# Patient Record
Sex: FEMALE | Race: WHITE | URBAN - METROPOLITAN AREA
[De-identification: names, ages, dates, MRNs, and addresses within clinical notes are randomized per-mention and may not be internally consistent; named-entity substitution may affect disease eponyms.]

---

## 2022-01-19 NOTE — ASU PATIENT PROFILE, ADULT - NSICDXPASTMEDICALHX_GEN_ALL_CORE_FT
PAST MEDICAL HISTORY:  Diverticulitis     High cholesterol     History of diverticulitis     Hypertension     Hypothyroidism

## 2022-01-19 NOTE — ASU PATIENT PROFILE, ADULT - NSICDXPASTSURGICALHX_GEN_ALL_CORE_FT
PAST SURGICAL HISTORY:  H/O bilateral oophorectomy     History of arthroscopy     History of laparotomy     History of tonsillectomy     S/P surgical removal of pilonidal cyst

## 2022-01-19 NOTE — ASU PATIENT PROFILE, ADULT - FALL HARM RISK - UNIVERSAL INTERVENTIONS
Bed in lowest position, wheels locked, appropriate side rails in place/Call bell, personal items and telephone in reach/Instruct patient to call for assistance before getting out of bed or chair/Non-slip footwear when patient is out of bed/[place] to call system/Physically safe environment - no spills, clutter or unnecessary equipment/Purposeful Proactive Rounding/Room/bathroom lighting operational, light cord in reach

## 2022-01-19 NOTE — ASU PATIENT PROFILE, ADULT - CAREGIVER PHONE NUMBER
Verified Results  * CT CHEST ABDOMEN PELVIS W CONTRAST [id number] 08:35AM [employer] Order Number: [id number]    - Patient Instructions: To schedule this appointment, please contact Central Scheduling at [phone]  Test Name Result Flag Reference   CT CHEST ABDOMEN PELVIS W CONTRAST (Report)     Additionally, delayed helical CT images of the abdomen were acquired  There is normal opacification of the renal collecting system and proximal ureters, without filling defect  The addendum does not change the impression of the original report  CT CHEST, ABDOMEN AND PELVIS WITH IV CONTRAST     INDICATION: 61-year-old woman with solitary pulmonary nodule and weight loss  COMPARISON: Chest CT 3/12/2015  Chest CT 3/21/2013  TECHNIQUE: CT examination of the chest, abdomen and pelvis was performed  This examination, like all CT scans performed in the [facility], was performed utilizing techniques to minimize radiation dose exposure, including the use of    iterative reconstruction and automated exposure control  Axial, sagittal, and coronal reformatted images were submitted for interpretation  100 cc of intravenous Omnipaque 350 was administered for this examination  Enteric contrast was administered  FINDINGS:     CHEST     LUNGS: 2 mm left lobe pulmonary nodule is unchanged since 2013 (series 3 image 29)  The irregular signal near nodule within the left lower lobe is unchanged since 2013 (series 3 image 44)  There are no new or suspicious pulmonary nodules  There is no   tracheal or endobronchial lesion  PLEURA: Unremarkable  HEART/GREAT VESSELS: Unremarkable for patient's age  Normal variant branching pattern of the aortic arch with left vertebral artery arising directly from the arch  MEDIASTINUM AND ISABEL: Unremarkable  CHEST WALL AND LOWER NECK: Unremarkable       ABDOMEN     LIVER/BILIARY TREE: A few hepatic hypodensities are too small to categorize, but are statistically benign representing cysts and are unchanged  GALLBLADDER: No calcified gallstones  No pericholecystic inflammatory change  SPLEEN: Unremarkable  PANCREAS: Unremarkable  ADRENAL GLANDS: Thickening of the left adrenal gland is unchanged which may represent adrenal hyperplasia  Right adrenal is unremarkable  KIDNEYS/URETERS: Unremarkable  No hydronephrosis  STOMACH AND BOWEL: Sigmoid and left colon diverticulosis without evidence of diverticulitis  APPENDIX: A normal appendix was visualized  ABDOMINOPELVIC CAVITY: No ascites or free intraperitoneal air  No lymphadenopathy  VESSELS: Unremarkable for patient's age  PELVIS     REPRODUCTIVE ORGANS: Unremarkable for patient's age  URINARY BLADDER: Unremarkable  ABDOMINAL WALL/INGUINAL REGIONS: Unremarkable  OSSEOUS STRUCTURES: There are no osseous aggressive lesions  Anterior compression deformity of T11 with 20% loss of height anteriorly is unchanged  IMPRESSION:   Chest:   1  Bilateral pulmonary nodules are unchanged since 2013 and radiographically benign  Abdomen:   1  No acute inflammatory process in the abdomen or pelvis  2  Sigmoid diverticulosis without diverticulitis  3  Anterior compression deformity of T11 with 20% loss of height anteriorly is unchanged         Workstation performed: [id number]     Signed by:   Mary Miller MD   8/17/16   100 [phone]

## 2022-01-20 ENCOUNTER — OUTPATIENT (OUTPATIENT)
Dept: INPATIENT UNIT | Facility: HOSPITAL | Age: 77
LOS: 1 days | Discharge: HOME | End: 2022-01-20
Payer: MEDICARE

## 2022-01-20 VITALS
DIASTOLIC BLOOD PRESSURE: 75 MMHG | HEART RATE: 97 BPM | TEMPERATURE: 98 F | WEIGHT: 125 LBS | OXYGEN SATURATION: 100 % | SYSTOLIC BLOOD PRESSURE: 124 MMHG | HEIGHT: 63 IN | RESPIRATION RATE: 16 BRPM

## 2022-01-20 VITALS
SYSTOLIC BLOOD PRESSURE: 127 MMHG | OXYGEN SATURATION: 100 % | HEART RATE: 97 BPM | RESPIRATION RATE: 16 BRPM | DIASTOLIC BLOOD PRESSURE: 83 MMHG | TEMPERATURE: 98 F

## 2022-01-20 DIAGNOSIS — Z98.890 OTHER SPECIFIED POSTPROCEDURAL STATES: Chronic | ICD-10-CM

## 2022-01-20 DIAGNOSIS — R16.0 HEPATOMEGALY, NOT ELSEWHERE CLASSIFIED: ICD-10-CM

## 2022-01-20 DIAGNOSIS — Z90.89 ACQUIRED ABSENCE OF OTHER ORGANS: Chronic | ICD-10-CM

## 2022-01-20 DIAGNOSIS — Z90.722 ACQUIRED ABSENCE OF OVARIES, BILATERAL: Chronic | ICD-10-CM

## 2022-01-20 DIAGNOSIS — C22.0 LIVER CELL CARCINOMA: ICD-10-CM

## 2022-01-20 LAB
ANION GAP SERPL CALC-SCNC: 8 MMOL/L — SIGNIFICANT CHANGE UP (ref 5–17)
BUN SERPL-MCNC: 25 MG/DL — HIGH (ref 7–23)
CALCIUM SERPL-MCNC: 10.3 MG/DL — HIGH (ref 8.5–10.1)
CHLORIDE SERPL-SCNC: 106 MMOL/L — SIGNIFICANT CHANGE UP (ref 96–108)
CO2 SERPL-SCNC: 24 MMOL/L — SIGNIFICANT CHANGE UP (ref 22–31)
CREAT SERPL-MCNC: 1.18 MG/DL — SIGNIFICANT CHANGE UP (ref 0.5–1.3)
GLUCOSE SERPL-MCNC: 97 MG/DL — SIGNIFICANT CHANGE UP (ref 70–99)
HCT VFR BLD CALC: 36.9 % — SIGNIFICANT CHANGE UP (ref 34.5–45)
HGB BLD-MCNC: 12 G/DL — SIGNIFICANT CHANGE UP (ref 11.5–15.5)
INR BLD: 1.05 RATIO — SIGNIFICANT CHANGE UP (ref 0.88–1.16)
MCHC RBC-ENTMCNC: 28.4 PG — SIGNIFICANT CHANGE UP (ref 27–34)
MCHC RBC-ENTMCNC: 32.5 GM/DL — SIGNIFICANT CHANGE UP (ref 32–36)
MCV RBC AUTO: 87.4 FL — SIGNIFICANT CHANGE UP (ref 80–100)
PLATELET # BLD AUTO: 348 K/UL — SIGNIFICANT CHANGE UP (ref 150–400)
POTASSIUM SERPL-MCNC: 4.2 MMOL/L — SIGNIFICANT CHANGE UP (ref 3.5–5.3)
POTASSIUM SERPL-SCNC: 4.2 MMOL/L — SIGNIFICANT CHANGE UP (ref 3.5–5.3)
PROTHROM AB SERPL-ACNC: 12.1 SEC — SIGNIFICANT CHANGE UP (ref 10.6–13.6)
RBC # BLD: 4.22 M/UL — SIGNIFICANT CHANGE UP (ref 3.8–5.2)
RBC # FLD: 13.2 % — SIGNIFICANT CHANGE UP (ref 10.3–14.5)
SODIUM SERPL-SCNC: 138 MMOL/L — SIGNIFICANT CHANGE UP (ref 135–145)
WBC # BLD: 8.89 K/UL — SIGNIFICANT CHANGE UP (ref 3.8–10.5)
WBC # FLD AUTO: 8.89 K/UL — SIGNIFICANT CHANGE UP (ref 3.8–10.5)

## 2022-01-20 PROCEDURE — 88172 CYTP DX EVAL FNA 1ST EA SITE: CPT

## 2022-01-20 PROCEDURE — 88342 IMHCHEM/IMCYTCHM 1ST ANTB: CPT

## 2022-01-20 PROCEDURE — 88341 IMHCHEM/IMCYTCHM EA ADD ANTB: CPT | Mod: 26

## 2022-01-20 PROCEDURE — 36415 COLL VENOUS BLD VENIPUNCTURE: CPT

## 2022-01-20 PROCEDURE — 88333 PATH CONSLTJ SURG CYTO XM 1: CPT | Mod: 26

## 2022-01-20 PROCEDURE — 88342 IMHCHEM/IMCYTCHM 1ST ANTB: CPT | Mod: 26

## 2022-01-20 PROCEDURE — 88307 TISSUE EXAM BY PATHOLOGIST: CPT

## 2022-01-20 PROCEDURE — 88360 TUMOR IMMUNOHISTOCHEM/MANUAL: CPT

## 2022-01-20 PROCEDURE — 76942 ECHO GUIDE FOR BIOPSY: CPT | Mod: 26

## 2022-01-20 PROCEDURE — 88307 TISSUE EXAM BY PATHOLOGIST: CPT | Mod: 26

## 2022-01-20 PROCEDURE — 88341 IMHCHEM/IMCYTCHM EA ADD ANTB: CPT

## 2022-01-20 PROCEDURE — 47000 NEEDLE BIOPSY OF LIVER PERQ: CPT

## 2022-01-20 PROCEDURE — 93010 ELECTROCARDIOGRAM REPORT: CPT

## 2022-01-20 PROCEDURE — 85027 COMPLETE CBC AUTOMATED: CPT

## 2022-01-20 PROCEDURE — 76942 ECHO GUIDE FOR BIOPSY: CPT

## 2022-01-20 PROCEDURE — 80048 BASIC METABOLIC PNL TOTAL CA: CPT

## 2022-01-20 PROCEDURE — 88333 PATH CONSLTJ SURG CYTO XM 1: CPT | Mod: 59

## 2022-01-20 PROCEDURE — 93005 ELECTROCARDIOGRAM TRACING: CPT | Mod: XU

## 2022-01-20 PROCEDURE — 85610 PROTHROMBIN TIME: CPT

## 2022-01-20 RX ORDER — MEPERIDINE HYDROCHLORIDE 50 MG/ML
12.5 INJECTION INTRAMUSCULAR; INTRAVENOUS; SUBCUTANEOUS
Refills: 0 | Status: DISCONTINUED | OUTPATIENT
Start: 2022-01-20 | End: 2022-01-20

## 2022-01-20 RX ORDER — ONDANSETRON 8 MG/1
4 TABLET, FILM COATED ORAL ONCE
Refills: 0 | Status: COMPLETED | OUTPATIENT
Start: 2022-01-20 | End: 2022-01-20

## 2022-01-20 RX ORDER — HYDROMORPHONE HYDROCHLORIDE 2 MG/ML
0.5 INJECTION INTRAMUSCULAR; INTRAVENOUS; SUBCUTANEOUS
Refills: 0 | Status: DISCONTINUED | OUTPATIENT
Start: 2022-01-20 | End: 2022-01-20

## 2022-01-20 RX ORDER — OXYCODONE HYDROCHLORIDE 5 MG/1
5 TABLET ORAL ONCE
Refills: 0 | Status: DISCONTINUED | OUTPATIENT
Start: 2022-01-20 | End: 2022-01-20

## 2022-01-20 RX ORDER — SODIUM CHLORIDE 9 MG/ML
1000 INJECTION INTRAMUSCULAR; INTRAVENOUS; SUBCUTANEOUS
Refills: 0 | Status: DISCONTINUED | OUTPATIENT
Start: 2022-01-20 | End: 2022-01-20

## 2022-01-20 RX ORDER — ACETAMINOPHEN 500 MG
1000 TABLET ORAL ONCE
Refills: 0 | Status: COMPLETED | OUTPATIENT
Start: 2022-01-20 | End: 2022-01-20

## 2022-01-20 RX ORDER — ERGOCALCIFEROL 1.25 MG/1
1 CAPSULE ORAL
Qty: 0 | Refills: 0 | DISCHARGE

## 2022-01-20 RX ADMIN — ONDANSETRON 4 MILLIGRAM(S): 8 TABLET, FILM COATED ORAL at 11:17

## 2022-01-20 RX ADMIN — Medication 400 MILLIGRAM(S): at 09:12

## 2022-01-20 RX ADMIN — Medication 1000 MILLIGRAM(S): at 09:27

## 2022-01-20 RX ADMIN — ONDANSETRON 4 MILLIGRAM(S): 8 TABLET, FILM COATED ORAL at 11:54

## 2022-01-20 RX ADMIN — HYDROMORPHONE HYDROCHLORIDE 0.5 MILLIGRAM(S): 2 INJECTION INTRAMUSCULAR; INTRAVENOUS; SUBCUTANEOUS at 09:25

## 2022-01-20 RX ADMIN — HYDROMORPHONE HYDROCHLORIDE 0.5 MILLIGRAM(S): 2 INJECTION INTRAMUSCULAR; INTRAVENOUS; SUBCUTANEOUS at 09:40

## 2022-01-20 NOTE — ASU DISCHARGE PLAN (ADULT/PEDIATRIC) - NS MD DC FALL RISK RISK
For information on Fall & Injury Prevention, visit: [email or web address] OR  [email or web address] OR  https://www.cdc.gov/steadi/patient.html

## 2022-06-27 RX ORDER — FLUTICASONE PROPIONATE 50 MCG
SPRAY, SUSPENSION (ML) NASAL
COMMUNITY
Start: 2021-11-23

## 2022-06-27 RX ORDER — LEVOTHYROXINE SODIUM 0.03 MG/1
TABLET ORAL
COMMUNITY

## 2022-06-27 RX ORDER — OLMESARTAN MEDOXOMIL 20 MG/1
TABLET ORAL
COMMUNITY

## 2022-11-15 ENCOUNTER — OFFICE (OUTPATIENT)
Dept: URBAN - METROPOLITAN AREA CLINIC 35 | Facility: CLINIC | Age: 77
Setting detail: OPHTHALMOLOGY
End: 2022-11-15
Payer: MEDICARE

## 2022-11-15 VITALS — HEIGHT: 55 IN

## 2022-11-15 DIAGNOSIS — H43.392: ICD-10-CM

## 2022-11-15 DIAGNOSIS — H26.493: ICD-10-CM

## 2022-11-15 DIAGNOSIS — H35.361: ICD-10-CM

## 2022-11-15 DIAGNOSIS — Z96.1: ICD-10-CM

## 2022-11-15 PROCEDURE — 92014 COMPRE OPH EXAM EST PT 1/>: CPT | Performed by: OPHTHALMOLOGY

## 2022-11-15 ASSESSMENT — REFRACTION_CURRENTRX
OS_OVR_VA: 20/
OD_OVR_VA: 20/
OD_VPRISM_DIRECTION: SV
OS_VPRISM_DIRECTION: SV
OS_SPHERE: +3.25
OD_SPHERE: +3.25

## 2022-11-15 ASSESSMENT — REFRACTION_AUTOREFRACTION
OS_CYLINDER: -1.00
OS_SPHERE: PLANO
OD_CYLINDER: -1.50
OD_AXIS: 094
OD_SPHERE: -0.25
OS_AXIS: 069

## 2022-11-15 ASSESSMENT — SPHEQUIV_DERIVED
OS_SPHEQUIV: -1
OD_SPHEQUIV: -1
OD_SPHEQUIV: -1
OS_SPHEQUIV: -0.5

## 2022-11-15 ASSESSMENT — KERATOMETRY
METHOD_AUTO_MANUAL: AUTO
OD_AXISANGLE_DEGREES: 168
OD_K1POWER_DIOPTERS: 41.25
OS_K2POWER_DIOPTERS: 41.25
OS_AXISANGLE_DEGREES: 090
OD_K2POWER_DIOPTERS: 41.75
OS_K1POWER_DIOPTERS: 41.25

## 2022-11-15 ASSESSMENT — AXIALLENGTH_DERIVED
OD_AL: 24.7724
OS_AL: 24.6594
OD_AL: 24.7724
OS_AL: 24.874

## 2022-11-15 ASSESSMENT — REFRACTION_MANIFEST
OD_CYLINDER: -1.00
OD_AXIS: 090
OD_VA1: 20/20
OS_CYLINDER: -1.00
OS_CYLINDER: -0.50
OS_VA1: 20/20-
OS_SPHERE: -0.50
OD_SPHERE: -0.50
OS_AXIS: 090
OS_SPHERE: -0.25
OS_VA1: 20/40
OS_AXIS: 080

## 2022-11-15 ASSESSMENT — VISUAL ACUITY
OS_BCVA: 20/20
OD_BCVA: 20/20-3

## 2022-11-15 ASSESSMENT — TONOMETRY
OS_IOP_MMHG: 16
OD_IOP_MMHG: 16

## 2022-11-15 ASSESSMENT — CONFRONTATIONAL VISUAL FIELD TEST (CVF)
OD_FINDINGS: FULL
OS_FINDINGS: FULL

## 2023-05-25 PROBLEM — K57.92 DIVERTICULITIS OF INTESTINE, PART UNSPECIFIED, WITHOUT PERFORATION OR ABSCESS WITHOUT BLEEDING: Chronic | Status: ACTIVE | Noted: 2022-01-19

## 2023-05-25 PROBLEM — E03.9 HYPOTHYROIDISM, UNSPECIFIED: Chronic | Status: ACTIVE | Noted: 2022-01-19

## 2023-05-25 PROBLEM — Z00.00 ENCOUNTER FOR PREVENTIVE HEALTH EXAMINATION: Status: ACTIVE | Noted: 2023-05-25

## 2023-05-25 PROBLEM — I10 ESSENTIAL (PRIMARY) HYPERTENSION: Chronic | Status: ACTIVE | Noted: 2022-01-19

## 2023-05-25 PROBLEM — Z87.19 PERSONAL HISTORY OF OTHER DISEASES OF THE DIGESTIVE SYSTEM: Chronic | Status: ACTIVE | Noted: 2022-01-19

## 2023-05-25 PROBLEM — E78.00 PURE HYPERCHOLESTEROLEMIA, UNSPECIFIED: Chronic | Status: ACTIVE | Noted: 2022-01-19

## 2023-05-30 ENCOUNTER — APPOINTMENT (OUTPATIENT)
Dept: ORTHOPEDIC SURGERY | Facility: CLINIC | Age: 78
End: 2023-05-30
Payer: MEDICARE

## 2023-05-30 ENCOUNTER — NON-APPOINTMENT (OUTPATIENT)
Age: 78
End: 2023-05-30

## 2023-05-30 VITALS
BODY MASS INDEX: 22.84 KG/M2 | DIASTOLIC BLOOD PRESSURE: 67 MMHG | HEIGHT: 61 IN | SYSTOLIC BLOOD PRESSURE: 103 MMHG | HEART RATE: 79 BPM | WEIGHT: 121 LBS

## 2023-05-30 PROCEDURE — 72100 X-RAY EXAM L-S SPINE 2/3 VWS: CPT

## 2023-05-30 PROCEDURE — 99204 OFFICE O/P NEW MOD 45 MIN: CPT

## 2023-05-30 RX ORDER — METHYLPREDNISOLONE 4 MG/1
4 TABLET ORAL
Qty: 1 | Refills: 0 | Status: ACTIVE | COMMUNITY
Start: 2023-05-30 | End: 1900-01-01

## 2023-05-30 NOTE — DISCUSSION/SUMMARY
[de-identified] : Lumbar radiculopathy, degenerative disc disease, spondylosis\par \par Extensive discussion of the natural history of this issue was had with the patient.  We discussed the treatment options focusing on conservative therapy which includes anti-inflammatories, physical therapy/home exercise, & activity modification. \par -A prescription for a Medrol Dosepak with the appropriate warnings and side effects was given to the patient.  I explicitly discussed she must call her oncologist first to discuss if it is okay with him that she take this steroid prior to beginning this to ensure it does not interfere with her other medications.\par -Physical therapy prescription was given to the patient.\par Patient will follow-up in 1 to 2 months if the pain returns or does not improve.

## 2023-05-30 NOTE — HISTORY OF PRESENT ILLNESS
[de-identified] : 5/30/2023–patient presents with 3 weeks of low back pain rating down her right leg into her anterior thigh.  Also complaining of some numbness on her right shin.  States she woke up in the morning 1 morning with this pain.  Taking Advil for the pain.  However she is on Opdivo once a month for renal cell cancer was told she should not take this sparingly.  Did receive a shot of Toradol in urgent care 3 weeks ago which did not help.  Denies red flag symptoms.  Multiple allergies as listed in chart, denies tobacco use.  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

## 2023-05-30 NOTE — PHYSICAL EXAM
[de-identified] : General Appearance: normal without acute distress\par Mental: Alert and oriented x 3\par Psych/affect: appropriate, cooperative\par Gait: Normal gait\par \par Lumbar spine\par Palpation: Nontender to palpation\par Motor: 5/5 L2-S1\par Sensory: 2/2 L2-S1 except 1/2 L4 on right\par Straight leg raise: Negative\par Clonus: < 5 beats [de-identified] : 5/30/2023–AP lateral lumbar spine–scoliotic curvature with significant spondylosis and degenerative disc disease

## 2023-10-25 DIAGNOSIS — M54.16 RADICULOPATHY, LUMBAR REGION: ICD-10-CM

## 2023-11-16 ENCOUNTER — OFFICE (OUTPATIENT)
Dept: URBAN - METROPOLITAN AREA CLINIC 35 | Facility: CLINIC | Age: 78
Setting detail: OPHTHALMOLOGY
End: 2023-11-16
Payer: MEDICARE

## 2023-11-16 DIAGNOSIS — Z96.1: ICD-10-CM

## 2023-11-16 DIAGNOSIS — H43.393: ICD-10-CM

## 2023-11-16 DIAGNOSIS — H35.363: ICD-10-CM

## 2023-11-16 DIAGNOSIS — H26.493: ICD-10-CM

## 2023-11-16 PROCEDURE — 92012 INTRM OPH EXAM EST PATIENT: CPT | Performed by: OPHTHALMOLOGY

## 2023-11-16 PROCEDURE — 92250 FUNDUS PHOTOGRAPHY W/I&R: CPT | Performed by: OPHTHALMOLOGY

## 2023-11-16 ASSESSMENT — REFRACTION_MANIFEST
OS_SPHERE: -0.25
OD_VA1: 20/20
OD_AXIS: 090
OD_CYLINDER: -1.00
OS_AXIS: 090
OS_CYLINDER: -0.50
OD_SPHERE: -0.50
OS_VA1: 20/40
OS_SPHERE: -0.50
OS_AXIS: 080
OS_CYLINDER: -1.00
OS_VA1: 20/20-

## 2023-11-16 ASSESSMENT — REFRACTION_AUTOREFRACTION
OS_AXIS: 070
OD_SPHERE: -0.50
OS_CYLINDER: -1.00
OD_CYLINDER: -1.25
OD_AXIS: 088
OS_SPHERE: PLANO

## 2023-11-16 ASSESSMENT — SPHEQUIV_DERIVED
OS_SPHEQUIV: -0.5
OS_SPHEQUIV: -1
OD_SPHEQUIV: -1
OD_SPHEQUIV: -1.125

## 2023-11-16 ASSESSMENT — CONFRONTATIONAL VISUAL FIELD TEST (CVF)
OD_FINDINGS: FULL
OS_FINDINGS: FULL

## 2023-11-16 ASSESSMENT — REFRACTION_CURRENTRX
OS_SPHERE: +2.25
OD_SPHERE: +2.25
OD_OVR_VA: 20/
OS_OVR_VA: 20/
OS_VPRISM_DIRECTION: SV
OD_VPRISM_DIRECTION: SV

## 2023-12-05 ENCOUNTER — APPOINTMENT (OUTPATIENT)
Dept: VASCULAR SURGERY | Facility: CLINIC | Age: 78
End: 2023-12-05
Payer: MEDICARE

## 2023-12-05 DIAGNOSIS — M79.606 PAIN IN LEG, UNSPECIFIED: ICD-10-CM

## 2023-12-05 PROCEDURE — 93970 EXTREMITY STUDY: CPT

## 2023-12-05 PROCEDURE — 93922 UPR/L XTREMITY ART 2 LEVELS: CPT

## 2023-12-05 PROCEDURE — 99203 OFFICE O/P NEW LOW 30 MIN: CPT

## 2023-12-05 RX ORDER — GABAPENTIN 300 MG/1
300 CAPSULE ORAL DAILY
Qty: 30 | Refills: 0 | Status: ACTIVE | COMMUNITY
Start: 2023-12-05 | End: 1900-01-01

## 2023-12-05 RX ORDER — OLMESARTAN MEDOXOMIL 20 MG/1
20 TABLET, FILM COATED ORAL
Refills: 0 | Status: ACTIVE | COMMUNITY

## 2023-12-05 RX ORDER — LEVOTHYROXINE SODIUM 25 UG/1
25 TABLET ORAL
Refills: 0 | Status: ACTIVE | COMMUNITY

## 2023-12-08 PROBLEM — M79.606 LEG PAIN: Status: ACTIVE | Noted: 2023-12-05

## 2023-12-13 ENCOUNTER — OFFICE (OUTPATIENT)
Dept: URBAN - METROPOLITAN AREA CLINIC 35 | Facility: CLINIC | Age: 78
Setting detail: OPHTHALMOLOGY
End: 2023-12-13
Payer: MEDICARE

## 2023-12-13 DIAGNOSIS — H26.493: ICD-10-CM

## 2023-12-13 DIAGNOSIS — H11.31: ICD-10-CM

## 2023-12-13 DIAGNOSIS — Z96.1: ICD-10-CM

## 2023-12-13 PROCEDURE — 99213 OFFICE O/P EST LOW 20 MIN: CPT | Performed by: OPHTHALMOLOGY

## 2023-12-13 ASSESSMENT — REFRACTION_MANIFEST
OS_VA1: 20/40
OD_CYLINDER: -1.00
OS_AXIS: 080
OD_VA1: 20/20
OS_SPHERE: -0.25
OS_VA1: 20/20-
OS_CYLINDER: -1.00
OS_CYLINDER: -0.50
OD_AXIS: 090
OS_SPHERE: -0.50
OS_AXIS: 090
OD_SPHERE: -0.50

## 2023-12-13 ASSESSMENT — REFRACTION_CURRENTRX
OS_SPHERE: +2.25
OS_VPRISM_DIRECTION: SV
OD_VPRISM_DIRECTION: SV
OD_OVR_VA: 20/
OD_SPHERE: +2.25
OS_OVR_VA: 20/

## 2023-12-13 ASSESSMENT — SPHEQUIV_DERIVED
OS_SPHEQUIV: -1
OS_SPHEQUIV: -0.5
OD_SPHEQUIV: -1

## 2023-12-13 ASSESSMENT — CONFRONTATIONAL VISUAL FIELD TEST (CVF)
OD_FINDINGS: FULL
OS_FINDINGS: FULL

## 2023-12-17 RX ORDER — TIVOZANIB 1.34 MG/1
1 CAPSULE ORAL
Refills: 0 | DISCHARGE
Start: 2023-12-17

## 2023-12-20 ENCOUNTER — INPATIENT (INPATIENT)
Facility: HOSPITAL | Age: 78
LOS: 2 days | Discharge: HOME | DRG: 543 | End: 2023-12-23
Attending: INTERNAL MEDICINE | Admitting: HOSPITALIST
Payer: MEDICARE

## 2023-12-20 VITALS — WEIGHT: 138.01 LBS

## 2023-12-20 DIAGNOSIS — Z98.890 OTHER SPECIFIED POSTPROCEDURAL STATES: Chronic | ICD-10-CM

## 2023-12-20 DIAGNOSIS — Z90.89 ACQUIRED ABSENCE OF OTHER ORGANS: Chronic | ICD-10-CM

## 2023-12-20 DIAGNOSIS — Z90.722 ACQUIRED ABSENCE OF OVARIES, BILATERAL: Chronic | ICD-10-CM

## 2023-12-20 DIAGNOSIS — S32.000A WEDGE COMPRESSION FRACTURE OF UNSPECIFIED LUMBAR VERTEBRA, INITIAL ENCOUNTER FOR CLOSED FRACTURE: ICD-10-CM

## 2023-12-20 LAB
ABO RH CONFIRMATION: SIGNIFICANT CHANGE UP
ABO RH CONFIRMATION: SIGNIFICANT CHANGE UP
ALBUMIN SERPL ELPH-MCNC: 3.1 G/DL — LOW (ref 3.3–5)
ALBUMIN SERPL ELPH-MCNC: 3.1 G/DL — LOW (ref 3.3–5)
ALP SERPL-CCNC: 125 U/L — HIGH (ref 40–120)
ALP SERPL-CCNC: 125 U/L — HIGH (ref 40–120)
ALT FLD-CCNC: 39 U/L — SIGNIFICANT CHANGE UP (ref 12–78)
ALT FLD-CCNC: 39 U/L — SIGNIFICANT CHANGE UP (ref 12–78)
ANION GAP SERPL CALC-SCNC: 6 MMOL/L — SIGNIFICANT CHANGE UP (ref 5–17)
ANION GAP SERPL CALC-SCNC: 6 MMOL/L — SIGNIFICANT CHANGE UP (ref 5–17)
APPEARANCE UR: CLEAR — SIGNIFICANT CHANGE UP
APPEARANCE UR: CLEAR — SIGNIFICANT CHANGE UP
APTT BLD: 28.6 SEC — SIGNIFICANT CHANGE UP (ref 24.5–35.6)
APTT BLD: 28.6 SEC — SIGNIFICANT CHANGE UP (ref 24.5–35.6)
AST SERPL-CCNC: 33 U/L — SIGNIFICANT CHANGE UP (ref 15–37)
AST SERPL-CCNC: 33 U/L — SIGNIFICANT CHANGE UP (ref 15–37)
BASOPHILS # BLD AUTO: 0.02 K/UL — SIGNIFICANT CHANGE UP (ref 0–0.2)
BASOPHILS # BLD AUTO: 0.02 K/UL — SIGNIFICANT CHANGE UP (ref 0–0.2)
BASOPHILS NFR BLD AUTO: 0.2 % — SIGNIFICANT CHANGE UP (ref 0–2)
BASOPHILS NFR BLD AUTO: 0.2 % — SIGNIFICANT CHANGE UP (ref 0–2)
BILIRUB SERPL-MCNC: 0.6 MG/DL — SIGNIFICANT CHANGE UP (ref 0.2–1.2)
BILIRUB SERPL-MCNC: 0.6 MG/DL — SIGNIFICANT CHANGE UP (ref 0.2–1.2)
BILIRUB UR-MCNC: NEGATIVE — SIGNIFICANT CHANGE UP
BILIRUB UR-MCNC: NEGATIVE — SIGNIFICANT CHANGE UP
BLD GP AB SCN SERPL QL: SIGNIFICANT CHANGE UP
BLD GP AB SCN SERPL QL: SIGNIFICANT CHANGE UP
BUN SERPL-MCNC: 37 MG/DL — HIGH (ref 7–23)
BUN SERPL-MCNC: 37 MG/DL — HIGH (ref 7–23)
CALCIUM SERPL-MCNC: 8.9 MG/DL — SIGNIFICANT CHANGE UP (ref 8.5–10.1)
CALCIUM SERPL-MCNC: 8.9 MG/DL — SIGNIFICANT CHANGE UP (ref 8.5–10.1)
CHLORIDE SERPL-SCNC: 104 MMOL/L — SIGNIFICANT CHANGE UP (ref 96–108)
CHLORIDE SERPL-SCNC: 104 MMOL/L — SIGNIFICANT CHANGE UP (ref 96–108)
CO2 SERPL-SCNC: 23 MMOL/L — SIGNIFICANT CHANGE UP (ref 22–31)
CO2 SERPL-SCNC: 23 MMOL/L — SIGNIFICANT CHANGE UP (ref 22–31)
COLOR SPEC: YELLOW — SIGNIFICANT CHANGE UP
COLOR SPEC: YELLOW — SIGNIFICANT CHANGE UP
CREAT SERPL-MCNC: 1.1 MG/DL — SIGNIFICANT CHANGE UP (ref 0.5–1.3)
CREAT SERPL-MCNC: 1.1 MG/DL — SIGNIFICANT CHANGE UP (ref 0.5–1.3)
DIFF PNL FLD: NEGATIVE — SIGNIFICANT CHANGE UP
DIFF PNL FLD: NEGATIVE — SIGNIFICANT CHANGE UP
EGFR: 52 ML/MIN/1.73M2 — LOW
EGFR: 52 ML/MIN/1.73M2 — LOW
EOSINOPHIL # BLD AUTO: 0 K/UL — SIGNIFICANT CHANGE UP (ref 0–0.5)
EOSINOPHIL # BLD AUTO: 0 K/UL — SIGNIFICANT CHANGE UP (ref 0–0.5)
EOSINOPHIL NFR BLD AUTO: 0 % — SIGNIFICANT CHANGE UP (ref 0–6)
EOSINOPHIL NFR BLD AUTO: 0 % — SIGNIFICANT CHANGE UP (ref 0–6)
GLUCOSE SERPL-MCNC: 115 MG/DL — HIGH (ref 70–99)
GLUCOSE SERPL-MCNC: 115 MG/DL — HIGH (ref 70–99)
GLUCOSE UR QL: NEGATIVE MG/DL — SIGNIFICANT CHANGE UP
GLUCOSE UR QL: NEGATIVE MG/DL — SIGNIFICANT CHANGE UP
HCT VFR BLD CALC: 51.3 % — HIGH (ref 34.5–45)
HCT VFR BLD CALC: 51.3 % — HIGH (ref 34.5–45)
HGB BLD-MCNC: 17.6 G/DL — HIGH (ref 11.5–15.5)
HGB BLD-MCNC: 17.6 G/DL — HIGH (ref 11.5–15.5)
IMM GRANULOCYTES NFR BLD AUTO: 0.3 % — SIGNIFICANT CHANGE UP (ref 0–0.9)
IMM GRANULOCYTES NFR BLD AUTO: 0.3 % — SIGNIFICANT CHANGE UP (ref 0–0.9)
INR BLD: 0.97 RATIO — SIGNIFICANT CHANGE UP (ref 0.85–1.18)
INR BLD: 0.97 RATIO — SIGNIFICANT CHANGE UP (ref 0.85–1.18)
KETONES UR-MCNC: ABNORMAL MG/DL
KETONES UR-MCNC: ABNORMAL MG/DL
LEUKOCYTE ESTERASE UR-ACNC: NEGATIVE — SIGNIFICANT CHANGE UP
LEUKOCYTE ESTERASE UR-ACNC: NEGATIVE — SIGNIFICANT CHANGE UP
LYMPHOCYTES # BLD AUTO: 0.5 K/UL — LOW (ref 1–3.3)
LYMPHOCYTES # BLD AUTO: 0.5 K/UL — LOW (ref 1–3.3)
LYMPHOCYTES # BLD AUTO: 3.8 % — LOW (ref 13–44)
LYMPHOCYTES # BLD AUTO: 3.8 % — LOW (ref 13–44)
MCHC RBC-ENTMCNC: 29.6 PG — SIGNIFICANT CHANGE UP (ref 27–34)
MCHC RBC-ENTMCNC: 29.6 PG — SIGNIFICANT CHANGE UP (ref 27–34)
MCHC RBC-ENTMCNC: 34.3 GM/DL — SIGNIFICANT CHANGE UP (ref 32–36)
MCHC RBC-ENTMCNC: 34.3 GM/DL — SIGNIFICANT CHANGE UP (ref 32–36)
MCV RBC AUTO: 86.4 FL — SIGNIFICANT CHANGE UP (ref 80–100)
MCV RBC AUTO: 86.4 FL — SIGNIFICANT CHANGE UP (ref 80–100)
MONOCYTES # BLD AUTO: 0.13 K/UL — SIGNIFICANT CHANGE UP (ref 0–0.9)
MONOCYTES # BLD AUTO: 0.13 K/UL — SIGNIFICANT CHANGE UP (ref 0–0.9)
MONOCYTES NFR BLD AUTO: 1 % — LOW (ref 2–14)
MONOCYTES NFR BLD AUTO: 1 % — LOW (ref 2–14)
NEUTROPHILS # BLD AUTO: 12.63 K/UL — HIGH (ref 1.8–7.4)
NEUTROPHILS # BLD AUTO: 12.63 K/UL — HIGH (ref 1.8–7.4)
NEUTROPHILS NFR BLD AUTO: 94.7 % — HIGH (ref 43–77)
NEUTROPHILS NFR BLD AUTO: 94.7 % — HIGH (ref 43–77)
NITRITE UR-MCNC: NEGATIVE — SIGNIFICANT CHANGE UP
NITRITE UR-MCNC: NEGATIVE — SIGNIFICANT CHANGE UP
PH UR: 5.5 — SIGNIFICANT CHANGE UP (ref 5–8)
PH UR: 5.5 — SIGNIFICANT CHANGE UP (ref 5–8)
PLATELET # BLD AUTO: 379 K/UL — SIGNIFICANT CHANGE UP (ref 150–400)
PLATELET # BLD AUTO: 379 K/UL — SIGNIFICANT CHANGE UP (ref 150–400)
POTASSIUM SERPL-MCNC: 4.2 MMOL/L — SIGNIFICANT CHANGE UP (ref 3.5–5.3)
POTASSIUM SERPL-MCNC: 4.2 MMOL/L — SIGNIFICANT CHANGE UP (ref 3.5–5.3)
POTASSIUM SERPL-SCNC: 4.2 MMOL/L — SIGNIFICANT CHANGE UP (ref 3.5–5.3)
POTASSIUM SERPL-SCNC: 4.2 MMOL/L — SIGNIFICANT CHANGE UP (ref 3.5–5.3)
PROT SERPL-MCNC: 6.5 GM/DL — SIGNIFICANT CHANGE UP (ref 6–8.3)
PROT SERPL-MCNC: 6.5 GM/DL — SIGNIFICANT CHANGE UP (ref 6–8.3)
PROT UR-MCNC: NEGATIVE MG/DL — SIGNIFICANT CHANGE UP
PROT UR-MCNC: NEGATIVE MG/DL — SIGNIFICANT CHANGE UP
PROTHROM AB SERPL-ACNC: 11 SEC — SIGNIFICANT CHANGE UP (ref 9.5–13)
PROTHROM AB SERPL-ACNC: 11 SEC — SIGNIFICANT CHANGE UP (ref 9.5–13)
RBC # BLD: 5.94 M/UL — HIGH (ref 3.8–5.2)
RBC # BLD: 5.94 M/UL — HIGH (ref 3.8–5.2)
RBC # FLD: 14.3 % — SIGNIFICANT CHANGE UP (ref 10.3–14.5)
RBC # FLD: 14.3 % — SIGNIFICANT CHANGE UP (ref 10.3–14.5)
SODIUM SERPL-SCNC: 133 MMOL/L — LOW (ref 135–145)
SODIUM SERPL-SCNC: 133 MMOL/L — LOW (ref 135–145)
SP GR SPEC: >1.03 — HIGH (ref 1–1.03)
SP GR SPEC: >1.03 — HIGH (ref 1–1.03)
UROBILINOGEN FLD QL: 1 MG/DL — SIGNIFICANT CHANGE UP (ref 0.2–1)
UROBILINOGEN FLD QL: 1 MG/DL — SIGNIFICANT CHANGE UP (ref 0.2–1)
WBC # BLD: 13.32 K/UL — HIGH (ref 3.8–10.5)
WBC # BLD: 13.32 K/UL — HIGH (ref 3.8–10.5)
WBC # FLD AUTO: 13.32 K/UL — HIGH (ref 3.8–10.5)
WBC # FLD AUTO: 13.32 K/UL — HIGH (ref 3.8–10.5)

## 2023-12-20 PROCEDURE — 80061 LIPID PANEL: CPT

## 2023-12-20 PROCEDURE — 99223 1ST HOSP IP/OBS HIGH 75: CPT

## 2023-12-20 PROCEDURE — 97116 GAIT TRAINING THERAPY: CPT | Mod: GP

## 2023-12-20 PROCEDURE — A9579: CPT

## 2023-12-20 PROCEDURE — 80048 BASIC METABOLIC PNL TOTAL CA: CPT

## 2023-12-20 PROCEDURE — 72158 MRI LUMBAR SPINE W/O & W/DYE: CPT | Mod: ME

## 2023-12-20 PROCEDURE — 97530 THERAPEUTIC ACTIVITIES: CPT | Mod: GP

## 2023-12-20 PROCEDURE — 97535 SELF CARE MNGMENT TRAINING: CPT | Mod: GO

## 2023-12-20 PROCEDURE — 97162 PT EVAL MOD COMPLEX 30 MIN: CPT | Mod: GP

## 2023-12-20 PROCEDURE — 85025 COMPLETE CBC W/AUTO DIFF WBC: CPT

## 2023-12-20 PROCEDURE — 83036 HEMOGLOBIN GLYCOSYLATED A1C: CPT

## 2023-12-20 PROCEDURE — G1004: CPT

## 2023-12-20 PROCEDURE — 72132 CT LUMBAR SPINE W/DYE: CPT | Mod: 26,MA

## 2023-12-20 PROCEDURE — 36415 COLL VENOUS BLD VENIPUNCTURE: CPT

## 2023-12-20 PROCEDURE — 99285 EMERGENCY DEPT VISIT HI MDM: CPT

## 2023-12-20 PROCEDURE — 86803 HEPATITIS C AB TEST: CPT

## 2023-12-20 PROCEDURE — 97166 OT EVAL MOD COMPLEX 45 MIN: CPT | Mod: GO

## 2023-12-20 PROCEDURE — 74177 CT ABD & PELVIS W/CONTRAST: CPT | Mod: 26,MA

## 2023-12-20 RX ORDER — SODIUM CHLORIDE 9 MG/ML
1000 INJECTION INTRAMUSCULAR; INTRAVENOUS; SUBCUTANEOUS ONCE
Refills: 0 | Status: COMPLETED | OUTPATIENT
Start: 2023-12-20 | End: 2023-12-20

## 2023-12-20 RX ORDER — ACETAMINOPHEN 500 MG
650 TABLET ORAL EVERY 6 HOURS
Refills: 0 | Status: DISCONTINUED | OUTPATIENT
Start: 2023-12-20 | End: 2023-12-22

## 2023-12-20 RX ORDER — ONDANSETRON 8 MG/1
4 TABLET, FILM COATED ORAL EVERY 8 HOURS
Refills: 0 | Status: DISCONTINUED | OUTPATIENT
Start: 2023-12-20 | End: 2023-12-23

## 2023-12-20 RX ORDER — OLMESARTAN MEDOXOMIL 5 MG/1
1 TABLET, FILM COATED ORAL
Refills: 0 | DISCHARGE

## 2023-12-20 RX ORDER — HYDROCORTISONE 20 MG
10 TABLET ORAL AT BEDTIME
Refills: 0 | Status: DISCONTINUED | OUTPATIENT
Start: 2023-12-20 | End: 2023-12-23

## 2023-12-20 RX ORDER — MORPHINE SULFATE 50 MG/1
2 CAPSULE, EXTENDED RELEASE ORAL ONCE
Refills: 0 | Status: DISCONTINUED | OUTPATIENT
Start: 2023-12-20 | End: 2023-12-20

## 2023-12-20 RX ORDER — LOSARTAN POTASSIUM 100 MG/1
50 TABLET, FILM COATED ORAL AT BEDTIME
Refills: 0 | Status: DISCONTINUED | OUTPATIENT
Start: 2023-12-20 | End: 2023-12-23

## 2023-12-20 RX ORDER — HYDROCORTISONE 20 MG
20 TABLET ORAL DAILY
Refills: 0 | Status: DISCONTINUED | OUTPATIENT
Start: 2023-12-20 | End: 2023-12-23

## 2023-12-20 RX ORDER — INFLUENZA VIRUS VACCINE 15; 15; 15; 15 UG/.5ML; UG/.5ML; UG/.5ML; UG/.5ML
0.7 SUSPENSION INTRAMUSCULAR ONCE
Refills: 0 | Status: COMPLETED | OUTPATIENT
Start: 2023-12-20 | End: 2023-12-20

## 2023-12-20 RX ORDER — LEVOTHYROXINE SODIUM 125 MCG
1 TABLET ORAL
Refills: 0 | DISCHARGE

## 2023-12-20 RX ORDER — CELECOXIB 200 MG/1
200 CAPSULE ORAL DAILY
Refills: 0 | Status: DISCONTINUED | OUTPATIENT
Start: 2023-12-20 | End: 2023-12-23

## 2023-12-20 RX ORDER — VITAMIN E 100 UNIT
800 CAPSULE ORAL
Qty: 0 | Refills: 0 | DISCHARGE

## 2023-12-20 RX ORDER — LANOLIN ALCOHOL/MO/W.PET/CERES
3 CREAM (GRAM) TOPICAL AT BEDTIME
Refills: 0 | Status: DISCONTINUED | OUTPATIENT
Start: 2023-12-20 | End: 2023-12-23

## 2023-12-20 RX ORDER — LEVOTHYROXINE SODIUM 125 MCG
1 TABLET ORAL
Qty: 0 | Refills: 0 | DISCHARGE

## 2023-12-20 RX ORDER — HEPARIN SODIUM 5000 [USP'U]/ML
5000 INJECTION INTRAVENOUS; SUBCUTANEOUS EVERY 12 HOURS
Refills: 0 | Status: DISCONTINUED | OUTPATIENT
Start: 2023-12-20 | End: 2023-12-23

## 2023-12-20 RX ORDER — ACETAMINOPHEN 500 MG
1000 TABLET ORAL ONCE
Refills: 0 | Status: COMPLETED | OUTPATIENT
Start: 2023-12-20 | End: 2023-12-20

## 2023-12-20 RX ADMIN — Medication 400 MILLIGRAM(S): at 15:03

## 2023-12-20 RX ADMIN — MORPHINE SULFATE 2 MILLIGRAM(S): 50 CAPSULE, EXTENDED RELEASE ORAL at 19:22

## 2023-12-20 RX ADMIN — MORPHINE SULFATE 2 MILLIGRAM(S): 50 CAPSULE, EXTENDED RELEASE ORAL at 15:09

## 2023-12-20 RX ADMIN — Medication 1000 MILLIGRAM(S): at 15:15

## 2023-12-20 RX ADMIN — SODIUM CHLORIDE 1000 MILLILITER(S): 9 INJECTION INTRAMUSCULAR; INTRAVENOUS; SUBCUTANEOUS at 15:03

## 2023-12-20 RX ADMIN — Medication 1000 MILLIGRAM(S): at 15:03

## 2023-12-20 RX ADMIN — MORPHINE SULFATE 2 MILLIGRAM(S): 50 CAPSULE, EXTENDED RELEASE ORAL at 15:32

## 2023-12-20 RX ADMIN — LOSARTAN POTASSIUM 50 MILLIGRAM(S): 100 TABLET, FILM COATED ORAL at 22:45

## 2023-12-20 NOTE — ED PROVIDER NOTE - CLINICAL SUMMARY MEDICAL DECISION MAKING FREE TEXT BOX
79 yo female with R sided lower back pain sent in by oncology for evaluation. Will get ct imaging, labs, pain control, and reeval. 77 yo female with R sided lower back pain sent in by oncology for evaluation. Will get ct imaging, labs, pain control, and reeval. 79 yo female with R sided lower back pain sent in by oncology for evaluation. Will get ct imaging, labs, pain control, and reeval.    Miles DO: patient with lumbar compression fx- acute; spoke with Dr. Dee- on call for spine- will see; leukocytosis likely 2/2 to recent steroids- no obvious source of infection; spoke with family at bedside- aware of plan of care for admission; endorsed to Dr. Alexandra.

## 2023-12-20 NOTE — PATIENT PROFILE ADULT - FALL HARM RISK - HARM RISK INTERVENTIONS
Assistance with ambulation/Assistance OOB with selected safe patient handling equipment/Communicate Risk of Fall with Harm to all staff/Discuss with provider need for PT consult/Monitor gait and stability/Provide patient with walking aids - walker, cane, crutches/Reinforce activity limits and safety measures with patient and family/Tailored Fall Risk Interventions/Visual Cue: Yellow wristband and red socks/Bed in lowest position, wheels locked, appropriate side rails in place/Call bell, personal items and telephone in reach/Instruct patient to call for assistance before getting out of bed or chair/Non-slip footwear when patient is out of bed/[place] to call system/Physically safe environment - no spills, clutter or unnecessary equipment/Purposeful Proactive Rounding/Room/bathroom lighting operational, light cord in reach Assistance with ambulation/Assistance OOB with selected safe patient handling equipment/Communicate Risk of Fall with Harm to all staff/Discuss with provider need for PT consult/Monitor gait and stability/Provide patient with walking aids - walker, cane, crutches/Reinforce activity limits and safety measures with patient and family/Tailored Fall Risk Interventions/Visual Cue: Yellow wristband and red socks/Bed in lowest position, wheels locked, appropriate side rails in place/Call bell, personal items and telephone in reach/Instruct patient to call for assistance before getting out of bed or chair/Non-slip footwear when patient is out of bed/[place] to call system/Physically safe environment - no spills, clutter or unnecessary equipment/Purposeful Proactive Rounding/Room/bathroom lighting operational, light cord in reach

## 2023-12-20 NOTE — ED ADULT NURSE REASSESSMENT NOTE - NS ED NURSE REASSESS COMMENT FT1
Patient complaining of lower right back pain at this time. Morphine 2mg IV administered. Will reassess. Offers no other complaints at this time.

## 2023-12-20 NOTE — ED ADULT NURSE NOTE - NSFALLRISKINTERV_ED_ALL_ED
Assistance OOB with selected safe patient handling equipment if applicable/Assistance with ambulation/Communicate fall risk and risk factors to all staff, patient, and family/Monitor gait and stability/Provide visual cue: yellow wristband, yellow gown, etc/Reinforce activity limits and safety measures with patient and family/Call bell, personal items and telephone in reach/Instruct patient to call for assistance before getting out of bed/chair/stretcher/Non-slip footwear applied when patient is off stretcher/[place] to call system/Physically safe environment - no spills, clutter or unnecessary equipment/Purposeful Proactive Rounding/Room/bathroom lighting operational, light cord in reach Assistance OOB with selected safe patient handling equipment if applicable/Assistance with ambulation/Communicate fall risk and risk factors to all staff, patient, and family/Monitor gait and stability/Provide visual cue: yellow wristband, yellow gown, etc/Reinforce activity limits and safety measures with patient and family/Call bell, personal items and telephone in reach/Instruct patient to call for assistance before getting out of bed/chair/stretcher/Non-slip footwear applied when patient is off stretcher/[place] to call system/Physically safe environment - no spills, clutter or unnecessary equipment/Purposeful Proactive Rounding/Room/bathroom lighting operational, light cord in reach

## 2023-12-20 NOTE — ED ADULT NURSE NOTE - OBJECTIVE STATEMENT
Patient came in complaining of R lower back pain radiating down right leg. Patient reports "worse than childbirth." Sent in by MD. No complaints of numbness/tingling to extremities, no incontinence. Patient has a hx of kidney CA with mets to liver.

## 2023-12-20 NOTE — ED PROVIDER NOTE - OBJECTIVE STATEMENT
77 yo female w/PMHx kidney CA that is in her liver, , hypothyroidism, HLD, and HTN presents to the ED c/o pain in her R hip that radiates down her R leg s/p mapping done 5 days ago. Pt was seen in [place] ED, pt told there may have been concern that they knicked her sciatic nerve. No bowel/bladder incontinence, no hematuria noted, pt not on AC meds, no reported blood in her stool, no fever/chills. Pt baseline ambulatory without a walker, but has been using her 's walker when ambulating with pain. Pt saw Dr. Brownlee 2 days ago, pt had an xray done. Pt called Dr. Carnes this morning due to the pain not subsiding and was advised to come in to the ED. Pt was put on prednisone by an orthopedist that she saw yesterday. Pt has been taking Tylenol for the pain with no relief. 79 yo female w/PMHx kidney CA that is in her liver, , hypothyroidism, HLD, and HTN presents to the ED c/o pain in her R hip that radiates down her R leg s/p mapping done 5 days ago. Pt was seen in [place] ED, pt told there may have been concern that they knicked her sciatic nerve. No bowel/bladder incontinence, no hematuria noted, pt not on AC meds, no reported blood in her stool, no fever/chills. Pt baseline ambulatory without a walker, but has been using her 's walker when ambulating with pain. Pt saw Dr. Brownlee 2 days ago, pt had an xray done. Pt called Dr. Carnes this morning due to the pain not subsiding and was advised to come in to the ED. Pt was put on prednisone by an orthopedist that she saw yesterday. Pt has been taking Tylenol for the pain with no relief. 77 yo female w/PMHx kidney CA that is in her liver, , hypothyroidism, HLD, and HTN presents to the ED c/o pain in her R hip that radiates down her R leg s/p mapping done 5 days ago. Pt was seen in [place] ED after procedure for right sided back pain; No bowel/bladder incontinence, no hematuria noted, pt not on AC meds, no reported blood in her stool, no fever/chills. Pt baseline ambulatory without a walker, but has been using her 's walker when ambulating with pain. Pt saw Dr. Brownlee 2 days ago, pt had an xray done. Pt called Dr. Brownlee this morning due to the pain not subsiding and was advised to come in to the ED. Pt was put on prednisone by an orthopedist that she saw yesterday. Pt has been taking Tylenol for the pain with no relief. 79 yo female w/PMHx kidney CA that is in her liver, , hypothyroidism, HLD, and HTN presents to the ED c/o pain in her R hip that radiates down her R leg s/p mapping done 5 days ago. Pt was seen in [place] ED after procedure for right sided back pain; No bowel/bladder incontinence, no hematuria noted, pt not on AC meds, no reported blood in her stool, no fever/chills. Pt baseline ambulatory without a walker, but has been using her 's walker when ambulating with pain. Pt saw Dr. Brownlee 2 days ago, pt had an xray done. Pt called Dr. Brownlee this morning due to the pain not subsiding and was advised to come in to the ED. Pt was put on prednisone by an orthopedist that she saw yesterday. Pt has been taking Tylenol for the pain with no relief.

## 2023-12-20 NOTE — ED ADULT TRIAGE NOTE - CHIEF COMPLAINT QUOTE
sib md manuel to be admitted for pain management. pt c/o back pain, hip  and right foot pain s/p mapping on friday.  pmh: renal cell carcinoma with liver mets.

## 2023-12-20 NOTE — PHARMACOTHERAPY INTERVENTION NOTE - COMMENTS
Medication reconciliation completed.  Reviewed Medication list and confirmed med allergies with patient; confirmed with Dr. First Medtalita.

## 2023-12-21 DIAGNOSIS — C64.2 MALIGNANT NEOPLASM OF LEFT KIDNEY, EXCEPT RENAL PELVIS: ICD-10-CM

## 2023-12-21 DIAGNOSIS — E03.9 HYPOTHYROIDISM, UNSPECIFIED: ICD-10-CM

## 2023-12-21 DIAGNOSIS — I10 ESSENTIAL (PRIMARY) HYPERTENSION: ICD-10-CM

## 2023-12-21 DIAGNOSIS — S32.000A WEDGE COMPRESSION FRACTURE OF UNSPECIFIED LUMBAR VERTEBRA, INITIAL ENCOUNTER FOR CLOSED FRACTURE: ICD-10-CM

## 2023-12-21 LAB
A1C WITH ESTIMATED AVERAGE GLUCOSE RESULT: 5.3 % — SIGNIFICANT CHANGE UP (ref 4–5.6)
A1C WITH ESTIMATED AVERAGE GLUCOSE RESULT: 5.3 % — SIGNIFICANT CHANGE UP (ref 4–5.6)
ANION GAP SERPL CALC-SCNC: 6 MMOL/L — SIGNIFICANT CHANGE UP (ref 5–17)
ANION GAP SERPL CALC-SCNC: 6 MMOL/L — SIGNIFICANT CHANGE UP (ref 5–17)
BASOPHILS # BLD AUTO: 0.02 K/UL — SIGNIFICANT CHANGE UP (ref 0–0.2)
BASOPHILS # BLD AUTO: 0.02 K/UL — SIGNIFICANT CHANGE UP (ref 0–0.2)
BASOPHILS NFR BLD AUTO: 0.2 % — SIGNIFICANT CHANGE UP (ref 0–2)
BASOPHILS NFR BLD AUTO: 0.2 % — SIGNIFICANT CHANGE UP (ref 0–2)
BUN SERPL-MCNC: 29 MG/DL — HIGH (ref 7–23)
BUN SERPL-MCNC: 29 MG/DL — HIGH (ref 7–23)
CALCIUM SERPL-MCNC: 9.6 MG/DL — SIGNIFICANT CHANGE UP (ref 8.5–10.1)
CALCIUM SERPL-MCNC: 9.6 MG/DL — SIGNIFICANT CHANGE UP (ref 8.5–10.1)
CHLORIDE SERPL-SCNC: 106 MMOL/L — SIGNIFICANT CHANGE UP (ref 96–108)
CHLORIDE SERPL-SCNC: 106 MMOL/L — SIGNIFICANT CHANGE UP (ref 96–108)
CHOLEST SERPL-MCNC: 228 MG/DL — HIGH
CHOLEST SERPL-MCNC: 228 MG/DL — HIGH
CO2 SERPL-SCNC: 26 MMOL/L — SIGNIFICANT CHANGE UP (ref 22–31)
CO2 SERPL-SCNC: 26 MMOL/L — SIGNIFICANT CHANGE UP (ref 22–31)
CREAT SERPL-MCNC: 1.16 MG/DL — SIGNIFICANT CHANGE UP (ref 0.5–1.3)
CREAT SERPL-MCNC: 1.16 MG/DL — SIGNIFICANT CHANGE UP (ref 0.5–1.3)
CULTURE RESULTS: SIGNIFICANT CHANGE UP
CULTURE RESULTS: SIGNIFICANT CHANGE UP
EGFR: 49 ML/MIN/1.73M2 — LOW
EGFR: 49 ML/MIN/1.73M2 — LOW
EOSINOPHIL # BLD AUTO: 0.05 K/UL — SIGNIFICANT CHANGE UP (ref 0–0.5)
EOSINOPHIL # BLD AUTO: 0.05 K/UL — SIGNIFICANT CHANGE UP (ref 0–0.5)
EOSINOPHIL NFR BLD AUTO: 0.4 % — SIGNIFICANT CHANGE UP (ref 0–6)
EOSINOPHIL NFR BLD AUTO: 0.4 % — SIGNIFICANT CHANGE UP (ref 0–6)
ESTIMATED AVERAGE GLUCOSE: 105 MG/DL — SIGNIFICANT CHANGE UP (ref 68–114)
ESTIMATED AVERAGE GLUCOSE: 105 MG/DL — SIGNIFICANT CHANGE UP (ref 68–114)
GLUCOSE SERPL-MCNC: 92 MG/DL — SIGNIFICANT CHANGE UP (ref 70–99)
GLUCOSE SERPL-MCNC: 92 MG/DL — SIGNIFICANT CHANGE UP (ref 70–99)
HCT VFR BLD CALC: 52.5 % — HIGH (ref 34.5–45)
HCT VFR BLD CALC: 52.5 % — HIGH (ref 34.5–45)
HCV AB S/CO SERPL IA: 0.06 S/CO — SIGNIFICANT CHANGE UP (ref 0–0.99)
HCV AB S/CO SERPL IA: 0.06 S/CO — SIGNIFICANT CHANGE UP (ref 0–0.99)
HCV AB SERPL-IMP: SIGNIFICANT CHANGE UP
HCV AB SERPL-IMP: SIGNIFICANT CHANGE UP
HDLC SERPL-MCNC: 77 MG/DL — SIGNIFICANT CHANGE UP
HDLC SERPL-MCNC: 77 MG/DL — SIGNIFICANT CHANGE UP
HGB BLD-MCNC: 17.1 G/DL — HIGH (ref 11.5–15.5)
HGB BLD-MCNC: 17.1 G/DL — HIGH (ref 11.5–15.5)
IMM GRANULOCYTES NFR BLD AUTO: 0.7 % — SIGNIFICANT CHANGE UP (ref 0–0.9)
IMM GRANULOCYTES NFR BLD AUTO: 0.7 % — SIGNIFICANT CHANGE UP (ref 0–0.9)
LIPID PNL WITH DIRECT LDL SERPL: 125 MG/DL — HIGH
LIPID PNL WITH DIRECT LDL SERPL: 125 MG/DL — HIGH
LYMPHOCYTES # BLD AUTO: 1.42 K/UL — SIGNIFICANT CHANGE UP (ref 1–3.3)
LYMPHOCYTES # BLD AUTO: 1.42 K/UL — SIGNIFICANT CHANGE UP (ref 1–3.3)
LYMPHOCYTES # BLD AUTO: 12.5 % — LOW (ref 13–44)
LYMPHOCYTES # BLD AUTO: 12.5 % — LOW (ref 13–44)
MCHC RBC-ENTMCNC: 29.1 PG — SIGNIFICANT CHANGE UP (ref 27–34)
MCHC RBC-ENTMCNC: 29.1 PG — SIGNIFICANT CHANGE UP (ref 27–34)
MCHC RBC-ENTMCNC: 32.6 GM/DL — SIGNIFICANT CHANGE UP (ref 32–36)
MCHC RBC-ENTMCNC: 32.6 GM/DL — SIGNIFICANT CHANGE UP (ref 32–36)
MCV RBC AUTO: 89.4 FL — SIGNIFICANT CHANGE UP (ref 80–100)
MCV RBC AUTO: 89.4 FL — SIGNIFICANT CHANGE UP (ref 80–100)
MONOCYTES # BLD AUTO: 1.04 K/UL — HIGH (ref 0–0.9)
MONOCYTES # BLD AUTO: 1.04 K/UL — HIGH (ref 0–0.9)
MONOCYTES NFR BLD AUTO: 9.2 % — SIGNIFICANT CHANGE UP (ref 2–14)
MONOCYTES NFR BLD AUTO: 9.2 % — SIGNIFICANT CHANGE UP (ref 2–14)
NEUTROPHILS # BLD AUTO: 8.72 K/UL — HIGH (ref 1.8–7.4)
NEUTROPHILS # BLD AUTO: 8.72 K/UL — HIGH (ref 1.8–7.4)
NEUTROPHILS NFR BLD AUTO: 77 % — SIGNIFICANT CHANGE UP (ref 43–77)
NEUTROPHILS NFR BLD AUTO: 77 % — SIGNIFICANT CHANGE UP (ref 43–77)
NON HDL CHOLESTEROL: 150 MG/DL — HIGH
NON HDL CHOLESTEROL: 150 MG/DL — HIGH
PLATELET # BLD AUTO: 347 K/UL — SIGNIFICANT CHANGE UP (ref 150–400)
PLATELET # BLD AUTO: 347 K/UL — SIGNIFICANT CHANGE UP (ref 150–400)
POTASSIUM SERPL-MCNC: 4.1 MMOL/L — SIGNIFICANT CHANGE UP (ref 3.5–5.3)
POTASSIUM SERPL-MCNC: 4.1 MMOL/L — SIGNIFICANT CHANGE UP (ref 3.5–5.3)
POTASSIUM SERPL-SCNC: 4.1 MMOL/L — SIGNIFICANT CHANGE UP (ref 3.5–5.3)
POTASSIUM SERPL-SCNC: 4.1 MMOL/L — SIGNIFICANT CHANGE UP (ref 3.5–5.3)
RBC # BLD: 5.87 M/UL — HIGH (ref 3.8–5.2)
RBC # BLD: 5.87 M/UL — HIGH (ref 3.8–5.2)
RBC # FLD: 14.5 % — SIGNIFICANT CHANGE UP (ref 10.3–14.5)
RBC # FLD: 14.5 % — SIGNIFICANT CHANGE UP (ref 10.3–14.5)
SODIUM SERPL-SCNC: 138 MMOL/L — SIGNIFICANT CHANGE UP (ref 135–145)
SODIUM SERPL-SCNC: 138 MMOL/L — SIGNIFICANT CHANGE UP (ref 135–145)
SPECIMEN SOURCE: SIGNIFICANT CHANGE UP
SPECIMEN SOURCE: SIGNIFICANT CHANGE UP
TRIGL SERPL-MCNC: 149 MG/DL — SIGNIFICANT CHANGE UP
TRIGL SERPL-MCNC: 149 MG/DL — SIGNIFICANT CHANGE UP
WBC # BLD: 11.33 K/UL — HIGH (ref 3.8–10.5)
WBC # BLD: 11.33 K/UL — HIGH (ref 3.8–10.5)
WBC # FLD AUTO: 11.33 K/UL — HIGH (ref 3.8–10.5)
WBC # FLD AUTO: 11.33 K/UL — HIGH (ref 3.8–10.5)

## 2023-12-21 PROCEDURE — 72158 MRI LUMBAR SPINE W/O & W/DYE: CPT | Mod: 26

## 2023-12-21 PROCEDURE — 99233 SBSQ HOSP IP/OBS HIGH 50: CPT

## 2023-12-21 RX ORDER — THIAMINE MONONITRATE (VIT B1) 100 MG
100 TABLET ORAL DAILY
Refills: 0 | Status: CANCELLED | OUTPATIENT
Start: 2023-12-21 | End: 2023-12-23

## 2023-12-21 RX ORDER — MULTIVIT-MIN/FERROUS GLUCONATE 9 MG/15 ML
1 LIQUID (ML) ORAL DAILY
Refills: 0 | Status: CANCELLED | OUTPATIENT
Start: 2023-12-21 | End: 2023-12-23

## 2023-12-21 RX ORDER — MORPHINE SULFATE 50 MG/1
2 CAPSULE, EXTENDED RELEASE ORAL
Refills: 0 | Status: DISCONTINUED | OUTPATIENT
Start: 2023-12-21 | End: 2023-12-22

## 2023-12-21 RX ORDER — MORPHINE SULFATE 50 MG/1
2 CAPSULE, EXTENDED RELEASE ORAL EVERY 6 HOURS
Refills: 0 | Status: DISCONTINUED | OUTPATIENT
Start: 2023-12-21 | End: 2023-12-21

## 2023-12-21 RX ADMIN — MORPHINE SULFATE 2 MILLIGRAM(S): 50 CAPSULE, EXTENDED RELEASE ORAL at 22:15

## 2023-12-21 RX ADMIN — MORPHINE SULFATE 2 MILLIGRAM(S): 50 CAPSULE, EXTENDED RELEASE ORAL at 00:04

## 2023-12-21 RX ADMIN — MORPHINE SULFATE 2 MILLIGRAM(S): 50 CAPSULE, EXTENDED RELEASE ORAL at 18:08

## 2023-12-21 RX ADMIN — LOSARTAN POTASSIUM 50 MILLIGRAM(S): 100 TABLET, FILM COATED ORAL at 22:08

## 2023-12-21 RX ADMIN — HEPARIN SODIUM 5000 UNIT(S): 5000 INJECTION INTRAVENOUS; SUBCUTANEOUS at 10:57

## 2023-12-21 RX ADMIN — MORPHINE SULFATE 2 MILLIGRAM(S): 50 CAPSULE, EXTENDED RELEASE ORAL at 05:05

## 2023-12-21 RX ADMIN — MORPHINE SULFATE 2 MILLIGRAM(S): 50 CAPSULE, EXTENDED RELEASE ORAL at 10:56

## 2023-12-21 RX ADMIN — MORPHINE SULFATE 2 MILLIGRAM(S): 50 CAPSULE, EXTENDED RELEASE ORAL at 17:53

## 2023-12-21 RX ADMIN — Medication 10 MILLIGRAM(S): at 22:08

## 2023-12-21 RX ADMIN — CELECOXIB 200 MILLIGRAM(S): 200 CAPSULE ORAL at 10:56

## 2023-12-21 RX ADMIN — Medication 20 MILLIGRAM(S): at 10:57

## 2023-12-21 RX ADMIN — HEPARIN SODIUM 5000 UNIT(S): 5000 INJECTION INTRAVENOUS; SUBCUTANEOUS at 22:08

## 2023-12-21 RX ADMIN — MORPHINE SULFATE 2 MILLIGRAM(S): 50 CAPSULE, EXTENDED RELEASE ORAL at 11:11

## 2023-12-21 RX ADMIN — MORPHINE SULFATE 2 MILLIGRAM(S): 50 CAPSULE, EXTENDED RELEASE ORAL at 04:42

## 2023-12-21 RX ADMIN — MORPHINE SULFATE 2 MILLIGRAM(S): 50 CAPSULE, EXTENDED RELEASE ORAL at 22:45

## 2023-12-21 RX ADMIN — MORPHINE SULFATE 2 MILLIGRAM(S): 50 CAPSULE, EXTENDED RELEASE ORAL at 00:22

## 2023-12-21 NOTE — DIETITIAN INITIAL EVALUATION ADULT - PERTINENT LABORATORY DATA
12-21    138  |  106  |  29<H>  ----------------------------<  92  4.1   |  26  |  1.16    Ca    9.6      21 Dec 2023 07:55    TPro  6.5  /  Alb  3.1<L>  /  TBili  0.6  /  DBili  x   /  AST  33  /  ALT  39  /  AlkPhos  125<H>  12-20  A1C with Estimated Average Glucose Result: 5.3 % (12-21-23 @ 07:55)

## 2023-12-21 NOTE — H&P ADULT - PROBLEM SELECTOR PLAN 1
5 days history of acute onset of low back pain started while mapping, constant, not alleviated by Tylenol  CT-Lumbar: Compression fracture deformity along the superior endplate of L4  - Ortho consulted   - Morphine   - Celebrex   - PT/OT  - WBAT  - DVT prophylaxis 5 days history of acute onset of low back pain started while mapping, constant, not alleviated by Tylenol  CT-Lumbar: Compression fracture deformity along the superior endplate of L4  - Ortho consulted   - Morphine   - Celebrex   - PT/OT  - WBAT  - DVT prophylaxis  - MRI in the AM

## 2023-12-21 NOTE — H&P ADULT - NSHPLABSRESULTS_GEN_ALL_CORE
17.6   13.32 )-----------( 379      ( 20 Dec 2023 14:35 )             51.3     133<L>  |  104  |  37<H>  ----------------------------<  115<H>     12-20  4.2   |  23  |  1.10    Ca    8.9      20 Dec 2023 15:24    TPro  6.5  /  Alb  3.1<L>  /  TBili  0.6  /  DBili  x   /  AST  33  /  ALT  39  /  AlkPhos  125<H>  12-20    PT/INR: 11.0/0.97 (12-20-23 @ 15:38)  PTT: 28.6 (12-20-23 @ 15:38)      Urinalysis Basic - ( 20 Dec 2023 15:05 )  Color: Yellow / Appearance: Clear / SG: >1.030 / pH: 5.5  Gluc: Negative mg/dL / Ketone: Trace mg/dL  / Bili: Negative / Urobili: 1.0 mg/dL   Blood: Negative / Protein: Negative mg/dL / Nitrite: Negative   Leuk Esterase: Negative / RBC: x / WBC x   Sq Epi: x / Non Sq Epi: x / Bacteria: x      CT-LUMBAR   FINDINGS:    Levoscoliosis to the lumbar spine. There is a lumbar lordosis. Subtle grade 1 anterolisthesis of L5 on S1. Compression endplate deformity along the superior endplate of L4,  Compared to prior imaging and resulting in loss of height of approximately 0-25%. The remainder of the vertebral bodies have the appropriate height. Reduced intervertebral disc height throughout the lumbar spine with scattered vacuum disc phenomena. Probable developing Schmorl's node along the superior endplate of L1. No jumped or perched facets.    T12-L1: No large disc bulge. The bilateral neuroforamina are patent. No narrowing of the spinal canal. Facet arthropathy.    L1-L2: Small disc bulge. Mild narrowing of the bilateral neuroforamen. No significant narrowing of the spinal canal. Facet arthropathy.    L2-L3: Disc bulge. Mild to moderate narrowing of the bilateral neuroforamen, right greater than left. Thickening of ligamentum flavum. Mild narrowing of the spinal canal. Facet arthropathy.    L3-L4: Disc bulge. Mild to moderate narrowing of the bilateral neuroforamen. Thickening of the ligamentum flavum. Moderate to severe narrowing of the spinal canal. Facet arthropathy.    L4-L5: Disc bulge. Mild to moderate narrowing of the bilateral neuroforamen. Thickening of ligamentum flavum. Moderate narrowing of the spinal canal. Facet arthropathy.    L5-S1: Disc bulge. Mild to moderate narrowing of the bilateral neuroforamen, left greater than right. Thickening of ligamentum flavum. No significant narrowing of the spinal canal. Facet arthropathy.    The paraspinal muscles are unremarkable. Caliectasis versus peripelvic cysts of the left kidney. Bilateral cortical based renal cysts. Cholelithiasis without evidence of cholecystitis. The extrahepatic CBD is enlarged measuring 1.6 cm. Questionable 0.3 cm right ureteral stone versus vascular phlebolith, image 288 of series 4. Diverticulosis without evidence of diverticulitis in the sigmoid colon.    IMPRESSION:    1. Compression fracture deformity along the superior endplate of L4. Recommend MRI of the lumbar spine for further assessment of acuity.  2. Multilevel degenerative change of the lumbar spine as discussed above, most significantly at the L3-L4 level where there is moderate to severe narrowing of the spinal canal.  3. Questionable subcentimeter right ureteral stone versus vascular phlebolith. Recommend clinical and laboratory correlation for further evaluation.

## 2023-12-21 NOTE — H&P ADULT - HISTORY OF PRESENT ILLNESS
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 Endorses 5 days history of low back pain radiates to the Right Lower extremity and ankle. Sudden onset on back pain started Friday while mapping, progressively getting worse, constant, not alleviated by pain meds. Denies any acute trauma but had a fall 2 months ago. at baseline ambulates independently, but has been using a walker since the pain started. Was place on prednisone yesterday by ortho. Denies falls, trauma. numbness, tingling, bladder/bowel incontinent fevers, chills, chest pain, SOB, N/V/D or any other acute symptoms. Labs remarkable for WBC: 13.32 (on Prednisone), H/H 17/64/51.3 (on Chemo), Found to be hypertensive BP: 180/113, HR:76, afebrile. Received 1L-NS, Ofirmev, Morphine 2mg x3   CT-Lumbar: Compression fracture deformity along the superior endplate of L4.

## 2023-12-21 NOTE — CONSULT NOTE ADULT - ASSESSMENT
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    Dusty Marcial MD  [facility]  Cell: [phone]   78 year old F hx of metastatic renal cell cancer, papillary variant, liver mets s/p radiation, s/p immunotherapy, now on Tivozanib presents with lower back pain    1) Lower back pain  Ct lumbar spine with L4 compression fracture  Await MRI  consult ortho/neurosurg  pain control as ordered      2) Metastatic renal cell  OK to continue tivozanib while in hospital    3) Polycythemia  has had recent development of polycythemia   will monitor  consider sending MISTY 2 mutation    F/u with Dr Saunders after dc    Thank you for the courtesy of this consultation and we will continue to follow.    Dusty Marcial MD  [facility]  Cell: [phone]

## 2023-12-21 NOTE — DIETITIAN INITIAL EVALUATION ADULT - PERTINENT MEDS FT
MEDICATIONS  (STANDING):  celecoxib 200 milliGRAM(s) Oral daily  heparin   Injectable 5000 Unit(s) SubCutaneous every 12 hours  hydrocortisone 10 milliGRAM(s) Oral at bedtime  hydrocortisone 20 milliGRAM(s) Oral daily  influenza  Vaccine (HIGH DOSE) 0.7 milliLiter(s) IntraMuscular once  losartan 50 milliGRAM(s) Oral at bedtime    MEDICATIONS  (PRN):  acetaminophen     Tablet .. 650 milliGRAM(s) Oral every 6 hours PRN Temp greater or equal to 38C (100.4F), Mild Pain (1 - 3)  aluminum hydroxide/magnesium hydroxide/simethicone Suspension 30 milliLiter(s) Oral every 4 hours PRN Dyspepsia  melatonin 3 milliGRAM(s) Oral at bedtime PRN Insomnia  morphine  - Injectable 2 milliGRAM(s) IV Push every 3 hours PRN Moderate Pain (4 - 6)  ondansetron Injectable 4 milliGRAM(s) IV Push every 8 hours PRN Nausea and/or Vomiting

## 2023-12-21 NOTE — CONSULT NOTE ADULT - SUBJECTIVE AND OBJECTIVE BOX
Pt is a 78F PMH kidney CA with mets to liver, hypothyroidism HLD, HTN who presents to ED with LBP radiating down R leg that started last Friday. States pain started suddenly while undergoing mapping 5 days ago and has worsened since. The pain is constant, including at rest, and radiates from R lower back down her leg anteriorly to ankle. Ambulates independently at baseline. To note, pt reports a fall onto knees/hands 2 months ago. Denies recent injuries, trauma, falls, numbness, tingling, fevers, chills, CP, SOB, N/V/D, bowel/bladder incontinence.    Vital Signs (24 Hrs):  T(C): 36.5 (12-20-23 @ 18:07), Max: 36.5 (12-20-23 @ 18:07)  HR: 67 (12-20-23 @ 18:07) (67 - 76)  BP: 169/104 (12-20-23 @ 18:07) (163/92 - 180/113)  RR: 18 (12-20-23 @ 18:07) (18 - 19)  SpO2: 99% (12-20-23 @ 18:07) (97% - 100%)  Wt(kg): --    LABS:                          17.6   13.32 )-----------( 379      ( 20 Dec 2023 14:35 )             51.3     12-20    133<L>  |  104  |  37<H>  ----------------------------<  115<H>  4.2   |  23  |  1.10    Ca    8.9      20 Dec 2023 15:24    TPro  6.5  /  Alb  3.1<L>  /  TBili  0.6  /  DBili  x   /  AST  33  /  ALT  39  /  AlkPhos  125<H>  12-20    LIVER FUNCTIONS - ( 20 Dec 2023 15:24 )  Alb: 3.1 g/dL / Pro: 6.5 gm/dL / ALK PHOS: 125 U/L / ALT: 39 U/L / AST: 33 U/L / GGT: x           PT/INR - ( 20 Dec 2023 15:38 )   PT: 11.0 sec;   INR: 0.97 ratio         PTT - ( 20 Dec 2023 15:38 )  PTT:28.6 sec    Physical Exam:   General: NAD, patient laying in bed comfortably  Calves nontender  Compartments compressible, soft    Spine:  NTTP of C-,T-Spine, TTP R L-Spine, paraspinal muscles, and R pelvic area with no step offs    Motor:       L2   L3    L4   L5   S1  L   5/5  5/5  5/5  5/5  5/5  R   5/5  5/5  5/5  5/5  5/5    Sensory:       L2   L3    L4   L5   S1  L    2     2     2     2     2   R    2     2     2     2     2     No pain with SLR, no clonus, no babinksi, no noonan    Secondary Assessment:  NC/AT, NTTP of clavicles, NTTP of Pelvis  UEs: NTTP of Shoulders, Elbows, Wrists, Hands; NT with AROM/PROM of Shoulders, Elbows, Wrists, Hands; AIN/PIN/Med/Uln/Msc/Rad/Ax intact  LEs: Able to SLR, NT with Log Roll, NT with Heel Strike, NTTP of Hips, Knees, Ankles, Feet; NT with AROM/PROM of Hips, Knees, Ankles, Feet; Q/H/Gsc/TA/EHL/FHL intact    Imaging:  CT LSp: age indeterminate L4 VCF    A/P: 78F who presents with LBP radiating down R leg, found to have age indeterminate L4 VCF  Given history of cancer and atraumatic pain, recommend obtaining MRI of the lumbar spine for further evaluation    WBAT  PT/OT  Follow up AM labs  Analgesics  DVT PPx per primary team  Incentive spirometry  Appreciate medical recs  Will discuss plan with Dr. Dee and notify primary team of any changes to plan
Reason for consult: metastatic renal cell cancer    HPI:  78 year old  female  a PMH metastatic renal cell ca following with Dr Saunders, maintained on Tivozanib presents with lower back pain.  She has had 5 days of lower back pain radiating to her right lower extremity and ankle.  She states meds have not alleviated her pain  She has had a CT scan showing compression fracture deformity along superior endplate of L 4  MRI pending  Morphine has been helping  No loss of bowel/bladder function but with pain traveling down RLE       PAST MEDICAL & SURGICAL HISTORY:  Hypertension      High cholesterol      Diverticulitis      History of diverticulitis      Hypothyroidism      History of tonsillectomy      History of laparotomy      History of arthroscopy      H/O bilateral oophorectomy      S/P surgical removal of pilonidal cyst          FAMILY HISTORY:      Alochol: Denied  Smoking: Nonsmoker  Drug Use: Denied  Marital Status:         Allergies    statins (Unknown)  Crestor (Other)  Zetia (Unknown)  Lipitor (Unknown)  Norvasc (Faint)  Cabometyx (Unknown)  Dyazide (Faint)  bacitracin (Rash)    Intolerances        MEDICATIONS  (STANDING):  celecoxib 200 milliGRAM(s) Oral daily  heparin   Injectable 5000 Unit(s) SubCutaneous every 12 hours  hydrocortisone 10 milliGRAM(s) Oral at bedtime  hydrocortisone 20 milliGRAM(s) Oral daily  influenza  Vaccine (HIGH DOSE) 0.7 milliLiter(s) IntraMuscular once  losartan 50 milliGRAM(s) Oral at bedtime    MEDICATIONS  (PRN):  acetaminophen     Tablet .. 650 milliGRAM(s) Oral every 6 hours PRN Temp greater or equal to 38C (100.4F), Mild Pain (1 - 3)  aluminum hydroxide/magnesium hydroxide/simethicone Suspension 30 milliLiter(s) Oral every 4 hours PRN Dyspepsia  melatonin 3 milliGRAM(s) Oral at bedtime PRN Insomnia  morphine  - Injectable 2 milliGRAM(s) IV Push every 3 hours PRN Moderate Pain (4 - 6)  ondansetron Injectable 4 milliGRAM(s) IV Push every 8 hours PRN Nausea and/or Vomiting      ROS  No fever, sweats, chills  No epistaxis, HA, sore throat       T(C): 36.5 (12-21-23 @ 11:00), Max: 36.5 (12-20-23 @ 18:07)  HR: 74 (12-21-23 @ 11:00) (65 - 76)  BP: 129/97 (12-21-23 @ 11:00) (129/97 - 180/113)  RR: 18 (12-21-23 @ 11:00) (18 - 19)  SpO2: 98% (12-21-23 @ 11:00) (97% - 100%)  Wt(kg): --    PE  NAD  Awake, alert  Anicteric, MMM  RRR  CTAB  Abd soft, NT, ND  No c/c/e  No rash grossly  FROM                          17.1   11.33 )-----------( 347      ( 21 Dec 2023 07:55 )             52.5       12-21    138  |  106  |  29<H>  ----------------------------<  92  4.1   |  26  |  1.16    Ca    9.6      21 Dec 2023 07:55    TPro  6.5  /  Alb  3.1<L>  /  TBili  0.6  /  DBili  x   /  AST  33  /  ALT  39  /  AlkPhos  125<H>  12-20

## 2023-12-21 NOTE — OCCUPATIONAL THERAPY INITIAL EVALUATION ADULT - ADL RETRAINING, OT EVAL
Patient will participate in lower body dressing with MOD I   in 2 weeks  Patient will participate in toilet transfer with MOD I   in 2 weeks  Patient will participate in toileting with MOD I     in 2 weeks  Patient will participate in grooming standing at the sink with MOD I   in 2 weeks

## 2023-12-21 NOTE — DIETITIAN INITIAL EVALUATION ADULT - PROBLEM SELECTOR PLAN 2
Upon ED arrival BP: 180/11  Current BP: 153/92  Continue home meds   - Benicar 20mg = Losartan 50mg   - Monitor BP

## 2023-12-21 NOTE — OCCUPATIONAL THERAPY INITIAL EVALUATION ADULT - PERTINENT HX OF CURRENT PROBLEM, REHAB EVAL
77 y/o F,  presents to the ED for Low Back Pain, 5 days history of low back pain radiates to the Right Lower extremity and ankle, Sudden onset. CT-Lumbar: Compression fracture deformity along the superior endplate of L4. Waiting Ortho consult and MRI  PMH of  kidney CA Mets to the liver, hypothyroidism, HLD, and HTN-Primary cancer of left kidney with metastasis 79 y/o F,  presents to the ED for Low Back Pain, 5 days history of low back pain radiates to the Right Lower extremity and ankle, Sudden onset. CT-Lumbar: Compression fracture deformity along the superior endplate of L4. Waiting Ortho consult and MRI  PMH of  kidney CA Mets to the liver, hypothyroidism, HLD, and HTN-Primary cancer of left kidney with metastasis

## 2023-12-21 NOTE — PHYSICAL THERAPY INITIAL EVALUATION ADULT - GENERAL OBSERVATIONS, REHAB EVAL
Pt seen on 2N, NAD, family at bedside, Pt reporting back pain Pt 7/10 at rest and 10/10 with activity.

## 2023-12-21 NOTE — DIETITIAN INITIAL EVALUATION ADULT - PROBLEM SELECTOR PLAN 1
5 days history of acute onset of low back pain started while mapping, constant, not alleviated by Tylenol  CT-Lumbar: Compression fracture deformity along the superior endplate of L4  - Ortho consulted   - Morphine   - Celebrex   - PT/OT  - WBAT  - DVT prophylaxis  - MRI in the AM

## 2023-12-21 NOTE — PHYSICAL THERAPY INITIAL EVALUATION ADULT - MODALITIES TREATMENT COMMENTS
Pt returned to supine for comfort, NAD, but unable to ambulate further due to increased pain, RW did not assist to decrease pain, Pt left supine in bed, +alarm, CBIR , family supportive. Awaiting MRI results,

## 2023-12-21 NOTE — DIETITIAN INITIAL EVALUATION ADULT - OTHER INFO
78 year old F w/ PMH of kidney CA Mets to the liver, hypothyroidism, HLD, and HTN presents to the ED for Low Back Pain. Endorses 5 days history of low back pain radiates to the Right Lower extremity and ankle. Sudden onset on back pain started Friday while mapping, progressively getting worse, constant, not alleviated by pain meds. Denies any acute trauma but had a fall 2 months ago. at baseline ambulates independently, but has been using a walker since the pain started. Was place on prednisone yesterday by ortho. Labs remarkable for WBC: 13.32 (on Prednisone), H/H 17/64/51.3 (on Chemo), Found to be hypertensive BP: 180/113, HR:76, afebrile. Received 1L-NS, Ofirmev, Morphine 2mg x3. CT-Lumbar: Compression fracture deformity along the superior endplate of L4. Adm w/ lumbar fx.     Pt reporting poor appetite at present 2/2 pain. Currently on DASH-TLC diet, recommend liberalize diet to regular to maximize caloric and nutrient intake. Endorses UBW of 136-138#; bed scale wt of 139.26# obtained by RD on 12/21/23. NFPE reveals mild muscle/fat wasting - appears thin, meets criteria for moderate acute PCM at this time. Pt not receptive to ONS at this time. Eder AGUILA. See below for further recommendations.

## 2023-12-21 NOTE — PHYSICAL THERAPY INITIAL EVALUATION ADULT - PERTINENT HX OF CURRENT PROBLEM, REHAB EVAL
79 y/o F,  presents to the ED for Low Back Pain, 5 days history of low back pain radiates to the Right Lower extremity and ankle, Sudden onset. CT-Lumbar: Compression fracture deformity along the superior endplate of L4. Waiting Ortho consult and MRI  PMH of  kidney CA Mets to the liver, hypothyroidism, HLD, and HTN-Primary cancer of left kidney with metastasis

## 2023-12-21 NOTE — H&P ADULT - NSHPPHYSICALEXAM_GEN_ALL_CORE
GENERAL: NAD, comfortable at bedside   HEAD:  Atraumatic, Normocephalic  EYES: EOMI, PERRL, conjunctiva and sclera clear  NECK: Supple, No JVD  CHEST/LUNG: Clear to auscultation bilaterally; No wheezes, rales or rhonchi  HEART: Regular rate and rhythm; No murmurs, rubs, or gallops, (+)S1, S2  ABDOMEN: Soft, Nontender, Nondistended; Normal Bowel sounds   EXTREMITIES:  2+ Peripheral Pulses, sensory intact, No clubbing, cyanosis, or edema  MSK: TTP at the lumbar R>L   PSYCH: normal mood and affect  NEUROLOGY: AAOx3, non-focal deficit   SKIN: No rashes or lesions

## 2023-12-21 NOTE — OCCUPATIONAL THERAPY INITIAL EVALUATION ADULT - LIVES WITH, PROFILE
Pt lives home w/ her  in a house w/2 [address] and resides on the main floor, RW, w/c, standard toilet, shower chair

## 2023-12-21 NOTE — OCCUPATIONAL THERAPY INITIAL EVALUATION ADULT - DIAGNOSIS, OT EVAL
Compression fx, spoke to ortho resident prior to session- no surgical intervention, MRI for malignancy in spine
60

## 2023-12-21 NOTE — DIETITIAN INITIAL EVALUATION ADULT - ADD RECOMMEND
1. Recommend liberalize diet to regular to maximize caloric and nutrient intake.   2. Consider adding thiamine 100 mg daily 2/2 poor PO intake/ malnutrition   3. Consider MVI w/ minerals daily to ensure 100% RDA met   4. Encourage protein-rich foods, maximize food preferences   5. Monitor bowel movements, c/w bowel regimen (miralax and senna daily)  6. Obtain vitamin D 25OH level to assess nutriture   7. Please obtain weekly weights   8. Confirm goals of care regarding nutrition support   RD will continue to monitor PO intake, labs, hydration, and wt prn.

## 2023-12-21 NOTE — DIETITIAN INITIAL EVALUATION ADULT - REASON INDICATOR FOR ASSESSMENT
HR - CA
[FreeTextEntry3] : I have reviewed the above and agree with all pertinent clinical information including history and physical examination and agree with treatment plan.

## 2023-12-21 NOTE — DIETITIAN INITIAL EVALUATION ADULT - ORAL INTAKE PTA/DIET HISTORY
Pt lives at home w/ , denies difficulty cooking and grocery shopping. Consumes 3 meals/day, reports not following any specific diet. Chemo tx started 2 yrs ago - Reported having poor appetite 2/2 chemo pills for first 6 mo however switched chemo meds and appetite has been normal for 1.5 years now. Reporting decreased appetite for 5 days 2/2 back pain.

## 2023-12-22 PROCEDURE — 99232 SBSQ HOSP IP/OBS MODERATE 35: CPT

## 2023-12-22 RX ORDER — SENNA PLUS 8.6 MG/1
2 TABLET ORAL AT BEDTIME
Refills: 0 | Status: DISCONTINUED | OUTPATIENT
Start: 2023-12-22 | End: 2023-12-23

## 2023-12-22 RX ORDER — OXYCODONE HYDROCHLORIDE 5 MG/1
5 TABLET ORAL EVERY 4 HOURS
Refills: 0 | Status: DISCONTINUED | OUTPATIENT
Start: 2023-12-22 | End: 2023-12-23

## 2023-12-22 RX ORDER — POLYETHYLENE GLYCOL 3350 17 G/17G
17 POWDER, FOR SOLUTION ORAL
Refills: 0 | Status: DISCONTINUED | OUTPATIENT
Start: 2023-12-22 | End: 2023-12-23

## 2023-12-22 RX ORDER — GABAPENTIN 400 MG/1
300 CAPSULE ORAL
Refills: 0 | Status: DISCONTINUED | OUTPATIENT
Start: 2023-12-22 | End: 2023-12-23

## 2023-12-22 RX ORDER — OXYCODONE HYDROCHLORIDE 5 MG/1
10 TABLET ORAL EVERY 4 HOURS
Refills: 0 | Status: DISCONTINUED | OUTPATIENT
Start: 2023-12-22 | End: 2023-12-23

## 2023-12-22 RX ORDER — ACETAMINOPHEN 500 MG
975 TABLET ORAL EVERY 6 HOURS
Refills: 0 | Status: DISCONTINUED | OUTPATIENT
Start: 2023-12-22 | End: 2023-12-23

## 2023-12-22 RX ADMIN — MORPHINE SULFATE 2 MILLIGRAM(S): 50 CAPSULE, EXTENDED RELEASE ORAL at 12:05

## 2023-12-22 RX ADMIN — GABAPENTIN 300 MILLIGRAM(S): 400 CAPSULE ORAL at 21:26

## 2023-12-22 RX ADMIN — HEPARIN SODIUM 5000 UNIT(S): 5000 INJECTION INTRAVENOUS; SUBCUTANEOUS at 21:26

## 2023-12-22 RX ADMIN — MORPHINE SULFATE 2 MILLIGRAM(S): 50 CAPSULE, EXTENDED RELEASE ORAL at 08:45

## 2023-12-22 RX ADMIN — LOSARTAN POTASSIUM 50 MILLIGRAM(S): 100 TABLET, FILM COATED ORAL at 21:26

## 2023-12-22 RX ADMIN — Medication 10 MILLIGRAM(S): at 21:27

## 2023-12-22 RX ADMIN — CELECOXIB 200 MILLIGRAM(S): 200 CAPSULE ORAL at 11:06

## 2023-12-22 RX ADMIN — OXYCODONE HYDROCHLORIDE 5 MILLIGRAM(S): 5 TABLET ORAL at 21:26

## 2023-12-22 RX ADMIN — MORPHINE SULFATE 2 MILLIGRAM(S): 50 CAPSULE, EXTENDED RELEASE ORAL at 03:39

## 2023-12-22 RX ADMIN — POLYETHYLENE GLYCOL 3350 17 GRAM(S): 17 POWDER, FOR SOLUTION ORAL at 21:26

## 2023-12-22 RX ADMIN — OXYCODONE HYDROCHLORIDE 5 MILLIGRAM(S): 5 TABLET ORAL at 21:56

## 2023-12-22 RX ADMIN — GABAPENTIN 300 MILLIGRAM(S): 400 CAPSULE ORAL at 11:06

## 2023-12-22 RX ADMIN — CELECOXIB 200 MILLIGRAM(S): 200 CAPSULE ORAL at 11:46

## 2023-12-22 RX ADMIN — SENNA PLUS 2 TABLET(S): 8.6 TABLET ORAL at 21:26

## 2023-12-22 RX ADMIN — MORPHINE SULFATE 2 MILLIGRAM(S): 50 CAPSULE, EXTENDED RELEASE ORAL at 04:09

## 2023-12-22 RX ADMIN — HEPARIN SODIUM 5000 UNIT(S): 5000 INJECTION INTRAVENOUS; SUBCUTANEOUS at 11:06

## 2023-12-22 RX ADMIN — MORPHINE SULFATE 2 MILLIGRAM(S): 50 CAPSULE, EXTENDED RELEASE ORAL at 11:51

## 2023-12-22 RX ADMIN — MORPHINE SULFATE 2 MILLIGRAM(S): 50 CAPSULE, EXTENDED RELEASE ORAL at 09:00

## 2023-12-22 RX ADMIN — Medication 20 MILLIGRAM(S): at 11:05

## 2023-12-23 ENCOUNTER — TRANSCRIPTION ENCOUNTER (OUTPATIENT)
Age: 78
End: 2023-12-23

## 2023-12-23 VITALS
RESPIRATION RATE: 16 BRPM | HEART RATE: 80 BPM | OXYGEN SATURATION: 98 % | DIASTOLIC BLOOD PRESSURE: 92 MMHG | SYSTOLIC BLOOD PRESSURE: 144 MMHG | TEMPERATURE: 97 F

## 2023-12-23 PROCEDURE — 99239 HOSP IP/OBS DSCHRG MGMT >30: CPT

## 2023-12-23 RX ORDER — OXYCODONE HYDROCHLORIDE 5 MG/1
1 TABLET ORAL
Qty: 42 | Refills: 0
Start: 2023-12-23 | End: 2023-12-29

## 2023-12-23 RX ORDER — ACETAMINOPHEN 500 MG
3 TABLET ORAL
Qty: 0 | Refills: 0 | DISCHARGE
Start: 2023-12-23

## 2023-12-23 RX ORDER — LEVOTHYROXINE SODIUM 125 MCG
0.5 TABLET ORAL
Refills: 0 | DISCHARGE

## 2023-12-23 RX ORDER — GABAPENTIN 400 MG/1
1 CAPSULE ORAL
Qty: 63 | Refills: 0
Start: 2023-12-23 | End: 2024-01-12

## 2023-12-23 RX ORDER — HYDRALAZINE HCL 50 MG
10 TABLET ORAL ONCE
Refills: 0 | Status: COMPLETED | OUTPATIENT
Start: 2023-12-23 | End: 2023-12-23

## 2023-12-23 RX ORDER — POLYETHYLENE GLYCOL 3350 17 G/17G
17 POWDER, FOR SOLUTION ORAL
Qty: 0 | Refills: 0 | DISCHARGE
Start: 2023-12-23

## 2023-12-23 RX ORDER — LOSARTAN POTASSIUM 100 MG/1
50 TABLET, FILM COATED ORAL ONCE
Refills: 0 | Status: COMPLETED | OUTPATIENT
Start: 2023-12-23 | End: 2023-12-23

## 2023-12-23 RX ORDER — MELOXICAM 15 MG/1
1 TABLET ORAL
Refills: 0 | DISCHARGE

## 2023-12-23 RX ORDER — SENNA PLUS 8.6 MG/1
2 TABLET ORAL
Qty: 0 | Refills: 0 | DISCHARGE
Start: 2023-12-23

## 2023-12-23 RX ORDER — NIVOLUMAB 10 MG/ML
0 INJECTION INTRAVENOUS
Refills: 0 | DISCHARGE

## 2023-12-23 RX ORDER — HYDROCORTISONE 20 MG
1 TABLET ORAL
Refills: 0 | DISCHARGE

## 2023-12-23 RX ADMIN — Medication 975 MILLIGRAM(S): at 00:17

## 2023-12-23 RX ADMIN — CELECOXIB 200 MILLIGRAM(S): 200 CAPSULE ORAL at 09:47

## 2023-12-23 RX ADMIN — OXYCODONE HYDROCHLORIDE 5 MILLIGRAM(S): 5 TABLET ORAL at 10:16

## 2023-12-23 RX ADMIN — Medication 975 MILLIGRAM(S): at 00:19

## 2023-12-23 RX ADMIN — POLYETHYLENE GLYCOL 3350 17 GRAM(S): 17 POWDER, FOR SOLUTION ORAL at 09:49

## 2023-12-23 RX ADMIN — Medication 975 MILLIGRAM(S): at 12:00

## 2023-12-23 RX ADMIN — Medication 975 MILLIGRAM(S): at 05:21

## 2023-12-23 RX ADMIN — OXYCODONE HYDROCHLORIDE 5 MILLIGRAM(S): 5 TABLET ORAL at 09:46

## 2023-12-23 RX ADMIN — LOSARTAN POTASSIUM 50 MILLIGRAM(S): 100 TABLET, FILM COATED ORAL at 05:28

## 2023-12-23 RX ADMIN — HEPARIN SODIUM 5000 UNIT(S): 5000 INJECTION INTRAVENOUS; SUBCUTANEOUS at 09:50

## 2023-12-23 RX ADMIN — Medication 975 MILLIGRAM(S): at 05:18

## 2023-12-23 RX ADMIN — Medication 10 MILLIGRAM(S): at 06:55

## 2023-12-23 RX ADMIN — Medication 20 MILLIGRAM(S): at 09:47

## 2023-12-23 RX ADMIN — GABAPENTIN 300 MILLIGRAM(S): 400 CAPSULE ORAL at 09:45

## 2023-12-23 NOTE — DISCHARGE NOTE PROVIDER - CARE PROVIDER_API CALL
Brittney Reaves  Orthopaedic Surgery  [address]  [place] [address]  Phone: [phone]  Fax: [phone]  Follow Up Time: 2 weeks   Brittney Reaves  Orthopaedic Surgery  [address]  [place] [address]  Phone: [phone]  Fax: [phone]  Follow Up Time: 2 weeks

## 2023-12-23 NOTE — PROGRESS NOTE ADULT - REASON FOR ADMISSION
Lumbar compression fracture.

## 2023-12-23 NOTE — DISCHARGE NOTE PROVIDER - NSDCCPCAREPLAN_GEN_ALL_CORE_FT
PRINCIPAL DISCHARGE DIAGNOSIS  Diagnosis: Right lumbar radiculopathy  Assessment and Plan of Treatment: Neurontin TID  Pain meds prn  Outpatient spine f/u with DR Reaves

## 2023-12-23 NOTE — DISCHARGE NOTE PROVIDER - NSDCMRMEDTOKEN_GEN_ALL_CORE_FT
acetaminophen 325 mg oral tablet: 3 tab(s) orally every 6 hours as needed for  mild pain  Benicar 20 mg oral tablet: 1 tab(s) orally once a day (at bedtime)  levothyroxine 88 mcg (0.088 mg) oral tablet: 1 tab(s) orally once a day ***pt takes extra 1/2 tab on Sundays***  Neurontin 300 mg oral capsule: 1 cap(s) orally 3 times a day MDD: 3 caps  oxyCODONE 5 mg oral tablet: 1 tab(s) orally every 4 hours MDD: 6 tabs  polyethylene glycol 3350 oral powder for reconstitution: 17 gram(s) orally 2 times a day  senna leaf extract oral tablet: 2 tab(s) orally once a day (at bedtime)

## 2023-12-23 NOTE — DISCHARGE NOTE NURSING/CASE MANAGEMENT/SOCIAL WORK - NSDCPEFALRISK_GEN_ALL_CORE
For information on Fall & Injury Prevention, visit: [email or web address] OR  [email or web address] OR  https://www.cdc.gov/steadi/patient.html For information on Fall & Injury Prevention, visit: [email or web address] OR  [email or web address] OR  https://www.cdc.gov/steadi/patient.html

## 2023-12-23 NOTE — DISCHARGE NOTE NURSING/CASE MANAGEMENT/SOCIAL WORK - PATIENT PORTAL LINK FT
You can access the FollowMyHealth Patient Portal offered by [facility] by registering at the following website: [email or web address]. By joining [employer]’s FollowMyHealth portal, you will also be able to view your health information using other applications (apps) compatible with our system. You can access the FollowMyHealth Patient Portal offered by [facility] by registering at the following website: [email or web address]. By joining [employer]’s FollowMyHealth portal, you will also be able to view your health information using other applications (apps) compatible with our system.

## 2023-12-23 NOTE — PROGRESS NOTE ADULT - SUBJECTIVE AND OBJECTIVE BOX
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 Endorses 5 days history of low back pain radiates to the Right Lower extremity and ankle. Sudden onset on back pain started Friday while mapping, progressively getting worse, constant, not alleviated by pain meds. Denies any acute trauma but had a fall 2 months ago. at baseline ambulates independently, but has been using a walker since the pain started. Was place on prednisone yesterday by ortho. Denies falls, trauma. numbness, tingling, bladder/bowel incontinent fevers, chills, chest pain, SOB, N/V/D or any other acute symptoms. Labs remarkable for WBC: 13.32 (on Prednisone), H/H 17/64/51.3 (on Chemo), Found to be hypertensive BP: 180/113, HR:76, afebrile. Received 1L-NS, Ofirmev, Morphine 2mg x3   CT-Lumbar: Compression fracture deformity along the superior endplate of L4.        12/21/23: Patient seen and examined. Pain only on sitting and ambulation.       Vital Signs Last 24 Hrs  T(C): 36.5 (21 Dec 2023 11:00), Max: 36.5 (20 Dec 2023 18:07)  T(F): 97.7 (21 Dec 2023 11:00), Max: 97.7 (20 Dec 2023 18:07)  HR: 74 (21 Dec 2023 11:00) (65 - 74)  BP: 129/97 (21 Dec 2023 11:00) (129/97 - 169/104)  BP(mean): 112 (20 Dec 2023 21:35) (112 - 123)  RR: 18 (21 Dec 2023 11:00) (18 - 18)  SpO2: 98% (21 Dec 2023 11:00) (97% - 100%)    Parameters below as of 21 Dec 2023 11:00  Patient On (Oxygen Delivery Method): room air            Physical Exam:     GENERAL: NAD, comfortable at bedside   HEAD:  Atraumatic, Normocephalic  EYES: EOMI, PERRL, conjunctiva and sclera clear  NECK: Supple, No JVD  CHEST/LUNG: Clear to auscultation bilaterally; No wheezes, rales or rhonchi  HEART: Regular rate and rhythm; No murmurs, rubs, or gallops, (+)S1, S2  ABDOMEN: Soft, Nontender, Nondistended; Normal Bowel sounds   EXTREMITIES:  2+ Peripheral Pulses, sensory intact, No clubbing, cyanosis, or edema  MSK: TTP at the lumbar R>L   PSYCH: normal mood and affect  NEUROLOGY: AAOx3, non-focal deficit   SKIN: No rashes or lesions                                  17.1   11.33 )-----------( 347      ( 21 Dec 2023 07:55 )             52.5     21 Dec 2023 07:55    138    |  106    |  29     ----------------------------<  92     4.1     |  26     |  1.16     Ca    9.6        21 Dec 2023 07:55    TPro  6.5    /  Alb  3.1    /  TBili  0.6    /  DBili  x      /  AST  33     /  ALT  39     /  AlkPhos  125    20 Dec 2023 15:24    LIVER FUNCTIONS - ( 20 Dec 2023 15:24 )  Alb: 3.1 g/dL / Pro: 6.5 gm/dL / ALK PHOS: 125 U/L / ALT: 39 U/L / AST: 33 U/L / GGT: x           PT/INR - ( 20 Dec 2023 15:38 )   PT: 11.0 sec;   INR: 0.97 ratio         PTT - ( 20 Dec 2023 15:38 )  PTT:28.6 sec  CAPILLARY BLOOD GLUCOSE            Urinalysis Basic - ( 21 Dec 2023 07:55 )    Color: x / Appearance: x / SG: x / pH: x  Gluc: 92 mg/dL / Ketone: x  / Bili: x / Urobili: x   Blood: x / Protein: x / Nitrite: x   Leuk Esterase: x / RBC: x / WBC x   Sq Epi: x / Non Sq Epi: x / Bacteria: x          MEDICATIONS  (STANDING):  celecoxib 200 milliGRAM(s) Oral daily  heparin   Injectable 5000 Unit(s) SubCutaneous every 12 hours  hydrocortisone 10 milliGRAM(s) Oral at bedtime  hydrocortisone 20 milliGRAM(s) Oral daily  influenza  Vaccine (HIGH DOSE) 0.7 milliLiter(s) IntraMuscular once  losartan 50 milliGRAM(s) Oral at bedtime    MEDICATIONS  (PRN):  acetaminophen     Tablet .. 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 Endorses 5 days history of low back pain radiates to the Right Lower extremity and ankle. Sudden onset on back pain started Friday while mapping, progressively getting worse, constant, not alleviated by pain meds. Denies any acute trauma but had a fall 2 months ago. at baseline ambulates independently, but has been using a walker since the pain started. Was place on prednisone yesterday by ortho. Denies falls, trauma. numbness, tingling, bladder/bowel incontinent fevers, chills, chest pain, SOB, N/V/D or any other acute symptoms. Labs remarkable for WBC: 13.32 (on Prednisone), H/H 17/64/51.3 (on Chemo), Found to be hypertensive BP: 180/113, HR:76, afebrile. Received 1L-NS, Ofirmev, Morphine 2mg x3   CT-Lumbar: Compression fracture deformity along the superior endplate of L4.      Problem/Plan - 1:  ·  Problem: Lumbar compression fracture.   ·  Plan: 5 days history of acute onset of low back pain started while mapping, constant, not alleviated by Tylenol  CT-Lumbar: Compression fracture deformity along the superior endplate of L4  - Ortho consulted   - Continue IV Morphine   - Celebrex   - PT/OT eval appreciated  - WBAT  - DVT prophylaxis    Problem/Plan - 2:  ·  Problem: Hypertension.   Continue home meds   - Monitor BP.    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   ·  Plan: Currently on chemo.  -Oncology eval appreciated    Problem/Plan - 4:  ·  Problem: Hypothyroidism.   ·  Plan: Synthroid 88mcg   
Pt seen today laying in bed c/o pain of the proximal aspect of right quad and dorsal aspect of right foot which started this morning after twisting in bed. Pt states pain was improving until this morning. PT denies numbness and weakness of b/l lower extremities. There are no known alleviating or aggravating factors. Pt denies changes in bowel and bladder habits.    PE  Gen appearance: NAD  motor strength: 5/5 of b/l quads, b/l HF, b/l gastrocs, b/l ant tibs, b/l EHL  Sensation: intact to light touch bilat lower ext  Non-tender to palpation of the lumbar spine today.    CT lumbar spine: Compression fracture deformity along the superior endplate of L4.   Recommend MRI of the lumbar spine for further assessment of acuity.  2.  Multilevel degenerative change of the lumbar spine as discussed   above, most significantly at the L3-L4 level where there is moderate to   severe narrowing of the spinal canal.    MRI lumbar spine: Disc extrusion into right lateral recess of the L4 body from the L3-4  disc space with associated degenerative changes involving the L3-4 disc  space level.  Evidence of edema involving the inferior endplate of L3 with low  signal T1 hyperintense signal on the STIR imaging in a somewhat bandlike  distribution. More subtle edema involving the superior endplate of L4.  Findings suggest significant degenerative change at this level with  vacuum disc phenomenon at the level of the disc space.    Plan  - MRI of the lumbar spine reviewed with pateint.  - Treatment options reviewed with patient such as conservative management (physical therapy with medications), injections, vs surgical options. Pt deferred on injections and surgical options at this time.  - Neurontin 300mg BID recommended at this time. Continue Celebrex.   - Monitor motor and neuro at this time.
Pt seen resting in bed comfortably with son and  at bedside. Pt states pain has improved with gabapentin. She was able to ambulate more with gabapentin. Pt has intermittent pain of the right quad. Pt denies numbness and weakness of b/l lower extremities. Pt denies changes in bowel and bladder habits.    PE  Gen appearance: NAD  Motor strength: 5/5 of b/l HF, b/l quads, b/l gastrocs, b/l ant tibs, b/l EHL  Sensation: intact to light touch bilat lower ext  Non- tender to palpation of the lumbar spine.    CT lumbar spine: Compression fracture deformity along the superior endplate of L4.  Recommend MRI of the lumbar spine for further assessment of acuity.  2. Multilevel degenerative change of the lumbar spine as discussed  above, most significantly at the L3-L4 level where there is moderate to  severe narrowing of the spinal canal.    MRI lumbar spine: Disc extrusion into right lateral recess of the L4 body from the L3-4  disc space with associated degenerative changes involving the L3-4 disc  space level.  Evidence of edema involving the inferior endplate of L3 with low  signal T1 hyperintense signal on the STIR imaging in a somewhat bandlike  distribution. More subtle edema involving the superior endplate of L4.  Findings suggest significant degenerative change at this level with  vacuum disc phenomenon at the level of the disc space.    Plan  Gabapentin 300mg will be titrated to 1 tab po TID. Pt will be discharged today.   F/u office as outpatient.   All questions answered.   Discussed case with Dr. Ornelas.   
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 Endorses 5 days history of low back pain radiates to the Right Lower extremity and ankle. Sudden onset on back pain started Friday while mapping, progressively getting worse, constant, not alleviated by pain meds. Denies any acute trauma but had a fall 2 months ago. at baseline ambulates independently, but has been using a walker since the pain started. Was place on prednisone yesterday by ortho. Denies falls, trauma. numbness, tingling, bladder/bowel incontinent fevers, chills, chest pain, SOB, N/V/D or any other acute symptoms. Labs remarkable for WBC: 13.32 (on Prednisone), H/H 17/64/51.3 (on Chemo), Found to be hypertensive BP: 180/113, HR:76, afebrile. Received 1L-NS, Ofirmev, Morphine 2mg x3   CT-Lumbar: Compression fracture deformity along the superior endplate of L4.    12/21/23: Patient seen and examined. Pain only on sitting and ambulation.   12/22/23- laying flat, states has RT leg numbness and pain.     Vital Signs Last 24 Hrs  T(C): 36.6 (22 Dec 2023 16:00), Max: 36.6 (22 Dec 2023 16:00)  T(F): 97.8 (22 Dec 2023 16:00), Max: 97.8 (22 Dec 2023 16:00)  HR: 70 (22 Dec 2023 16:00) (70 - 81)  BP: 145/90 (22 Dec 2023 16:00) (121/90 - 145/90)  BP(mean): 103 (22 Dec 2023 16:00) (103 - 103)  RR: 17 (22 Dec 2023 16:00) (16 - 17)  SpO2: 99% (22 Dec 2023 16:00) (96% - 100%)    Parameters below as of 22 Dec 2023 16:00  Patient On (Oxygen Delivery Method): room air    Physical Exam:  GENERAL: NAD, comfortable at bedside   HEAD:  Atraumatic, Normocephalic  EYES: EOMI, PERRL, conjunctiva and sclera clear  NECK: Supple, No JVD  CHEST/LUNG: Clear to auscultation bilaterally; No wheezes, rales or rhonchi  HEART: Regular rate and rhythm; No murmurs, rubs, or gallops, (+)S1, S2  ABDOMEN: Soft, Nontender, Nondistended; Normal Bowel sounds   EXTREMITIES:  2+ Peripheral Pulses, sensory intact, No clubbing, cyanosis, or edema  MSK: TTP at the lumbar R>L   PSYCH: normal mood and affect  NEUROLOGY: AAOx3, non-focal deficit   SKIN: No rashes or lesions    LABS:                                 17.1   11.33 )-----------( 347      ( 21 Dec 2023 07:55 )             52.5     21 Dec 2023 07:55    138    |  106    |  29     ----------------------------<  92     4.1     |  26     |  1.16     Ca    9.6        21 Dec 2023 07:55    Urinalysis Basic - ( 21 Dec 2023 07:55 )  Color: x / Appearance: x / SG: x / pH: x  Gluc: 92 mg/dL / Ketone: x  / Bili: x / Urobili: x   Blood: x / Protein: x / Nitrite: x   Leuk Esterase: x / RBC: x / WBC x   Sq Epi: x / Non Sq Epi: x / Bacteria: x    MEDICATIONS  (STANDING):  acetaminophen     Tablet .. 975 milliGRAM(s) Oral every 6 hours  celecoxib 200 milliGRAM(s) Oral daily  gabapentin 300 milliGRAM(s) Oral two times a day  heparin   Injectable 5000 Unit(s) SubCutaneous every 12 hours  hydrocortisone 10 milliGRAM(s) Oral at bedtime  hydrocortisone 20 milliGRAM(s) Oral daily  influenza  Vaccine (HIGH DOSE) 0.7 milliLiter(s) IntraMuscular once  losartan 50 milliGRAM(s) Oral at bedtime  polyethylene glycol 3350 17 Gram(s) Oral two times a day  senna 2 Tablet(s) Oral at bedtime    MEDICATIONS  (PRN):  aluminum hydroxide/magnesium hydroxide/simethicone Suspension 30 milliLiter(s) Oral every 4 hours PRN Dyspepsia  melatonin 3 milliGRAM(s) Oral at bedtime PRN Insomnia  ondansetron Injectable 4 milliGRAM(s) IV Push every 8 hours PRN Nausea and/or Vomiting  oxyCODONE    IR 5 milliGRAM(s) Oral every 4 hours PRN Moderate Pain (4 - 6)  oxyCODONE    IR 10 milliGRAM(s) Oral every 4 hours PRN Severe Pain (7 - 10)    Assessment/Plan:  #RT sided lumbar radiculopathy from L3-4 HNP  #L4 compression fx  Spine team following  Pain meds revised- start on standing tylenol 975mg q6h, oxy5mg/10mg q4h prn  Cont neurontin 300mg bid  Add bowel regimen  OOB with PT    #Metastatic renal cell ca  Onc eval noted  Outpatient f/u    #HTN- cont cozaar    #DVT proph- heparin sq    #Dispo- mobilize with PT  
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 Endorses 5 days history of low back pain radiates to the Right Lower extremity and ankle. Sudden onset on back pain started Friday while mapping, progressively getting worse, constant, not alleviated by pain meds. Denies any acute trauma but had a fall 2 months ago. at baseline ambulates independently, but has been using a walker since the pain started. Was place on prednisone yesterday by ortho. Denies falls, trauma. numbness, tingling, bladder/bowel incontinent fevers, chills, chest pain, SOB, N/V/D or any other acute symptoms. Labs remarkable for WBC: 13.32 (on Prednisone), H/H 17/64/51.3 (on Chemo), Found to be hypertensive BP: 180/113, HR:76, afebrile. Received 1L-NS, Ofirmev, Morphine 2mg x3   CT-Lumbar: Compression fracture deformity along the superior endplate of L4.    12/21/23: Patient seen and examined. Pain only on sitting and ambulation.   12/22/23- laying flat, states has RT leg numbness and pain.   12/23/23 seen ambulating from the bathroom back to the bed independant. States earlier today had more of the RT groin numbness/pain but now it is much improved. Not on chronic steroids at home. Excited to go home today    Vital Signs Last 24 Hrs  T(C): 36.3 (23 Dec 2023 07:25), Max: 36.6 (22 Dec 2023 16:00)  T(F): 97.3 (23 Dec 2023 07:25), Max: 97.8 (22 Dec 2023 16:00)  HR: 80 (23 Dec 2023 07:25) (68 - 90)  BP: 144/92 (23 Dec 2023 07:25) (144/92 - 198/110)  BP(mean): 128 (23 Dec 2023 06:35) (103 - 128)  RR: 16 (23 Dec 2023 07:25) (16 - 17)  SpO2: 98% (23 Dec 2023 07:25) (98% - 99%)    Parameters below as of 23 Dec 2023 07:25  Patient On (Oxygen Delivery Method): room air    Physical Exam:  GENERAL: NAD, comfortable at bedside   HEAD:  Atraumatic, Normocephalic  EYES: EOMI, PERRL, conjunctiva and sclera clear  NECK: Supple, No JVD  CHEST/LUNG: Clear to auscultation bilaterally; No wheezes, rales or rhonchi  HEART: Regular rate and rhythm; No murmurs, rubs, or gallops, (+)S1, S2  ABDOMEN: Soft, Nontender, Nondistended; Normal Bowel sounds   EXTREMITIES:  2+ Peripheral Pulses, sensory intact, No clubbing, cyanosis, or edema  MSK: no muscle tenderness  PSYCH: normal mood and affect  NEUROLOGY: AAOx3, non-focal deficit   SKIN: No rashes or lesions    LABS:                      No new labs    MEDICATIONS  (STANDING):  acetaminophen     Tablet .. 975 milliGRAM(s) Oral every 6 hours  celecoxib 200 milliGRAM(s) Oral daily  gabapentin 300 milliGRAM(s) Oral two times a day  heparin   Injectable 5000 Unit(s) SubCutaneous every 12 hours  hydrocortisone 10 milliGRAM(s) Oral at bedtime  hydrocortisone 20 milliGRAM(s) Oral daily  influenza  Vaccine (HIGH DOSE) 0.7 milliLiter(s) IntraMuscular once  losartan 50 milliGRAM(s) Oral at bedtime  polyethylene glycol 3350 17 Gram(s) Oral two times a day  senna 2 Tablet(s) Oral at bedtime    MEDICATIONS  (PRN):  aluminum hydroxide/magnesium hydroxide/simethicone Suspension 30 milliLiter(s) Oral every 4 hours PRN Dyspepsia  melatonin 3 milliGRAM(s) Oral at bedtime PRN Insomnia  ondansetron Injectable 4 milliGRAM(s) IV Push every 8 hours PRN Nausea and/or Vomiting  oxyCODONE    IR 5 milliGRAM(s) Oral every 4 hours PRN Moderate Pain (4 - 6)  oxyCODONE    IR 10 milliGRAM(s) Oral every 4 hours PRN Severe Pain (7 - 10)    Assessment/Plan:  #RT sided lumbar radiculopathy from L3-4 HNP  #L4 compression fx  Imaging studies reviewed  Spine team following  Ambulating  Will increase neurontin to 300mg TID on discharge  DC steroids  Cont tylenol prn  Will discharge on oxy 5mg prn  Outpatinet f/u with DR Reaves after New Year's    #Metastatic renal cell ca  Onc eval noted  Outpatient f/u    #No evidence of malnutrition, normal BMI, physical exam does not support the diagnosis    #HTN- cont cozaar    #DVT proph- heparin sq    #Dispo- home with home PT today. DC time 45 mins. D/w pt, son at bedside, spine team

## 2023-12-23 NOTE — DISCHARGE NOTE PROVIDER - HOSPITAL COURSE
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 Endorses 5 days history of low back pain radiates to the Right Lower extremity and ankle. Sudden onset on back pain started Friday while mapping, progressively getting worse, constant, not alleviated by pain meds. Denies any acute trauma but had a fall 2 months ago. at baseline ambulates independently, but has been using a walker since the pain started. Was place on prednisone yesterday by ortho. Denies falls, trauma. numbness, tingling, bladder/bowel incontinent fevers, chills, chest pain, SOB, N/V/D or any other acute symptoms.   CT-Lumbar: Compression fracture deformity along the superior endplate of L4.  Patient was seen by spine, treated conservatively. Improved. Ambulating independantly and stable for discharge home today    #RT sided lumbar radiculopathy from L3-4 HNP  #L4 compression fx  Imaging studies reviewed  Spine team following  Ambulating  Will increase neurontin to 300mg TID on discharge  DC steroids (states not on chronic steroids at home)  Cont tylenol prn  Will discharge on oxy 5mg prn  Outpatinet f/u with DR Reaves after New Year's    #Metastatic renal cell ca  Onc eval noted  Outpatient f/u    #No evidence of malnutrition, normal BMI, physical exam does not support the diagnosis    #HTN- cont cozaar    #Dispo- home with home PT today. DC time 45 mins. D/w pt, son at bedside, spine team

## 2023-12-29 DIAGNOSIS — M51.16 INTERVERTEBRAL DISC DISORDERS WITH RADICULOPATHY, LUMBAR REGION: ICD-10-CM

## 2023-12-29 DIAGNOSIS — E03.9 HYPOTHYROIDISM, UNSPECIFIED: ICD-10-CM

## 2023-12-29 DIAGNOSIS — C78.7 SECONDARY MALIGNANT NEOPLASM OF LIVER AND INTRAHEPATIC BILE DUCT: ICD-10-CM

## 2023-12-29 DIAGNOSIS — D75.1 SECONDARY POLYCYTHEMIA: ICD-10-CM

## 2023-12-29 DIAGNOSIS — C64.2 MALIGNANT NEOPLASM OF LEFT KIDNEY, EXCEPT RENAL PELVIS: ICD-10-CM

## 2023-12-29 DIAGNOSIS — E78.00 PURE HYPERCHOLESTEROLEMIA, UNSPECIFIED: ICD-10-CM

## 2023-12-29 DIAGNOSIS — Z79.890 HORMONE REPLACEMENT THERAPY: ICD-10-CM

## 2023-12-29 DIAGNOSIS — M48.56XA COLLAPSED VERTEBRA, NOT ELSEWHERE CLASSIFIED, LUMBAR REGION, INITIAL ENCOUNTER FOR FRACTURE: ICD-10-CM

## 2023-12-29 DIAGNOSIS — I10 ESSENTIAL (PRIMARY) HYPERTENSION: ICD-10-CM

## 2024-06-21 NOTE — DIETITIAN NUTRITION RISK NOTIFICATION - PHYSICAL ASSESSMENT TEMPLES
mild
What Type Of Note Output Would You Prefer (Optional)?: Bullet Format
Hpi Title: Evaluation of Skin Lesions

## 2024-08-03 ENCOUNTER — RESULT REVIEW (OUTPATIENT)
Age: 79
End: 2024-08-03

## 2024-08-15 ENCOUNTER — TRANSCRIPTION ENCOUNTER (OUTPATIENT)
Age: 79
End: 2024-08-15

## 2024-09-09 ENCOUNTER — INPATIENT (INPATIENT)
Facility: HOSPITAL | Age: 79
LOS: 4 days | Discharge: HOME | DRG: 872 | End: 2024-09-14
Attending: STUDENT IN AN ORGANIZED HEALTH CARE EDUCATION/TRAINING PROGRAM | Admitting: SURGERY
Payer: MEDICARE

## 2024-09-09 ENCOUNTER — TRANSCRIPTION ENCOUNTER (OUTPATIENT)
Age: 79
End: 2024-09-09

## 2024-09-09 VITALS
HEART RATE: 121 BPM | OXYGEN SATURATION: 96 % | TEMPERATURE: 98 F | WEIGHT: 139.99 LBS | DIASTOLIC BLOOD PRESSURE: 76 MMHG | HEIGHT: 61 IN | SYSTOLIC BLOOD PRESSURE: 106 MMHG | RESPIRATION RATE: 20 BRPM

## 2024-09-09 DIAGNOSIS — A41.9 SEPSIS, UNSPECIFIED ORGANISM: ICD-10-CM

## 2024-09-09 DIAGNOSIS — Z90.89 ACQUIRED ABSENCE OF OTHER ORGANS: Chronic | ICD-10-CM

## 2024-09-09 DIAGNOSIS — Z98.890 OTHER SPECIFIED POSTPROCEDURAL STATES: Chronic | ICD-10-CM

## 2024-09-09 DIAGNOSIS — Z90.722 ACQUIRED ABSENCE OF OVARIES, BILATERAL: Chronic | ICD-10-CM

## 2024-09-09 LAB
ALBUMIN SERPL ELPH-MCNC: 3.1 G/DL — LOW (ref 3.3–5)
ALP SERPL-CCNC: 757 U/L — HIGH (ref 40–120)
ALT FLD-CCNC: 79 U/L — HIGH (ref 12–78)
ANION GAP SERPL CALC-SCNC: 8 MMOL/L — SIGNIFICANT CHANGE UP (ref 5–17)
APPEARANCE UR: ABNORMAL
APTT BLD: 30.8 SEC — SIGNIFICANT CHANGE UP (ref 24.5–35.6)
AST SERPL-CCNC: 123 U/L — HIGH (ref 15–37)
BACTERIA # UR AUTO: NEGATIVE /HPF — SIGNIFICANT CHANGE UP
BASOPHILS # BLD AUTO: 0 K/UL — SIGNIFICANT CHANGE UP (ref 0–0.2)
BASOPHILS NFR BLD AUTO: 0 % — SIGNIFICANT CHANGE UP (ref 0–2)
BILIRUB SERPL-MCNC: 1 MG/DL — SIGNIFICANT CHANGE UP (ref 0.2–1.2)
BILIRUB UR-MCNC: ABNORMAL
BUN SERPL-MCNC: 19 MG/DL — SIGNIFICANT CHANGE UP (ref 7–23)
CALCIUM SERPL-MCNC: 9.8 MG/DL — SIGNIFICANT CHANGE UP (ref 8.5–10.1)
CAST: 15 /LPF — HIGH (ref 0–4)
CHLORIDE SERPL-SCNC: 99 MMOL/L — SIGNIFICANT CHANGE UP (ref 96–108)
CO2 SERPL-SCNC: 25 MMOL/L — SIGNIFICANT CHANGE UP (ref 22–31)
COLOR SPEC: SIGNIFICANT CHANGE UP
CREAT SERPL-MCNC: 1.25 MG/DL — SIGNIFICANT CHANGE UP (ref 0.5–1.3)
DIFF PNL FLD: NEGATIVE — SIGNIFICANT CHANGE UP
EGFR: 44 ML/MIN/1.73M2 — LOW
EGFR: 44 ML/MIN/1.73M2 — LOW
EOSINOPHIL # BLD AUTO: 0 K/UL — SIGNIFICANT CHANGE UP (ref 0–0.5)
EOSINOPHIL NFR BLD AUTO: 0 % — SIGNIFICANT CHANGE UP (ref 0–6)
FLUAV AG NPH QL: SIGNIFICANT CHANGE UP
FLUBV AG NPH QL: SIGNIFICANT CHANGE UP
GLUCOSE SERPL-MCNC: 74 MG/DL — SIGNIFICANT CHANGE UP (ref 70–99)
GLUCOSE UR QL: NEGATIVE MG/DL — SIGNIFICANT CHANGE UP
GRAN CASTS # UR COMP ASSIST: PRESENT
HCT VFR BLD CALC: 45.6 % — HIGH (ref 34.5–45)
HGB BLD-MCNC: 14.3 G/DL — SIGNIFICANT CHANGE UP (ref 11.5–15.5)
HYALINE CASTS # UR AUTO: PRESENT
INR BLD: 1.46 RATIO — HIGH (ref 0.85–1.18)
KETONES UR-MCNC: 15 MG/DL
LACTATE SERPL-SCNC: 4.5 MMOL/L — CRITICAL HIGH (ref 0.7–2)
LACTATE SERPL-SCNC: 5.5 MMOL/L — CRITICAL HIGH (ref 0.7–2)
LEUKOCYTE ESTERASE UR-ACNC: ABNORMAL
LYMPHOCYTES # BLD AUTO: 1.02 K/UL — SIGNIFICANT CHANGE UP (ref 1–3.3)
LYMPHOCYTES # BLD AUTO: 4 % — LOW (ref 13–44)
MANUAL SMEAR VERIFICATION: SIGNIFICANT CHANGE UP
MCHC RBC-ENTMCNC: 28.5 PG — SIGNIFICANT CHANGE UP (ref 27–34)
MCHC RBC-ENTMCNC: 31.4 GM/DL — LOW (ref 32–36)
MCV RBC AUTO: 90.8 FL — SIGNIFICANT CHANGE UP (ref 80–100)
MONOCYTES # BLD AUTO: 0.25 K/UL — SIGNIFICANT CHANGE UP (ref 0–0.9)
MONOCYTES NFR BLD AUTO: 1 % — LOW (ref 2–14)
NEUTROPHILS # BLD AUTO: 24.22 K/UL — HIGH (ref 1.8–7.4)
NEUTROPHILS NFR BLD AUTO: 95 % — HIGH (ref 43–77)
NITRITE UR-MCNC: NEGATIVE — SIGNIFICANT CHANGE UP
NRBC # BLD: 0 /100 WBCS — SIGNIFICANT CHANGE UP (ref 0–0)
NRBC # BLD: SIGNIFICANT CHANGE UP /100 WBCS (ref 0–0)
NRBC BLD-RTO: 0 /100 WBCS — SIGNIFICANT CHANGE UP (ref 0–0)
NRBC BLD-RTO: SIGNIFICANT CHANGE UP /100 WBCS (ref 0–0)
PH UR: 5 — SIGNIFICANT CHANGE UP (ref 5–8)
PLAT MORPH BLD: NORMAL — SIGNIFICANT CHANGE UP
PLATELET # BLD AUTO: 362 K/UL — SIGNIFICANT CHANGE UP (ref 150–400)
POLYCHROMASIA BLD QL SMEAR: SLIGHT — SIGNIFICANT CHANGE UP
POTASSIUM SERPL-MCNC: 4.4 MMOL/L — SIGNIFICANT CHANGE UP (ref 3.5–5.3)
POTASSIUM SERPL-SCNC: 4.4 MMOL/L — SIGNIFICANT CHANGE UP (ref 3.5–5.3)
PROT SERPL-MCNC: 8 GM/DL — SIGNIFICANT CHANGE UP (ref 6–8.3)
PROT UR-MCNC: 30 MG/DL
PROTHROM AB SERPL-ACNC: 16.3 SEC — HIGH (ref 9.5–13)
RBC # BLD: 5.02 M/UL — SIGNIFICANT CHANGE UP (ref 3.8–5.2)
RBC # FLD: 15.9 % — HIGH (ref 10.3–14.5)
RBC BLD AUTO: SIGNIFICANT CHANGE UP
RBC CASTS # UR COMP ASSIST: 8 /HPF — HIGH (ref 0–4)
RSV RNA NPH QL NAA+NON-PROBE: SIGNIFICANT CHANGE UP
SARS-COV-2 RNA SPEC QL NAA+PROBE: SIGNIFICANT CHANGE UP
SODIUM SERPL-SCNC: 132 MMOL/L — LOW (ref 135–145)
SP GR SPEC: 1.02 — SIGNIFICANT CHANGE UP (ref 1–1.03)
SQUAMOUS # UR AUTO: 1 /HPF — SIGNIFICANT CHANGE UP (ref 0–5)
UROBILINOGEN FLD QL: 1 MG/DL — SIGNIFICANT CHANGE UP (ref 0.2–1)
WBC # BLD: 25.49 K/UL — HIGH (ref 3.8–10.5)
WBC # FLD AUTO: 25.49 K/UL — HIGH (ref 3.8–10.5)
WBC UR QL: 4 /HPF — SIGNIFICANT CHANGE UP (ref 0–5)

## 2024-09-09 PROCEDURE — 83605 ASSAY OF LACTIC ACID: CPT

## 2024-09-09 PROCEDURE — 36415 COLL VENOUS BLD VENIPUNCTURE: CPT

## 2024-09-09 PROCEDURE — 97530 THERAPEUTIC ACTIVITIES: CPT | Mod: GP

## 2024-09-09 PROCEDURE — 74177 CT ABD & PELVIS W/CONTRAST: CPT | Mod: 26

## 2024-09-09 PROCEDURE — 85730 THROMBOPLASTIN TIME PARTIAL: CPT

## 2024-09-09 PROCEDURE — A9579: CPT

## 2024-09-09 PROCEDURE — 87449 NOS EACH ORGANISM AG IA: CPT

## 2024-09-09 PROCEDURE — 80048 BASIC METABOLIC PNL TOTAL CA: CPT

## 2024-09-09 PROCEDURE — 97162 PT EVAL MOD COMPLEX 30 MIN: CPT | Mod: GP

## 2024-09-09 PROCEDURE — 87640 STAPH A DNA AMP PROBE: CPT

## 2024-09-09 PROCEDURE — 85025 COMPLETE CBC W/AUTO DIFF WBC: CPT

## 2024-09-09 PROCEDURE — 82962 GLUCOSE BLOOD TEST: CPT

## 2024-09-09 PROCEDURE — 85610 PROTHROMBIN TIME: CPT

## 2024-09-09 PROCEDURE — 99291 CRITICAL CARE FIRST HOUR: CPT

## 2024-09-09 PROCEDURE — 83735 ASSAY OF MAGNESIUM: CPT

## 2024-09-09 PROCEDURE — 84100 ASSAY OF PHOSPHORUS: CPT

## 2024-09-09 PROCEDURE — 71260 CT THORAX DX C+: CPT | Mod: MC

## 2024-09-09 PROCEDURE — 74183 MRI ABD W/O CNTR FLWD CNTR: CPT | Mod: MC

## 2024-09-09 PROCEDURE — 84145 PROCALCITONIN (PCT): CPT

## 2024-09-09 PROCEDURE — 82248 BILIRUBIN DIRECT: CPT

## 2024-09-09 PROCEDURE — 85027 COMPLETE CBC AUTOMATED: CPT

## 2024-09-09 PROCEDURE — 87040 BLOOD CULTURE FOR BACTERIA: CPT

## 2024-09-09 PROCEDURE — 87324 CLOSTRIDIUM AG IA: CPT

## 2024-09-09 PROCEDURE — 74177 CT ABD & PELVIS W/CONTRAST: CPT | Mod: MC

## 2024-09-09 PROCEDURE — 80053 COMPREHEN METABOLIC PANEL: CPT

## 2024-09-09 PROCEDURE — 71260 CT THORAX DX C+: CPT | Mod: 26

## 2024-09-09 PROCEDURE — 97116 GAIT TRAINING THERAPY: CPT | Mod: GP

## 2024-09-09 PROCEDURE — 87641 MR-STAPH DNA AMP PROBE: CPT

## 2024-09-09 RX ORDER — PIPERACILLIN-TAZO-DEXTROSE,ISO 3.375G/5
3.38 IV SOLUTION, PIGGYBACK PREMIX FROZEN(ML) INTRAVENOUS ONCE
Refills: 0 | Status: COMPLETED | OUTPATIENT
Start: 2024-09-09 | End: 2024-09-09

## 2024-09-09 RX ORDER — HYDROCORTISONE 20 MG
100 TABLET ORAL EVERY 8 HOURS
Refills: 0 | Status: DISCONTINUED | OUTPATIENT
Start: 2024-09-09 | End: 2024-09-10

## 2024-09-09 RX ORDER — SODIUM CHLORIDE 9 G/1000ML
1000 INJECTION, SOLUTION INTRAVENOUS
Refills: 0 | Status: DISCONTINUED | OUTPATIENT
Start: 2024-09-09 | End: 2024-09-11

## 2024-09-09 RX ORDER — PIPERACILLIN-TAZO-DEXTROSE,ISO 3.375G/5
3.38 IV SOLUTION, PIGGYBACK PREMIX FROZEN(ML) INTRAVENOUS EVERY 8 HOURS
Refills: 0 | Status: DISCONTINUED | OUTPATIENT
Start: 2024-09-09 | End: 2024-09-10

## 2024-09-09 RX ORDER — AMIODARONE HYDROCHLORIDE 50 MG/ML
200 INJECTION, SOLUTION INTRAVENOUS DAILY
Refills: 0 | Status: DISCONTINUED | OUTPATIENT
Start: 2024-09-09 | End: 2024-09-09

## 2024-09-09 RX ORDER — POLYETHYLENE GLYCOL 3350 17 G/17G
17 POWDER, FOR SOLUTION ORAL
Refills: 0 | Status: DISCONTINUED | OUTPATIENT
Start: 2024-09-09 | End: 2024-09-14

## 2024-09-09 RX ORDER — NOREPINEPHRINE BITARTRATE 8 MG
0.05 SOLUTION INTRAVENOUS
Qty: 8 | Refills: 0 | Status: DISCONTINUED | OUTPATIENT
Start: 2024-09-09 | End: 2024-09-10

## 2024-09-09 RX ORDER — ACETAMINOPHEN 500 MG/5ML
1000 LIQUID (ML) ORAL ONCE
Refills: 0 | Status: COMPLETED | OUTPATIENT
Start: 2024-09-09 | End: 2024-09-09

## 2024-09-09 RX ORDER — LEVOTHYROXINE SODIUM 300 MCG
88 TABLET ORAL DAILY
Refills: 0 | Status: DISCONTINUED | OUTPATIENT
Start: 2024-09-09 | End: 2024-09-09

## 2024-09-09 RX ORDER — SENNA 187 MG
2 TABLET ORAL AT BEDTIME
Refills: 0 | Status: DISCONTINUED | OUTPATIENT
Start: 2024-09-09 | End: 2024-09-14

## 2024-09-09 RX ORDER — LEVOTHYROXINE SODIUM 300 MCG
100 TABLET ORAL DAILY
Refills: 0 | Status: DISCONTINUED | OUTPATIENT
Start: 2024-09-09 | End: 2024-09-14

## 2024-09-09 RX ORDER — VANCOMYCIN HCL IN 5 % DEXTROSE 1.5G/250ML
1000 PLASTIC BAG, INJECTION (ML) INTRAVENOUS ONCE
Refills: 0 | Status: COMPLETED | OUTPATIENT
Start: 2024-09-09 | End: 2024-09-09

## 2024-09-09 RX ORDER — KETOROLAC TROMETHAMINE 30 MG/ML
15 INJECTION, SOLUTION INTRAMUSCULAR; INTRAVENOUS ONCE
Refills: 0 | Status: DISCONTINUED | OUTPATIENT
Start: 2024-09-09 | End: 2024-09-09

## 2024-09-09 RX ADMIN — NOREPINEPHRINE BITARTRATE 5.95 MICROGRAM(S)/KG/MIN: 8 SOLUTION at 15:34

## 2024-09-09 RX ADMIN — Medication 1000 MILLILITER(S): at 12:38

## 2024-09-09 RX ADMIN — SODIUM CHLORIDE 125 MILLILITER(S): 9 INJECTION, SOLUTION INTRAVENOUS at 19:51

## 2024-09-09 RX ADMIN — Medication 25 GRAM(S): at 21:38

## 2024-09-09 RX ADMIN — Medication 100 MILLIGRAM(S): at 16:58

## 2024-09-09 RX ADMIN — KETOROLAC TROMETHAMINE 15 MILLIGRAM(S): 30 INJECTION, SOLUTION INTRAMUSCULAR; INTRAVENOUS at 16:08

## 2024-09-09 RX ADMIN — Medication 250 MILLIGRAM(S): at 16:08

## 2024-09-09 RX ADMIN — Medication 1000 MILLILITER(S): at 13:06

## 2024-09-09 RX ADMIN — Medication 200 GRAM(S): at 13:33

## 2024-09-09 RX ADMIN — Medication 1000 MILLILITER(S): at 16:08

## 2024-09-09 RX ADMIN — Medication 400 MILLIGRAM(S): at 13:07

## 2024-09-10 LAB
ALBUMIN SERPL ELPH-MCNC: 2.1 G/DL — LOW (ref 3.3–5)
ALP SERPL-CCNC: 469 U/L — HIGH (ref 40–120)
ALT FLD-CCNC: 96 U/L — HIGH (ref 12–78)
ANION GAP SERPL CALC-SCNC: 7 MMOL/L — SIGNIFICANT CHANGE UP (ref 5–17)
AST SERPL-CCNC: 104 U/L — HIGH (ref 15–37)
BASOPHILS # BLD AUTO: 0.06 K/UL — SIGNIFICANT CHANGE UP (ref 0–0.2)
BASOPHILS NFR BLD AUTO: 0.2 % — SIGNIFICANT CHANGE UP (ref 0–2)
BILIRUB DIRECT SERPL-MCNC: 0.7 MG/DL — HIGH (ref 0–0.3)
BILIRUB INDIRECT FLD-MCNC: 0.5 MG/DL — SIGNIFICANT CHANGE UP (ref 0.2–1)
BILIRUB SERPL-MCNC: 1.2 MG/DL — SIGNIFICANT CHANGE UP (ref 0.2–1.2)
BUN SERPL-MCNC: 14 MG/DL — SIGNIFICANT CHANGE UP (ref 7–23)
C DIFF GDH STL QL: NEGATIVE — SIGNIFICANT CHANGE UP
C DIFF GDH STL QL: SIGNIFICANT CHANGE UP
CALCIUM SERPL-MCNC: 8.5 MG/DL — SIGNIFICANT CHANGE UP (ref 8.5–10.1)
CHLORIDE SERPL-SCNC: 111 MMOL/L — HIGH (ref 96–108)
CO2 SERPL-SCNC: 22 MMOL/L — SIGNIFICANT CHANGE UP (ref 22–31)
CREAT SERPL-MCNC: 0.87 MG/DL — SIGNIFICANT CHANGE UP (ref 0.5–1.3)
E CLOAC COMP DNA BLD POS QL NAA+PROBE: SIGNIFICANT CHANGE UP
EGFR: 69 ML/MIN/1.73M2 — SIGNIFICANT CHANGE UP
EGFR: 69 ML/MIN/1.73M2 — SIGNIFICANT CHANGE UP
EOSINOPHIL # BLD AUTO: 0 K/UL — SIGNIFICANT CHANGE UP (ref 0–0.5)
EOSINOPHIL NFR BLD AUTO: 0 % — SIGNIFICANT CHANGE UP (ref 0–6)
GLUCOSE SERPL-MCNC: 99 MG/DL — SIGNIFICANT CHANGE UP (ref 70–99)
GRAM STN FLD: ABNORMAL
HCT VFR BLD CALC: 38.7 % — SIGNIFICANT CHANGE UP (ref 34.5–45)
HGB BLD-MCNC: 12.3 G/DL — SIGNIFICANT CHANGE UP (ref 11.5–15.5)
IMM GRANULOCYTES NFR BLD AUTO: 0.7 % — SIGNIFICANT CHANGE UP (ref 0–0.9)
LACTATE SERPL-SCNC: 1.9 MMOL/L — SIGNIFICANT CHANGE UP (ref 0.7–2)
LYMPHOCYTES # BLD AUTO: 0.42 K/UL — LOW (ref 1–3.3)
LYMPHOCYTES # BLD AUTO: 1.3 % — LOW (ref 13–44)
MAGNESIUM SERPL-MCNC: 1.5 MG/DL — LOW (ref 1.6–2.6)
MCHC RBC-ENTMCNC: 28.1 PG — SIGNIFICANT CHANGE UP (ref 27–34)
MCHC RBC-ENTMCNC: 31.8 GM/DL — LOW (ref 32–36)
MCV RBC AUTO: 88.4 FL — SIGNIFICANT CHANGE UP (ref 80–100)
METHOD TYPE: SIGNIFICANT CHANGE UP
MONOCYTES # BLD AUTO: 0.45 K/UL — SIGNIFICANT CHANGE UP (ref 0–0.9)
MONOCYTES NFR BLD AUTO: 1.3 % — LOW (ref 2–14)
MRSA PCR RESULT.: DETECTED
NEUTROPHILS # BLD AUTO: 32.17 K/UL — HIGH (ref 1.8–7.4)
NEUTROPHILS NFR BLD AUTO: 96.5 % — HIGH (ref 43–77)
PHOSPHATE SERPL-MCNC: 3.6 MG/DL — SIGNIFICANT CHANGE UP (ref 2.5–4.5)
PLATELET # BLD AUTO: 275 K/UL — SIGNIFICANT CHANGE UP (ref 150–400)
POTASSIUM SERPL-MCNC: 3.9 MMOL/L — SIGNIFICANT CHANGE UP (ref 3.5–5.3)
POTASSIUM SERPL-SCNC: 3.9 MMOL/L — SIGNIFICANT CHANGE UP (ref 3.5–5.3)
PROCALCITONIN SERPL-MCNC: 15 NG/ML — HIGH (ref 0.02–0.1)
PROT SERPL-MCNC: 5.9 GM/DL — LOW (ref 6–8.3)
RBC # BLD: 4.38 M/UL — SIGNIFICANT CHANGE UP (ref 3.8–5.2)
RBC # FLD: 15.9 % — HIGH (ref 10.3–14.5)
S AUREUS DNA NOSE QL NAA+PROBE: DETECTED
SODIUM SERPL-SCNC: 140 MMOL/L — SIGNIFICANT CHANGE UP (ref 135–145)
SPECIMEN SOURCE: SIGNIFICANT CHANGE UP
SPECIMEN SOURCE: SIGNIFICANT CHANGE UP
WBC # BLD: 33.34 K/UL — HIGH (ref 3.8–10.5)
WBC # FLD AUTO: 33.34 K/UL — HIGH (ref 3.8–10.5)

## 2024-09-10 PROCEDURE — 74183 MRI ABD W/O CNTR FLWD CNTR: CPT | Mod: 26

## 2024-09-10 PROCEDURE — 99222 1ST HOSP IP/OBS MODERATE 55: CPT

## 2024-09-10 PROCEDURE — 99291 CRITICAL CARE FIRST HOUR: CPT

## 2024-09-10 RX ORDER — CEFEPIME 2 G/20ML
INJECTION, POWDER, FOR SOLUTION INTRAVENOUS
Refills: 0 | Status: DISCONTINUED | OUTPATIENT
Start: 2024-09-10 | End: 2024-09-10

## 2024-09-10 RX ORDER — HYDROCORTISONE 20 MG
20 TABLET ORAL
Refills: 0 | Status: DISCONTINUED | OUTPATIENT
Start: 2024-09-10 | End: 2024-09-10

## 2024-09-10 RX ORDER — HYDROCORTISONE 20 MG
20 TABLET ORAL
Refills: 0 | Status: DISCONTINUED | OUTPATIENT
Start: 2024-09-11 | End: 2024-09-14

## 2024-09-10 RX ORDER — MEROPENEM 1 G/30ML
1000 INJECTION INTRAVENOUS EVERY 8 HOURS
Refills: 0 | Status: DISCONTINUED | OUTPATIENT
Start: 2024-09-10 | End: 2024-09-13

## 2024-09-10 RX ORDER — CEFEPIME 2 G/20ML
2000 INJECTION, POWDER, FOR SOLUTION INTRAVENOUS EVERY 12 HOURS
Refills: 0 | Status: DISCONTINUED | OUTPATIENT
Start: 2024-09-10 | End: 2024-09-10

## 2024-09-10 RX ORDER — MUPIROCIN CALCIUM 20 MG/G
1 CREAM TOPICAL
Refills: 0 | Status: DISCONTINUED | OUTPATIENT
Start: 2024-09-10 | End: 2024-09-14

## 2024-09-10 RX ORDER — HYDROCORTISONE 20 MG
10 TABLET ORAL AT BEDTIME
Refills: 0 | Status: DISCONTINUED | OUTPATIENT
Start: 2024-09-11 | End: 2024-09-14

## 2024-09-10 RX ORDER — MEROPENEM 1 G/30ML
1000 INJECTION INTRAVENOUS EVERY 8 HOURS
Refills: 0 | Status: DISCONTINUED | OUTPATIENT
Start: 2024-09-10 | End: 2024-09-10

## 2024-09-10 RX ORDER — HYDROCORTISONE 20 MG
3 TABLET ORAL
Refills: 0 | DISCHARGE

## 2024-09-10 RX ORDER — CEFEPIME 2 G/20ML
2000 INJECTION, POWDER, FOR SOLUTION INTRAVENOUS ONCE
Refills: 0 | Status: COMPLETED | OUTPATIENT
Start: 2024-09-10 | End: 2024-09-10

## 2024-09-10 RX ORDER — HYDROCORTISONE 20 MG
30 TABLET ORAL DAILY
Refills: 0 | Status: DISCONTINUED | OUTPATIENT
Start: 2024-09-10 | End: 2024-09-10

## 2024-09-10 RX ORDER — HYDROCORTISONE 20 MG
10 TABLET ORAL AT BEDTIME
Refills: 0 | Status: DISCONTINUED | OUTPATIENT
Start: 2024-09-10 | End: 2024-09-10

## 2024-09-10 RX ADMIN — MEROPENEM 1000 MILLIGRAM(S): 1 INJECTION INTRAVENOUS at 19:37

## 2024-09-10 RX ADMIN — SODIUM CHLORIDE 125 MILLILITER(S): 9 INJECTION, SOLUTION INTRAVENOUS at 04:06

## 2024-09-10 RX ADMIN — CEFEPIME 2000 MILLIGRAM(S): 2 INJECTION, POWDER, FOR SOLUTION INTRAVENOUS at 11:08

## 2024-09-10 RX ADMIN — Medication 100 MILLIGRAM(S): at 00:10

## 2024-09-10 RX ADMIN — Medication 40 MILLIGRAM(S): at 11:26

## 2024-09-10 RX ADMIN — Medication 100 MILLIGRAM(S): at 07:24

## 2024-09-10 RX ADMIN — MUPIROCIN CALCIUM 1 APPLICATION(S): 20 CREAM TOPICAL at 22:53

## 2024-09-10 RX ADMIN — Medication 100 MICROGRAM(S): at 05:36

## 2024-09-10 RX ADMIN — Medication 25 GRAM(S): at 05:36

## 2024-09-11 DIAGNOSIS — K75.0 ABSCESS OF LIVER: ICD-10-CM

## 2024-09-11 LAB
-  AMPICILLIN/SULBACTAM: SIGNIFICANT CHANGE UP
-  AMPICILLIN: SIGNIFICANT CHANGE UP
-  AZTREONAM: SIGNIFICANT CHANGE UP
-  CEFAZOLIN: SIGNIFICANT CHANGE UP
-  CEFEPIME: SIGNIFICANT CHANGE UP
-  CEFOXITIN: SIGNIFICANT CHANGE UP
-  CEFTRIAXONE: SIGNIFICANT CHANGE UP
-  CIPROFLOXACIN: SIGNIFICANT CHANGE UP
-  ERTAPENEM: SIGNIFICANT CHANGE UP
-  GENTAMICIN: SIGNIFICANT CHANGE UP
-  IMIPENEM: SIGNIFICANT CHANGE UP
-  LEVOFLOXACIN: SIGNIFICANT CHANGE UP
-  MEROPENEM: SIGNIFICANT CHANGE UP
-  PIPERACILLIN/TAZOBACTAM: SIGNIFICANT CHANGE UP
-  TOBRAMYCIN: SIGNIFICANT CHANGE UP
-  TRIMETHOPRIM/SULFAMETHOXAZOLE: SIGNIFICANT CHANGE UP
ALBUMIN SERPL ELPH-MCNC: 2.3 G/DL — LOW (ref 3.3–5)
ALP SERPL-CCNC: 415 U/L — HIGH (ref 40–120)
ALT FLD-CCNC: 133 U/L — HIGH (ref 12–78)
ANION GAP SERPL CALC-SCNC: 10 MMOL/L — SIGNIFICANT CHANGE UP (ref 5–17)
AST SERPL-CCNC: 132 U/L — HIGH (ref 15–37)
BILIRUB SERPL-MCNC: 0.9 MG/DL — SIGNIFICANT CHANGE UP (ref 0.2–1.2)
BUN SERPL-MCNC: 16 MG/DL — SIGNIFICANT CHANGE UP (ref 7–23)
CALCIUM SERPL-MCNC: 9.1 MG/DL — SIGNIFICANT CHANGE UP (ref 8.5–10.1)
CHLORIDE SERPL-SCNC: 108 MMOL/L — SIGNIFICANT CHANGE UP (ref 96–108)
CO2 SERPL-SCNC: 21 MMOL/L — LOW (ref 22–31)
CREAT SERPL-MCNC: 0.89 MG/DL — SIGNIFICANT CHANGE UP (ref 0.5–1.3)
CULTURE RESULTS: ABNORMAL
CULTURE RESULTS: ABNORMAL
EGFR: 67 ML/MIN/1.73M2 — SIGNIFICANT CHANGE UP
EGFR: 67 ML/MIN/1.73M2 — SIGNIFICANT CHANGE UP
GLUCOSE BLDC GLUCOMTR-MCNC: 76 MG/DL — SIGNIFICANT CHANGE UP (ref 70–99)
GLUCOSE BLDC GLUCOMTR-MCNC: 89 MG/DL — SIGNIFICANT CHANGE UP (ref 70–99)
GLUCOSE SERPL-MCNC: 68 MG/DL — LOW (ref 70–99)
HCT VFR BLD CALC: 41.5 % — SIGNIFICANT CHANGE UP (ref 34.5–45)
HGB BLD-MCNC: 12.9 G/DL — SIGNIFICANT CHANGE UP (ref 11.5–15.5)
MAGNESIUM SERPL-MCNC: 1.6 MG/DL — SIGNIFICANT CHANGE UP (ref 1.6–2.6)
MCHC RBC-ENTMCNC: 28 PG — SIGNIFICANT CHANGE UP (ref 27–34)
MCHC RBC-ENTMCNC: 31.1 GM/DL — LOW (ref 32–36)
MCV RBC AUTO: 90 FL — SIGNIFICANT CHANGE UP (ref 80–100)
METHOD TYPE: SIGNIFICANT CHANGE UP
ORGANISM # SPEC MICROSCOPIC CNT: ABNORMAL
ORGANISM # SPEC MICROSCOPIC CNT: ABNORMAL
ORGANISM # SPEC MICROSCOPIC CNT: SIGNIFICANT CHANGE UP
PHOSPHATE SERPL-MCNC: 2.3 MG/DL — LOW (ref 2.5–4.5)
PLATELET # BLD AUTO: 197 K/UL — SIGNIFICANT CHANGE UP (ref 150–400)
POTASSIUM SERPL-MCNC: 3.5 MMOL/L — SIGNIFICANT CHANGE UP (ref 3.5–5.3)
POTASSIUM SERPL-SCNC: 3.5 MMOL/L — SIGNIFICANT CHANGE UP (ref 3.5–5.3)
PROT SERPL-MCNC: 6.3 GM/DL — SIGNIFICANT CHANGE UP (ref 6–8.3)
RBC # BLD: 4.61 M/UL — SIGNIFICANT CHANGE UP (ref 3.8–5.2)
RBC # FLD: 16.1 % — HIGH (ref 10.3–14.5)
SODIUM SERPL-SCNC: 139 MMOL/L — SIGNIFICANT CHANGE UP (ref 135–145)
SPECIMEN SOURCE: SIGNIFICANT CHANGE UP
SPECIMEN SOURCE: SIGNIFICANT CHANGE UP
WBC # BLD: 16.15 K/UL — HIGH (ref 3.8–10.5)
WBC # FLD AUTO: 16.15 K/UL — HIGH (ref 3.8–10.5)

## 2024-09-11 PROCEDURE — 99221 1ST HOSP IP/OBS SF/LOW 40: CPT

## 2024-09-11 PROCEDURE — 99233 SBSQ HOSP IP/OBS HIGH 50: CPT

## 2024-09-11 RX ORDER — GLUCAGON 3 MG/1
1 POWDER NASAL ONCE
Refills: 0 | Status: DISCONTINUED | OUTPATIENT
Start: 2024-09-11 | End: 2024-09-14

## 2024-09-11 RX ORDER — DEXTROSE 50 % IN WATER 50 %
25 SYRINGE (ML) INTRAVENOUS ONCE
Refills: 0 | Status: DISCONTINUED | OUTPATIENT
Start: 2024-09-11 | End: 2024-09-14

## 2024-09-11 RX ORDER — IBUPROFEN 200 MG
400 TABLET ORAL ONCE
Refills: 0 | Status: COMPLETED | OUTPATIENT
Start: 2024-09-11 | End: 2024-09-11

## 2024-09-11 RX ORDER — SODIUM CHLORIDE 9 G/1000ML
1000 INJECTION, SOLUTION INTRAVENOUS
Refills: 0 | Status: DISCONTINUED | OUTPATIENT
Start: 2024-09-11 | End: 2024-09-14

## 2024-09-11 RX ORDER — ACETAMINOPHEN 500 MG/5ML
650 LIQUID (ML) ORAL ONCE
Refills: 0 | Status: COMPLETED | OUTPATIENT
Start: 2024-09-11 | End: 2024-09-11

## 2024-09-11 RX ORDER — DEXTROSE 50 % IN WATER 50 %
12.5 SYRINGE (ML) INTRAVENOUS ONCE
Refills: 0 | Status: DISCONTINUED | OUTPATIENT
Start: 2024-09-11 | End: 2024-09-14

## 2024-09-11 RX ORDER — DEXTROSE 50 % IN WATER 50 %
15 SYRINGE (ML) INTRAVENOUS ONCE
Refills: 0 | Status: DISCONTINUED | OUTPATIENT
Start: 2024-09-11 | End: 2024-09-14

## 2024-09-11 RX ORDER — DEXTROSE 50 % IN WATER 50 %
12.5 SYRINGE (ML) INTRAVENOUS ONCE
Refills: 0 | Status: COMPLETED | OUTPATIENT
Start: 2024-09-11 | End: 2024-09-11

## 2024-09-11 RX ADMIN — MUPIROCIN CALCIUM 1 APPLICATION(S): 20 CREAM TOPICAL at 09:12

## 2024-09-11 RX ADMIN — Medication 400 MILLIGRAM(S): at 06:39

## 2024-09-11 RX ADMIN — Medication 40 MILLIGRAM(S): at 05:19

## 2024-09-11 RX ADMIN — MEROPENEM 1000 MILLIGRAM(S): 1 INJECTION INTRAVENOUS at 11:07

## 2024-09-11 RX ADMIN — Medication 650 MILLIGRAM(S): at 05:40

## 2024-09-11 RX ADMIN — Medication 1 APPLICATION(S): at 05:19

## 2024-09-11 RX ADMIN — MEROPENEM 1000 MILLIGRAM(S): 1 INJECTION INTRAVENOUS at 21:42

## 2024-09-11 RX ADMIN — Medication 10 MILLIGRAM(S): at 21:42

## 2024-09-11 RX ADMIN — Medication 650 MILLIGRAM(S): at 06:10

## 2024-09-11 RX ADMIN — Medication 20 MILLIGRAM(S): at 09:13

## 2024-09-11 RX ADMIN — Medication 100 MICROGRAM(S): at 05:19

## 2024-09-11 RX ADMIN — Medication 400 MILLIGRAM(S): at 07:09

## 2024-09-11 RX ADMIN — MEROPENEM 1000 MILLIGRAM(S): 1 INJECTION INTRAVENOUS at 05:19

## 2024-09-11 RX ADMIN — MUPIROCIN CALCIUM 1 APPLICATION(S): 20 CREAM TOPICAL at 21:42

## 2024-09-12 LAB
ANION GAP SERPL CALC-SCNC: 6 MMOL/L — SIGNIFICANT CHANGE UP (ref 5–17)
APTT BLD: 27.6 SEC — SIGNIFICANT CHANGE UP (ref 24.5–35.6)
BUN SERPL-MCNC: 15 MG/DL — SIGNIFICANT CHANGE UP (ref 7–23)
CALCIUM SERPL-MCNC: 8.6 MG/DL — SIGNIFICANT CHANGE UP (ref 8.5–10.1)
CHLORIDE SERPL-SCNC: 110 MMOL/L — HIGH (ref 96–108)
CO2 SERPL-SCNC: 26 MMOL/L — SIGNIFICANT CHANGE UP (ref 22–31)
CREAT SERPL-MCNC: 0.62 MG/DL — SIGNIFICANT CHANGE UP (ref 0.5–1.3)
EGFR: 92 ML/MIN/1.73M2 — SIGNIFICANT CHANGE UP
EGFR: 92 ML/MIN/1.73M2 — SIGNIFICANT CHANGE UP
GLUCOSE SERPL-MCNC: 93 MG/DL — SIGNIFICANT CHANGE UP (ref 70–99)
HCT VFR BLD CALC: 34.1 % — LOW (ref 34.5–45)
HGB BLD-MCNC: 10.9 G/DL — LOW (ref 11.5–15.5)
INR BLD: 1.15 RATIO — SIGNIFICANT CHANGE UP (ref 0.85–1.18)
MCHC RBC-ENTMCNC: 28.3 PG — SIGNIFICANT CHANGE UP (ref 27–34)
MCHC RBC-ENTMCNC: 32 GM/DL — SIGNIFICANT CHANGE UP (ref 32–36)
MCV RBC AUTO: 88.6 FL — SIGNIFICANT CHANGE UP (ref 80–100)
PLATELET # BLD AUTO: 169 K/UL — SIGNIFICANT CHANGE UP (ref 150–400)
POTASSIUM SERPL-MCNC: 3.7 MMOL/L — SIGNIFICANT CHANGE UP (ref 3.5–5.3)
POTASSIUM SERPL-SCNC: 3.7 MMOL/L — SIGNIFICANT CHANGE UP (ref 3.5–5.3)
PROTHROM AB SERPL-ACNC: 12.9 SEC — SIGNIFICANT CHANGE UP (ref 9.5–13)
RBC # BLD: 3.85 M/UL — SIGNIFICANT CHANGE UP (ref 3.8–5.2)
RBC # FLD: 15.9 % — HIGH (ref 10.3–14.5)
SODIUM SERPL-SCNC: 142 MMOL/L — SIGNIFICANT CHANGE UP (ref 135–145)
WBC # BLD: 10.33 K/UL — SIGNIFICANT CHANGE UP (ref 3.8–10.5)
WBC # FLD AUTO: 10.33 K/UL — SIGNIFICANT CHANGE UP (ref 3.8–10.5)

## 2024-09-12 PROCEDURE — 99233 SBSQ HOSP IP/OBS HIGH 50: CPT

## 2024-09-12 PROCEDURE — 99221 1ST HOSP IP/OBS SF/LOW 40: CPT | Mod: AI

## 2024-09-12 RX ORDER — METOPROLOL SUCCINATE 50 MG/1
50 TABLET, EXTENDED RELEASE ORAL EVERY 24 HOURS
Refills: 0 | Status: DISCONTINUED | OUTPATIENT
Start: 2024-09-12 | End: 2024-09-14

## 2024-09-12 RX ORDER — APIXABAN 2.5 MG/1
2.5 TABLET, FILM COATED ORAL
Refills: 0 | Status: DISCONTINUED | OUTPATIENT
Start: 2024-09-12 | End: 2024-09-14

## 2024-09-12 RX ADMIN — MEROPENEM 1000 MILLIGRAM(S): 1 INJECTION INTRAVENOUS at 22:00

## 2024-09-12 RX ADMIN — MUPIROCIN CALCIUM 1 APPLICATION(S): 20 CREAM TOPICAL at 22:02

## 2024-09-12 RX ADMIN — MEROPENEM 1000 MILLIGRAM(S): 1 INJECTION INTRAVENOUS at 14:26

## 2024-09-12 RX ADMIN — MUPIROCIN CALCIUM 1 APPLICATION(S): 20 CREAM TOPICAL at 10:48

## 2024-09-12 RX ADMIN — Medication 20 MILLIGRAM(S): at 10:48

## 2024-09-12 RX ADMIN — Medication 10 MILLIGRAM(S): at 22:00

## 2024-09-12 RX ADMIN — MEROPENEM 1000 MILLIGRAM(S): 1 INJECTION INTRAVENOUS at 05:59

## 2024-09-12 RX ADMIN — METOPROLOL SUCCINATE 50 MILLIGRAM(S): 50 TABLET, EXTENDED RELEASE ORAL at 22:00

## 2024-09-12 RX ADMIN — APIXABAN 2.5 MILLIGRAM(S): 2.5 TABLET, FILM COATED ORAL at 22:00

## 2024-09-12 RX ADMIN — Medication 100 MICROGRAM(S): at 05:59

## 2024-09-12 RX ADMIN — Medication 1 APPLICATION(S): at 05:59

## 2024-09-13 LAB
ALBUMIN SERPL ELPH-MCNC: 2.3 G/DL — LOW (ref 3.3–5)
ALP SERPL-CCNC: 354 U/L — HIGH (ref 40–120)
ALT FLD-CCNC: 68 U/L — SIGNIFICANT CHANGE UP (ref 12–78)
ANION GAP SERPL CALC-SCNC: 6 MMOL/L — SIGNIFICANT CHANGE UP (ref 5–17)
AST SERPL-CCNC: 39 U/L — HIGH (ref 15–37)
BILIRUB SERPL-MCNC: 0.8 MG/DL — SIGNIFICANT CHANGE UP (ref 0.2–1.2)
BUN SERPL-MCNC: 14 MG/DL — SIGNIFICANT CHANGE UP (ref 7–23)
CALCIUM SERPL-MCNC: 8.9 MG/DL — SIGNIFICANT CHANGE UP (ref 8.5–10.1)
CHLORIDE SERPL-SCNC: 104 MMOL/L — SIGNIFICANT CHANGE UP (ref 96–108)
CO2 SERPL-SCNC: 26 MMOL/L — SIGNIFICANT CHANGE UP (ref 22–31)
CREAT SERPL-MCNC: 0.69 MG/DL — SIGNIFICANT CHANGE UP (ref 0.5–1.3)
EGFR: 89 ML/MIN/1.73M2 — SIGNIFICANT CHANGE UP
EGFR: 89 ML/MIN/1.73M2 — SIGNIFICANT CHANGE UP
GLUCOSE SERPL-MCNC: 76 MG/DL — SIGNIFICANT CHANGE UP (ref 70–99)
HCT VFR BLD CALC: 35.4 % — SIGNIFICANT CHANGE UP (ref 34.5–45)
HGB BLD-MCNC: 11.2 G/DL — LOW (ref 11.5–15.5)
MCHC RBC-ENTMCNC: 27.7 PG — SIGNIFICANT CHANGE UP (ref 27–34)
MCHC RBC-ENTMCNC: 31.6 GM/DL — LOW (ref 32–36)
MCV RBC AUTO: 87.4 FL — SIGNIFICANT CHANGE UP (ref 80–100)
PLATELET # BLD AUTO: 201 K/UL — SIGNIFICANT CHANGE UP (ref 150–400)
POTASSIUM SERPL-MCNC: 3.4 MMOL/L — LOW (ref 3.5–5.3)
POTASSIUM SERPL-SCNC: 3.4 MMOL/L — LOW (ref 3.5–5.3)
PROT SERPL-MCNC: 6.3 GM/DL — SIGNIFICANT CHANGE UP (ref 6–8.3)
RBC # BLD: 4.05 M/UL — SIGNIFICANT CHANGE UP (ref 3.8–5.2)
RBC # FLD: 16.1 % — HIGH (ref 10.3–14.5)
SODIUM SERPL-SCNC: 136 MMOL/L — SIGNIFICANT CHANGE UP (ref 135–145)
WBC # BLD: 9.64 K/UL — SIGNIFICANT CHANGE UP (ref 3.8–10.5)
WBC # FLD AUTO: 9.64 K/UL — SIGNIFICANT CHANGE UP (ref 3.8–10.5)

## 2024-09-13 PROCEDURE — 99233 SBSQ HOSP IP/OBS HIGH 50: CPT

## 2024-09-13 RX ORDER — CIPROFLOXACIN HCL 250 MG
500 TABLET ORAL EVERY 12 HOURS
Refills: 0 | Status: DISCONTINUED | OUTPATIENT
Start: 2024-09-13 | End: 2024-09-14

## 2024-09-13 RX ORDER — METRONIDAZOLE 250 MG
500 TABLET ORAL EVERY 8 HOURS
Refills: 0 | Status: DISCONTINUED | OUTPATIENT
Start: 2024-09-13 | End: 2024-09-14

## 2024-09-13 RX ADMIN — APIXABAN 2.5 MILLIGRAM(S): 2.5 TABLET, FILM COATED ORAL at 09:20

## 2024-09-13 RX ADMIN — Medication 10 MILLIGRAM(S): at 21:42

## 2024-09-13 RX ADMIN — Medication 500 MILLIGRAM(S): at 21:42

## 2024-09-13 RX ADMIN — MUPIROCIN CALCIUM 1 APPLICATION(S): 20 CREAM TOPICAL at 21:40

## 2024-09-13 RX ADMIN — Medication 2 TABLET(S): at 09:22

## 2024-09-13 RX ADMIN — Medication 500 MILLIGRAM(S): at 13:41

## 2024-09-13 RX ADMIN — Medication 1 APPLICATION(S): at 09:20

## 2024-09-13 RX ADMIN — APIXABAN 2.5 MILLIGRAM(S): 2.5 TABLET, FILM COATED ORAL at 21:41

## 2024-09-13 RX ADMIN — Medication 20 MILLIGRAM(S): at 09:21

## 2024-09-13 RX ADMIN — METOPROLOL SUCCINATE 50 MILLIGRAM(S): 50 TABLET, EXTENDED RELEASE ORAL at 21:41

## 2024-09-13 RX ADMIN — MEROPENEM 1000 MILLIGRAM(S): 1 INJECTION INTRAVENOUS at 05:15

## 2024-09-13 RX ADMIN — Medication 100 MICROGRAM(S): at 05:15

## 2024-09-13 RX ADMIN — POLYETHYLENE GLYCOL 3350 17 GRAM(S): 17 POWDER, FOR SOLUTION ORAL at 09:20

## 2024-09-13 RX ADMIN — Medication 40 MILLIGRAM(S): at 05:15

## 2024-09-13 RX ADMIN — MUPIROCIN CALCIUM 1 APPLICATION(S): 20 CREAM TOPICAL at 13:04

## 2024-09-13 RX ADMIN — Medication 40 MILLIEQUIVALENT(S): at 09:26

## 2024-09-14 ENCOUNTER — TRANSCRIPTION ENCOUNTER (OUTPATIENT)
Age: 79
End: 2024-09-14

## 2024-09-14 VITALS
DIASTOLIC BLOOD PRESSURE: 95 MMHG | OXYGEN SATURATION: 100 % | RESPIRATION RATE: 18 BRPM | SYSTOLIC BLOOD PRESSURE: 155 MMHG | TEMPERATURE: 98 F | HEART RATE: 74 BPM

## 2024-09-14 PROCEDURE — 99239 HOSP IP/OBS DSCHRG MGMT >30: CPT

## 2024-09-14 RX ORDER — METRONIDAZOLE 250 MG
1 TABLET ORAL
Qty: 27 | Refills: 0
Start: 2024-09-14 | End: 2024-09-22

## 2024-09-14 RX ORDER — CIPROFLOXACIN HCL 250 MG
1 TABLET ORAL
Qty: 18 | Refills: 0
Start: 2024-09-14 | End: 2024-09-22

## 2024-09-14 RX ORDER — OLMESARTAN MEDOXOMIL 5 MG/1
1 TABLET, FILM COATED ORAL
Refills: 0 | DISCHARGE

## 2024-09-14 RX ORDER — MAGNESIUM, ALUMINUM HYDROXIDE 200-200 MG
30 TABLET,CHEWABLE ORAL ONCE
Refills: 0 | Status: COMPLETED | OUTPATIENT
Start: 2024-09-14 | End: 2024-09-14

## 2024-09-14 RX ADMIN — Medication 500 MILLIGRAM(S): at 06:38

## 2024-09-14 RX ADMIN — Medication 100 MICROGRAM(S): at 06:39

## 2024-09-14 RX ADMIN — APIXABAN 2.5 MILLIGRAM(S): 2.5 TABLET, FILM COATED ORAL at 09:32

## 2024-09-14 RX ADMIN — Medication 500 MILLIGRAM(S): at 09:32

## 2024-09-14 RX ADMIN — POLYETHYLENE GLYCOL 3350 17 GRAM(S): 17 POWDER, FOR SOLUTION ORAL at 06:43

## 2024-09-14 RX ADMIN — MUPIROCIN CALCIUM 1 APPLICATION(S): 20 CREAM TOPICAL at 09:32

## 2024-09-14 RX ADMIN — Medication 20 MILLIGRAM(S): at 09:32

## 2024-09-14 RX ADMIN — Medication 1 APPLICATION(S): at 09:32

## 2024-09-17 LAB
CULTURE RESULTS: SIGNIFICANT CHANGE UP
CULTURE RESULTS: SIGNIFICANT CHANGE UP
SPECIMEN SOURCE: SIGNIFICANT CHANGE UP
SPECIMEN SOURCE: SIGNIFICANT CHANGE UP

## 2024-09-19 DIAGNOSIS — E44.0 MODERATE PROTEIN-CALORIE MALNUTRITION: ICD-10-CM

## 2024-09-19 DIAGNOSIS — Z79.01 LONG TERM (CURRENT) USE OF ANTICOAGULANTS: ICD-10-CM

## 2024-09-19 DIAGNOSIS — I10 ESSENTIAL (PRIMARY) HYPERTENSION: ICD-10-CM

## 2024-09-19 DIAGNOSIS — C78.7 SECONDARY MALIGNANT NEOPLASM OF LIVER AND INTRAHEPATIC BILE DUCT: ICD-10-CM

## 2024-09-19 DIAGNOSIS — A41.02 SEPSIS DUE TO METHICILLIN RESISTANT STAPHYLOCOCCUS AUREUS: ICD-10-CM

## 2024-09-19 DIAGNOSIS — Z95.828 PRESENCE OF OTHER VASCULAR IMPLANTS AND GRAFTS: ICD-10-CM

## 2024-09-19 DIAGNOSIS — E03.9 HYPOTHYROIDISM, UNSPECIFIED: ICD-10-CM

## 2024-09-19 DIAGNOSIS — C64.1 MALIGNANT NEOPLASM OF RIGHT KIDNEY, EXCEPT RENAL PELVIS: ICD-10-CM

## 2024-09-19 DIAGNOSIS — E86.0 DEHYDRATION: ICD-10-CM

## 2024-09-19 DIAGNOSIS — E78.5 HYPERLIPIDEMIA, UNSPECIFIED: ICD-10-CM

## 2024-09-19 DIAGNOSIS — Z79.890 HORMONE REPLACEMENT THERAPY: ICD-10-CM

## 2024-09-19 DIAGNOSIS — K75.0 ABSCESS OF LIVER: ICD-10-CM

## 2024-09-19 DIAGNOSIS — E27.40 UNSPECIFIED ADRENOCORTICAL INSUFFICIENCY: ICD-10-CM

## 2024-09-19 DIAGNOSIS — R65.21 SEVERE SEPSIS WITH SEPTIC SHOCK: ICD-10-CM

## 2024-09-19 DIAGNOSIS — Z90.722 ACQUIRED ABSENCE OF OVARIES, BILATERAL: ICD-10-CM

## 2024-09-19 DIAGNOSIS — I48.91 UNSPECIFIED ATRIAL FIBRILLATION: ICD-10-CM

## 2024-09-29 ENCOUNTER — INPATIENT (INPATIENT)
Facility: HOSPITAL | Age: 79
LOS: 16 days | Discharge: HOME HEALTH | DRG: 872 | End: 2024-10-16
Attending: STUDENT IN AN ORGANIZED HEALTH CARE EDUCATION/TRAINING PROGRAM | Admitting: SURGERY
Payer: MEDICARE

## 2024-09-29 VITALS — WEIGHT: 139.11 LBS | HEIGHT: 61 IN

## 2024-09-29 DIAGNOSIS — Z90.89 ACQUIRED ABSENCE OF OTHER ORGANS: Chronic | ICD-10-CM

## 2024-09-29 DIAGNOSIS — A41.9 SEPSIS, UNSPECIFIED ORGANISM: ICD-10-CM

## 2024-09-29 DIAGNOSIS — Z98.890 OTHER SPECIFIED POSTPROCEDURAL STATES: Chronic | ICD-10-CM

## 2024-09-29 DIAGNOSIS — Z90.722 ACQUIRED ABSENCE OF OVARIES, BILATERAL: Chronic | ICD-10-CM

## 2024-09-29 LAB
-  K. PNEUMONIAE GROUP: SIGNIFICANT CHANGE UP
ALBUMIN SERPL ELPH-MCNC: 2.1 G/DL — LOW (ref 3.3–5)
ALBUMIN SERPL ELPH-MCNC: 2.5 G/DL — LOW (ref 3.3–5)
ALP SERPL-CCNC: 331 U/L — HIGH (ref 40–120)
ALP SERPL-CCNC: 476 U/L — HIGH (ref 40–120)
ALT FLD-CCNC: 165 U/L — HIGH (ref 12–78)
ALT FLD-CCNC: 56 U/L — SIGNIFICANT CHANGE UP (ref 12–78)
AMYLASE P1 CFR SERPL: 45 U/L — SIGNIFICANT CHANGE UP (ref 25–115)
ANION GAP SERPL CALC-SCNC: 11 MMOL/L — SIGNIFICANT CHANGE UP (ref 5–17)
ANION GAP SERPL CALC-SCNC: 9 MMOL/L — SIGNIFICANT CHANGE UP (ref 5–17)
APPEARANCE UR: ABNORMAL
APTT BLD: 35.7 SEC — HIGH (ref 24.5–35.6)
AST SERPL-CCNC: 104 U/L — HIGH (ref 15–37)
AST SERPL-CCNC: 353 U/L — HIGH (ref 15–37)
BACTERIA # UR AUTO: NEGATIVE /HPF — SIGNIFICANT CHANGE UP
BASOPHILS # BLD AUTO: 0.07 K/UL — SIGNIFICANT CHANGE UP (ref 0–0.2)
BASOPHILS NFR BLD AUTO: 0.3 % — SIGNIFICANT CHANGE UP (ref 0–2)
BILIRUB SERPL-MCNC: 1.2 MG/DL — SIGNIFICANT CHANGE UP (ref 0.2–1.2)
BILIRUB SERPL-MCNC: 1.6 MG/DL — HIGH (ref 0.2–1.2)
BILIRUB UR-MCNC: ABNORMAL
BUN SERPL-MCNC: 21 MG/DL — SIGNIFICANT CHANGE UP (ref 7–23)
BUN SERPL-MCNC: 26 MG/DL — HIGH (ref 7–23)
CALCIUM SERPL-MCNC: 8.4 MG/DL — LOW (ref 8.5–10.1)
CALCIUM SERPL-MCNC: 9 MG/DL — SIGNIFICANT CHANGE UP (ref 8.5–10.1)
CAST: 17 /LPF — HIGH (ref 0–4)
CHLORIDE SERPL-SCNC: 103 MMOL/L — SIGNIFICANT CHANGE UP (ref 96–108)
CHLORIDE SERPL-SCNC: 112 MMOL/L — HIGH (ref 96–108)
CO2 SERPL-SCNC: 20 MMOL/L — LOW (ref 22–31)
CO2 SERPL-SCNC: 21 MMOL/L — LOW (ref 22–31)
COLOR SPEC: ABNORMAL
COMMENT - URINE: SIGNIFICANT CHANGE UP
CREAT SERPL-MCNC: 1.3 MG/DL — SIGNIFICANT CHANGE UP (ref 0.5–1.3)
CREAT SERPL-MCNC: 2.06 MG/DL — HIGH (ref 0.5–1.3)
DIFF PNL FLD: NEGATIVE — SIGNIFICANT CHANGE UP
E FAECALIS DNA BLD POS QL NAA+NON-PROBE: SIGNIFICANT CHANGE UP
E FAECIUM DNA BLD POS QL NAA+NON-PROBE: SIGNIFICANT CHANGE UP
EGFR: 24 ML/MIN/1.73M2 — LOW
EGFR: 42 ML/MIN/1.73M2 — LOW
EOSINOPHIL # BLD AUTO: 0.03 K/UL — SIGNIFICANT CHANGE UP (ref 0–0.5)
EOSINOPHIL NFR BLD AUTO: 0.1 % — SIGNIFICANT CHANGE UP (ref 0–6)
FLUAV AG NPH QL: SIGNIFICANT CHANGE UP
FLUBV AG NPH QL: SIGNIFICANT CHANGE UP
GLUCOSE SERPL-MCNC: 55 MG/DL — LOW (ref 70–99)
GLUCOSE SERPL-MCNC: 85 MG/DL — SIGNIFICANT CHANGE UP (ref 70–99)
GLUCOSE UR QL: NEGATIVE MG/DL — SIGNIFICANT CHANGE UP
GRAM STN FLD: ABNORMAL
GRAN CASTS # UR COMP ASSIST: PRESENT
HCT VFR BLD CALC: 37.2 % — SIGNIFICANT CHANGE UP (ref 34.5–45)
HCT VFR BLD CALC: 44.6 % — SIGNIFICANT CHANGE UP (ref 34.5–45)
HGB BLD-MCNC: 11.7 G/DL — SIGNIFICANT CHANGE UP (ref 11.5–15.5)
HGB BLD-MCNC: 13.4 G/DL — SIGNIFICANT CHANGE UP (ref 11.5–15.5)
IMM GRANULOCYTES NFR BLD AUTO: 0.8 % — SIGNIFICANT CHANGE UP (ref 0–0.9)
INR BLD: 1.4 RATIO — HIGH (ref 0.85–1.16)
INR BLD: 1.58 RATIO — HIGH (ref 0.85–1.16)
KETONES UR-MCNC: ABNORMAL MG/DL
LACTATE SERPL-SCNC: 2.6 MMOL/L — HIGH (ref 0.7–2)
LACTATE SERPL-SCNC: 3.1 MMOL/L — HIGH (ref 0.7–2)
LACTATE SERPL-SCNC: 8.1 MMOL/L — CRITICAL HIGH (ref 0.7–2)
LEUKOCYTE ESTERASE UR-ACNC: ABNORMAL
LIDOCAIN IGE QN: 20 U/L — SIGNIFICANT CHANGE UP (ref 13–75)
LYMPHOCYTES # BLD AUTO: 0.61 K/UL — LOW (ref 1–3.3)
LYMPHOCYTES # BLD AUTO: 2.9 % — LOW (ref 13–44)
MAGNESIUM SERPL-MCNC: 1.5 MG/DL — LOW (ref 1.6–2.6)
MCHC RBC-ENTMCNC: 27.6 PG — SIGNIFICANT CHANGE UP (ref 27–34)
MCHC RBC-ENTMCNC: 28 PG — SIGNIFICANT CHANGE UP (ref 27–34)
MCHC RBC-ENTMCNC: 30 GM/DL — LOW (ref 32–36)
MCHC RBC-ENTMCNC: 31.5 GM/DL — LOW (ref 32–36)
MCV RBC AUTO: 89 FL — SIGNIFICANT CHANGE UP (ref 80–100)
MCV RBC AUTO: 91.8 FL — SIGNIFICANT CHANGE UP (ref 80–100)
METHOD TYPE: SIGNIFICANT CHANGE UP
MONOCYTES # BLD AUTO: 0.67 K/UL — SIGNIFICANT CHANGE UP (ref 0–0.9)
MONOCYTES NFR BLD AUTO: 3.2 % — SIGNIFICANT CHANGE UP (ref 2–14)
MRSA PCR RESULT.: SIGNIFICANT CHANGE UP
NEUTROPHILS # BLD AUTO: 19.34 K/UL — HIGH (ref 1.8–7.4)
NEUTROPHILS NFR BLD AUTO: 92.7 % — HIGH (ref 43–77)
NITRITE UR-MCNC: NEGATIVE — SIGNIFICANT CHANGE UP
PH UR: 5 — SIGNIFICANT CHANGE UP (ref 5–8)
PHOSPHATE SERPL-MCNC: 3.8 MG/DL — SIGNIFICANT CHANGE UP (ref 2.5–4.5)
PLATELET # BLD AUTO: 202 K/UL — SIGNIFICANT CHANGE UP (ref 150–400)
PLATELET # BLD AUTO: 279 K/UL — SIGNIFICANT CHANGE UP (ref 150–400)
POTASSIUM SERPL-MCNC: 4.4 MMOL/L — SIGNIFICANT CHANGE UP (ref 3.5–5.3)
POTASSIUM SERPL-MCNC: 5.3 MMOL/L — SIGNIFICANT CHANGE UP (ref 3.5–5.3)
POTASSIUM SERPL-SCNC: 4.4 MMOL/L — SIGNIFICANT CHANGE UP (ref 3.5–5.3)
POTASSIUM SERPL-SCNC: 5.3 MMOL/L — SIGNIFICANT CHANGE UP (ref 3.5–5.3)
PROCALCITONIN SERPL-MCNC: 29.4 NG/ML — HIGH (ref 0.02–0.1)
PROT SERPL-MCNC: 5.5 GM/DL — LOW (ref 6–8.3)
PROT SERPL-MCNC: 6.5 GM/DL — SIGNIFICANT CHANGE UP (ref 6–8.3)
PROT UR-MCNC: 30 MG/DL
PROTHROM AB SERPL-ACNC: 16.5 SEC — HIGH (ref 9.9–13.4)
PROTHROM AB SERPL-ACNC: 18.6 SEC — HIGH (ref 9.9–13.4)
RBC # BLD: 4.18 M/UL — SIGNIFICANT CHANGE UP (ref 3.8–5.2)
RBC # BLD: 4.86 M/UL — SIGNIFICANT CHANGE UP (ref 3.8–5.2)
RBC # FLD: 18.9 % — HIGH (ref 10.3–14.5)
RBC # FLD: 19.6 % — HIGH (ref 10.3–14.5)
RBC CASTS # UR COMP ASSIST: 18 /HPF — HIGH (ref 0–4)
RSV RNA NPH QL NAA+NON-PROBE: SIGNIFICANT CHANGE UP
S AUREUS DNA NOSE QL NAA+PROBE: SIGNIFICANT CHANGE UP
SARS-COV-2 RNA SPEC QL NAA+PROBE: SIGNIFICANT CHANGE UP
SODIUM SERPL-SCNC: 135 MMOL/L — SIGNIFICANT CHANGE UP (ref 135–145)
SODIUM SERPL-SCNC: 141 MMOL/L — SIGNIFICANT CHANGE UP (ref 135–145)
SP GR SPEC: 1.03 — SIGNIFICANT CHANGE UP (ref 1–1.03)
SPECIMEN SOURCE: SIGNIFICANT CHANGE UP
SPECIMEN SOURCE: SIGNIFICANT CHANGE UP
SQUAMOUS # UR AUTO: 6 /HPF — HIGH (ref 0–5)
UROBILINOGEN FLD QL: 1 MG/DL — SIGNIFICANT CHANGE UP (ref 0.2–1)
WBC # BLD: 20.88 K/UL — HIGH (ref 3.8–10.5)
WBC # BLD: 21.48 K/UL — HIGH (ref 3.8–10.5)
WBC # FLD AUTO: 20.88 K/UL — HIGH (ref 3.8–10.5)
WBC # FLD AUTO: 21.48 K/UL — HIGH (ref 3.8–10.5)
WBC UR QL: 5 /HPF — SIGNIFICANT CHANGE UP (ref 0–5)

## 2024-09-29 PROCEDURE — 84100 ASSAY OF PHOSPHORUS: CPT

## 2024-09-29 PROCEDURE — 87040 BLOOD CULTURE FOR BACTERIA: CPT

## 2024-09-29 PROCEDURE — 85027 COMPLETE CBC AUTOMATED: CPT

## 2024-09-29 PROCEDURE — 76000 FLUOROSCOPY <1 HR PHYS/QHP: CPT

## 2024-09-29 PROCEDURE — 83605 ASSAY OF LACTIC ACID: CPT

## 2024-09-29 PROCEDURE — 86022 PLATELET ANTIBODIES: CPT

## 2024-09-29 PROCEDURE — C1769: CPT

## 2024-09-29 PROCEDURE — 93308 TTE F-UP OR LMTD: CPT

## 2024-09-29 PROCEDURE — 83735 ASSAY OF MAGNESIUM: CPT

## 2024-09-29 PROCEDURE — 74183 MRI ABD W/O CNTR FLWD CNTR: CPT | Mod: MC

## 2024-09-29 PROCEDURE — C1889: CPT

## 2024-09-29 PROCEDURE — 36415 COLL VENOUS BLD VENIPUNCTURE: CPT

## 2024-09-29 PROCEDURE — C1729: CPT

## 2024-09-29 PROCEDURE — C2625: CPT

## 2024-09-29 PROCEDURE — 99291 CRITICAL CARE FIRST HOUR: CPT

## 2024-09-29 PROCEDURE — 87070 CULTURE OTHR SPECIMN AEROBIC: CPT

## 2024-09-29 PROCEDURE — 83036 HEMOGLOBIN GLYCOSYLATED A1C: CPT

## 2024-09-29 PROCEDURE — 85025 COMPLETE CBC W/AUTO DIFF WBC: CPT

## 2024-09-29 PROCEDURE — 93321 DOPPLER ECHO F-UP/LMTD STD: CPT

## 2024-09-29 PROCEDURE — C1748: CPT

## 2024-09-29 PROCEDURE — 80048 BASIC METABOLIC PNL TOTAL CA: CPT

## 2024-09-29 PROCEDURE — 49405 IMAGE CATH FLUID COLXN VISC: CPT

## 2024-09-29 PROCEDURE — 85730 THROMBOPLASTIN TIME PARTIAL: CPT

## 2024-09-29 PROCEDURE — 93005 ELECTROCARDIOGRAM TRACING: CPT

## 2024-09-29 PROCEDURE — 80202 ASSAY OF VANCOMYCIN: CPT

## 2024-09-29 PROCEDURE — 87641 MR-STAPH DNA AMP PROBE: CPT

## 2024-09-29 PROCEDURE — 87186 SC STD MICRODIL/AGAR DIL: CPT

## 2024-09-29 PROCEDURE — 97530 THERAPEUTIC ACTIVITIES: CPT | Mod: GP

## 2024-09-29 PROCEDURE — 85610 PROTHROMBIN TIME: CPT

## 2024-09-29 PROCEDURE — 82150 ASSAY OF AMYLASE: CPT

## 2024-09-29 PROCEDURE — C1751: CPT

## 2024-09-29 PROCEDURE — 97116 GAIT TRAINING THERAPY: CPT | Mod: GP

## 2024-09-29 PROCEDURE — 87077 CULTURE AEROBIC IDENTIFY: CPT

## 2024-09-29 PROCEDURE — 87640 STAPH A DNA AMP PROBE: CPT

## 2024-09-29 PROCEDURE — 80053 COMPREHEN METABOLIC PANEL: CPT

## 2024-09-29 PROCEDURE — 74177 CT ABD & PELVIS W/CONTRAST: CPT | Mod: 26,MC

## 2024-09-29 PROCEDURE — 84145 PROCALCITONIN (PCT): CPT

## 2024-09-29 PROCEDURE — 97163 PT EVAL HIGH COMPLEX 45 MIN: CPT | Mod: GP

## 2024-09-29 PROCEDURE — A9579: CPT

## 2024-09-29 PROCEDURE — 83690 ASSAY OF LIPASE: CPT

## 2024-09-29 PROCEDURE — 71045 X-RAY EXAM CHEST 1 VIEW: CPT | Mod: 26

## 2024-09-29 PROCEDURE — 80076 HEPATIC FUNCTION PANEL: CPT

## 2024-09-29 PROCEDURE — C1887: CPT

## 2024-09-29 PROCEDURE — 93325 DOPPLER ECHO COLOR FLOW MAPG: CPT

## 2024-09-29 PROCEDURE — 74160 CT ABDOMEN W/CONTRAST: CPT | Mod: MC

## 2024-09-29 RX ORDER — VANCOMYCIN HCL-SODIUM CHLORIDE IV SOLN 1.5 GM/250ML-0.9% 1.5-0.9/25 GM/ML-%
950 SOLUTION INTRAVENOUS ONCE
Refills: 0 | Status: DISCONTINUED | OUTPATIENT
Start: 2024-09-29 | End: 2024-09-29

## 2024-09-29 RX ORDER — MEROPENEM 500 MG/20ML
500 INJECTION INTRAVENOUS ONCE
Refills: 0 | Status: COMPLETED | OUTPATIENT
Start: 2024-09-29 | End: 2024-09-29

## 2024-09-29 RX ORDER — MEROPENEM 500 MG/20ML
INJECTION INTRAVENOUS
Refills: 0 | Status: DISCONTINUED | OUTPATIENT
Start: 2024-09-29 | End: 2024-09-29

## 2024-09-29 RX ORDER — HYDROCORTISONE 5 MG/1
100 TABLET ORAL ONCE
Refills: 0 | Status: COMPLETED | OUTPATIENT
Start: 2024-09-29 | End: 2024-09-29

## 2024-09-29 RX ORDER — CEFEPIME 2 G/1
1000 INJECTION, POWDER, FOR SOLUTION INTRAVENOUS ONCE
Refills: 0 | Status: COMPLETED | OUTPATIENT
Start: 2024-09-29 | End: 2024-09-29

## 2024-09-29 RX ORDER — SODIUM CHLORIDE 0.9 % (FLUSH) 0.9 %
1000 SYRINGE (ML) INJECTION ONCE
Refills: 0 | Status: COMPLETED | OUTPATIENT
Start: 2024-09-29 | End: 2024-09-29

## 2024-09-29 RX ORDER — FLUCONAZOLE 200 MG
150 TABLET ORAL ONCE
Refills: 0 | Status: COMPLETED | OUTPATIENT
Start: 2024-09-29 | End: 2024-09-29

## 2024-09-29 RX ORDER — CEFEPIME 2 G/1
1000 INJECTION, POWDER, FOR SOLUTION INTRAVENOUS ONCE
Refills: 0 | Status: DISCONTINUED | OUTPATIENT
Start: 2024-09-29 | End: 2024-09-29

## 2024-09-29 RX ORDER — MEROPENEM 500 MG/20ML
500 INJECTION INTRAVENOUS EVERY 12 HOURS
Refills: 0 | Status: DISCONTINUED | OUTPATIENT
Start: 2024-09-30 | End: 2024-09-30

## 2024-09-29 RX ORDER — ACETAMINOPHEN 325 MG
1000 TABLET ORAL ONCE
Refills: 0 | Status: COMPLETED | OUTPATIENT
Start: 2024-09-29 | End: 2024-09-29

## 2024-09-29 RX ORDER — NOREPINEPHRINE BITARTRATE/D5W 16MG/250ML
0.05 PLASTIC BAG, INJECTION (ML) INTRAVENOUS
Qty: 8 | Refills: 0 | Status: DISCONTINUED | OUTPATIENT
Start: 2024-09-29 | End: 2024-09-30

## 2024-09-29 RX ORDER — HYDROCORTISONE 5 MG/1
20 TABLET ORAL DAILY
Refills: 0 | Status: DISCONTINUED | OUTPATIENT
Start: 2024-09-29 | End: 2024-10-16

## 2024-09-29 RX ORDER — AMIODARONE HYDROCHLORIDE 50 MG/ML
100 INJECTION, SOLUTION INTRAVENOUS DAILY
Refills: 0 | Status: DISCONTINUED | OUTPATIENT
Start: 2024-09-30 | End: 2024-10-16

## 2024-09-29 RX ORDER — HYDROCORTISONE 5 MG/1
10 TABLET ORAL AT BEDTIME
Refills: 0 | Status: DISCONTINUED | OUTPATIENT
Start: 2024-09-29 | End: 2024-10-16

## 2024-09-29 RX ORDER — MEROPENEM 500 MG/20ML
INJECTION INTRAVENOUS
Refills: 0 | Status: DISCONTINUED | OUTPATIENT
Start: 2024-09-29 | End: 2024-09-30

## 2024-09-29 RX ORDER — SODIUM CHLORIDE IRRIG SOLUTION 0.9 %
500 SOLUTION, IRRIGATION IRRIGATION
Refills: 0 | Status: DISCONTINUED | OUTPATIENT
Start: 2024-09-29 | End: 2024-09-30

## 2024-09-29 RX ORDER — VANCOMYCIN HCL-SODIUM CHLORIDE IV SOLN 1.5 GM/250ML-0.9% 1.5-0.9/25 GM/ML-%
1000 SOLUTION INTRAVENOUS ONCE
Refills: 0 | Status: COMPLETED | OUTPATIENT
Start: 2024-09-29 | End: 2024-09-29

## 2024-09-29 RX ORDER — SODIUM CHLORIDE 0.9 % (FLUSH) 0.9 %
1950 SYRINGE (ML) INJECTION ONCE
Refills: 0 | Status: COMPLETED | OUTPATIENT
Start: 2024-09-29 | End: 2024-09-29

## 2024-09-29 RX ORDER — SODIUM CHLORIDE IRRIG SOLUTION 0.9 %
1000 SOLUTION, IRRIGATION IRRIGATION
Refills: 0 | Status: DISCONTINUED | OUTPATIENT
Start: 2024-09-29 | End: 2024-10-01

## 2024-09-29 RX ORDER — PANTOPRAZOLE SODIUM 40 MG/1
40 TABLET, DELAYED RELEASE ORAL DAILY
Refills: 0 | Status: DISCONTINUED | OUTPATIENT
Start: 2024-09-29 | End: 2024-10-16

## 2024-09-29 RX ADMIN — Medication 5000 UNIT(S): at 13:44

## 2024-09-29 RX ADMIN — VANCOMYCIN HCL-SODIUM CHLORIDE IV SOLN 1.5 GM/250ML-0.9% 250 MILLIGRAM(S): 1.5-0.9/25 SOLUTION at 21:17

## 2024-09-29 RX ADMIN — PANTOPRAZOLE SODIUM 40 MILLIGRAM(S): 40 TABLET, DELAYED RELEASE ORAL at 12:38

## 2024-09-29 RX ADMIN — CEFEPIME 1000 MILLIGRAM(S): 2 INJECTION, POWDER, FOR SOLUTION INTRAVENOUS at 06:18

## 2024-09-29 RX ADMIN — VANCOMYCIN HCL-SODIUM CHLORIDE IV SOLN 1.5 GM/250ML-0.9% 250 MILLIGRAM(S): 1.5-0.9/25 SOLUTION at 06:22

## 2024-09-29 RX ADMIN — Medication 100 MILLILITER(S): at 18:13

## 2024-09-29 RX ADMIN — HYDROCORTISONE 100 MILLIGRAM(S): 5 TABLET ORAL at 06:26

## 2024-09-29 RX ADMIN — Medication 5.92 MICROGRAM(S)/KG/MIN: at 08:50

## 2024-09-29 RX ADMIN — HYDROCORTISONE 10 MILLIGRAM(S): 5 TABLET ORAL at 21:52

## 2024-09-29 RX ADMIN — Medication 1000 MILLIGRAM(S): at 06:25

## 2024-09-29 RX ADMIN — Medication 1000 MILLILITER(S): at 08:15

## 2024-09-29 RX ADMIN — Medication 100 MICROGRAM(S): at 12:38

## 2024-09-29 RX ADMIN — MEROPENEM 500 MILLIGRAM(S): 500 INJECTION INTRAVENOUS at 13:00

## 2024-09-29 RX ADMIN — HYDROCORTISONE 20 MILLIGRAM(S): 5 TABLET ORAL at 12:38

## 2024-09-29 RX ADMIN — Medication 500 MILLILITER(S): at 12:46

## 2024-09-29 RX ADMIN — Medication 1950 MILLILITER(S): at 05:18

## 2024-09-29 RX ADMIN — Medication 100 MILLILITER(S): at 12:46

## 2024-09-29 RX ADMIN — Medication 5000 UNIT(S): at 21:18

## 2024-09-29 RX ADMIN — Medication 150 MILLIGRAM(S): at 08:44

## 2024-09-29 NOTE — ED ADULT NURSE REASSESSMENT NOTE - NS ED NURSE REASSESS COMMENT FT1
Assumed care of pt from Александр VANEGAS, denies pain at this time, pt is alert to person, place, time, situation at this time.

## 2024-09-29 NOTE — PROGRESS NOTE ADULT - SUBJECTIVE AND OBJECTIVE BOX
Patient is a 79y old  Female who presents with a chief complaint of fever, abd pain, nausea, diarrhea (29 Sep 2024 11:29)    Interval event/s:     Allergies    Crestor (Other)  Lipitor (Unknown)  tivozanib (Faint)  Cabometyx (Unknown)  Dyazide (Faint)  statins (Unknown)  bacitracin (Rash)  Zetia (Unknown)  Norvasc (Faint)    Intolerances      REVIEW OF SYSTEMS: SEE BELOW       ICU Vital Signs Last 24 Hrs  T(C): 36.9 (29 Sep 2024 11:01), Max: 40.2 (29 Sep 2024 06:00)  T(F): 98.5 (29 Sep 2024 11:01), Max: 104.4 (29 Sep 2024 06:00)  HR: 87 (29 Sep 2024 11:00) (85 - 124)  BP: 87/68 (29 Sep 2024 11:00) (76/53 - 114/76)  BP(mean): 75 (29 Sep 2024 11:00) (58 - 90)  ABP: --  ABP(mean): --  RR: 26 (29 Sep 2024 11:00) (18 - 26)  SpO2: 98% (29 Sep 2024 11:00) (96% - 99%)    O2 Parameters below as of 29 Sep 2024 10:55  Patient On (Oxygen Delivery Method): room air            CAPILLARY BLOOD GLUCOSE          I&O's Summary          MEDICATIONS  (STANDING):  heparin   Injectable 5000 Unit(s) SubCutaneous every 8 hours  hydrocortisone 10 milliGRAM(s) Oral at bedtime  hydrocortisone 20 milliGRAM(s) Oral daily  lactated ringers. 500 milliLiter(s) (500 mL/Hr) IV Continuous <Continuous>  lactated ringers. 1000 milliLiter(s) (100 mL/Hr) IV Continuous <Continuous>  levothyroxine 100 MICROGram(s) Oral daily  meropenem  IVPB      norepinephrine Infusion 0.05 MICROgram(s)/kG/Min (5.92 mL/Hr) IV Continuous <Continuous>  pantoprazole  Injectable 40 milliGRAM(s) IV Push daily  Tivozanib (Fotivda) 890 MICROGram(s)   Oral daily      MEDICATIONS  (PRN):      PHYSICAL EXAM: SEE BELOW                          13.4   20.88 )-----------( 279      ( 29 Sep 2024 06:17 )             44.6       09-29    135  |  103  |  26[H]  ----------------------------<  55[L]  5.3   |  21[L]  |  2.06[H]    Ca    9.0      29 Sep 2024 06:17    TPro  6.5  /  Alb  2.5[L]  /  TBili  1.2  /  DBili  x   /  AST  104[H]  /  ALT  56  /  AlkPhos  476[H]  09-29    Lactate 2.6           09-29 @ 09:42    Lactate 3.1           09-29 @ 08:50    Lactate 8.1           09-29 @ 06:17          PT/INR - ( 29 Sep 2024 06:17 )   PT: 16.5 sec;   INR: 1.40 ratio         PTT - ( 29 Sep 2024 06:17 )  PTT:35.7 sec  Urinalysis Basic - ( 29 Sep 2024 06:17 )    Color: x / Appearance: x / SG: x / pH: x  Gluc: 55 mg/dL / Ketone: x  / Bili: x / Urobili: x   Blood: x / Protein: x / Nitrite: x   Leuk Esterase: x / RBC: x / WBC x   Sq Epi: x / Non Sq Epi: x / Bacteria: x

## 2024-09-29 NOTE — PHARMACOTHERAPY INTERVENTION NOTE - COMMENTS
Recommended an ID consult in the setting of Enterococcus faecium bacteremia.     Tania Hill, PharmD  Clinical Pharmacy Specialist, Infectious Diseases  Tele-Antimicrobial Stewardship Program (Tele-ASP)  Tele-ASP Phone: [phone]  
Recommended vancomycin trough level for 9/30 with AM labs to assist with further vancomycin dosing optimization.     Tania Hill, PharmD  Clinical Pharmacy Specialist, Infectious Diseases  Tele-Antimicrobial Stewardship Program (Tele-[facility])  Tele-ASP Phone: [phone] 
Medication history complete, reviewed medications with patient and confirmed with doctor first med hx.

## 2024-09-29 NOTE — H&P ADULT - ASSESSMENT
ICU ASSESSMENT AND PLAN:   78yo F with PMH: Renal Cell Carcinoma with mets to liver, IR embolization of a liver lesion on 7/30 Diverticulitis, HTN, HLD, Hypothyroid was recently, recent new onset Afib/RVR in August 2024 started on Eliquis, GI Bleed after ERCP- resolved without intervention, started on eliquis during admit for Afib. liver abscesses    admitted 8/2-8/15 for septic shock requiring pressors and critical care admission, Found to have dilated common bile duct, elevated LFTs and likely cholangitis/choledocholithiasis with stone, s/p ERCP with sphincterotomy/stone removal and stent placement on 8/5/24      admitted 9/9-9/14 for septic shock, requiring levophed and critical care admission, blood cultures during admission had Enterobacter, sensitive to Cipro. Noted liver abscess on MRI, felt to be too small for biopsy at the time, decision for prolong antibiotic course made. Discharged on Cipro/Flagyl to complete her therapy, this was completed on Monday 9/23.     She was doing well until today, when she developed fever to 103+, nausea, vomiting, diarrhea-brown, which she states is exactly how she presented for 9/9-9/14 admission.  Found to be hypotensive in ED, give 3 liters sepsis fluids without improvement and started on levophed for BP augmentation. ICU Consulted    Will admit to ICU for critical care monitoring    1- Septic Shock +/- Hypovolemic shock  2- Acute Renal Failure  3- Metabolic acidosis  4- Hypoglycemia  5- Transaminitis  6- Hypodense lesions in liver on CT scan, patient states she had MRI done as outpatient on Friday 9/27 to followup liver abscess noted on previous admission.   7- Parox Afib on eliquis/amiodarone  8- hypothyroid, last TSH August was normal  9- Adrenal Insufficiency    - Continue levophed, maintain MAP>65, wean as able  - Consider midodrine if unable to wean easily  - LR 500cc bolus, then LR at 100cc/hr   - Continue hydrocortisone, consider increased/stress dose if remains hypotensive  - Lactate improving, repeat in AM  - Consulted her Heme/Onc Dr. Saunders, as he will be able to access the recent MRI result and get an accurate comparison of the studies  - IR to be consulted  - Meropenem started for now, ID consulted  - Renal dose all medications  - Avoid nephrotoxic agents  - Maintain electrolytes, goals K>4.0, Phos>3.0, Mg>2.0  - Monitor glucoses, no history of DM  - GI consulted Dr. Reinoso  - Continue levothyroxine  - Currently in sinus with PRANAY, will hold off eliquis, Hep for DVT proph, can adjust once decision for any procedures are determined    Case discussed with Dr. Anatoliy Valle, ICU Attending    Critical Care time 75 minutes

## 2024-09-29 NOTE — ED PROVIDER NOTE - CADM POA CENTRAL LINE
An attempt was made to call the telephone number listed in patient’s demographics, to schedule a Well Child Exam, listed telephone number not in service. If/when parent(s) returns my call, please schedule  w/available provider and update patient's demographic information.    Thank you,  Aggie Hinojosa, HAMZAHR   No

## 2024-09-29 NOTE — ED PROVIDER NOTE - PHYSICAL EXAMINATION
Constitutional: NAD AAOx3  Eyes: EOMI, pupils equal  Head: Normocephalic atraumatic, dry mm  Mouth: no airway obstruction  Cardiac: tachy regular rate   Resp: Lungs CTAB  GI: Abd s/nt/nd  Neuro: CN2-12 intact  Skin: No rashes

## 2024-09-29 NOTE — H&P ADULT - NSHPREVIEWOFSYSTEMS_GEN_ALL_CORE
REVIEW OF SYSTEMS    General:	no weight gain/loss  Skin/Breast:	no rashes  Ophthalmologic:	no vision changes  ENMT:	no dysphagia, no tinnitus  Respiratory and Thorax:	no wheeze, no cough, no sputum  Cardiovascular:	no chest pain  Gastrointestinal:	prior to today, no symptoms, this morning nausea/vomiting/diarrhea  Genitourinary:	no dysuria  Musculoskeletal:	no muscle pain, no muscle wasting, no cramping  Neurological:	no headaches, no numbness/tingling  Psychiatric:	no depression no changes in behavior, normal activity  Hematology/Lymphatics:	no nodes,   Endocrine:	no changes in skin or hair, no polyuria/polydipsia  Allergic/Immunologic: no rashes

## 2024-09-29 NOTE — PROGRESS NOTE ADULT - ASSESSMENT
80 y/o female PMH RCC with metastasis to liver s/p IR embolization & biliary stent, HTN, HLD, hypothyroid, recent hospitalization for Enterobacter bacteremia and liver abscess, discharged on cipro and Flagyl which she finished on 9/23, now presents with fever, N, V and malaise     Found to have hypotension, elevated WBC, and Cr      Admitted for:     Septic shock (POA) due to liver abscess, possible gram negative bacteremia   Prerenal PRANAY   Lactic acidosis       Plan:     -neuro stable, no focal deficit   -on low dose Levo, wean for goal MAP>65  -also on hydrocortisone, will change to iv and increase dose if Levo requirement increases   -received IVF bolus, will continue maintenance fluids as appears dry  -trend LA   -start empiric meropenem (received during previous hospitalization), f/u BCx, trend WBC and temp curve   -consult ID and IR   -monitor renal fn. Trend and replete lytes prn. No Marcos   -DVT ppx with sc heparin       Patient critically ill on pressor

## 2024-09-29 NOTE — PATIENT PROFILE ADULT - FALL HARM RISK - HARM RISK INTERVENTIONS

## 2024-09-29 NOTE — PATIENT PROFILE ADULT - NSPROPOAPRESSUREINJURY_GEN_A_NUR
ED HPI GENERAL MEDICAL PROBLEM





- General


Stated Complaint: DOESN'T FEEL WELL


Time Seen by Provider: 08/28/21 14:10


Source of Information: Reports: Patient


History Limitations: Reports: No Limitations





- History of Present Illness


INITIAL COMMENTS - FREE TEXT/NARRATIVE: 





Patient comes emergency department today from home with concerns of not feeling 

well.  This patient yesterday was asymptomatic.  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 

tired.  He had a very brief episode of diaphoresis.  Otherwise he has no other 

symptoms.  During the episode of the diaphoresis he did not have any chest pain 

shortness of breath or difficulty breathing.  No weakness dizziness 

lightheadedness.  He has felt fatigued all day but he typically would not feel 

as such with [place] activity [place].  He has had no fever no 

chills.  No abdominal pain no nausea or vomiting.  No cough or congestion.  No 

hematuria dysuria urinary frequency.  No black or tarry stools.  No rash sores 

or lesions.  He is vaccinated for Covid.  He has not been around anyone ill.  He

still feels fatigue but the diaphoresis has resolved.  He has a history of high 

blood pressure.





- Related Data


                                    Allergies











Allergy/AdvReac Type Severity Reaction Status Date / Time


 


No Known Allergies Allergy   Verified 08/28/21 14:34











Home Meds: 


                                    Home Meds





Hydrochlorothiazide 25 mg PO DAILY 12/07/16 [History]


Lisinopril 10 mg PO DAILY 12/07/16 [History]


Sertraline [Zoloft] 100 mg PO DAILY 12/07/16 [History]


amLODIPine [Norvasc] 10 mg PO DAILY 12/07/16 [History]











Past Medical History


HEENT History: Reports: None


Cardiovascular History: Reports: High Cholesterol, Hypertension, Other (See 

Below)


Other Cardiovascular History: low hdl


Respiratory History: Reports: None


Gastrointestinal History: Reports: Other (See Below)


Other Gastrointestinal History: fm hx colon ca


Psychiatric History: Reports: Anxiety, Depression


Endocrine/Metabolic History: Reports: Obesity/BMI 30+





- Past Surgical History


HEENT Surgical History: Reports: Tonsillectomy





ED ROS GENERAL





- Review of Systems


Review Of Systems: Comprehensive ROS is negative, except as noted in HPI.





ED EXAM, GENERAL





- Physical Exam


Exam: See Below


Exam Limited By: No Limitations


General Appearance: Alert, WD/WN, No Apparent Distress


Eye Exam: Bilateral Eye: EOMI


Ears: Normal External Exam


Nose: Normal Inspection


Throat/Mouth: Normal Inspection, Normal Lips, Normal Voice


Head: Atraumatic, Normocephalic


Neck: Normal Inspection, Supple, Non-Tender, Full Range of Motion


Respiratory/Chest: No Respiratory Distress, Lungs Clear, Normal Breath Sounds, 

No Accessory Muscle Use, Chest Non-Tender


Cardiovascular: Normal Peripheral Pulses, Regular Rate, Rhythm, No Murmur


GI/Abdominal: Normal Bowel Sounds, Soft, Non-Tender


 (Male) Exam: Deferred


Rectal (Males) Exam: Deferred


Back Exam: Normal Inspection, Full Range of Motion


Extremities: Normal Inspection, Normal Range of Motion, Non-Tender, No Pedal 

Edema, Normal Capillary Refill


Neurological: Alert, Oriented, Normal Cognition, Normal Gait, No Motor/Sensory 

Deficits


Psychiatric: Normal Affect, Normal Mood


Skin Exam: Warm, Dry, Intact, Normal Color, No Rash





Course





- Vital Signs


Last Recorded V/S: 


                                Last Vital Signs











Temp  98.1 F   08/28/21 16:05


 


Pulse  78   08/28/21 16:05


 


Resp  14   08/28/21 16:05


 


BP  128/72   08/28/21 16:05


 


Pulse Ox  98   08/28/21 16:05














- Orders/Labs/Meds


Orders: 


                               Active Orders 24 hr











 Category Date Time Status


 


 Peripheral IV Insertion Adult [OM.PC] Stat Oth  08/28/21 14:11 Ordered











Labs: 


                                Laboratory Tests











  08/28/21 08/28/21 08/28/21 Range/Units





  14:11 14:12 14:52 


 


WBC    8.2  (4.0-10.0)  x10^3/uL


 


RBC    4.86  (4.5-6.0)  x10^6/uL


 


Hgb    15.4  (14.0-18.0)  g/dL


 


Hct    43.8  (40.0-52.0)  %


 


MCV    90.1  (78.0-93.0)  fL


 


MCH    31.7  (26.0-32.0)  pg


 


MCHC    35.2  (32.0-36.0)  g/dL


 


RDW Coeff of Cody    12.2  (10.0-15.0)  %


 


Plt Count    261  (130-400)  x10^3/uL


 


Immature Gran % (Auto)    0.20  (0.00-0.43)  %


 


Neut % (Auto)    65.9  (50.0-80.0)  %


 


Lymph % (Auto)    22.4 L  (25.0-50.0)  %


 


Mono % (Auto)    7.3  (2.0-11.0)  %


 


Eos % (Auto)    3.3  (0.0-4.0)  %


 


Baso % (Auto)    0.9  (0.2-1.2)  %


 


Neut # (Auto)    5.4  (1.8-7.7)  x10^3/uL


 


Lymph # (Auto)    1.8  (1.0-4.8)  x10^3/uL


 


Mono # (Auto)    0.6  (0.0-0.8)  x10^3/uL


 


Eos # (Auto)    0.3  (0.0-0.5)  x10^3/uL


 


Baso # (Auto)    0.1  (0.0-0.2)  x10^3/uL


 


Immature Gran # (Auto)    0.02  (0.00-0.07)  x10^3/uL


 


Sodium     (136-145)  mmol/L


 


Potassium     (3.5-5.1)  mmol/L


 


Chloride     ()  mmol/L


 


Carbon Dioxide     (21-32)  mmol/L


 


Anion Gap     (5-15)  mmol/L


 


BUN     (7-18)  mg/dL


 


Creatinine     (0.70-1.30)  mg/dL


 


Est Cr Clr Drug Dosing     


 


Estimated GFR (MDRD)     


 


Glucose     (70-99)  mg/dL


 


Lactic Acid     (0.4-2.0)  mmol/L


 


Calcium     (8.5-10.1)  mg/dL


 


Corrected Calcium     (8.5-10.1)  mg/dL


 


Total Bilirubin     (0.2-1.0)  mg/dL


 


AST     (15-37)  U/L


 


ALT     (16-63)  U/L


 


Alkaline Phosphatase     ()  U/L


 


Creatine Kinase     ()  U/L


 


Troponin I High Sens     (<=76)  ng/L


 


C-Reactive Protein     (<=0.9)  mg/dL


 


Total Protein     (6.4-8.2)  g/dL


 


Albumin     (3.4-5.0)  g/dL


 


Globulin     


 


Albumin/Globulin Ratio     


 


TSH, Ultra Sensitive     (0.358-3.74)  uIU/mL


 


Urine Color  Yellow    (YELLOW)  


 


Urine Appearance  Clear    (CLEAR)  


 


Urine pH  7.0    (5.0-8.0)  


 


Ur Specific Gravity  1.015    


 


Urine Protein  Negative    (NEGATIVE)  mg/dL


 


Urine Glucose (UA)  Negative    (NEGATIVE)  mg/dL


 


Urine Ketones  Negative    (NEGATIVE)  mg/dL


 


Urine Occult Blood  Negative    (NEGATIVE)  


 


Urine Nitrite  Negative    (NEGATIVE)  


 


Urine Bilirubin  Negative    (NEGATIVE)  


 


Urine Urobilinogen  0.2    (0.2)  EU/dL


 


Ur Leukocyte Esterase  Negative    (NEGATIVE)  


 


SARS CoV-2 RNA Rapid DANIEL   Negative   (NEGATIVE)  














  08/28/21 08/28/21 08/28/21 Range/Units





  14:52 14:52 14:52 


 


WBC     (4.0-10.0)  x10^3/uL


 


RBC     (4.5-6.0)  x10^6/uL


 


Hgb     (14.0-18.0)  g/dL


 


Hct     (40.0-52.0)  %


 


MCV     (78.0-93.0)  fL


 


MCH     (26.0-32.0)  pg


 


MCHC     (32.0-36.0)  g/dL


 


RDW Coeff of Cody     (10.0-15.0)  %


 


Plt Count     (130-400)  x10^3/uL


 


Immature Gran % (Auto)     (0.00-0.43)  %


 


Neut % (Auto)     (50.0-80.0)  %


 


Lymph % (Auto)     (25.0-50.0)  %


 


Mono % (Auto)     (2.0-11.0)  %


 


Eos % (Auto)     (0.0-4.0)  %


 


Baso % (Auto)     (0.2-1.2)  %


 


Neut # (Auto)     (1.8-7.7)  x10^3/uL


 


Lymph # (Auto)     (1.0-4.8)  x10^3/uL


 


Mono # (Auto)     (0.0-0.8)  x10^3/uL


 


Eos # (Auto)     (0.0-0.5)  x10^3/uL


 


Baso # (Auto)     (0.0-0.2)  x10^3/uL


 


Immature Gran # (Auto)     (0.00-0.07)  x10^3/uL


 


Sodium  139    (136-145)  mmol/L


 


Potassium  3.7    (3.5-5.1)  mmol/L


 


Chloride  102    ()  mmol/L


 


Carbon Dioxide  26    (21-32)  mmol/L


 


Anion Gap  14.7    (5-15)  mmol/L


 


BUN  10    (7-18)  mg/dL


 


Creatinine  1.0    (0.70-1.30)  mg/dL


 


Est Cr Clr Drug Dosing  TNP    


 


Estimated GFR (MDRD)  > 60    


 


Glucose  89    (70-99)  mg/dL


 


Lactic Acid   1.0   (0.4-2.0)  mmol/L


 


Calcium  9.1    (8.5-10.1)  mg/dL


 


Corrected Calcium  9.3    (8.5-10.1)  mg/dL


 


Total Bilirubin  0.4    (0.2-1.0)  mg/dL


 


AST  35    (15-37)  U/L


 


ALT  78 H    (16-63)  U/L


 


Alkaline Phosphatase  48    ()  U/L


 


Creatine Kinase  167    ()  U/L


 


Troponin I High Sens  10    (<=76)  ng/L


 


C-Reactive Protein  < 0.2    (<=0.9)  mg/dL


 


Total Protein  7.4    (6.4-8.2)  g/dL


 


Albumin  3.7    (3.4-5.0)  g/dL


 


Globulin  3.7    


 


Albumin/Globulin Ratio  1.00    


 


TSH, Ultra Sensitive    1.369  (0.358-3.74)  uIU/mL


 


Urine Color     (YELLOW)  


 


Urine Appearance     (CLEAR)  


 


Urine pH     (5.0-8.0)  


 


Ur Specific Gravity     


 


Urine Protein     (NEGATIVE)  mg/dL


 


Urine Glucose (UA)     (NEGATIVE)  mg/dL


 


Urine Ketones     (NEGATIVE)  mg/dL


 


Urine Occult Blood     (NEGATIVE)  


 


Urine Nitrite     (NEGATIVE)  


 


Urine Bilirubin     (NEGATIVE)  


 


Urine Urobilinogen     (0.2)  EU/dL


 


Ur Leukocyte Esterase     (NEGATIVE)  


 


SARS CoV-2 RNA Rapid DANIEL     (NEGATIVE)  











Meds: 


Medications














Discontinued Medications














Generic Name Dose Route Start Last Admin





  Trade Name Freq  PRN Reason Stop Dose Admin


 


Sodium Chloride  10 ml  08/28/21 14:11 





  Sodium Chloride 0.9% 10 Ml Syringe  FLUSH  





  ASDIRECTED PRN  





  Keep Vein Open  














- Re-Assessments/Exams


Free Text/Narrative Re-Assessment/Exam: 





His EKG on arrival is unremarkable no ischemia ST elevation or depression when 

reviewed extemporaneously by myself.





The patient is in no distress and is in exam is unremarkable and he only 

complains of fatigue on arrival.





His laboratory evaluation is very unremarkable.  His troponin is normal.  His 

TSH is normal his urinalysis as well as his CMP and BMP.  I am unsure of what is

 causing his symptoms of fatigue.  His CPK is normal as well.  I see no signs of

 infection.  He had no chest pain palpitations or other complaints during the 

episode of diaphoresis.  It is possible that he may had a small tachyarrhythmia 

that caused the diaphoretic situation although I am unsure of what is causing 

his rather sudden onset of fatigue.  He has no malaise myalgias or arthralgias. 

 He is otherwise asymptomatic in the emergency department.  I will have him 

follow-up with his primary care provider on Monday to see if they will get a 

Holter monitor in place to ensure that he is not have any tacky dysrhythmias.  

Anything new or worse he is to recheck sooner.  He is comfortable with this plan

 his questions are answered.








Departure





- Departure


Time of Disposition: 16:18


Disposition: Home, Self-Care 01


Clinical Impression: 


Fatigue


Qualifiers:


 Fatigue type: unspecified Qualified Code(s): R53.83 - Other fatigue








- Discharge Information


Referrals: 


Liliana Soto DO [Primary Care Provider] - 


Forms:  ED Department Discharge


Additional Instructions: 


Home rest today. 


Stay out of the heat. 


Plenty of fluids over the next few days. 


Return to the ED if new or worsening symptoms. 


Contact your PCP on monday for possible holter monitor placement. 





Sepsis Event Note (ED)





- Focused Exam


Vital Signs: 


                                   Vital Signs











  Temp Pulse Resp BP Pulse Ox


 


 08/28/21 16:05  98.1 F  78  14  128/72  98


 


 08/28/21 14:05  98.2 F  89  18  131/86  96














- My Orders


Last 24 Hours: 


My Active Orders





08/28/21 14:11


Peripheral IV Insertion Adult [OM.PC] Stat 














- Assessment/Plan


Last 24 Hours: 


My Active Orders





08/28/21 14:11


Peripheral IV Insertion Adult [OM.PC] Stat no

## 2024-09-29 NOTE — ED PROVIDER NOTE - CLINICAL SUMMARY MEDICAL DECISION MAKING FREE TEXT BOX
79-year-old patient presents emerged department.  Fevers up to 102 at home.  Patient hypotensive and tachycardic, concern for sepsis.  Plan check labs, CT abdomen pelvis.

## 2024-09-29 NOTE — ED PROVIDER NOTE - PROGRESS NOTE DETAILS
ED Attending Dr. Catherine, pt difficult iv access.  IV placed left neck - 20 guage. Ronan Lemus DO (Attending): Received signout from Dr. Brooks, patient with septic shock status post 2 L, IV gave an additional liter total of 3 L IV fluids, broad-spectrum antibiotics.  Patient initial lactate 8.1, leukocytosis to 20.  Urine, chest x-ray and CT do not reveal obvious source.  On reassessment patient's MAP still below 60 3:05 liters IV fluids.  Will start norepinephrine and consult ICU.

## 2024-09-29 NOTE — H&P ADULT - HISTORY OF PRESENT ILLNESS
78yo F with PMH: Renal Cell Carcinoma with mets to liver, IR embolization of a liver lesion on 7/30 Diverticulitis, HTN, HLD, Hypothyroid was recently, recent new onset Afib/RVR in August 2024 started on Eliquis, GI Bleed after ERCP- resolved without intervention, started on eliquis during admit for Afib.    admitted 8/2-8/15 for septic shock requiring pressors and critical care admission, Found to have dilated common bile duct, elevated LFTs and likely cholangitis/choledocholithiasis with stone, s/p ERCP with sphincterotomy/stone removal and stent placement on 8/5/24      admitted 9/9-9/14 for septic shock, requiring levophed and critical care admission, blood cultures during admission had Enterobacter, sensitive to Cipro. 78yo F with PMH: Renal Cell Carcinoma with mets to liver, IR embolization of a liver lesion on 7/30 Diverticulitis, HTN, HLD, Hypothyroid was recently, recent new onset Afib/RVR in August 2024 started on Eliquis, GI Bleed after ERCP- resolved without intervention, started on eliquis during admit for Afib. liver abscesses    admitted 8/2-8/15 for septic shock requiring pressors and critical care admission, Found to have dilated common bile duct, elevated LFTs and likely cholangitis/choledocholithiasis with stone, s/p ERCP with sphincterotomy/stone removal and stent placement on 8/5/24      admitted 9/9-9/14 for septic shock, requiring levophed and critical care admission, blood cultures during admission had Enterobacter, sensitive to Cipro. Noted liver abscess on MRI, felt to be too small for biopsy at the time, decision for prolong antibiotic course made. Discharged on Cipro/Flagyl to complete her therapy, this was completed on Monday 9/23.     She was doing well until today, when she developed fever to 103+, nausea, vomiting, diarrhea-brown, which she states is exactly how she presented for 9/9-9/14 admission.  Found to be hypotensive in ED, give 3 liters sepsis fluids without improvement and started on levophed for BP augmentation. ICU Consulted

## 2024-09-29 NOTE — H&P ADULT - NSICDXPASTMEDICALHX_GEN_ALL_CORE_FT
PAST MEDICAL HISTORY:  Cholelithiasis with cholangitis     Diverticulitis     High cholesterol     History of biliary stent insertion     History of diverticulitis     Hypertension     Hypothyroidism     Metastasis to liver     Paroxysmal atrial fibrillation     Renal cell cancer

## 2024-09-29 NOTE — ED ADULT TRIAGE NOTE - CHIEF COMPLAINT QUOTE
Pt presents to the ED from home with c/o lower abdominal pain, N/V/D and fever tmax 102 since 1800p. Pt states she last took Tylenol at 2130p. Pts son states she has been seen in  for similar symptoms 2x.

## 2024-09-29 NOTE — ED PROVIDER NOTE - CRITICAL CARE ATTENDING CONTRIBUTION TO CARE
Patient presented febrile, tachycardic, hypotensive given 3 L IV fluids, stress dose steroids, persistently hypotensive, Broad-spectrum antibiotics, started on Levophed.  Patient admitted to ICU.

## 2024-09-29 NOTE — ED ADULT NURSE NOTE - OBJECTIVE STATEMENT
80 y/o female presents to the ED c/o 2 episodes of vomiting and an elevated temp (max temp 103) since around 3PM. Pt reports taking tylenol at 9:30 PM. Pt PMHx kidney cancer in liver (on immunotherapy), 6-8 weeks ago sepsis with similar symptoms. Pt denies pain, SOB, chest pain, N/V/D. Pt has no other complaints at this time, safety and comfort maintained.

## 2024-09-29 NOTE — H&P ADULT - NSHPPHYSICALEXAM_GEN_ALL_CORE
T(C): 36.9 (09-29-24 @ 11:01), Max: 40.2 (09-29-24 @ 06:00)  HR: 77 (09-29-24 @ 14:00) (77 - 124)  BP: 90/64 (09-29-24 @ 14:00) (76/53 - 114/76)  RR: 16 (09-29-24 @ 14:00) (16 - 26)  SpO2: 99% (09-29-24 @ 14:00) (95% - 100%)    CONSTITUTIONAL: Pleasant, cooperative, talkative F layin bed, Well groomed, no apparent distress  EYES: PERRLA and symmetric, EOMI, No conjunctival or scleral injection, non-icteric  ENMT: Oral mucosa with moist membranes. Normal dentition; no pharyngeal injection or exudates             NECK: Supple, symmetric and without tracheal deviation   RESP: No respiratory distress, no use of accessory muscles; CTA b/l, no WRR  CV: RRR, +S1S2, no MRG; no JVD; no peripheral edema  GI: Soft, NT, ND, no rebound, no guarding; no palpable masses; no hepatosplenomegaly; no hernia palpated  LYMPH: No cervical LAD or tenderness; no axillary LAD or tenderness; no inguinal LAD or tenderness  MSK: Normal gait; No digital clubbing or cyanosis; examination of the (head/neck/spine/ribs/pelvis, RUE, LUE, RLE, LLE) without misalignment,            Normal ROM without pain, no spinal tenderness, normal muscle strength/tone  SKIN: No rashes or ulcers noted; no subcutaneous nodules or induration palpable  NEURO: CN II-XII intact; normal reflexes in upper and lower extremities, sensation intact in upper and lower extremities b/l to light touch   PSYCH: Appropriate insight/judgment; A+O x 3, mood and affect appropriate, recent/remote memory intact

## 2024-09-29 NOTE — H&P ADULT - NSHPLABSRESULTS_GEN_ALL_CORE
09-29    135  |  103  |  26[H]  ----------------------------<  55[L]  5.3   |  21[L]  |  2.06[H]    Ca    9.0      29 Sep 2024 06:17    TPro  6.5  /  Alb  2.5[L]  /  TBili  1.2  /  DBili  x   /  AST  104[H]  /  ALT  56  /  AlkPhos  476[H]  09-29                          13.4   20.88 )-----------( 279      ( 29 Sep 2024 06:17 )             44.6     9/29 6am Lactate 8.1  9/29 9am Lactate 3.1  9/29 10am Lactate 2.6    PT/INR 16.5/1.4  PTT 35.7    Respiratory Viral panel negative    Urinalysis (09.29.24 @ 06:06)   Glucose Qualitative, Urine: Negative mg/dL  Blood, Urine: Negative  pH Urine: 5.0  Color: Orange  Urine Appearance: Cloudy  Bilirubin: Moderate  Ketone - Urine: Trace mg/dL  Specific Gravity: 1.029  Protein, Urine: 30 mg/dL  Urobilinogen: 1.0 mg/dL  Nitrite: Negative  Leukocyte Esterase Concentration: Trace    PREVIOUS ADMISSION BLOOD CULTURES  Culture - Blood (09.09.24 @ 12:32)   - Enterobacter cloacae complex: Detec  - Ampicillin: R >16 These ampicillin results predict results for amoxicillin  - Ampicillin/Sulbactam: R >16/8  - Aztreonam: S <=4  - Cefazolin: R >16  - Cefepime: S <=2  - Cefoxitin: R >16  - Ceftriaxone: R 8 Enterobacter cloacae, Klebsiella aerogenes, and Citrobacter freundii may develop resistance during prolonged therapy.  - Ciprofloxacin: S <=0.25  - Ertapenem: S <=0.5  - Gentamicin: S <=2  - Imipenem: S <=1  - Levofloxacin: S <=0.5  - Meropenem: S <=1  - Piperacillin/Tazobactam: S <=8 Treatment with Pipercillin/Tazobactam is not recommended in severe infections casued by Klebsiella aerogenes, Enterobacter cloacae complex, and Citrobacter freundii complex.  - Tobramycin: S <=2  - Trimethoprim/Sulfamethoxazole: S <=0.5/9.5    PREVIOUS ADMISSION ECHO  8/3/24:   1. Left ventricular wall thickness is normal. Left ventricular systolic function is normal with an ejection fraction visually estimated at 65 to 70 %.   2. Normal left ventricular diastolic function.   3. Normal right ventricular cavity size and normal right ventricular systolic function.   4. The right atrium is normal in size.   5. Mild mitral regurgitation.   6. Structurally normal pulmonic valve with normal leaflet excursion.   7. Structurally normal tricuspid valve with normalleaflet excursion. Mild tricuspid regurgitation.   8. Left atrium is normal in size.   9. Mild pulmonic regurgitation.  10. No evidence of aortic regurgitation.  11. There is mild calcification of the mitral valve annulus.  12. Trileaflet aortic valve with normal systolic excursion. Mild aortic stenosis.    9/29 CT Abdomen and Pelvis:  1- Sigmoid diverticulosis without diverticulitis. No bowel obstruction or gross bowel wall thickening.  2- Extensive area of hypovascularity throughout the right hepatic lobe more prominent than on prior exam may represent sequela of recent treatment, correlate with clinical history. Stable appearance of the left hepatic lobe.

## 2024-09-29 NOTE — ED PROVIDER NOTE - OBJECTIVE STATEMENT
79-year-old patient with past medical history for renal  cancer, on immunotherapy, follows with Dr. manuel for heme-onc,  presents emergency department with son for weakness, lower abdominal pain, vomiting.  Patient started to not feel well this morning.  Vomit x 1.  Reports today patient increasing lower abdominal pain and now with increased weakness.  No travel, no sick contacts.  Patient with admissions in the past for sepsis.

## 2024-09-30 LAB
A1C WITH ESTIMATED AVERAGE GLUCOSE RESULT: 4.8 % — SIGNIFICANT CHANGE UP (ref 4–5.6)
ALBUMIN SERPL ELPH-MCNC: 1.8 G/DL — LOW (ref 3.3–5)
ALP SERPL-CCNC: 255 U/L — HIGH (ref 40–120)
ALT FLD-CCNC: 164 U/L — HIGH (ref 12–78)
ANION GAP SERPL CALC-SCNC: 5 MMOL/L — SIGNIFICANT CHANGE UP (ref 5–17)
AST SERPL-CCNC: 262 U/L — HIGH (ref 15–37)
BASOPHILS # BLD AUTO: 0 K/UL — SIGNIFICANT CHANGE UP (ref 0–0.2)
BASOPHILS NFR BLD AUTO: 0 % — SIGNIFICANT CHANGE UP (ref 0–2)
BILIRUB SERPL-MCNC: 0.8 MG/DL — SIGNIFICANT CHANGE UP (ref 0.2–1.2)
BUN SERPL-MCNC: 17 MG/DL — SIGNIFICANT CHANGE UP (ref 7–23)
CALCIUM SERPL-MCNC: 8.5 MG/DL — SIGNIFICANT CHANGE UP (ref 8.5–10.1)
CHLORIDE SERPL-SCNC: 110 MMOL/L — HIGH (ref 96–108)
CO2 SERPL-SCNC: 26 MMOL/L — SIGNIFICANT CHANGE UP (ref 22–31)
CREAT SERPL-MCNC: 0.85 MG/DL — SIGNIFICANT CHANGE UP (ref 0.5–1.3)
CULTURE RESULTS: NO GROWTH — SIGNIFICANT CHANGE UP
EGFR: 71 ML/MIN/1.73M2 — SIGNIFICANT CHANGE UP
EOSINOPHIL # BLD AUTO: 0 K/UL — SIGNIFICANT CHANGE UP (ref 0–0.5)
EOSINOPHIL NFR BLD AUTO: 0 % — SIGNIFICANT CHANGE UP (ref 0–6)
ESTIMATED AVERAGE GLUCOSE: 91 MG/DL — SIGNIFICANT CHANGE UP (ref 68–114)
GLUCOSE SERPL-MCNC: 74 MG/DL — SIGNIFICANT CHANGE UP (ref 70–99)
HCT VFR BLD CALC: 34.3 % — LOW (ref 34.5–45)
HCT VFR BLD CALC: 36.6 % — SIGNIFICANT CHANGE UP (ref 34.5–45)
HGB BLD-MCNC: 10.7 G/DL — LOW (ref 11.5–15.5)
HGB BLD-MCNC: 11.4 G/DL — LOW (ref 11.5–15.5)
LACTATE SERPL-SCNC: 1.8 MMOL/L — SIGNIFICANT CHANGE UP (ref 0.7–2)
LYMPHOCYTES # BLD AUTO: 0.73 K/UL — LOW (ref 1–3.3)
LYMPHOCYTES # BLD AUTO: 5 % — LOW (ref 13–44)
MAGNESIUM SERPL-MCNC: 1.4 MG/DL — LOW (ref 1.6–2.6)
MCHC RBC-ENTMCNC: 27.8 PG — SIGNIFICANT CHANGE UP (ref 27–34)
MCHC RBC-ENTMCNC: 27.9 PG — SIGNIFICANT CHANGE UP (ref 27–34)
MCHC RBC-ENTMCNC: 31.1 GM/DL — LOW (ref 32–36)
MCHC RBC-ENTMCNC: 31.2 GM/DL — LOW (ref 32–36)
MCV RBC AUTO: 89.3 FL — SIGNIFICANT CHANGE UP (ref 80–100)
MCV RBC AUTO: 89.6 FL — SIGNIFICANT CHANGE UP (ref 80–100)
MONOCYTES # BLD AUTO: 0.44 K/UL — SIGNIFICANT CHANGE UP (ref 0–0.9)
MONOCYTES NFR BLD AUTO: 3 % — SIGNIFICANT CHANGE UP (ref 2–14)
NEUTROPHILS # BLD AUTO: 13.32 K/UL — HIGH (ref 1.8–7.4)
NEUTROPHILS NFR BLD AUTO: 88 % — HIGH (ref 43–77)
NRBC # BLD: SIGNIFICANT CHANGE UP /100 WBCS (ref 0–0)
PHOSPHATE SERPL-MCNC: 2.7 MG/DL — SIGNIFICANT CHANGE UP (ref 2.5–4.5)
PLATELET # BLD AUTO: 103 K/UL — LOW (ref 150–400)
PLATELET # BLD AUTO: 143 K/UL — LOW (ref 150–400)
POTASSIUM SERPL-MCNC: 4 MMOL/L — SIGNIFICANT CHANGE UP (ref 3.5–5.3)
POTASSIUM SERPL-SCNC: 4 MMOL/L — SIGNIFICANT CHANGE UP (ref 3.5–5.3)
PROT SERPL-MCNC: 5 GM/DL — LOW (ref 6–8.3)
RBC # BLD: 3.83 M/UL — SIGNIFICANT CHANGE UP (ref 3.8–5.2)
RBC # BLD: 4.1 M/UL — SIGNIFICANT CHANGE UP (ref 3.8–5.2)
RBC # FLD: 18.8 % — HIGH (ref 10.3–14.5)
RBC # FLD: 19 % — HIGH (ref 10.3–14.5)
SODIUM SERPL-SCNC: 141 MMOL/L — SIGNIFICANT CHANGE UP (ref 135–145)
SPECIMEN SOURCE: SIGNIFICANT CHANGE UP
WBC # BLD: 14.64 K/UL — HIGH (ref 3.8–10.5)
WBC # BLD: 18.23 K/UL — HIGH (ref 3.8–10.5)
WBC # FLD AUTO: 14.64 K/UL — HIGH (ref 3.8–10.5)
WBC # FLD AUTO: 18.23 K/UL — HIGH (ref 3.8–10.5)

## 2024-09-30 PROCEDURE — 99221 1ST HOSP IP/OBS SF/LOW 40: CPT

## 2024-09-30 PROCEDURE — 99291 CRITICAL CARE FIRST HOUR: CPT

## 2024-09-30 PROCEDURE — 49405 IMAGE CATH FLUID COLXN VISC: CPT

## 2024-09-30 PROCEDURE — 99222 1ST HOSP IP/OBS MODERATE 55: CPT

## 2024-09-30 PROCEDURE — 93010 ELECTROCARDIOGRAM REPORT: CPT

## 2024-09-30 RX ORDER — ACETAMINOPHEN 325 MG
1000 TABLET ORAL ONCE
Refills: 0 | Status: COMPLETED | OUTPATIENT
Start: 2024-09-30 | End: 2024-09-30

## 2024-09-30 RX ORDER — PHENYLEPHRINE TANNATE 10 MG/5 ML
0.1 SUSPENSION, ORAL (FINAL DOSE FORM) ORAL
Qty: 40 | Refills: 0 | Status: DISCONTINUED | OUTPATIENT
Start: 2024-09-30 | End: 2024-10-01

## 2024-09-30 RX ORDER — NOREPINEPHRINE BITARTRATE/D5W 16MG/250ML
0.05 PLASTIC BAG, INJECTION (ML) INTRAVENOUS
Qty: 8 | Refills: 0 | Status: DISCONTINUED | OUTPATIENT
Start: 2024-09-30 | End: 2024-10-01

## 2024-09-30 RX ORDER — MAGNESIUM SULFATE 500 MG/ML
2 VIAL (ML) INJECTION
Refills: 0 | Status: COMPLETED | OUTPATIENT
Start: 2024-09-30 | End: 2024-09-30

## 2024-09-30 RX ORDER — ACETAMINOPHEN 325 MG
1000 TABLET ORAL ONCE
Refills: 0 | Status: DISCONTINUED | OUTPATIENT
Start: 2024-09-30 | End: 2024-09-30

## 2024-09-30 RX ORDER — CHLORHEXIDINE GLUCONATE ORAL RINSE 1.2 MG/ML
1 SOLUTION DENTAL
Refills: 0 | Status: DISCONTINUED | OUTPATIENT
Start: 2024-09-30 | End: 2024-10-16

## 2024-09-30 RX ORDER — AMIODARONE HYDROCHLORIDE 50 MG/ML
150 INJECTION, SOLUTION INTRAVENOUS ONCE
Refills: 0 | Status: COMPLETED | OUTPATIENT
Start: 2024-09-30 | End: 2024-09-30

## 2024-09-30 RX ORDER — VANCOMYCIN HCL-SODIUM CHLORIDE IV SOLN 1.5 GM/250ML-0.9% 1.5-0.9/25 GM/ML-%
500 SOLUTION INTRAVENOUS EVERY 12 HOURS
Refills: 0 | Status: DISCONTINUED | OUTPATIENT
Start: 2024-09-30 | End: 2024-10-02

## 2024-09-30 RX ORDER — MEROPENEM 500 MG/20ML
1000 INJECTION INTRAVENOUS EVERY 12 HOURS
Refills: 0 | Status: DISCONTINUED | OUTPATIENT
Start: 2024-09-30 | End: 2024-10-03

## 2024-09-30 RX ORDER — SODIUM CHLORIDE 0.9 % (FLUSH) 0.9 %
1000 SYRINGE (ML) INJECTION ONCE
Refills: 0 | Status: COMPLETED | OUTPATIENT
Start: 2024-09-30 | End: 2024-09-30

## 2024-09-30 RX ORDER — METOPROLOL TARTRATE 50 MG
2.5 TABLET ORAL ONCE
Refills: 0 | Status: COMPLETED | OUTPATIENT
Start: 2024-09-30 | End: 2024-09-30

## 2024-09-30 RX ADMIN — HYDROCORTISONE 10 MILLIGRAM(S): 5 TABLET ORAL at 21:16

## 2024-09-30 RX ADMIN — Medication 5000 UNIT(S): at 05:25

## 2024-09-30 RX ADMIN — MEROPENEM 500 MILLIGRAM(S): 500 INJECTION INTRAVENOUS at 05:25

## 2024-09-30 RX ADMIN — Medication 1000 MILLIGRAM(S): at 14:20

## 2024-09-30 RX ADMIN — Medication 5000 UNIT(S): at 21:21

## 2024-09-30 RX ADMIN — Medication 2.5 MILLIGRAM(S): at 17:57

## 2024-09-30 RX ADMIN — CHLORHEXIDINE GLUCONATE ORAL RINSE 1 APPLICATION(S): 1.2 SOLUTION DENTAL at 17:09

## 2024-09-30 RX ADMIN — Medication 25 GRAM(S): at 09:11

## 2024-09-30 RX ADMIN — AMIODARONE HYDROCHLORIDE 100 MILLIGRAM(S): 50 INJECTION, SOLUTION INTRAVENOUS at 12:44

## 2024-09-30 RX ADMIN — HYDROCORTISONE 20 MILLIGRAM(S): 5 TABLET ORAL at 12:43

## 2024-09-30 RX ADMIN — Medication 400 MILLIGRAM(S): at 13:50

## 2024-09-30 RX ADMIN — Medication 25 GRAM(S): at 11:21

## 2024-09-30 RX ADMIN — Medication 100 MICROGRAM(S): at 05:26

## 2024-09-30 RX ADMIN — Medication 2.37 MICROGRAM(S)/KG/MIN: at 17:08

## 2024-09-30 RX ADMIN — VANCOMYCIN HCL-SODIUM CHLORIDE IV SOLN 1.5 GM/250ML-0.9% 100 MILLIGRAM(S): 1.5-0.9/25 SOLUTION at 21:16

## 2024-09-30 RX ADMIN — Medication 1000 MILLILITER(S): at 17:08

## 2024-09-30 RX ADMIN — PANTOPRAZOLE SODIUM 40 MILLIGRAM(S): 40 TABLET, DELAYED RELEASE ORAL at 09:12

## 2024-09-30 RX ADMIN — AMIODARONE HYDROCHLORIDE 618 MILLIGRAM(S): 50 INJECTION, SOLUTION INTRAVENOUS at 17:08

## 2024-09-30 RX ADMIN — MEROPENEM 1000 MILLIGRAM(S): 500 INJECTION INTRAVENOUS at 15:56

## 2024-09-30 NOTE — CONSULT NOTE ADULT - ASSESSMENT
Interventional Radiology    Evaluate for Procedure: liver abscess    HPI: 78 y/o female PMH RCC with metastasis to liver s/p IR embolization & biliary stent, HTN, HLD, hypothyroid, recent hospitalization for Enterobacter bacteremia and liver abscess, discharged on cipro and Flagyl which she finished on 9/23, now presents with fever, N, V and malaise found to have liver abscess on imaging. IR consulted for drainage.     Allergies: Crestor (Other)  Lipitor (Unknown)  tivozanib (Faint)  Cabometyx (Unknown)  Dyazide (Faint)  statins (Unknown)  bacitracin (Rash)  Zetia (Unknown)  Norvasc (Faint)    Medications (Abx/Cardiac/Anticoagulation/Blood Products)    cefepime  Injectable.: 1000 milliGRAM(s) IV Push (09-29 @ 06:18)  fluconAZOLE   Tablet: 150 milliGRAM(s) Oral (09-29 @ 08:44)  heparin   Injectable: 5000 Unit(s) SubCutaneous (09-30 @ 05:25)  meropenem Injectable: 500 milliGRAM(s) IV Push (09-29 @ 13:00)  meropenem Injectable: 500 milliGRAM(s) IV Push (09-30 @ 05:25)  norepinephrine Infusion: 5.92 mL/Hr IV Continuous (09-29 @ 08:44)  vancomycin  IVPB: 250 mL/Hr IV Intermittent (09-29 @ 21:17)  vancomycin  IVPB.: 250 mL/Hr IV Intermittent (09-29 @ 06:22)    Data:    T(C): 36.9  HR: 73  BP: 124/69  RR: 20  SpO2: 76%    -WBC 14.64 / HgB 10.7 / Hct 34.3 / Plt 143  -Na 141 / Cl 110 / BUN 17 / Glucose 74  -K 4.0 / CO2 26 / Cr 0.85  - / Alk Phos 255 / T.Bili 0.8  -INR 1.58 / PTT --          Radiology:     Assessment/Plan:78 y/o female PMH RCC with metastasis to liver s/p IR embolization & biliary stent, HTN, HLD, hypothyroid, recent hospitalization for Enterobacter bacteremia and liver abscess, discharged on cipro and Flagyl which she finished on 9/23, now presents with fever, N, V and malaise found to have liver abscess on imaging. IR consulted for drainage.   Pt with leukocystosis and on pressor support in the ICU .    - CT reviewed with Dr. Carvajal, liver abscess accessible for drainage.   - Will plan for procedure on 9/30.  - Needs to be NPO for procedure.   - D/w Dr. Nevarez.  Interventional Radiology    Evaluate for Procedure: liver abscess    HPI: 78 y/o female PMH RCC with metastasis to liver s/p IR embolization & biliary stent, HTN, HLD, hypothyroid, recent hospitalization for Enterobacter bacteremia and liver abscess, discharged on cipro and Flagyl which she finished on 9/23, now presents with fever, N, V and malaise found to have liver abscess on imaging. IR consulted for drainage.     Allergies: Crestor (Other)  Lipitor (Unknown)  tivozanib (Faint)  Cabometyx (Unknown)  Dyazide (Faint)  statins (Unknown)  bacitracin (Rash)  Zetia (Unknown)  Norvasc (Faint)    Medications (Abx/Cardiac/Anticoagulation/Blood Products)    cefepime  Injectable.: 1000 milliGRAM(s) IV Push (09-29 @ 06:18)  fluconAZOLE   Tablet: 150 milliGRAM(s) Oral (09-29 @ 08:44)  heparin   Injectable: 5000 Unit(s) SubCutaneous (09-30 @ 05:25)  meropenem Injectable: 500 milliGRAM(s) IV Push (09-29 @ 13:00)  meropenem Injectable: 500 milliGRAM(s) IV Push (09-30 @ 05:25)  norepinephrine Infusion: 5.92 mL/Hr IV Continuous (09-29 @ 08:44)  vancomycin  IVPB: 250 mL/Hr IV Intermittent (09-29 @ 21:17)  vancomycin  IVPB.: 250 mL/Hr IV Intermittent (09-29 @ 06:22)    Data:    T(C): 36.9  HR: 73  BP: 124/69  RR: 20  SpO2: 76%    -WBC 14.64 / HgB 10.7 / Hct 34.3 / Plt 143  -Na 141 / Cl 110 / BUN 17 / Glucose 74  -K 4.0 / CO2 26 / Cr 0.85  - / Alk Phos 255 / T.Bili 0.8  -INR 1.58 / PTT --          Radiology:     Assessment/Plan:78 y/o female PMH RCC with metastasis to liver s/p IR embolization & biliary stent, HTN, HLD, hypothyroid, recent hospitalization for Enterobacter bacteremia and liver abscess, discharged on cipro and Flagyl which she finished on 9/23, now presents with fever, N, V and malaise found to have liver abscess on imaging. IR consulted for drainage.       - CT reviewed with Dr. Carvajal, liver abscess accessible for drainage.   - Will plan for procedure on 9/30.  - Needs to be NPO for procedure.   - D/w Dr. Nevarez.

## 2024-09-30 NOTE — CONSULT NOTE ADULT - SUBJECTIVE AND OBJECTIVE BOX
Patient is a 79y old  Female who presents with a chief complaint of fever, abd pain, nausea, diarrhea    HPI:  This is a 79 year old woman with  diverticulosis,, HLD, HTN, hypothyroid, metastatic RCC to liver s/p kidney embo 7/30 on immunotherapy, AF on Eliquis, recently hospitalized for cholangitis s/p ERCP with placement of stent 8/5 returning 9/10 with sepsis r/t liver abscess s/p drainage now presenting again to [facility] with fever, chills, abdominal pain, non bloody diarrhea, and vomiting. Patient evaluated at bedside this morning in CCU. Currently denies any abdominal pain, does admit to chills and mild nausea currently. Denies hematemesis or CGE. Reports symptoms similar to presenting symptoms prompting last hospitalization. Upon admission hypotensive, on IV pressors now hemodynamically stable off pressors. CT imaging with hepatic abscess, CBD with stent in place. Significant leukocytosis. IR for drainage of abscess today versus tomorrow.     PAST MEDICAL & SURGICAL HISTORY:  Hypertension      High cholesterol      Diverticulitis      History of diverticulitis      Hypothyroidism      Renal cell cancer      Metastasis to liver      Paroxysmal atrial fibrillation      Cholelithiasis with cholangitis      History of biliary stent insertion      History of tonsillectomy      History of laparotomy      History of arthroscopy      H/O bilateral oophorectomy      S/P surgical removal of pilonidal cyst    MEDICATIONS  (STANDING):  acetaminophen   IVPB .. 1000 milliGRAM(s) IV Intermittent once  aMIOdarone    Tablet 100 milliGRAM(s) Oral daily  chlorhexidine 4% Liquid 1 Application(s) Topical <User Schedule>  heparin   Injectable 5000 Unit(s) SubCutaneous every 8 hours  hydrocortisone 10 milliGRAM(s) Oral at bedtime  hydrocortisone 20 milliGRAM(s) Oral daily  lactated ringers. 1000 milliLiter(s) (100 mL/Hr) IV Continuous <Continuous>  levothyroxine 100 MICROGram(s) Oral daily  meropenem Injectable 1000 milliGRAM(s) IV Push every 12 hours  norepinephrine Infusion 0.05 MICROgram(s)/kG/Min (5.92 mL/Hr) IV Continuous <Continuous>  pantoprazole  Injectable 40 milliGRAM(s) IV Push daily  Tivozanib (Fotivda) 890 MICROGram(s) 0.89 milliGRAM(s) Oral daily  vancomycin  IVPB 500 milliGRAM(s) IV Intermittent every 12 hours    MEDICATIONS  (PRN):    Allergies    Crestor (Other)  Lipitor (Unknown)  tivozanib (Faint)  Cabometyx (Unknown)  Dyazide (Faint)  statins (Unknown)  bacitracin (Rash)  Zetia (Unknown)  Norvasc (Faint)    Intolerances    SOCIAL HISTORY:    FAMILY HISTORY:      REVIEW OF SYSTEMS:    CONSTITUTIONAL: No weakness, fevers or chills  EYES/ENT: No visual changes;  No vertigo or throat pain   NECK: No pain or stiffness  RESPIRATORY: No cough, wheezing, hemoptysis; No shortness of breath  CARDIOVASCULAR: No chest pain or palpitations  GASTROINTESTINAL: See HPI  GENITOURINARY: No dysuria, frequency or hematuria  NEUROLOGICAL: No numbness or weakness  SKIN: No itching, burning, rashes, or lesions   PSYCH: Normal mood and affect  All other review of systems is negative unless indicated above.    Vital Signs Last 24 Hrs  T(C): 40.3 (30 Sep 2024 13:36), Max: 40.3 (30 Sep 2024 13:36)  T(F): 104.6 (30 Sep 2024 13:36), Max: 104.6 (30 Sep 2024 13:36)  HR: 101 (30 Sep 2024 10:00) (72 - 114)  BP: 131/78 (30 Sep 2024 10:00) (92/59 - 138/80)  BP(mean): 95 (30 Sep 2024 10:00) (68 - 97)  RR: 25 (30 Sep 2024 10:00) (16 - 26)  SpO2: 100% (30 Sep 2024 10:00) (76% - 100%)    Parameters below as of 30 Sep 2024 08:00  Patient On (Oxygen Delivery Method): room air    PHYSICAL EXAM:    Constitutional: No acute distress, well-developed, non-toxic appearing  HEENT: masked, good phonation, not icteric  Neck: supple, no lymphadenopathy  Respiratory: clear to ascultation bilaterally, no wheezing  Cardiovascular: S1 and S2, regular rate and rhythm, no murmurs rubs or gallops  Gastrointestinal: soft, non-tender, non-distended, +bowel sounds, no rebound or guarding, no surgical scars, no drains  Extremities: No peripheral edema, no cyanosis or clubbing  Vascular: 2+ peripheral pulses, no venous stasis  Neurological: A/O x 3, no focal deficits, no asterixis  Psychiatric: Normal mood, normal affect  Skin: No rashes, not jaundiced    LABS:                        10.7   14.64 )-----------( 143      ( 30 Sep 2024 06:38 )             34.3     09-30    141  |  110[H]  |  17  ----------------------------<  74  4.0   |  26  |  0.85    Ca    8.5      30 Sep 2024 06:38  Phos  2.7     09-30  Mg     1.4     09-30    TPro  5.0[L]  /  Alb  1.8[L]  /  TBili  0.8  /  DBili  x   /  AST  262[H]  /  ALT  164[H]  /  AlkPhos  255[H]  09-30    PT/INR - ( 29 Sep 2024 14:36 )   PT: 18.6 sec;   INR: 1.58 ratio         PTT - ( 29 Sep 2024 06:17 )  PTT:35.7 sec  LIVER FUNCTIONS - ( 30 Sep 2024 06:38 )  Alb: 1.8 g/dL / Pro: 5.0 gm/dL / ALK PHOS: 255 U/L / ALT: 164 U/L / AST: 262 U/L / GGT: x             RADIOLOGY & ADDITIONAL STUDIES:  FINDINGS:  LOWER CHEST: A few less than 4 mm right middle lobe and bilateral lower   nodules slightly increased in size and number from the prior exam. Right   retrocrural cystic lesion is unchanged.    LIVER: Extensive area of hypovascularity in the anterior right hepatic   lobe extending into the right hepatic dome to a greater extent than on   prior CT of 9/9/2024 correlate for recent treatment or manipulation. Left   hepatic hypovascular lesions are unchanged.  BILE DUCTS: Biliary stent identified.  GALLBLADDER: Not well delineated.  SPLEEN: Several areas of decreased peripheral wedge-shaped enhancement of   the spleen slightly more prominent on prior exam of unclear etiology and   significance.  PANCREAS: Within normal limits.  ADRENALS: Within normal limits.  KIDNEYS/URETERS: Kidneys enhance bilaterally and symmetrically without   hydronephrosis. Multiple left parapelvic cyst and posterior left mid to   upper pole renal cyst.    BLADDER: Within normal limits.  REPRODUCTIVE ORGANS: Uterus and adnexa within normal limits.    BOWEL: No bowel obstruction. Appendix is not visualized. No evidence of   inflammation in the pericecal region. Sigmoid diverticulosis without   diverticulitis.  PERITONEUM/RETROPERITONEUM: Within normal limits.  VESSELS: Atherosclerotic changes.  LYMPH NODES: No lymphadenopathy.  ABDOMINAL WALL: Within normal limits.  BONES: Degenerative changes and osteopenia.    IMPRESSION:  Sigmoid diverticulosis without diverticulitis. No bowel obstruction or   gross bowel wall thickening.    Extensive area of hypovascularity throughout the right hepatic lobe more   prominent than on prior exam may represent sequela of recent treatment,   correlate with clinical history. Stable appearance of the left hepatic   lobe.     Patient is a 79y old  Female who presents with a chief complaint of fever, abd pain, nausea, diarrhea    79 year old woman with diverticulosis,, HLD, HTN, hypothyroid, metastatic RCC to liver s/p kidney embo 7/30 on immunotherapy, AF on Eliquis, recently hospitalized for cholangitis s/p ERCP with placement of stent 8/5 returning 9/10 with sepsis due to liver abscess s/p drainage now presenting again to [facility] with fever, chills, abdominal pain, non bloody diarrhea, and vomiting.     Patient evaluated at bedside this morning in CCU. States over the weekend developed acute onset of nausea, vomiting, and fevers. Also with abdominal pain, RUQ, sharp, non radiating, 6/10 in intensity, sudden onset, constant, without known alleviating or exacerbating factors.  Currently denies any abdominal pain, but does admit to  ongoing intermittent chills and mild nausea. Denies hematemesis or CGE. Reports symptoms similar to presenting symptoms prompting last hospitalization. Upon admission hypotensive, on IV pressors now hemodynamically stable off pressors. CT imaging with hepatic abscess, CBD with stent in place. Significant leukocytosis. IR for drainage of abscess today versus tomorrow.     PAST MEDICAL & SURGICAL HISTORY:  Hypertension      High cholesterol      Diverticulitis      History of diverticulitis      Hypothyroidism      Renal cell cancer      Metastasis to liver      Paroxysmal atrial fibrillation      Cholelithiasis with cholangitis      History of biliary stent insertion      History of tonsillectomy      History of laparotomy      History of arthroscopy      H/O bilateral oophorectomy      S/P surgical removal of pilonidal cyst    MEDICATIONS  (STANDING):  acetaminophen   IVPB .. 1000 milliGRAM(s) IV Intermittent once  aMIOdarone    Tablet 100 milliGRAM(s) Oral daily  chlorhexidine 4% Liquid 1 Application(s) Topical <User Schedule>  heparin   Injectable 5000 Unit(s) SubCutaneous every 8 hours  hydrocortisone 10 milliGRAM(s) Oral at bedtime  hydrocortisone 20 milliGRAM(s) Oral daily  lactated ringers. 1000 milliLiter(s) (100 mL/Hr) IV Continuous <Continuous>  levothyroxine 100 MICROGram(s) Oral daily  meropenem Injectable 1000 milliGRAM(s) IV Push every 12 hours  norepinephrine Infusion 0.05 MICROgram(s)/kG/Min (5.92 mL/Hr) IV Continuous <Continuous>  pantoprazole  Injectable 40 milliGRAM(s) IV Push daily  Tivozanib (Fotivda) 890 MICROGram(s) 0.89 milliGRAM(s) Oral daily  vancomycin  IVPB 500 milliGRAM(s) IV Intermittent every 12 hours    MEDICATIONS  (PRN):    Allergies    Crestor (Other)  Lipitor (Unknown)  tivozanib (Faint)  Cabometyx (Unknown)  Dyazide (Faint)  statins (Unknown)  bacitracin (Rash)  Zetia (Unknown)  Norvasc (Faint)    Intolerances    SOCIAL HISTORY:  no smoking, drinking or drugs    FAMILY HISTORY:  no colon or stomach cancer      REVIEW OF SYSTEMS:    CONSTITUTIONAL: + weakness, +fevers + chills  EYES/ENT: No visual changes;  No vertigo or throat pain   NECK: No pain or stiffness  RESPIRATORY: No cough, wheezing, hemoptysis; No shortness of breath  CARDIOVASCULAR: No chest pain or palpitations  GASTROINTESTINAL: See HPI  GENITOURINARY: No dysuria, frequency or hematuria  NEUROLOGICAL: No numbness or weakness  SKIN: No itching, burning, rashes, or lesions   PSYCH: Normal mood and affect  All other review of systems is negative unless indicated above.    Vital Signs Last 24 Hrs  T(C): 40.3 (30 Sep 2024 13:36), Max: 40.3 (30 Sep 2024 13:36)  T(F): 104.6 (30 Sep 2024 13:36), Max: 104.6 (30 Sep 2024 13:36)  HR: 101 (30 Sep 2024 10:00) (72 - 114)  BP: 131/78 (30 Sep 2024 10:00) (92/59 - 138/80)  BP(mean): 95 (30 Sep 2024 10:00) (68 - 97)  RR: 25 (30 Sep 2024 10:00) (16 - 26)  SpO2: 100% (30 Sep 2024 10:00) (76% - 100%)    Parameters below as of 30 Sep 2024 08:00  Patient On (Oxygen Delivery Method): room air    PHYSICAL EXAM:    Constitutional: No acute distress, non-toxic appearing  HEENT: unmasked, good phonation, not icteric  Neck: supple, no lymphadenopathy  Respiratory: clear to ascultation bilaterally, no wheezing  Cardiovascular: S1 and S2, regular rate and rhythm, no murmurs rubs or gallops  Gastrointestinal: soft, mildly tender to deep palpation, non-distended, +bowel sounds, no rebound or guarding  Extremities: No peripheral edema, no cyanosis or clubbing  Vascular: 2+ peripheral pulses, no venous stasis  Neurological: A/O x 3, no focal deficits, no asterixis  Psychiatric: Normal mood, normal affect  Skin: No rashes, not jaundiced    LABS:                        10.7   14.64 )-----------( 143      ( 30 Sep 2024 06:38 )             34.3     09-30    141  |  110[H]  |  17  ----------------------------<  74  4.0   |  26  |  0.85    Ca    8.5      30 Sep 2024 06:38  Phos  2.7     09-30  Mg     1.4     09-30    TPro  5.0[L]  /  Alb  1.8[L]  /  TBili  0.8  /  DBili  x   /  AST  262[H]  /  ALT  164[H]  /  AlkPhos  255[H]  09-30    PT/INR - ( 29 Sep 2024 14:36 )   PT: 18.6 sec;   INR: 1.58 ratio         PTT - ( 29 Sep 2024 06:17 )  PTT:35.7 sec  LIVER FUNCTIONS - ( 30 Sep 2024 06:38 )  Alb: 1.8 g/dL / Pro: 5.0 gm/dL / ALK PHOS: 255 U/L / ALT: 164 U/L / AST: 262 U/L / GGT: x             RADIOLOGY & ADDITIONAL STUDIES:  FINDINGS:  LOWER CHEST: A few less than 4 mm right middle lobe and bilateral lower   nodules slightly increased in size and number from the prior exam. Right   retrocrural cystic lesion is unchanged.    LIVER: Extensive area of hypovascularity in the anterior right hepatic   lobe extending into the right hepatic dome to a greater extent than on   prior CT of 9/9/2024 correlate for recent treatment or manipulation. Left   hepatic hypovascular lesions are unchanged.  BILE DUCTS: Biliary stent identified.  GALLBLADDER: Not well delineated.  SPLEEN: Several areas of decreased peripheral wedge-shaped enhancement of   the spleen slightly more prominent on prior exam of unclear etiology and   significance.  PANCREAS: Within normal limits.  ADRENALS: Within normal limits.  KIDNEYS/URETERS: Kidneys enhance bilaterally and symmetrically without   hydronephrosis. Multiple left parapelvic cyst and posterior left mid to   upper pole renal cyst.    BLADDER: Within normal limits.  REPRODUCTIVE ORGANS: Uterus and adnexa within normal limits.    BOWEL: No bowel obstruction. Appendix is not visualized. No evidence of   inflammation in the pericecal region. Sigmoid diverticulosis without   diverticulitis.  PERITONEUM/RETROPERITONEUM: Within normal limits.  VESSELS: Atherosclerotic changes.  LYMPH NODES: No lymphadenopathy.  ABDOMINAL WALL: Within normal limits.  BONES: Degenerative changes and osteopenia.    IMPRESSION:  Sigmoid diverticulosis without diverticulitis. No bowel obstruction or   gross bowel wall thickening.    Extensive area of hypovascularity throughout the right hepatic lobe more   prominent than on prior exam may represent sequela of recent treatment,   correlate with clinical history. Stable appearance of the left hepatic   lobe.     26-Apr-2021

## 2024-09-30 NOTE — CONSULT NOTE ADULT - ASSESSMENT
79 year old woman with  diverticulosis,, HLD, HTN, hypothyroid, metastatic RCC to liver s/p kidney embo 7/30 on immunotherapy, AF on Eliquis, recently hospitalized for cholangitis s/p ERCP with placement of stent 8/5 returning 9/10 with sepsis r/t liver abscess s/p drainage now presenting again to [facility] with fever, chills, abdominal pain, non bloody diarrhea, and vomiting. Elevated transaminases normal tbili. Imaging consistent with liver abscess requiring repeat drainage via IR.     Imp: Sepsis 2/2 hepatic abscess versus transient obstruction CBD stent    Rec:  ::Agree with IR evaluation and drainage abscess  ::Due to second admission for sepsis and presence of CBD stent, would recommend stent removal and exchange via ERCP  ::ERCP likely Wednesday  ::Continue IV abx and supportive care

## 2024-09-30 NOTE — PRE PROCEDURE NOTE - PRE PROCEDURE EVALUATION
Evaluate for Procedure: liver abscess    HPI: 78 y/o female PMH RCC with metastasis to liver s/p IR embolization & biliary stent, HTN, HLD, hypothyroid, recent hospitalization for Enterobacter bacteremia and liver abscess, discharged on cipro and Flagyl which she finished on 9/23, now presents with fever, N, V and malaise found to have liver abscess on imaging. IR consulted for drainage.     Allergies: Crestor (Other)  Lipitor (Unknown)  tivozanib (Faint)  Cabometyx (Unknown)  Dyazide (Faint)  statins (Unknown)  bacitracin (Rash)  Zetia (Unknown)  Norvasc (Faint)    Medications (Abx/Cardiac/Anticoagulation/Blood Products)    cefepime  Injectable.: 1000 milliGRAM(s) IV Push (09-29 @ 06:18)  fluconAZOLE   Tablet: 150 milliGRAM(s) Oral (09-29 @ 08:44)  heparin   Injectable: 5000 Unit(s) SubCutaneous (09-30 @ 05:25)  meropenem Injectable: 500 milliGRAM(s) IV Push (09-29 @ 13:00)  meropenem Injectable: 500 milliGRAM(s) IV Push (09-30 @ 05:25)  norepinephrine Infusion: 5.92 mL/Hr IV Continuous (09-29 @ 08:44)  vancomycin  IVPB: 250 mL/Hr IV Intermittent (09-29 @ 21:17)  vancomycin  IVPB.: 250 mL/Hr IV Intermittent (09-29 @ 06:22)    Data:    T(C): 36.9  HR: 73  BP: 124/69  RR: 20  SpO2: 76%    -WBC 14.64 / HgB 10.7 / Hct 34.3 / Plt 143  -Na 141 / Cl 110 / BUN 17 / Glucose 74  -K 4.0 / CO2 26 / Cr 0.85  - / Alk Phos 255 / T.Bili 0.8  -INR 1.58 / PTT --          Radiology:     Assessment/Plan:78 y/o female PMH RCC with metastasis to liver s/p IR embolization & biliary stent, HTN, HLD, hypothyroid, recent hospitalization for Enterobacter bacteremia and liver abscess, discharged on cipro and Flagyl which she finished on 9/23, now presents with fever, N, V and malaise found to have liver abscess on imaging. IR consulted for drainage.       - CT reviewed with Dr. Carvajal, liver abscess accessible for drainage.   - Will plan for procedure on 9/30.  - Needs to be NPO for procedure.   - D/w Dr. Nevarez.

## 2024-09-30 NOTE — PROGRESS NOTE ADULT - SUBJECTIVE AND OBJECTIVE BOX
CCU Progress  Note    HPI:    S:    Pt seen and examined  78yo F with PMH: Renal Cell Carcinoma with mets to liver, IR embolization of a liver lesion on 7/30 Diverticulitis, HTN, HLD, Hypothyroid was recently, recent new onset Afib/RVR in August 2024 started on Eliquis, GI Bleed after ERCP- resolved without intervention, started on eliquis during admit for Afib. liver abscesses    admitted 8/2-8/15 for septic shock requiring pressors and critical care admission, Found to have dilated common bile duct, elevated LFTs and likely cholangitis/choledocholithiasis with stone, s/p ERCP with sphincterotomy/stone removal and stent placement on 8/5/24    admitted 9/9-9/14 for septic shock, requiring levophed and critical care admission, blood cultures during admission had Enterobacter, sensitive to Cipro. Noted liver abscess on MRI, felt to be too small for biopsy at the time, decision for prolong antibiotic course made. Discharged on Cipro/Flagyl to complete her therapy, this was completed on Monday 9/23.     She was doing well until today, when she developed fever to 103+, nausea, vomiting, diarrhea-brown, which she states is exactly how she presented for 9/9-9/14 admission.  Found to be hypotensive in ED, give 3 liters sepsis fluids without improvement and started on levophed for BP augmentation. ICU Consulted.    9/30: Remains on pressors, source felt to be liver abscess, larger then previous. IR, ID consults, on merrem as S on last admit.    ROS: + abd pain, N/V  Remainder of systems reviewed, negative        Allergies    Crestor (Other)  Lipitor (Unknown)  tivozanib (Faint)  Cabometyx (Unknown)  Dyazide (Faint)  statins (Unknown)  bacitracin (Rash)  Zetia (Unknown)  Norvasc (Faint)    Intolerances        MEDICATIONS  (STANDING):  acetaminophen   IVPB .. 1000 milliGRAM(s) IV Intermittent once  aMIOdarone    Tablet 100 milliGRAM(s) Oral daily  chlorhexidine 4% Liquid 1 Application(s) Topical <User Schedule>  hydrocortisone 10 milliGRAM(s) Oral at bedtime  hydrocortisone 20 milliGRAM(s) Oral daily  levothyroxine 100 MICROGram(s) Oral daily  magnesium sulfate  IVPB 2 Gram(s) IV Intermittent once  meropenem Injectable 1000 milliGRAM(s) IV Push every 12 hours  pantoprazole  Injectable 40 milliGRAM(s) IV Push daily  potassium chloride  10 mEq/100 mL IVPB 10 milliEquivalent(s) IV Intermittent every 1 hour  vancomycin  IVPB 750 milliGRAM(s) IV Intermittent every 12 hours    MEDICATIONS  (PRN):  HYDROmorphone  Injectable 0.5 milliGRAM(s) IV Push every 4 hours PRN Moderate Pain (4 - 6)      Drug Dosing Weight  Height (cm): 154.9 (01 Oct 2024 18:19)  Weight (kg): 63.1 (01 Oct 2024 18:19)  BMI (kg/m2): 26.3 (01 Oct 2024 18:19)  BSA (m2): 1.62 (01 Oct 2024 18:19)      PAST MEDICAL & SURGICAL HISTORY:  Hypertension      High cholesterol      Diverticulitis      History of diverticulitis      Hypothyroidism      Renal cell cancer      Metastasis to liver      Paroxysmal atrial fibrillation      Cholelithiasis with cholangitis      History of biliary stent insertion      History of tonsillectomy      History of laparotomy      History of arthroscopy      H/O bilateral oophorectomy      S/P surgical removal of pilonidal cyst          FAMILY HISTORY:        REVIEW OF SYSTEMS    UNLESS OTHERWISE NOTED IN HPI above:    Constitutional:  No Weight Change, No Fever, No Chills, No Night Sweats, No Fatigue, No Malaise  ENT/Mouth:  No Hearing Changes, No Ear Pain, No Nasal Congestion, No  Sinus Pain, No Hoarseness, No sore throat, No Rhinorrhea, No Swallowing  Difficulty  Eyes:  No Eye Pain, No Swelling, No Redness, No Foreign Body, No Discharge, No Vision Changes  Cardiovascular:  No Chest Pain, No SOB, No PND, No Dyspnea on Exertion,  No Orthopnea, No Claudication, No Edema, No Palpitations  Respiratory:  No Cough, No Sputum, No Wheezing, No Smoke Exposure, No Dyspnea  Gastrointestinal:  No Nausea, No Vomiting, No Diarrhea, No  Constipation, No Pain, No Heartburn, No Anorexia, No Dysphagia, No  Hematochezia, No Melena, No Flatulence, No Jaundice  Genitourinary:  No Dysmenorrhea, No DUB, No Dyspareunia, No Dysuria, No  Urinary Frequency, No Hematuria, No Urinary Incontinence, No Urgency,  No Flank Pain, No Urinary Flow Changes, No Hesitancy  Musculoskeletal:  No Arthralgias, No Myalgias, No Joint Swelling, No  Joint Stiffness, No Back Pain, No Neck Pain, No Injury History  Skin:  No Skin Lesions, No Pruritis, No Hair Changes, No Breast/Skin Changes, No Nipple Discharge  Neuro:  No Weakness, No Numbness, No Paresthesias, No Loss of  Consciousness, No Syncope, No Dizziness, No Headache, No Coordination  Changes, No Recent Falls  Psych:  No Anxiety/Panic, No Depression, No Insomnia, No Personality  Changes, No Delusions, No Rumination, No SI/HI/AH/VH, No Social Issues,  No Memory Changes, No Violence/Abuse Hx., No Eating Concerns  Heme/Lymph:  No Bruising, No Bleeding, No Transfusions History, No Lymphadenopathy  Endocrine:  No Polyuria, No Polydipsia, No Temperature Intolerance    O:    ICU Vital Signs Last 24 Hrs  T(C): 36.4 (03 Oct 2024 08:00), Max: 37.3 (02 Oct 2024 16:20)  T(F): 97.6 (03 Oct 2024 08:00), Max: 99.1 (02 Oct 2024 16:20)  HR: 99 (03 Oct 2024 09:00) (82 - 106)  BP: 151/88 (03 Oct 2024 06:00) (114/98 - 151/88)  BP(mean): 108 (03 Oct 2024 06:00) (99 - 115)  ABP: --  ABP(mean): --  RR: 22 (03 Oct 2024 09:00) (14 - 24)  SpO2: 100% (03 Oct 2024 09:00) (93% - 100%)    O2 Parameters below as of 02 Oct 2024 18:00  Patient On (Oxygen Delivery Method): room air                I&O's Detail    02 Oct 2024 07:01  -  03 Oct 2024 07:00  --------------------------------------------------------  IN:  Total IN: 0 mL    OUT:    Drain (mL): 0 mL    Voided (mL): 1 mL  Total OUT: 1 mL    Total NET: -1 mL              PE:    Adult F lying in bed  + appears ill but not toxic  No JVD trachea midline  Normocephalic, atraumatic  S1S2+  CTA B/L  Abd soft some TTP RUQ to deep palpation, no peritoneal signs  No leg swelling/edema noted  Awake and alert  Skin pink, warm    LABS:    CBC Full  -  ( 03 Oct 2024 05:42 )  WBC Count : 9.16 K/uL  RBC Count : 3.91 M/uL  Hemoglobin : 10.6 g/dL  Hematocrit : 33.1 %  Platelet Count - Automated : 112 K/uL  Mean Cell Volume : 84.7 fl  Mean Cell Hemoglobin : 27.1 pg  Mean Cell Hemoglobin Concentration : 32.0 gm/dL  Auto Neutrophil # : x  Auto Lymphocyte # : x  Auto Monocyte # : x  Auto Eosinophil # : x  Auto Basophil # : x  Auto Neutrophil % : x  Auto Lymphocyte % : x  Auto Monocyte % : x  Auto Eosinophil % : x  Auto Basophil % : x    10-03    140  |  105  |  8   ----------------------------<  86  3.2[L]   |  29  |  0.51    Ca    8.0[L]      03 Oct 2024 05:42  Phos  2.8     10-03  Mg     1.5     10-03    TPro  5.1[L]  /  Alb  1.7[L]  /  TBili  0.9  /  DBili  0.4[H]  /  AST  298[H]  /  ALT  254[H]  /  AlkPhos  332[H]  10-03    PT/INR - ( 03 Oct 2024 05:42 )   PT: 12.3 sec;   INR: 1.04 ratio           Urinalysis Basic - ( 03 Oct 2024 05:42 )    Color: x / Appearance: x / SG: x / pH: x  Gluc: 86 mg/dL / Ketone: x  / Bili: x / Urobili: x   Blood: x / Protein: x / Nitrite: x   Leuk Esterase: x / RBC: x / WBC x   Sq Epi: x / Non Sq Epi: x / Bacteria: x      CAPILLARY BLOOD GLUCOSE            LIVER FUNCTIONS - ( 03 Oct 2024 05:42 )  Alb: 1.7 g/dL / Pro: 5.1 gm/dL / ALK PHOS: 332 U/L / ALT: 254 U/L / AST: 298 U/L / GGT: x

## 2024-09-30 NOTE — PROGRESS NOTE ADULT - ASSESSMENT
A:    79F    Here for:    1- Shock POA, suspect septic  2- PRANAY  3- Metabolic acidosis  4- Hypoglycemia  5- Transaminitis  6- Liver abscesses  7- Adrenal Insufficiency    This patient requires critical care for support of one or more vital organ systems with a high probability of imminent or life threatening deterioration in his/her condition    P:    Pt requires continued critical care for active resuscitation and mgmt of organ system, active management of vasopressors and Tx of likely RN bacteremia    Pressors, actively titrate to MAP > 65; may require addition of 2nd agent   Continued fluid resuscitation as remains volume responsive  IR consult, will likely benefit from drainage of abscess to obtain source control  Merrem, previous Cx S to it, continue, ID consult, f/u Cxs; CZx of abscess will be of particular value  Hx of adrenal insufficiency on PO hydrocortisone; change to IV  Diet, NPO prior to any IR procedure  BG < 180  PRANAY, suspect pre renal + ATN from sepsis; continue IVF, trend  No s/s active bleeding  Trend LA, sepsis bundle  VTE ppx    Dispo: Cont critical care.    Date of entry of this note is equal to the date of services rendered    TOTAL CRITICAL CARE TIME:  Add'l 70 minutes (EXCLUSIVE of any non bundled procedures)    Note: This is a critically ill patient. Time spent has been in salvage of life, limb and vital organ systems. This time INCLUDES time spent directly as this patient's bedside with evaluation and management, review of chart including review of laboratory and imaging studies, interpretation of vital signs and cardiac output measurements, any necessary ventilator management, and time spent discussing plan of care with patient and family, including goals of care discussion. This also includes time spent in multidisciplinary discussion with care team and various consultants to optimize treatment plan. This time may NOT include various procedures, which will be noted seperately.

## 2024-09-30 NOTE — PROGRESS NOTE ADULT - SUBJECTIVE AND OBJECTIVE BOX
CC:    HPI:  78yo F with PMH: Renal Cell Carcinoma with mets to liver, IR embolization of a liver lesion on 7/30 Diverticulitis, HTN, HLD, Hypothyroid was recently, recent new onset Afib/RVR in August 2024 started on Eliquis, GI Bleed after ERCP- resolved without intervention, started on eliquis during admit for Afib. liver abscesses    admitted 8/2-8/15 for septic shock requiring pressors and critical care admission, Found to have dilated common bile duct, elevated LFTs and likely cholangitis/choledocholithiasis with stone, s/p ERCP with sphincterotomy/stone removal and stent placement on 8/5/24      admitted 9/9-9/14 for septic shock, requiring levophed and critical care admission, blood cultures during admission had Enterobacter, sensitive to Cipro. Noted liver abscess on MRI, felt to be too small for biopsy at the time, decision for prolong antibiotic course made. Discharged on Cipro/Flagyl to complete her therapy, this was completed on Monday 9/23.     She was doing well until today, when she developed fever to 103+, nausea, vomiting, diarrhea-brown, which she states is exactly how she presented for 9/9-9/14 admission.      9/30: Patient to go to IR today for hepatic abscess drainage         PAST MEDICAL & SURGICAL HISTORY:  Hypertension      High cholesterol      Diverticulitis      History of diverticulitis      Hypothyroidism      Renal cell cancer      Metastasis to liver      Paroxysmal atrial fibrillation      Cholelithiasis with cholangitis      History of biliary stent insertion      History of tonsillectomy      History of laparotomy      History of arthroscopy      H/O bilateral oophorectomy      S/P surgical removal of pilonidal cyst          FAMILY HISTORY:      Social Hx:    Allergies    Crestor (Other)  Lipitor (Unknown)  tivozanib (Faint)  Cabometyx (Unknown)  Dyazide (Faint)  statins (Unknown)  bacitracin (Rash)  Zetia (Unknown)  Norvasc (Faint)    Intolerances            ICU Vital Signs Last 24 Hrs  T(C): 36.9 (30 Sep 2024 08:28), Max: 37.1 (29 Sep 2024 19:40)  T(F): 98.4 (30 Sep 2024 08:28), Max: 98.7 (29 Sep 2024 19:40)  HR: 101 (30 Sep 2024 10:00) (72 - 114)  BP: 131/78 (30 Sep 2024 10:00) (90/64 - 138/80)  BP(mean): 95 (30 Sep 2024 10:00) (68 - 97)  ABP: --  ABP(mean): --  RR: 25 (30 Sep 2024 10:00) (16 - 26)  SpO2: 100% (30 Sep 2024 10:00) (76% - 100%)    O2 Parameters below as of 30 Sep 2024 08:00  Patient On (Oxygen Delivery Method): room air                I&O's Summary    29 Sep 2024 07:01  -  30 Sep 2024 07:00  --------------------------------------------------------  IN: 2721 mL / OUT: 900 mL / NET: 1821 mL                              10.7   14.64 )-----------( 143      ( 30 Sep 2024 06:38 )             34.3       09-30    141  |  110[H]  |  17  ----------------------------<  74  4.0   |  26  |  0.85    Ca    8.5      30 Sep 2024 06:38  Phos  2.7     09-30  Mg     1.4     09-30    TPro  5.0[L]  /  Alb  1.8[L]  /  TBili  0.8  /  DBili  x   /  AST  262[H]  /  ALT  164[H]  /  AlkPhos  255[H]  09-30                Urinalysis Basic - ( 30 Sep 2024 06:38 )    Color: x / Appearance: x / SG: x / pH: x  Gluc: 74 mg/dL / Ketone: x  / Bili: x / Urobili: x   Blood: x / Protein: x / Nitrite: x   Leuk Esterase: x / RBC: x / WBC x   Sq Epi: x / Non Sq Epi: x / Bacteria: x        MEDICATIONS  (STANDING):  acetaminophen   IVPB .. 1000 milliGRAM(s) IV Intermittent once  aMIOdarone    Tablet 100 milliGRAM(s) Oral daily  heparin   Injectable 5000 Unit(s) SubCutaneous every 8 hours  hydrocortisone 10 milliGRAM(s) Oral at bedtime  hydrocortisone 20 milliGRAM(s) Oral daily  lactated ringers. 1000 milliLiter(s) (100 mL/Hr) IV Continuous <Continuous>  levothyroxine 100 MICROGram(s) Oral daily  meropenem Injectable 1000 milliGRAM(s) IV Push every 12 hours  norepinephrine Infusion 0.05 MICROgram(s)/kG/Min (5.92 mL/Hr) IV Continuous <Continuous>  pantoprazole  Injectable 40 milliGRAM(s) IV Push daily  Tivozanib (Fotivda) 890 MICROGram(s) 0.89 milliGRAM(s) Oral daily  vancomycin  IVPB 500 milliGRAM(s) IV Intermittent every 12 hours    MEDICATIONS  (PRN):      DVT Prophylaxis: SQH    Advanced Directives:  Discussed with:    Visit Information: 30 min    ** Time is exclusive of billed procedures and/or teaching and/or routine family updates.

## 2024-09-30 NOTE — PROGRESS NOTE ADULT - ASSESSMENT
A/P: 79 female P/W fevers and has an enlarging hepatic abscess and bacteremia    Plan:  CCU  Continue Dorcas and Vanco PND sensitivities  For IR Drainage today  GI to Change the Stent on Wednesday  PO Diet after procedure  Continue IVF  Wean pressors off  Continue Synthroid  SQH DVT Prophylaxis

## 2024-09-30 NOTE — CONSULT NOTE ADULT - ASSESSMENT
78yo F with PMH: Renal Cell Carcinoma with mets to liver, IR embolization of a liver lesion on 7/30, Diverticulitis, HTN, HLD, Hypothyroidism,  recent new onset Afib/RVR in August 2024 started on Eliquis, GI Bleed after ERCP- resolved without intervention, liver abscesses, an admission on  8/2-8/15 for septic shock requiring pressors and critical care admission, Found to have dilated common bile duct, elevated LFTs and likely cholangitis/choledocholithiasis with stone, s/p ERCP with sphincterotomy/stone removal and stent placement on 8/5/24, admitted 9/9-9/14 for septic shock, requiring levophed and critical care admission, blood cultures during admission had Enterobacter, sensitive to Cipro. Noted liver abscess on MRI, felt to be too small for biopsy at the time, decision for prolonged antibiotic course made. Discharged on Cipro/Flagyl to complete her therapy, this was completed on Monday 9/23. Then patient was admitted on 9/29 for evaluation of fever to 103, nausea, vomiting, diarrhea, similar to her presentation of her 9/9-9/14 admission, when admitted on 9/29 was so hypotensive has required levophed after fluids. The patient is still on pressors after fluids without improvement. Initial lactate was 8, but has improved to normal. The patient is alert and oriented and has mild difuse abdominal pain but overall her GI symptoms are improved.     1. Patient admitted with septic shock secondary to multiple organisms most likely due to GI or liver origin, due to liver abscesses or masses  - follow up cultures to sensitivity  - will continue with meropenem as ordered; however sometimes the Enterococcus faecium will be ampicillin resistant therefore will add vancomycin  - check vancomycin trough prior to fourth dose   - reviewed prior medical records to evaluate for resistant or atypical pathogens   - iv hydration and supportive care and pressors per intensivist  - serial cbc and monitor temperature   - IR consulted  - oncology evaluation  - leukocytosis most likely reactive to infection and is improving with antibiotics as is lactate  2. other issues; per medicine    [facility] token not applicable as all rx is iv

## 2024-09-30 NOTE — PROGRESS NOTE ADULT - SUBJECTIVE AND OBJECTIVE BOX
80yo F with PMH: Renal Cell Carcinoma with mets to liver, IR embolization of a liver lesion on 7/30 Diverticulitis, HTN, HLD, Hypothyroid was recently, recent new onset Afib/RVR in August 2024 started on Eliquis, GI Bleed after ERCP- resolved without intervention, started on eliquis during admit for Afib. liver abscesses    admitted 8/2-8/15 for septic shock requiring pressors and critical care admission, Found to have dilated common bile duct, elevated LFTs and likely cholangitis/choledocholithiasis with stone, s/p ERCP with sphincterotomy/stone removal and stent placement on 8/5/24      admitted 9/9-9/14 for septic shock, requiring levophed and critical care admission, blood cultures during admission had Enterobacter, sensitive to Cipro. Noted liver abscess on MRI, felt to be too small for biopsy at the time, decision for prolong antibiotic course made. Discharged on Cipro/Flagyl to complete her therapy, this was completed on Monday 9/23.     She was doing well until yesterday; , when she developed fever to 103+, nausea, vomiting, diarrhea-brown, which she states is exactly how she presented for 9/9-9/14 admission.    Patient given IV fluids pressors and antibiotics   now feeling better,   off pressors     review of scans shows liver lesion/abscess larger than several weeks ago     plan   later today going down to interventional radiology   drain will be placed into abscess   plan will be determined by specific findings/results of culture     above reviewed with patient who is comfortable with the  plan

## 2024-09-30 NOTE — CONSULT NOTE ADULT - NS ATTEND AMEND GEN_ALL_CORE FT
79 year old woman with prior cholangitis, with recent admission for liver abscess s/p drainage, now returns with sepsis and liver abscess.      Agree with IR drainage, but question is why has recurrent abscesses?  ?stent occlusion? No jaundice/normal bili.   ?leak?  IR drainage first to control sepsis and ERCP for cholangiogram and remove/replace stent.

## 2024-09-30 NOTE — CONSULT NOTE ADULT - SUBJECTIVE AND OBJECTIVE BOX
Patient is a 79y old  Female who presents with a chief complaint of fever, abd pain, nausea, diarrhea (29 Sep 2024 12:13)    HPI:  78yo F with PMH: Renal Cell Carcinoma with mets to liver, IR embolization of a liver lesion on 7/30, Diverticulitis, HTN, HLD, Hypothyroidism,  recent new onset Afib/RVR in August 2024 started on Eliquis, GI Bleed after ERCP- resolved without intervention, liver abscesses, an admission on  8/2-8/15 for septic shock requiring pressors and critical care admission, Found to have dilated common bile duct, elevated LFTs and likely cholangitis/choledocholithiasis with stone, s/p ERCP with sphincterotomy/stone removal and stent placement on 8/5/24, admitted 9/9-9/14 for septic shock, requiring levophed and critical care admission, blood cultures during admission had Enterobacter, sensitive to Cipro. Noted liver abscess on MRI, felt to be too small for biopsy at the time, decision for prolonged antibiotic course made. Discharged on Cipro/Flagyl to complete her therapy, this was completed on Monday 9/23. Then patient was admitted on 9/29 for evaluation of fever to 103, nausea, vomiting, diarrhea, similar to her presentation of her 9/9-9/14 admission, when admitted on 9/29 was so hypotensive has required levophed after fluids. The patient is still on pressors after fluids without improvement. Initial lactate was 8, but has improved to normal. The patient is alert and oriented and has mild difuse abdominal pain but overall her GI symptoms are improved.         PMH: as above  PSH: as above  Meds: per reconciliation sheet, noted below  MEDICATIONS  (STANDING):  acetaminophen   IVPB .. 1000 milliGRAM(s) IV Intermittent once  aMIOdarone    Tablet 100 milliGRAM(s) Oral daily  heparin   Injectable 5000 Unit(s) SubCutaneous every 8 hours  hydrocortisone 10 milliGRAM(s) Oral at bedtime  hydrocortisone 20 milliGRAM(s) Oral daily  lactated ringers. 1000 milliLiter(s) (100 mL/Hr) IV Continuous <Continuous>  levothyroxine 100 MICROGram(s) Oral daily  magnesium sulfate  IVPB 2 Gram(s) IV Intermittent every 2 hours  meropenem Injectable 500 milliGRAM(s) IV Push every 12 hours  meropenem Injectable      norepinephrine Infusion 0.05 MICROgram(s)/kG/Min (5.92 mL/Hr) IV Continuous <Continuous>  pantoprazole  Injectable 40 milliGRAM(s) IV Push daily  Tivozanib (Fotivda) 890 MICROGram(s) 0.89 milliGRAM(s) Oral daily  vancomycin  IVPB 500 milliGRAM(s) IV Intermittent every 12 hours    MEDICATIONS  (PRN):    Allergies    Crestor (Other)  Lipitor (Unknown)  tivozanib (Faint)  Cabometyx (Unknown)  Dyazide (Faint)  statins (Unknown)  bacitracin (Rash)  Zetia (Unknown)  Norvasc (Faint)    Intolerances      Social: no smoking, no alcohol, no illegal drugs; no recent travel, no exposure to TB  FAMILY HISTORY:     no history of premature cardiovascular disease in first degree relatives  ROS: the patient denies fever, no HA, no dizziness, no sore throat, no blurry vision, no CP, no palpitations, no SOB, no cough,  no dysuria, no leg pain, no claudication, no rash, no joint aches, no rectal pain or bleeding, no night sweats  All other systems reviewed and are negative    Vital Signs Last 24 Hrs  T(C): 36.9 (30 Sep 2024 08:28), Max: 37.1 (29 Sep 2024 19:40)  T(F): 98.4 (30 Sep 2024 08:28), Max: 98.7 (29 Sep 2024 19:40)  HR: 101 (30 Sep 2024 10:00) (72 - 114)  BP: 131/78 (30 Sep 2024 10:00) (90/64 - 138/80)  BP(mean): 95 (30 Sep 2024 10:00) (68 - 97)  RR: 25 (30 Sep 2024 10:00) (16 - 26)  SpO2: 100% (30 Sep 2024 10:00) (76% - 100%)    Parameters below as of 30 Sep 2024 08:00  Patient On (Oxygen Delivery Method): room air      Daily     Daily     PE:    Constitutional: frail looking  HEENT: NC/AT, EOMI, PERRLA, conjunctivae clear; ears and nose atraumatic; pharynx clear  Neck: supple; thyroid not palpable  Back: no tenderness  Respiratory: respiratory effort normal; clear to auscultation  Cardiovascular: S1S2 regular, no murmurs  Abdomen: soft, mildly tender, not distended, positive BS; no liver or spleen organomegaly  Genitourinary: no suprapubic tenderness  Musculoskeletal: no muscle tenderness, no joint swelling or tenderness  Neurological/ Psychiatric: AxOx3, judgement and insight normal;  moving all extremities  Skin: no rashes; no palpable lesions    Labs: all available labs reviewed                        10.7   14.64 )-----------( 143      ( 30 Sep 2024 06:38 )             34.3     09-30    141  |  110[H]  |  17  ----------------------------<  74  4.0   |  26  |  0.85    Ca    8.5      30 Sep 2024 06:38  Phos  2.7     09-30  Mg     1.4     09-30    TPro  5.0[L]  /  Alb  1.8[L]  /  TBili  0.8  /  DBili  x   /  AST  262[H]  /  ALT  164[H]  /  AlkPhos  255[H]  09-30     LIVER FUNCTIONS - ( 30 Sep 2024 06:38 )  Alb: 1.8 g/dL / Pro: 5.0 gm/dL / ALK PHOS: 255 U/L / ALT: 164 U/L / AST: 262 U/L / GGT: x           Urinalysis Basic - ( 30 Sep 2024 06:38 )    Color: x / Appearance: x / SG: x / pH: x  Gluc: 74 mg/dL / Ketone: x  / Bili: x / Urobili: x   Blood: x / Protein: x / Nitrite: x   Leuk Esterase: x / RBC: x / WBC x   Sq Epi: x / Non Sq Epi: x / Bacteria: x        Lactate, Blood (09.29.24 @ 09:42)   Lactate, Blood: 2.6 mmol/L    Lactate, Blood (09.29.24 @ 06:17)   Lactate, Blood: 8.1: Test Name:LACTATE Called by:L DIBLASI Called to:SOO OWUSU RN_   Read back 2 Pt IDs:Y Read back values:Y Date/Tm:09/29/2024 07:31:51 EDT mmol/L    Culture - Blood (09.29.24 @ 06:06)   Gram Stain:   Growth in aerobic bottle: Gram Negative Rods and Gram positive cocci in   pairs   Growth in anaerobic bottle: Gram Negative Rods and Gram positive cocci in   pairs and Gram Positive Rods  Specimen Source: .Blood BLOOD  Culture Results:   Growth in aerobic bottle: Gram Negative Rods and Gram positive cocci in   pairs   Growth in anaerobic bottle: Gram Negative Rods and Gram positive cocci in   pairs and Gram Positive Rods    Culture - Blood (09.29.24 @ 06:06)   - K. pneumoniae group: Detec (K. pneumoniae, K. quasipneumoniae, K. variicola)  - Enterococcus faecium: Detec  - Enterococcus faecalis: Detec  Gram Stain:   Growth in anaerobic bottle: Gram Positive Rods   and Gram positive cocci in pairs   and Gram Negative Rods   Growth in aerobic bottle: Gram positive cocci in pairs and Gram Negative   Rods  Specimen Source: .Blood BLOOD  Organism: Blood Culture PCR  Culture Results:   Growth in anaerobic bottle: Gram Positive Rods   and Gram positive cocci in pairs   and Gram Negative Rods   Growth in aerobic bottle: Gram positive cocci in pairs and Gram Negative   Rods   Direct identification is available within approximately 3-5   hours either by Blood Panel Multiplexed PCR or Direct   MALDI-TOF. Details: [email or web address]  Organism Identification: Blood Culture PCR  Method Type: PCR    < from: CT Abdomen and Pelvis w/ IV Cont (09.29.24 @ 07:22) >    ACC: [id number] EXAM:  CT ABDOMEN AND PELVIS IC   ORDERED BY: CHAPO MCGILL     PROCEDURE DATE:  09/29/2024          INTERPRETATION:  CLINICAL INFORMATION: Lower abdominal pain and fever,   history of malignancy    COMPARISON: CT chest abdomen pelvis 9/9/2024 CT abdomen pelvis 8/7/2024,   MRI of the abdomen 9/10/2024    CONTRAST/COMPLICATIONS:  IV Contrast: Omnipaque 350  90 cc administered   0 cc discarded  Oral Contrast: NONE  Complications: None reported at time of study completion    PROCEDURE:  CTof the Abdomen and Pelvis was performed.  Sagittal and coronal reformats were performed.    FINDINGS:  LOWER CHEST: A few less than 4 mm right middle lobe and bilateral lower   nodules slightly increased in size and number from the prior exam. Right   retrocrural cystic lesion is unchanged.    LIVER: Extensive area of hypovascularity in the anterior right hepatic   lobe extending into the right hepatic dome to a greater extent than on   prior CT of 9/9/2024 correlate for recent treatment or manipulation. Left   hepatic hypovascular lesions are unchanged.  BILE DUCTS: Biliary stent identified.  GALLBLADDER: Not well delineated.  SPLEEN: Several areas of decreased peripheral wedge-shaped enhancement of   the spleen slightly more prominent on prior exam of unclear etiology and   significance.  PANCREAS: Within normal limits.  ADRENALS: Within normal limits.  KIDNEYS/URETERS: Kidneys enhance bilaterally and symmetrically without   hydronephrosis. Multiple left parapelvic cyst and posterior left mid to   upper pole renal cyst.    BLADDER: Within normal limits.  REPRODUCTIVE ORGANS: Uterus and adnexa within normal limits.    BOWEL: No bowel obstruction. Appendix is not visualized. No evidence of   inflammation in the pericecal region. Sigmoid diverticulosis without   diverticulitis.  PERITONEUM/RETROPERITONEUM: Within normal limits.  VESSELS: Atherosclerotic changes.  LYMPH NODES: No lymphadenopathy.  ABDOMINAL WALL: Within normal limits.  BONES: Degenerative changes and osteopenia.    IMPRESSION:  Sigmoid diverticulosis without diverticulitis. No bowel obstruction or   gross bowel wall thickening.    Extensive area of hypovascularity throughout the right hepatic lobe more   prominent than on prior exam may represent sequela of recent treatment,   correlate with clinical history. Stable appearance of the left hepatic   lobe.    < end of copied text >        Radiology: all available radiological tests reviewed    Advanced directives addressed: full resuscitation

## 2024-10-01 LAB
-  AMPICILLIN/SULBACTAM: SIGNIFICANT CHANGE UP
-  AMPICILLIN: SIGNIFICANT CHANGE UP
-  AZTREONAM: SIGNIFICANT CHANGE UP
-  CEFAZOLIN: SIGNIFICANT CHANGE UP
-  CEFEPIME: SIGNIFICANT CHANGE UP
-  CEFOXITIN: SIGNIFICANT CHANGE UP
-  CEFTRIAXONE: SIGNIFICANT CHANGE UP
-  CIPROFLOXACIN: SIGNIFICANT CHANGE UP
-  ERTAPENEM: SIGNIFICANT CHANGE UP
-  GENTAMICIN SYNERGY: SIGNIFICANT CHANGE UP
-  GENTAMICIN SYNERGY: SIGNIFICANT CHANGE UP
-  GENTAMICIN: SIGNIFICANT CHANGE UP
-  IMIPENEM: SIGNIFICANT CHANGE UP
-  LEVOFLOXACIN: SIGNIFICANT CHANGE UP
-  MEROPENEM: SIGNIFICANT CHANGE UP
-  PIPERACILLIN/TAZOBACTAM: SIGNIFICANT CHANGE UP
-  STREPTOMYCIN SYNERGY: SIGNIFICANT CHANGE UP
-  STREPTOMYCIN SYNERGY: SIGNIFICANT CHANGE UP
-  TOBRAMYCIN: SIGNIFICANT CHANGE UP
-  TRIMETHOPRIM/SULFAMETHOXAZOLE: SIGNIFICANT CHANGE UP
-  VANCOMYCIN: SIGNIFICANT CHANGE UP
-  VANCOMYCIN: SIGNIFICANT CHANGE UP
ANION GAP SERPL CALC-SCNC: 5 MMOL/L — SIGNIFICANT CHANGE UP (ref 5–17)
BUN SERPL-MCNC: 12 MG/DL — SIGNIFICANT CHANGE UP (ref 7–23)
CALCIUM SERPL-MCNC: 7.8 MG/DL — LOW (ref 8.5–10.1)
CHLORIDE SERPL-SCNC: 113 MMOL/L — HIGH (ref 96–108)
CO2 SERPL-SCNC: 23 MMOL/L — SIGNIFICANT CHANGE UP (ref 22–31)
CREAT SERPL-MCNC: 0.59 MG/DL — SIGNIFICANT CHANGE UP (ref 0.5–1.3)
CULTURE RESULTS: ABNORMAL
CULTURE RESULTS: ABNORMAL
EGFR: 93 ML/MIN/1.73M2 — SIGNIFICANT CHANGE UP
GLUCOSE SERPL-MCNC: 82 MG/DL — SIGNIFICANT CHANGE UP (ref 70–99)
GRAM STN FLD: ABNORMAL
HCT VFR BLD CALC: 33.4 % — LOW (ref 34.5–45)
HGB BLD-MCNC: 10.3 G/DL — LOW (ref 11.5–15.5)
MAGNESIUM SERPL-MCNC: 1.9 MG/DL — SIGNIFICANT CHANGE UP (ref 1.6–2.6)
MCHC RBC-ENTMCNC: 27.7 PG — SIGNIFICANT CHANGE UP (ref 27–34)
MCHC RBC-ENTMCNC: 30.8 GM/DL — LOW (ref 32–36)
MCV RBC AUTO: 89.8 FL — SIGNIFICANT CHANGE UP (ref 80–100)
METHOD TYPE: SIGNIFICANT CHANGE UP
ORGANISM # SPEC MICROSCOPIC CNT: ABNORMAL
ORGANISM # SPEC MICROSCOPIC CNT: SIGNIFICANT CHANGE UP
PHOSPHATE SERPL-MCNC: 2.6 MG/DL — SIGNIFICANT CHANGE UP (ref 2.5–4.5)
PLATELET # BLD AUTO: 83 K/UL — LOW (ref 150–400)
POTASSIUM SERPL-MCNC: 3.8 MMOL/L — SIGNIFICANT CHANGE UP (ref 3.5–5.3)
POTASSIUM SERPL-SCNC: 3.8 MMOL/L — SIGNIFICANT CHANGE UP (ref 3.5–5.3)
RBC # BLD: 3.72 M/UL — LOW (ref 3.8–5.2)
RBC # FLD: 18.9 % — HIGH (ref 10.3–14.5)
SODIUM SERPL-SCNC: 141 MMOL/L — SIGNIFICANT CHANGE UP (ref 135–145)
SPECIMEN SOURCE: SIGNIFICANT CHANGE UP
WBC # BLD: 11.99 K/UL — HIGH (ref 3.8–10.5)
WBC # FLD AUTO: 11.99 K/UL — HIGH (ref 3.8–10.5)

## 2024-10-01 PROCEDURE — 99231 SBSQ HOSP IP/OBS SF/LOW 25: CPT

## 2024-10-01 PROCEDURE — 74183 MRI ABD W/O CNTR FLWD CNTR: CPT | Mod: 26

## 2024-10-01 PROCEDURE — 99291 CRITICAL CARE FIRST HOUR: CPT

## 2024-10-01 RX ORDER — ACETAMINOPHEN 325 MG
650 TABLET ORAL ONCE
Refills: 0 | Status: COMPLETED | OUTPATIENT
Start: 2024-10-01 | End: 2024-10-01

## 2024-10-01 RX ORDER — HYDROMORPHONE HYDROCHLORIDE 1 MG/ML
0.5 INJECTION, SOLUTION INTRAMUSCULAR; INTRAVENOUS; SUBCUTANEOUS EVERY 4 HOURS
Refills: 0 | Status: DISCONTINUED | OUTPATIENT
Start: 2024-10-01 | End: 2024-10-07

## 2024-10-01 RX ADMIN — MEROPENEM 1000 MILLIGRAM(S): 500 INJECTION INTRAVENOUS at 05:24

## 2024-10-01 RX ADMIN — Medication 5000 UNIT(S): at 21:56

## 2024-10-01 RX ADMIN — HYDROCORTISONE 20 MILLIGRAM(S): 5 TABLET ORAL at 09:31

## 2024-10-01 RX ADMIN — PANTOPRAZOLE SODIUM 40 MILLIGRAM(S): 40 TABLET, DELAYED RELEASE ORAL at 09:30

## 2024-10-01 RX ADMIN — Medication 650 MILLIGRAM(S): at 20:51

## 2024-10-01 RX ADMIN — HYDROMORPHONE HYDROCHLORIDE 0.5 MILLIGRAM(S): 1 INJECTION, SOLUTION INTRAMUSCULAR; INTRAVENOUS; SUBCUTANEOUS at 10:00

## 2024-10-01 RX ADMIN — HYDROCORTISONE 10 MILLIGRAM(S): 5 TABLET ORAL at 21:56

## 2024-10-01 RX ADMIN — Medication 5000 UNIT(S): at 05:24

## 2024-10-01 RX ADMIN — Medication 5000 UNIT(S): at 14:32

## 2024-10-01 RX ADMIN — MEROPENEM 1000 MILLIGRAM(S): 500 INJECTION INTRAVENOUS at 17:42

## 2024-10-01 RX ADMIN — VANCOMYCIN HCL-SODIUM CHLORIDE IV SOLN 1.5 GM/250ML-0.9% 100 MILLIGRAM(S): 1.5-0.9/25 SOLUTION at 21:56

## 2024-10-01 RX ADMIN — AMIODARONE HYDROCHLORIDE 100 MILLIGRAM(S): 50 INJECTION, SOLUTION INTRAVENOUS at 09:31

## 2024-10-01 RX ADMIN — Medication 100 MICROGRAM(S): at 05:25

## 2024-10-01 RX ADMIN — Medication 650 MILLIGRAM(S): at 21:30

## 2024-10-01 RX ADMIN — CHLORHEXIDINE GLUCONATE ORAL RINSE 1 APPLICATION(S): 1.2 SOLUTION DENTAL at 05:26

## 2024-10-01 RX ADMIN — HYDROMORPHONE HYDROCHLORIDE 0.5 MILLIGRAM(S): 1 INJECTION, SOLUTION INTRAMUSCULAR; INTRAVENOUS; SUBCUTANEOUS at 09:32

## 2024-10-01 RX ADMIN — VANCOMYCIN HCL-SODIUM CHLORIDE IV SOLN 1.5 GM/250ML-0.9% 100 MILLIGRAM(S): 1.5-0.9/25 SOLUTION at 09:35

## 2024-10-01 NOTE — PROGRESS NOTE ADULT - ASSESSMENT
80yo F with PMH: Renal Cell Carcinoma with mets to liver, IR embolization of a liver lesion on 7/30, Diverticulitis, HTN, HLD, Hypothyroidism,  recent new onset Afib/RVR in August 2024 started on Eliquis, GI Bleed after ERCP- resolved without intervention, liver abscesses, an admission on  8/2-8/15 for septic shock requiring pressors and critical care admission, Found to have dilated common bile duct, elevated LFTs and likely cholangitis/choledocholithiasis with stone, s/p ERCP with sphincterotomy/stone removal and stent placement on 8/5/24, admitted 9/9-9/14 for septic shock, requiring levophed and critical care admission, blood cultures during admission had Enterobacter, sensitive to Cipro. Noted liver abscess on MRI, felt to be too small for biopsy at the time, decision for prolonged antibiotic course made. Discharged on Cipro/Flagyl to complete her therapy, this was completed on Monday 9/23. Then patient was admitted on 9/29 for evaluation of fever to 103, nausea, vomiting, diarrhea, similar to her presentation of her 9/9-9/14 admission, when admitted on 9/29 was so hypotensive has required levophed after fluids. The patient is still on pressors after fluids without improvement. Initial lactate was 8, but has improved to normal. The patient is alert and oriented and has mild difuse abdominal pain but overall her GI symptoms are improved.     1. Patient admitted with septic shock secondary to multiple organisms most likely due to GI or liver origin, due to liver abscesses or masses  - follow up cultures to sensitivity  - will continue with meropenem as ordered; however sometimes the Enterococcus faecium will be ampicillin resistant therefore will add vancomycin; also noted with Clostridium perfringens, usually from perforated colon, however patient does not have this on imaging and physical exam not consistent with this; meropenem will cover this as well as other organisms recovered in culture; also on vancomycin for potential resistant Enterococcus  - check vancomycin trough prior to fourth dose   - reviewed prior medical records to evaluate for resistant or atypical pathogens   - iv hydration and supportive care and pressors per intensivist  - serial cbc and monitor temperature   - IR consulted and drain placed  - oncology evaluation and noted  - leukocytosis most likely reactive to infection and is improving with antibiotics as is lactate  - GI evaluation in progress-- CBD stent to be evaluated   2. other issues; per medicine    [facility] token not applicable as all rx is iv

## 2024-10-01 NOTE — DIETITIAN INITIAL EVALUATION ADULT - ORAL INTAKE PTA/DIET HISTORY
Reports having a good appetite/ intake pta. States that her appetite has been fair and has not changed since her previous admission earlier in September. Normally consumes 3 meals/ day and does not follow any diet restrictions. Per diet recall, pt consuming <75% of ENN chronically. Has previously tried Ensure products which has caused her to have gastrointestinal issues. Her family member is a nurse and told her to drink Evian water and body armor which she has been trying to do. Lives at home w/ her . Had nausea/ vomiting the day of admission but does not normally have issues w/ nausea and vomiting.

## 2024-10-01 NOTE — DIETITIAN INITIAL EVALUATION ADULT - OTHER INFO
80 y/o F with a PMHx of Renal Cell Carcinoma with mets to liver, IR embolization of a liver lesion on 7/30 Diverticulitis, HTN, HLD, Hypothyroid was recently, recent new onset Afib/RVR in August 2024 started on Eliquis, GI Bleed after ERCP- resolved without intervention, started on eliquis during admit for Afib, liver abscesses who was admitted 8/2-8/15 for septic shock requiring pressors and critical care admission and readmitted 9/9-9/14 for septic shock, requiring levophed and critical care admission, blood cultures during admission had Enterobacter, sensitive to Cipro. Presented to ED for fever to 103+, nausea, vomiting, diarrhea-brown, which she states is exactly how she presented for 9/9-9/14 admission. Found to be hypotensive in ED, give 3 liters sepsis fluids without improvement and started on levophed for BP augmentation. Admitted for septic shock d/t liver abscess, acute renal failure, enlarging liver abscess, bacteremia, and metabolic acidosis; tx to CCU. S/p 12F drain placed into hepatic dome abscess (9/30). Plan for ERCP tomorrow.    Visited pt this morning, pt's diet advanced to Regular while in IDR. RD observed CLD tray at bedside - 100% of tray consumed; pt declined wanting to order breakfast at time of visit. States that she only had nausea and vomiting the day she was admitted but has been fine for the rest of admission. Endorses a stable BW of 143#. RD obtained bedscale wt on 10/1 - 143#. Weight hx reviewed: 146# w/ 1+ edema (taken by RD on 9/12/24; met criteria for moderate malnutrition), 165# w/ 2+ edema (taken by RD on 8/6/24; met criteria for moderate malnutrition, however noted ?accuracy of bedscale wt), 139# (taken by RD on 12/21/23; met criteria for moderate malnutrition); weight appears relatively over the last year. Pt thin appearing. NFPE reveals mild to moderate muscle/ fat wasting, pt continues to meet criteria for PCM at this time. C/w Regular diet. Encouraged high kcal/ high prot intake, pt declined any ONS options at this time d/t ONS causing GI distress. Encouraged pt to have family/ visitors bring in body armour from home. Pt reports having loose stools over the last week, pt receptive to trialing banatrol to help w/ loose stools. Please see additional recommendations below.

## 2024-10-01 NOTE — DIETITIAN INITIAL EVALUATION ADULT - ORAL NUTRITION SUPPLEMENTS
DATE OF VISIT:  03/09/2017    SUBJECTIVE:  The patient was seen and examined.  The patient is resting comfortably.  From nursing staff report I learned, the patient was having some shortness of breath earlier, but when she walked she felt better.  The patient continues to be on heparin drip.  The patient has been advanced to clear liquids by General Surgery.  Consultation notes reviewed.  No other acute events from nursing staff report.    OBJECTIVE:    VITAL SIGNS:  Temperature 98.6, pulse of 120, respirations 18, /78, and O2 saturation 94% on 2 to 3 L nasal cannula.       GENERAL:  The patient is awake, alert, oriented.     CVS:  Tachycardic.     PULMONARY:  Lungs, clear to auscultate bilaterally.     GI:  Incision site, G-tube and colostomy.     EXTREMITIES:  No sign of edema or cyanosis.    LABS AND INVESTIGATIONS:  Sodium 140, potassium 4.2, chloride 106, carbon dioxide 25, BUN 20, creatinine 0.67.  GFR more than 60, glucose 135, calcium 8.5, phosphorus 4.2, magnesium 1.9.  CBC showed WBC of 8.9, hemoglobin 7.9, hematocrit 25, and platelets are 586.    ASSESSMENT AND PLAN:  Small bowel obstruction with perforation of the esophageal hernia, status post repair by Dr. Moya from General Surgery.  The patient continues to improve from point of view.  The patient has advanced to clear liquids.  She continues to have nausea, but no vomiting, not much in the colostomy bag, G-tube still to gravity, I am told by nursing staff.  We will defer further postop management to Dr. Moya and General Surgery team.  The patient is off antibiotics at this time per ID.  1. History of colorectal cancer.  The patient is status post colostomy prior to this hospitalization due to her history of colorectal cancer.  2. Endometrial cancer.  The patient is status post hysterectomy, bilateral salpingo-oophorectomy prior to this hospitalization.  Defer to treatment as outpatient.  3. History of thyroid cancer in the  Declines all ONS options despite max encouragement  past.  4. Pulmonary embolism and deep venous thrombosis.  The patient is currently on heparin drip.  She needs to transition to oral anticoagulation.  5. Tachycardia, sinus in nature.  Continue with current metoprolol regimen.  I discuss with Cardiology.  They are okay with the patient being on the higher side while she has sinus. Once she starts tolerating oral diet, we will transition to oral tablets.  6. Anxiety.  Continue with Ativan p.r.n.  7. Dyslipidemia.  Continue statin therapy when possible.  8. Acute on chronic anemia.  Hemoglobin is stable today.  No need of transfusion.  No obvious signs of bleeding.  Continue monitoring Heme/Onc is also on board.  9. Hypokalemia.  Potassium is within normal limits.  10. Hypomagnesemia, within normal limits today.  11. Diabetes mellitus type 2.  Continue sliding scale and TPN, which has insulin already included in it.  12. Metabolic acidosis, resolved.  13. Thrombocytopenia, improved.  14. Severe malnutrition.  Continue with supplements.  15. Atelectasis.  Continue incentive spirometry, mobility, and physical therapy.  16. Hematuria.  This most likely secondary to either heparin and mixture from vaginal bleeding due to her endometrial cancer.  Continue with anticoagulation given her DVT PE needs anticoagulation.  17. Insomnia.  Continue with p.r.n. Benadryl as needed.  18. Deep venous thrombosis/gastrointestinal prophylaxis.  Continue with SCDs, heparin drip, and encourage mobility.  The patient is on TPN and Protonix.    DISPOSITION:  The patient is not ready for discharge.  Continue with the current management.        __________________________  Anastasia Scott MD  [id number]      D:  03/09/2017 11:33:04  T:  03/09/2017 12:04:12   AQ/modl   Job:  [id number]

## 2024-10-01 NOTE — PROGRESS NOTE ADULT - SUBJECTIVE AND OBJECTIVE BOX
Interventional Radiology Follow-Up Note.    This is a 79y Female s/p liver abscess drainage on 9/30 in Interventional Radiology with Dr. Carvajal.   Reports pain in the RUQ.  Pt with fever and tachy cardic post procedure. Now improved. Off pressors.    Medication:     aMIOdarone    Tablet: (09-30)  aMIOdarone IVPB: (09-30)  heparin   Injectable: (10-01)  meropenem Injectable: (09-29)  meropenem Injectable: (09-30)  meropenem Injectable: (10-01)  metoprolol tartrate Injectable: (09-30)  phenylephrine    Infusion: (09-30)  vancomycin  IVPB: (09-29)  vancomycin  IVPB: (09-30)    Vitals:   T(F): 98, Max: 104.6 (13:36)  HR: 79  BP: 119/84  RR: 16  SpO2: 96%    Physical Exam:  General: Nontoxic, in NAD.  Abdomen: soft, NTND.    Drain Device: Drain intact attached to gravity drain bag. Drain was just emptied therefore no fluid in the bag.  Flushed with 5cc NS w/o difficulty. Dressing clean, dry, intact.   24hr Drain output: 30cc.    LABS:  WBC 11.99 / Hgb 10.3 / Hct 33.4 / Plt 83  Na 141 / K 3.8 / CO2 23 / Cl 113 / BUN 12 / Cr 0.59 / Glucose 82  ALT -- / AST -- / Alk Phos -- / Tbili --  Ptt -- / Pt -- / INR --    Culture - Abscess with Gram Stain (09.30.24 @ 15:00)    Gram Stain:   Few polymorphonuclear leukocytes seen per low power field  Few Gram positive cocci in pairs seen per oil power field  Few Gram Negative Rods seen per oil power field   Specimen Source: .Abscess            Assessment/Plan: 78 y/o female PMH RCC with metastasis to liver s/p IR embolization & biliary stent, HTN, HLD, hypothyroid, recent hospitalization for Enterobacter bacteremia and liver abscess, discharged on cipro and Flagyl which she finished on 9/23, now presents with fever, N, V and malaise found to have liver abscess on imaging. Pt s/p liver abscess drainage on 9/30.  Pt with fever and tachy cardic post procedure. Now improved. Off pressors.    - CX; Few Gram positive cocci and Gram Negative Montrell  - Flush drain as ordered.  - MRI abdomen and pelvis with IV contrast for tumor evalaution.  - Continue to monitor output.   - D/w ICU ACP and .     Interventional Radiology Follow-Up Note.    This is a 79y Female s/p liver abscess drainage on 9/30 in Interventional Radiology with Dr. Carvajal.   Reports pain in the RUQ.  Pt with fever and tachy cardic post procedure. Now improved. Off pressors.    Medication:     aMIOdarone    Tablet: (09-30)  aMIOdarone IVPB: (09-30)  heparin   Injectable: (10-01)  meropenem Injectable: (09-29)  meropenem Injectable: (09-30)  meropenem Injectable: (10-01)  metoprolol tartrate Injectable: (09-30)  phenylephrine    Infusion: (09-30)  vancomycin  IVPB: (09-29)  vancomycin  IVPB: (09-30)    Vitals:   T(F): 98, Max: 104.6 (13:36)  HR: 79  BP: 119/84  RR: 16  SpO2: 96%    Physical Exam:  General: Nontoxic, in NAD.  Abdomen: soft, NTND.    Drain Device: Drain intact attached to gravity drain bag. Drain was just emptied therefore no fluid in the bag.  Flushed with 5cc NS w/o difficulty. Dressing clean, dry, intact.   24hr Drain output: 30cc.    LABS:  WBC 11.99 / Hgb 10.3 / Hct 33.4 / Plt 83  Na 141 / K 3.8 / CO2 23 / Cl 113 / BUN 12 / Cr 0.59 / Glucose 82  ALT -- / AST -- / Alk Phos -- / Tbili --  Ptt -- / Pt -- / INR --    Culture - Abscess with Gram Stain (09.30.24 @ 15:00)    Gram Stain:   Few polymorphonuclear leukocytes seen per low power field  Few Gram positive cocci in pairs seen per oil power field  Few Gram Negative Rods seen per oil power field   Specimen Source: .Abscess            Assessment/Plan: 80 y/o female PMH RCC with metastasis to liver s/p IR embolization & biliary stent, HTN, HLD, hypothyroid, recent hospitalization for Enterobacter bacteremia and liver abscess, discharged on cipro and Flagyl which she finished on 9/23, now presents with fever, N, V and malaise found to have liver abscess on imaging. Pt s/p liver abscess drainage on 9/30.  Pt with fever and tachy cardic post procedure. Now improved. Off pressors.    - CX; Few Gram positive cocci and Gram Negative Montrell  - Flush drain as ordered.  - MRI abdomen with IV contrast for tumor evaluation.  - Continue to monitor output.   - D/w ICU ACP and .

## 2024-10-01 NOTE — DIETITIAN INITIAL EVALUATION ADULT - PERTINENT LABORATORY DATA
10-01    141  |  113[H]  |  12  ----------------------------<  82  3.8   |  23  |  0.59    Ca    7.8[L]      01 Oct 2024 05:45  Phos  2.6     10-01  Mg     1.9     10-01    TPro  5.0[L]  /  Alb  1.8[L]  /  TBili  0.8  /  DBili  x   /  AST  262[H]  /  ALT  164[H]  /  AlkPhos  255[H]  09-30  A1C with Estimated Average Glucose Result: 4.8 % (09-30-24 @ 06:38)  A1C with Estimated Average Glucose Result: 5.4 % (08-05-24 @ 05:47)  A1C with Estimated Average Glucose Result: 5.3 % (12-21-23 @ 07:55)

## 2024-10-01 NOTE — PROGRESS NOTE ADULT - ASSESSMENT
A:    79F    Here for:    1- Shock POA, suspect septic  2- PRANAY  3- Metabolic acidosis  4- Hypoglycemia  5- Transaminitis  6- Liver abscesses  7- Adrenal Insufficiency  8- RADHA    This patient requires critical care for support of one or more vital organ systems with a high probability of imminent or life threatening deterioration in his/her condition    P:    Pt requires continued critical care for active resuscitation and mgmt of organ system, active management of vasopressors and Tx of likely RN bacteremia    s/p drainage of abscess, now in RADHA and "SIRS-y" post drainage, which is not entirely unexpected   Add'l bolus IVF now  Given push dose esmolol during case; will use amiodarone now as not a negative inotrope    Pressors, actively titrate to MAP > 65; may require addition of 2nd agent   Continued fluid resuscitation as remains volume responsive  Merrem, previous Cx S to it, continue, ID consult, f/u Cxs; CZx of abscess will be of particular value  Hx of adrenal insufficiency on PO hydrocortisone; change to IV  Diet, NPO prior to any IR procedure  BG < 180  PRANAY, suspect pre renal + ATN from sepsis; continue IVF, trend  No s/s active bleeding  Trend LA, sepsis bundle  VTE ppx    Dispo: Cont critical care.    Date of entry of this note is equal to the date of services rendered    TOTAL CRITICAL CARE TIME:  31 minutes (EXCLUSIVE of any non bundled procedures)    Note: This is a critically ill patient. Time spent has been in salvage of life, limb and vital organ systems. This time INCLUDES time spent directly as this patient's bedside with evaluation and management, review of chart including review of laboratory and imaging studies, interpretation of vital signs and cardiac output measurements, any necessary ventilator management, and time spent discussing plan of care with patient and family, including goals of care discussion. This also includes time spent in multidisciplinary discussion with care team and various consultants to optimize treatment plan. This time may NOT include various procedures, which will be noted seperately.

## 2024-10-01 NOTE — DIETITIAN NUTRITION RISK NOTIFICATION - ADDITIONAL COMMENTS/DIETITIAN RECOMMENDATIONS
1) C/w Regular diet  2) Declines all ONS options despite max encouragement  3) Obtain vitamin D 25OH level to assess nutriture  4) Please obtain daily weights  5) MVI w/ minerals daily to ensure 100% RDA met  6) Consider adding thiamine 100 mg daily 2/2 poor PO intake/ malnutrition   7) Encourage protein-rich foods, maximize food preferences  8) Monitor bowel movements, if no BM for >3 days, consider implementing bowel regimen.  9) Check serum zinc level for suspected deficiency. Consider adding 220 mg of zinc sulfate daily 2/2 persistent diarrhea  10) Add 3 packets Banatrol 2/2 persistent diarrhea (provides 40 kcal, 0 g protein/ packet)  11) Confirm goals of care regarding nutrition support  RD will continue to monitor PO intake, labs, hydration, and wt prn.

## 2024-10-01 NOTE — DIETITIAN NUTRITION RISK NOTIFICATION - TREATMENT: THE FOLLOWING DIET HAS BEEN RECOMMENDED
Diet, Regular (10-01-24 @ 08:38) [Active]  Diet, NPO after Midnight:      NPO Start Date: 01-Oct-2024,   NPO Start Time: 23:59 (10-01-24 @ 08:38) [Active]

## 2024-10-01 NOTE — DIETITIAN INITIAL EVALUATION ADULT - ETIOLOGY
r/t decreased ability to meet increased nutrient needs 2/2 metastatic renal cell cancer w/ mets to liver, sepsis

## 2024-10-01 NOTE — CONSULT NOTE ADULT - SUBJECTIVE AND OBJECTIVE BOX
Reason for consult: renal cell ca    HPI:  78yo F with PMH: Renal Cell Carcinoma with mets to liver, IR embolization of a liver lesion on 7/30 Diverticulitis, HTN, HLD, Hypothyroid was recently  admitted 8/2-8/15 for septic shock requiring pressors and critical care admission, Found to have dilated common bile duct, elevated LFTs and likely cholangitis/choledocholithiasis with stone, s/p ERCP with sphincterotomy/stone removal and stent placement on 8/5/24      admitted 9/9-9/14 for septic shock, requiring levophed and critical care admission, blood cultures during admission had Enterobacter, sensitive to Cipro. Noted liver abscess on MRI, felt to be too small for biopsy at the time, decision for prolong antibiotic course made. Discharged on Cipro/Flagyl to complete her therapy, this was completed on Monday 9/23.     She was doing well until earlier this week when she developed fever to 103+, nausea, vomiting, diarrhea-brown, which she states is exactly how she presented for 9/9-9/14 admission. She was admitted to ICU and given pressor support and IVF. had right sided abdominal drain placed by IR and now improving.      PAST MEDICAL & SURGICAL HISTORY:  Hypertension      High cholesterol      Diverticulitis      History of diverticulitis      Hypothyroidism      Renal cell cancer      Metastasis to liver      Paroxysmal atrial fibrillation      Cholelithiasis with cholangitis      History of biliary stent insertion      History of tonsillectomy      History of laparotomy      History of arthroscopy      H/O bilateral oophorectomy      S/P surgical removal of pilonidal cyst          FAMILY HISTORY:      Alochol: Denied  Smoking: Nonsmoker  Drug Use: Denied  Marital Status:         Allergies    Crestor (Other)  Lipitor (Unknown)  tivozanib (Faint)  Cabometyx (Unknown)  Dyazide (Faint)  statins (Unknown)  bacitracin (Rash)  Zetia (Unknown)  Norvasc (Faint)    Intolerances        MEDICATIONS  (STANDING):  acetaminophen   IVPB .. 1000 milliGRAM(s) IV Intermittent once  aMIOdarone    Tablet 100 milliGRAM(s) Oral daily  chlorhexidine 4% Liquid 1 Application(s) Topical <User Schedule>  heparin   Injectable 5000 Unit(s) SubCutaneous every 8 hours  hydrocortisone 10 milliGRAM(s) Oral at bedtime  hydrocortisone 20 milliGRAM(s) Oral daily  levothyroxine 100 MICROGram(s) Oral daily  meropenem Injectable 1000 milliGRAM(s) IV Push every 12 hours  pantoprazole  Injectable 40 milliGRAM(s) IV Push daily  phenylephrine    Infusion 0.1 MICROgram(s)/kG/Min (2.37 mL/Hr) IV Continuous <Continuous>  Tivozanib (Fotivda) 890 MICROGram(s) 0.89 milliGRAM(s) Oral daily  vancomycin  IVPB 500 milliGRAM(s) IV Intermittent every 12 hours    MEDICATIONS  (PRN):  HYDROmorphone  Injectable 0.5 milliGRAM(s) IV Push every 4 hours PRN Moderate Pain (4 - 6)      ROS  No fever, sweats, chills  No epistaxis, HA, sore throat    No dysuria, hematuria    T(C): 36.6 (10-01-24 @ 15:58), Max: 37.1 (10-01-24 @ 10:07)  HR: 88 (10-01-24 @ 16:00) (71 - 135)  BP: 118/80 (10-01-24 @ 16:00) (94/81 - 145/95)  RR: 18 (10-01-24 @ 16:00) (14 - 29)  SpO2: 99% (10-01-24 @ 16:00) (92% - 100%)  Wt(kg): --    PE  NAD  Awake, alert  Abd r sided abd drain  No c/c/e  No rash grossly  FROM                          10.3   11.99 )-----------( 83       ( 01 Oct 2024 05:45 )             33.4       10-01    141  |  113[H]  |  12  ----------------------------<  82  3.8   |  23  |  0.59    Ca    7.8[L]      01 Oct 2024 05:45  Phos  2.6     10-01  Mg     1.9     10-01    TPro  5.0[L]  /  Alb  1.8[L]  /  TBili  0.8  /  DBili  x   /  AST  262[H]  /  ALT  164[H]  /  AlkPhos  255[H]  09-30

## 2024-10-01 NOTE — PROGRESS NOTE ADULT - ASSESSMENT
A/P: 79 female P/W fevers and has an enlarging hepatic abscess and bacteremia    Plan:  CCU  Continue Dorcas and Vanco PND sensitivities  S/P IR Drainage  Worsening sepsis post drainage  For MR of Liver  GI to Change the Stent on Wednesday--NPO after MN  PO Diet after procedure  D/C IVF  Continue Synthroid  SQH DVT Prophylaxis

## 2024-10-01 NOTE — DIETITIAN INITIAL EVALUATION ADULT - ADD RECOMMEND
1) C/w Regular diet  2) Declines all ONS options despite max encouragement  3) Obtain vitamin D 25OH level to assess nutriture  4) Please obtain daily weights  5) MVI w/ minerals daily to ensure 100% RDA met  6) Consider adding thiamine 100 mg daily 2/2 poor PO intake/ malnutrition

## 2024-10-01 NOTE — CONSULT NOTE ADULT - ASSESSMENT
80 y/o female PMH HTN, HLD, hypothyroid, RCC with liver mets on Tivozanib, recent IR embolization of a liver lesion on 7/30 and multiple admissions for sepsis, liver abscess now presents with hypovolemic shock.    1) Septic Shock  On IV abx  IR consulted and drain placed  leukocytosis most likely reactive to infection and is improving with antibiotics as is lactate  GI evaluation in progress-- CBD stent to be evaluated       2) Metatastatic renal cell ca  -now on tivozanib and tolerating well- however would hold off while in hospital  -also on opdivo and holding until after dc  -post IR guided R hepatic lobe embolization by Dr Brito by Dr Molina Brito - will need IR f/u  -f/u with Dr Saunders after dc    Thank you for the courtesy of this consultation and we will continue to follow.      d/w nursing staff    Dusty Marcial MD  [facility]  Cell: [phone]    Thank you for the courtesy of this consultation and we will continue to follow.    Dusty Marcial MD  [facility]  Cell: [phone]

## 2024-10-01 NOTE — DIETITIAN INITIAL EVALUATION ADULT - PERTINENT MEDS FT
MEDICATIONS  (STANDING):  acetaminophen   IVPB .. 1000 milliGRAM(s) IV Intermittent once  aMIOdarone    Tablet 100 milliGRAM(s) Oral daily  chlorhexidine 4% Liquid 1 Application(s) Topical <User Schedule>  heparin   Injectable 5000 Unit(s) SubCutaneous every 8 hours  hydrocortisone 10 milliGRAM(s) Oral at bedtime  hydrocortisone 20 milliGRAM(s) Oral daily  levothyroxine 100 MICROGram(s) Oral daily  meropenem Injectable 1000 milliGRAM(s) IV Push every 12 hours  pantoprazole  Injectable 40 milliGRAM(s) IV Push daily  phenylephrine    Infusion 0.1 MICROgram(s)/kG/Min (2.37 mL/Hr) IV Continuous <Continuous>  Tivozanib (Fotivda) 890 MICROGram(s) 0.89 milliGRAM(s) Oral daily  vancomycin  IVPB 500 milliGRAM(s) IV Intermittent every 12 hours    MEDICATIONS  (PRN):  HYDROmorphone  Injectable 0.5 milliGRAM(s) IV Push every 4 hours PRN Moderate Pain (4 - 6)    Home Medications:  amiodarone 100 mg oral tablet: 1 tab(s) orally once a day (29 Sep 2024 08:27)  apixaban 2.5 mg oral tablet: 1 tab(s) orally 2 times a day (29 Sep 2024 08:46)  Fotivda 890 mcg (0.89 mg) oral capsule: 1 cap(s) orally once a day x 21 days then 7 days off then restart ***patient is in the middle of cycle- family aware and will bring in if necessary*** (29 Sep 2024 08:46)  hydrocortisone 10 mg oral tablet: 1 tab(s) orally 2 tablets in the morning, 1 tablet in the evening (29 Sep 2024 08:46)  levothyroxine 100 mcg (0.1 mg) oral tablet: 1 tab(s) orally once a day (29 Sep 2024 08:46)  metoprolol succinate 50 mg oral tablet, extended release: 1 tab(s) orally 2 times a day (29 Sep 2024 08:46)  Opdivo 10 mg/mL intravenous solution: Once a month (29 Sep 2024 08:46)  vitamin E d-alpha 400 intl units oral capsule: 2 cap(s) orally once a day (29 Sep 2024 08:46)

## 2024-10-01 NOTE — PROGRESS NOTE ADULT - SUBJECTIVE AND OBJECTIVE BOX
Date of service: 10-01-24 @ 11:56      Patient lying in bed; had fever late last afternoon, had right sided abdominal drain placed by IR; off pressors  wants to eat, is in good spirits      ROS:  denies dizziness, no HA, no SOB or cough,  no diarrhea or constipation; no dysuria, no urinary frequency, no legs pain, no rashes    MEDICATIONS  (STANDING):  acetaminophen   IVPB .. 1000 milliGRAM(s) IV Intermittent once  aMIOdarone    Tablet 100 milliGRAM(s) Oral daily  chlorhexidine 4% Liquid 1 Application(s) Topical <User Schedule>  heparin   Injectable 5000 Unit(s) SubCutaneous every 8 hours  hydrocortisone 10 milliGRAM(s) Oral at bedtime  hydrocortisone 20 milliGRAM(s) Oral daily  levothyroxine 100 MICROGram(s) Oral daily  meropenem Injectable 1000 milliGRAM(s) IV Push every 12 hours  pantoprazole  Injectable 40 milliGRAM(s) IV Push daily  phenylephrine    Infusion 0.1 MICROgram(s)/kG/Min (2.37 mL/Hr) IV Continuous <Continuous>  Tivozanib (Fotivda) 890 MICROGram(s) 0.89 milliGRAM(s) Oral daily  vancomycin  IVPB 500 milliGRAM(s) IV Intermittent every 12 hours    MEDICATIONS  (PRN):  HYDROmorphone  Injectable 0.5 milliGRAM(s) IV Push every 4 hours PRN Moderate Pain (4 - 6)      Vital Signs Last 24 Hrs  T(C): 36.7 (30 Sep 2024 23:46), Max: 40.3 (30 Sep 2024 13:36)  T(F): 98 (30 Sep 2024 23:46), Max: 104.6 (30 Sep 2024 13:36)  HR: 101 (01 Oct 2024 11:00) (71 - 185)  BP: 141/94 (01 Oct 2024 11:00) (59/46 - 148/91)  BP(mean): 108 (01 Oct 2024 11:00) (52 - 109)  RR: 29 (01 Oct 2024 11:00) (14 - 30)  SpO2: 99% (01 Oct 2024 11:00) (85% - 100%)    Parameters below as of 01 Oct 2024 10:00  Patient On (Oxygen Delivery Method): room air            Physical Exam:            PE:    Constitutional: frail looking  HEENT: NC/AT, EOMI, PERRLA, conjunctivae clear; ears and nose atraumatic; pharynx clear  Neck: supple; thyroid not palpable  Back: no tenderness  Respiratory: respiratory effort normal; clear to auscultation  Cardiovascular: S1S2 regular, no murmurs  Abdomen: soft, mildly tender, not distended, positive BS; no liver or spleen organomegaly; right sided drain with blood tinged fluid  Genitourinary: no suprapubic tenderness  Musculoskeletal: no muscle tenderness, no joint swelling or tenderness  Neurological/ Psychiatric: AxOx3, judgement and insight normal;  moving all extremities  Skin: no rashes; no palpable lesions    Labs: all available labs reviewed             Labs:                        10.3   11.99 )-----------( 83       ( 01 Oct 2024 05:45 )             33.4     10-01    141  |  113[H]  |  12  ----------------------------<  82  3.8   |  23  |  0.59    Ca    7.8[L]      01 Oct 2024 05:45  Phos  2.6     10-01  Mg     1.9     10-01    TPro  5.0[L]  /  Alb  1.8[L]  /  TBili  0.8  /  DBili  x   /  AST  262[H]  /  ALT  164[H]  /  AlkPhos  255[H]  09-30           Cultures:       Culture - Abscess with Gram Stain (collected 09-30-24 @ 15:00)  Source: .Abscess  Gram Stain (10-01-24 @ 03:00):    Few polymorphonuclear leukocytes seen per low power field    Few Gram positive cocci in pairs seen per oil power field    Few Gram Negative Rods seen per oil power field    Culture - Blood (collected 09-29-24 @ 06:06)  Source: .Blood BLOOD  Gram Stain (09-29-24 @ 21:29):    Growth in aerobic bottle: Gram Negative Rods and Gram positive cocci in    pairs    Growth in anaerobic bottle: Gram Negative Rods and Gram positive cocci in    pairs and Gram Positive Rods  Preliminary Report (09-30-24 @ 22:28):    Growth in aerobic and anaerobic bottles: Klebsiella pneumoniae    Growth in aerobic and anaerobic bottles: Enterococcus faecalis    Growth in anaerobic bottle: Clostridium perfringens "Susceptibilities not    performed"    See previous culture [id number]    Culture - Urine (collected 09-29-24 @ 06:06)  Source: Clean Catch None  Final Report (09-30-24 @ 12:10):    No growth    Culture - Blood (collected 09-29-24 @ 06:06)  Source: .Blood BLOOD  Gram Stain (09-29-24 @ 21:28):    Growth in anaerobic bottle: Gram Positive Rods    and Gram positive cocci in pairs    and Gram Negative Rods    Growth in aerobic bottle: Gram positive cocci in pairs and Gram Negative    Rods  Preliminary Report (09-30-24 @ 22:43):    Growth in aerobic and anaerobic bottles: Klebsiella pneumoniae    Growth in aerobic and anaerobic bottles: Enterococcus faecalis    Growth in anaerobic bottle: Clostridium perfringens "Susceptibilities not    performed"    Direct identification is available within approximately 3-5    hours either by Blood Panel Multiplexed PCR or Direct    MALDI-TOF. Details: [email or web address]  Organism: Blood Culture PCR  Klebsiella pneumoniae (10-01-24 @ 07:39)  Organism: Klebsiella pneumoniae (10-01-24 @ 07:39)      -  Levofloxacin: S <=0.5      -  Tobramycin: S <=2      -  Aztreonam: S <=4      -  Gentamicin: S <=2      -  Cefazolin: S <=2      -  Cefepime: S <=2      -  Piperacillin/Tazobactam: S <=8      -  Ciprofloxacin: S <=0.25      -  Imipenem: S <=1      -  Ceftriaxone: S <=1      -  Ampicillin: R >16 These ampicillin results predict results for amoxicillin      Method Type: ARLEY      -  Meropenem: S <=1      -  Ampicillin/Sulbactam: S 8/4      -  Cefoxitin: I 16      -  Trimethoprim/Sulfamethoxazole: S <=0.5/9.5      -  Ertapenem: S <=0.5  Organism: Blood Culture PCR (09-29-24 @ 21:27)      Method Type: PCR      -  K. pneumoniae group: Detec (K. pneumoniae, K. quasipneumoniae, K. variicola)      -  Enterococcus faecalis: Detec      -  Enterococcus faecium: Detec                        < from: CT Abdomen and Pelvis w/ IV Cont (09.29.24 @ 07:22) >    ACC: [id number] EXAM:  CT ABDOMEN AND PELVIS IC   ORDERED BY: CHAPO MCGILL     PROCEDURE DATE:  09/29/2024          INTERPRETATION:  CLINICAL INFORMATION: Lower abdominal pain and fever,   history of malignancy    COMPARISON: CT chest abdomen pelvis 9/9/2024 CT abdomen pelvis 8/7/2024,   MRI of the abdomen 9/10/2024    CONTRAST/COMPLICATIONS:  IV Contrast: Omnipaque 350  90 cc administered   0 cc discarded  Oral Contrast: NONE  Complications: None reported at time of study completion    PROCEDURE:  CTof the Abdomen and Pelvis was performed.  Sagittal and coronal reformats were performed.    FINDINGS:  LOWER CHEST: A few less than 4 mm right middle lobe and bilateral lower   nodules slightly increased in size and number from the prior exam. Right   retrocrural cystic lesion is unchanged.    LIVER: Extensive area of hypovascularity in the anterior right hepatic   lobe extending into the right hepatic dome to a greater extent than on   prior CT of 9/9/2024 correlate for recent treatment or manipulation. Left   hepatic hypovascular lesions are unchanged.  BILE DUCTS: Biliary stent identified.  GALLBLADDER: Not well delineated.  SPLEEN: Several areas of decreased peripheral wedge-shaped enhancement of   the spleen slightly more prominent on prior exam of unclear etiology and   significance.  PANCREAS: Within normal limits.  ADRENALS: Within normal limits.  KIDNEYS/URETERS: Kidneys enhance bilaterally and symmetrically without   hydronephrosis. Multiple left parapelvic cyst and posterior left mid to   upper pole renal cyst.    BLADDER: Within normal limits.  REPRODUCTIVE ORGANS: Uterus and adnexa within normal limits.    BOWEL: No bowel obstruction. Appendix is not visualized. No evidence of   inflammation in the pericecal region. Sigmoid diverticulosis without   diverticulitis.  PERITONEUM/RETROPERITONEUM: Within normal limits.  VESSELS: Atherosclerotic changes.  LYMPH NODES: No lymphadenopathy.  ABDOMINAL WALL: Within normal limits.  BONES: Degenerative changes and osteopenia.    IMPRESSION:  Sigmoid diverticulosis without diverticulitis. No bowel obstruction or   gross bowel wall thickening.    Extensive area of hypovascularity throughout the right hepatic lobe more   prominent than on prior exam may represent sequela of recent treatment,   correlate with clinical history. Stable appearance of the left hepatic   lobe.    < end of copied text >        Radiology: all available radiological tests reviewed    Advanced directives addressed: full resuscitation

## 2024-10-01 NOTE — DIETITIAN INITIAL EVALUATION ADULT - NS FNS DIET ORDER
Diet, Regular (10-01-24 @ 08:38)  Diet, NPO after Midnight:      NPO Start Date: 01-Oct-2024,   NPO Start Time: 23:59 (10-01-24 @ 08:38)

## 2024-10-01 NOTE — PROGRESS NOTE ADULT - SUBJECTIVE AND OBJECTIVE BOX
CCU Progress  Note    HPI:    S:    Pt seen and examined  78yo F with PMH: Renal Cell Carcinoma with mets to liver, IR embolization of a liver lesion on 7/30 Diverticulitis, HTN, HLD, Hypothyroid was recently, recent new onset Afib/RVR in August 2024 started on Eliquis, GI Bleed after ERCP- resolved without intervention, started on eliquis during admit for Afib. liver abscesses    admitted 8/2-8/15 for septic shock requiring pressors and critical care admission, Found to have dilated common bile duct, elevated LFTs and likely cholangitis/choledocholithiasis with stone, s/p ERCP with sphincterotomy/stone removal and stent placement on 8/5/24    admitted 9/9-9/14 for septic shock, requiring levophed and critical care admission, blood cultures during admission had Enterobacter, sensitive to Cipro. Noted liver abscess on MRI, felt to be too small for biopsy at the time, decision for prolong antibiotic course made. Discharged on Cipro/Flagyl to complete her therapy, this was completed on Monday 9/23.     She was doing well until today, when she developed fever to 103+, nausea, vomiting, diarrhea-brown, which she states is exactly how she presented for 9/9-9/14 admission.  Found to be hypotensive in ED, give 3 liters sepsis fluids without improvement and started on levophed for BP augmentation. ICU Consulted.    9/30: Remains on pressors, source felt to be liver abscess, larger then previous. IR, ID consults, on merrem as S on last admit.  10/1: s/p IR drainage; extremely "SIRS-y" post drainage came over in RADHA, higher pressor requirement    ROS: + abd pain, N/V  Remainder of systems reviewed, negative    Allergies    Crestor (Other)  Lipitor (Unknown)  tivozanib (Faint)  Cabometyx (Unknown)  Dyazide (Faint)  statins (Unknown)  bacitracin (Rash)  Zetia (Unknown)  Norvasc (Faint)    Intolerances    MEDICATIONS  (STANDING):    acetaminophen   IVPB .. 1000 milliGRAM(s) IV Intermittent once  aMIOdarone    Tablet 100 milliGRAM(s) Oral daily  chlorhexidine 4% Liquid 1 Application(s) Topical <User Schedule>  hydrocortisone 10 milliGRAM(s) Oral at bedtime  hydrocortisone 20 milliGRAM(s) Oral daily  levothyroxine 100 MICROGram(s) Oral daily  magnesium sulfate  IVPB 2 Gram(s) IV Intermittent once  meropenem Injectable 1000 milliGRAM(s) IV Push every 12 hours  pantoprazole  Injectable 40 milliGRAM(s) IV Push daily  potassium chloride  10 mEq/100 mL IVPB 10 milliEquivalent(s) IV Intermittent every 1 hour  vancomycin  IVPB 750 milliGRAM(s) IV Intermittent every 12 hours    MEDICATIONS  (PRN):  HYDROmorphone  Injectable 0.5 milliGRAM(s) IV Push every 4 hours PRN Moderate Pain (4 - 6)    Drug Dosing Weight    Height (cm): 154.9 (01 Oct 2024 18:19)  Weight (kg): 63.1 (01 Oct 2024 18:19)  BMI (kg/m2): 26.3 (01 Oct 2024 18:19)  BSA (m2): 1.62 (01 Oct 2024 18:19)    PAST MEDICAL & SURGICAL HISTORY:  Hypertension      High cholesterol      Diverticulitis      History of diverticulitis      Hypothyroidism      Renal cell cancer      Metastasis to liver      Paroxysmal atrial fibrillation      Cholelithiasis with cholangitis      History of biliary stent insertion      History of tonsillectomy      History of laparotomy      History of arthroscopy      H/O bilateral oophorectomy      S/P surgical removal of pilonidal cyst          FAMILY HISTORY:        REVIEW OF SYSTEMS    UNLESS OTHERWISE NOTED IN HPI above:    Constitutional:  No Weight Change, No Fever, No Chills, No Night Sweats, No Fatigue, No Malaise  ENT/Mouth:  No Hearing Changes, No Ear Pain, No Nasal Congestion, No  Sinus Pain, No Hoarseness, No sore throat, No Rhinorrhea, No Swallowing  Difficulty  Eyes:  No Eye Pain, No Swelling, No Redness, No Foreign Body, No Discharge, No Vision Changes  Cardiovascular:  No Chest Pain, No SOB, No PND, No Dyspnea on Exertion,  No Orthopnea, No Claudication, No Edema, No Palpitations  Respiratory:  No Cough, No Sputum, No Wheezing, No Smoke Exposure, No Dyspnea  Gastrointestinal:  No Nausea, No Vomiting, No Diarrhea, No  Constipation, No Pain, No Heartburn, No Anorexia, No Dysphagia, No  Hematochezia, No Melena, No Flatulence, No Jaundice  Genitourinary:  No Dysmenorrhea, No DUB, No Dyspareunia, No Dysuria, No  Urinary Frequency, No Hematuria, No Urinary Incontinence, No Urgency,  No Flank Pain, No Urinary Flow Changes, No Hesitancy  Musculoskeletal:  No Arthralgias, No Myalgias, No Joint Swelling, No  Joint Stiffness, No Back Pain, No Neck Pain, No Injury History  Skin:  No Skin Lesions, No Pruritis, No Hair Changes, No Breast/Skin Changes, No Nipple Discharge  Neuro:  No Weakness, No Numbness, No Paresthesias, No Loss of  Consciousness, No Syncope, No Dizziness, No Headache, No Coordination  Changes, No Recent Falls  Psych:  No Anxiety/Panic, No Depression, No Insomnia, No Personality  Changes, No Delusions, No Rumination, No SI/HI/AH/VH, No Social Issues,  No Memory Changes, No Violence/Abuse Hx., No Eating Concerns  Heme/Lymph:  No Bruising, No Bleeding, No Transfusions History, No Lymphadenopathy  Endocrine:  No Polyuria, No Polydipsia, No Temperature Intolerance    O:    ICU Vital Signs Last 24 Hrs  T(C): 36.4 (03 Oct 2024 08:00), Max: 37.3 (02 Oct 2024 16:20)  T(F): 97.6 (03 Oct 2024 08:00), Max: 99.1 (02 Oct 2024 16:20)  HR: 99 (03 Oct 2024 09:00) (82 - 106)  BP: 151/88 (03 Oct 2024 06:00) (114/98 - 151/88)  BP(mean): 108 (03 Oct 2024 06:00) (99 - 115)  ABP: --  ABP(mean): --  RR: 22 (03 Oct 2024 09:00) (14 - 24)  SpO2: 100% (03 Oct 2024 09:00) (93% - 100%)    O2 Parameters below as of 02 Oct 2024 18:00  Patient On (Oxygen Delivery Method): room air                I&O's Detail    02 Oct 2024 07:01  -  03 Oct 2024 07:00  --------------------------------------------------------  IN:  Total IN: 0 mL    OUT:    Drain (mL): 0 mL    Voided (mL): 1 mL  Total OUT: 1 mL    Total NET: -1 mL              PE:    Adult F lying in bed  + appears ill but not toxic  No JVD trachea midline  Normocephalic, atraumatic  S1S2+  CTA B/L  Abd soft some TTP RUQ to deep palpation, no peritoneal signs  No leg swelling/edema noted  Awake and alert  Skin pink, warm      LABS:    CBC Full  -  ( 03 Oct 2024 05:42 )  WBC Count : 9.16 K/uL  RBC Count : 3.91 M/uL  Hemoglobin : 10.6 g/dL  Hematocrit : 33.1 %  Platelet Count - Automated : 112 K/uL  Mean Cell Volume : 84.7 fl  Mean Cell Hemoglobin : 27.1 pg  Mean Cell Hemoglobin Concentration : 32.0 gm/dL  Auto Neutrophil # : x  Auto Lymphocyte # : x  Auto Monocyte # : x  Auto Eosinophil # : x  Auto Basophil # : x  Auto Neutrophil % : x  Auto Lymphocyte % : x  Auto Monocyte % : x  Auto Eosinophil % : x  Auto Basophil % : x    10-03    140  |  105  |  8   ----------------------------<  86  3.2[L]   |  29  |  0.51    Ca    8.0[L]      03 Oct 2024 05:42  Phos  2.8     10-03  Mg     1.5     10-03    TPro  5.1[L]  /  Alb  1.7[L]  /  TBili  0.9  /  DBili  0.4[H]  /  AST  298[H]  /  ALT  254[H]  /  AlkPhos  332[H]  10-03    PT/INR - ( 03 Oct 2024 05:42 )   PT: 12.3 sec;   INR: 1.04 ratio           Urinalysis Basic - ( 03 Oct 2024 05:42 )    Color: x / Appearance: x / SG: x / pH: x  Gluc: 86 mg/dL / Ketone: x  / Bili: x / Urobili: x   Blood: x / Protein: x / Nitrite: x   Leuk Esterase: x / RBC: x / WBC x   Sq Epi: x / Non Sq Epi: x / Bacteria: x      CAPILLARY BLOOD GLUCOSE            LIVER FUNCTIONS - ( 03 Oct 2024 05:42 )  Alb: 1.7 g/dL / Pro: 5.1 gm/dL / ALK PHOS: 332 U/L / ALT: 254 U/L / AST: 298 U/L / GGT: x

## 2024-10-01 NOTE — PROGRESS NOTE ADULT - SUBJECTIVE AND OBJECTIVE BOX
HPI:  78yo F with PMH: Renal Cell Carcinoma with mets to liver, IR embolization of a liver lesion on 7/30 Diverticulitis, HTN, HLD, Hypothyroid was recently, recent new onset Afib/RVR in August 2024 started on Eliquis, GI Bleed after ERCP- resolved without intervention, started on eliquis during admit for Afib. liver abscesses    admitted 8/2-8/15 for septic shock requiring pressors and critical care admission, Found to have dilated common bile duct, elevated LFTs and likely cholangitis/choledocholithiasis with stone, s/p ERCP with sphincterotomy/stone removal and stent placement on 8/5/24      admitted 9/9-9/14 for septic shock, requiring levophed and critical care admission, blood cultures during admission had Enterobacter, sensitive to Cipro. Noted liver abscess on MRI, felt to be too small for biopsy at the time, decision for prolong antibiotic course made. Discharged on Cipro/Flagyl to complete her therapy, this was completed on Monday 9/23.     She was doing well until today, when she developed fever to 103+, nausea, vomiting, diarrhea-brown, which she states is exactly how she presented for 9/9-9/14 admission.      9/30: Patient to go to IR today for hepatic abscess drainage  10/1: S/P Hepatic abscess drainage, pressors over night       PAST MEDICAL & SURGICAL HISTORY:  Hypertension      High cholesterol      Diverticulitis      History of diverticulitis      Hypothyroidism      Renal cell cancer      Metastasis to liver      Paroxysmal atrial fibrillation      Cholelithiasis with cholangitis      History of biliary stent insertion      History of tonsillectomy      History of laparotomy      History of arthroscopy      H/O bilateral oophorectomy      S/P surgical removal of pilonidal cyst          FAMILY HISTORY:      Social Hx:    Allergies    Crestor (Other)  Lipitor (Unknown)  tivozanib (Faint)  Cabometyx (Unknown)  Dyazide (Faint)  statins (Unknown)  bacitracin (Rash)  Zetia (Unknown)  Norvasc (Faint)    Intolerances            ICU Vital Signs Last 24 Hrs  T(C): 36.7 (30 Sep 2024 23:46), Max: 40.3 (30 Sep 2024 13:36)  T(F): 98 (30 Sep 2024 23:46), Max: 104.6 (30 Sep 2024 13:36)  HR: 79 (01 Oct 2024 06:00) (71 - 185)  BP: 119/84 (01 Oct 2024 06:00) (59/46 - 148/91)  BP(mean): 96 (01 Oct 2024 06:00) (52 - 109)  ABP: --  ABP(mean): --  RR: 16 (01 Oct 2024 06:00) (14 - 30)  SpO2: 96% (01 Oct 2024 05:00) (85% - 100%)    O2 Parameters below as of 30 Sep 2024 16:15  Patient On (Oxygen Delivery Method): room air                I&O's Summary    30 Sep 2024 07:01  -  01 Oct 2024 07:00  --------------------------------------------------------  IN: 4278.7 mL / OUT: 33 mL / NET: 4245.7 mL                              10.3   11.99 )-----------( 83       ( 01 Oct 2024 05:45 )             33.4       10-01    141  |  113[H]  |  12  ----------------------------<  82  3.8   |  23  |  0.59    Ca    7.8[L]      01 Oct 2024 05:45  Phos  2.6     10-01  Mg     1.9     10-01    TPro  5.0[L]  /  Alb  1.8[L]  /  TBili  0.8  /  DBili  x   /  AST  262[H]  /  ALT  164[H]  /  AlkPhos  255[H]  09-30                Urinalysis Basic - ( 01 Oct 2024 05:45 )    Color: x / Appearance: x / SG: x / pH: x  Gluc: 82 mg/dL / Ketone: x  / Bili: x / Urobili: x   Blood: x / Protein: x / Nitrite: x   Leuk Esterase: x / RBC: x / WBC x   Sq Epi: x / Non Sq Epi: x / Bacteria: x        MEDICATIONS  (STANDING):  acetaminophen   IVPB .. 1000 milliGRAM(s) IV Intermittent once  aMIOdarone    Tablet 100 milliGRAM(s) Oral daily  chlorhexidine 4% Liquid 1 Application(s) Topical <User Schedule>  heparin   Injectable 5000 Unit(s) SubCutaneous every 8 hours  hydrocortisone 10 milliGRAM(s) Oral at bedtime  hydrocortisone 20 milliGRAM(s) Oral daily  levothyroxine 100 MICROGram(s) Oral daily  meropenem Injectable 1000 milliGRAM(s) IV Push every 12 hours  pantoprazole  Injectable 40 milliGRAM(s) IV Push daily  phenylephrine    Infusion 0.1 MICROgram(s)/kG/Min (2.37 mL/Hr) IV Continuous <Continuous>  Tivozanib (Fotivda) 890 MICROGram(s) 0.89 milliGRAM(s) Oral daily  vancomycin  IVPB 500 milliGRAM(s) IV Intermittent every 12 hours    MEDICATIONS  (PRN):  HYDROmorphone  Injectable 0.5 milliGRAM(s) IV Push every 4 hours PRN Moderate Pain (4 - 6)      DVT Prophylaxis:    Advanced Directives:  Discussed with:    Visit Information: 30 min    ** Time is exclusive of billed procedures and/or teaching and/or routine family updates.

## 2024-10-02 ENCOUNTER — RESULT REVIEW (OUTPATIENT)
Age: 79
End: 2024-10-02

## 2024-10-02 LAB
-  AMOXICILLIN/CLAVULANIC ACID: SIGNIFICANT CHANGE UP
-  AMPICILLIN/SULBACTAM: SIGNIFICANT CHANGE UP
-  AMPICILLIN: SIGNIFICANT CHANGE UP
-  AMPICILLIN: SIGNIFICANT CHANGE UP
-  AZTREONAM: SIGNIFICANT CHANGE UP
-  CEFAZOLIN: SIGNIFICANT CHANGE UP
-  CEFEPIME: SIGNIFICANT CHANGE UP
-  CEFOXITIN: SIGNIFICANT CHANGE UP
-  CEFTRIAXONE: SIGNIFICANT CHANGE UP
-  CIPROFLOXACIN: SIGNIFICANT CHANGE UP
-  ERTAPENEM: SIGNIFICANT CHANGE UP
-  GENTAMICIN: SIGNIFICANT CHANGE UP
-  IMIPENEM: SIGNIFICANT CHANGE UP
-  LEVOFLOXACIN: SIGNIFICANT CHANGE UP
-  MEROPENEM: SIGNIFICANT CHANGE UP
-  PIPERACILLIN/TAZOBACTAM: SIGNIFICANT CHANGE UP
-  TOBRAMYCIN: SIGNIFICANT CHANGE UP
-  TRIMETHOPRIM/SULFAMETHOXAZOLE: SIGNIFICANT CHANGE UP
-  VANCOMYCIN: SIGNIFICANT CHANGE UP
ADD ON TEST-SPECIMEN IN LAB: SIGNIFICANT CHANGE UP
ADD ON TEST-SPECIMEN IN LAB: SIGNIFICANT CHANGE UP
ALBUMIN SERPL ELPH-MCNC: 1.9 G/DL — LOW (ref 3.3–5)
ALP SERPL-CCNC: 351 U/L — HIGH (ref 40–120)
ALT FLD-CCNC: 292 U/L — HIGH (ref 12–78)
ANION GAP SERPL CALC-SCNC: 5 MMOL/L — SIGNIFICANT CHANGE UP (ref 5–17)
AST SERPL-CCNC: 362 U/L — HIGH (ref 15–37)
BILIRUB DIRECT SERPL-MCNC: 0.4 MG/DL — HIGH (ref 0–0.3)
BILIRUB INDIRECT FLD-MCNC: 0.5 MG/DL — SIGNIFICANT CHANGE UP (ref 0.2–1)
BILIRUB SERPL-MCNC: 0.9 MG/DL — SIGNIFICANT CHANGE UP (ref 0.2–1.2)
BUN SERPL-MCNC: 9 MG/DL — SIGNIFICANT CHANGE UP (ref 7–23)
CALCIUM SERPL-MCNC: 8.1 MG/DL — LOW (ref 8.5–10.1)
CHLORIDE SERPL-SCNC: 110 MMOL/L — HIGH (ref 96–108)
CO2 SERPL-SCNC: 23 MMOL/L — SIGNIFICANT CHANGE UP (ref 22–31)
CREAT SERPL-MCNC: 0.48 MG/DL — LOW (ref 0.5–1.3)
EGFR: 97 ML/MIN/1.73M2 — SIGNIFICANT CHANGE UP
GLUCOSE SERPL-MCNC: 72 MG/DL — SIGNIFICANT CHANGE UP (ref 70–99)
HCT VFR BLD CALC: 32.7 % — LOW (ref 34.5–45)
HEPARIN-PF4 AB RESULT: <0.6 U/ML — SIGNIFICANT CHANGE UP (ref 0–0.9)
HGB BLD-MCNC: 10.3 G/DL — LOW (ref 11.5–15.5)
MAGNESIUM SERPL-MCNC: 1.7 MG/DL — SIGNIFICANT CHANGE UP (ref 1.6–2.6)
MCHC RBC-ENTMCNC: 27.5 PG — SIGNIFICANT CHANGE UP (ref 27–34)
MCHC RBC-ENTMCNC: 31.5 GM/DL — LOW (ref 32–36)
MCV RBC AUTO: 87.2 FL — SIGNIFICANT CHANGE UP (ref 80–100)
METHOD TYPE: SIGNIFICANT CHANGE UP
METHOD TYPE: SIGNIFICANT CHANGE UP
PF4 HEPARIN CMPLX AB SER-ACNC: NEGATIVE — SIGNIFICANT CHANGE UP
PHOSPHATE SERPL-MCNC: 2.1 MG/DL — LOW (ref 2.5–4.5)
PLATELET # BLD AUTO: 71 K/UL — LOW (ref 150–400)
POTASSIUM SERPL-MCNC: 3.7 MMOL/L — SIGNIFICANT CHANGE UP (ref 3.5–5.3)
POTASSIUM SERPL-SCNC: 3.7 MMOL/L — SIGNIFICANT CHANGE UP (ref 3.5–5.3)
PROT SERPL-MCNC: 5.7 GM/DL — LOW (ref 6–8.3)
RBC # BLD: 3.75 M/UL — LOW (ref 3.8–5.2)
RBC # FLD: 18.7 % — HIGH (ref 10.3–14.5)
SODIUM SERPL-SCNC: 138 MMOL/L — SIGNIFICANT CHANGE UP (ref 135–145)
VANCOMYCIN TROUGH SERPL-MCNC: 8.4 UG/ML — LOW (ref 10–20)
WBC # BLD: 9.28 K/UL — SIGNIFICANT CHANGE UP (ref 3.8–10.5)
WBC # FLD AUTO: 9.28 K/UL — SIGNIFICANT CHANGE UP (ref 3.8–10.5)

## 2024-10-02 PROCEDURE — 93325 DOPPLER ECHO COLOR FLOW MAPG: CPT | Mod: 26

## 2024-10-02 PROCEDURE — 99232 SBSQ HOSP IP/OBS MODERATE 35: CPT

## 2024-10-02 PROCEDURE — 93321 DOPPLER ECHO F-UP/LMTD STD: CPT | Mod: 26

## 2024-10-02 PROCEDURE — 99231 SBSQ HOSP IP/OBS SF/LOW 25: CPT

## 2024-10-02 PROCEDURE — 93308 TTE F-UP OR LMTD: CPT | Mod: 26

## 2024-10-02 PROCEDURE — 99233 SBSQ HOSP IP/OBS HIGH 50: CPT

## 2024-10-02 RX ORDER — VANCOMYCIN HCL-SODIUM CHLORIDE IV SOLN 1.5 GM/250ML-0.9% 1.5-0.9/25 GM/ML-%
750 SOLUTION INTRAVENOUS EVERY 12 HOURS
Refills: 0 | Status: DISCONTINUED | OUTPATIENT
Start: 2024-10-02 | End: 2024-10-08

## 2024-10-02 RX ORDER — SOD PHOS DI, MONO/K PHOS MONO 250 MG
2 TABLET ORAL
Refills: 0 | Status: COMPLETED | OUTPATIENT
Start: 2024-10-02 | End: 2024-10-02

## 2024-10-02 RX ADMIN — PANTOPRAZOLE SODIUM 40 MILLIGRAM(S): 40 TABLET, DELAYED RELEASE ORAL at 10:18

## 2024-10-02 RX ADMIN — Medication 100 MICROGRAM(S): at 05:18

## 2024-10-02 RX ADMIN — Medication 2 PACKET(S): at 10:18

## 2024-10-02 RX ADMIN — HYDROMORPHONE HYDROCHLORIDE 0.5 MILLIGRAM(S): 1 INJECTION, SOLUTION INTRAMUSCULAR; INTRAVENOUS; SUBCUTANEOUS at 11:00

## 2024-10-02 RX ADMIN — HYDROMORPHONE HYDROCHLORIDE 0.5 MILLIGRAM(S): 1 INJECTION, SOLUTION INTRAMUSCULAR; INTRAVENOUS; SUBCUTANEOUS at 10:30

## 2024-10-02 RX ADMIN — HYDROCORTISONE 10 MILLIGRAM(S): 5 TABLET ORAL at 23:34

## 2024-10-02 RX ADMIN — MEROPENEM 1000 MILLIGRAM(S): 500 INJECTION INTRAVENOUS at 05:19

## 2024-10-02 RX ADMIN — AMIODARONE HYDROCHLORIDE 100 MILLIGRAM(S): 50 INJECTION, SOLUTION INTRAVENOUS at 10:18

## 2024-10-02 RX ADMIN — Medication 2 PACKET(S): at 23:34

## 2024-10-02 RX ADMIN — MEROPENEM 1000 MILLIGRAM(S): 500 INJECTION INTRAVENOUS at 17:54

## 2024-10-02 RX ADMIN — HYDROMORPHONE HYDROCHLORIDE 0.5 MILLIGRAM(S): 1 INJECTION, SOLUTION INTRAMUSCULAR; INTRAVENOUS; SUBCUTANEOUS at 23:35

## 2024-10-02 RX ADMIN — HYDROCORTISONE 20 MILLIGRAM(S): 5 TABLET ORAL at 10:18

## 2024-10-02 RX ADMIN — VANCOMYCIN HCL-SODIUM CHLORIDE IV SOLN 1.5 GM/250ML-0.9% 100 MILLIGRAM(S): 1.5-0.9/25 SOLUTION at 10:19

## 2024-10-02 RX ADMIN — CHLORHEXIDINE GLUCONATE ORAL RINSE 1 APPLICATION(S): 1.2 SOLUTION DENTAL at 05:19

## 2024-10-02 RX ADMIN — VANCOMYCIN HCL-SODIUM CHLORIDE IV SOLN 1.5 GM/250ML-0.9% 250 MILLIGRAM(S): 1.5-0.9/25 SOLUTION at 23:35

## 2024-10-02 NOTE — PROGRESS NOTE ADULT - ASSESSMENT
80yo F with PMH: Renal Cell Carcinoma with mets to liver, IR embolization of a liver lesion on 7/30, Diverticulitis, HTN, HLD, Hypothyroidism,  recent new onset Afib/RVR in August 2024 started on Eliquis, GI Bleed after ERCP- resolved without intervention, liver abscesses, an admission on  8/2-8/15 for septic shock requiring pressors and critical care admission, Found to have dilated common bile duct, elevated LFTs and likely cholangitis/choledocholithiasis with stone, s/p ERCP with sphincterotomy/stone removal and stent placement on 8/5/24, admitted 9/9-9/14 for septic shock, requiring levophed and critical care admission, blood cultures during admission had Enterobacter, sensitive to Cipro. Noted liver abscess on MRI, felt to be too small for biopsy at the time, decision for prolonged antibiotic course made. Discharged on Cipro/Flagyl to complete her therapy, this was completed on Monday 9/23. Then patient was admitted on 9/29 for evaluation of fever to 103, nausea, vomiting, diarrhea, similar to her presentation of her 9/9-9/14 admission, when admitted on 9/29 was so hypotensive has required levophed after fluids. The patient is still on pressors after fluids without improvement. Initial lactate was 8, but has improved to normal. The patient is alert and oriented and has mild difuse abdominal pain but overall her GI symptoms are improved.     1. Patient admitted with septic shock secondary to multiple organisms most likely due to GI or liver origin, due to liver abscesses or masses  - follow up cultures to sensitivity  - will continue with meropenem as ordered; however sometimes the Enterococcus faecium will be ampicillin resistant therefore will add vancomycin; also noted with Clostridium perfringens, usually from perforated colon, however patient does not have this on imaging and physical exam not consistent with this; meropenem will cover this as well as other organisms recovered in culture; also on vancomycin for potential resistant Enterococcus  - repeat blood cultures done today, 10/2  - echocardiogram ordered to rule out endocarditis given the Enterococcus  - check vancomycin trough prior to fourth dose -- subtherapeutic; will increase the dose to 750 mg q 12 hours  - serial cbc and monitor temperature   - IR consulted and drain placed-- growing Klebsiella and Enterococcus faecalis  - oncology evaluation and noted  - leukocytosis most likely reactive to infection and is improving with antibiotics as is lactate  - GI evaluation in progress-- CBD stent to be evaluated --- procedure to be done today, 10/2  2. other issues; per medicine    [facility] token not applicable as all rx is iv

## 2024-10-02 NOTE — PROGRESS NOTE ADULT - ASSESSMENT
RCC IV/ papillary with liver metastases : Unknown primary  S/P Liver directed therapies , prior TKI/ immune therapy ( [facility]SI-[facility]) and recently Tivolizumab + Nivolumab  Multiple septic episodes/ Now with source likely from abscess  Improved on antibiotics  Anemia / multifactorial    PLAN    Antibiotics  Hydration  physical therapy  DVT prophylaxis  Monitor counts  Will hold on therapy ( TKI ) for now  Can FU with IR as out patient

## 2024-10-02 NOTE — PROGRESS NOTE ADULT - SUBJECTIVE AND OBJECTIVE BOX
Interventional Radiology Follow-Up Note.    This is a 79y Female s/p liver abscess drainage on 9/30 in Interventional Radiology with Dr. Carvajal.   reports pain at the drain site.     Medication:   aMIOdarone    Tablet: (10-01)  aMIOdarone IVPB: (09-30)  heparin   Injectable: (10-01)  meropenem Injectable: (10-02)  metoprolol tartrate Injectable: (09-30)  phenylephrine    Infusion: (09-30)  vancomycin  IVPB: (10-01)    Vitals:   T(F): 98.7, Max: 98.7 (00:06)  HR: 76  BP: 137/88  RR: 18  SpO2: 98%    Physical Exam:  General: Nontoxic, in NAD.  Abdomen: soft, NTND.   Drain Device: Drain intact attached to gravity drain bag. Draining SS fluid.  Flushed with 10cc NS w/o difficulty. Dressing clean, dry, intact.   24hr Drain output: 5cc    LABS:  WBC 9.28 / Hgb 10.3 / Hct 32.7 / Plt 71  Na 138 / K 3.7 / CO2 23 / Cl 110 / BUN 9 / Cr 0.48 / Glucose 72  ALT -- / AST -- / Alk Phos -- / Tbili --  Ptt -- / Pt -- / INR --    Preliminary Report:    Few Enterococcus faecalis    Rare Klebsiella pneumoniae        Assessment/Plan:   80 y/o female PMH RCC with metastasis to liver s/p IR embolization & biliary stent, HTN, HLD, hypothyroid, recent hospitalization for Enterobacter bacteremia and liver abscess, discharged on cipro and Flagyl which she finished on 9/23, now presents with fever, N, V and malaise found to have liver abscess on imaging. Pt s/p liver abscess drainage on 9/30.  Pt with fever and tachy cardic post procedure. Now improved. Off pressors.    - CX; Few Gram positive cocci and Gram Negative Montrell  - Flush drain as ordered.  - MRI results pending.   - Planned for stent exchange with GI today.

## 2024-10-02 NOTE — PROGRESS NOTE ADULT - ASSESSMENT
A:    79yFemale    1- Shock POA, suspect septic  2- PRANAY  3- Metabolic acidosis  4- Hypoglycemia  5- Transaminitis  6- Liver abscesses  7- Adrenal Insufficiency  8- RADHA    This patient requires a moderate level of care due to the complexity and severity of their condition which increases the amount and complexity of data to be reviewed and analyzed in addition to the risk of complications, morbidity and mortality of patient management    P:    s/p drainage of abscess  Overall improving  Resolved shock state  Organ dysfxn improving  However, remains with complex and active care    Pressors titrated off  PRN fluids  Merrem + vanco per Cx results  NPO p MN for change of stent by GI  Hx of adrenal insufficiency on PO hydrocortisone; change to IV, wean  Diet, NPO prior to any IR procedure  BG < 180  PRANAY, suspect pre renal + ATN from sepsis; continue IVF, trend  No s/s active bleeding  Trend LA, sepsis bundle  VTE ppx      Dispo: Cont care.    Time spent on this patient encounter: 35+ minutes    Date of entry of this note is equal to the date of services rendered.    This includes assessment of the presenting problems with associated risks, reviewing the medical record to prepare for the encounter and meeting face to face with the patient to obtain additional history and conduct a focused exmaination. I have also performed an appropiate physical exam, made interventions listed and ordered and interpreted appropiate diagnostic studies as documented. This also included communication and coordination of care with the multidisciplinary team including the bedside nurse, appropriate attending of record and consultants as needed.

## 2024-10-02 NOTE — PROGRESS NOTE ADULT - SUBJECTIVE AND OBJECTIVE BOX
Patient is a 79y old female who presents with a chief complaint of fever, abd pain, nausea, diarrhea    Followup: sepsis  s/p liver abscess drainage via IR. For planned CBD stent exchange tomorrow. At bedside with no complaints. MRI done, pending read.    MEDICATIONS  (STANDING):  acetaminophen   IVPB .. 1000 milliGRAM(s) IV Intermittent once  aMIOdarone    Tablet 100 milliGRAM(s) Oral daily  chlorhexidine 4% Liquid 1 Application(s) Topical <User Schedule>  hydrocortisone 10 milliGRAM(s) Oral at bedtime  hydrocortisone 20 milliGRAM(s) Oral daily  levothyroxine 100 MICROGram(s) Oral daily  meropenem Injectable 1000 milliGRAM(s) IV Push every 12 hours  pantoprazole  Injectable 40 milliGRAM(s) IV Push daily  potassium phosphate / sodium phosphate Powder (PHOS-NaK) 2 Packet(s) Oral two times a day  vancomycin  IVPB 500 milliGRAM(s) IV Intermittent every 12 hours    MEDICATIONS  (PRN):  HYDROmorphone  Injectable 0.5 milliGRAM(s) IV Push every 4 hours PRN Moderate Pain (4 - 6)    Vital Signs Last 24 Hrs  T(C): 36.1 (02 Oct 2024 09:45), Max: 37.1 (02 Oct 2024 00:06)  T(F): 97 (02 Oct 2024 09:45), Max: 98.7 (02 Oct 2024 00:06)  HR: 76 (02 Oct 2024 06:00) (71 - 100)  BP: 137/88 (02 Oct 2024 05:00) (94/81 - 149/76)  BP(mean): 103 (02 Oct 2024 05:00) (86 - 106)  RR: 18 (02 Oct 2024 06:00) (15 - 24)  SpO2: 98% (02 Oct 2024 06:00) (93% - 100%)    Parameters below as of 02 Oct 2024 06:00  Patient On (Oxygen Delivery Method): room air    PHYSICAL EXAM:    Constitutional: No acute distress, well-developed, non-toxic appearing  HEENT: masked, good phonation, not icteric  Neck: supple, no lymphadenopathy  Respiratory: clear to ascultation bilaterally, no wheezing  Cardiovascular: S1 and S2, regular rate and rhythm, no murmurs rubs or gallops  Gastrointestinal: soft, non-tender, non-distended, +bowel sounds, no rebound or guarding, no surgical scars, no drains  Extremities: No peripheral edema, no cyanosis or clubbing  Vascular: 2+ peripheral pulses, no venous stasis  Neurological: A/O x 3, no focal deficits, no asterixis  Psychiatric: Normal mood, normal affect  Skin: No rashes, not jaundiced    LABS:                        10.3   9.28  )-----------( 71       ( 02 Oct 2024 06:21 )             32.7     10-02    138  |  110[H]  |  9   ----------------------------<  72  3.7   |  23  |  0.48[L]    Ca    8.1[L]      02 Oct 2024 06:21  Phos  2.1     10-02  Mg     1.7     10-02      RADIOLOGY & ADDITIONAL STUDIES: MR pending Patient is a 79y old female who presents with a chief complaint of fever, abd pain, nausea, diarrhea    Followup: sepsis  s/p liver abscess drainage via IR. For planned CBD stent exchange tomorrow. At bedside with no complaints. MRI done, pending read.    MEDICATIONS  (STANDING):  acetaminophen   IVPB .. 1000 milliGRAM(s) IV Intermittent once  aMIOdarone    Tablet 100 milliGRAM(s) Oral daily  chlorhexidine 4% Liquid 1 Application(s) Topical <User Schedule>  hydrocortisone 10 milliGRAM(s) Oral at bedtime  hydrocortisone 20 milliGRAM(s) Oral daily  levothyroxine 100 MICROGram(s) Oral daily  meropenem Injectable 1000 milliGRAM(s) IV Push every 12 hours  pantoprazole  Injectable 40 milliGRAM(s) IV Push daily  potassium phosphate / sodium phosphate Powder (PHOS-NaK) 2 Packet(s) Oral two times a day  vancomycin  IVPB 500 milliGRAM(s) IV Intermittent every 12 hours    MEDICATIONS  (PRN):  HYDROmorphone  Injectable 0.5 milliGRAM(s) IV Push every 4 hours PRN Moderate Pain (4 - 6)    Vital Signs Last 24 Hrs  T(C): 36.1 (02 Oct 2024 09:45), Max: 37.1 (02 Oct 2024 00:06)  T(F): 97 (02 Oct 2024 09:45), Max: 98.7 (02 Oct 2024 00:06)  HR: 76 (02 Oct 2024 06:00) (71 - 100)  BP: 137/88 (02 Oct 2024 05:00) (94/81 - 149/76)  BP(mean): 103 (02 Oct 2024 05:00) (86 - 106)  RR: 18 (02 Oct 2024 06:00) (15 - 24)  SpO2: 98% (02 Oct 2024 06:00) (93% - 100%)    Parameters below as of 02 Oct 2024 06:00  Patient On (Oxygen Delivery Method): room air    PHYSICAL EXAM:    Constitutional: No acute distress, well-developed, non-toxic appearing  HEENT: unmasked, good phonation, not icteric  Neck: supple, no lymphadenopathy  Gastrointestinal: soft, non-tender, non-distended, +bowel sounds, no rebound or guarding, +drain  Extremities: No peripheral edema, no cyanosis or clubbing  Vascular: 2+ peripheral pulses, no venous stasis  Neurological: A/O x 3, no focal deficits, no asterixis  Psychiatric: Normal mood, normal affect  Skin: No rashes, not jaundiced    LABS:                        10.3   9.28  )-----------( 71       ( 02 Oct 2024 06:21 )             32.7     10-02    138  |  110[H]  |  9   ----------------------------<  72  3.7   |  23  |  0.48[L]    Ca    8.1[L]      02 Oct 2024 06:21  Phos  2.1     10-02  Mg     1.7     10-02      RADIOLOGY & ADDITIONAL STUDIES: MR pending

## 2024-10-02 NOTE — PROGRESS NOTE ADULT - ASSESSMENT
79 year old woman with  diverticulosis,, HLD, HTN, hypothyroid, metastatic RCC to liver s/p kidney embo 7/30 on immunotherapy, AF on Eliquis, recently hospitalized for cholangitis s/p ERCP with placement of stent 8/5 returning 9/10 with sepsis r/t liver abscess s/p drainage now presenting again to [facility] with fever, chills, abdominal pain, non bloody diarrhea, and vomiting. Elevated transaminases normal tbili. Imaging consistent with liver abscess requiring repeat drainage via IR.     Imp: Sepsis 2/2 hepatic abscess versus transient obstruction CBD stent  s/p drainage abscess via IR growing Klebsiella and Enterococcus faecalis  significant thrombocytopenia, r/o HIT    Rec:  ::MR pending  ::Due to second admission for sepsis and presence of CBD stent, would recommend stent removal and exchange via ERCP tomorrow 10/3  ::NPO p MN 10/2  ::Hep function and coags in AM  ::Continue IV abx and supportive care

## 2024-10-02 NOTE — PROGRESS NOTE ADULT - SUBJECTIVE AND OBJECTIVE BOX
HPI:  78yo F with PMH: Renal Cell Carcinoma with mets to liver, IR embolization of a liver lesion on 7/30 Diverticulitis, HTN, HLD, Hypothyroid was recently, recent new onset Afib/RVR in August 2024 started on Eliquis, GI Bleed after ERCP- resolved without intervention, started on eliquis during admit for Afib. liver abscesses    admitted 8/2-8/15 for septic shock requiring pressors and critical care admission, Found to have dilated common bile duct, elevated LFTs and likely cholangitis/choledocholithiasis with stone, s/p ERCP with sphincterotomy/stone removal and stent placement on 8/5/24      admitted 9/9-9/14 for septic shock, requiring levophed and critical care admission, blood cultures during admission had Enterobacter, sensitive to Cipro. Noted liver abscess on MRI, felt to be too small for biopsy at the time, decision for prolong antibiotic course made. Discharged on Cipro/Flagyl to complete her therapy, this was completed on Monday 9/23.     She was doing well until today, when she developed fever to 103+, nausea, vomiting, diarrhea-brown, which she states is exactly how she presented for 9/9-9/14 admission.      9/30: Patient to go to IR today for hepatic abscess drainage  10/1: S/P Hepatic abscess drainage, pressors over night  10/2: Off Pressors, for Stent change with GI on 10/3, worsening thrombocytopenia       PAST MEDICAL & SURGICAL HISTORY:  Hypertension      High cholesterol      Diverticulitis      History of diverticulitis      Hypothyroidism      Renal cell cancer      Metastasis to liver      Paroxysmal atrial fibrillation      Cholelithiasis with cholangitis      History of biliary stent insertion      History of tonsillectomy      History of laparotomy      History of arthroscopy      H/O bilateral oophorectomy      S/P surgical removal of pilonidal cyst          FAMILY HISTORY:      Social Hx:    Allergies    Crestor (Other)  Lipitor (Unknown)  tivozanib (Faint)  Cabometyx (Unknown)  Dyazide (Faint)  statins (Unknown)  bacitracin (Rash)  Zetia (Unknown)  Norvasc (Faint)    Intolerances        Height (cm): 154.9 (10-01 @ 18:19)  Weight (kg): 63.1 (10-01 @ 18:19)  BMI (kg/m2): 26.3 (10-01 @ 18:19)    ICU Vital Signs Last 24 Hrs  T(C): 36.1 (02 Oct 2024 09:45), Max: 37.1 (02 Oct 2024 00:06)  T(F): 97 (02 Oct 2024 09:45), Max: 98.7 (02 Oct 2024 00:06)  HR: 76 (02 Oct 2024 06:00) (71 - 104)  BP: 137/88 (02 Oct 2024 05:00) (94/81 - 149/76)  BP(mean): 103 (02 Oct 2024 05:00) (86 - 111)  ABP: --  ABP(mean): --  RR: 18 (02 Oct 2024 06:00) (15 - 24)  SpO2: 98% (02 Oct 2024 06:00) (93% - 100%)    O2 Parameters below as of 02 Oct 2024 06:00  Patient On (Oxygen Delivery Method): room air                I&O's Summary    01 Oct 2024 07:01  -  02 Oct 2024 07:00  --------------------------------------------------------  IN: 100 mL / OUT: 480 mL / NET: -380 mL                              10.3   9.28  )-----------( 71       ( 02 Oct 2024 06:21 )             32.7       10-02    138  |  110[H]  |  9   ----------------------------<  72  3.7   |  23  |  0.48[L]    Ca    8.1[L]      02 Oct 2024 06:21  Phos  2.1     10-02  Mg     1.7     10-02                  Urinalysis Basic - ( 02 Oct 2024 06:21 )    Color: x / Appearance: x / SG: x / pH: x  Gluc: 72 mg/dL / Ketone: x  / Bili: x / Urobili: x   Blood: x / Protein: x / Nitrite: x   Leuk Esterase: x / RBC: x / WBC x   Sq Epi: x / Non Sq Epi: x / Bacteria: x        MEDICATIONS  (STANDING):  acetaminophen   IVPB .. 1000 milliGRAM(s) IV Intermittent once  aMIOdarone    Tablet 100 milliGRAM(s) Oral daily  chlorhexidine 4% Liquid 1 Application(s) Topical <User Schedule>  hydrocortisone 10 milliGRAM(s) Oral at bedtime  hydrocortisone 20 milliGRAM(s) Oral daily  levothyroxine 100 MICROGram(s) Oral daily  meropenem Injectable 1000 milliGRAM(s) IV Push every 12 hours  pantoprazole  Injectable 40 milliGRAM(s) IV Push daily  potassium phosphate / sodium phosphate Powder (PHOS-NaK) 2 Packet(s) Oral two times a day  vancomycin  IVPB 500 milliGRAM(s) IV Intermittent every 12 hours    MEDICATIONS  (PRN):  HYDROmorphone  Injectable 0.5 milliGRAM(s) IV Push every 4 hours PRN Moderate Pain (4 - 6)      DVT Prophylaxis:    Advanced Directives:  Discussed with:    Visit Information:    ** Time is exclusive of billed procedures and/or teaching and/or routine family updates.

## 2024-10-02 NOTE — PROGRESS NOTE ADULT - ASSESSMENT
A/P: 79 female P/W fevers and has an enlarging hepatic abscess and bacteremia    Plan:  CCU  Continue Docras and Vanco PND all sensitivities  S/P IR Drainage  For MR of Liver---Awaiting Reading  GI to Change the Stent on 10/3--NPO after MN  PO Diet and NPO after MN tonight  Replace lytes  Continue Synthroid  SQH DVT Prophylaxis A/P: 79 female P/W fevers and has an enlarging hepatic abscess and bacteremia    Plan:  CCU  Continue Dorcas and Vanco PND all sensitivities  S/P IR Drainage  For MR of Liver---Awaiting Reading  GI to Change the Stent on 10/3--NPO after MN  PO Diet and NPO after MN tonight  Check HIT, D/C SQH  Replace lytes  Continue Synthroid  SQH DVT Prophylaxis

## 2024-10-02 NOTE — PROGRESS NOTE ADULT - SUBJECTIVE AND OBJECTIVE BOX
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, s/p multiple  IR embolization liver lesions last  on 7/30 ; On Tivozanib ( VEGF TKI inhibitor)    8/2-8/15 had  septic shock requiring pressors and critical care admission, Found to have dilated common bile duct, elevated LFTs and likely cholangitis/choledocholithiasis with stone, s/p ERCP with sphincterotomy/stone removal and stent placement on 8/5/24  Readmitted 9/9-9/14 for septic shock, requiring levophed and critical care admission, blood cultures during admission had Enterobacter, sensitive to Cipro / Noted liver abscess on MRI, felt to be too small for biopsy at the time, decision for prolong antibiotic course made. Discharged on Cipro/Flagyl to complete her therapy, this was completed on Monday 9/23.   She was doing well until earlier this week when she developed fever to 103+, nausea, vomiting, diarrhea-brown, which she states is exactly how she presented for 9/9-9/14 admission. She was admitted to ICU and given pressor support and IVF. had right sided abdominal drain placed by IR and now improving.  Feeling better today  Blood pressure in normal range         < from: CT Abdomen and Pelvis w/ IV Cont (09.29.24 @ 07:22) >  COMPARISON: CT chest abdomen pelvis 9/9/2024 CT abdomen pelvis 8/7/2024,   MRI of the abdomen 9/10/2024    CONTRAST/COMPLICATIONS:  IV Contrast: Omnipaque 350  90 cc administered   0 cc discarded  Oral Contrast: NONE  Complications: None reported at time of study completion    PROCEDURE:  CTof the Abdomen and Pelvis was performed.  Sagittal and coronal reformats were performed.    FINDINGS:  LOWER CHEST: A few less than 4 mm right middle lobe and bilateral lower   nodules slightly increased in size and number from the prior exam. Right   retrocrural cystic lesion is unchanged.    LIVER: Extensive area of hypovascularity in the anterior right hepatic   lobe extending into the right hepatic dome to a greater extent than on   prior CT of 9/9/2024 correlate for recent treatment or manipulation. Left   hepatic hypovascular lesions are unchanged.  BILE DUCTS: Biliary stent identified.  GALLBLADDER: Not well delineated.  SPLEEN: Several areas of decreased peripheral wedge-shaped enhancement of   the spleen slightly more prominent on prior exam of unclear etiology and   significance.  PANCREAS: Within normal limits.  ADRENALS: Within normal limits.  KIDNEYS/URETERS: Kidneys enhance bilaterally and symmetrically without   hydronephrosis. Multiple left parapelvic cyst and posterior left mid to   upper pole renal cyst.    BLADDER: Within normal limits.  REPRODUCTIVE ORGANS: Uterus and adnexa within normal limits.    BOWEL: No bowel obstruction. Appendix is not visualized. No evidence of   inflammation in the pericecal region. Sigmoid diverticulosis without   diverticulitis.  PERITONEUM/RETROPERITONEUM: Within normal limits.  VESSELS: Atherosclerotic changes.  LYMPH NODES: No lymphadenopathy.  ABDOMINAL WALL: Within normal limits.  BONES: Degenerative changes and osteopenia.    IMPRESSION:  Sigmoid diverticulosis without diverticulitis. No bowel obstruction or   gross bowel wall thickening.    Extensive area of hypovascularity throughout the right hepatic lobe more   prominent     < end of copied text >      < from: MR Abdomen w/wo IV Cont (09.10.24 @ 22:23) >  PROCEDURE DATE:  09/10/2024          INTERPRETATION:  CLINICAL INFORMATION: History of metastatic papillary   renal cell cancer to liver. Status post embolization. Cholangitis with   biliary stent.    COMPARISON: Contrast-enhanced CT of the abdomen and pelvis September 9, 2024, pre and postcontrast CT of the abdomen and pelvis August 7, 2024,   contrast-enhanced CT of the abdomen and pelvis August 4, 2024,   noncontrast CT of the abdomen and pelvis August 3, 2024,   contrast-enhanced CT of the abdomen and pelvis December 20, 2023.    CONTRAST/COMPLICATIONS:  IV Contrast: Gadavist  6 cc administered   1.5 cc discarded  Oral Contrast: NONE  Complications: None reported at time of study completion    PROCEDURE:  MRI of the abdomen was performed.      FINDINGS:  LOWER CHEST: Within normal limits.    LIVER: There are multiple heterogeneously mildly T2 hyperintense   collections with thin uniform peripheral enhancement throughout the   liver, without evidence of internal enhancement, compatible with prior   embolization sites, measuring up to 6.0 x 5.0 cm in segment 4. There is   residual enhancing T2 hyperintense tumor measuring 2.0 x 1.9 cm in   segment 3 anteriorly and a 3.5 x 2.2 cm in segment 8 centrally.  BILE DUCTS: CBD stent in place. There is mild intrahepatic biliary   dilatation within the lateral segment of the left lobe of the liver with   adjacent peripherally enhancing T2 bright collection measuring up to 9   mm, suggestive of small abscesses.  GALLBLADDER: Within normal limits.  SPLEEN: Within normal limits.  PANCREAS: Within normal limits.  ADRENALS: Within normal limits.  KIDNEYS/URETERS: Again are noted left parapelvic and cortical cysts.   Trace bilateral perinephric stranding. Otherwise, within normal limits.    VISUALIZED PORTIONS:  BOWEL: Within normal limits.  PERITONEUM: No ascites.  VESSELS: Within normal limits.  RETROPERITONEUM/LYMPH NODES: No lymphadenopathy. Dilated cisterna chyli.  ABDOMINAL WALL: Within normal limits.  BONES: Within normal limits.    IMPRESSION: Evolution of prior embolization sites with residual tumor, as   described. Mild intrahepatic biliary dilatation in the left lobe   laterally with small adjacent abscesses.    --- End of Report ---    < end of copied text >    REVIEW OF SYSTEMS:      Allergies    Crestor (Other)  Lipitor (Unknown)  tivozanib (Faint)  Cabometyx (Unknown)  Dyazide (Faint)  statins (Unknown)  bacitracin (Rash)  Zetia (Unknown)  Norvasc (Faint)    Intolerances        MEDICATIONS  (STANDING):  acetaminophen   IVPB .. 1000 milliGRAM(s) IV Intermittent once  aMIOdarone    Tablet 100 milliGRAM(s) Oral daily  chlorhexidine 4% Liquid 1 Application(s) Topical <User Schedule>  hydrocortisone 10 milliGRAM(s) Oral at bedtime  hydrocortisone 20 milliGRAM(s) Oral daily  levothyroxine 100 MICROGram(s) Oral daily  meropenem Injectable 1000 milliGRAM(s) IV Push every 12 hours  pantoprazole  Injectable 40 milliGRAM(s) IV Push daily  potassium phosphate / sodium phosphate Powder (PHOS-NaK) 2 Packet(s) Oral two times a day  vancomycin  IVPB 750 milliGRAM(s) IV Intermittent every 12 hours    MEDICATIONS  (PRN):  HYDROmorphone  Injectable 0.5 milliGRAM(s) IV Push every 4 hours PRN Moderate Pain (4 - 6)      Vital Signs Last 24 Hrs  T(C): 37.3 (02 Oct 2024 16:20), Max: 37.3 (02 Oct 2024 16:20)  T(F): 99.1 (02 Oct 2024 16:20), Max: 99.1 (02 Oct 2024 16:20)  HR: 103 (02 Oct 2024 16:00) (71 - 106)  BP: 114/98 (02 Oct 2024 16:00) (111/99 - 153/88)  BP(mean): 107 (02 Oct 2024 16:00) (86 - 117)  RR: 22 (02 Oct 2024 16:00) (15 - 23)  SpO2: 95% (02 Oct 2024 16:00) (93% - 100%)    Parameters below as of 02 Oct 2024 14:00  Patient On (Oxygen Delivery Method): room air        PHYSICAL EXAM:    GENERAL: NAD,   HEAD:  Atraumatic, Normocephalic  EYES: EOMI, PERRLA, conjunctiva and sclera clear    NECK: Supple, No JVD, Normal thyroid  NERVOUS SYSTEM:    CHEST/LUNG: Clear to percussion bilaterally; No rales, rhonchi,   HEART: Regular rate and rhythm;   ABDOMEN: Soft, Nontender.  EXTREMITIES:   edema:-  LYMPH: No lymphadenopathy noted        LABS:                        10.3   9.28  )-----------( 71       ( 02 Oct 2024 06:21 )             32.7     10-02    138  |  110[H]  |  9   ----------------------------<  72  3.7   |  23  |  0.48[L]    Ca    8.1[L]      02 Oct 2024 06:21  Phos  2.1     10-02  Mg     1.7     10-02    TPro  5.7[L]  /  Alb  1.9[L]  /  TBili  0.9  /  DBili  0.4[H]  /  AST  362[H]  /  ALT  292[H]  /  AlkPhos  351[H]  10-02      Urinalysis Basic - ( 02 Oct 2024 06:21 )    Color: x / Appearance: x / SG: x / pH: x  Gluc: 72 mg/dL / Ketone: x  / Bili: x / Urobili: x   Blood: x / Protein: x / Nitrite: x   Leuk Esterase: x / RBC: x / WBC x   Sq Epi: x / Non Sq Epi: x / Bacteria: x

## 2024-10-02 NOTE — PROGRESS NOTE ADULT - SUBJECTIVE AND OBJECTIVE BOX
Date of service: 10-02-24 @ 13:29      Patient lying in bed; afebrile, no complaints; will have GI procedure for CBD stent       ROS: no fever or chills; denies dizziness, no HA, no SOB or cough, no abdominal pain, no diarrhea or constipation; no dysuria, no urinary frequency, no legs pain, no rashes    MEDICATIONS  (STANDING):  acetaminophen   IVPB .. 1000 milliGRAM(s) IV Intermittent once  aMIOdarone    Tablet 100 milliGRAM(s) Oral daily  chlorhexidine 4% Liquid 1 Application(s) Topical <User Schedule>  hydrocortisone 10 milliGRAM(s) Oral at bedtime  hydrocortisone 20 milliGRAM(s) Oral daily  levothyroxine 100 MICROGram(s) Oral daily  meropenem Injectable 1000 milliGRAM(s) IV Push every 12 hours  pantoprazole  Injectable 40 milliGRAM(s) IV Push daily  potassium phosphate / sodium phosphate Powder (PHOS-NaK) 2 Packet(s) Oral two times a day  vancomycin  IVPB 500 milliGRAM(s) IV Intermittent every 12 hours    MEDICATIONS  (PRN):  HYDROmorphone  Injectable 0.5 milliGRAM(s) IV Push every 4 hours PRN Moderate Pain (4 - 6)      Vital Signs Last 24 Hrs  T(C): 36.1 (02 Oct 2024 09:45), Max: 37.1 (02 Oct 2024 00:06)  T(F): 97 (02 Oct 2024 09:45), Max: 98.7 (02 Oct 2024 00:06)  HR: 76 (02 Oct 2024 06:00) (71 - 100)  BP: 137/88 (02 Oct 2024 05:00) (94/81 - 149/76)  BP(mean): 103 (02 Oct 2024 05:00) (86 - 106)  RR: 18 (02 Oct 2024 06:00) (15 - 24)  SpO2: 98% (02 Oct 2024 06:00) (93% - 100%)    Parameters below as of 02 Oct 2024 06:00  Patient On (Oxygen Delivery Method): room air            Physical Exam:            PE:    Constitutional: frail looking  HEENT: NC/AT, EOMI, PERRLA, conjunctivae clear; ears and nose atraumatic; pharynx clear  Neck: supple; thyroid not palpable  Back: no tenderness  Respiratory: respiratory effort normal; clear to auscultation  Cardiovascular: S1S2 regular, no murmurs  Abdomen: soft, mildly tender, not distended, positive BS; no liver or spleen organomegaly; right sided drain with blood tinged fluid  Genitourinary: no suprapubic tenderness  Musculoskeletal: no muscle tenderness, no joint swelling or tenderness  Neurological/ Psychiatric: AxOx3, judgement and insight normal;  moving all extremities  Skin: no rashes; no palpable lesions    Labs: all available labs reviewed                                     Labs:                        10.3   9.28  )-----------( 71       ( 02 Oct 2024 06:21 )             32.7     10-02    138  |  110[H]  |  9   ----------------------------<  72  3.7   |  23  |  0.48[L]    Ca    8.1[L]      02 Oct 2024 06:21  Phos  2.1     10-02  Mg     1.7     10-02         Vancomycin Level, Trough: 8.4 ug/mL (10-02 @ 09:51)      Cultures:       Culture - Abscess with Gram Stain (collected 09-30-24 @ 15:00)  Source: .Abscess  Gram Stain (10-01-24 @ 03:00):    Few polymorphonuclear leukocytes seen per low power field    Few Gram positive cocci in pairs seen per oil power field    Few Gram Negative Rods seen per oil power field  Preliminary Report (10-01-24 @ 22:32):    Few Enterococcus faecalis    Rare Klebsiella pneumoniae    Culture - Blood (collected 09-29-24 @ 06:06)  Source: .Blood BLOOD  Gram Stain (09-29-24 @ 21:29):    Growth in aerobic bottle: Gram Negative Rods and Gram positive cocci in    pairs    Growth in anaerobic bottle: Gram Negative Rods and Gram positive cocci in    pairs and Gram Positive Rods  Final Report (10-01-24 @ 12:11):    Growth in aerobic and anaerobic bottles: Klebsiella pneumoniae    Growth in aerobic and anaerobic bottles: Enterococcus faecalis    Growth in anaerobic bottle: Clostridium perfringens "Susceptibilities not    performed"    See previous culture [id number]    Culture - Blood (collected 09-29-24 @ 06:06)  Source: .Blood BLOOD  Gram Stain (09-29-24 @ 21:28):    Growth in anaerobic bottle: Gram Positive Rods    and Gram positive cocci in pairs    and Gram Negative Rods    Growth in aerobic bottle: Gram positive cocci in pairs and Gram Negative    Rods  Final Report (10-01-24 @ 12:10):    Growth in aerobic and anaerobic bottles: Klebsiella pneumoniae    Growth in aerobic and anaerobic bottles: Enterococcus faecalis    Growth in anaerobic bottle: Enterococcus faecium    Growth in anaerobic bottle: Clostridium perfringens "Susceptibilities not    performed"    Direct identification is available within approximately 3-5    hours either by Blood Panel Multiplexed PCR or Direct    MALDI-TOF. Details: [email or web address]  Organism: Blood Culture PCR  Klebsiella pneumoniae  Enterococcus faecalis  Enterococcus faecium (10-01-24 @ 12:10)  Organism: Enterococcus faecium (10-01-24 @ 12:10)      Method Type: ARLEY      -  Ampicillin: R >8 Predicts results to ampicillin/sulbactam, amoxacillin-clavulanate and  piperacillin-tazobactam.      -  Vancomycin: S 0.5      -  Gentamicin synergy: S <=500      -  Streptomycin synergy: R >1000  Organism: Enterococcus faecalis (10-01-24 @ 12:10)      Method Type: ARLEY      -  Ampicillin: S <=2 Predicts results to ampicillin/sulbactam, amoxacillin-clavulanate and  piperacillin-tazobactam.      -  Vancomycin: S 2      -  Gentamicin synergy: S <=500      -  Streptomycin synergy: S <=1000  Organism: Klebsiella pneumoniae (10-01-24 @ 12:10)      Method Type: ARLEY      -  Ampicillin: R >16 These ampicillin results predict results for amoxicillin      -  Ampicillin/Sulbactam: S 8/4      -  Aztreonam: S <=4      -  Cefazolin: S <=2      -  Cefepime: S <=2      -  Cefoxitin: I 16      -  Ceftriaxone: S <=1      -  Ciprofloxacin: S <=0.25      -  Ertapenem: S <=0.5      -  Gentamicin: S <=2      -  Imipenem: S <=1      -  Levofloxacin: S <=0.5      -  Meropenem: S <=1      -  Piperacillin/Tazobactam: S <=8      -  Tobramycin: S <=2      -  Trimethoprim/Sulfamethoxazole: S <=0.5/9.5  Organism: Blood Culture PCR (10-01-24 @ 12:10)      Method Type: PCR      -  K. pneumoniae group: Detec (K. pneumoniae, K. quasipneumoniae, K. variicola)      -  Enterococcus faecalis: Detec      -  Enterococcus faecium: Detec    Culture - Urine (collected 09-29-24 @ 06:06)  Source: Clean Catch None  Final Report (09-30-24 @ 12:10):    No growth      Culture - Abscess with Gram Stain (09.30.24 @ 15:00)   Gram Stain:   Few polymorphonuclear leukocytes seen per low power field   Few Gram positive cocci in pairs seen per oil power field   Few Gram Negative Rods seen per oil power field  Specimen Source: .Abscess  Culture Results:   Few Enterococcus faecalis   Rare Klebsiella pneumoniae      < from: CT Abdomen and Pelvis w/ IV Cont (09.29.24 @ 07:22) >    ACC: [id number] EXAM:  CT ABDOMEN AND PELVIS IC   ORDERED BY: CHAPO MCGILL     PROCEDURE DATE:  09/29/2024          INTERPRETATION:  CLINICAL INFORMATION: Lower abdominal pain and fever,   history of malignancy    COMPARISON: CT chest abdomen pelvis 9/9/2024 CT abdomen pelvis 8/7/2024,   MRI of the abdomen 9/10/2024    CONTRAST/COMPLICATIONS:  IV Contrast: Omnipaque 350  90 cc administered   0 cc discarded  Oral Contrast: NONE  Complications: None reported at time of study completion    PROCEDURE:  CTof the Abdomen and Pelvis was performed.  Sagittal and coronal reformats were performed.    FINDINGS:  LOWER CHEST: A few less than 4 mm right middle lobe and bilateral lower   nodules slightly increased in size and number from the prior exam. Right   retrocrural cystic lesion is unchanged.    LIVER: Extensive area of hypovascularity in the anterior right hepatic   lobe extending into the right hepatic dome to a greater extent than on   prior CT of 9/9/2024 correlate for recent treatment or manipulation. Left   hepatic hypovascular lesions are unchanged.  BILE DUCTS: Biliary stent identified.  GALLBLADDER: Not well delineated.  SPLEEN: Several areas of decreased peripheral wedge-shaped enhancement of   the spleen slightly more prominent on prior exam of unclear etiology and   significance.  PANCREAS: Within normal limits.  ADRENALS: Within normal limits.  KIDNEYS/URETERS: Kidneys enhance bilaterally and symmetrically without   hydronephrosis. Multiple left parapelvic cyst and posterior left mid to   upper pole renal cyst.    BLADDER: Within normal limits.  REPRODUCTIVE ORGANS: Uterus and adnexa within normal limits.    BOWEL: No bowel obstruction. Appendix is not visualized. No evidence of   inflammation in the pericecal region. Sigmoid diverticulosis without   diverticulitis.  PERITONEUM/RETROPERITONEUM: Within normal limits.  VESSELS: Atherosclerotic changes.  LYMPH NODES: No lymphadenopathy.  ABDOMINAL WALL: Within normal limits.  BONES: Degenerative changes and osteopenia.    IMPRESSION:  Sigmoid diverticulosis without diverticulitis. No bowel obstruction or   gross bowel wall thickening.    Extensive area of hypovascularity throughout the right hepatic lobe more   prominent than on prior exam may represent sequela of recent treatment,   correlate with clinical history. Stable appearance of the left hepatic   lobe.    < end of copied text >        Radiology: all available radiological tests reviewed    Advanced directives addressed: full resuscitation

## 2024-10-02 NOTE — PROGRESS NOTE ADULT - SUBJECTIVE AND OBJECTIVE BOX
CCU Progress  Note    HPI:    S:    Pt seen and examined  80yo F with PMH: Renal Cell Carcinoma with mets to liver, IR embolization of a liver lesion on 7/30 Diverticulitis, HTN, HLD, Hypothyroid was recently, recent new onset Afib/RVR in August 2024 started on Eliquis, GI Bleed after ERCP- resolved without intervention, started on eliquis during admit for Afib. liver abscesses    admitted 8/2-8/15 for septic shock requiring pressors and critical care admission, Found to have dilated common bile duct, elevated LFTs and likely cholangitis/choledocholithiasis with stone, s/p ERCP with sphincterotomy/stone removal and stent placement on 8/5/24    admitted 9/9-9/14 for septic shock, requiring levophed and critical care admission, blood cultures during admission had Enterobacter, sensitive to Cipro. Noted liver abscess on MRI, felt to be too small for biopsy at the time, decision for prolong antibiotic course made. Discharged on Cipro/Flagyl to complete her therapy, this was completed on Monday 9/23.     She was doing well until today, when she developed fever to 103+, nausea, vomiting, diarrhea-brown, which she states is exactly how she presented for 9/9-9/14 admission.  Found to be hypotensive in ED, give 3 liters sepsis fluids without improvement and started on levophed for BP augmentation. ICU Consulted.    9/30: Remains on pressors, source felt to be liver abscess, larger then previous. IR, ID consults, on merrem as S on last admit.  10/1: s/p IR drainage; extremely "SIRS-y" post drainage came over in [facility], higher pressor requirement  10/2: Improved with resuscitation, pressors weaned off. HR better controlled. Cx's with multiple bugs ID'd, ID recs appreciated.     ROS: + abd pain, N/V, improving  Remainder of systems reviewed, negative      Allergies    Crestor (Other)  Lipitor (Unknown)  tivozanib (Faint)  Cabometyx (Unknown)  Dyazide (Faint)  statins (Unknown)  bacitracin (Rash)  Zetia (Unknown)  Norvasc (Faint)    Intolerances        MEDICATIONS  (STANDING):  acetaminophen   IVPB .. 1000 milliGRAM(s) IV Intermittent once  aMIOdarone    Tablet 100 milliGRAM(s) Oral daily  chlorhexidine 4% Liquid 1 Application(s) Topical <User Schedule>  hydrocortisone 10 milliGRAM(s) Oral at bedtime  hydrocortisone 20 milliGRAM(s) Oral daily  levothyroxine 100 MICROGram(s) Oral daily  magnesium sulfate  IVPB 2 Gram(s) IV Intermittent once  meropenem Injectable 1000 milliGRAM(s) IV Push every 12 hours  pantoprazole  Injectable 40 milliGRAM(s) IV Push daily  potassium chloride  10 mEq/100 mL IVPB 10 milliEquivalent(s) IV Intermittent every 1 hour  vancomycin  IVPB 750 milliGRAM(s) IV Intermittent every 12 hours    MEDICATIONS  (PRN):  HYDROmorphone  Injectable 0.5 milliGRAM(s) IV Push every 4 hours PRN Moderate Pain (4 - 6)      Drug Dosing Weight  Height (cm): 154.9 (01 Oct 2024 18:19)  Weight (kg): 63.1 (01 Oct 2024 18:19)  BMI (kg/m2): 26.3 (01 Oct 2024 18:19)  BSA (m2): 1.62 (01 Oct 2024 18:19)    PAST MEDICAL & SURGICAL HISTORY:  Hypertension      High cholesterol      Diverticulitis      History of diverticulitis      Hypothyroidism      Renal cell cancer      Metastasis to liver      Paroxysmal atrial fibrillation      Cholelithiasis with cholangitis      History of biliary stent insertion      History of tonsillectomy      History of laparotomy      History of arthroscopy      H/O bilateral oophorectomy      S/P surgical removal of pilonidal cyst          FAMILY HISTORY:      UNLESS OTHERWISE NOTED IN HPI above:    Constitutional:  No Weight Change, No Fever, No Chills, No Night Sweats, No Fatigue, No Malaise  ENT/Mouth:  No Hearing Changes, No Ear Pain, No Nasal Congestion, No  Sinus Pain, No Hoarseness, No sore throat, No Rhinorrhea, No Swallowing  Difficulty  Eyes:  No Eye Pain, No Swelling, No Redness, No Foreign Body, No Discharge, No Vision Changes  Cardiovascular:  No Chest Pain, No SOB, No PND, No Dyspnea on Exertion,  No Orthopnea, No Claudication, No Edema, No Palpitations  Respiratory:  No Cough, No Sputum, No Wheezing, No Smoke Exposure, No Dyspnea  Gastrointestinal:  No Nausea, No Vomiting, No Diarrhea, No  Constipation, No Pain, No Heartburn, No Anorexia, No Dysphagia, No  Hematochezia, No Melena, No Flatulence, No Jaundice  Genitourinary:  No Dysmenorrhea, No DUB, No Dyspareunia, No Dysuria, No  Urinary Frequency, No Hematuria, No Urinary Incontinence, No Urgency,  No Flank Pain, No Urinary Flow Changes, No Hesitancy  Musculoskeletal:  No Arthralgias, No Myalgias, No Joint Swelling, No  Joint Stiffness, No Back Pain, No Neck Pain, No Injury History  Skin:  No Skin Lesions, No Pruritis, No Hair Changes, No Breast/Skin Changes, No Nipple Discharge  Neuro:  No Weakness, No Numbness, No Paresthesias, No Loss of  Consciousness, No Syncope, No Dizziness, No Headache, No Coordination  Changes, No Recent Falls  Psych:  No Anxiety/Panic, No Depression, No Insomnia, No Personality  Changes, No Delusions, No Rumination, No SI/HI/AH/VH, No Social Issues,  No Memory Changes, No Violence/Abuse Hx., No Eating Concerns  Heme/Lymph:  No Bruising, No Bleeding, No Transfusions History, No Lymphadenopathy  Endocrine:  No Polyuria, No Polydipsia, No Temperature Intolerance    O:    ICU Vital Signs Last 24 Hrs  T(C): 36.4 (03 Oct 2024 08:00), Max: 37.3 (02 Oct 2024 16:20)  T(F): 97.6 (03 Oct 2024 08:00), Max: 99.1 (02 Oct 2024 16:20)  HR: 99 (03 Oct 2024 09:00) (82 - 106)  BP: 151/88 (03 Oct 2024 06:00) (114/98 - 151/88)  BP(mean): 108 (03 Oct 2024 06:00) (99 - 115)  ABP: --  ABP(mean): --  RR: 22 (03 Oct 2024 09:00) (14 - 24)  SpO2: 100% (03 Oct 2024 09:00) (93% - 100%)    O2 Parameters below as of 02 Oct 2024 18:00  Patient On (Oxygen Delivery Method): room air                I&O's Detail    02 Oct 2024 07:01  -  03 Oct 2024 07:00  --------------------------------------------------------  IN:  Total IN: 0 mL    OUT:    Drain (mL): 0 mL    Voided (mL): 1 mL  Total OUT: 1 mL    Total NET: -1 mL              PE:    Adult F lying in bed  + appears ill but not toxic  No JVD trachea midline  Normocephalic, atraumatic  S1S2+ afib but rate controlled  CTA B/L  Abd soft some TTP RUQ to deep palpation, no peritoneal signs  + IR drain in place with puruplent blood effluent  No leg swelling/edema noted  Awake and alert  Skin pink, warm      LABS:    CBC Full  -  ( 03 Oct 2024 05:42 )  WBC Count : 9.16 K/uL  RBC Count : 3.91 M/uL  Hemoglobin : 10.6 g/dL  Hematocrit : 33.1 %  Platelet Count - Automated : 112 K/uL  Mean Cell Volume : 84.7 fl  Mean Cell Hemoglobin : 27.1 pg  Mean Cell Hemoglobin Concentration : 32.0 gm/dL  Auto Neutrophil # : x  Auto Lymphocyte # : x  Auto Monocyte # : x  Auto Eosinophil # : x  Auto Basophil # : x  Auto Neutrophil % : x  Auto Lymphocyte % : x  Auto Monocyte % : x  Auto Eosinophil % : x  Auto Basophil % : x    10-03    140  |  105  |  8   ----------------------------<  86  3.2[L]   |  29  |  0.51    Ca    8.0[L]      03 Oct 2024 05:42  Phos  2.8     10-03  Mg     1.5     10-03    TPro  5.1[L]  /  Alb  1.7[L]  /  TBili  0.9  /  DBili  0.4[H]  /  AST  298[H]  /  ALT  254[H]  /  AlkPhos  332[H]  10-03    PT/INR - ( 03 Oct 2024 05:42 )   PT: 12.3 sec;   INR: 1.04 ratio           Urinalysis Basic - ( 03 Oct 2024 05:42 )    Color: x / Appearance: x / SG: x / pH: x  Gluc: 86 mg/dL / Ketone: x  / Bili: x / Urobili: x   Blood: x / Protein: x / Nitrite: x   Leuk Esterase: x / RBC: x / WBC x   Sq Epi: x / Non Sq Epi: x / Bacteria: x      CAPILLARY BLOOD GLUCOSE            LIVER FUNCTIONS - ( 03 Oct 2024 05:42 )  Alb: 1.7 g/dL / Pro: 5.1 gm/dL / ALK PHOS: 332 U/L / ALT: 254 U/L / AST: 298 U/L / GGT: x

## 2024-10-03 DIAGNOSIS — A41.9 SEPSIS, UNSPECIFIED ORGANISM: ICD-10-CM

## 2024-10-03 DIAGNOSIS — E83.42 HYPOMAGNESEMIA: ICD-10-CM

## 2024-10-03 DIAGNOSIS — R18.8 OTHER ASCITES: ICD-10-CM

## 2024-10-03 DIAGNOSIS — E87.6 HYPOKALEMIA: ICD-10-CM

## 2024-10-03 LAB
ALBUMIN SERPL ELPH-MCNC: 1.7 G/DL — LOW (ref 3.3–5)
ALP SERPL-CCNC: 332 U/L — HIGH (ref 40–120)
ALT FLD-CCNC: 254 U/L — HIGH (ref 12–78)
ANION GAP SERPL CALC-SCNC: 6 MMOL/L — SIGNIFICANT CHANGE UP (ref 5–17)
AST SERPL-CCNC: 298 U/L — HIGH (ref 15–37)
BILIRUB DIRECT SERPL-MCNC: 0.4 MG/DL — HIGH (ref 0–0.3)
BILIRUB INDIRECT FLD-MCNC: 0.5 MG/DL — SIGNIFICANT CHANGE UP (ref 0.2–1)
BILIRUB SERPL-MCNC: 0.9 MG/DL — SIGNIFICANT CHANGE UP (ref 0.2–1.2)
BUN SERPL-MCNC: 8 MG/DL — SIGNIFICANT CHANGE UP (ref 7–23)
CALCIUM SERPL-MCNC: 8 MG/DL — LOW (ref 8.5–10.1)
CHLORIDE SERPL-SCNC: 105 MMOL/L — SIGNIFICANT CHANGE UP (ref 96–108)
CO2 SERPL-SCNC: 29 MMOL/L — SIGNIFICANT CHANGE UP (ref 22–31)
CREAT SERPL-MCNC: 0.51 MG/DL — SIGNIFICANT CHANGE UP (ref 0.5–1.3)
EGFR: 96 ML/MIN/1.73M2 — SIGNIFICANT CHANGE UP
GLUCOSE SERPL-MCNC: 86 MG/DL — SIGNIFICANT CHANGE UP (ref 70–99)
HCT VFR BLD CALC: 33.1 % — LOW (ref 34.5–45)
HGB BLD-MCNC: 10.6 G/DL — LOW (ref 11.5–15.5)
INR BLD: 1.04 RATIO — SIGNIFICANT CHANGE UP (ref 0.85–1.16)
MAGNESIUM SERPL-MCNC: 1.5 MG/DL — LOW (ref 1.6–2.6)
MCHC RBC-ENTMCNC: 27.1 PG — SIGNIFICANT CHANGE UP (ref 27–34)
MCHC RBC-ENTMCNC: 32 GM/DL — SIGNIFICANT CHANGE UP (ref 32–36)
MCV RBC AUTO: 84.7 FL — SIGNIFICANT CHANGE UP (ref 80–100)
PHOSPHATE SERPL-MCNC: 2.8 MG/DL — SIGNIFICANT CHANGE UP (ref 2.5–4.5)
PLATELET # BLD AUTO: 112 K/UL — LOW (ref 150–400)
POTASSIUM SERPL-MCNC: 3.2 MMOL/L — LOW (ref 3.5–5.3)
POTASSIUM SERPL-SCNC: 3.2 MMOL/L — LOW (ref 3.5–5.3)
PROT SERPL-MCNC: 5.1 GM/DL — LOW (ref 6–8.3)
PROTHROM AB SERPL-ACNC: 12.3 SEC — SIGNIFICANT CHANGE UP (ref 9.9–13.4)
RBC # BLD: 3.91 M/UL — SIGNIFICANT CHANGE UP (ref 3.8–5.2)
RBC # FLD: 18.9 % — HIGH (ref 10.3–14.5)
SODIUM SERPL-SCNC: 140 MMOL/L — SIGNIFICANT CHANGE UP (ref 135–145)
VANCOMYCIN TROUGH SERPL-MCNC: 10 UG/ML — SIGNIFICANT CHANGE UP (ref 10–20)
WBC # BLD: 9.16 K/UL — SIGNIFICANT CHANGE UP (ref 3.8–10.5)
WBC # FLD AUTO: 9.16 K/UL — SIGNIFICANT CHANGE UP (ref 3.8–10.5)

## 2024-10-03 PROCEDURE — 99231 SBSQ HOSP IP/OBS SF/LOW 25: CPT

## 2024-10-03 PROCEDURE — 43264 ERCP REMOVE DUCT CALCULI: CPT

## 2024-10-03 PROCEDURE — 99291 CRITICAL CARE FIRST HOUR: CPT

## 2024-10-03 PROCEDURE — 43276 ERCP STENT EXCHANGE W/DILATE: CPT

## 2024-10-03 RX ORDER — MAGNESIUM SULFATE 500 MG/ML
2 VIAL (ML) INJECTION ONCE
Refills: 0 | Status: COMPLETED | OUTPATIENT
Start: 2024-10-03 | End: 2024-10-03

## 2024-10-03 RX ORDER — MEROPENEM 500 MG/20ML
1000 INJECTION INTRAVENOUS EVERY 8 HOURS
Refills: 0 | Status: DISCONTINUED | OUTPATIENT
Start: 2024-10-03 | End: 2024-10-08

## 2024-10-03 RX ORDER — METOPROLOL TARTRATE 50 MG
50 TABLET ORAL EVERY 12 HOURS
Refills: 0 | Status: DISCONTINUED | OUTPATIENT
Start: 2024-10-03 | End: 2024-10-16

## 2024-10-03 RX ADMIN — HYDROMORPHONE HYDROCHLORIDE 0.5 MILLIGRAM(S): 1 INJECTION, SOLUTION INTRAMUSCULAR; INTRAVENOUS; SUBCUTANEOUS at 09:55

## 2024-10-03 RX ADMIN — VANCOMYCIN HCL-SODIUM CHLORIDE IV SOLN 1.5 GM/250ML-0.9% 250 MILLIGRAM(S): 1.5-0.9/25 SOLUTION at 22:00

## 2024-10-03 RX ADMIN — Medication 50 GRAM(S): at 12:18

## 2024-10-03 RX ADMIN — HYDROMORPHONE HYDROCHLORIDE 0.5 MILLIGRAM(S): 1 INJECTION, SOLUTION INTRAMUSCULAR; INTRAVENOUS; SUBCUTANEOUS at 10:30

## 2024-10-03 RX ADMIN — HYDROMORPHONE HYDROCHLORIDE 0.5 MILLIGRAM(S): 1 INJECTION, SOLUTION INTRAMUSCULAR; INTRAVENOUS; SUBCUTANEOUS at 22:30

## 2024-10-03 RX ADMIN — Medication 40 MILLIEQUIVALENT(S): at 18:26

## 2024-10-03 RX ADMIN — PANTOPRAZOLE SODIUM 40 MILLIGRAM(S): 40 TABLET, DELAYED RELEASE ORAL at 10:18

## 2024-10-03 RX ADMIN — Medication 100 MILLIEQUIVALENT(S): at 16:18

## 2024-10-03 RX ADMIN — MEROPENEM 1000 MILLIGRAM(S): 500 INJECTION INTRAVENOUS at 16:16

## 2024-10-03 RX ADMIN — Medication 100 MILLIEQUIVALENT(S): at 10:17

## 2024-10-03 RX ADMIN — CHLORHEXIDINE GLUCONATE ORAL RINSE 1 APPLICATION(S): 1.2 SOLUTION DENTAL at 06:55

## 2024-10-03 RX ADMIN — Medication 100 MILLIEQUIVALENT(S): at 12:18

## 2024-10-03 RX ADMIN — HYDROCORTISONE 10 MILLIGRAM(S): 5 TABLET ORAL at 22:00

## 2024-10-03 RX ADMIN — VANCOMYCIN HCL-SODIUM CHLORIDE IV SOLN 1.5 GM/250ML-0.9% 250 MILLIGRAM(S): 1.5-0.9/25 SOLUTION at 10:19

## 2024-10-03 RX ADMIN — AMIODARONE HYDROCHLORIDE 100 MILLIGRAM(S): 50 INJECTION, SOLUTION INTRAVENOUS at 10:18

## 2024-10-03 RX ADMIN — HYDROCORTISONE 20 MILLIGRAM(S): 5 TABLET ORAL at 10:19

## 2024-10-03 RX ADMIN — MEROPENEM 1000 MILLIGRAM(S): 500 INJECTION INTRAVENOUS at 06:55

## 2024-10-03 RX ADMIN — MEROPENEM 1000 MILLIGRAM(S): 500 INJECTION INTRAVENOUS at 22:00

## 2024-10-03 RX ADMIN — Medication 100 MICROGRAM(S): at 06:55

## 2024-10-03 RX ADMIN — Medication 50 MILLIGRAM(S): at 18:26

## 2024-10-03 RX ADMIN — HYDROMORPHONE HYDROCHLORIDE 0.5 MILLIGRAM(S): 1 INJECTION, SOLUTION INTRAMUSCULAR; INTRAVENOUS; SUBCUTANEOUS at 22:00

## 2024-10-03 NOTE — PROGRESS NOTE ADULT - SUBJECTIVE AND OBJECTIVE BOX
Pt seen, comfortable, going for procedure today, low grade fever +     MEDICATIONS  (STANDING):  acetaminophen   IVPB .. 1000 milliGRAM(s) IV Intermittent once  aMIOdarone    Tablet 100 milliGRAM(s) Oral daily  chlorhexidine 4% Liquid 1 Application(s) Topical <User Schedule>  hydrocortisone 10 milliGRAM(s) Oral at bedtime  hydrocortisone 20 milliGRAM(s) Oral daily  levothyroxine 100 MICROGram(s) Oral daily  magnesium sulfate  IVPB 2 Gram(s) IV Intermittent once  meropenem Injectable 1000 milliGRAM(s) IV Push every 12 hours  pantoprazole  Injectable 40 milliGRAM(s) IV Push daily  potassium chloride  10 mEq/100 mL IVPB 10 milliEquivalent(s) IV Intermittent every 1 hour  vancomycin  IVPB 750 milliGRAM(s) IV Intermittent every 12 hours    MEDICATIONS  (PRN):  HYDROmorphone  Injectable 0.5 milliGRAM(s) IV Push every 4 hours PRN Moderate Pain (4 - 6)      ROS     No epistaxis, HA, sore throat       Vital Signs Last 24 Hrs  T(C): 36.4 (03 Oct 2024 08:00), Max: 37.3 (02 Oct 2024 16:20)  T(F): 97.6 (03 Oct 2024 08:00), Max: 99.1 (02 Oct 2024 16:20)  HR: 99 (03 Oct 2024 09:00) (82 - 106)  BP: 151/88 (03 Oct 2024 06:00) (114/98 - 151/88)  BP(mean): 108 (03 Oct 2024 06:00) (99 - 115)  RR: 22 (03 Oct 2024 09:00) (14 - 24)  SpO2: 100% (03 Oct 2024 09:00) (93% - 100%)    Parameters below as of 02 Oct 2024 18:00  Patient On (Oxygen Delivery Method): room air        PE  NAD  Awake, alert   Abd R drain  No c/c/e  No rash grossly  FROM                          10.6   9.16  )-----------( 112      ( 03 Oct 2024 05:42 )             33.1       10-03    140  |  105  |  8   ----------------------------<  86  3.2[L]   |  29  |  0.51    Ca    8.0[L]      03 Oct 2024 05:42  Phos  2.8     10-03  Mg     1.5     10-03    TPro  5.1[L]  /  Alb  1.7[L]  /  TBili  0.9  /  DBili  0.4[H]  /  AST  298[H]  /  ALT  254[H]  /  AlkPhos  332[H]  10-03

## 2024-10-03 NOTE — PROGRESS NOTE ADULT - SUBJECTIVE AND OBJECTIVE BOX
Patient is a 79y old  Female who presents with a chief complaint of fever, abd pain, nausea, diarrhea (03 Oct 2024 16:58)      BRIEF HOSPITAL COURSE-      Events last 24 hours:   s/p ERCP         Review of Systems:  CONSTITUTIONAL: No fever, chills, or fatigue  EYES: No eye pain, visual disturbances, or discharge  ENMT:  No difficulty hearing, tinnitus, vertigo; No sinus or throat pain  NECK: No pain or stiffness  RESPIRATORY: No cough, wheezing, chills or hemoptysis; No shortness of breath  CARDIOVASCULAR: No chest pain, palpitations, dizziness, or leg swelling  GASTROINTESTINAL: No abdominal or epigastric pain. No nausea, vomiting, or hematemesis; No diarrhea or constipation. No melena or hematochezia.  GENITOURINARY: No dysuria, frequency, hematuria, or incontinence  NEUROLOGICAL: No headaches, memory loss, loss of strength, numbness, or tremors  SKIN: No itching, burning, rashes, or lesions   MUSCULOSKELETAL: No joint pain or swelling; No muscle, back, or extremity pain  PSYCHIATRIC: No depression, anxiety, mood swings, or difficulty sleeping        PAST MEDICAL & SURGICAL HISTORY-  Hypertension      High cholesterol      Diverticulitis      History of diverticulitis      Hypothyroidism      Renal cell cancer      Metastasis to liver      Paroxysmal atrial fibrillation      Cholelithiasis with cholangitis      History of biliary stent insertion      History of tonsillectomy      History of laparotomy      History of arthroscopy      H/O bilateral oophorectomy      S/P surgical removal of pilonidal cyst          Medications:  meropenem Injectable 1000 milliGRAM(s) IV Push every 8 hours  vancomycin  IVPB 750 milliGRAM(s) IV Intermittent every 12 hours    aMIOdarone    Tablet 100 milliGRAM(s) Oral daily  metoprolol succinate ER 50 milliGRAM(s) Oral every 12 hours      acetaminophen   IVPB .. 1000 milliGRAM(s) IV Intermittent once  HYDROmorphone  Injectable 0.5 milliGRAM(s) IV Push every 4 hours PRN        pantoprazole  Injectable 40 milliGRAM(s) IV Push daily      hydrocortisone 10 milliGRAM(s) Oral at bedtime  hydrocortisone 20 milliGRAM(s) Oral daily  levothyroxine 100 MICROGram(s) Oral daily        chlorhexidine 4% Liquid 1 Application(s) Topical <User Schedule>            ICU Vital Signs Last 24 Hrs  T(C): 36.9 (04 Oct 2024 00:04), Max: 37.1 (03 Oct 2024 21:00)  T(F): 98.5 (04 Oct 2024 00:04), Max: 98.8 (03 Oct 2024 21:00)  HR: 88 (04 Oct 2024 02:00) (79 - 103)  BP: 150/96 (04 Oct 2024 02:00) (127/99 - 158/88)  BP(mean): 112 (04 Oct 2024 02:00) (102 - 122)  ABP: --  ABP(mean): --  RR: 27 (04 Oct 2024 02:00) (14 - 27)  SpO2: 99% (03 Oct 2024 21:00) (93% - 100%)    O2 Parameters below as of 03 Oct 2024 13:30  Patient On (Oxygen Delivery Method): room air                I&O's Detail    02 Oct 2024 07:01  -  03 Oct 2024 07:00  --------------------------------------------------------  IN:  Total IN: 0 mL    OUT:    Drain (mL): 0 mL    Voided (mL): 1 mL  Total OUT: 1 mL    Total NET: -1 mL      03 Oct 2024 07:01  -  04 Oct 2024 02:38  --------------------------------------------------------  IN:    IV PiggyBack: 400 mL    Oral Fluid: 360 mL  Total IN: 760 mL    OUT:    Drain (mL): 20 mL    Voided (mL): 800 mL  Total OUT: 820 mL    Total NET: -60 mL          LABS:                        10.6   9.16  )-----------( 112      ( 03 Oct 2024 05:42 )             33.1     10-03    140  |  105  |  8   ----------------------------<  86  3.2[L]   |  29  |  0.51    Ca    8.0[L]      03 Oct 2024 05:42  Phos  2.8     10-03  Mg     1.5     10-03    TPro  5.1[L]  /  Alb  1.7[L]  /  TBili  0.9  /  DBili  0.4[H]  /  AST  298[H]  /  ALT  254[H]  /  AlkPhos  332[H]  10-03          CAPILLARY BLOOD GLUCOSE        PT/INR - ( 03 Oct 2024 05:42 )   PT: 12.3 sec;   INR: 1.04 ratio           Urinalysis Basic - ( 03 Oct 2024 05:42 )    Color: x / Appearance: x / SG: x / pH: x  Gluc: 86 mg/dL / Ketone: x  / Bili: x / Urobili: x   Blood: x / Protein: x / Nitrite: x   Leuk Esterase: x / RBC: x / WBC x   Sq Epi: x / Non Sq Epi: x / Bacteria: x      CULTURES:  Culture Results:   No growth at 24 hours (10-02 @ 12:11)  Culture Results:   No growth at 24 hours (10-02 @ 12:06)  Culture Results:   Few Enterococcus faecalis  Rare Klebsiella pneumoniae (09-30 @ 15:00)  Culture Results:   Growth in aerobic and anaerobic bottles: Klebsiella pneumoniae  Growth in aerobic and anaerobic bottles: Enterococcus faecalis  Growth in anaerobic bottle: Enterococcus faecium  Growth in anaerobic bottle: Clostridium perfringens "Susceptibilities not  performed"  Direct identification is available within approximately 3-5  hours either by Blood Panel Multiplexed PCR or Direct  MALDI-TOF. Details: [email or web address] (09-29 @ 06:06)  Culture Results:   Growth in aerobic and anaerobic bottles: Klebsiella pneumoniae  Growth in aerobic and anaerobic bottles: Enterococcus faecalis  Growth in anaerobic bottle: Clostridium perfringens "Susceptibilities not  performed"  See previous culture [id number] (09-29 @ 06:06)  Culture Results:   No growth (09-29 @ 06:06)      Physical Examination:  General: No acute distress.    HEENT: Pupils equal, reactive to light.  Symmetric.  PULM: Clear to auscultation bilaterally, no significant sputum production  NECK: Supple, no lymphadenopathy, trachea midline  CVS: Regular rate and rhythm, no murmurs, rubs, or gallops  ABD: Soft, nondistended, nontender, normoactive bowel sounds, no masses  EXT: No edema, nontender  SKIN: Warm and well perfused, no rashes noted.  NEURO: Alert, oriented, interactive, nonfocal  DEVICES:     RADIOLOGY: ***    CRITICAL CARE TIME SPENT: ** minutes assessing presenting problems of acute illness, which pose high probability of life threatening deterioration or end organ damage/dysfunction, as well as medical decision making including initiating plan of care, reviewing data, reviewing radiologic exams, discussing with multidisciplinary team,  discussing goals of care with patient/family, and writing this note.  Non-inclusive of procedures performed,      DATE OF ENTRY OF THIS NOTE IS EQUAL TO THE DATE OF SERVICES RENDERED Patient is a 79y old  Female who presents with a chief complaint of fever, abd pain, nausea, diarrhea (03 Oct 2024 16:58)      BRIEF HOSPITAL COURSE-  80 y/o Female with PMH of Renal Cell Carcinoma (W/ Liver Mets), IR embolization of a liver lesion on 7/30 Diverticulitis, HTN, HLD, Hypothyroid was recently, recent new onset Afib/RVR in August 2024 started on Eliquis, GI Bleed after ERCP- resolved without intervention, started on eliquis during admit for Afib. liver abscesses    Events last 24 hours:   Shock state resolved, transitioned off vasopressor therapy. Endorses mild abdominal tenderness.         Review of Systems:  CONSTITUTIONAL: No fever, chills, or fatigue  EYES: No eye pain, visual disturbances, or discharge  ENMT:  No difficulty hearing, tinnitus, vertigo; No sinus or throat pain  NECK: No pain or stiffness  RESPIRATORY: No cough, wheezing, chills or hemoptysis; No shortness of breath  CARDIOVASCULAR: No chest pain, palpitations, dizziness, or leg swelling  GASTROINTESTINAL: No abdominal or epigastric pain. No nausea, vomiting, or hematemesis; No diarrhea or constipation. No melena or hematochezia.  GENITOURINARY: No dysuria, frequency, hematuria, or incontinence  NEUROLOGICAL: No headaches, memory loss, loss of strength, numbness, or tremors  SKIN: No itching, burning, rashes, or lesions   MUSCULOSKELETAL: No joint pain or swelling; No muscle, back, or extremity pain  PSYCHIATRIC: No depression, anxiety, mood swings, or difficulty sleeping        PAST MEDICAL & SURGICAL HISTORY-  Hypertension      High cholesterol      Diverticulitis      History of diverticulitis      Hypothyroidism      Renal cell cancer      Metastasis to liver      Paroxysmal atrial fibrillation      Cholelithiasis with cholangitis      History of biliary stent insertion      History of tonsillectomy      History of laparotomy      History of arthroscopy      H/O bilateral oophorectomy      S/P surgical removal of pilonidal cyst          Medications:  meropenem Injectable 1000 milliGRAM(s) IV Push every 8 hours  vancomycin  IVPB 750 milliGRAM(s) IV Intermittent every 12 hours    aMIOdarone    Tablet 100 milliGRAM(s) Oral daily  metoprolol succinate ER 50 milliGRAM(s) Oral every 12 hours      acetaminophen   IVPB .. 1000 milliGRAM(s) IV Intermittent once  HYDROmorphone  Injectable 0.5 milliGRAM(s) IV Push every 4 hours PRN        pantoprazole  Injectable 40 milliGRAM(s) IV Push daily      hydrocortisone 10 milliGRAM(s) Oral at bedtime  hydrocortisone 20 milliGRAM(s) Oral daily  levothyroxine 100 MICROGram(s) Oral daily        chlorhexidine 4% Liquid 1 Application(s) Topical <User Schedule>            ICU Vital Signs Last 24 Hrs  T(C): 36.9 (04 Oct 2024 00:04), Max: 37.1 (03 Oct 2024 21:00)  T(F): 98.5 (04 Oct 2024 00:04), Max: 98.8 (03 Oct 2024 21:00)  HR: 88 (04 Oct 2024 02:00) (79 - 103)  BP: 150/96 (04 Oct 2024 02:00) (127/99 - 158/88)  BP(mean): 112 (04 Oct 2024 02:00) (102 - 122)  ABP: --  ABP(mean): --  RR: 27 (04 Oct 2024 02:00) (14 - 27)  SpO2: 99% (03 Oct 2024 21:00) (93% - 100%)    O2 Parameters below as of 03 Oct 2024 13:30  Patient On (Oxygen Delivery Method): room air                I&O's Detail    02 Oct 2024 07:01  -  03 Oct 2024 07:00  --------------------------------------------------------  IN:  Total IN: 0 mL    OUT:    Drain (mL): 0 mL    Voided (mL): 1 mL  Total OUT: 1 mL    Total NET: -1 mL      03 Oct 2024 07:01  -  04 Oct 2024 02:38  --------------------------------------------------------  IN:    IV PiggyBack: 400 mL    Oral Fluid: 360 mL  Total IN: 760 mL    OUT:    Drain (mL): 20 mL    Voided (mL): 800 mL  Total OUT: 820 mL    Total NET: -60 mL          LABS:                        10.6   9.16  )-----------( 112      ( 03 Oct 2024 05:42 )             33.1     10-03    140  |  105  |  8   ----------------------------<  86  3.2[L]   |  29  |  0.51    Ca    8.0[L]      03 Oct 2024 05:42  Phos  2.8     10-03  Mg     1.5     10-03    TPro  5.1[L]  /  Alb  1.7[L]  /  TBili  0.9  /  DBili  0.4[H]  /  AST  298[H]  /  ALT  254[H]  /  AlkPhos  332[H]  10-03          CAPILLARY BLOOD GLUCOSE        PT/INR - ( 03 Oct 2024 05:42 )   PT: 12.3 sec;   INR: 1.04 ratio           Urinalysis Basic - ( 03 Oct 2024 05:42 )    Color: x / Appearance: x / SG: x / pH: x  Gluc: 86 mg/dL / Ketone: x  / Bili: x / Urobili: x   Blood: x / Protein: x / Nitrite: x   Leuk Esterase: x / RBC: x / WBC x   Sq Epi: x / Non Sq Epi: x / Bacteria: x      CULTURES:  Culture Results:   No growth at 24 hours (10-02 @ 12:11)  Culture Results:   No growth at 24 hours (10-02 @ 12:06)  Culture Results:   Few Enterococcus faecalis  Rare Klebsiella pneumoniae (09-30 @ 15:00)  Culture Results:   Growth in aerobic and anaerobic bottles: Klebsiella pneumoniae  Growth in aerobic and anaerobic bottles: Enterococcus faecalis  Growth in anaerobic bottle: Enterococcus faecium  Growth in anaerobic bottle: Clostridium perfringens "Susceptibilities not  performed"  Direct identification is available within approximately 3-5  hours either by Blood Panel Multiplexed PCR or Direct  MALDI-TOF. Details: [email or web address] (09-29 @ 06:06)  Culture Results:   Growth in aerobic and anaerobic bottles: Klebsiella pneumoniae  Growth in aerobic and anaerobic bottles: Enterococcus faecalis  Growth in anaerobic bottle: Clostridium perfringens "Susceptibilities not  performed"  See previous culture [id number] (09-29 @ 06:06)  Culture Results:   No growth (09-29 @ 06:06)      Physical Examination:  General: No acute distress.    HEENT: Pupils equal, reactive to light.  Symmetric.  PULM: Clear to auscultation bilaterally, no significant sputum production  NECK: Supple, no lymphadenopathy, trachea midline  CVS: Regular rate and rhythm, no murmurs, rubs, or gallops  ABD: Soft, nondistended, nontender, normoactive bowel sounds, no masses  EXT: No edema, nontender  SKIN: Warm and well perfused, no rashes noted.  NEURO: Alert, oriented, interactive, nonfocal  DEVICES:     RADIOLOGY: ***    CRITICAL CARE TIME SPENT: ** minutes assessing presenting problems of acute illness, which pose high probability of life threatening deterioration or end organ damage/dysfunction, as well as medical decision making including initiating plan of care, reviewing data, reviewing radiologic exams, discussing with multidisciplinary team,  discussing goals of care with patient/family, and writing this note.  Non-inclusive of procedures performed,      DATE OF ENTRY OF THIS NOTE IS EQUAL TO THE DATE OF SERVICES RENDERED Patient is a 79y old  Female who presents with a chief complaint of fever, abd pain, nausea, diarrhea (03 Oct 2024 16:58)      BRIEF HOSPITAL COURSE-  78 y/o Female with PMH of Renal Cell Carcinoma (W/ Liver Mets), IR embolization of a liver lesion on 7/30, Diverticulitis, HTN, HLD, Hypothyroid, AFib (On Eliquis) presents to  ED complaining of fevers. Admitted to ICU for distributive shcok secondary to liver abscess. Of note, patient with multiple recent hospitalizations secondary to distributive shock in setting of acute liver abscess and cholangitis.     Events last 24 hours:   Shock state resolved, transitioned off vasopressor therapy. Endorses mild abdominal tenderness. s/p IR drainage of liver abscess.         Review of Systems:  CONSTITUTIONAL: No fever, chills, or fatigue.  EYES: No eye pain, visual disturbances, or discharge.  ENMT:  No difficulty hearing, tinnitus, vertigo. No sinus or throat pain.  NECK: No pain or stiffness.  RESPIRATORY: No cough, wheezing, chills or hemoptysis. No shortness of breath.  CARDIOVASCULAR: No chest pain, palpitations, dizziness, or leg swelling.  GASTROINTESTINAL: +Abdominal pain. No epigastric pain. No nausea, vomiting, or hematemesis; No diarrhea or constipation. No melena or hematochezia.  GENITOURINARY: No dysuria, frequency, hematuria, or incontinence.  NEUROLOGICAL: No headaches, memory loss, loss of strength, numbness, or tremors.  SKIN: No itching, burning, rashes, or lesions.  MUSCULOSKELETAL: No joint pain or swelling; No muscle, back, or extremity pain.  PSYCHIATRIC: No depression, anxiety, mood swings, or difficulty sleeping.        PAST MEDICAL & SURGICAL HISTORY-  Hypertension      High cholesterol      Diverticulitis      History of diverticulitis      Hypothyroidism      Renal cell cancer      Metastasis to liver      Paroxysmal atrial fibrillation      Cholelithiasis with cholangitis      History of biliary stent insertion      History of tonsillectomy      History of laparotomy      History of arthroscopy      H/O bilateral oophorectomy      S/P surgical removal of pilonidal cyst          Medications:  meropenem Injectable 1000 milliGRAM(s) IV Push every 8 hours  vancomycin  IVPB 750 milliGRAM(s) IV Intermittent every 12 hours    aMIOdarone    Tablet 100 milliGRAM(s) Oral daily  metoprolol succinate ER 50 milliGRAM(s) Oral every 12 hours    acetaminophen   IVPB .. 1000 milliGRAM(s) IV Intermittent once  HYDROmorphone  Injectable 0.5 milliGRAM(s) IV Push every 4 hours PRN    pantoprazole  Injectable 40 milliGRAM(s) IV Push daily    hydrocortisone 10 milliGRAM(s) Oral at bedtime  hydrocortisone 20 milliGRAM(s) Oral daily  levothyroxine 100 MICROGram(s) Oral daily    chlorhexidine 4% Liquid 1 Application(s) Topical <User Schedule>          ICU Vital Signs Last 24 Hrs  T(C): 36.9 (04 Oct 2024 00:04), Max: 37.1 (03 Oct 2024 21:00)  T(F): 98.5 (04 Oct 2024 00:04), Max: 98.8 (03 Oct 2024 21:00)  HR: 88 (04 Oct 2024 02:00) (79 - 103)  BP: 150/96 (04 Oct 2024 02:00) (127/99 - 158/88)  BP(mean): 112 (04 Oct 2024 02:00) (102 - 122)  ABP: --  ABP(mean): --  RR: 27 (04 Oct 2024 02:00) (14 - 27)  SpO2: 99% (03 Oct 2024 21:00) (93% - 100%)    O2 Parameters below as of 03 Oct 2024 13:30  Patient On (Oxygen Delivery Method): room air                I&O's Detail    02 Oct 2024 07:01  -  03 Oct 2024 07:00  --------------------------------------------------------  IN:  Total IN: 0 mL    OUT:    Drain (mL): 0 mL    Voided (mL): 1 mL  Total OUT: 1 mL    Total NET: -1 mL      03 Oct 2024 07:01  -  04 Oct 2024 02:38  --------------------------------------------------------  IN:    IV PiggyBack: 400 mL    Oral Fluid: 360 mL  Total IN: 760 mL    OUT:    Drain (mL): 20 mL    Voided (mL): 800 mL  Total OUT: 820 mL    Total NET: -60 mL          LABS:                        10.6   9.16  )-----------( 112      ( 03 Oct 2024 05:42 )             33.1     10-03    140  |  105  |  8   ----------------------------<  86  3.2[L]   |  29  |  0.51    Ca    8.0[L]      03 Oct 2024 05:42  Phos  2.8     10-03  Mg     1.5     10-03    TPro  5.1[L]  /  Alb  1.7[L]  /  TBili  0.9  /  DBili  0.4[H]  /  AST  298[H]  /  ALT  254[H]  /  AlkPhos  332[H]  10-03        CAPILLARY BLOOD GLUCOSE        PT/INR - ( 03 Oct 2024 05:42 )   PT: 12.3 sec;   INR: 1.04 ratio           Urinalysis Basic - ( 03 Oct 2024 05:42 )  Color: x / Appearance: x / SG: x / pH: x  Gluc: 86 mg/dL / Ketone: x  / Bili: x / Urobili: x   Blood: x / Protein: x / Nitrite: x   Leuk Esterase: x / RBC: x / WBC x   Sq Epi: x / Non Sq Epi: x / Bacteria: x        CULTURES:  Culture Results:   No growth at 24 hours (10-02 @ 12:11)  Culture Results:   No growth at 24 hours (10-02 @ 12:06)  Culture Results:   Few Enterococcus faecalis  Rare Klebsiella pneumoniae (09-30 @ 15:00)  Culture Results:   Growth in aerobic and anaerobic bottles: Klebsiella pneumoniae  Growth in aerobic and anaerobic bottles: Enterococcus faecalis  Growth in anaerobic bottle: Enterococcus faecium  Growth in anaerobic bottle: Clostridium perfringens "Susceptibilities not  performed"  Direct identification is available within approximately 3-5  hours either by Blood Panel Multiplexed PCR or Direct  MALDI-TOF. Details: [email or web address] (09-29 @ 06:06)  Culture Results:   Growth in aerobic and anaerobic bottles: Klebsiella pneumoniae  Growth in aerobic and anaerobic bottles: Enterococcus faecalis  Growth in anaerobic bottle: Clostridium perfringens "Susceptibilities not  performed"  See previous culture [id number] (09-29 @ 06:06)  Culture Results:   No growth (09-29 @ 06:06)      Physical Examination:  General: Elderly female, chronically ill appearing. In no acute distress.    HEENT: Pupils equal, reactive to light.  Symmetric.  PULM: Clear to auscultation bilaterally, no adventitious sounds. No significant sputum production.  NECK: Supple, no lymphadenopathy, trachea midline.  CVS: Regular rate and rhythm. No murmurs, rubs, or gallops. S1, S2 intact.   ABD: Soft, nondistended, nontender, normoactive bowel sounds. No masses palpable.   EXT: No edema. Nontender.   SKIN: Warm and well perfused, no rashes noted.  NEURO: Alert, oriented, interactive. Nonfocal.   DEVICES:       RADIOLOGY-    < from: CT Abdomen and Pelvis w/ IV Cont (09.29.24 @ 07:22) >  ACC: [id number] EXAM:  CT ABDOMEN AND PELVIS IC   ORDERED BY: CHAPO MCGILL     PROCEDURE DATE:  09/29/2024      INTERPRETATION:  CLINICAL INFORMATION: Lower abdominal pain and fever,   history of malignancy    COMPARISON: CT chest abdomen pelvis 9/9/2024 CT abdomen pelvis 8/7/2024,   MRI of the abdomen 9/10/2024    CONTRAST/COMPLICATIONS:  IV Contrast: Omnipaque 350  90 cc administered   0 cc discarded  Oral Contrast: NONE  Complications: None reported at time of study completion    PROCEDURE:  CTof the Abdomen and Pelvis was performed.  Sagittal and coronal reformats were performed.    FINDINGS:  LOWER CHEST: A few less than 4 mm right middle lobe and bilateral lower   nodules slightly increased in size and number from the prior exam. Right   retrocrural cystic lesion is unchanged.    LIVER: Extensive area of hypovascularity in the anterior right hepatic   lobe extending into the right hepatic dome to a greater extent than on   prior CT of 9/9/2024 correlate for recent treatment or manipulation. Left   hepatic hypovascular lesions are unchanged.  BILE DUCTS: Biliary stent identified.  GALLBLADDER: Not well delineated.  SPLEEN: Several areas of decreased peripheral wedge-shaped enhancement of   the spleen slightly more prominent on prior exam of unclear etiology and   significance.  PANCREAS: Within normal limits.  ADRENALS: Within normal limits.  KIDNEYS/URETERS: Kidneys enhance bilaterally and symmetrically without   hydronephrosis. Multiple left parapelvic cyst and posterior left mid to   upper pole renal cyst.    BLADDER: Within normal limits.  REPRODUCTIVE ORGANS: Uterus and adnexa within normal limits.    BOWEL: No bowel obstruction. Appendix is not visualized. No evidence of   inflammation in the pericecal region. Sigmoid diverticulosis without   diverticulitis.  PERITONEUM/RETROPERITONEUM: Within normal limits.  VESSELS: Atherosclerotic changes.  LYMPH NODES: No lymphadenopathy.  ABDOMINAL WALL: Within normal limits.  BONES: Degenerative changes and osteopenia.    IMPRESSION:  Sigmoid diverticulosis without diverticulitis. No bowel obstruction or   gross bowel wall thickening.    Extensive area of hypovascularity throughout the right hepatic lobe more   prominent than on prior exam may represent sequela of recent treatment,   correlate with clinical history. Stable appearance of the left hepatic   lobe.      --- End of Report ---    TRENTON ABRAHAM MD; Attending Radiologist  This document has been electronically signed. Sep 29 2024  7:40AM    < end of copied text >      Time spent on this patient encounter, which includes documenting this note in the electronic medical record, was 75+ minutes including assessing the presenting problems with associated risks, reviewing the medical record to prepare for the encounter, and meeting face to face with the patient to obtain additional history.  I have also performed an appropriate physical exam, made interventions listed and ordered and interpreted appropriate diagnostic studies as documented.     To improve communication and patient safety, I have coordinated care with the multidisciplinary team including the bedside nurse, appropriate attending of record and consultants as needed.          DATE OF ENTRY OF THIS NOTE IS EQUAL TO THE DATE OF SERVICES RENDERED

## 2024-10-03 NOTE — PROGRESS NOTE ADULT - ASSESSMENT
RCC IV/ papillary with liver metastases : Unknown primary  S/P Liver directed therapies , prior TKI/ immune therapy ( MSI-H) and recently Tivolizumab + Nivolumab  Multiple septic episodes/ Now with source likely from abscess  Improved on antibiotics  Anemia / multifactorial    PLAN    Antibiotics  Hydration  S/p IR guided drain placement  For CBD stent exchange today  physical therapy  DVT prophylaxis  Monitor counts  Will hold on therapy ( TKI ) for now  Can FU with IR as out patient      Thank you for the courtesy of this consultation and we will continue to follow.    Dusty Marcial MD  [facility]  Cell: [phone]

## 2024-10-03 NOTE — PROGRESS NOTE ADULT - ASSESSMENT
80yo F with PMH: Renal Cell Carcinoma with mets to liver, IR embolization of a liver lesion on 7/30, Diverticulitis, HTN, HLD, Hypothyroidism,  recent new onset Afib/RVR in August 2024 started on Eliquis, GI Bleed after ERCP- resolved without intervention, liver abscesses, an admission on  8/2-8/15 for septic shock requiring pressors and critical care admission, Found to have dilated common bile duct, elevated LFTs and likely cholangitis/choledocholithiasis with stone, s/p ERCP with sphincterotomy/stone removal and stent placement on 8/5/24, admitted 9/9-9/14 for septic shock, requiring levophed and critical care admission, blood cultures during admission had Enterobacter, sensitive to Cipro. Noted liver abscess on MRI, felt to be too small for biopsy at the time, decision for prolonged antibiotic course made. Discharged on Cipro/Flagyl to complete her therapy, this was completed on Monday 9/23. Then patient was admitted on 9/29 for evaluation of fever to 103, nausea, vomiting, diarrhea, similar to her presentation of her 9/9-9/14 admission, when admitted on 9/29 was so hypotensive has required levophed after fluids. The patient is still on pressors after fluids without improvement. Initial lactate was 8, but has improved to normal. The patient is alert and oriented and has mild difuse abdominal pain but overall her GI symptoms are improved.     1. Liver abscess s/p drainage with ENFA, KLPN. Sepsis with ENFA, ENFAE, KLPN and CLPE. Metastatic renal cell Ca with mets to the liver. CRF stage 3. Immunocompromised host.   -Patient admitted with septic shock secondary to multiple organisms most likely due to GI or liver origin, due to liver abscesses or masses  -still with low grade fever  -abdomen is improving  -on meropenem 1 g mIV q12h and vancomycin 750 mg IV q12h  -tolerating abx well so far; no side effects noted  -vancomycin dose increased yesterday due to low level  -continue IV abx coverage   -repeat BC x 2 collected  - serial cbc and monitor temperature   - IR consulted and drain placed-- growing Klebsiella and Enterococcus faecalis  - oncology evaluation and noted  - leukocytosis resolved  - GI evaluation in progress-- plan for CBD stent exchange   2. other issues; per medicine    [facility] abx token not applicable at this time

## 2024-10-03 NOTE — PROGRESS NOTE ADULT - SUBJECTIVE AND OBJECTIVE BOX
Date of service: 10-03-24 @ 10:44    Lying in bed in NAD  Has low grade fever    ROS: no fever or chills; denies dizziness, no HA, no SOB or cough, no abdominal pain, no diarrhea or constipation; no dysuria, no legs pain, no rashes    MEDICATIONS  (STANDING):  acetaminophen   IVPB .. 1000 milliGRAM(s) IV Intermittent once  aMIOdarone    Tablet 100 milliGRAM(s) Oral daily  chlorhexidine 4% Liquid 1 Application(s) Topical <User Schedule>  hydrocortisone 10 milliGRAM(s) Oral at bedtime  hydrocortisone 20 milliGRAM(s) Oral daily  levothyroxine 100 MICROGram(s) Oral daily  magnesium sulfate  IVPB 2 Gram(s) IV Intermittent once  meropenem Injectable 1000 milliGRAM(s) IV Push every 12 hours  pantoprazole  Injectable 40 milliGRAM(s) IV Push daily  potassium chloride  10 mEq/100 mL IVPB 10 milliEquivalent(s) IV Intermittent every 1 hour  vancomycin  IVPB 750 milliGRAM(s) IV Intermittent every 12 hours    Vital Signs Last 24 Hrs  T(C): 36.4 (03 Oct 2024 08:00), Max: 37.3 (02 Oct 2024 16:20)  T(F): 97.6 (03 Oct 2024 08:00), Max: 99.1 (02 Oct 2024 16:20)  HR: 99 (03 Oct 2024 09:00) (82 - 106)  BP: 151/88 (03 Oct 2024 06:00) (114/98 - 151/88)  BP(mean): 108 (03 Oct 2024 06:00) (99 - 115)  RR: 22 (03 Oct 2024 09:00) (14 - 24)  SpO2: 100% (03 Oct 2024 09:00) (93% - 100%)    Parameters below as of 02 Oct 2024 18:00  Patient On (Oxygen Delivery Method): room air     Physical exam:    Constitutional: frail looking  HEENT: NC/AT, EOMI, PERRLA, conjunctivae clear; ears and nose atraumatic; pharynx clear  Neck: supple; thyroid not palpable  Back: no tenderness  Respiratory: respiratory effort normal; clear to auscultation  Cardiovascular: S1S2 regular, no murmurs  Abdomen: soft, mildly tender, not distended, positive BS; no liver or spleen organomegaly; right sided drain with blood tinged fluid  Genitourinary: no suprapubic tenderness  Musculoskeletal: no muscle tenderness, no joint swelling or tenderness  Neurological/ Psychiatric: AxOx3, judgement and insight normal;  moving all extremities  Skin: no rashes; no palpable lesions    Labs: reviewed                        10.6   9.16  )-----------( 112      ( 03 Oct 2024 05:42 )             33.1     10-03    140  |  105  |  8   ----------------------------<  86  3.2[L]   |  29  |  0.51    Ca    8.0[L]      03 Oct 2024 05:42  Phos  2.8     10-03  Mg     1.5     10-03    TPro  5.1[L]  /  Alb  1.7[L]  /  TBili  0.9  /  DBili  0.4[H]  /  AST  298[H]  /  ALT  254[H]  /  AlkPhos  332[H]  10-03    Vancomycin Level, Trough: 8.4 ug/mL (10-02 @ 09:51)                        10.3   9.28  )-----------( 71       ( 02 Oct 2024 06:21 )             32.7     10-02    138  |  110[H]  |  9   ----------------------------<  72  3.7   |  23  |  0.48[L]    Ca    8.1[L]      02 Oct 2024 06:21  Phos  2.1     10-02  Mg     1.7     10-02    Cultures:       Culture - Abscess with Gram Stain (collected 09-30-24 @ 15:00)  Source: .Abscess  Gram Stain (10-01-24 @ 03:00):    Few polymorphonuclear leukocytes seen per low power field    Few Gram positive cocci in pairs seen per oil power field    Few Gram Negative Rods seen per oil power field  Preliminary Report (10-01-24 @ 22:32):    Few Enterococcus faecalis    Rare Klebsiella pneumoniae    Culture - Blood (collected 09-29-24 @ 06:06)  Source: .Blood BLOOD  Gram Stain (09-29-24 @ 21:29):    Growth in aerobic bottle: Gram Negative Rods and Gram positive cocci in    pairs    Growth in anaerobic bottle: Gram Negative Rods and Gram positive cocci in    pairs and Gram Positive Rods  Final Report (10-01-24 @ 12:11):    Growth in aerobic and anaerobic bottles: Klebsiella pneumoniae    Growth in aerobic and anaerobic bottles: Enterococcus faecalis    Growth in anaerobic bottle: Clostridium perfringens "Susceptibilities not    performed"    See previous culture [id number]    Culture - Blood (collected 09-29-24 @ 06:06)  Source: .Blood BLOOD  Gram Stain (09-29-24 @ 21:28):    Growth in anaerobic bottle: Gram Positive Rods    and Gram positive cocci in pairs    and Gram Negative Rods    Growth in aerobic bottle: Gram positive cocci in pairs and Gram Negative    Rods  Final Report (10-01-24 @ 12:10):    Growth in aerobic and anaerobic bottles: Klebsiella pneumoniae    Growth in aerobic and anaerobic bottles: Enterococcus faecalis    Growth in anaerobic bottle: Enterococcus faecium    Growth in anaerobic bottle: Clostridium perfringens "Susceptibilities not    performed"    Direct identification is available within approximately 3-5    hours either by Blood Panel Multiplexed PCR or Direct    MALDI-TOF. Details: [email or web address]  Organism: Blood Culture PCR  Klebsiella pneumoniae  Enterococcus faecalis  Enterococcus faecium (10-01-24 @ 12:10)  Organism: Enterococcus faecium (10-01-24 @ 12:10)      Method Type: ARLEY      -  Ampicillin: R >8 Predicts results to ampicillin/sulbactam, amoxacillin-clavulanate and  piperacillin-tazobactam.      -  Vancomycin: S 0.5      -  Gentamicin synergy: S <=500      -  Streptomycin synergy: R >1000  Organism: Enterococcus faecalis (10-01-24 @ 12:10)      Method Type: ARLEY      -  Ampicillin: S <=2 Predicts results to ampicillin/sulbactam, amoxacillin-clavulanate and  piperacillin-tazobactam.      -  Vancomycin: S 2      -  Gentamicin synergy: S <=500      -  Streptomycin synergy: S <=1000  Organism: Klebsiella pneumoniae (10-01-24 @ 12:10)      Method Type: ARLEY      -  Ampicillin: R >16 These ampicillin results predict results for amoxicillin      -  Ampicillin/Sulbactam: S 8/4      -  Aztreonam: S <=4      -  Cefazolin: S <=2      -  Cefepime: S <=2      -  Cefoxitin: I 16      -  Ceftriaxone: S <=1      -  Ciprofloxacin: S <=0.25      -  Ertapenem: S <=0.5      -  Gentamicin: S <=2      -  Imipenem: S <=1      -  Levofloxacin: S <=0.5      -  Meropenem: S <=1      -  Piperacillin/Tazobactam: S <=8      -  Tobramycin: S <=2      -  Trimethoprim/Sulfamethoxazole: S <=0.5/9.5  Organism: Blood Culture PCR (10-01-24 @ 12:10)      Method Type: PCR      -  K. pneumoniae group: Detec (K. pneumoniae, K. quasipneumoniae, K. variicola)      -  Enterococcus faecalis: Detec      -  Enterococcus faecium: Detec    Culture - Urine (collected 09-29-24 @ 06:06)  Source: Clean Catch None  Final Report (09-30-24 @ 12:10):    No growth    < from: CT Abdomen and Pelvis w/ IV Cont (09.29.24 @ 07:22) >    ACC: [id number] EXAM:  CT ABDOMEN AND PELVIS IC   ORDERED BY: CHAPO MCGILL     PROCEDURE DATE:  09/29/2024          INTERPRETATION:  CLINICAL INFORMATION: Lower abdominal pain and fever,   history of malignancy    COMPARISON: CT chest abdomen pelvis 9/9/2024 CT abdomen pelvis 8/7/2024,   MRI of the abdomen 9/10/2024    CONTRAST/COMPLICATIONS:  IV Contrast: Omnipaque 350  90 cc administered   0 cc discarded  Oral Contrast: NONE  Complications: None reported at time of study completion    PROCEDURE:  CTof the Abdomen and Pelvis was performed.  Sagittal and coronal reformats were performed.    FINDINGS:  LOWER CHEST: A few less than 4 mm right middle lobe and bilateral lower   nodules slightly increased in size and number from the prior exam. Right   retrocrural cystic lesion is unchanged.    LIVER: Extensive area of hypovascularity in the anterior right hepatic   lobe extending into the right hepatic dome to a greater extent than on   prior CT of 9/9/2024 correlate for recent treatment or manipulation. Left   hepatic hypovascular lesions are unchanged.  BILE DUCTS: Biliary stent identified.  GALLBLADDER: Not well delineated.  SPLEEN: Several areas of decreased peripheral wedge-shaped enhancement of   the spleen slightly more prominent on prior exam of unclear etiology and   significance.  PANCREAS: Within normal limits.  ADRENALS: Within normal limits.  KIDNEYS/URETERS: Kidneys enhance bilaterally and symmetrically without   hydronephrosis. Multiple left parapelvic cyst and posterior left mid to   upper pole renal cyst.    BLADDER: Within normal limits.  REPRODUCTIVE ORGANS: Uterus and adnexa within normal limits.    BOWEL: No bowel obstruction. Appendix is not visualized. No evidence of   inflammation in the pericecal region. Sigmoid diverticulosis without   diverticulitis.  PERITONEUM/RETROPERITONEUM: Within normal limits.  VESSELS: Atherosclerotic changes.  LYMPH NODES: No lymphadenopathy.  ABDOMINAL WALL: Within normal limits.  BONES: Degenerative changes and osteopenia.    IMPRESSION:  Sigmoid diverticulosis without diverticulitis. No bowel obstruction or   gross bowel wall thickening.    Extensive area of hypovascularity throughout the right hepatic lobe more   prominent than on prior exam may represent sequela of recent treatment,   correlate with clinical history. Stable appearance of the left hepatic   lobe.    < end of copied text >        Radiology: all available radiological tests reviewed    Advanced directives addressed: full resuscitation

## 2024-10-03 NOTE — PROGRESS NOTE ADULT - SUBJECTIVE AND OBJECTIVE BOX
80yo F with PMH: Renal Cell Carcinoma with mets to liver, IR embolization of a liver lesion on 7/30 Diverticulitis, HTN, HLD, Hypothyroid was recently, recent new onset Afib/RVR in August 2024 started on Eliquis, GI Bleed after ERCP- resolved without intervention, started on eliquis during admit for Afib. liver abscesses admitted 8/2-8/15 for septic shock requiring pressors and critical care admission, Found to have dilated common bile duct, elevated LFTs and likely cholangitis/choledocholithiasis with stone, s/p ERCP with sphincterotomy/stone removal and stent placement on 8/5/24  admitted 9/9-9/14 for septic shock, requiring levophed and critical care admission, blood cultures during admission had Enterobacter, sensitive to Cipro. Noted liver abscess on MRI, felt to be too small for biopsy at the time, decision for prolong antibiotic course made. Discharged on Cipro/Flagyl to complete her therapy, this was completed on Monday 9/23.     She was doing well until today, when she developed fever to 103+, nausea, vomiting, diarrhea-brown, which she states is exactly how she presented for 9/9-9/14 admission.  9/30: Patient to go to IR today for hepatic abscess drainage  10/1: S/P Hepatic abscess drainage, pressors over night  10/2: Off Pressors, for Stent change with GI on 10/3, worsening thrombocytopenia  10/3 Case discussed on CCU rounds, stable.    ICU Vital Signs Last 24 Hrs  T(C): 36.1 (03 Oct 2024 13:30), Max: 37.2 (03 Oct 2024 00:00)  T(F): 97 (03 Oct 2024 13:30), Max: 98.9 (03 Oct 2024 00:00)  HR: 87 (03 Oct 2024 16:00) (82 - 101)  BP: 144/96 (03 Oct 2024 16:00) (127/99 - 158/88)  BP(mean): 108 (03 Oct 2024 16:00) (102 - 115)  RR: 15 (03 Oct 2024 16:00) (14 - 24)  SpO2: 95% (03 Oct 2024 16:00) (93% - 100%)    O2 Parameters below as of 03 Oct 2024 13:30  Patient On (Oxygen Delivery Method): room air                          10.6   9.16  )-----------( 112      ( 03 Oct 2024 05:42 )             33.1         10-03    140  |  105  |  8   ----------------------------<  86  3.2[L]   |  29  |  0.51    Ca    8.0[L]      03 Oct 2024 05:42  Phos  2.8     10-03  Mg     1.5     10-03    TPro  5.1[L]  /  Alb  1.7[L]  /  TBili  0.9  /  DBili  0.4[H]  /  AST  298[H]  /  ALT  254[H]  /  AlkPhos  332[H]  10-03    LIVER FUNCTIONS - ( 03 Oct 2024 05:42 )  Alb: 1.7 g/dL / Pro: 5.1 gm/dL / ALK PHOS: 332 U/L / ALT: 254 U/L / AST: 298 U/L / GGT: x

## 2024-10-03 NOTE — PROGRESS NOTE ADULT - SUBJECTIVE AND OBJECTIVE BOX
78yo F with PMH: Renal Cell Carcinoma with mets to liver, IR embolization of a liver lesion on 7/30 Diverticulitis, HTN, HLD, Hypothyroid was recently, recent new onset Afib/RVR in August 2024 started on Eliquis, GI Bleed after ERCP- resolved without intervention, started on eliquis during admit for Afib. liver abscesses now s/p IR drainage.       Vital Signs Last 24 Hrs  T(C): 36.1 (03 Oct 2024 13:30), Max: 37.3 (02 Oct 2024 16:20)  T(F): 97 (03 Oct 2024 13:30), Max: 99.1 (02 Oct 2024 16:20)  HR: 88 (03 Oct 2024 14:00) (82 - 103)  BP: 144/100 (03 Oct 2024 14:00) (114/98 - 151/88)  BP(mean): 114 (03 Oct 2024 14:00) (102 - 115)  RR: 14 (03 Oct 2024 14:00) (14 - 24)  SpO2: 97% (03 Oct 2024 14:00) (93% - 100%)    Parameters below as of 03 Oct 2024 13:30  Patient On (Oxygen Delivery Method): room air      CBC                        10.6   9.16  )-----------( 112      ( 03 Oct 2024 05:42 )             33.1       Chemistry  10-03    140  |  105  |  8   ----------------------------<  86  3.2[L]   |  29  |  0.51    Ca    8.0[L]      03 Oct 2024 05:42  Phos  2.8     10-03  Mg     1.5     10-03    TPro  5.1[L]  /  Alb  1.7[L]  /  TBili  0.9  /  DBili  0.4[H]  /  AST  298[H]  /  ALT  254[H]  /  AlkPhos  332[H]  10-03      PT/INR - ( 03 Oct 2024 05:42 )   PT: 12.3 sec;   INR: 1.04 ratio

## 2024-10-03 NOTE — PROGRESS NOTE ADULT - ASSESSMENT
78yo F with liver abscess s/p IR drainage  - s/p GI stent exchange  - WBC WNL  - 0cc output overnight 78yo F with liver abscess s/p IR drainage  - s/p GI stent exchange  - WBC WNL  - 0cc output overnight, flushed at bedside with no issues

## 2024-10-03 NOTE — PROGRESS NOTE ADULT - ASSESSMENT
80 y/o Female with PMH of Renal Cell Carcinoma (W/ Liver Mets), IR embolization of a liver lesion on 7/30, Diverticulitis, HTN, HLD, Hypothyroid, AFib (On Eliquis) presents to  ED complaining of fever with:    1. Distributive Shock   2. Liver abscess  3. Hypokalemia   4. Hypomagnesemia   5. Thrombocytopenia       Neuro: Alert and oriented, mentating at baseline. Pain control PRN.   Resp: Maintaining O2>93% on NC. Actively titrating FiO2 as tolerated. Aspiration precautions. CXR with .  CV: Shock state resolved, transitioned off vasopressor therapy. Shock state likely secondary to acute underlying infectious etiology. AFib, HR controlled. Goal HR <110. Keep K>4 and Mg>2 for optimal arrhythmia suppression. Monitor clinical and laboratory end points of perfusion maintain MAP >65.   GI: CBD with stent exchange today. s/p liver abscess drainage with IR. SUBHA drain in place, minimal output. GI prophylaxis with Protonix.   Renal: Hypomagnesemia and hypokalemia, repleted with KCl and Mg SO4. Avoid nephrotoxic medications. Trend labs, replete lytes as needed to maintain K>4, Mg>2 and Phos>3.   ID: Normal WBC and afebrile. BCx/AbscessCx +Kleb and Enterococcus. C/W Meropenem and Vancomycin. Trend WBC and fever curve.   Heme: H/H stable. Trend H/H, transfuse for hgb <7.0 or active bleeding. SCDs in place. Thrombocytopenia, Plt 70k. Suspect secondary to acute underlying infectious etiology

## 2024-10-04 DIAGNOSIS — E83.39 OTHER DISORDERS OF PHOSPHORUS METABOLISM: ICD-10-CM

## 2024-10-04 LAB
ALBUMIN SERPL ELPH-MCNC: 1.9 G/DL — LOW (ref 3.3–5)
ALP SERPL-CCNC: 374 U/L — HIGH (ref 40–120)
ALT FLD-CCNC: 209 U/L — HIGH (ref 12–78)
ANION GAP SERPL CALC-SCNC: 5 MMOL/L — SIGNIFICANT CHANGE UP (ref 5–17)
ANISOCYTOSIS BLD QL: SIGNIFICANT CHANGE UP
AST SERPL-CCNC: 165 U/L — HIGH (ref 15–37)
BASOPHILS # BLD AUTO: 0 K/UL — SIGNIFICANT CHANGE UP (ref 0–0.2)
BASOPHILS NFR BLD AUTO: 0 % — SIGNIFICANT CHANGE UP (ref 0–2)
BILIRUB SERPL-MCNC: 1 MG/DL — SIGNIFICANT CHANGE UP (ref 0.2–1.2)
BUN SERPL-MCNC: 6 MG/DL — LOW (ref 7–23)
CALCIUM SERPL-MCNC: 8.6 MG/DL — SIGNIFICANT CHANGE UP (ref 8.5–10.1)
CHLORIDE SERPL-SCNC: 105 MMOL/L — SIGNIFICANT CHANGE UP (ref 96–108)
CO2 SERPL-SCNC: 30 MMOL/L — SIGNIFICANT CHANGE UP (ref 22–31)
CREAT SERPL-MCNC: 0.45 MG/DL — LOW (ref 0.5–1.3)
EGFR: 99 ML/MIN/1.73M2 — SIGNIFICANT CHANGE UP
EOSINOPHIL # BLD AUTO: 0.1 K/UL — SIGNIFICANT CHANGE UP (ref 0–0.5)
EOSINOPHIL NFR BLD AUTO: 1 % — SIGNIFICANT CHANGE UP (ref 0–6)
GLUCOSE SERPL-MCNC: 92 MG/DL — SIGNIFICANT CHANGE UP (ref 70–99)
HCT VFR BLD CALC: 35.2 % — SIGNIFICANT CHANGE UP (ref 34.5–45)
HGB BLD-MCNC: 11.1 G/DL — LOW (ref 11.5–15.5)
HYPOCHROMIA BLD QL: SLIGHT — SIGNIFICANT CHANGE UP
LYMPHOCYTES # BLD AUTO: 0.59 K/UL — LOW (ref 1–3.3)
LYMPHOCYTES # BLD AUTO: 6 % — LOW (ref 13–44)
MAGNESIUM SERPL-MCNC: 1.8 MG/DL — SIGNIFICANT CHANGE UP (ref 1.6–2.6)
MANUAL SMEAR VERIFICATION: SIGNIFICANT CHANGE UP
MCHC RBC-ENTMCNC: 27 PG — SIGNIFICANT CHANGE UP (ref 27–34)
MCHC RBC-ENTMCNC: 31.5 GM/DL — LOW (ref 32–36)
MCV RBC AUTO: 85.6 FL — SIGNIFICANT CHANGE UP (ref 80–100)
MONOCYTES # BLD AUTO: 0.49 K/UL — SIGNIFICANT CHANGE UP (ref 0–0.9)
MONOCYTES NFR BLD AUTO: 5 % — SIGNIFICANT CHANGE UP (ref 2–14)
MYELOCYTES NFR BLD: 2 % — HIGH (ref 0–0)
NEUTROPHILS # BLD AUTO: 8.44 K/UL — HIGH (ref 1.8–7.4)
NEUTROPHILS NFR BLD AUTO: 86 % — HIGH (ref 43–77)
NRBC # BLD: 0 /100 WBCS — SIGNIFICANT CHANGE UP (ref 0–0)
NRBC # BLD: SIGNIFICANT CHANGE UP /100 WBCS (ref 0–0)
PHOSPHATE SERPL-MCNC: 2.4 MG/DL — LOW (ref 2.5–4.5)
PLAT MORPH BLD: NORMAL — SIGNIFICANT CHANGE UP
PLATELET # BLD AUTO: 144 K/UL — LOW (ref 150–400)
POTASSIUM SERPL-MCNC: 3.4 MMOL/L — LOW (ref 3.5–5.3)
POTASSIUM SERPL-SCNC: 3.4 MMOL/L — LOW (ref 3.5–5.3)
PROT SERPL-MCNC: 5.5 GM/DL — LOW (ref 6–8.3)
RBC # BLD: 4.11 M/UL — SIGNIFICANT CHANGE UP (ref 3.8–5.2)
RBC # FLD: 18.8 % — HIGH (ref 10.3–14.5)
RBC BLD AUTO: ABNORMAL
SODIUM SERPL-SCNC: 140 MMOL/L — SIGNIFICANT CHANGE UP (ref 135–145)
STOMATOCYTES BLD QL SMEAR: SIGNIFICANT CHANGE UP
TOXIC GRANULES BLD QL SMEAR: PRESENT — SIGNIFICANT CHANGE UP
VANCOMYCIN TROUGH SERPL-MCNC: 11.8 UG/ML — SIGNIFICANT CHANGE UP (ref 10–20)
WBC # BLD: 9.81 K/UL — SIGNIFICANT CHANGE UP (ref 3.8–10.5)
WBC # FLD AUTO: 9.81 K/UL — SIGNIFICANT CHANGE UP (ref 3.8–10.5)

## 2024-10-04 PROCEDURE — 99291 CRITICAL CARE FIRST HOUR: CPT

## 2024-10-04 PROCEDURE — 99233 SBSQ HOSP IP/OBS HIGH 50: CPT

## 2024-10-04 RX ORDER — SOD PHOS DI, MONO/K PHOS MONO 250 MG
1 TABLET ORAL THREE TIMES A DAY
Refills: 0 | Status: COMPLETED | OUTPATIENT
Start: 2024-10-04 | End: 2024-10-05

## 2024-10-04 RX ADMIN — Medication 50 MILLIGRAM(S): at 10:05

## 2024-10-04 RX ADMIN — HYDROMORPHONE HYDROCHLORIDE 0.5 MILLIGRAM(S): 1 INJECTION, SOLUTION INTRAMUSCULAR; INTRAVENOUS; SUBCUTANEOUS at 06:42

## 2024-10-04 RX ADMIN — Medication 1 TABLET(S): at 14:21

## 2024-10-04 RX ADMIN — HYDROCORTISONE 20 MILLIGRAM(S): 5 TABLET ORAL at 10:05

## 2024-10-04 RX ADMIN — MEROPENEM 1000 MILLIGRAM(S): 500 INJECTION INTRAVENOUS at 14:21

## 2024-10-04 RX ADMIN — HYDROMORPHONE HYDROCHLORIDE 0.5 MILLIGRAM(S): 1 INJECTION, SOLUTION INTRAMUSCULAR; INTRAVENOUS; SUBCUTANEOUS at 07:00

## 2024-10-04 RX ADMIN — MEROPENEM 1000 MILLIGRAM(S): 500 INJECTION INTRAVENOUS at 22:01

## 2024-10-04 RX ADMIN — HYDROMORPHONE HYDROCHLORIDE 0.5 MILLIGRAM(S): 1 INJECTION, SOLUTION INTRAMUSCULAR; INTRAVENOUS; SUBCUTANEOUS at 21:54

## 2024-10-04 RX ADMIN — VANCOMYCIN HCL-SODIUM CHLORIDE IV SOLN 1.5 GM/250ML-0.9% 250 MILLIGRAM(S): 1.5-0.9/25 SOLUTION at 21:58

## 2024-10-04 RX ADMIN — VANCOMYCIN HCL-SODIUM CHLORIDE IV SOLN 1.5 GM/250ML-0.9% 250 MILLIGRAM(S): 1.5-0.9/25 SOLUTION at 10:04

## 2024-10-04 RX ADMIN — Medication 50 MILLIGRAM(S): at 22:06

## 2024-10-04 RX ADMIN — HYDROMORPHONE HYDROCHLORIDE 0.5 MILLIGRAM(S): 1 INJECTION, SOLUTION INTRAMUSCULAR; INTRAVENOUS; SUBCUTANEOUS at 22:30

## 2024-10-04 RX ADMIN — HYDROMORPHONE HYDROCHLORIDE 0.5 MILLIGRAM(S): 1 INJECTION, SOLUTION INTRAMUSCULAR; INTRAVENOUS; SUBCUTANEOUS at 11:48

## 2024-10-04 RX ADMIN — HYDROCORTISONE 10 MILLIGRAM(S): 5 TABLET ORAL at 22:04

## 2024-10-04 RX ADMIN — Medication 1 TABLET(S): at 22:04

## 2024-10-04 RX ADMIN — AMIODARONE HYDROCHLORIDE 100 MILLIGRAM(S): 50 INJECTION, SOLUTION INTRAVENOUS at 10:05

## 2024-10-04 RX ADMIN — MEROPENEM 1000 MILLIGRAM(S): 500 INJECTION INTRAVENOUS at 06:20

## 2024-10-04 RX ADMIN — Medication 100 MICROGRAM(S): at 06:22

## 2024-10-04 RX ADMIN — PANTOPRAZOLE SODIUM 40 MILLIGRAM(S): 40 TABLET, DELAYED RELEASE ORAL at 10:04

## 2024-10-04 RX ADMIN — CHLORHEXIDINE GLUCONATE ORAL RINSE 1 APPLICATION(S): 1.2 SOLUTION DENTAL at 07:19

## 2024-10-04 NOTE — PROGRESS NOTE ADULT - SUBJECTIVE AND OBJECTIVE BOX
Date of service: 10-04-24 @ 09:37    Lying in bed in NAD  Abdomen feels less tender  Tolerating PO intake  No fever  Had ERCP with stant exchange     ROS: no fever or chills; denies dizziness, no HA, no SOB or cough, no diarrhea or constipation; no dysuria, no legs pain, no rashes    MEDICATIONS  (STANDING):  acetaminophen   IVPB .. 1000 milliGRAM(s) IV Intermittent once  aMIOdarone    Tablet 100 milliGRAM(s) Oral daily  chlorhexidine 4% Liquid 1 Application(s) Topical <User Schedule>  hydrocortisone 10 milliGRAM(s) Oral at bedtime  hydrocortisone 20 milliGRAM(s) Oral daily  levothyroxine 100 MICROGram(s) Oral daily  meropenem Injectable 1000 milliGRAM(s) IV Push every 8 hours  metoprolol succinate ER 50 milliGRAM(s) Oral every 12 hours  pantoprazole  Injectable 40 milliGRAM(s) IV Push daily  potassium phosphate / sodium phosphate Tablet (K-PHOS No. 2) 1 Tablet(s) Oral three times a day  vancomycin  IVPB 750 milliGRAM(s) IV Intermittent every 12 hours    Vital Signs Last 24 Hrs  T(C): 36.9 (04 Oct 2024 05:00), Max: 37.1 (03 Oct 2024 21:00)  T(F): 98.5 (04 Oct 2024 05:00), Max: 98.8 (03 Oct 2024 21:00)  HR: 77 (04 Oct 2024 08:00) (69 - 103)  BP: 159/97 (04 Oct 2024 08:00) (127/99 - 162/98)  BP(mean): 111 (04 Oct 2024 08:00) (100 - 122)  RR: 14 (04 Oct 2024 08:00) (12 - 27)  SpO2: 98% (04 Oct 2024 08:00) (93% - 99%)    Parameters below as of 03 Oct 2024 13:30  Patient On (Oxygen Delivery Method): room air     Physical exam:    Constitutional: frail looking  HEENT: NC/AT, EOMI, PERRLA, conjunctivae clear; ears and nose atraumatic; pharynx clear  Neck: supple; thyroid not palpable  Back: no tenderness  Respiratory: respiratory effort normal; clear to auscultation  Cardiovascular: S1S2 regular, no murmurs  Abdomen: soft, mildly tender, not distended, positive BS; no liver or spleen organomegaly; right sided drain with blood tinged fluid  Genitourinary: no suprapubic tenderness  Musculoskeletal: no muscle tenderness, no joint swelling or tenderness  Neurological/ Psychiatric: AxOx3, judgement and insight normal;  moving all extremities  Skin: no rashes; no palpable lesions    Labs: reviewed                        11.1   9.81  )-----------(144      ( 04 Oct 2024 05:26 )             35.2     10-04    140  |  105  |  6[L]  ----------------------------<  92  3.4[L]   |  30  |  0.45[L]    Ca    8.6      04 Oct 2024 05:26  Phos  2.4     10-04  Mg     1.8     10-04    TPro  5.5[L]  /  Alb  1.9[L]  /  TBili  1.0  /  DBili  x   /  AST  165[H]  /  ALT  209[H]  /  AlkPhos  374[H]  10-04    Vancomycin Level, Trough: 10.0 ug/mL (10-03 @ 21:40)  Vancomycin Level, Trough: 8.4 ug/mL (10-02 @ 09:51)                        10.6   9.16  )-----------( 112      ( 03 Oct 2024 05:42 )             33.1     10-03    140  |  105  |  8   ----------------------------<  86  3.2[L]   |  29  |  0.51    Ca    8.0[L]      03 Oct 2024 05:42  Phos  2.8     10-03  Mg     1.5     10-03    TPro  5.1[L]  /  Alb  1.7[L]  /  TBili  0.9  /  DBili  0.4[H]  /  AST  298[H]  /  ALT  254[H]  /  AlkPhos  332[H]  10-03    Vancomycin Level, Trough: 8.4 ug/mL (10-02 @ 09:51)                        10.3   9.28  )-----------( 71       ( 02 Oct 2024 06:21 )             32.7     10-02    138  |  110[H]  |  9   ----------------------------<  72  3.7   |  23  |  0.48[L]    Ca    8.1[L]      02 Oct 2024 06:21  Phos  2.1     10-02  Mg     1.7     10-02    Cultures:       Culture - Abscess with Gram Stain (collected 09-30-24 @ 15:00)  Source: .Abscess  Gram Stain (10-01-24 @ 03:00):    Few polymorphonuclear leukocytes seen per low power field    Few Gram positive cocci in pairs seen per oil power field    Few Gram Negative Rods seen per oil power field  Preliminary Report (10-01-24 @ 22:32):    Few Enterococcus faecalis    Rare Klebsiella pneumoniae    Culture - Blood (collected 09-29-24 @ 06:06)  Source: .Blood BLOOD  Gram Stain (09-29-24 @ 21:29):    Growth in aerobic bottle: Gram Negative Rods and Gram positive cocci in    pairs    Growth in anaerobic bottle: Gram Negative Rods and Gram positive cocci in    pairs and Gram Positive Rods  Final Report (10-01-24 @ 12:11):    Growth in aerobic and anaerobic bottles: Klebsiella pneumoniae    Growth in aerobic and anaerobic bottles: Enterococcus faecalis    Growth in anaerobic bottle: Clostridium perfringens "Susceptibilities not    performed"    See previous culture [id number]    Culture - Blood (collected 09-29-24 @ 06:06)  Source: .Blood BLOOD  Gram Stain (09-29-24 @ 21:28):    Growth in anaerobic bottle: Gram Positive Rods    and Gram positive cocci in pairs    and Gram Negative Rods    Growth in aerobic bottle: Gram positive cocci in pairs and Gram Negative    Rods  Final Report (10-01-24 @ 12:10):    Growth in aerobic and anaerobic bottles: Klebsiella pneumoniae    Growth in aerobic and anaerobic bottles: Enterococcus faecalis    Growth in anaerobic bottle: Enterococcus faecium    Growth in anaerobic bottle: Clostridium perfringens "Susceptibilities not    performed"    Direct identification is available within approximately 3-5    hours either by Blood Panel Multiplexed PCR or Direct    MALDI-TOF. Details: [email or web address]  Organism: Blood Culture PCR  Klebsiella pneumoniae  Enterococcus faecalis  Enterococcus faecium (10-01-24 @ 12:10)  Organism: Enterococcus faecium (10-01-24 @ 12:10)      Method Type: ARLEY      -  Ampicillin: R >8 Predicts results to ampicillin/sulbactam, amoxacillin-clavulanate and  piperacillin-tazobactam.      -  Vancomycin: S 0.5      -  Gentamicin synergy: S <=500      -  Streptomycin synergy: R >1000  Organism: Enterococcus faecalis (10-01-24 @ 12:10)      Method Type: ARLEY      -  Ampicillin: S <=2 Predicts results to ampicillin/sulbactam, amoxacillin-clavulanate and  piperacillin-tazobactam.      -  Vancomycin: S 2      -  Gentamicin synergy: S <=500      -  Streptomycin synergy: S <=1000  Organism: Klebsiella pneumoniae (10-01-24 @ 12:10)      Method Type: ARLEY      -  Ampicillin: R >16 These ampicillin results predict results for amoxicillin      -  Ampicillin/Sulbactam: S 8/4      -  Aztreonam: S <=4      -  Cefazolin: S <=2      -  Cefepime: S <=2      -  Cefoxitin: I 16      -  Ceftriaxone: S <=1      -  Ciprofloxacin: S <=0.25      -  Ertapenem: S <=0.5      -  Gentamicin: S <=2      -  Imipenem: S <=1      -  Levofloxacin: S <=0.5      -  Meropenem: S <=1      -  Piperacillin/Tazobactam: S <=8      -  Tobramycin: S <=2      -  Trimethoprim/Sulfamethoxazole: S <=0.5/9.5  Organism: Blood Culture PCR (10-01-24 @ 12:10)      Method Type: PCR      -  K. pneumoniae group: Detec (K. pneumoniae, K. quasipneumoniae, K. variicola)      -  Enterococcus faecalis: Detec      -  Enterococcus faecium: Detec    Culture - Urine (collected 09-29-24 @ 06:06)  Source: Clean Catch None  Final Report (09-30-24 @ 12:10):    No growth    < from: CT Abdomen and Pelvis w/ IV Cont (09.29.24 @ 07:22) >    ACC: [id number] EXAM:  CT ABDOMEN AND PELVIS IC   ORDERED BY: CHAPO MANRIQUE DATE:  09/29/2024          INTERPRETATION:  CLINICAL INFORMATION: Lower abdominal pain and fever,   history of malignancy    COMPARISON: CT chest abdomen pelvis 9/9/2024 CT abdomen pelvis 8/7/2024,   MRI of the abdomen 9/10/2024    CONTRAST/COMPLICATIONS:  IV Contrast: Omnipaque 350  90 cc administered   0 cc discarded  Oral Contrast: NONE  Complications: None reported at time of study completion    PROCEDURE:  CTof the Abdomen and Pelvis was performed.  Sagittal and coronal reformats were performed.    FINDINGS:  LOWER CHEST: A few less than 4 mm right middle lobe and bilateral lower   nodules slightly increased in size and number from the prior exam. Right   retrocrural cystic lesion is unchanged.    LIVER: Extensive area of hypovascularity in the anterior right hepatic   lobe extending into the right hepatic dome to a greater extent than on   prior CT of 9/9/2024 correlate for recent treatment or manipulation. Left   hepatic hypovascular lesions are unchanged.  BILE DUCTS: Biliary stent identified.  GALLBLADDER: Not well delineated.  SPLEEN: Several areas of decreased peripheral wedge-shaped enhancement of   the spleen slightly more prominent on prior exam of unclear etiology and   significance.  PANCREAS: Within normal limits.  ADRENALS: Within normal limits.  KIDNEYS/URETERS: Kidneys enhance bilaterally and symmetrically without   hydronephrosis. Multiple left parapelvic cyst and posterior left mid to   upper pole renal cyst.    BLADDER: Within normal limits.  REPRODUCTIVE ORGANS: Uterus and adnexa within normal limits.    BOWEL: No bowel obstruction. Appendix is not visualized. No evidence of   inflammation in the pericecal region. Sigmoid diverticulosis without   diverticulitis.  PERITONEUM/RETROPERITONEUM: Within normal limits.  VESSELS: Atherosclerotic changes.  LYMPH NODES: No lymphadenopathy.  ABDOMINAL WALL: Within normal limits.  BONES: Degenerative changes and osteopenia.    IMPRESSION:  Sigmoid diverticulosis without diverticulitis. No bowel obstruction or   gross bowel wall thickening.    Extensive area of hypovascularity throughout the right hepatic lobe more   prominent than on prior exam may represent sequela of recent treatment,   correlate with clinical history. Stable appearance of the left hepatic   lobe.    < end of copied text >        Radiology: all available radiological tests reviewed    Advanced directives addressed: full resuscitation

## 2024-10-04 NOTE — PROGRESS NOTE ADULT - ASSESSMENT
#Septic shock - s /p ICU  management and pressor agent     #Sepsis due to hepatic abscess   -s/p IR drained   -ct abx     #Hypokalemia- replaced, monitor it     #HTN- metoprolol, ct to monitor and adjust it     #HLD      #Hypothyroidism -ct levohtyroxine     #h/o Afib   -metoprolol   -off AC due to h.o massive gi bleed   -amiodarone     #Renal cell cancer   -out pt management     #possible adrenal insufficiency -ct hydrocortisone     #DVT pr -scd

## 2024-10-04 NOTE — PROGRESS NOTE ADULT - RESPIRATORY
airway patent/good air movement/respirations non-labored
clear to auscultation bilaterally/no wheezes/no rales/no rhonchi
no respiratory distress/airway patent/good air movement/respirations non-labored
clear to auscultation bilaterally/no wheezes/no rales/no rhonchi
clear to auscultation bilaterally/no wheezes/no rales/no rhonchi
clear to auscultation bilaterally/no wheezes/no respiratory distress

## 2024-10-04 NOTE — PROGRESS NOTE ADULT - SUBJECTIVE AND OBJECTIVE BOX
80yo F with PMH: Renal Cell Carcinoma with mets to liver, IR embolization of a liver lesion on 7/30 Diverticulitis, HTN, HLD, Hypothyroid was recently, recent new onset Afib/RVR in August 2024 started on Eliquis, GI Bleed after ERCP- resolved without intervention, started on eliquis during admit for Afib. liver abscesses admitted 8/2-8/15 for septic shock requiring pressors and critical care admission, Found to have dilated common bile duct, elevated LFTs and likely cholangitis/choledocholithiasis with stone, s/p ERCP with sphincterotomy/stone removal and stent placement on 8/5/24  admitted 9/9-9/14 for septic shock, requiring levophed and critical care admission, blood cultures during admission had Enterobacter, sensitive to Cipro. Noted liver abscess on MRI, felt to be too small for biopsy at the time, decision for prolong antibiotic course made. Discharged on Cipro/Flagyl to complete her therapy, this was completed on Monday 9/23.     She was doing well until today, when she developed fever to 103+, nausea, vomiting, diarrhea-brown, which she states is exactly how she presented for 9/9-9/14 admission.  9/30: Patient to go to IR today for hepatic abscess drainage  10/1: S/P Hepatic abscess drainage, pressors over night  10/2: Off Pressors, for Stent change with GI on 10/3, worsening thrombocytopenia  10/3 Case discussed on CCU rounds, stable.  10/4 Case discussedd on CCU rounds, clinically stable, no acute changes.    ICU Vital Signs Last 24 Hrs  T(C): 36.9 (04 Oct 2024 05:00), Max: 37.1 (03 Oct 2024 21:00)  T(F): 98.5 (04 Oct 2024 05:00), Max: 98.8 (03 Oct 2024 21:00)  HR: 79 (04 Oct 2024 09:00) (69 - 103)  BP: 144/93 (04 Oct 2024 09:00) (127/99 - 162/98)  BP(mean): 110 (04 Oct 2024 09:00) (100 - 122)  RR: 13 (04 Oct 2024 09:00) (12 - 27)  SpO2: 98% (04 Oct 2024 09:00) (93% - 99%)    O2 Parameters below as of 03 Oct 2024 13:30  Patient On (Oxygen Delivery Method): room air                                  11.1   9.81  )-----------( 144      ( 04 Oct 2024 05:26 )             35.2         10-04    140  |  105  |  6[L]  ----------------------------<  92  3.4[L]   |  30  |  0.45[L]    Ca    8.6      04 Oct 2024 05:26  Phos  2.4     10-04  Mg     1.8     10-04    TPro  5.5[L]  /  Alb  1.9[L]  /  TBili  1.0  /  DBili  x   /  AST  165[H]  /  ALT  209[H]  /  AlkPhos  374[H]  10-04    LIVER FUNCTIONS - ( 04 Oct 2024 05:26 )  Alb: 1.9 g/dL / Pro: 5.5 gm/dL / ALK PHOS: 374 U/L / ALT: 209 U/L / AST: 165 U/L / GGT: x

## 2024-10-04 NOTE — PROGRESS NOTE ADULT - SUBJECTIVE AND OBJECTIVE BOX
80yo F with PMH: Renal Cell Carcinoma with mets to liver, IR embolization of a liver lesion on 7/30 Diverticulitis, HTN, HLD, Hypothyroid was recently, recent new onset Afib/RVR in August 2024 started on Eliquis, GI Bleed after ERCP- resolved without intervention, started on eliquis during admit for Afib. liver abscesses    admitted 8/2-8/15 for septic shock requiring pressors and critical care admission, Found to have dilated common bile duct, elevated LFTs and likely cholangitis/choledocholithiasis with stone, s/p ERCP with sphincterotomy/stone removal and stent placement on 8/5/24      admitted 9/9-9/14 for septic shock, requiring levophed and critical care admission, blood cultures during admission had Enterobacter, sensitive to Cipro. Noted liver abscess on MRI, felt to be too small for biopsy at the time, decision for prolong antibiotic course made. Discharged on Cipro/Flagyl to complete her therapy, this was completed on Monday 9/23.     She was doing well until day of admission, when she developed fever to 103+, nausea, vomiting, diarrhea-brown, which she states is exactly how she presented for admission.  Found to be hypotensive in ED, give 3 liters sepsis fluids without improvement and started on levophed for BP augmentation. ICU Consulted   (29 Sep 2024 11:29)      Review of Systems:  CONSTITUTIONAL: as per hpi   EYES/ENT: No visual changes;  No vertigo or throat pain   NECK: No pain or stiffness  RESPIRATORY: No cough, wheezing, hemoptysis; No shortness of breath,   CARDIOVASCULAR: No chest pain or palpitations  GASTROINTESTINAL: as per hpi   GENITOURINARY: No dysuria, frequency or hematuria  NEUROLOGICAL: No numbness or weakness  SKIN: No itching, burning, rashes, or lesions   All other review of systems is negative unless indicated above    PHYSICAL EXAM:    Vital Signs Last 24 Hrs  T(C): 36.9 (04 Oct 2024 05:00), Max: 37.1 (03 Oct 2024 21:00)  T(F): 98.5 (04 Oct 2024 05:00), Max: 98.8 (03 Oct 2024 21:00)  HR: 79 (04 Oct 2024 09:00) (69 - 103)  BP: 144/93 (04 Oct 2024 09:00) (127/99 - 162/98)  BP(mean): 110 (04 Oct 2024 09:00) (100 - 122)  RR: 13 (04 Oct 2024 09:00) (12 - 27)  SpO2: 98% (04 Oct 2024 09:00) (93% - 99%)    Parameters below as of 03 Oct 2024 13:30  Patient On (Oxygen Delivery Method): room air        GENERAL: comfortable   HEAD:  Atraumatic, Normocephalic  EYES: EOMI, PERRLA, conjunctiva and sclera clear  HEENT: Moist mucous membranes  NECK: Supple, No JVD  NERVOUS SYSTEM:  Alert   CHEST/LUNG: Clear to auscultation bilaterally; No rales, rhonchi, wheezing, or rubs  HEART:S1S2 normal, no murmer  ABDOMEN: Soft, tender on deep palpation,bs+  GENITOURINARY- Voiding, no palpable bladder  EXTREMITIES:  2+ Peripheral Pulses, No clubbing, cyanosis, or edema  MUSCULOSKELETAL No muscle tenderness, Muscle tone normal, No joint tenderness, no Joint swelling, Joint range of motion-normal  SKIN-no rash, no lesion  PSYCH- Mood stable  LYMPH Node- No palpable lymph node    LABS:                        11.1   9.81  )-----------( 144      ( 04 Oct 2024 05:26 )             35.2     10-04    140  |  105  |  6[L]  ----------------------------<  92  3.4[L]   |  30  |  0.45[L]    Ca    8.6      04 Oct 2024 05:26  Phos  2.4     10-04  Mg     1.8     10-04    TPro  5.5[L]  /  Alb  1.9[L]  /  TBili  1.0  /  DBili  x   /  AST  165[H]  /  ALT  209[H]  /  AlkPhos  374[H]  10-04    PT/INR - ( 03 Oct 2024 05:42 )   PT: 12.3 sec;   INR: 1.04 ratio           Urinalysis Basic - ( 04 Oct 2024 05:26 )    Color: x / Appearance: x / SG: x / pH: x  Gluc: 92 mg/dL / Ketone: x  / Bili: x / Urobili: x   Blood: x / Protein: x / Nitrite: x   Leuk Esterase: x / RBC: x / WBC x   Sq Epi: x / Non Sq Epi: x / Bacteria: x        CAPILLARY BLOOD GLUCOSE                Standing medicine  acetaminophen   IVPB .. 1000 milliGRAM(s) IV Intermittent once  aMIOdarone    Tablet 100 milliGRAM(s) Oral daily  chlorhexidine 4% Liquid 1 Application(s) Topical <User Schedule>  hydrocortisone 20 milliGRAM(s) Oral daily  hydrocortisone 10 milliGRAM(s) Oral at bedtime  HYDROmorphone  Injectable 0.5 milliGRAM(s) IV Push every 4 hours PRN  levothyroxine 100 MICROGram(s) Oral daily  meropenem Injectable 1000 milliGRAM(s) IV Push every 8 hours  metoprolol succinate ER 50 milliGRAM(s) Oral every 12 hours  pantoprazole  Injectable 40 milliGRAM(s) IV Push daily  potassium phosphate / sodium phosphate Tablet (K-PHOS No. 2) 1 Tablet(s) Oral three times a day  vancomycin  IVPB 750 milliGRAM(s) IV Intermittent every 12 hours        # Reviewed today's blood work which include CBC, BMP       #Discussed with other team member- ID -Dr. Roberson- ct abx, ICU team     #Discussed with pt in detail        Total time spent 51 minutes

## 2024-10-04 NOTE — PROGRESS NOTE ADULT - ASSESSMENT
80yo F with PMH: Renal Cell Carcinoma with mets to liver, IR embolization of a liver lesion on 7/30, Diverticulitis, HTN, HLD, Hypothyroidism,  recent new onset Afib/RVR in August 2024 started on Eliquis, GI Bleed after ERCP- resolved without intervention, liver abscesses, an admission on  8/2-8/15 for septic shock requiring pressors and critical care admission, Found to have dilated common bile duct, elevated LFTs and likely cholangitis/choledocholithiasis with stone, s/p ERCP with sphincterotomy/stone removal and stent placement on 8/5/24, admitted 9/9-9/14 for septic shock, requiring levophed and critical care admission, blood cultures during admission had Enterobacter, sensitive to Cipro. Noted liver abscess on MRI, felt to be too small for biopsy at the time, decision for prolonged antibiotic course made. Discharged on Cipro/Flagyl to complete her therapy, this was completed on Monday 9/23. Then patient was admitted on 9/29 for evaluation of fever to 103, nausea, vomiting, diarrhea, similar to her presentation of her 9/9-9/14 admission, when admitted on 9/29 was so hypotensive has required levophed after fluids. The patient is still on pressors after fluids without improvement. Initial lactate was 8, but has improved to normal. The patient is alert and oriented and has mild difuse abdominal pain but overall her GI symptoms are improved.     1. Liver abscess s/p drainage with ENFA, KLPN. Sepsis with ENFA, ENFAE, KLPN and CLPE. Metastatic renal cell Ca with mets to the liver. CRF stage 3. Immunocompromised host.   -Patient admitted with septic shock secondary to multiple organisms most likely due to GI or liver origin, due to liver abscesses or masses  -still with low grade fever  -abdomen is improving  -on meropenem 1 gm IV q12h and vancomycin 750 mg IV q12h # 4  -tolerating abx well so far; no side effects noted  -repeat vancomycin trough level is therapeutic  -continue IV abx coverage   -repeat BC x 2 collected  - serial cbc and monitor temperature   - IR consulted and drain placed-- growing Klebsiella and Enterococcus faecalis  - oncology evaluation and noted  - leukocytosis resolved  - GI evaluation -- s/p ERCP with stant exchange   2. other issues; per medicine    [facility] abx token not applicable at this time

## 2024-10-05 LAB
ANION GAP SERPL CALC-SCNC: 5 MMOL/L — SIGNIFICANT CHANGE UP (ref 5–17)
BUN SERPL-MCNC: 10 MG/DL — SIGNIFICANT CHANGE UP (ref 7–23)
CALCIUM SERPL-MCNC: 8.7 MG/DL — SIGNIFICANT CHANGE UP (ref 8.5–10.1)
CHLORIDE SERPL-SCNC: 101 MMOL/L — SIGNIFICANT CHANGE UP (ref 96–108)
CO2 SERPL-SCNC: 31 MMOL/L — SIGNIFICANT CHANGE UP (ref 22–31)
CREAT SERPL-MCNC: 0.5 MG/DL — SIGNIFICANT CHANGE UP (ref 0.5–1.3)
CULTURE RESULTS: ABNORMAL
EGFR: 97 ML/MIN/1.73M2 — SIGNIFICANT CHANGE UP
GLUCOSE SERPL-MCNC: 96 MG/DL — SIGNIFICANT CHANGE UP (ref 70–99)
ORGANISM # SPEC MICROSCOPIC CNT: ABNORMAL
ORGANISM # SPEC MICROSCOPIC CNT: ABNORMAL
ORGANISM # SPEC MICROSCOPIC CNT: SIGNIFICANT CHANGE UP
POTASSIUM SERPL-MCNC: 3.5 MMOL/L — SIGNIFICANT CHANGE UP (ref 3.5–5.3)
POTASSIUM SERPL-SCNC: 3.5 MMOL/L — SIGNIFICANT CHANGE UP (ref 3.5–5.3)
SODIUM SERPL-SCNC: 137 MMOL/L — SIGNIFICANT CHANGE UP (ref 135–145)
SPECIMEN SOURCE: SIGNIFICANT CHANGE UP

## 2024-10-05 PROCEDURE — 99233 SBSQ HOSP IP/OBS HIGH 50: CPT

## 2024-10-05 RX ADMIN — HYDROCORTISONE 20 MILLIGRAM(S): 5 TABLET ORAL at 09:22

## 2024-10-05 RX ADMIN — HYDROCORTISONE 10 MILLIGRAM(S): 5 TABLET ORAL at 21:21

## 2024-10-05 RX ADMIN — Medication 1 TABLET(S): at 05:19

## 2024-10-05 RX ADMIN — Medication 50 MILLIGRAM(S): at 21:21

## 2024-10-05 RX ADMIN — MEROPENEM 1000 MILLIGRAM(S): 500 INJECTION INTRAVENOUS at 21:21

## 2024-10-05 RX ADMIN — PANTOPRAZOLE SODIUM 40 MILLIGRAM(S): 40 TABLET, DELAYED RELEASE ORAL at 09:21

## 2024-10-05 RX ADMIN — HYDROMORPHONE HYDROCHLORIDE 0.5 MILLIGRAM(S): 1 INJECTION, SOLUTION INTRAMUSCULAR; INTRAVENOUS; SUBCUTANEOUS at 19:30

## 2024-10-05 RX ADMIN — AMIODARONE HYDROCHLORIDE 100 MILLIGRAM(S): 50 INJECTION, SOLUTION INTRAVENOUS at 09:23

## 2024-10-05 RX ADMIN — MEROPENEM 1000 MILLIGRAM(S): 500 INJECTION INTRAVENOUS at 05:20

## 2024-10-05 RX ADMIN — VANCOMYCIN HCL-SODIUM CHLORIDE IV SOLN 1.5 GM/250ML-0.9% 250 MILLIGRAM(S): 1.5-0.9/25 SOLUTION at 09:20

## 2024-10-05 RX ADMIN — Medication 50 MILLIGRAM(S): at 09:23

## 2024-10-05 RX ADMIN — Medication 100 MICROGRAM(S): at 05:18

## 2024-10-05 RX ADMIN — HYDROMORPHONE HYDROCHLORIDE 0.5 MILLIGRAM(S): 1 INJECTION, SOLUTION INTRAMUSCULAR; INTRAVENOUS; SUBCUTANEOUS at 05:18

## 2024-10-05 RX ADMIN — MEROPENEM 1000 MILLIGRAM(S): 500 INJECTION INTRAVENOUS at 14:37

## 2024-10-05 RX ADMIN — HYDROMORPHONE HYDROCHLORIDE 0.5 MILLIGRAM(S): 1 INJECTION, SOLUTION INTRAMUSCULAR; INTRAVENOUS; SUBCUTANEOUS at 05:48

## 2024-10-05 RX ADMIN — HYDROMORPHONE HYDROCHLORIDE 0.5 MILLIGRAM(S): 1 INJECTION, SOLUTION INTRAMUSCULAR; INTRAVENOUS; SUBCUTANEOUS at 18:58

## 2024-10-05 RX ADMIN — CHLORHEXIDINE GLUCONATE ORAL RINSE 1 APPLICATION(S): 1.2 SOLUTION DENTAL at 14:37

## 2024-10-05 RX ADMIN — VANCOMYCIN HCL-SODIUM CHLORIDE IV SOLN 1.5 GM/250ML-0.9% 250 MILLIGRAM(S): 1.5-0.9/25 SOLUTION at 21:22

## 2024-10-05 NOTE — PROGRESS NOTE ADULT - ASSESSMENT
78yo F with PMH: Renal Cell Carcinoma with mets to liver, IR embolization of a liver lesion on 7/30, Diverticulitis, HTN, HLD, Hypothyroidism,  recent new onset Afib/RVR in August 2024 started on Eliquis, GI Bleed after ERCP- resolved without intervention, liver abscesses, an admission on  8/2-8/15 for septic shock requiring pressors and critical care admission, Found to have dilated common bile duct, elevated LFTs and likely cholangitis/choledocholithiasis with stone, s/p ERCP with sphincterotomy/stone removal and stent placement on 8/5/24, admitted 9/9-9/14 for septic shock, requiring levophed and critical care admission, blood cultures during admission had Enterobacter, sensitive to Cipro. Noted liver abscess on MRI, felt to be too small for biopsy at the time, decision for prolonged antibiotic course made. Discharged on Cipro/Flagyl to complete her therapy, this was completed on Monday 9/23. Then patient was admitted on 9/29 for evaluation of fever to 103, nausea, vomiting, diarrhea, similar to her presentation of her 9/9-9/14 admission, when admitted on 9/29 was so hypotensive has required levophed after fluids. The patient is still on pressors after fluids without improvement. Initial lactate was 8, but has improved to normal. The patient is alert and oriented and has mild difuse abdominal pain but overall her GI symptoms are improved.     1. Liver abscess s/p drainage with ENFA, KLPN. Sepsis with ENFA, ENFAE, KLPN and CLPE. Metastatic renal cell Ca with mets to the liver. CRF stage 3. Immunocompromised host.   -Patient admitted with septic shock secondary to multiple organisms most likely due to GI or liver origin, due to liver abscesses or masses  -low grade fever is resolving  -abdomen is improving  -on meropenem 1 gm IV q12h and vancomycin 750 mg IV q12h # 5  -tolerating abx well so far; no side effects noted  -repeat vancomycin trough level is therapeutic  -repeat BC x 2 are negative to date   - serial cbc and monitor temperature   - oncology evaluation appreciated   - leukocytosis resolved  - GI evaluation -- s/p ERCP with stent exchange   -continue IV abx coverage   2. other issues; per medicine    [facility] abx token not applicable at this time

## 2024-10-05 NOTE — PROGRESS NOTE ADULT - SUBJECTIVE AND OBJECTIVE BOX
80yo F with PMH: Renal Cell Carcinoma with mets to liver, IR embolization of a liver lesion on 7/30 Diverticulitis, HTN, HLD, Hypothyroid was recently, recent new onset Afib/RVR in August 2024 started on Eliquis, GI Bleed after ERCP- resolved without intervention, started on eliquis during admit for Afib. liver abscesses    admitted 8/2-8/15 for septic shock requiring pressors and critical care admission, Found to have dilated common bile duct, elevated LFTs and likely cholangitis/choledocholithiasis with stone, s/p ERCP with sphincterotomy/stone removal and stent placement on 8/5/24      admitted 9/9-9/14 for septic shock, requiring levophed and critical care admission, blood cultures during admission had Enterobacter, sensitive to Cipro. Noted liver abscess on MRI, felt to be too small for biopsy at the time, decision for prolong antibiotic course made. Discharged on Cipro/Flagyl to complete her therapy, this was completed on Monday 9/23.     She was doing well until day of admission, when she developed fever to 103+, nausea, vomiting, diarrhea-brown, which she states is exactly how she presented for admission.  Found to be hypotensive in ED, give 3 liters sepsis fluids without improvement and started on levophed for BP augmentation. ICU Consulted   (29 Sep 2024 11:29)      Review of Systems:  CONSTITUTIONAL: as per hpi   EYES/ENT: No visual changes;  No vertigo or throat pain   NECK: No pain or stiffness  RESPIRATORY: No cough, wheezing, hemoptysis; No shortness of breath,   CARDIOVASCULAR: No chest pain or palpitations  GASTROINTESTINAL: as per hpi   GENITOURINARY: No dysuria, frequency or hematuria  NEUROLOGICAL: No numbness or weakness  SKIN: No itching, burning, rashes, or lesions   All other review of systems is negative unless indicated above    PHYSICAL EXAM:    Vital Signs Last 24 Hrs  T(C): 36.2 (05 Oct 2024 09:16), Max: 37.2 (04 Oct 2024 16:46)  T(F): 97.2 (05 Oct 2024 09:16), Max: 98.9 (04 Oct 2024 16:46)  HR: 96 (05 Oct 2024 09:16) (79 - 96)  BP: 143/90 (05 Oct 2024 09:16) (108/64 - 153/88)  BP(mean): 100 (04 Oct 2024 17:00) (100 - 111)  RR: 18 (05 Oct 2024 09:16) (13 - 18)  SpO2: 99% (05 Oct 2024 09:16) (95% - 99%)    Parameters below as of 05 Oct 2024 09:16  Patient On (Oxygen Delivery Method): room air            GENERAL: comfortable   HEAD:  Atraumatic, Normocephalic  EYES: EOMI, PERRLA, conjunctiva and sclera clear  HEENT: Moist mucous membranes  NECK: Supple, No JVD  NERVOUS SYSTEM:  Alert   CHEST/LUNG: Clear to auscultation bilaterally; No rales, rhonchi, wheezing, or rubs  HEART:S1S2 normal, no murmer  ABDOMEN: Soft, tender on deep palpation,bs+  GENITOURINARY- Voiding, no palpable bladder  EXTREMITIES:  2+ Peripheral Pulses, No clubbing, cyanosis, or edema  MUSCULOSKELETAL No muscle tenderness, Muscle tone normal, No joint tenderness, no Joint swelling, Joint range of motion-normal  SKIN-no rash, no lesion  PSYCH- Mood stable  LYMPH Node- No palpable lymph node                        11.1   9.81  )-----------( 144      ( 04 Oct 2024 05:26 )             35.2     10-05    137  |  101  |  10  ----------------------------<  96  3.5   |  31  |  0.50    Ca    8.7      05 Oct 2024 06:16  Phos  2.4     10-04  Mg     1.8     10-04    TPro  5.5[L]  /  Alb  1.9[L]  /  TBili  1.0  /  DBili  x   /  AST  165[H]  /  ALT  209[H]  /  AlkPhos  374[H]  10-04    LIVER FUNCTIONS - ( 04 Oct 2024 05:26 )  Alb: 1.9 g/dL / Pro: 5.5 gm/dL / ALK PHOS: 374 U/L / ALT: 209 U/L / AST: 165 U/L / GGT: x             Culture - Blood (collected 02 Oct 2024 12:11)  Source: .Blood BLOOD  Preliminary Report (04 Oct 2024 20:01):    No growth at 48 Hours    Culture - Blood (collected 02 Oct 2024 12:06)  Source: .Blood BLOOD  Preliminary Report (04 Oct 2024 20:01):    No growth at 48 Hours        Urinalysis Basic - ( 05 Oct 2024 06:16 )    Color: x / Appearance: x / SG: x / pH: x  Gluc: 96 mg/dL / Ketone: x  / Bili: x / Urobili: x   Blood: x / Protein: x / Nitrite: x   Leuk Esterase: x / RBC: x / WBC x   Sq Epi: x / Non Sq Epi: x / Bacteria: x          CAPILLARY BLOOD GLUCOSE          Culture - Blood (collected 02 Oct 2024 12:11)  Source: .Blood BLOOD  Preliminary Report (04 Oct 2024 20:01):    No growth at 48 Hours    Culture - Blood (collected 02 Oct 2024 12:06)  Source: .Blood BLOOD  Preliminary Report (04 Oct 2024 20:01):    No growth at 48 Hours      MEDICATIONS  (STANDING):  acetaminophen   IVPB .. 1000 milliGRAM(s) IV Intermittent once  aMIOdarone    Tablet 100 milliGRAM(s) Oral daily  chlorhexidine 4% Liquid 1 Application(s) Topical <User Schedule>  hydrocortisone 10 milliGRAM(s) Oral at bedtime  hydrocortisone 20 milliGRAM(s) Oral daily  levothyroxine 100 MICROGram(s) Oral daily  meropenem Injectable 1000 milliGRAM(s) IV Push every 8 hours  metoprolol succinate ER 50 milliGRAM(s) Oral every 12 hours  pantoprazole  Injectable 40 milliGRAM(s) IV Push daily  vancomycin  IVPB 750 milliGRAM(s) IV Intermittent every 12 hours    MEDICATIONS  (PRN):  HYDROmorphone  Injectable 0.5 milliGRAM(s) IV Push every 4 hours PRN Moderate Pain (4 - 6)            # Reviewed today's blood work which include CBC, BMP       #Discussed with other team member- ID -Dr. Roberson- ct abx,     #Discussed with pt in detail        Total time spent 51 minutes

## 2024-10-05 NOTE — PROGRESS NOTE ADULT - SUBJECTIVE AND OBJECTIVE BOX
Date of service: 10-05-24 @ 09:34    Lying in bed in NAD  Alert and verbal  Tolerating PO intake  No fever    ROS: no fever or chills; denies dizziness, no HA, no SOB or cough, no diarrhea or constipation; no dysuria, no legs pain, no rashes    MEDICATIONS  (STANDING):  acetaminophen   IVPB .. 1000 milliGRAM(s) IV Intermittent once  aMIOdarone    Tablet 100 milliGRAM(s) Oral daily  chlorhexidine 4% Liquid 1 Application(s) Topical <User Schedule>  hydrocortisone 10 milliGRAM(s) Oral at bedtime  hydrocortisone 20 milliGRAM(s) Oral daily  levothyroxine 100 MICROGram(s) Oral daily  meropenem Injectable 1000 milliGRAM(s) IV Push every 8 hours  metoprolol succinate ER 50 milliGRAM(s) Oral every 12 hours  pantoprazole  Injectable 40 milliGRAM(s) IV Push daily  vancomycin  IVPB 750 milliGRAM(s) IV Intermittent every 12 hours    Vital Signs Last 24 Hrs  T(C): 36.2 (05 Oct 2024 09:16), Max: 37.2 (04 Oct 2024 16:46)  T(F): 97.2 (05 Oct 2024 09:16), Max: 98.9 (04 Oct 2024 16:46)  HR: 96 (05 Oct 2024 09:16) (79 - 96)  BP: 143/90 (05 Oct 2024 09:16) (108/64 - 153/88)  BP(mean): 100 (04 Oct 2024 17:00) (100 - 111)  RR: 18 (05 Oct 2024 09:16) (13 - 18)  SpO2: 99% (05 Oct 2024 09:16) (95% - 99%)    Parameters below as of 05 Oct 2024 09:16  Patient On (Oxygen Delivery Method): room air     Physical exam:    Constitutional: frail looking  HEENT: NC/AT, EOMI, PERRLA, conjunctivae clear; ears and nose atraumatic; pharynx clear  Neck: supple; thyroid not palpable  Back: no tenderness  Respiratory: respiratory effort normal; clear to auscultation  Cardiovascular: S1S2 regular, no murmurs  Abdomen: soft, mildly tender, not distended, positive BS; no liver or spleen organomegaly; right sided drain with blood tinged fluid  Genitourinary: no suprapubic tenderness  Musculoskeletal: no muscle tenderness, no joint swelling or tenderness  Neurological/ Psychiatric: AxOx3, judgement and insight normal;  moving all extremities  Skin: no rashes; no palpable lesions    Labs: reviewed                        11.1   9.81  )-----------(144      ( 04 Oct 2024 05:26 )             35.2     10-04    140  |  105  |  6[L]  ----------------------------<  92  3.4[L]   |  30  |  0.45[L]    Ca    8.6      04 Oct 2024 05:26  Phos  2.4     10-04  Mg     1.8     10-04    TPro  5.5[L]  /  Alb  1.9[L]  /  TBili  1.0  /  DBili  x   /  AST  165[H]  /  ALT  209[H]  /  AlkPhos  374[H]  10-04    Vancomycin Level, Trough: 10.0 ug/mL (10-03 @ 21:40)  Vancomycin Level, Trough: 8.4 ug/mL (10-02 @ 09:51)                        10.6   9.16  )-----------( 112      ( 03 Oct 2024 05:42 )             33.1     10-03    140  |  105  |  8   ----------------------------<  86  3.2[L]   |  29  |  0.51    Ca    8.0[L]      03 Oct 2024 05:42  Phos  2.8     10-03  Mg     1.5     10-03    TPro  5.1[L]  /  Alb  1.7[L]  /  TBili  0.9  /  DBili  0.4[H]  /  AST  298[H]  /  ALT  254[H]  /  AlkPhos  332[H]  10-03    Vancomycin Level, Trough: 8.4 ug/mL (10-02 @ 09:51)                        10.3   9.28  )-----------( 71       ( 02 Oct 2024 06:21 )             32.7     10-02    138  |  110[H]  |  9   ----------------------------<  72  3.7   |  23  |  0.48[L]    Ca    8.1[L]      02 Oct 2024 06:21  Phos  2.1     10-02  Mg     1.7     10-02    Cultures:       Culture - Abscess with Gram Stain (collected 09-30-24 @ 15:00)  Source: .Abscess  Gram Stain (10-01-24 @ 03:00):    Few polymorphonuclear leukocytes seen per low power field    Few Gram positive cocci in pairs seen per oil power field    Few Gram Negative Rods seen per oil power field  Preliminary Report (10-01-24 @ 22:32):    Few Enterococcus faecalis    Rare Klebsiella pneumoniae    Culture - Blood (collected 09-29-24 @ 06:06)  Source: .Blood BLOOD  Gram Stain (09-29-24 @ 21:29):    Growth in aerobic bottle: Gram Negative Rods and Gram positive cocci in    pairs    Growth in anaerobic bottle: Gram Negative Rods and Gram positive cocci in    pairs and Gram Positive Rods  Final Report (10-01-24 @ 12:11):    Growth in aerobic and anaerobic bottles: Klebsiella pneumoniae    Growth in aerobic and anaerobic bottles: Enterococcus faecalis    Growth in anaerobic bottle: Clostridium perfringens "Susceptibilities not    performed"    See previous culture [id number]    Culture - Blood (collected 09-29-24 @ 06:06)  Source: .Blood BLOOD  Gram Stain (09-29-24 @ 21:28):    Growth in anaerobic bottle: Gram Positive Rods    and Gram positive cocci in pairs    and Gram Negative Rods    Growth in aerobic bottle: Gram positive cocci in pairs and Gram Negative    Rods  Final Report (10-01-24 @ 12:10):    Growth in aerobic and anaerobic bottles: Klebsiella pneumoniae    Growth in aerobic and anaerobic bottles: Enterococcus faecalis    Growth in anaerobic bottle: Enterococcus faecium    Growth in anaerobic bottle: Clostridium perfringens "Susceptibilities not    performed"    Direct identification is available within approximately 3-5    hours either by Blood Panel Multiplexed PCR or Direct    MALDI-TOF. Details: [email or web address]  Organism: Blood Culture PCR  Klebsiella pneumoniae  Enterococcus faecalis  Enterococcus faecium (10-01-24 @ 12:10)  Organism: Enterococcus faecium (10-01-24 @ 12:10)      Method Type: ARLEY      -  Ampicillin: R >8 Predicts results to ampicillin/sulbactam, amoxacillin-clavulanate and  piperacillin-tazobactam.      -  Vancomycin: S 0.5      -  Gentamicin synergy: S <=500      -  Streptomycin synergy: R >1000  Organism: Enterococcus faecalis (10-01-24 @ 12:10)      Method Type: ARLEY      -  Ampicillin: S <=2 Predicts results to ampicillin/sulbactam, amoxacillin-clavulanate and  piperacillin-tazobactam.      -  Vancomycin: S 2      -  Gentamicin synergy: S <=500      -  Streptomycin synergy: S <=1000  Organism: Klebsiella pneumoniae (10-01-24 @ 12:10)      Method Type: ARLEY      -  Ampicillin: R >16 These ampicillin results predict results for amoxicillin      -  Ampicillin/Sulbactam: S 8/4      -  Aztreonam: S <=4      -  Cefazolin: S <=2      -  Cefepime: S <=2      -  Cefoxitin: I 16      -  Ceftriaxone: S <=1      -  Ciprofloxacin: S <=0.25      -  Ertapenem: S <=0.5      -  Gentamicin: S <=2      -  Imipenem: S <=1      -  Levofloxacin: S <=0.5      -  Meropenem: S <=1      -  Piperacillin/Tazobactam: S <=8      -  Tobramycin: S <=2      -  Trimethoprim/Sulfamethoxazole: S <=0.5/9.5  Organism: Blood Culture PCR (10-01-24 @ 12:10)      Method Type: PCR      -  K. pneumoniae group: Detec (K. pneumoniae, K. quasipneumoniae, K. variicola)      -  Enterococcus faecalis: Detec      -  Enterococcus faecium: Detec    Culture - Urine (collected 09-29-24 @ 06:06)  Source: Clean Catch None  Final Report (09-30-24 @ 12:10):    No growth    Culture - Blood (collected 02 Oct 2024 12:11)  Source: .Blood BLOOD  Preliminary Report (04 Oct 2024 20:01):    No growth at 48 Hours    Culture - Blood (collected 02 Oct 2024 12:06)  Source: .Blood BLOOD  Preliminary Report (04 Oct 2024 20:01):    No growth at 48 Hours    < from: CT Abdomen and Pelvis w/ IV Cont (09.29.24 @ 07:22) >    ACC: [id number] EXAM:  CT ABDOMEN AND PELVIS IC   ORDERED BY: CHAPO MANRIQUE DATE:  09/29/2024          INTERPRETATION:  CLINICAL INFORMATION: Lower abdominal pain and fever,   history of malignancy    COMPARISON: CT chest abdomen pelvis 9/9/2024 CT abdomen pelvis 8/7/2024,   MRI of the abdomen 9/10/2024    CONTRAST/COMPLICATIONS:  IV Contrast: Omnipaque 350  90 cc administered   0 cc discarded  Oral Contrast: NONE  Complications: None reported at time of study completion    PROCEDURE:  CTof the Abdomen and Pelvis was performed.  Sagittal and coronal reformats were performed.    FINDINGS:  LOWER CHEST: A few less than 4 mm right middle lobe and bilateral lower   nodules slightly increased in size and number from the prior exam. Right   retrocrural cystic lesion is unchanged.    LIVER: Extensive area of hypovascularity in the anterior right hepatic   lobe extending into the right hepatic dome to a greater extent than on   prior CT of 9/9/2024 correlate for recent treatment or manipulation. Left   hepatic hypovascular lesions are unchanged.  BILE DUCTS: Biliary stent identified.  GALLBLADDER: Not well delineated.  SPLEEN: Several areas of decreased peripheral wedge-shaped enhancement of   the spleen slightly more prominent on prior exam of unclear etiology and   significance.  PANCREAS: Within normal limits.  ADRENALS: Within normal limits.  KIDNEYS/URETERS: Kidneys enhance bilaterally and symmetrically without   hydronephrosis. Multiple left parapelvic cyst and posterior left mid to   upper pole renal cyst.    BLADDER: Within normal limits.  REPRODUCTIVE ORGANS: Uterus and adnexa within normal limits.    BOWEL: No bowel obstruction. Appendix is not visualized. No evidence of   inflammation in the pericecal region. Sigmoid diverticulosis without   diverticulitis.  PERITONEUM/RETROPERITONEUM: Within normal limits.  VESSELS: Atherosclerotic changes.  LYMPH NODES: No lymphadenopathy.  ABDOMINAL WALL: Within normal limits.  BONES: Degenerative changes and osteopenia.    IMPRESSION:  Sigmoid diverticulosis without diverticulitis. No bowel obstruction or   gross bowel wall thickening.    Extensive area of hypovascularity throughout the right hepatic lobe more   prominent than on prior exam may represent sequela of recent treatment,   correlate with clinical history. Stable appearance of the left hepatic   lobe.    < end of copied text >        Radiology: all available radiological tests reviewed    Advanced directives addressed: full resuscitation

## 2024-10-06 PROCEDURE — 99233 SBSQ HOSP IP/OBS HIGH 50: CPT

## 2024-10-06 RX ADMIN — Medication 100 MICROGRAM(S): at 05:30

## 2024-10-06 RX ADMIN — HYDROCORTISONE 10 MILLIGRAM(S): 5 TABLET ORAL at 22:08

## 2024-10-06 RX ADMIN — HYDROCORTISONE 20 MILLIGRAM(S): 5 TABLET ORAL at 09:44

## 2024-10-06 RX ADMIN — PANTOPRAZOLE SODIUM 40 MILLIGRAM(S): 40 TABLET, DELAYED RELEASE ORAL at 09:43

## 2024-10-06 RX ADMIN — Medication 50 MILLIGRAM(S): at 22:08

## 2024-10-06 RX ADMIN — MEROPENEM 1000 MILLIGRAM(S): 500 INJECTION INTRAVENOUS at 22:08

## 2024-10-06 RX ADMIN — VANCOMYCIN HCL-SODIUM CHLORIDE IV SOLN 1.5 GM/250ML-0.9% 250 MILLIGRAM(S): 1.5-0.9/25 SOLUTION at 09:43

## 2024-10-06 RX ADMIN — HYDROMORPHONE HYDROCHLORIDE 0.5 MILLIGRAM(S): 1 INJECTION, SOLUTION INTRAMUSCULAR; INTRAVENOUS; SUBCUTANEOUS at 00:10

## 2024-10-06 RX ADMIN — AMIODARONE HYDROCHLORIDE 100 MILLIGRAM(S): 50 INJECTION, SOLUTION INTRAVENOUS at 09:44

## 2024-10-06 RX ADMIN — HYDROMORPHONE HYDROCHLORIDE 0.5 MILLIGRAM(S): 1 INJECTION, SOLUTION INTRAMUSCULAR; INTRAVENOUS; SUBCUTANEOUS at 05:55

## 2024-10-06 RX ADMIN — HYDROMORPHONE HYDROCHLORIDE 0.5 MILLIGRAM(S): 1 INJECTION, SOLUTION INTRAMUSCULAR; INTRAVENOUS; SUBCUTANEOUS at 00:40

## 2024-10-06 RX ADMIN — Medication 50 MILLIGRAM(S): at 09:43

## 2024-10-06 RX ADMIN — CHLORHEXIDINE GLUCONATE ORAL RINSE 1 APPLICATION(S): 1.2 SOLUTION DENTAL at 09:44

## 2024-10-06 RX ADMIN — HYDROMORPHONE HYDROCHLORIDE 0.5 MILLIGRAM(S): 1 INJECTION, SOLUTION INTRAMUSCULAR; INTRAVENOUS; SUBCUTANEOUS at 06:25

## 2024-10-06 RX ADMIN — VANCOMYCIN HCL-SODIUM CHLORIDE IV SOLN 1.5 GM/250ML-0.9% 250 MILLIGRAM(S): 1.5-0.9/25 SOLUTION at 22:08

## 2024-10-06 RX ADMIN — MEROPENEM 1000 MILLIGRAM(S): 500 INJECTION INTRAVENOUS at 05:30

## 2024-10-06 RX ADMIN — MEROPENEM 1000 MILLIGRAM(S): 500 INJECTION INTRAVENOUS at 14:28

## 2024-10-06 RX ADMIN — HYDROMORPHONE HYDROCHLORIDE 0.5 MILLIGRAM(S): 1 INJECTION, SOLUTION INTRAMUSCULAR; INTRAVENOUS; SUBCUTANEOUS at 12:17

## 2024-10-06 RX ADMIN — HYDROMORPHONE HYDROCHLORIDE 0.5 MILLIGRAM(S): 1 INJECTION, SOLUTION INTRAMUSCULAR; INTRAVENOUS; SUBCUTANEOUS at 12:47

## 2024-10-06 NOTE — PROGRESS NOTE ADULT - SUBJECTIVE AND OBJECTIVE BOX
80yo F with PMH: Renal Cell Carcinoma with mets to liver, IR embolization of a liver lesion on 7/30 Diverticulitis, HTN, HLD, Hypothyroid was recently, recent new onset Afib/RVR in August 2024 started on Eliquis, GI Bleed after ERCP- resolved without intervention, started on eliquis during admit for Afib. liver abscesses    admitted 8/2-8/15 for septic shock requiring pressors and critical care admission, Found to have dilated common bile duct, elevated LFTs and likely cholangitis/choledocholithiasis with stone, s/p ERCP with sphincterotomy/stone removal and stent placement on 8/5/24      admitted 9/9-9/14 for septic shock, requiring levophed and critical care admission, blood cultures during admission had Enterobacter, sensitive to Cipro. Noted liver abscess on MRI, felt to be too small for biopsy at the time, decision for prolong antibiotic course made. Discharged on Cipro/Flagyl to complete her therapy, this was completed on Monday 9/23.     She was doing well until day of admission, when she developed fever to 103+, nausea, vomiting, diarrhea-brown, which she states is exactly how she presented for admission.  Found to be hypotensive in ED, give 3 liters sepsis fluids without improvement and started on levophed for BP augmentation. ICU Consulted   (29 Sep 2024 11:29)      Review of Systems:  CONSTITUTIONAL: as per hpi   EYES/ENT: No visual changes;  No vertigo or throat pain   NECK: No pain or stiffness  RESPIRATORY: No cough, wheezing, hemoptysis; No shortness of breath,   CARDIOVASCULAR: No chest pain or palpitations  GASTROINTESTINAL: as per hpi   GENITOURINARY: No dysuria, frequency or hematuria  NEUROLOGICAL: No numbness or weakness  SKIN: No itching, burning, rashes, or lesions   All other review of systems is negative unless indicated above    PHYSICAL EXAM:    Vital Signs Last 24 Hrs  T(C): 36.6 (06 Oct 2024 08:36), Max: 36.6 (06 Oct 2024 08:36)  T(F): 97.8 (06 Oct 2024 08:36), Max: 97.8 (06 Oct 2024 08:36)  HR: 88 (06 Oct 2024 08:36) (85 - 88)  BP: 146/96 (06 Oct 2024 08:36) (138/84 - 146/96)  BP(mean): --  RR: 18 (06 Oct 2024 08:36) (18 - 18)  SpO2: 97% (06 Oct 2024 08:36) (97% - 97%)    Parameters below as of 06 Oct 2024 08:36  Patient On (Oxygen Delivery Method): room air            GENERAL: comfortable   HEAD:  Atraumatic, Normocephalic  EYES: EOMI, PERRLA, conjunctiva and sclera clear  HEENT: Moist mucous membranes  NECK: Supple, No JVD  NERVOUS SYSTEM:  Alert   CHEST/LUNG: Clear to auscultation bilaterally; No rales, rhonchi, wheezing, or rubs  HEART:S1S2 normal, no murmer  ABDOMEN: Soft, tender on deep palpation,bs+  GENITOURINARY- Voiding, no palpable bladder  EXTREMITIES:  2+ Peripheral Pulses, No clubbing, cyanosis, or edema  MUSCULOSKELETAL No muscle tenderness, Muscle tone normal, No joint tenderness, no Joint swelling, Joint range of motion-normal  SKIN-no rash, no lesion  PSYCH- Mood stable  LYMPH Node- No palpable lymph node               10-05    137  |  101  |  10  ----------------------------<  96  3.5   |  31  |  0.50    Ca    8.7      05 Oct 2024 06:16            Urinalysis Basic - ( 05 Oct 2024 06:16 )    Color: x / Appearance: x / SG: x / pH: x  Gluc: 96 mg/dL / Ketone: x  / Bili: x / Urobili: x   Blood: x / Protein: x / Nitrite: x   Leuk Esterase: x / RBC: x / WBC x   Sq Epi: x / Non Sq Epi: x / Bacteria: x          CAPILLARY BLOOD GLUCOSE          MEDICATIONS  (STANDING):  aMIOdarone    Tablet 100 milliGRAM(s) Oral daily  chlorhexidine 4% Liquid 1 Application(s) Topical <User Schedule>  hydrocortisone 10 milliGRAM(s) Oral at bedtime  hydrocortisone 20 milliGRAM(s) Oral daily  levothyroxine 100 MICROGram(s) Oral daily  meropenem Injectable 1000 milliGRAM(s) IV Push every 8 hours  metoprolol succinate ER 50 milliGRAM(s) Oral every 12 hours  pantoprazole  Injectable 40 milliGRAM(s) IV Push daily  vancomycin  IVPB 750 milliGRAM(s) IV Intermittent every 12 hours    MEDICATIONS  (PRN):  HYDROmorphone  Injectable 0.5 milliGRAM(s) IV Push every 4 hours PRN Moderate Pain (4 - 6)      # Reviewed today's blood work which include CBC, BMP       #Discussed with other team member- ID -Dr. Roberson- ct abx,     #Discussed with pt in detail        Total time spent 51 minutes

## 2024-10-06 NOTE — PROGRESS NOTE ADULT - SUBJECTIVE AND OBJECTIVE BOX
Date of service: 10-06-24 @ 08:03    Lying in bed in NAD  Feels stronger  No fever  LFT's still elevated    ROS: no fever or chills; denies dizziness, no HA, no SOB or cough, no abdominal pain, no diarrhea or constipation; no dysuria, no legs pain, no rashes    MEDICATIONS  (STANDING):  aMIOdarone    Tablet 100 milliGRAM(s) Oral daily  chlorhexidine 4% Liquid 1 Application(s) Topical <User Schedule>  hydrocortisone 10 milliGRAM(s) Oral at bedtime  hydrocortisone 20 milliGRAM(s) Oral daily  levothyroxine 100 MICROGram(s) Oral daily  meropenem Injectable 1000 milliGRAM(s) IV Push every 8 hours  metoprolol succinate ER 50 milliGRAM(s) Oral every 12 hours  pantoprazole  Injectable 40 milliGRAM(s) IV Push daily  vancomycin  IVPB 750 milliGRAM(s) IV Intermittent every 12 hours    Vital Signs Last 24 Hrs  T(C): 36.2 (05 Oct 2024 21:15), Max: 36.2 (05 Oct 2024 09:16)  T(F): 97.2 (05 Oct 2024 21:15), Max: 97.2 (05 Oct 2024 09:16)  HR: 85 (05 Oct 2024 21:15) (85 - 96)  BP: 146/88 (05 Oct 2024 21:15) (138/84 - 146/88)  BP(mean): --  RR: 18 (05 Oct 2024 21:15) (18 - 18)  SpO2: 97% (05 Oct 2024 21:15) (97% - 99%)    Parameters below as of 05 Oct 2024 21:15  Patient On (Oxygen Delivery Method): room air     Physical exam:    Constitutional: frail looking  HEENT: NC/AT, EOMI, PERRLA, conjunctivae clear; ears and nose atraumatic; pharynx clear  Neck: supple; thyroid not palpable  Back: no tenderness  Respiratory: respiratory effort normal; clear to auscultation  Cardiovascular: S1S2 regular, no murmurs  Abdomen: soft, mildly tender, not distended, positive BS; no liver or spleen organomegaly; right sided drain   Genitourinary: no suprapubic tenderness  Musculoskeletal: no muscle tenderness, no joint swelling or tenderness  Neurological/ Psychiatric: AxOx3, judgement and insight normal;  moving all extremities  Skin: no rashes; no palpable lesions    Labs: reviewed    10-05    137  |  101  |  10  ----------------------------<  96  3.5   |  31  |  0.50    Ca    8.7      05 Oct 2024 06:16    Vancomycin Level, Trough: 11.8 ug/mL (10-04 @ 09:13)                        11.1   9.81  )-----------(144      ( 04 Oct 2024 05:26 )             35.2     10-04    140  |  105  |  6[L]  ----------------------------<  92  3.4[L]   |  30  |  0.45[L]    Ca    8.6      04 Oct 2024 05:26  Phos  2.4     10-04  Mg     1.8     10-04    TPro  5.5[L]  /  Alb  1.9[L]  /  TBili  1.0  /  DBili  x   /  AST  165[H]  /  ALT  209[H]  /  AlkPhos  374[H]  10-04    Vancomycin Level, Trough: 10.0 ug/mL (10-03 @ 21:40)  Vancomycin Level, Trough: 8.4 ug/mL (10-02 @ 09:51)                        10.6   9.16  )-----------( 112      ( 03 Oct 2024 05:42 )             33.1     10-03    140  |  105  |  8   ----------------------------<  86  3.2[L]   |  29  |  0.51    Ca    8.0[L]      03 Oct 2024 05:42  Phos  2.8     10-03  Mg     1.5     10-03    TPro  5.1[L]  /  Alb  1.7[L]  /  TBili  0.9  /  DBili  0.4[H]  /  AST  298[H]  /  ALT  254[H]  /  AlkPhos  332[H]  10-03    Vancomycin Level, Trough: 8.4 ug/mL (10-02 @ 09:51)                        10.3   9.28  )-----------( 71       ( 02 Oct 2024 06:21 )             32.7     10-02    138  |  110[H]  |  9   ----------------------------<  72  3.7   |  23  |  0.48[L]    Ca    8.1[L]      02 Oct 2024 06:21  Phos  2.1     10-02  Mg     1.7     10-02    Cultures:       Culture - Abscess with Gram Stain (collected 09-30-24 @ 15:00)  Source: .Abscess  Gram Stain (10-01-24 @ 03:00):    Few polymorphonuclear leukocytes seen per low power field    Few Gram positive cocci in pairs seen per oil power field    Few Gram Negative Rods seen per oil power field  Preliminary Report (10-01-24 @ 22:32):    Few Enterococcus faecalis    Rare Klebsiella pneumoniae    Culture - Blood (collected 09-29-24 @ 06:06)  Source: .Blood BLOOD  Gram Stain (09-29-24 @ 21:29):    Growth in aerobic bottle: Gram Negative Rods and Gram positive cocci in    pairs    Growth in anaerobic bottle: Gram Negative Rods and Gram positive cocci in    pairs and Gram Positive Rods  Final Report (10-01-24 @ 12:11):    Growth in aerobic and anaerobic bottles: Klebsiella pneumoniae    Growth in aerobic and anaerobic bottles: Enterococcus faecalis    Growth in anaerobic bottle: Clostridium perfringens "Susceptibilities not    performed"    See previous culture [id number]    Culture - Blood (collected 09-29-24 @ 06:06)  Source: .Blood BLOOD  Gram Stain (09-29-24 @ 21:28):    Growth in anaerobic bottle: Gram Positive Rods    and Gram positive cocci in pairs    and Gram Negative Rods    Growth in aerobic bottle: Gram positive cocci in pairs and Gram Negative    Rods  Final Report (10-01-24 @ 12:10):    Growth in aerobic and anaerobic bottles: Klebsiella pneumoniae    Growth in aerobic and anaerobic bottles: Enterococcus faecalis    Growth in anaerobic bottle: Enterococcus faecium    Growth in anaerobic bottle: Clostridium perfringens "Susceptibilities not    performed"    Direct identification is available within approximately 3-5    hours either by Blood Panel Multiplexed PCR or Direct    MALDI-TOF. Details: [email or web address]  Organism: Blood Culture PCR  Klebsiella pneumoniae  Enterococcus faecalis  Enterococcus faecium (10-01-24 @ 12:10)  Organism: Enterococcus faecium (10-01-24 @ 12:10)      Method Type: ARLEY      -  Ampicillin: R >8 Predicts results to ampicillin/sulbactam, amoxacillin-clavulanate and  piperacillin-tazobactam.      -  Vancomycin: S 0.5      -  Gentamicin synergy: S <=500      -  Streptomycin synergy: R >1000  Organism: Enterococcus faecalis (10-01-24 @ 12:10)      Method Type: ARLEY      -  Ampicillin: S <=2 Predicts results to ampicillin/sulbactam, amoxacillin-clavulanate and  piperacillin-tazobactam.      -  Vancomycin: S 2      -  Gentamicin synergy: S <=500      -  Streptomycin synergy: S <=1000  Organism: Klebsiella pneumoniae (10-01-24 @ 12:10)      Method Type: ARLEY      -  Ampicillin: R >16 These ampicillin results predict results for amoxicillin      -  Ampicillin/Sulbactam: S 8/4      -  Aztreonam: S <=4      -  Cefazolin: S <=2      -  Cefepime: S <=2      -  Cefoxitin: I 16      -  Ceftriaxone: S <=1      -  Ciprofloxacin: S <=0.25      -  Ertapenem: S <=0.5      -  Gentamicin: S <=2      -  Imipenem: S <=1      -  Levofloxacin: S <=0.5      -  Meropenem: S <=1      -  Piperacillin/Tazobactam: S <=8      -  Tobramycin: S <=2      -  Trimethoprim/Sulfamethoxazole: S <=0.5/9.5  Organism: Blood Culture PCR (10-01-24 @ 12:10)      Method Type: PCR      -  K. pneumoniae group: Detec (K. pneumoniae, K. quasipneumoniae, K. variicola)      -  Enterococcus faecalis: Detec      -  Enterococcus faecium: Detec    Culture - Urine (collected 09-29-24 @ 06:06)  Source: Clean Catch None  Final Report (09-30-24 @ 12:10):    No growth    Culture - Blood (collected 02 Oct 2024 12:11)  Source: .Blood BLOOD  Preliminary Report (04 Oct 2024 20:01):    No growth at 48 Hours    Culture - Blood (collected 02 Oct 2024 12:06)  Source: .Blood BLOOD  Preliminary Report (04 Oct 2024 20:01):    No growth at 48 Hours    < from: CT Abdomen and Pelvis w/ IV Cont (09.29.24 @ 07:22) >    ACC: [id number] EXAM:  CT ABDOMEN AND PELVIS IC   ORDERED BY: CHAPO MCGILL     PROCEDURE DATE:  09/29/2024          INTERPRETATION:  CLINICAL INFORMATION: Lower abdominal pain and fever,   history of malignancy    COMPARISON: CT chest abdomen pelvis 9/9/2024 CT abdomen pelvis 8/7/2024,   MRI of the abdomen 9/10/2024    CONTRAST/COMPLICATIONS:  IV Contrast: Omnipaque 350  90 cc administered   0 cc discarded  Oral Contrast: NONE  Complications: None reported at time of study completion    PROCEDURE:  CTof the Abdomen and Pelvis was performed.  Sagittal and coronal reformats were performed.    FINDINGS:  LOWER CHEST: A few less than 4 mm right middle lobe and bilateral lower   nodules slightly increased in size and number from the prior exam. Right   retrocrural cystic lesion is unchanged.    LIVER: Extensive area of hypovascularity in the anterior right hepatic   lobe extending into the right hepatic dome to a greater extent than on   prior CT of 9/9/2024 correlate for recent treatment or manipulation. Left   hepatic hypovascular lesions are unchanged.  BILE DUCTS: Biliary stent identified.  GALLBLADDER: Not well delineated.  SPLEEN: Several areas of decreased peripheral wedge-shaped enhancement of   the spleen slightly more prominent on prior exam of unclear etiology and   significance.  PANCREAS: Within normal limits.  ADRENALS: Within normal limits.  KIDNEYS/URETERS: Kidneys enhance bilaterally and symmetrically without   hydronephrosis. Multiple left parapelvic cyst and posterior left mid to   upper pole renal cyst.    BLADDER: Within normal limits.  REPRODUCTIVE ORGANS: Uterus and adnexa within normal limits.    BOWEL: No bowel obstruction. Appendix is not visualized. No evidence of   inflammation in the pericecal region. Sigmoid diverticulosis without   diverticulitis.  PERITONEUM/RETROPERITONEUM: Within normal limits.  VESSELS: Atherosclerotic changes.  LYMPH NODES: No lymphadenopathy.  ABDOMINAL WALL: Within normal limits.  BONES: Degenerative changes and osteopenia.    IMPRESSION:  Sigmoid diverticulosis without diverticulitis. No bowel obstruction or   gross bowel wall thickening.    Extensive area of hypovascularity throughout the right hepatic lobe more   prominent than on prior exam may represent sequela of recent treatment,   correlate with clinical history. Stable appearance of the left hepatic   lobe.    < end of copied text >        Radiology: all available radiological tests reviewed    Advanced directives addressed: full resuscitation

## 2024-10-06 NOTE — PROGRESS NOTE ADULT - NSPROGADDITIONALINFOA_GEN_ALL_CORE
I spent a total of 51  minutes in face-to-face time with the patient and on the floor managing patient including coordination of care. Overt 50% of the time spent in discussion of the diagnosis, counseling and treatment plan.
I spent a total of 51  minutes in face-to-face time with the patient and on the floor managing patient including coordination of care. Overt 50% of the time spent in discussion of the diagnosis, counseling and treatment plan.

## 2024-10-06 NOTE — PROGRESS NOTE ADULT - ASSESSMENT
80yo F with PMH: Renal Cell Carcinoma with mets to liver, IR embolization of a liver lesion on 7/30, Diverticulitis, HTN, HLD, Hypothyroidism,  recent new onset Afib/RVR in August 2024 started on Eliquis, GI Bleed after ERCP- resolved without intervention, liver abscesses, an admission on  8/2-8/15 for septic shock requiring pressors and critical care admission, Found to have dilated common bile duct, elevated LFTs and likely cholangitis/choledocholithiasis with stone, s/p ERCP with sphincterotomy/stone removal and stent placement on 8/5/24, admitted 9/9-9/14 for septic shock, requiring levophed and critical care admission, blood cultures during admission had Enterobacter, sensitive to Cipro. Noted liver abscess on MRI, felt to be too small for biopsy at the time, decision for prolonged antibiotic course made. Discharged on Cipro/Flagyl to complete her therapy, this was completed on Monday 9/23. Then patient was admitted on 9/29 for evaluation of fever to 103, nausea, vomiting, diarrhea, similar to her presentation of her 9/9-9/14 admission, when admitted on 9/29 was so hypotensive has required levophed after fluids. The patient is still on pressors after fluids without improvement. Initial lactate was 8, but has improved to normal. The patient is alert and oriented and has mild difuse abdominal pain but overall her GI symptoms are improved.     1. Liver abscess s/p drainage with ENFA, KLPN. Sepsis with ENFA, ENFAE, KLPN and CLPE. Metastatic renal cell Ca with mets to the liver. CRF stage 3. Immunocompromised host.   -Patient admitted with septic shock secondary to multiple organisms most likely due to GI or liver origin, due to liver abscesses or masses  -low grade fever is resolving  -abdomen is improving  -on meropenem 1 gm IV q12h and vancomycin 750 mg IV q12h # 6  -tolerating abx well so far; no side effects noted  -repeat vancomycin trough level is therapeutic  -repeat BC x 2 are negative to date   - serial cbc and monitor temperature   - oncology evaluation appreciated   - leukocytosis resolved  - GI evaluation -- s/p ERCP with stent exchange   -continue IV abx coverage   -she may need to complete therapy with IV abx as outpatient   2. other issues; per medicine    [facility] abx token not applicable at this time

## 2024-10-07 ENCOUNTER — TRANSCRIPTION ENCOUNTER (OUTPATIENT)
Age: 79
End: 2024-10-07

## 2024-10-07 PROCEDURE — 99232 SBSQ HOSP IP/OBS MODERATE 35: CPT

## 2024-10-07 PROCEDURE — 99231 SBSQ HOSP IP/OBS SF/LOW 25: CPT

## 2024-10-07 RX ADMIN — PANTOPRAZOLE SODIUM 40 MILLIGRAM(S): 40 TABLET, DELAYED RELEASE ORAL at 09:16

## 2024-10-07 RX ADMIN — HYDROMORPHONE HYDROCHLORIDE 0.5 MILLIGRAM(S): 1 INJECTION, SOLUTION INTRAMUSCULAR; INTRAVENOUS; SUBCUTANEOUS at 00:42

## 2024-10-07 RX ADMIN — VANCOMYCIN HCL-SODIUM CHLORIDE IV SOLN 1.5 GM/250ML-0.9% 250 MILLIGRAM(S): 1.5-0.9/25 SOLUTION at 09:15

## 2024-10-07 RX ADMIN — HYDROCORTISONE 10 MILLIGRAM(S): 5 TABLET ORAL at 21:19

## 2024-10-07 RX ADMIN — MEROPENEM 1000 MILLIGRAM(S): 500 INJECTION INTRAVENOUS at 05:46

## 2024-10-07 RX ADMIN — Medication 50 MILLIGRAM(S): at 09:19

## 2024-10-07 RX ADMIN — Medication 50 MILLIGRAM(S): at 21:21

## 2024-10-07 RX ADMIN — AMIODARONE HYDROCHLORIDE 100 MILLIGRAM(S): 50 INJECTION, SOLUTION INTRAVENOUS at 09:19

## 2024-10-07 RX ADMIN — HYDROCORTISONE 20 MILLIGRAM(S): 5 TABLET ORAL at 09:16

## 2024-10-07 RX ADMIN — HYDROMORPHONE HYDROCHLORIDE 0.5 MILLIGRAM(S): 1 INJECTION, SOLUTION INTRAMUSCULAR; INTRAVENOUS; SUBCUTANEOUS at 10:08

## 2024-10-07 RX ADMIN — HYDROMORPHONE HYDROCHLORIDE 0.5 MILLIGRAM(S): 1 INJECTION, SOLUTION INTRAMUSCULAR; INTRAVENOUS; SUBCUTANEOUS at 06:20

## 2024-10-07 RX ADMIN — MEROPENEM 1000 MILLIGRAM(S): 500 INJECTION INTRAVENOUS at 21:21

## 2024-10-07 RX ADMIN — Medication 100 MICROGRAM(S): at 05:46

## 2024-10-07 RX ADMIN — HYDROMORPHONE HYDROCHLORIDE 0.5 MILLIGRAM(S): 1 INJECTION, SOLUTION INTRAMUSCULAR; INTRAVENOUS; SUBCUTANEOUS at 06:01

## 2024-10-07 RX ADMIN — HYDROMORPHONE HYDROCHLORIDE 0.5 MILLIGRAM(S): 1 INJECTION, SOLUTION INTRAMUSCULAR; INTRAVENOUS; SUBCUTANEOUS at 01:00

## 2024-10-07 RX ADMIN — VANCOMYCIN HCL-SODIUM CHLORIDE IV SOLN 1.5 GM/250ML-0.9% 250 MILLIGRAM(S): 1.5-0.9/25 SOLUTION at 21:18

## 2024-10-07 RX ADMIN — CHLORHEXIDINE GLUCONATE ORAL RINSE 1 APPLICATION(S): 1.2 SOLUTION DENTAL at 09:19

## 2024-10-07 RX ADMIN — MEROPENEM 1000 MILLIGRAM(S): 500 INJECTION INTRAVENOUS at 13:58

## 2024-10-07 NOTE — PROGRESS NOTE ADULT - SUBJECTIVE AND OBJECTIVE BOX
Date of service: 10-07-24 @ 12:01    Patient lying in bed; afebrile, has pain at the drain placed liver lesion site which was placed by IR        ROS: no fever or chills; denies dizziness, no HA, no SOB or cough,  no diarrhea or constipation; no dysuria, no urinary frequency, no legs pain, no rashes    MEDICATIONS  (STANDING):  aMIOdarone    Tablet 100 milliGRAM(s) Oral daily  chlorhexidine 4% Liquid 1 Application(s) Topical <User Schedule>  hydrocortisone 10 milliGRAM(s) Oral at bedtime  hydrocortisone 20 milliGRAM(s) Oral daily  levothyroxine 100 MICROGram(s) Oral daily  meropenem Injectable 1000 milliGRAM(s) IV Push every 8 hours  metoprolol succinate ER 50 milliGRAM(s) Oral every 12 hours  pantoprazole  Injectable 40 milliGRAM(s) IV Push daily  vancomycin  IVPB 750 milliGRAM(s) IV Intermittent every 12 hours    MEDICATIONS  (PRN):  HYDROmorphone  Injectable 0.5 milliGRAM(s) IV Push every 4 hours PRN Moderate Pain (4 - 6)      Vital Signs Last 24 Hrs  T(C): 36.6 (07 Oct 2024 09:10), Max: 36.8 (06 Oct 2024 20:39)  T(F): 97.9 (07 Oct 2024 09:10), Max: 98.3 (06 Oct 2024 20:39)  HR: 91 (07 Oct 2024 09:10) (86 - 91)  BP: 144/90 (07 Oct 2024 09:10) (134/81 - 144/90)  BP(mean): --  RR: 18 (07 Oct 2024 09:10) (18 - 18)  SpO2: 96% (07 Oct 2024 09:10) (96% - 98%)    Parameters below as of 07 Oct 2024 09:10  Patient On (Oxygen Delivery Method): room air            Physical Exam:          Constitutional: frail looking  HEENT: NC/AT, EOMI, PERRLA, conjunctivae clear; ears and nose atraumatic; pharynx clear  Neck: supple; thyroid not palpable  Back: no tenderness  Respiratory: respiratory effort normal; clear to auscultation  Cardiovascular: S1S2 regular, no murmurs  Abdomen: soft, mildly tender, not distended, positive BS; no liver or spleen organomegaly; right sided drain   Genitourinary: no suprapubic tenderness  Musculoskeletal: no muscle tenderness, no joint swelling or tenderness  Neurological/ Psychiatric: AxOx3, judgement and insight normal;  moving all extremities  Skin: no rashes; no palpable lesions    Labs: reviewed        Labs:                 Cultures:       Culture - Blood (collected 10-02-24 @ 12:11)  Source: .Blood BLOOD  Preliminary Report (10-06-24 @ 20:00):    No growth at 4 days    Culture - Blood (collected 10-02-24 @ 12:06)  Source: .Blood BLOOD  Preliminary Report (10-06-24 @ 20:00):    No growth at 4 days    Culture - Abscess with Gram Stain (collected 09-30-24 @ 15:00)  Source: .Abscess  Gram Stain (10-01-24 @ 03:00):    Few polymorphonuclear leukocytes seen per low power field    Few Gram positive cocci in pairs seen per oil power field    Few Gram Negative Rods seen per oil power field  Final Report (10-05-24 @ 14:59):    Few Enterococcus faecalis    Rare Klebsiella pneumoniae  Organism: Enterococcus faecalis  Klebsiella pneumoniae (10-05-24 @ 14:59)  Organism: Klebsiella pneumoniae (10-05-24 @ 14:59)      Method Type: ARLEY      -  Amoxicillin/Clavulanic Acid: S <=8/4      -  Ampicillin: R >16 These ampicillin results predict results for amoxicillin      -  Ampicillin/Sulbactam: S 8/4      -  Aztreonam: S <=4      -  Cefazolin: S <=2      -  Cefepime: S <=2      -  Cefoxitin: S <=8      -  Ceftriaxone: S <=1      -  Ciprofloxacin: S <=0.25      -  Ertapenem: S <=0.5      -  Gentamicin: S <=2      -  Imipenem: S <=1      -  Levofloxacin: S <=0.5      -  Meropenem: S <=1      -  Piperacillin/Tazobactam: S <=8      -  Tobramycin: S <=2      -  Trimethoprim/Sulfamethoxazole: S <=0.5/9.5  Organism: Enterococcus faecalis (10-05-24 @ 14:59)      Method Type: ARLYE      -  Ampicillin: S <=2 Predicts results to ampicillin/sulbactam, amoxacillin-clavulanate and  piperacillin-tazobactam.      -  Vancomycin: S 2                    Culture - Abscess with Gram Stain (collected 09-30-24 @ 15:00)  Source: .Abscess  Gram Stain (10-01-24 @ 03:00):    Few polymorphonuclear leukocytes seen per low power field    Few Gram positive cocci in pairs seen per oil power field    Few Gram Negative Rods seen per oil power field  Preliminary Report (10-01-24 @ 22:32):    Few Enterococcus faecalis    Rare Klebsiella pneumoniae    Culture - Blood (collected 09-29-24 @ 06:06)  Source: .Blood BLOOD  Gram Stain (09-29-24 @ 21:29):    Growth in aerobic bottle: Gram Negative Rods and Gram positive cocci in    pairs    Growth in anaerobic bottle: Gram Negative Rods and Gram positive cocci in    pairs and Gram Positive Rods  Final Report (10-01-24 @ 12:11):    Growth in aerobic and anaerobic bottles: Klebsiella pneumoniae    Growth in aerobic and anaerobic bottles: Enterococcus faecalis    Growth in anaerobic bottle: Clostridium perfringens "Susceptibilities not    performed"    See previous culture [id number]    Culture - Blood (collected 09-29-24 @ 06:06)  Source: .Blood BLOOD  Gram Stain (09-29-24 @ 21:28):    Growth in anaerobic bottle: Gram Positive Rods    and Gram positive cocci in pairs    and Gram Negative Rods    Growth in aerobic bottle: Gram positive cocci in pairs and Gram Negative    Rods  Final Report (10-01-24 @ 12:10):    Growth in aerobic and anaerobic bottles: Klebsiella pneumoniae    Growth in aerobic and anaerobic bottles: Enterococcus faecalis    Growth in anaerobic bottle: Enterococcus faecium    Growth in anaerobic bottle: Clostridium perfringens "Susceptibilities not    performed"    Direct identification is available within approximately 3-5    hours either by Blood Panel Multiplexed PCR or Direct    MALDI-TOF. Details: [email or web address]  Organism: Blood Culture PCR  Klebsiella pneumoniae  Enterococcus faecalis  Enterococcus faecium (10-01-24 @ 12:10)  Organism: Enterococcus faecium (10-01-24 @ 12:10)      Method Type: ARLEY      -  Ampicillin: R >8 Predicts results to ampicillin/sulbactam, amoxacillin-clavulanate and  piperacillin-tazobactam.      -  Vancomycin: S 0.5      -  Gentamicin synergy: S <=500      -  Streptomycin synergy: R >1000  Organism: Enterococcus faecalis (10-01-24 @ 12:10)      Method Type: ARLEY      -  Ampicillin: S <=2 Predicts results to ampicillin/sulbactam, amoxacillin-clavulanate and  piperacillin-tazobactam.      -  Vancomycin: S 2      -  Gentamicin synergy: S <=500      -  Streptomycin synergy: S <=1000  Organism: Klebsiella pneumoniae (10-01-24 @ 12:10)      Method Type: ARLEY      -  Ampicillin: R >16 These ampicillin results predict results for amoxicillin      -  Ampicillin/Sulbactam: S 8/4      -  Aztreonam: S <=4      -  Cefazolin: S <=2      -  Cefepime: S <=2      -  Cefoxitin: I 16      -  Ceftriaxone: S <=1      -  Ciprofloxacin: S <=0.25      -  Ertapenem: S <=0.5      -  Gentamicin: S <=2      -  Imipenem: S <=1      -  Levofloxacin: S <=0.5      -  Meropenem: S <=1      -  Piperacillin/Tazobactam: S <=8      -  Tobramycin: S <=2      -  Trimethoprim/Sulfamethoxazole: S <=0.5/9.5  Organism: Blood Culture PCR (10-01-24 @ 12:10)      Method Type: PCR      -  K. pneumoniae group: Detec (K. pneumoniae, K. quasipneumoniae, K. variicola)      -  Enterococcus faecalis: Detec      -  Enterococcus faecium: Detec    Culture - Urine (collected 09-29-24 @ 06:06)  Source: Clean Catch None  Final Report (09-30-24 @ 12:10):    No growth    Culture - Blood (collected 02 Oct 2024 12:11)  Source: .Blood BLOOD  Preliminary Report (04 Oct 2024 20:01):    No growth at 48 Hours    Culture - Blood (collected 02 Oct 2024 12:06)  Source: .Blood BLOOD  Preliminary Report (04 Oct 2024 20:01):    No growth at 48 Hours    < from: CT Abdomen and Pelvis w/ IV Cont (09.29.24 @ 07:22) >    ACC: [id number] EXAM:  CT ABDOMEN AND PELVIS IC   ORDERED BY: CHAPO MCGILL     PROCEDURE DATE:  09/29/2024          INTERPRETATION:  CLINICAL INFORMATION: Lower abdominal pain and fever,   history of malignancy    COMPARISON: CT chest abdomen pelvis 9/9/2024 CT abdomen pelvis 8/7/2024,   MRI of the abdomen 9/10/2024    CONTRAST/COMPLICATIONS:  IV Contrast: Omnipaque 350  90 cc administered   0 cc discarded  Oral Contrast: NONE  Complications: None reported at time of study completion    PROCEDURE:  CTof the Abdomen and Pelvis was performed.  Sagittal and coronal reformats were performed.    FINDINGS:  LOWER CHEST: A few less than 4 mm right middle lobe and bilateral lower   nodules slightly increased in size and number from the prior exam. Right   retrocrural cystic lesion is unchanged.    LIVER: Extensive area of hypovascularity in the anterior right hepatic   lobe extending into the right hepatic dome to a greater extent than on   prior CT of 9/9/2024 correlate for recent treatment or manipulation. Left   hepatic hypovascular lesions are unchanged.  BILE DUCTS: Biliary stent identified.  GALLBLADDER: Not well delineated.  SPLEEN: Several areas of decreased peripheral wedge-shaped enhancement of   the spleen slightly more prominent on prior exam of unclear etiology and   significance.  PANCREAS: Within normal limits.  ADRENALS: Within normal limits.  KIDNEYS/URETERS: Kidneys enhance bilaterally and symmetrically without   hydronephrosis. Multiple left parapelvic cyst and posterior left mid to   upper pole renal cyst.    BLADDER: Within normal limits.  REPRODUCTIVE ORGANS: Uterus and adnexa within normal limits.    BOWEL: No bowel obstruction. Appendix is not visualized. No evidence of   inflammation in the pericecal region. Sigmoid diverticulosis without   diverticulitis.  PERITONEUM/RETROPERITONEUM: Within normal limits.  VESSELS: Atherosclerotic changes.  LYMPH NODES: No lymphadenopathy.  ABDOMINAL WALL: Within normal limits.  BONES: Degenerative changes and osteopenia.    IMPRESSION:  Sigmoid diverticulosis without diverticulitis. No bowel obstruction or   gross bowel wall thickening.    Extensive area of hypovascularity throughout the right hepatic lobe more   prominent than on prior exam may represent sequela of recent treatment,   correlate with clinical history. Stable appearance of the left hepatic   lobe.    < end of copied text >        Radiology: all available radiological tests reviewed    Advanced directives addressed: full resuscitation

## 2024-10-07 NOTE — PROGRESS NOTE ADULT - ASSESSMENT
78yo F with PMH: Renal Cell Carcinoma with mets to liver, IR embolization of a liver lesion on 7/30, Diverticulitis, HTN, HLD, Hypothyroidism,  recent new onset Afib/RVR in August 2024 started on Eliquis, GI Bleed after ERCP- resolved without intervention, liver abscesses, an admission on  8/2-8/15 for septic shock requiring pressors and critical care admission, Found to have dilated common bile duct, elevated LFTs and likely cholangitis/choledocholithiasis with stone, s/p ERCP with sphincterotomy/stone removal and stent placement on 8/5/24, admitted 9/9-9/14 for septic shock, requiring levophed and critical care admission, blood cultures during admission had Enterobacter, sensitive to Cipro. Noted liver abscess on MRI, felt to be too small for biopsy at the time, decision for prolonged antibiotic course made. Discharged on Cipro/Flagyl to complete her therapy, this was completed on Monday 9/23. Then patient was admitted on 9/29 for evaluation of fever to 103, nausea, vomiting, diarrhea, similar to her presentation of her 9/9-9/14 admission, when admitted on 9/29 was so hypotensive has required levophed after fluids. The patient is still on pressors after fluids without improvement. Initial lactate was 8, but has improved to normal. The patient is alert and oriented and has mild difuse abdominal pain but overall her GI symptoms are improved.     1. Liver abscess s/p drainage with ENFA, KLPN. Sepsis with ENFA, ENFAE, KLPN and CLPE. Metastatic renal cell Ca with mets to the liver. CRF stage 3. Immunocompromised host.   -Patient admitted with septic shock secondary to multiple organisms most likely due to GI or liver origin, due to liver abscesses or masses  -low grade fever is resolving  -abdomen is improving  -on meropenem 1 gm IV q12h and vancomycin 750 mg IV q12h # 7  -tolerating abx well so far; no side effects noted  -repeat vancomycin trough level is therapeutic  -repeat BC x 2 are negative to date   - serial cbc and monitor temperature   - oncology evaluation appreciated   - leukocytosis resolved  - GI evaluation -- s/p ERCP with stent exchange   -continue IV abx coverage   -she may need to complete therapy with IV abx as outpatient   2. other issues; per medicine    [facility] abx token not applicable at this time

## 2024-10-07 NOTE — PROGRESS NOTE ADULT - SUBJECTIVE AND OBJECTIVE BOX
HOSPITALIST ATTENDING PROGRESS NOTE    Chart and meds reviewed.  Patient seen and examined.    CC: Sepsis    Subjective: Feels well, no fever. Still with pain by SUBHA site    All other systems reviewed and found to be negative with the exception of what has been described above.    MEDICATIONS  (STANDING):  aMIOdarone    Tablet 100 milliGRAM(s) Oral daily  chlorhexidine 4% Liquid 1 Application(s) Topical <User Schedule>  hydrocortisone 20 milliGRAM(s) Oral daily  hydrocortisone 10 milliGRAM(s) Oral at bedtime  levothyroxine 100 MICROGram(s) Oral daily  meropenem Injectable 1000 milliGRAM(s) IV Push every 8 hours  metoprolol succinate ER 50 milliGRAM(s) Oral every 12 hours  pantoprazole  Injectable 40 milliGRAM(s) IV Push daily  vancomycin  IVPB 750 milliGRAM(s) IV Intermittent every 12 hours    MEDICATIONS  (PRN):  HYDROmorphone  Injectable 0.5 milliGRAM(s) IV Push every 4 hours PRN Moderate Pain (4 - 6)    Vital Signs Last 24 Hrs  T(C): 36.6 (07 Oct 2024 09:10), Max: 36.8 (06 Oct 2024 20:39)  T(F): 97.9 (07 Oct 2024 09:10), Max: 98.3 (06 Oct 2024 20:39)  HR: 91 (07 Oct 2024 09:10) (86 - 91)  BP: 144/90 (07 Oct 2024 09:10) (134/81 - 144/90)  RR: 18 (07 Oct 2024 09:10) (18 - 18)  SpO2: 96% (07 Oct 2024 09:10) (96% - 98%)    Parameters below as of 07 Oct 2024 09:10  Patient On (Oxygen Delivery Method): room air    GEN: NAD  HEENT:  pupils equal and reactive, EOMI, no oropharyngeal lesions, erythema, exudates, oral thrush  NECK:   supple, no carotid bruits  CV:  +S1, +S2, regular, no murmurs  RESP:   lungs clear to auscultation bilaterally, no wheezing, rales, rhonchi, good air entry bilaterally  GI:  abdomen soft, non-tender, non-distended, normal BS + SUBHA drain  EXT:  no clubbing, no cyanosis, no edema, no calf pain, swelling or erythema  NEURO:  AAOX3, no focal neurological deficits, follows all commands, able to move extremities spontaneously  SKIN:  no ulcers, lesions or rashes    LABS: No new labs       Spironolactone Pregnancy And Lactation Text: This medication can cause feminization of the male fetus and should be avoided during pregnancy. The active metabolite is also found in breast milk.

## 2024-10-07 NOTE — DISCHARGE NOTE NURSING/CASE MANAGEMENT/SOCIAL WORK - NSSCNAMETXT_GEN_ALL_CORE
[facility] for IV abx infusions

## 2024-10-07 NOTE — PHYSICAL THERAPY INITIAL EVALUATION ADULT - LEVEL OF CONSCIOUSNESS, REHAB EVAL
RC/ Surgery time and arrival time is given out the day prior to surgery     Medication pended for provider to refill pn 11/09/2023    Rodrigo alert

## 2024-10-07 NOTE — PROGRESS NOTE ADULT - SUBJECTIVE AND OBJECTIVE BOX
Pt seen, comfortable, likely dc home tomorrow    MEDICATIONS  (STANDING):  aMIOdarone    Tablet 100 milliGRAM(s) Oral daily  chlorhexidine 4% Liquid 1 Application(s) Topical <User Schedule>  hydrocortisone 10 milliGRAM(s) Oral at bedtime  hydrocortisone 20 milliGRAM(s) Oral daily  levothyroxine 100 MICROGram(s) Oral daily  meropenem Injectable 1000 milliGRAM(s) IV Push every 8 hours  metoprolol succinate ER 50 milliGRAM(s) Oral every 12 hours  pantoprazole  Injectable 40 milliGRAM(s) IV Push daily  vancomycin  IVPB 750 milliGRAM(s) IV Intermittent every 12 hours    MEDICATIONS  (PRN):  HYDROmorphone  Injectable 0.5 milliGRAM(s) IV Push every 4 hours PRN Moderate Pain (4 - 6)      ROS  No fever, sweats, chills  No epistaxis, HA, sore throat  No CP, SOB, cough, sputum       Vital Signs Last 24 Hrs  T(C): 36.6 (07 Oct 2024 09:10), Max: 36.8 (06 Oct 2024 20:39)  T(F): 97.9 (07 Oct 2024 09:10), Max: 98.3 (06 Oct 2024 20:39)  HR: 91 (07 Oct 2024 09:10) (86 - 91)  BP: 144/90 (07 Oct 2024 09:10) (134/81 - 144/90)  BP(mean): --  RR: 18 (07 Oct 2024 09:10) (18 - 18)  SpO2: 96% (07 Oct 2024 09:10) (96% - 98%)    Parameters below as of 07 Oct 2024 09:10  Patient On (Oxygen Delivery Method): room air        PE  NAD  Awake, alert     No c/c/e  No rash grossly  FROM

## 2024-10-07 NOTE — DISCHARGE NOTE NURSING/CASE MANAGEMENT/SOCIAL WORK - NSDCPEFALRISK_GEN_ALL_CORE
For information on Fall & Injury Prevention, visit: [email or web address] OR  [email or web address] OR  https://www.cdc.gov/steadi/patient.html

## 2024-10-07 NOTE — DISCHARGE NOTE NURSING/CASE MANAGEMENT/SOCIAL WORK - PATIENT PORTAL LINK FT
You can access the FollowMyHealth Patient Portal offered by [facility] by registering at the following website: [email or web address]. By joining [email or web address]’s FollowMyHealth portal, you will also be able to view your health information using other applications (apps) compatible with our system.

## 2024-10-07 NOTE — PROGRESS NOTE ADULT - ASSESSMENT
RCC IV/ papillary with liver metastases : Unknown primary  S/P Liver directed therapies , prior TKI/ immune therapy ( MSI-H) and recently Tivolizumab + Nivolumab  Multiple septic episodes/ Now with source likely from abscess  Improved on antibiotics  Anemia / multifactorial    PLAN    Antibiotics  Hydration  S/p IR guided drain placement  S/p CBD stent exchange  physical therapy  DVT prophylaxis  Monitor counts  Will hold on therapy ( TKI ) for now  Can FU with IR as out patient    possible dc soon pending ID     Thank you for the courtesy of this consultation and we will continue to follow.    Dusty Marcial MD  [facility]  Cell: [phone]

## 2024-10-07 NOTE — PROGRESS NOTE ADULT - SUBJECTIVE AND OBJECTIVE BOX
Interventional Radiology Follow-Up Note.      This is a 79y Female s/p liver abscess drainage on 9/30 in Interventional Radiology with Dr. Carvajal.   Reports pain at the drain site.       Medication:   aMIOdarone    Tablet: (10-07)  meropenem Injectable: (10-07)  metoprolol succinate ER: (10-07)  vancomycin  IVPB: (10-07)    Vitals:   T(F): 97.9, Max: 98.3 (20:39)  HR: 91  BP: 144/90  RR: 18  SpO2: 96%    Physical Exam:  General: Nontoxic, in NAD.  Abdomen: soft, NTND.    Drain Device: Drain intact attached to gravity drain bag. Draining yellow colored flui. Dressing clean, dry, intact.   24hr Drain output: 27cc    LABS:             Assessment/Plan:  78 y/o female PMH RCC with metastasis to liver s/p IR embolization & biliary stent, HTN, HLD, hypothyroid, recent hospitalization for Enterobacter bacteremia and liver abscess, discharged on cipro and Flagyl which she finished on 9/23, now presents with fever, N, V and malaise found to have liver abscess on imaging. Pt s/p liver abscess drainage on 9/30.   WBC improved. Afebrile. Downgraded from ICU.  s/p ERCP      - CX; Few Gram positive cocci and Gram Negative Montrell  - Flush drain as ordered.  - CT with IV contrast to evaluate the liver abscess on 10/8.  - D/w Dr. Carvajal and DR. patricia.

## 2024-10-07 NOTE — PROGRESS NOTE ADULT - ASSESSMENT
78yo F with PMH: Renal Cell Carcinoma with mets to liver, IR embolization of a liver lesion on 7/30 Diverticulitis, HTN, HLD, Hypothyroid was recently, recent new onset Afib/RVR in August 2024 started on Eliquis, GI Bleed after ERCP- resolved without intervention, started on eliquis during admit for Afib. She was found to have a liver abscess with sepsis.     # Septic shock secondary to Hepatic abscess  - Off Pressors, downgrade to MS floor   - SP IR drain, monitor output   - ID on board  - FU cultures  - Continue meropenem and vancomycin    # P. Afib  - Stable  - Continue eliquis  - Continue metoprolol  - Continue amiodarone    # Gerd  - Continue protonix    # Hypothyroid  - Stable  - Continue levtothyroxine    # DVT prophylaxis  - Continue eliquis

## 2024-10-08 LAB
ANION GAP SERPL CALC-SCNC: 4 MMOL/L — LOW (ref 5–17)
BUN SERPL-MCNC: 14 MG/DL — SIGNIFICANT CHANGE UP (ref 7–23)
CALCIUM SERPL-MCNC: 8.9 MG/DL — SIGNIFICANT CHANGE UP (ref 8.5–10.1)
CHLORIDE SERPL-SCNC: 101 MMOL/L — SIGNIFICANT CHANGE UP (ref 96–108)
CO2 SERPL-SCNC: 33 MMOL/L — HIGH (ref 22–31)
CREAT SERPL-MCNC: 0.55 MG/DL — SIGNIFICANT CHANGE UP (ref 0.5–1.3)
EGFR: 94 ML/MIN/1.73M2 — SIGNIFICANT CHANGE UP
GLUCOSE SERPL-MCNC: 95 MG/DL — SIGNIFICANT CHANGE UP (ref 70–99)
HCT VFR BLD CALC: 33.9 % — LOW (ref 34.5–45)
HGB BLD-MCNC: 10.8 G/DL — LOW (ref 11.5–15.5)
MCHC RBC-ENTMCNC: 27.3 PG — SIGNIFICANT CHANGE UP (ref 27–34)
MCHC RBC-ENTMCNC: 31.9 GM/DL — LOW (ref 32–36)
MCV RBC AUTO: 85.8 FL — SIGNIFICANT CHANGE UP (ref 80–100)
PLATELET # BLD AUTO: 298 K/UL — SIGNIFICANT CHANGE UP (ref 150–400)
POTASSIUM SERPL-MCNC: 3.3 MMOL/L — LOW (ref 3.5–5.3)
POTASSIUM SERPL-SCNC: 3.3 MMOL/L — LOW (ref 3.5–5.3)
RBC # BLD: 3.95 M/UL — SIGNIFICANT CHANGE UP (ref 3.8–5.2)
RBC # FLD: 18.9 % — HIGH (ref 10.3–14.5)
SODIUM SERPL-SCNC: 138 MMOL/L — SIGNIFICANT CHANGE UP (ref 135–145)
WBC # BLD: 15.46 K/UL — HIGH (ref 3.8–10.5)
WBC # FLD AUTO: 15.46 K/UL — HIGH (ref 3.8–10.5)

## 2024-10-08 PROCEDURE — 99232 SBSQ HOSP IP/OBS MODERATE 35: CPT

## 2024-10-08 PROCEDURE — 74160 CT ABDOMEN W/CONTRAST: CPT | Mod: 26

## 2024-10-08 PROCEDURE — 99231 SBSQ HOSP IP/OBS SF/LOW 25: CPT

## 2024-10-08 RX ORDER — ERTAPENEM 1 G/1
1000 INJECTION, POWDER, LYOPHILIZED, FOR SOLUTION INTRAMUSCULAR; INTRAVENOUS EVERY 24 HOURS
Refills: 0 | Status: DISCONTINUED | OUTPATIENT
Start: 2024-10-08 | End: 2024-10-12

## 2024-10-08 RX ADMIN — MEROPENEM 1000 MILLIGRAM(S): 500 INJECTION INTRAVENOUS at 05:24

## 2024-10-08 RX ADMIN — Medication 50 MILLIGRAM(S): at 10:08

## 2024-10-08 RX ADMIN — Medication 50 MILLIGRAM(S): at 21:46

## 2024-10-08 RX ADMIN — ERTAPENEM 120 MILLIGRAM(S): 1 INJECTION, POWDER, LYOPHILIZED, FOR SOLUTION INTRAMUSCULAR; INTRAVENOUS at 10:11

## 2024-10-08 RX ADMIN — PANTOPRAZOLE SODIUM 40 MILLIGRAM(S): 40 TABLET, DELAYED RELEASE ORAL at 10:09

## 2024-10-08 RX ADMIN — HYDROCORTISONE 10 MILLIGRAM(S): 5 TABLET ORAL at 21:47

## 2024-10-08 RX ADMIN — Medication 40 MILLIEQUIVALENT(S): at 10:06

## 2024-10-08 RX ADMIN — AMIODARONE HYDROCHLORIDE 100 MILLIGRAM(S): 50 INJECTION, SOLUTION INTRAVENOUS at 10:07

## 2024-10-08 RX ADMIN — HYDROCORTISONE 20 MILLIGRAM(S): 5 TABLET ORAL at 10:07

## 2024-10-08 RX ADMIN — Medication 100 MICROGRAM(S): at 05:24

## 2024-10-08 RX ADMIN — CHLORHEXIDINE GLUCONATE ORAL RINSE 1 APPLICATION(S): 1.2 SOLUTION DENTAL at 10:06

## 2024-10-08 RX ADMIN — Medication 1 TABLET(S): at 21:47

## 2024-10-08 RX ADMIN — Medication 1 TABLET(S): at 10:11

## 2024-10-08 NOTE — PROGRESS NOTE ADULT - ASSESSMENT
RCC IV/ papillary with liver metastases : Unknown primary  S/P Liver directed therapies , prior TKI/ immune therapy ( MSI-H) and recently Tivolizumab + Nivolumab  Multiple septic episodes/ Now with source likely from abscess  Improved on antibiotics  Anemia / multifactorial    PLAN    Antibiotics  Hydration  S/p IR guided drain placement  S/p CBD stent exchange  physical therapy  DVT prophylaxis  Monitor counts  Will hold on therapy ( TKI ) for now  Can FU with IR as out patient    possible dc soon pending ID eval- will have midline placed for IV abx  Will update dr noonan from [facility] ir    Thank you for the courtesy of this consultation and we will continue to follow.    Dusty Marcial MD  [facility]  Cell: [phone]

## 2024-10-08 NOTE — PROGRESS NOTE ADULT - SUBJECTIVE AND OBJECTIVE BOX
Pt seen, sitting upright, comfortable, likely dc patience    MEDICATIONS  (STANDING):  aMIOdarone    Tablet 100 milliGRAM(s) Oral daily  amoxicillin  500 milliGRAM(s)/clavulanate 1 Tablet(s) Oral two times a day  chlorhexidine 4% Liquid 1 Application(s) Topical <User Schedule>  ertapenem  IVPB 1000 milliGRAM(s) IV Intermittent every 24 hours  hydrocortisone 10 milliGRAM(s) Oral at bedtime  hydrocortisone 20 milliGRAM(s) Oral daily  levothyroxine 100 MICROGram(s) Oral daily  metoprolol succinate ER 50 milliGRAM(s) Oral every 12 hours  pantoprazole  Injectable 40 milliGRAM(s) IV Push daily    MEDICATIONS  (PRN):  HYDROmorphone  Injectable 0.5 milliGRAM(s) IV Push every 4 hours PRN Moderate Pain (4 - 6)      ROS  No fever, sweats, chills     Vital Signs Last 24 Hrs  T(C): 36.3 (08 Oct 2024 09:00), Max: 36.6 (07 Oct 2024 17:00)  T(F): 97.4 (08 Oct 2024 09:00), Max: 97.9 (07 Oct 2024 17:00)  HR: 80 (08 Oct 2024 09:00) (80 - 87)  BP: 120/73 (08 Oct 2024 09:00) (113/75 - 120/73)  BP(mean): --  RR: 16 (08 Oct 2024 09:00) (16 - 18)  SpO2: 98% (08 Oct 2024 09:00) (95% - 98%)    Parameters below as of 08 Oct 2024 09:00  Patient On (Oxygen Delivery Method): room air        PE  NAD     No c/c/e  No rash grossly  FROM                          10.8   15.46 )-----------( 298      ( 08 Oct 2024 06:14 )             33.9       10-08    138  |  101  |  14  ----------------------------<  95  3.3[L]   |  33[H]  |  0.55    Ca    8.9      08 Oct 2024 06:14         no

## 2024-10-08 NOTE — PROGRESS NOTE ADULT - ASSESSMENT
80yo F s/p liver abscess drain placement  - WBC elevation to 15.46 today  - Drain output 16 cc overnight. Drain flushed at bedside, no issues noted

## 2024-10-08 NOTE — PROGRESS NOTE ADULT - SUBJECTIVE AND OBJECTIVE BOX
78yo F with PMH: Renal Cell Carcinoma with mets to liver, IR embolization of a liver lesion on 7/30 Diverticulitis, HTN, HLD, Hypothyroid was recently, recent new onset Afib/RVR in August 2024 started on Eliquis, GI Bleed after ERCP- resolved without intervention, started on eliquis during admit for Afib. liver abscesses now s/p IR drainage.       Vital Signs Last 24 Hrs  T(C): 36.2 (08 Oct 2024 15:00), Max: 36.6 (07 Oct 2024 17:00)  T(F): 97.2 (08 Oct 2024 15:00), Max: 97.9 (07 Oct 2024 17:00)  HR: 82 (08 Oct 2024 15:00) (80 - 87)  BP: 124/82 (08 Oct 2024 15:00) (113/75 - 124/82)  BP(mean): --  RR: 16 (08 Oct 2024 15:00) (16 - 18)  SpO2: 97% (08 Oct 2024 15:00) (95% - 98%)    Parameters below as of 08 Oct 2024 15:00  Patient On (Oxygen Delivery Method): room air      GENERAL APPEARANCE: Well developed, in no acute distress.    LUNGS: Auscultation of the lungs revealed normal breath sounds without any other adventitious sounds or rubs.    CARDIOVASCULAR: There was a regular rate and rhythm    ABDOMEN: Soft and nontender with normal bowel sounds.     NEUROLOGIC: Alert and oriented x 3. Normal affect.       CBC                        10.8   15.46 )-----------( 298      ( 08 Oct 2024 06:14 )             33.9       Chemistry  10-08    138  |  101  |  14  ----------------------------<  95  3.3[L]   |  33[H]  |  0.55    Ca    8.9      08 Oct 2024 06:14

## 2024-10-08 NOTE — PROGRESS NOTE ADULT - SUBJECTIVE AND OBJECTIVE BOX
HOSPITALIST ATTENDING PROGRESS NOTE    Chart and meds reviewed.  Patient seen and examined.    CC: abd pain    Subjective: doing well, no fever. Tolerating po.     All other systems reviewed and found to be negative with the exception of what has been described above.    MEDICATIONS  (STANDING):  aMIOdarone    Tablet 100 milliGRAM(s) Oral daily  amoxicillin  500 milliGRAM(s)/clavulanate 1 Tablet(s) Oral two times a day  chlorhexidine 4% Liquid 1 Application(s) Topical <User Schedule>  ertapenem  IVPB 1000 milliGRAM(s) IV Intermittent every 24 hours  hydrocortisone 10 milliGRAM(s) Oral at bedtime  hydrocortisone 20 milliGRAM(s) Oral daily  levothyroxine 100 MICROGram(s) Oral daily  metoprolol succinate ER 50 milliGRAM(s) Oral every 12 hours  pantoprazole  Injectable 40 milliGRAM(s) IV Push daily    MEDICATIONS  (PRN):  HYDROmorphone  Injectable 0.5 milliGRAM(s) IV Push every 4 hours PRN Moderate Pain (4 - 6)    Vital Signs Last 24 Hrs  T(C): 36.3 (08 Oct 2024 09:00), Max: 36.6 (07 Oct 2024 17:00)  T(F): 97.4 (08 Oct 2024 09:00), Max: 97.9 (07 Oct 2024 17:00)  HR: 80 (08 Oct 2024 09:00) (80 - 87)  BP: 120/73 (08 Oct 2024 09:00) (113/75 - 120/73)  RR: 16 (08 Oct 2024 09:00) (16 - 18)  SpO2: 98% (08 Oct 2024 09:00) (95% - 98%)    Parameters below as of 08 Oct 2024 09:00  Patient On (Oxygen Delivery Method): room air    GEN: NAD  HEENT:  pupils equal and reactive, EOMI, no oropharyngeal lesions, erythema, exudates, oral thrush  NECK:   supple, no carotid bruits  CV:  +S1, +S2, regular, no murmurs  RESP:   lungs clear to auscultation bilaterally, no wheezing, rales, rhonchi, good air entry bilaterally  GI:  abdomen soft, non-tender, non-distended, normal BS, +  SUBHA  EXT:  no clubbing, no cyanosis, no edema, no calf pain, swelling or erythema  NEURO:  AAOX3, no focal neurological deficits, follows all commands, able to move extremities spontaneously  SKIN:  no ulcers, lesions or rashes    LABS:                          10.8   15.46 )-----------( 298      ( 08 Oct 2024 06:14 )             33.9     10-08    138  |  101  |  14  ----------------------------<  95  3.3[L]   |  33[H]  |  0.55    Ca    8.9      08 Oct 2024 06:14      Urinalysis Basic - ( 08 Oct 2024 06:14 )  Color: x / Appearance: x / SG: x / pH: x  Gluc: 95 mg/dL / Ketone: x  / Bili: x / Urobili: x   Blood: x / Protein: x / Nitrite: x   Leuk Esterase: x / RBC: x / WBC x   Sq Epi: x / Non Sq Epi: x / Bacteria: x

## 2024-10-08 NOTE — PROGRESS NOTE ADULT - SUBJECTIVE AND OBJECTIVE BOX
Date of service: 10-08-24 @ 09:33        Patient lying in bed; afebrile, anxious to go home; has family support at home; notes that she walked with physical therapy and is willing to go home with iv antibiotics    ROS: no fever or chills; denies dizziness, no HA, no SOB or cough, no abdominal pain, no diarrhea or constipation; no dysuria, no urinary frequency, no legs pain, no rashes    MEDICATIONS  (STANDING):  aMIOdarone    Tablet 100 milliGRAM(s) Oral daily  chlorhexidine 4% Liquid 1 Application(s) Topical <User Schedule>  hydrocortisone 10 milliGRAM(s) Oral at bedtime  hydrocortisone 20 milliGRAM(s) Oral daily  levothyroxine 100 MICROGram(s) Oral daily  meropenem Injectable 1000 milliGRAM(s) IV Push every 8 hours  metoprolol succinate ER 50 milliGRAM(s) Oral every 12 hours  pantoprazole  Injectable 40 milliGRAM(s) IV Push daily  potassium chloride   Powder 40 milliEquivalent(s) Oral once  vancomycin  IVPB 750 milliGRAM(s) IV Intermittent every 12 hours    MEDICATIONS  (PRN):  HYDROmorphone  Injectable 0.5 milliGRAM(s) IV Push every 4 hours PRN Moderate Pain (4 - 6)      Vital Signs Last 24 Hrs  T(C): 36.3 (08 Oct 2024 09:00), Max: 36.6 (07 Oct 2024 17:00)  T(F): 97.4 (08 Oct 2024 09:00), Max: 97.9 (07 Oct 2024 17:00)  HR: 80 (08 Oct 2024 09:00) (80 - 87)  BP: 120/73 (08 Oct 2024 09:00) (113/75 - 120/73)  BP(mean): --  RR: 16 (08 Oct 2024 09:00) (16 - 18)  SpO2: 98% (08 Oct 2024 09:00) (95% - 98%)    Parameters below as of 08 Oct 2024 09:00  Patient On (Oxygen Delivery Method): room air            Physical Exam:        Constitutional: frail looking  HEENT: NC/AT, EOMI, PERRLA, conjunctivae clear; ears and nose atraumatic; pharynx clear  Neck: supple; thyroid not palpable  Back: no tenderness  Respiratory: respiratory effort normal; clear to auscultation  Cardiovascular: S1S2 regular, no murmurs  Abdomen: soft, mildly tender, not distended, positive BS; no liver or spleen organomegaly; right sided drain   Genitourinary: no suprapubic tenderness  Musculoskeletal: no muscle tenderness, no joint swelling or tenderness  Neurological/ Psychiatric: AxOx3, judgement and insight normal;  moving all extremities  Skin: no rashes; no palpable lesions    Labs: reviewed        Culture - Blood (collected 10-02-24 @ 12:11)  Source: .Blood BLOOD  Preliminary Report (10-06-24 @ 20:00):    No growth at 4 days    Culture - Blood (collected 10-02-24 @ 12:06)  Source: .Blood BLOOD  Preliminary Report (10-06-24 @ 20:00):    No growth at 4 days    Culture - Abscess with Gram Stain (collected 09-30-24 @ 15:00)  Source: .Abscess  Gram Stain (10-01-24 @ 03:00):    Few polymorphonuclear leukocytes seen per low power field    Few Gram positive cocci in pairs seen per oil power field    Few Gram Negative Rods seen per oil power field  Final Report (10-05-24 @ 14:59):    Few Enterococcus faecalis    Rare Klebsiella pneumoniae  Organism: Enterococcus faecalis  Klebsiella pneumoniae (10-05-24 @ 14:59)  Organism: Klebsiella pneumoniae (10-05-24 @ 14:59)      Method Type: ARLEY      -  Amoxicillin/Clavulanic Acid: S <=8/4      -  Ampicillin: R >16 These ampicillin results predict results for amoxicillin      -  Ampicillin/Sulbactam: S 8/4      -  Aztreonam: S <=4      -  Cefazolin: S <=2      -  Cefepime: S <=2      -  Cefoxitin: S <=8      -  Ceftriaxone: S <=1      -  Ciprofloxacin: S <=0.25      -  Ertapenem: S <=0.5      -  Gentamicin: S <=2      -  Imipenem: S <=1      -  Levofloxacin: S <=0.5      -  Meropenem: S <=1      -  Piperacillin/Tazobactam: S <=8      -  Tobramycin: S <=2      -  Trimethoprim/Sulfamethoxazole: S <=0.5/9.5  Organism: Enterococcus faecalis (10-05-24 @ 14:59)      Method Type: ARLEY      -  Ampicillin: S <=2 Predicts results to ampicillin/sulbactam, amoxacillin-clavulanate and  piperacillin-tazobactam.      -  Vancomycin: S 2                    Culture - Abscess with Gram Stain (collected 09-30-24 @ 15:00)  Source: .Abscess  Gram Stain (10-01-24 @ 03:00):    Few polymorphonuclear leukocytes seen per low power field    Few Gram positive cocci in pairs seen per oil power field    Few Gram Negative Rods seen per oil power field  Preliminary Report (10-01-24 @ 22:32):    Few Enterococcus faecalis    Rare Klebsiella pneumoniae    Culture - Blood (collected 09-29-24 @ 06:06)  Source: .Blood BLOOD  Gram Stain (09-29-24 @ 21:29):    Growth in aerobic bottle: Gram Negative Rods and Gram positive cocci in    pairs    Growth in anaerobic bottle: Gram Negative Rods and Gram positive cocci in    pairs and Gram Positive Rods  Final Report (10-01-24 @ 12:11):    Growth in aerobic and anaerobic bottles: Klebsiella pneumoniae    Growth in aerobic and anaerobic bottles: Enterococcus faecalis    Growth in anaerobic bottle: Clostridium perfringens "Susceptibilities not    performed"    See previous culture [id number]    Culture - Blood (collected 09-29-24 @ 06:06)  Source: .Blood BLOOD  Gram Stain (09-29-24 @ 21:28):    Growth in anaerobic bottle: Gram Positive Rods    and Gram positive cocci in pairs    and Gram Negative Rods    Growth in aerobic bottle: Gram positive cocci in pairs and Gram Negative    Rods  Final Report (10-01-24 @ 12:10):    Growth in aerobic and anaerobic bottles: Klebsiella pneumoniae    Growth in aerobic and anaerobic bottles: Enterococcus faecalis    Growth in anaerobic bottle: Enterococcus faecium    Growth in anaerobic bottle: Clostridium perfringens "Susceptibilities not    performed"    Direct identification is available within approximately 3-5    hours either by Blood Panel Multiplexed PCR or Direct    MALDI-TOF. Details: [email or web address]  Organism: Blood Culture PCR  Klebsiella pneumoniae  Enterococcus faecalis  Enterococcus faecium (10-01-24 @ 12:10)  Organism: Enterococcus faecium (10-01-24 @ 12:10)      Method Type: ARLEY      -  Ampicillin: R >8 Predicts results to ampicillin/sulbactam, amoxacillin-clavulanate and  piperacillin-tazobactam.      -  Vancomycin: S 0.5      -  Gentamicin synergy: S <=500      -  Streptomycin synergy: R >1000  Organism: Enterococcus faecalis (10-01-24 @ 12:10)      Method Type: ARLEY      -  Ampicillin: S <=2 Predicts results to ampicillin/sulbactam, amoxacillin-clavulanate and  piperacillin-tazobactam.      -  Vancomycin: S 2      -  Gentamicin synergy: S <=500      -  Streptomycin synergy: S <=1000  Organism: Klebsiella pneumoniae (10-01-24 @ 12:10)      Method Type: ARLEY      -  Ampicillin: R >16 These ampicillin results predict results for amoxicillin      -  Ampicillin/Sulbactam: S 8/4      -  Aztreonam: S <=4      -  Cefazolin: S <=2      -  Cefepime: S <=2      -  Cefoxitin: I 16      -  Ceftriaxone: S <=1      -  Ciprofloxacin: S <=0.25      -  Ertapenem: S <=0.5      -  Gentamicin: S <=2      -  Imipenem: S <=1      -  Levofloxacin: S <=0.5      -  Meropenem: S <=1      -  Piperacillin/Tazobactam: S <=8      -  Tobramycin: S <=2      -  Trimethoprim/Sulfamethoxazole: S <=0.5/9.5  Organism: Blood Culture PCR (10-01-24 @ 12:10)      Method Type: PCR      -  K. pneumoniae group: Detec (K. pneumoniae, K. quasipneumoniae, K. variicola)      -  Enterococcus faecalis: Detec      -  Enterococcus faecium: Detec    Culture - Urine (collected 09-29-24 @ 06:06)  Source: Clean Catch None  Final Report (09-30-24 @ 12:10):    No growth    Culture - Blood (collected 02 Oct 2024 12:11)  Source: .Blood BLOOD  Preliminary Report (04 Oct 2024 20:01):    No growth at 48 Hours    Culture - Blood (collected 02 Oct 2024 12:06)  Source: .Blood BLOOD  Preliminary Report (04 Oct 2024 20:01):    No growth at 48 Hours    < from: CT Abdomen and Pelvis w/ IV Cont (09.29.24 @ 07:22) >    ACC: [id number] EXAM:  CT ABDOMEN AND PELVIS IC   ORDERED BY: CHAPO MANRIQUE DATE:  09/29/2024          INTERPRETATION:  CLINICAL INFORMATION: Lower abdominal pain and fever,   history of malignancy    COMPARISON: CT chest abdomen pelvis 9/9/2024 CT abdomen pelvis 8/7/2024,   MRI of the abdomen 9/10/2024    CONTRAST/COMPLICATIONS:  IV Contrast: Omnipaque 350  90 cc administered   0 cc discarded  Oral Contrast: NONE  Complications: None reported at time of study completion    PROCEDURE:  CTof the Abdomen and Pelvis was performed.  Sagittal and coronal reformats were performed.    FINDINGS:  LOWER CHEST: A few less than 4 mm right middle lobe and bilateral lower   nodules slightly increased in size and number from the prior exam. Right   retrocrural cystic lesion is unchanged.    LIVER: Extensive area of hypovascularity in the anterior right hepatic   lobe extending into the right hepatic dome to a greater extent than on   prior CT of 9/9/2024 correlate for recent treatment or manipulation. Left   hepatic hypovascular lesions are unchanged.  BILE DUCTS: Biliary stent identified.  GALLBLADDER: Not well delineated.  SPLEEN: Several areas of decreased peripheral wedge-shaped enhancement of   the spleen slightly more prominent on prior exam of unclear etiology and   significance.  PANCREAS: Within normal limits.  ADRENALS: Within normal limits.  KIDNEYS/URETERS: Kidneys enhance bilaterally and symmetrically without   hydronephrosis. Multiple left parapelvic cyst and posterior left mid to   upper pole renal cyst.    BLADDER: Within normal limits.  REPRODUCTIVE ORGANS: Uterus and adnexa within normal limits.    BOWEL: No bowel obstruction. Appendix is not visualized. No evidence of   inflammation in the pericecal region. Sigmoid diverticulosis without   diverticulitis.  PERITONEUM/RETROPERITONEUM: Within normal limits.  VESSELS: Atherosclerotic changes.  LYMPH NODES: No lymphadenopathy.  ABDOMINAL WALL: Within normal limits.  BONES: Degenerative changes and osteopenia.    IMPRESSION:  Sigmoid diverticulosis without diverticulitis. No bowel obstruction or   gross bowel wall thickening.    Extensive area of hypovascularity throughout the right hepatic lobe more   prominent than on prior exam may represent sequela of recent treatment,   correlate with clinical history. Stable appearance of the left hepatic   lobe.    < end of copied text >        Radiology: all available radiological tests reviewed    Advanced directives addressed: full resuscitation

## 2024-10-08 NOTE — PROGRESS NOTE ADULT - ASSESSMENT
80yo F with PMH: Renal Cell Carcinoma with mets to liver, IR embolization of a liver lesion on 7/30 Diverticulitis, HTN, HLD, Hypothyroid was recently, recent new onset Afib/RVR in August 2024 started on Eliquis, GI Bleed after ERCP- resolved without intervention, started on eliquis during admit for Afib. She was found to have a liver abscess with sepsis.     # Septic shock secondary to Hepatic abscess  - Off Pressors, downgrade to MS floor   - SP IR drain, monitor output   - ID on board  - FU cultures  - Continue meropenem and vancomycin  - Plan for DC 10/9 on IV abx    # P. Afib  - Stable  - Continue eliquis  - Continue metoprolol  - Continue amiodarone    # Gerd  - Continue protonix    # Hypothyroid  - Stable  - Continue levtothyroxine    # DVT prophylaxis  - Continue eliquis

## 2024-10-08 NOTE — PROGRESS NOTE ADULT - ASSESSMENT
80yo F with PMH: Renal Cell Carcinoma with mets to liver, IR embolization of a liver lesion on 7/30, Diverticulitis, HTN, HLD, Hypothyroidism,  recent new onset Afib/RVR in August 2024 started on Eliquis, GI Bleed after ERCP- resolved without intervention, liver abscesses, an admission on  8/2-8/15 for septic shock requiring pressors and critical care admission, Found to have dilated common bile duct, elevated LFTs and likely cholangitis/choledocholithiasis with stone, s/p ERCP with sphincterotomy/stone removal and stent placement on 8/5/24, admitted 9/9-9/14 for septic shock, requiring levophed and critical care admission, blood cultures during admission had Enterobacter, sensitive to Cipro. Noted liver abscess on MRI, felt to be too small for biopsy at the time, decision for prolonged antibiotic course made. Discharged on Cipro/Flagyl to complete her therapy, this was completed on Monday 9/23. Then patient was admitted on 9/29 for evaluation of fever to 103, nausea, vomiting, diarrhea, similar to her presentation of her 9/9-9/14 admission, when admitted on 9/29 was so hypotensive has required levophed after fluids. The patient is still on pressors after fluids without improvement. Initial lactate was 8, but has improved to normal. The patient is alert and oriented and has mild difuse abdominal pain but overall her GI symptoms are improved.     1. Liver abscess s/p drainage with ENFA, KLPN. Sepsis with ENFA, ENFAE, KLPN and CLPE. Metastatic renal cell Ca with mets to the liver. CRF stage 3. Immunocompromised host.   -Patient admitted with septic shock secondary to multiple organisms most likely due to GI or liver origin, due to liver abscesses or masses  -on meropenem 1 gm IV q12h and vancomycin 750 mg IV q12h # 8  -tolerating abx well so far; no side effects noted  -repeat vancomycin trough level is therapeutic  -repeat BC x 2 are negative to date   - serial cbc and monitor temperature   - oncology evaluation appreciated   - leukocytosis resolved  - GI evaluation -- s/p ERCP with stent exchange   - in  preparation for going home will change antibiotics to invanz one gram daily to complete antibiotics on 11/5/24 with weekly cbc, cmp, esr, crp plus augmentin 500 mg po q 12 hours with same end date  - will order midline to be placed  - discussed with hospitalist and expect discharge on 10/9  2. other issues; per medicine    [facility] abx token not applicable at this time

## 2024-10-09 LAB
ALBUMIN SERPL ELPH-MCNC: 2 G/DL — LOW (ref 3.3–5)
ALP SERPL-CCNC: 440 U/L — HIGH (ref 40–120)
ALT FLD-CCNC: 60 U/L — SIGNIFICANT CHANGE UP (ref 12–78)
ANION GAP SERPL CALC-SCNC: 4 MMOL/L — LOW (ref 5–17)
APTT BLD: 30.1 SEC — SIGNIFICANT CHANGE UP (ref 24.5–35.6)
AST SERPL-CCNC: 35 U/L — SIGNIFICANT CHANGE UP (ref 15–37)
BILIRUB DIRECT SERPL-MCNC: 0.3 MG/DL — SIGNIFICANT CHANGE UP (ref 0–0.3)
BILIRUB INDIRECT FLD-MCNC: 0.5 MG/DL — SIGNIFICANT CHANGE UP (ref 0.2–1)
BILIRUB SERPL-MCNC: 0.8 MG/DL — SIGNIFICANT CHANGE UP (ref 0.2–1.2)
BUN SERPL-MCNC: 11 MG/DL — SIGNIFICANT CHANGE UP (ref 7–23)
CALCIUM SERPL-MCNC: 9.1 MG/DL — SIGNIFICANT CHANGE UP (ref 8.5–10.1)
CHLORIDE SERPL-SCNC: 102 MMOL/L — SIGNIFICANT CHANGE UP (ref 96–108)
CO2 SERPL-SCNC: 30 MMOL/L — SIGNIFICANT CHANGE UP (ref 22–31)
CREAT SERPL-MCNC: 0.58 MG/DL — SIGNIFICANT CHANGE UP (ref 0.5–1.3)
EGFR: 93 ML/MIN/1.73M2 — SIGNIFICANT CHANGE UP
GLUCOSE SERPL-MCNC: 80 MG/DL — SIGNIFICANT CHANGE UP (ref 70–99)
GRAM STN FLD: ABNORMAL
HCT VFR BLD CALC: 35.9 % — SIGNIFICANT CHANGE UP (ref 34.5–45)
HGB BLD-MCNC: 11.2 G/DL — LOW (ref 11.5–15.5)
INR BLD: 1.1 RATIO — SIGNIFICANT CHANGE UP (ref 0.85–1.16)
MCHC RBC-ENTMCNC: 27.2 PG — SIGNIFICANT CHANGE UP (ref 27–34)
MCHC RBC-ENTMCNC: 31.2 GM/DL — LOW (ref 32–36)
MCV RBC AUTO: 87.1 FL — SIGNIFICANT CHANGE UP (ref 80–100)
PLATELET # BLD AUTO: 378 K/UL — SIGNIFICANT CHANGE UP (ref 150–400)
POTASSIUM SERPL-MCNC: 4 MMOL/L — SIGNIFICANT CHANGE UP (ref 3.5–5.3)
POTASSIUM SERPL-SCNC: 4 MMOL/L — SIGNIFICANT CHANGE UP (ref 3.5–5.3)
PROT SERPL-MCNC: 6.2 GM/DL — SIGNIFICANT CHANGE UP (ref 6–8.3)
PROTHROM AB SERPL-ACNC: 13 SEC — SIGNIFICANT CHANGE UP (ref 9.9–13.4)
RBC # BLD: 4.12 M/UL — SIGNIFICANT CHANGE UP (ref 3.8–5.2)
RBC # FLD: 18.6 % — HIGH (ref 10.3–14.5)
SODIUM SERPL-SCNC: 136 MMOL/L — SIGNIFICANT CHANGE UP (ref 135–145)
SPECIMEN SOURCE: SIGNIFICANT CHANGE UP
WBC # BLD: 10.09 K/UL — SIGNIFICANT CHANGE UP (ref 3.8–10.5)
WBC # FLD AUTO: 10.09 K/UL — SIGNIFICANT CHANGE UP (ref 3.8–10.5)

## 2024-10-09 PROCEDURE — 99232 SBSQ HOSP IP/OBS MODERATE 35: CPT

## 2024-10-09 PROCEDURE — 49405 IMAGE CATH FLUID COLXN VISC: CPT

## 2024-10-09 RX ORDER — FENTANYL CITRATE-0.9 % NACL/PF 300MCG/30
25 PATIENT CONTROLLED ANALGESIA VIAL INJECTION
Refills: 0 | Status: DISCONTINUED | OUTPATIENT
Start: 2024-10-09 | End: 2024-10-09

## 2024-10-09 RX ORDER — ONDANSETRON HCL/PF 4 MG/2 ML
4 VIAL (ML) INJECTION ONCE
Refills: 0 | Status: DISCONTINUED | OUTPATIENT
Start: 2024-10-09 | End: 2024-10-09

## 2024-10-09 RX ORDER — ACETAMINOPHEN 325 MG
1000 TABLET ORAL ONCE
Refills: 0 | Status: COMPLETED | OUTPATIENT
Start: 2024-10-09 | End: 2024-10-09

## 2024-10-09 RX ADMIN — Medication 100 MICROGRAM(S): at 05:55

## 2024-10-09 RX ADMIN — Medication 1 TABLET(S): at 13:21

## 2024-10-09 RX ADMIN — AMIODARONE HYDROCHLORIDE 100 MILLIGRAM(S): 50 INJECTION, SOLUTION INTRAVENOUS at 13:22

## 2024-10-09 RX ADMIN — HYDROCORTISONE 10 MILLIGRAM(S): 5 TABLET ORAL at 20:38

## 2024-10-09 RX ADMIN — CHLORHEXIDINE GLUCONATE ORAL RINSE 1 APPLICATION(S): 1.2 SOLUTION DENTAL at 13:23

## 2024-10-09 RX ADMIN — PANTOPRAZOLE SODIUM 40 MILLIGRAM(S): 40 TABLET, DELAYED RELEASE ORAL at 13:22

## 2024-10-09 RX ADMIN — HYDROCORTISONE 20 MILLIGRAM(S): 5 TABLET ORAL at 13:22

## 2024-10-09 RX ADMIN — Medication 50 MILLIGRAM(S): at 13:22

## 2024-10-09 RX ADMIN — Medication 1 TABLET(S): at 20:38

## 2024-10-09 RX ADMIN — Medication 50 MILLIGRAM(S): at 20:38

## 2024-10-09 RX ADMIN — Medication 1000 MILLIGRAM(S): at 21:10

## 2024-10-09 RX ADMIN — ERTAPENEM 120 MILLIGRAM(S): 1 INJECTION, POWDER, LYOPHILIZED, FOR SOLUTION INTRAMUSCULAR; INTRAVENOUS at 14:08

## 2024-10-09 RX ADMIN — Medication 1000 MILLIGRAM(S): at 12:38

## 2024-10-09 RX ADMIN — Medication 400 MILLIGRAM(S): at 12:15

## 2024-10-09 RX ADMIN — Medication 400 MILLIGRAM(S): at 20:38

## 2024-10-09 NOTE — PROGRESS NOTE ADULT - SUBJECTIVE AND OBJECTIVE BOX
HOSPITALIST ATTENDING PROGRESS NOTE    Chart and meds reviewed.  Patient seen and examined.    CC: Abd pain    Subjective: Feels well. no chest pain or abdominal pain. No fever    All other systems reviewed and found to be negative with the exception of what has been described above.    MEDICATIONS  (STANDING):  aMIOdarone    Tablet 100 milliGRAM(s) Oral daily  amoxicillin  500 milliGRAM(s)/clavulanate 1 Tablet(s) Oral two times a day  chlorhexidine 4% Liquid 1 Application(s) Topical <User Schedule>  ertapenem  IVPB 1000 milliGRAM(s) IV Intermittent every 24 hours  hydrocortisone 10 milliGRAM(s) Oral at bedtime  hydrocortisone 20 milliGRAM(s) Oral daily  levothyroxine 100 MICROGram(s) Oral daily  metoprolol succinate ER 50 milliGRAM(s) Oral every 12 hours  pantoprazole  Injectable 40 milliGRAM(s) IV Push daily    MEDICATIONS  (PRN):      Vital Signs Last 24 Hrs  T(C): 36.4 (09 Oct 2024 08:42), Max: 36.4 (09 Oct 2024 08:42)  T(F): 97.5 (09 Oct 2024 08:42), Max: 97.5 (09 Oct 2024 08:42)  HR: 80 (09 Oct 2024 08:42) (79 - 82)  BP: 143/83 (09 Oct 2024 08:42) (123/83 - 143/83)  RR: 17 (09 Oct 2024 08:42) (16 - 17)  SpO2: 98% (09 Oct 2024 08:42) (97% - 99%)    Parameters below as of 09 Oct 2024 08:42  Patient On (Oxygen Delivery Method): room air    GEN: NAD  HEENT:  pupils equal and reactive, EOMI, no oropharyngeal lesions, erythema, exudates, oral thrush  NECK:   supple, no carotid bruits  CV:  +S1, +S2, regular, no murmurs  RESP:   lungs clear to auscultation bilaterally, no wheezing, rales, rhonchi, good air entry bilaterally  GI:  abdomen soft, non-tender, non-distended, normal BS + SUBHA drain  EXT:  no clubbing, no cyanosis, no edema, no calf pain, swelling or erythema  NEURO:  AAOX3, no focal neurological deficits, follows all commands, able to move extremities spontaneously  SKIN:  no ulcers, lesions or rashes    LABS:                          11.2   10.09 )-----------( 378      ( 09 Oct 2024 06:59 )             35.9     10-09    136  |  102  |  11  ----------------------------<  80  4.0   |  30  |  0.58    Ca    9.1      09 Oct 2024 06:59    TPro  6.2  /  Alb  2.0[L]  /  TBili  0.8  /  DBili  0.3  /  AST  35  /  ALT  60  /  AlkPhos  440[H]  10-09    LIVER FUNCTIONS - ( 09 Oct 2024 06:59 )  Alb: 2.0 g/dL / Pro: 6.2 gm/dL / ALK PHOS: 440 U/L / ALT: 60 U/L / AST: 35 U/L / GGT: x           PT/INR - ( 09 Oct 2024 09:16 )   PT: 13.0 sec;   INR: 1.10 ratio    PTT - ( 09 Oct 2024 09:16 )  PTT:30.1 sec    Urinalysis Basic - ( 09 Oct 2024 06:59   Color: x / Appearance: x / SG: x / pH: x  Gluc: 80 mg/dL / Ketone: x  / Bili: x / Urobili: x   Blood: x / Protein: x / Nitrite: x   Leuk Esterase: x / RBC: x / WBC x   Sq Epi: x / Non Sq Epi: x / Bacteria: x

## 2024-10-09 NOTE — PROGRESS NOTE ADULT - ASSESSMENT
78yo F with PMH: Renal Cell Carcinoma with mets to liver, IR embolization of a liver lesion on 7/30 Diverticulitis, HTN, HLD, Hypothyroid was recently, recent new onset Afib/RVR in August 2024 started on Eliquis, GI Bleed after ERCP- resolved without intervention, started on eliquis during admit for Afib. She was found to have a liver abscess with sepsis.     # Septic shock secondary to Hepatic abscess  - Off Pressors, downgrade to MS floor   - SP IR drain, monitor output   - ID on board  - FU cultures  - SP meropenem and vancomycin  - To be DC home on augmentin and ertapenem to complete till 11/4   - Repeat CT scans show multiple abscess  - FU IR recommendations and ID    # P. Afib  - Stable  - Continue eliquis  - Continue metoprolol  - Continue amiodarone    # Gerd  - Continue protonix    # Hypothyroid  - Stable  - Continue levtothyroxine    # DVT prophylaxis  - Continue eliquis

## 2024-10-09 NOTE — BRIEF OPERATIVE NOTE - OPERATION/FINDINGS
1) Contrast injected into indwelling drain. Loop not within newly formed collection, just adjacent  2) Despite attempts at repositioning, indwelling drain could not be advanced into new collection  3) Indwelling drain removed  4) New 14F drain placed transhepatically into new collection at dome of liver, lilliam pus draining to gravity
ERCP with stent remoal, no leak on cholangiogram, balloon sweep of sludge. Placement of a 10 Fr stent. 
12F drain placed into hepatic dome abscess with CT guidance. Bloody-purulent output to gravity.

## 2024-10-09 NOTE — ADVANCED PRACTICE NURSE CONSULT - ASSESSMENT
Procedure Note: Vascular Access    Procedure Name: LUE Midline Placement    Time out: Patient’s first and last name, , MRN, procedure and correct site confirmed prior to start of procedure.    Procedure Date and Time: 10/9/24 162    Informed Consent: Benefits, risks, and possible complications of procedure explained to patient/caregiver who verbalized understanding and gave verbal consent.    Local Anesthesia: n/a    Procedure findings and Details: Successful placement of LUE single lumen Midline (PowerGlide Pro Lot#[id number]) via L basilic vein inserted under ultrasound guidance. Midline line trimmed to 10cm.    Follow up: CXR not needed for placement verification. OK to use. Report given to Clary VANEGAS.

## 2024-10-09 NOTE — PROGRESS NOTE ADULT - SUBJECTIVE AND OBJECTIVE BOX
Date of service: 10-09-24 @ 09:04      Patient lying in bed; wants to go home is afebrile, notes she had repeat ct of abdomen yesterday        ROS: no fever or chills; denies dizziness, no HA, no SOB or cough, no abdominal pain, no diarrhea or constipation; no dysuria, no urinary frequency, no legs pain, no rashes    MEDICATIONS  (STANDING):  aMIOdarone    Tablet 100 milliGRAM(s) Oral daily  amoxicillin  500 milliGRAM(s)/clavulanate 1 Tablet(s) Oral two times a day  chlorhexidine 4% Liquid 1 Application(s) Topical <User Schedule>  ertapenem  IVPB 1000 milliGRAM(s) IV Intermittent every 24 hours  hydrocortisone 10 milliGRAM(s) Oral at bedtime  hydrocortisone 20 milliGRAM(s) Oral daily  levothyroxine 100 MICROGram(s) Oral daily  metoprolol succinate ER 50 milliGRAM(s) Oral every 12 hours  pantoprazole  Injectable 40 milliGRAM(s) IV Push daily    MEDICATIONS  (PRN):      Vital Signs Last 24 Hrs  T(C): 36.4 (09 Oct 2024 08:42), Max: 36.4 (09 Oct 2024 08:42)  T(F): 97.5 (09 Oct 2024 08:42), Max: 97.5 (09 Oct 2024 08:42)  HR: 80 (09 Oct 2024 08:42) (79 - 82)  BP: 143/83 (09 Oct 2024 08:42) (123/83 - 143/83)  BP(mean): --  RR: 17 (09 Oct 2024 08:42) (16 - 17)  SpO2: 98% (09 Oct 2024 08:42) (97% - 99%)    Parameters below as of 09 Oct 2024 08:42  Patient On (Oxygen Delivery Method): room air            Physical Exam:            Physical Exam:        Constitutional: frail looking  HEENT: NC/AT, EOMI, PERRLA, conjunctivae clear; ears and nose atraumatic; pharynx clear  Neck: supple; thyroid not palpable  Back: no tenderness  Respiratory: respiratory effort normal; clear to auscultation  Cardiovascular: S1S2 regular, no murmurs  Abdomen: soft, mildly tender, not distended, positive BS; no liver or spleen organomegaly; right sided drain   Genitourinary: no suprapubic tenderness  Musculoskeletal: no muscle tenderness, no joint swelling or tenderness  Neurological/ Psychiatric: AxOx3, judgement and insight normal;  moving all extremities  Skin: no rashes; no palpable lesions    Labs: reviewed        Culture - Blood (collected 10-02-24 @ 12:11)  Source: .Blood BLOOD  Preliminary Report (10-06-24 @ 20:00):    No growth at 4 days    Culture - Blood (collected 10-02-24 @ 12:06)  Source: .Blood BLOOD  Preliminary Report (10-06-24 @ 20:00):    No growth at 4 days    Culture - Abscess with Gram Stain (collected 09-30-24 @ 15:00)  Source: .Abscess  Gram Stain (10-01-24 @ 03:00):    Few polymorphonuclear leukocytes seen per low power field    Few Gram positive cocci in pairs seen per oil power field    Few Gram Negative Rods seen per oil power field  Final Report (10-05-24 @ 14:59):    Few Enterococcus faecalis    Rare Klebsiella pneumoniae  Organism: Enterococcus faecalis  Klebsiella pneumoniae (10-05-24 @ 14:59)  Organism: Klebsiella pneumoniae (10-05-24 @ 14:59)      Method Type: ARLEY      -  Amoxicillin/Clavulanic Acid: S <=8/4      -  Ampicillin: R >16 These ampicillin results predict results for amoxicillin      -  Ampicillin/Sulbactam: S 8/4      -  Aztreonam: S <=4      -  Cefazolin: S <=2      -  Cefepime: S <=2      -  Cefoxitin: S <=8      -  Ceftriaxone: S <=1      -  Ciprofloxacin: S <=0.25      -  Ertapenem: S <=0.5      -  Gentamicin: S <=2      -  Imipenem: S <=1      -  Levofloxacin: S <=0.5      -  Meropenem: S <=1      -  Piperacillin/Tazobactam: S <=8      -  Tobramycin: S <=2      -  Trimethoprim/Sulfamethoxazole: S <=0.5/9.5  Organism: Enterococcus faecalis (10-05-24 @ 14:59)      Method Type: ARLEY      -  Ampicillin: S <=2 Predicts results to ampicillin/sulbactam, amoxacillin-clavulanate and  piperacillin-tazobactam.      -  Vancomycin: S 2                    Culture - Abscess with Gram Stain (collected 09-30-24 @ 15:00)  Source: .Abscess  Gram Stain (10-01-24 @ 03:00):    Few polymorphonuclear leukocytes seen per low power field    Few Gram positive cocci in pairs seen per oil power field    Few Gram Negative Rods seen per oil power field  Preliminary Report (10-01-24 @ 22:32):    Few Enterococcus faecalis    Rare Klebsiella pneumoniae    Culture - Blood (collected 09-29-24 @ 06:06)  Source: .Blood BLOOD  Gram Stain (09-29-24 @ 21:29):    Growth in aerobic bottle: Gram Negative Rods and Gram positive cocci in    pairs    Growth in anaerobic bottle: Gram Negative Rods and Gram positive cocci in    pairs and Gram Positive Rods  Final Report (10-01-24 @ 12:11):    Growth in aerobic and anaerobic bottles: Klebsiella pneumoniae    Growth in aerobic and anaerobic bottles: Enterococcus faecalis    Growth in anaerobic bottle: Clostridium perfringens "Susceptibilities not    performed"    See previous culture [id number]    Culture - Blood (collected 09-29-24 @ 06:06)  Source: .Blood BLOOD  Gram Stain (09-29-24 @ 21:28):    Growth in anaerobic bottle: Gram Positive Rods    and Gram positive cocci in pairs    and Gram Negative Rods    Growth in aerobic bottle: Gram positive cocci in pairs and Gram Negative    Rods  Final Report (10-01-24 @ 12:10):    Growth in aerobic and anaerobic bottles: Klebsiella pneumoniae    Growth in aerobic and anaerobic bottles: Enterococcus faecalis    Growth in anaerobic bottle: Enterococcus faecium    Growth in anaerobic bottle: Clostridium perfringens "Susceptibilities not    performed"    Direct identification is available within approximately 3-5    hours either by Blood Panel Multiplexed PCR or Direct    MALDI-TOF. Details: [email or web address]  Organism: Blood Culture PCR  Klebsiella pneumoniae  Enterococcus faecalis  Enterococcus faecium (10-01-24 @ 12:10)  Organism: Enterococcus faecium (10-01-24 @ 12:10)      Method Type: ARLEY      -  Ampicillin: R >8 Predicts results to ampicillin/sulbactam, amoxacillin-clavulanate and  piperacillin-tazobactam.      -  Vancomycin: S 0.5      -  Gentamicin synergy: S <=500      -  Streptomycin synergy: R >1000  Organism: Enterococcus faecalis (10-01-24 @ 12:10)      Method Type: ARLEY      -  Ampicillin: S <=2 Predicts results to ampicillin/sulbactam, amoxacillin-clavulanate and  piperacillin-tazobactam.      -  Vancomycin: S 2      -  Gentamicin synergy: S <=500      -  Streptomycin synergy: S <=1000  Organism: Klebsiella pneumoniae (10-01-24 @ 12:10)      Method Type: ARLEY      -  Ampicillin: R >16 These ampicillin results predict results for amoxicillin      -  Ampicillin/Sulbactam: S 8/4      -  Aztreonam: S <=4      -  Cefazolin: S <=2      -  Cefepime: S <=2      -  Cefoxitin: I 16      -  Ceftriaxone: S <=1      -  Ciprofloxacin: S <=0.25      -  Ertapenem: S <=0.5      -  Gentamicin: S <=2      -  Imipenem: S <=1      -  Levofloxacin: S <=0.5      -  Meropenem: S <=1      -  Piperacillin/Tazobactam: S <=8      -  Tobramycin: S <=2      -  Trimethoprim/Sulfamethoxazole: S <=0.5/9.5  Organism: Blood Culture PCR (10-01-24 @ 12:10)      Method Type: PCR      -  K. pneumoniae group: Detec (K. pneumoniae, K. quasipneumoniae, K. variicola)      -  Enterococcus faecalis: Detec      -  Enterococcus faecium: Detec    Culture - Urine (collected 09-29-24 @ 06:06)  Source: Clean Catch None  Final Report (09-30-24 @ 12:10):    No growth    Culture - Blood (collected 02 Oct 2024 12:11)  Source: .Blood BLOOD  Preliminary Report (04 Oct 2024 20:01):    No growth at 48 Hours    Culture - Blood (collected 02 Oct 2024 12:06)  Source: .Blood BLOOD  Preliminary Report (04 Oct 2024 20:01):    No growth at 48 Hours          < from: CT Abdomen w/ IV Cont (10.08.24 @ 08:18) >    ACC: [id number] EXAM:  CT ABDOMEN ONLY IC   ORDERED BY: FARRUKH MASTERSON     PROCEDURE DATE:  10/08/2024          INTERPRETATION:  CLINICAL INFORMATION: Hepatic abscess drain check    COMPARISON: MRI October 01, 2024, CT September 29, 2024    CONTRAST/COMPLICATIONS:  IV Contrast: Omnipaque 350  90 cc administered   0 cc discarded  Oral Contrast: NONE  Complications: None reported at time of study completion    PROCEDURE:  CT of the Abdomen was performed.  Sagittal and coronal reformats were performed.    FINDINGS:  LOWER CHEST: Within normal limits.    LIVER: Interval placement of pigtail drainage catheter with tip in   segment 8 of the liver. Persistent loculated fluid collections anterior   to the drain measure 5.7 x 2.9 cm (2; 11) and 5.5x 5.3 cm (2; 22).   Multiple additional smaller loculated collections are also noted.   Heterogeneous enhancing tumor is noted in both lobes, unchanged.  BILE DUCTS: Normal caliber. Plastic common duct stent in place.  GALLBLADDER: Within normal limits.  SPLEEN: Within normal limits.  PANCREAS: Within normal limits.  ADRENALS: Within normal limits.  KIDNEYS/URETERS: Left renal parenchymal cyst and bilateral parapelvic   cysts.    VISUALIZED PORTIONS:  BOWEL: Within normal limits.  PERITONEUM/RETROPERITONEUM: Within normal limits.  VESSELS: Within normal limits.  LYMPH NODES: No lymphadenopathy.  ABDOMINAL WALL: Unchanged 2.7 x 1.7 cm soft tissue metastasis in the   right lateral abdominal wall (2; 37).  BONES: No lytic or blastic lesion.    IMPRESSION:  Multiple persistent hepatic abscesses as described. Interventional   radiology consultation recommended.    < end of copied text >          Radiology: all available radiological tests reviewed    Advanced directives addressed: full resuscitation

## 2024-10-09 NOTE — PROGRESS NOTE ADULT - ASSESSMENT
78yo F with PMH: Renal Cell Carcinoma with mets to liver, IR embolization of a liver lesion on 7/30, Diverticulitis, HTN, HLD, Hypothyroidism,  recent new onset Afib/RVR in August 2024 started on Eliquis, GI Bleed after ERCP- resolved without intervention, liver abscesses, an admission on  8/2-8/15 for septic shock requiring pressors and critical care admission, Found to have dilated common bile duct, elevated LFTs and likely cholangitis/choledocholithiasis with stone, s/p ERCP with sphincterotomy/stone removal and stent placement on 8/5/24, admitted 9/9-9/14 for septic shock, requiring levophed and critical care admission, blood cultures during admission had Enterobacter, sensitive to Cipro. Noted liver abscess on MRI, felt to be too small for biopsy at the time, decision for prolonged antibiotic course made. Discharged on Cipro/Flagyl to complete her therapy, this was completed on Monday 9/23. Then patient was admitted on 9/29 for evaluation of fever to 103, nausea, vomiting, diarrhea, similar to her presentation of her 9/9-9/14 admission, when admitted on 9/29 was so hypotensive has required levophed after fluids. The patient is still on pressors after fluids without improvement. Initial lactate was 8, but has improved to normal. The patient is alert and oriented and has mild difuse abdominal pain but overall her GI symptoms are improved.     1. Liver abscess s/p drainage with ENFA, KLPN. Sepsis with ENFA, ENFAE, KLPN and CLPE. Metastatic renal cell Ca with mets to the liver. CRF stage 3. Immunocompromised host.   -Patient admitted with septic shock secondary to multiple organisms most likely due to GI or liver origin, due to liver abscesses or masses  - completed appropriate course of meropenem and vancomycin  -repeat BC x 2 are negative to date   - serial cbc and monitor temperature   - oncology evaluation appreciated   - leukocytosis resolved  - GI evaluation -- s/p ERCP with stent exchange   - in  preparation for going home will change antibiotics to invanz one gram daily to complete antibiotics on 11/5/24 with weekly cbc, cmp, esr, crp plus augmentin 500 mg po q 12 hours with same end date  - will order midline to be placed- pending  - given findings on ct imaging, a second Abdominal drain will be placed in liver abscesses by IR  - discussed with hospitalist and expect discharge on 10/10  - case management as affirmed that home iv antibiotics have been arranged with patient insurance in place  2. other issues; per medicine    [facility] abx token not applicable at this time

## 2024-10-10 DIAGNOSIS — K75.0 ABSCESS OF LIVER: ICD-10-CM

## 2024-10-10 LAB
ANION GAP SERPL CALC-SCNC: 6 MMOL/L — SIGNIFICANT CHANGE UP (ref 5–17)
BUN SERPL-MCNC: 16 MG/DL — SIGNIFICANT CHANGE UP (ref 7–23)
CALCIUM SERPL-MCNC: 8.7 MG/DL — SIGNIFICANT CHANGE UP (ref 8.5–10.1)
CHLORIDE SERPL-SCNC: 103 MMOL/L — SIGNIFICANT CHANGE UP (ref 96–108)
CO2 SERPL-SCNC: 27 MMOL/L — SIGNIFICANT CHANGE UP (ref 22–31)
CREAT SERPL-MCNC: 0.57 MG/DL — SIGNIFICANT CHANGE UP (ref 0.5–1.3)
EGFR: 94 ML/MIN/1.73M2 — SIGNIFICANT CHANGE UP
GLUCOSE SERPL-MCNC: 81 MG/DL — SIGNIFICANT CHANGE UP (ref 70–99)
HCT VFR BLD CALC: 33.9 % — LOW (ref 34.5–45)
HGB BLD-MCNC: 10.5 G/DL — LOW (ref 11.5–15.5)
MCHC RBC-ENTMCNC: 26.9 PG — LOW (ref 27–34)
MCHC RBC-ENTMCNC: 31 GM/DL — LOW (ref 32–36)
MCV RBC AUTO: 86.7 FL — SIGNIFICANT CHANGE UP (ref 80–100)
PLATELET # BLD AUTO: 426 K/UL — HIGH (ref 150–400)
POTASSIUM SERPL-MCNC: 3.8 MMOL/L — SIGNIFICANT CHANGE UP (ref 3.5–5.3)
POTASSIUM SERPL-SCNC: 3.8 MMOL/L — SIGNIFICANT CHANGE UP (ref 3.5–5.3)
RBC # BLD: 3.91 M/UL — SIGNIFICANT CHANGE UP (ref 3.8–5.2)
RBC # FLD: 18.6 % — HIGH (ref 10.3–14.5)
SODIUM SERPL-SCNC: 136 MMOL/L — SIGNIFICANT CHANGE UP (ref 135–145)
WBC # BLD: 16.12 K/UL — HIGH (ref 3.8–10.5)
WBC # FLD AUTO: 16.12 K/UL — HIGH (ref 3.8–10.5)

## 2024-10-10 PROCEDURE — 99232 SBSQ HOSP IP/OBS MODERATE 35: CPT

## 2024-10-10 RX ORDER — ACETAMINOPHEN 325 MG
1000 TABLET ORAL ONCE
Refills: 0 | Status: COMPLETED | OUTPATIENT
Start: 2024-10-10 | End: 2024-10-10

## 2024-10-10 RX ORDER — APIXABAN 5 MG/1
5 TABLET, FILM COATED ORAL EVERY 12 HOURS
Refills: 0 | Status: DISCONTINUED | OUTPATIENT
Start: 2024-10-10 | End: 2024-10-16

## 2024-10-10 RX ADMIN — Medication 100 MICROGRAM(S): at 04:53

## 2024-10-10 RX ADMIN — CHLORHEXIDINE GLUCONATE ORAL RINSE 1 APPLICATION(S): 1.2 SOLUTION DENTAL at 10:22

## 2024-10-10 RX ADMIN — Medication 50 MILLIGRAM(S): at 10:23

## 2024-10-10 RX ADMIN — PANTOPRAZOLE SODIUM 40 MILLIGRAM(S): 40 TABLET, DELAYED RELEASE ORAL at 10:22

## 2024-10-10 RX ADMIN — HYDROCORTISONE 10 MILLIGRAM(S): 5 TABLET ORAL at 21:38

## 2024-10-10 RX ADMIN — APIXABAN 5 MILLIGRAM(S): 5 TABLET, FILM COATED ORAL at 21:38

## 2024-10-10 RX ADMIN — HYDROCORTISONE 20 MILLIGRAM(S): 5 TABLET ORAL at 10:22

## 2024-10-10 RX ADMIN — Medication 400 MILLIGRAM(S): at 04:53

## 2024-10-10 RX ADMIN — ERTAPENEM 120 MILLIGRAM(S): 1 INJECTION, POWDER, LYOPHILIZED, FOR SOLUTION INTRAMUSCULAR; INTRAVENOUS at 10:22

## 2024-10-10 RX ADMIN — Medication 1000 MILLIGRAM(S): at 21:20

## 2024-10-10 RX ADMIN — Medication 400 MILLIGRAM(S): at 20:50

## 2024-10-10 RX ADMIN — AMIODARONE HYDROCHLORIDE 100 MILLIGRAM(S): 50 INJECTION, SOLUTION INTRAVENOUS at 10:23

## 2024-10-10 RX ADMIN — Medication 50 MILLIGRAM(S): at 21:38

## 2024-10-10 RX ADMIN — Medication 1000 MILLIGRAM(S): at 05:33

## 2024-10-10 RX ADMIN — Medication 1 TABLET(S): at 10:22

## 2024-10-10 RX ADMIN — Medication 1 TABLET(S): at 21:38

## 2024-10-10 NOTE — PROGRESS NOTE ADULT - ASSESSMENT
80yo F with PMH: Renal Cell Carcinoma with mets to liver, IR embolization of a liver lesion on 7/30 Diverticulitis, HTN, HLD, Hypothyroid was recently, recent new onset Afib/RVR in August 2024 started on Eliquis, GI Bleed after ERCP- resolved without intervention, started on eliquis during admit for Afib. She was found to have a liver abscess with sepsis.     # Septic shock secondary to Hepatic abscess  - Off Pressors, downgrade to MS floor   - SP IR drain, monitor output   - ID on board  - FU cultures  - SP meropenem and vancomycin  - To be DC home on augmentin and ertapenem to complete till 11/4   - Repeat CT scans show multiple abscess  - SP Replacement of SUBHA drain to drain additional abscess  - FU IR recommendations and ID    # Adrenal insufficiency  - Continue hydrocortisone     # P. Afib  - Stable  - Continue eliquis  - Continue metoprolol  - Continue amiodarone    # Gerd  - Continue protonix    # Hypothyroid  - Stable  - Continue levtothyroxine    # DVT prophylaxis  - Continue eliquis  78yo F with PMH: Renal Cell Carcinoma with mets to liver, IR embolization of a liver lesion on 7/30 Diverticulitis, HTN, HLD, Hypothyroid was recently, recent new onset Afib/RVR in August 2024 started on Eliquis, GI Bleed after ERCP- resolved without intervention, started on eliquis during admit for Afib. She was found to have a liver abscess with sepsis.     # Septic shock secondary to Hepatic abscess  - Off Pressors, downgrade to MS floor   - SP IR drain, monitor output   - ID on board  - FU cultures  - SP meropenem and vancomycin  - To be DC home on augmentin and ertapenem to complete till 11/4   - Repeat CT scans show multiple abscess  - SP Replacement of SUBHA drain to drain additional abscess  - WBC 16, observe overnight to trend  - FU IR recommendations and ID    # Adrenal insufficiency  - Continue hydrocortisone     # P. Afib  - Stable  - Continue eliquis  - Continue metoprolol  - Continue amiodarone    # Gerd  - Continue protonix    # Hypothyroid  - Stable  - Continue levtothyroxine    # DVT prophylaxis  - Continue eliquis

## 2024-10-10 NOTE — PROGRESS NOTE ADULT - SUBJECTIVE AND OBJECTIVE BOX
Interventional Radiology Follow-Up Note.     This is a 79y Female s/p drain removal and new drain placement on 10/9 in Interventional Radiology with Dr. Carvajal.   Denies abdominal pain.         Medication:   aMIOdarone    Tablet: (10-09)  amoxicillin  500 milliGRAM(s)/clavulanate: (10-09)  ertapenem  IVPB: (10-09)  metoprolol succinate ER: (10-09)    Vitals:   T(F): 97.9, Max: 98.1 (13:00)  HR: 79  BP: 135/95  RR: 17  SpO2: 100%    Physical Exam:  General: Nontoxic, in NAD.  Abdomen: soft, NTND.    Drain Device: Drain intact attached to gravity drain bag. Draining SS fluid.  Flushed with 5cc NS w/o difficulty. Dressing clean, dry, intact.   24hr Drain output: 14CC    LABS:  WBC 16.12 / Hgb 10.5 / Hct 33.9 / Plt 426  Na 136 / K 3.8 / CO2 27 / Cl 103 / BUN 16 / Cr 0.57 / Glucose 81  ALT -- / AST -- / Alk Phos -- / Tbili --  Ptt -- / Pt -- / INR --          Assessment/Plan: 78yo F with PMH: Renal Cell Carcinoma with mets to liver, IR embolization of a liver lesion on 7/30 Diverticulitis, HTN, HLD, Hypothyroid was recently, recent new onset Afib/RVR in August 2024 started on Eliquis, GI Bleed after ERCP- resolved without intervention, started on eliquis during admit for Afib. liver abscesses now s/p IR drainage.     - WBC elevated today.  - IF the pt is discharged home over the weekend. Recommend flushing drain daily with 10cc NS.   - Pt should follow up next week for drain evaluation and possible removal.  - D/w nurse at bedside.

## 2024-10-10 NOTE — PROGRESS NOTE ADULT - ASSESSMENT
RCC IV/ papillary with liver metastases : Unknown primary  S/P Liver directed therapies , prior TKI/ immune therapy ( MSI-H) and recently Tivolizumab + Nivolumab  Multiple septic episodes/ Now with source likely from abscess  Improved on antibiotics  Anemia / multifactorial    PLAN       Hydration  S/p IR guided drain placement  S/p CBD stent exchange  S/p tube re adjustment   DVT prophylaxis  Monitor counts  Will hold on therapy ( TKI ) for now  Can FU with IR as out patient    possible dc soon pending ID eval- will have midline placed for IV abx  Will update dr noonan from [facility] ir  Will be dc on abx  Thank you for the courtesy of this consultation and we will continue to follow.    Dusty Marcial MD  [facility]  Cell: [phone]

## 2024-10-10 NOTE — PROGRESS NOTE ADULT - SUBJECTIVE AND OBJECTIVE BOX
HOSPITALIST ATTENDING PROGRESS NOTE    Chart and meds reviewed.  Patient seen and examined.    CC: hepatic abscess    Subjective: WBC elevated today, no fever. Tolerating po.     All other systems reviewed and found to be negative with the exception of what has been described above.    MEDICATIONS  (STANDING):  aMIOdarone    Tablet 100 milliGRAM(s) Oral daily  amoxicillin  500 milliGRAM(s)/clavulanate 1 Tablet(s) Oral two times a day  chlorhexidine 4% Liquid 1 Application(s) Topical <User Schedule>  ertapenem  IVPB 1000 milliGRAM(s) IV Intermittent every 24 hours  hydrocortisone 10 milliGRAM(s) Oral at bedtime  hydrocortisone 20 milliGRAM(s) Oral daily  levothyroxine 100 MICROGram(s) Oral daily  metoprolol succinate ER 50 milliGRAM(s) Oral every 12 hours  pantoprazole  Injectable 40 milliGRAM(s) IV Push daily    MEDICATIONS  (PRN):    Vital Signs Last 24 Hrs  T(C): 36.6 (10 Oct 2024 09:00), Max: 36.7 (09 Oct 2024 13:00)  T(F): 97.9 (10 Oct 2024 09:00), Max: 98.1 (09 Oct 2024 13:00)  HR: 79 (10 Oct 2024 09:00) (69 - 98)  BP: 135/95 (10 Oct 2024 09:00) (102/68 - 135/95)  RR: 17 (10 Oct 2024 09:00) (12 - 18)  SpO2: 100% (10 Oct 2024 09:00) (98% - 100%)    Parameters below as of 10 Oct 2024 09:00  Patient On (Oxygen Delivery Method): room air    GEN: NAD  HEENT:  pupils equal and reactive, EOMI, no oropharyngeal lesions, erythema, exudates, oral thrush  NECK:   supple, no carotid bruits  CV:  +S1, +S2, regular, no murmurs  RESP:   lungs clear to auscultation bilaterally, no wheezing, rales, rhonchi, good air entry bilaterally  GI:  abdomen soft, non-tender, non-distended, normal BS + SUBHA  EXT:  no clubbing, no cyanosis, no edema, no calf pain, swelling or erythema  NEURO:  AAOX3, no focal neurological deficits, follows all commands, able to move extremities spontaneously  SKIN:  no ulcers, lesions or rashes    LABS:                          10.5   16.12 )-----------( 426      ( 10 Oct 2024 07:49 )             33.9     10-10    136  |  103  |  16  ----------------------------<  81  3.8   |  27  |  0.57    Ca    8.7      10 Oct 2024 07:49    TPro  6.2  /  Alb  2.0[L]  /  TBili  0.8  /  DBili  0.3  /  AST  35  /  ALT  60  /  AlkPhos  440[H]  10-09    LIVER FUNCTIONS - ( 09 Oct 2024 06:59 )  Alb: 2.0 g/dL / Pro: 6.2 gm/dL / ALK PHOS: 440 U/L / ALT: 60 U/L / AST: 35 U/L / GGT: x           PT/INR - ( 09 Oct 2024 09:16 )   PT: 13.0 sec;   INR: 1.10 ratio    PTT - ( 09 Oct 2024 09:16 )  PTT:30.1 sec    Urinalysis Basic - ( 10 Oct 2024 07:49 )  Color: x / Appearance: x / SG: x / pH: x  Gluc: 81 mg/dL / Ketone: x  / Bili: x / Urobili: x   Blood: x / Protein: x / Nitrite: x   Leuk Esterase: x / RBC: x / WBC x   Sq Epi: x / Non Sq Epi: x / Bacteria: x

## 2024-10-10 NOTE — PROGRESS NOTE ADULT - SUBJECTIVE AND OBJECTIVE BOX
Pt seen, wbc slightly elevated, had tube adjusted yesterday    MEDICATIONS  (STANDING):  aMIOdarone    Tablet 100 milliGRAM(s) Oral daily  amoxicillin  500 milliGRAM(s)/clavulanate 1 Tablet(s) Oral two times a day  chlorhexidine 4% Liquid 1 Application(s) Topical <User Schedule>  ertapenem  IVPB 1000 milliGRAM(s) IV Intermittent every 24 hours  hydrocortisone 10 milliGRAM(s) Oral at bedtime  hydrocortisone 20 milliGRAM(s) Oral daily  levothyroxine 100 MICROGram(s) Oral daily  metoprolol succinate ER 50 milliGRAM(s) Oral every 12 hours  pantoprazole  Injectable 40 milliGRAM(s) IV Push daily    MEDICATIONS  (PRN):      ROS  No fever, sweats, chills  No epistaxis, HA, sore throat     Vital Signs Last 24 Hrs  T(C): 36.6 (10 Oct 2024 09:00), Max: 36.7 (09 Oct 2024 13:00)  T(F): 97.9 (10 Oct 2024 09:00), Max: 98.1 (09 Oct 2024 13:00)  HR: 79 (10 Oct 2024 09:00) (69 - 98)  BP: 135/95 (10 Oct 2024 09:00) (102/68 - 135/95)  BP(mean): --  RR: 17 (10 Oct 2024 09:00) (12 - 18)  SpO2: 100% (10 Oct 2024 09:00) (98% - 100%)    Parameters below as of 10 Oct 2024 09:00  Patient On (Oxygen Delivery Method): room air        PE  NAD     No c/c/e  No rash grossly  FROM                          10.5   16.12 )-----------( 426      ( 10 Oct 2024 07:49 )             33.9       10-10    136  |  103  |  16  ----------------------------<  81  3.8   |  27  |  0.57    Ca    8.7      10 Oct 2024 07:49    TPro  6.2  /  Alb  2.0[L]  /  TBili  0.8  /  DBili  0.3  /  AST  35  /  ALT  60  /  AlkPhos  440[H]  10-09

## 2024-10-11 ENCOUNTER — TRANSCRIPTION ENCOUNTER (OUTPATIENT)
Age: 79
End: 2024-10-11

## 2024-10-11 PROBLEM — Z98.890 OTHER SPECIFIED POSTPROCEDURAL STATES: Chronic | Status: ACTIVE | Noted: 2024-09-29

## 2024-10-11 PROBLEM — C78.7 SECONDARY MALIGNANT NEOPLASM OF LIVER AND INTRAHEPATIC BILE DUCT: Chronic | Status: ACTIVE | Noted: 2024-09-29

## 2024-10-11 PROBLEM — C64.9 MALIGNANT NEOPLASM OF UNSPECIFIED KIDNEY, EXCEPT RENAL PELVIS: Chronic | Status: ACTIVE | Noted: 2024-09-29

## 2024-10-11 PROBLEM — K80.20 CALCULUS OF GALLBLADDER WITHOUT CHOLECYSTITIS WITHOUT OBSTRUCTION: Chronic | Status: ACTIVE | Noted: 2024-09-29

## 2024-10-11 PROBLEM — I48.0 PAROXYSMAL ATRIAL FIBRILLATION: Chronic | Status: ACTIVE | Noted: 2024-09-29

## 2024-10-11 LAB
-  AMOXICILLIN/CLAVULANIC ACID: SIGNIFICANT CHANGE UP
-  AMPICILLIN/SULBACTAM: SIGNIFICANT CHANGE UP
-  AMPICILLIN: SIGNIFICANT CHANGE UP
-  AMPICILLIN: SIGNIFICANT CHANGE UP
-  AZTREONAM: SIGNIFICANT CHANGE UP
-  CEFAZOLIN: SIGNIFICANT CHANGE UP
-  CEFEPIME: SIGNIFICANT CHANGE UP
-  CEFOXITIN: SIGNIFICANT CHANGE UP
-  CEFTRIAXONE: SIGNIFICANT CHANGE UP
-  CIPROFLOXACIN: SIGNIFICANT CHANGE UP
-  ERTAPENEM: SIGNIFICANT CHANGE UP
-  GENTAMICIN: SIGNIFICANT CHANGE UP
-  IMIPENEM: SIGNIFICANT CHANGE UP
-  LEVOFLOXACIN: SIGNIFICANT CHANGE UP
-  MEROPENEM: SIGNIFICANT CHANGE UP
-  PIPERACILLIN/TAZOBACTAM: SIGNIFICANT CHANGE UP
-  TOBRAMYCIN: SIGNIFICANT CHANGE UP
-  TRIMETHOPRIM/SULFAMETHOXAZOLE: SIGNIFICANT CHANGE UP
-  VANCOMYCIN: SIGNIFICANT CHANGE UP
ADD ON TEST-SPECIMEN IN LAB: SIGNIFICANT CHANGE UP
ANION GAP SERPL CALC-SCNC: 3 MMOL/L — LOW (ref 5–17)
BUN SERPL-MCNC: 13 MG/DL — SIGNIFICANT CHANGE UP (ref 7–23)
CALCIUM SERPL-MCNC: 9 MG/DL — SIGNIFICANT CHANGE UP (ref 8.5–10.1)
CHLORIDE SERPL-SCNC: 106 MMOL/L — SIGNIFICANT CHANGE UP (ref 96–108)
CO2 SERPL-SCNC: 30 MMOL/L — SIGNIFICANT CHANGE UP (ref 22–31)
CREAT SERPL-MCNC: 0.62 MG/DL — SIGNIFICANT CHANGE UP (ref 0.5–1.3)
EGFR: 92 ML/MIN/1.73M2 — SIGNIFICANT CHANGE UP
GLUCOSE SERPL-MCNC: 74 MG/DL — SIGNIFICANT CHANGE UP (ref 70–99)
HCT VFR BLD CALC: 35.6 % — SIGNIFICANT CHANGE UP (ref 34.5–45)
HGB BLD-MCNC: 11 G/DL — LOW (ref 11.5–15.5)
MAGNESIUM SERPL-MCNC: 1.9 MG/DL — SIGNIFICANT CHANGE UP (ref 1.6–2.6)
MCHC RBC-ENTMCNC: 26.8 PG — LOW (ref 27–34)
MCHC RBC-ENTMCNC: 30.9 GM/DL — LOW (ref 32–36)
MCV RBC AUTO: 86.8 FL — SIGNIFICANT CHANGE UP (ref 80–100)
METHOD TYPE: SIGNIFICANT CHANGE UP
METHOD TYPE: SIGNIFICANT CHANGE UP
PLATELET # BLD AUTO: 463 K/UL — HIGH (ref 150–400)
POTASSIUM SERPL-MCNC: 3.7 MMOL/L — SIGNIFICANT CHANGE UP (ref 3.5–5.3)
POTASSIUM SERPL-SCNC: 3.7 MMOL/L — SIGNIFICANT CHANGE UP (ref 3.5–5.3)
RBC # BLD: 4.1 M/UL — SIGNIFICANT CHANGE UP (ref 3.8–5.2)
RBC # FLD: 18.6 % — HIGH (ref 10.3–14.5)
SODIUM SERPL-SCNC: 139 MMOL/L — SIGNIFICANT CHANGE UP (ref 135–145)
WBC # BLD: 13.52 K/UL — HIGH (ref 3.8–10.5)
WBC # FLD AUTO: 13.52 K/UL — HIGH (ref 3.8–10.5)

## 2024-10-11 PROCEDURE — 93010 ELECTROCARDIOGRAM REPORT: CPT

## 2024-10-11 PROCEDURE — 99239 HOSP IP/OBS DSCHRG MGMT >30: CPT

## 2024-10-11 RX ORDER — ERTAPENEM 1 G/1
1 INJECTION, POWDER, LYOPHILIZED, FOR SOLUTION INTRAMUSCULAR; INTRAVENOUS
Qty: 24 | Refills: 0
Start: 2024-10-11 | End: 2024-11-03

## 2024-10-11 RX ORDER — HYDROCORTISONE 20 MG
1 TABLET ORAL
Refills: 0 | DISCHARGE

## 2024-10-11 RX ORDER — TIVOZANIB 1.34 MG/1
1 CAPSULE ORAL
Refills: 0 | DISCHARGE

## 2024-10-11 RX ORDER — APIXABAN 2.5 MG/1
1 TABLET, FILM COATED ORAL
Refills: 0 | DISCHARGE

## 2024-10-11 RX ORDER — APIXABAN 5 MG/1
1 TABLET, FILM COATED ORAL
Qty: 60 | Refills: 0
Start: 2024-10-11 | End: 2024-11-09

## 2024-10-11 RX ORDER — NIVOLUMAB 10 MG/ML
0 INJECTION INTRAVENOUS
Refills: 0 | DISCHARGE

## 2024-10-11 RX ORDER — SODIUM CHLORIDE 0.9 % (FLUSH) 0.9 %
500 SYRINGE (ML) INJECTION ONCE
Refills: 0 | Status: COMPLETED | OUTPATIENT
Start: 2024-10-11 | End: 2024-10-11

## 2024-10-11 RX ORDER — ACETAMINOPHEN 325 MG
1000 TABLET ORAL ONCE
Refills: 0 | Status: COMPLETED | OUTPATIENT
Start: 2024-10-11 | End: 2024-10-11

## 2024-10-11 RX ORDER — HYDROCORTISONE 5 MG/1
1 TABLET ORAL
Qty: 60 | Refills: 0
Start: 2024-10-11 | End: 2024-12-09

## 2024-10-11 RX ORDER — PANTOPRAZOLE SODIUM 40 MG/1
1 TABLET, DELAYED RELEASE ORAL
Qty: 30 | Refills: 0
Start: 2024-10-11 | End: 2024-11-09

## 2024-10-11 RX ADMIN — APIXABAN 5 MILLIGRAM(S): 5 TABLET, FILM COATED ORAL at 21:41

## 2024-10-11 RX ADMIN — Medication 1 TABLET(S): at 10:11

## 2024-10-11 RX ADMIN — Medication 50 MILLIGRAM(S): at 21:41

## 2024-10-11 RX ADMIN — AMIODARONE HYDROCHLORIDE 100 MILLIGRAM(S): 50 INJECTION, SOLUTION INTRAVENOUS at 10:14

## 2024-10-11 RX ADMIN — Medication 50 MILLIGRAM(S): at 10:11

## 2024-10-11 RX ADMIN — HYDROCORTISONE 10 MILLIGRAM(S): 5 TABLET ORAL at 21:41

## 2024-10-11 RX ADMIN — CHLORHEXIDINE GLUCONATE ORAL RINSE 1 APPLICATION(S): 1.2 SOLUTION DENTAL at 10:14

## 2024-10-11 RX ADMIN — Medication 1 TABLET(S): at 21:41

## 2024-10-11 RX ADMIN — Medication 1000 MILLIGRAM(S): at 12:11

## 2024-10-11 RX ADMIN — PANTOPRAZOLE SODIUM 40 MILLIGRAM(S): 40 TABLET, DELAYED RELEASE ORAL at 10:11

## 2024-10-11 RX ADMIN — HYDROCORTISONE 20 MILLIGRAM(S): 5 TABLET ORAL at 10:11

## 2024-10-11 RX ADMIN — Medication 400 MILLIGRAM(S): at 10:22

## 2024-10-11 RX ADMIN — ERTAPENEM 120 MILLIGRAM(S): 1 INJECTION, POWDER, LYOPHILIZED, FOR SOLUTION INTRAMUSCULAR; INTRAVENOUS at 10:11

## 2024-10-11 RX ADMIN — Medication 1000 MILLILITER(S): at 12:40

## 2024-10-11 RX ADMIN — Medication 100 MICROGRAM(S): at 06:38

## 2024-10-11 RX ADMIN — APIXABAN 5 MILLIGRAM(S): 5 TABLET, FILM COATED ORAL at 10:11

## 2024-10-11 NOTE — PROGRESS NOTE ADULT - SUBJECTIVE AND OBJECTIVE BOX
HOSPITALIST ATTENDING PROGRESS NOTE    Chart and meds reviewed.  Patient seen and examined.    CC: Hepatic abscess    Subjective: Feels well but with hypotension and tachycardia. No fever    All other systems reviewed and found to be negative with the exception of what has been described above.    MEDICATIONS  (STANDING):  aMIOdarone    Tablet 100 milliGRAM(s) Oral daily  amoxicillin  500 milliGRAM(s)/clavulanate 1 Tablet(s) Oral two times a day  apixaban 5 milliGRAM(s) Oral every 12 hours  chlorhexidine 4% Liquid 1 Application(s) Topical <User Schedule>  ertapenem  IVPB 1000 milliGRAM(s) IV Intermittent every 24 hours  hydrocortisone 10 milliGRAM(s) Oral at bedtime  hydrocortisone 20 milliGRAM(s) Oral daily  levothyroxine 100 MICROGram(s) Oral daily  metoprolol succinate ER 50 milliGRAM(s) Oral every 12 hours  pantoprazole  Injectable 40 milliGRAM(s) IV Push daily    MEDICATIONS  (PRN):    Vital Signs Last 24 Hrs  T(C): 36.4 (11 Oct 2024 12:23), Max: 36.7 (11 Oct 2024 08:35)  T(F): 97.6 (11 Oct 2024 12:23), Max: 98 (11 Oct 2024 08:35)  HR: 102 (11 Oct 2024 12:23) (77 - 102)  BP: 97/70 (11 Oct 2024 12:23) (97/70 - 150/80)  RR: 18 (11 Oct 2024 12:23) (16 - 18)  SpO2: 97% (11 Oct 2024 12:23) (97% - 99%)    Parameters below as of 11 Oct 2024 12:23  Patient On (Oxygen Delivery Method): room ai    GEN: NAD  HEENT:  pupils equal and reactive, EOMI, no oropharyngeal lesions, erythema, exudates, oral thrush  NECK:   supple, no carotid bruits  CV:  +S1, +S2, regular, no murmurs  RESP:   lungs clear to auscultation bilaterally, no wheezing, rales, rhonchi, good air entry bilaterally  GI:  abdomen soft, non-tender, non-distended, normal BS  EXT:  no clubbing, no cyanosis, no edema, no calf pain, swelling or erythema  NEURO:  AAOX3, no focal neurological deficits, follows all commands, able to move extremities spontaneously  SKIN:  no ulcers, lesions or rashes    LABS:                          11.0   13.52 )-----------( 463      ( 11 Oct 2024 06:48 )             35.6     10-11    139  |  106  |  13  ----------------------------<  74  3.7   |  30  |  0.62    Ca    9.0      11 Oct 2024 06:48      Urinalysis Basic - ( 11 Oct 2024 06:48 )  Color: x / Appearance: x / SG: x / pH: x  Gluc: 74 mg/dL / Ketone: x  / Bili: x / Urobili: x   Blood: x / Protein: x / Nitrite: x   Leuk Esterase: x / RBC: x / WBC x   Sq Epi: x / Non Sq Epi: x / Bacteria: x

## 2024-10-11 NOTE — DISCHARGE NOTE PROVIDER - NSDCCPCAREPLAN_GEN_ALL_CORE_FT
PRINCIPAL DISCHARGE DIAGNOSIS  Diagnosis: Sepsis  Assessment and Plan of Treatment: You were diagnosed with a hepatic abscess, you have a drain which should be flushed 1 day with 10 CC NS. You are going to fu with IR in 1 week at your designated appoitment. You are going home on antibiotics till 11/4. Please continue medications as prescribed and fu with Dr barrera within 1 week for check up.     PRINCIPAL DISCHARGE DIAGNOSIS  Diagnosis: Sepsis  Assessment and Plan of Treatment: You were diagnosed with a hepatic abscess and you have a drain which should be flushed daily with 10cc of normal saline. You are going to follow up with IR in 1 week at your designated appointment. You are going home on antibiotics until 11/5. Please start Augmentin twice daily and IV Invanz daily. Follow up with Infectious Disease within 1 week of discharge. Follow up with your PCP within 1 week of discharge.

## 2024-10-11 NOTE — DISCHARGE NOTE PROVIDER - CARE PROVIDERS DIRECT ADDRESSES
[email or web address]

## 2024-10-11 NOTE — PROGRESS NOTE ADULT - SUBJECTIVE AND OBJECTIVE BOX
Pt seen, feeling well, likely home today    MEDICATIONS  (STANDING):  aMIOdarone    Tablet 100 milliGRAM(s) Oral daily  amoxicillin  500 milliGRAM(s)/clavulanate 1 Tablet(s) Oral two times a day  apixaban 5 milliGRAM(s) Oral every 12 hours  chlorhexidine 4% Liquid 1 Application(s) Topical <User Schedule>  ertapenem  IVPB 1000 milliGRAM(s) IV Intermittent every 24 hours  hydrocortisone 10 milliGRAM(s) Oral at bedtime  hydrocortisone 20 milliGRAM(s) Oral daily  levothyroxine 100 MICROGram(s) Oral daily  metoprolol succinate ER 50 milliGRAM(s) Oral every 12 hours  pantoprazole  Injectable 40 milliGRAM(s) IV Push daily    MEDICATIONS  (PRN):      ROS  No fever, sweats, chills       Vital Signs Last 24 Hrs  T(C): 36.7 (11 Oct 2024 08:35), Max: 36.7 (11 Oct 2024 08:35)  T(F): 98 (11 Oct 2024 08:35), Max: 98 (11 Oct 2024 08:35)  HR: 81 (11 Oct 2024 08:35) (77 - 81)  BP: 150/80 (11 Oct 2024 08:35) (109/70 - 150/80)  BP(mean): --  RR: 17 (11 Oct 2024 08:35) (16 - 17)  SpO2: 99% (11 Oct 2024 08:35) (97% - 99%)    Parameters below as of 11 Oct 2024 08:35  Patient On (Oxygen Delivery Method): room air        PE  NAD  Awake, alert     No c/c/e  No rash grossly  FROM                          11.0   13.52 )-----------( 463      ( 11 Oct 2024 06:48 )             35.6       10-11    139  |  106  |  13  ----------------------------<  74  3.7   |  30  |  0.62    Ca    9.0      11 Oct 2024 06:48

## 2024-10-11 NOTE — DISCHARGE NOTE PROVIDER - HOSPITAL COURSE
80yo F with PMH: Renal Cell Carcinoma with mets to liver, IR embolization of a liver lesion on 7/30 Diverticulitis, HTN, HLD, Hypothyroid was recently, recent new onset Afib/RVR in August 2024 started on Eliquis, GI Bleed after ERCP- resolved without intervention, started on eliquis during admit for Afib. She was found to have a liver abscess with sepsis.     # Septic shock secondary to Hepatic abscess  - Off Pressors, downgrade to MS floor   - SP IR drain, monitor output   - ID on board  - FU cultures  - SP meropenem and vancomycin  - To be DC home on augmentin and ertapenem to complete till 11/4   - Repeat CT scans show multiple abscess  - SP Replacement of SUBHA drain to drain additional abscess  - WBC 16 down to 13  - FU with IR and PCP in 1 week    # Adrenal insufficiency  - Continue hydrocortisone     # P. Afib  - Stable  - Continue eliquis  - Continue metoprolol  - Continue amiodarone    # Gerd  - Continue protonix    # Hypothyroid  - Stable  - Continue levtothyroxine    # DVT prophylaxis  - Continue eliquis ADMISSION H+P:    HPI:  80yo F with PMH: Renal Cell Carcinoma with mets to liver, IR embolization of a liver lesion on 7/30 Diverticulitis, HTN, HLD, Hypothyroid was recently, recent new onset Afib/RVR in August 2024 started on Eliquis, GI Bleed after ERCP- resolved without intervention, started on eliquis during admit for Afib. liver abscesses    admitted 8/2-8/15 for septic shock requiring pressors and critical care admission, Found to have dilated common bile duct, elevated LFTs and likely cholangitis/choledocholithiasis with stone, s/p ERCP with sphincterotomy/stone removal and stent placement on 8/5/24      admitted 9/9-9/14 for septic shock, requiring levophed and critical care admission, blood cultures during admission had Enterobacter, sensitive to Cipro. Noted liver abscess on MRI, felt to be too small for biopsy at the time, decision for prolong antibiotic course made. Discharged on Cipro/Flagyl to complete her therapy, this was completed on Monday 9/23.     She was doing well until today, when she developed fever to 103+, nausea, vomiting, diarrhea-brown, which she states is exactly how she presented for 9/9-9/14 admission.  Found to be hypotensive in ED, give 3 liters sepsis fluids without improvement and started on levophed for BP augmentation. ICU Consulted   (29 Sep 2024 11:29)      ---  HOSPITAL COURSE:   Pt was admitted for septic shock 2/2 hepatic abscess with Klebsiella, Enterococcus faecalis, Enterococcus faecium and Clostridium perfringens growing in blood cultures. Pt had IR drain placed and output was monitored closely. Abscess fluid culture grew Klebsiella, Enterococcus faecalis. Pt was treated with IV antibiotics. Pt was initially on pressors in the ICU and then downgraded to medical floors as she clinically improved. Repeat blood cultures were negative. Repeat CT scan showed: Fluid collection 5.3 x 1.8 cm is stable from prior imaging from 9/9/2024. This is adjacent to tumor located immediately to its left. Given stability and history of tumor ablation this could represent fluid collection or necrotic tumor. IR was consulted and recommended no further IR intervention. ID recommended continuing IV Invanz and PO Augmentin upon discharge until 11/5 with close outpatient follow up with ID.     Patient was medically optimized and improved clinically throughout hospital course. Patient seen and examined on day of discharge.    Vital Signs  T(C): 36.6 (16 Oct 2024 09:06), Max: 36.6 (15 Oct 2024 15:31)  T(F): 97.8 (16 Oct 2024 09:06), Max: 97.8 (15 Oct 2024 15:31)  HR: 71 (16 Oct 2024 09:06) (71 - 77)  BP: 141/73 (16 Oct 2024 09:06) (132/78 - 141/73)  RR: 18 (16 Oct 2024 09:06) (17 - 18)  SpO2: 99% (16 Oct 2024 09:06) (98% - 100%)    Physical Exam:  GENERAL: NAD, seated comfortably in chair  HEENT: anicteric, moist mucous membranes  CHEST/LUNG:  CTA b/l, no rales, wheezes, or rhonchi  HEART:  RRR, S1, S2  ABDOMEN: +SUBHA drain, soft, nontender, nondistended  EXTREMITIES: no edema, cyanosis, or calf tenderness  NERVOUS SYSTEM: answers questions and follows commands appropriately    Patient is medically stable for discharge home with outpatient follow up.  ---  CONSULTANTS:   ID- Dr. Sims  IR- Dr. Falcon  Heme/Onc- Dr. Marcial  ---  TIME SPENT:   I, the attending physician, was physically present for the key portions of the evaluation and management (E/M) service provided. The total amount of time spent reviewing the hospital course, laboratory values, imaging findings, assessing/counseling the patient, discussing with consultant physicians, social work, nursing staff was -- minutes.     ---  FINAL DISCHARGE DIAGNOSIS LIST:  Please see last daily progress note for final discharge diagnoses    ---  Primary care provider was made aware of plan for discharge:  [    ] NO     [     ] YES

## 2024-10-11 NOTE — PROGRESS NOTE ADULT - ASSESSMENT
78yo F with PMH: Renal Cell Carcinoma with mets to liver, IR embolization of a liver lesion on 7/30 Diverticulitis, HTN, HLD, Hypothyroid was recently, recent new onset Afib/RVR in August 2024 started on Eliquis, GI Bleed after ERCP- resolved without intervention, started on eliquis during admit for Afib. She was found to have a liver abscess with sepsis.     # Septic shock secondary to Hepatic abscess  - Off Pressors, downgrade to MS floor   - SP IR drain, monitor output   - ID on board  - FU cultures  - SP meropenem and vancomycin  - To be DC home on augmentin and ertapenem to complete till 11/4   - Repeat CT scans show multiple abscess  - SP Replacement of SUBHA drain to drain additional abscess  - WBC 13  - FU IR recommendations and ID  - Slight hypotension and tacycardia 10/11  - Fluid bolus and orthostatics    # Adrenal insufficiency  - Continue hydrocortisone     # P. Afib  - Stable  - Continue eliquis  - Continue metoprolol  - Continue amiodarone    # Gerd  - Continue protonix    # Hypothyroid  - Stable  - Continue levtothyroxine    # DVT prophylaxis  - Continue eliquis

## 2024-10-11 NOTE — DISCHARGE NOTE PROVIDER - CARE PROVIDER_API CALL
Jaron Dockery  Cardiovascular Disease  [address]  [place] [address]  Phone: [phone]  Fax: [phone]  Follow Up Time:    Jaron Dockery  Cardiovascular Disease  [address]  [place] [address]  Phone: [phone]  Fax: [phone]  Follow Up Time:     Shanika Sims  Infectious Disease  [address], [address]  [place] [address]  Phone: [phone]  Fax: [phone]  Follow Up Time: 1 week    Minh Carvajal  Interventional Radiology and Diagnostic Radiology  [address] [place] [address]  Phone: [phone]  Fax: [phone]  Follow Up Time: 1 week

## 2024-10-11 NOTE — DISCHARGE NOTE PROVIDER - PROVIDER TOKENS
PROVIDER:[TOKEN:[[id number]]] PROVIDER:[TOKEN:[[id number]]],PROVIDER:[TOKEN:[[id number]],FOLLOWUP:[1 week]],PROVIDER:[TOKEN:[[id number]],FOLLOWUP:[1 week]]

## 2024-10-11 NOTE — DISCHARGE NOTE PROVIDER - NSDCFUSCHEDAPPT_GEN_ALL_CORE_FT
Nakul Turner  [facility]  [address]  Scheduled Appointment: 10/23/2024     Nakul Turner  [facility]  [address]  Scheduled Appointment: 10/23/2024    Minh Carvajal  [facility]  HNT PreAdmits  Scheduled Appointment: 10/24/2024

## 2024-10-11 NOTE — PROGRESS NOTE ADULT - ASSESSMENT
RCC IV/ papillary with liver metastases : Unknown primary  S/P Liver directed therapies , prior TKI/ immune therapy ( MSI-H) and recently Tivolizumab + Nivolumab  Multiple septic episodes/ Now with source likely from abscess  Improved on antibiotics  Anemia / multifactorial    PLAN    Antibiotics  Hydration  S/p IR guided drain placement  S/p CBD stent exchange  physical therapy  DVT prophylaxis  Monitor counts  Will hold on therapy ( TKI ) for now  Can FU with IR as out patient    possible DC today  f/u with Dr Saunders    Thank you for the courtesy of this consultation and we will continue to follow.    Dusty Marcial MD  [facility]  Cell: [phone]

## 2024-10-11 NOTE — DISCHARGE NOTE PROVIDER - NSDCMRMEDTOKEN_GEN_ALL_CORE_FT
amiodarone 100 mg oral tablet: 1 tab(s) orally once a day  amoxicillin-clavulanate 500 mg-125 mg oral tablet: 1 tab(s) orally 2 times a day  apixaban 5 mg oral tablet: 1 tab(s) orally every 12 hours  hydrocortisone 10 mg oral tablet: 1 tab(s) orally once a day 2 tablets in the morning, 1 tablet in the evening  levothyroxine 100 mcg (0.1 mg) oral tablet: 1 tab(s) orally once a day  metoprolol succinate 50 mg oral tablet, extended release: 1 tab(s) orally 2 times a day  Protonix 40 mg oral delayed release tablet: 1 tab(s) orally once a day  vitamin E d-alpha 400 intl units oral capsule: 2 cap(s) orally once a day

## 2024-10-11 NOTE — DISCHARGE NOTE PROVIDER - ATTENDING DISCHARGE PHYSICAL EXAMINATION:
Gen nad  head ncat  ent perrl  neck supple  chest cta  cvs s1s2  abd soft + drain  ext no edema  skin wdi  neuro aaxo3   GENERAL: NAD, seated comfortably in chair  HEENT: anicteric, moist mucous membranes  CHEST/LUNG:  CTA b/l, no rales, wheezes, or rhonchi  HEART:  RRR, S1, S2  ABDOMEN: +SUBHA drain, soft, nontender, nondistended  EXTREMITIES: no edema, cyanosis, or calf tenderness  NERVOUS SYSTEM: answers questions and follows commands appropriately

## 2024-10-12 LAB
ANION GAP SERPL CALC-SCNC: 6 MMOL/L — SIGNIFICANT CHANGE UP (ref 5–17)
ANION GAP SERPL CALC-SCNC: 8 MMOL/L — SIGNIFICANT CHANGE UP (ref 5–17)
BUN SERPL-MCNC: 11 MG/DL — SIGNIFICANT CHANGE UP (ref 7–23)
BUN SERPL-MCNC: 12 MG/DL — SIGNIFICANT CHANGE UP (ref 7–23)
CALCIUM SERPL-MCNC: 8.3 MG/DL — LOW (ref 8.5–10.1)
CALCIUM SERPL-MCNC: 9.2 MG/DL — SIGNIFICANT CHANGE UP (ref 8.5–10.1)
CHLORIDE SERPL-SCNC: 104 MMOL/L — SIGNIFICANT CHANGE UP (ref 96–108)
CHLORIDE SERPL-SCNC: 105 MMOL/L — SIGNIFICANT CHANGE UP (ref 96–108)
CO2 SERPL-SCNC: 22 MMOL/L — SIGNIFICANT CHANGE UP (ref 22–31)
CO2 SERPL-SCNC: 27 MMOL/L — SIGNIFICANT CHANGE UP (ref 22–31)
CREAT SERPL-MCNC: 0.62 MG/DL — SIGNIFICANT CHANGE UP (ref 0.5–1.3)
CREAT SERPL-MCNC: 1.01 MG/DL — SIGNIFICANT CHANGE UP (ref 0.5–1.3)
EGFR: 57 ML/MIN/1.73M2 — LOW
EGFR: 92 ML/MIN/1.73M2 — SIGNIFICANT CHANGE UP
GLUCOSE SERPL-MCNC: 114 MG/DL — HIGH (ref 70–99)
GLUCOSE SERPL-MCNC: 72 MG/DL — SIGNIFICANT CHANGE UP (ref 70–99)
HCT VFR BLD CALC: 34.1 % — LOW (ref 34.5–45)
HGB BLD-MCNC: 10.5 G/DL — LOW (ref 11.5–15.5)
LACTATE SERPL-SCNC: 1.4 MMOL/L — SIGNIFICANT CHANGE UP (ref 0.7–2)
LACTATE SERPL-SCNC: 2.8 MMOL/L — HIGH (ref 0.7–2)
MCHC RBC-ENTMCNC: 26.6 PG — LOW (ref 27–34)
MCHC RBC-ENTMCNC: 30.8 GM/DL — LOW (ref 32–36)
MCV RBC AUTO: 86.3 FL — SIGNIFICANT CHANGE UP (ref 80–100)
PLATELET # BLD AUTO: 457 K/UL — HIGH (ref 150–400)
POTASSIUM SERPL-MCNC: 3.3 MMOL/L — LOW (ref 3.5–5.3)
POTASSIUM SERPL-MCNC: 3.9 MMOL/L — SIGNIFICANT CHANGE UP (ref 3.5–5.3)
POTASSIUM SERPL-SCNC: 3.3 MMOL/L — LOW (ref 3.5–5.3)
POTASSIUM SERPL-SCNC: 3.9 MMOL/L — SIGNIFICANT CHANGE UP (ref 3.5–5.3)
RBC # BLD: 3.95 M/UL — SIGNIFICANT CHANGE UP (ref 3.8–5.2)
RBC # FLD: 18.6 % — HIGH (ref 10.3–14.5)
SODIUM SERPL-SCNC: 134 MMOL/L — LOW (ref 135–145)
SODIUM SERPL-SCNC: 138 MMOL/L — SIGNIFICANT CHANGE UP (ref 135–145)
WBC # BLD: 15.29 K/UL — HIGH (ref 3.8–10.5)
WBC # FLD AUTO: 15.29 K/UL — HIGH (ref 3.8–10.5)

## 2024-10-12 PROCEDURE — 74160 CT ABDOMEN W/CONTRAST: CPT | Mod: 26

## 2024-10-12 PROCEDURE — 99232 SBSQ HOSP IP/OBS MODERATE 35: CPT

## 2024-10-12 RX ORDER — SODIUM CHLORIDE 0.9 % (FLUSH) 0.9 %
1000 SYRINGE (ML) INJECTION
Refills: 0 | Status: DISCONTINUED | OUTPATIENT
Start: 2024-10-12 | End: 2024-10-12

## 2024-10-12 RX ORDER — MEROPENEM 500 MG/20ML
1000 INJECTION INTRAVENOUS EVERY 8 HOURS
Refills: 0 | Status: DISCONTINUED | OUTPATIENT
Start: 2024-10-12 | End: 2024-10-16

## 2024-10-12 RX ORDER — VANCOMYCIN HCL-SODIUM CHLORIDE IV SOLN 1.5 GM/250ML-0.9% 1.5-0.9/25 GM/ML-%
750 SOLUTION INTRAVENOUS EVERY 12 HOURS
Refills: 0 | Status: DISCONTINUED | OUTPATIENT
Start: 2024-10-12 | End: 2024-10-16

## 2024-10-12 RX ORDER — SODIUM CHLORIDE 0.9 % (FLUSH) 0.9 %
500 SYRINGE (ML) INJECTION ONCE
Refills: 0 | Status: COMPLETED | OUTPATIENT
Start: 2024-10-12 | End: 2024-10-12

## 2024-10-12 RX ORDER — ACETAMINOPHEN 325 MG
1000 TABLET ORAL ONCE
Refills: 0 | Status: COMPLETED | OUTPATIENT
Start: 2024-10-12 | End: 2024-10-12

## 2024-10-12 RX ORDER — MEROPENEM 500 MG/20ML
1000 INJECTION INTRAVENOUS EVERY 8 HOURS
Refills: 0 | Status: DISCONTINUED | OUTPATIENT
Start: 2024-10-12 | End: 2024-10-12

## 2024-10-12 RX ORDER — SODIUM CHLORIDE 0.9 % (FLUSH) 0.9 %
1000 SYRINGE (ML) INJECTION
Refills: 0 | Status: DISCONTINUED | OUTPATIENT
Start: 2024-10-12 | End: 2024-10-16

## 2024-10-12 RX ADMIN — ERTAPENEM 120 MILLIGRAM(S): 1 INJECTION, POWDER, LYOPHILIZED, FOR SOLUTION INTRAMUSCULAR; INTRAVENOUS at 11:46

## 2024-10-12 RX ADMIN — Medication 400 MILLIGRAM(S): at 13:45

## 2024-10-12 RX ADMIN — Medication 1000 MILLILITER(S): at 17:50

## 2024-10-12 RX ADMIN — Medication 100 MILLIEQUIVALENT(S): at 20:19

## 2024-10-12 RX ADMIN — Medication 100 MILLIEQUIVALENT(S): at 19:03

## 2024-10-12 RX ADMIN — Medication 1000 MILLILITER(S): at 16:27

## 2024-10-12 RX ADMIN — VANCOMYCIN HCL-SODIUM CHLORIDE IV SOLN 1.5 GM/250ML-0.9% 250 MILLIGRAM(S): 1.5-0.9/25 SOLUTION at 23:19

## 2024-10-12 RX ADMIN — Medication 100 MILLIEQUIVALENT(S): at 21:34

## 2024-10-12 RX ADMIN — Medication 100 MICROGRAM(S): at 05:38

## 2024-10-12 RX ADMIN — Medication 1 TABLET(S): at 11:29

## 2024-10-12 RX ADMIN — HYDROCORTISONE 10 MILLIGRAM(S): 5 TABLET ORAL at 23:21

## 2024-10-12 RX ADMIN — APIXABAN 5 MILLIGRAM(S): 5 TABLET, FILM COATED ORAL at 23:20

## 2024-10-12 RX ADMIN — Medication 50 MILLIGRAM(S): at 11:28

## 2024-10-12 RX ADMIN — Medication 125 MILLILITER(S): at 16:27

## 2024-10-12 RX ADMIN — APIXABAN 5 MILLIGRAM(S): 5 TABLET, FILM COATED ORAL at 11:29

## 2024-10-12 RX ADMIN — PANTOPRAZOLE SODIUM 40 MILLIGRAM(S): 40 TABLET, DELAYED RELEASE ORAL at 11:28

## 2024-10-12 RX ADMIN — MEROPENEM 1000 MILLIGRAM(S): 500 INJECTION INTRAVENOUS at 16:18

## 2024-10-12 RX ADMIN — CHLORHEXIDINE GLUCONATE ORAL RINSE 1 APPLICATION(S): 1.2 SOLUTION DENTAL at 11:30

## 2024-10-12 RX ADMIN — AMIODARONE HYDROCHLORIDE 100 MILLIGRAM(S): 50 INJECTION, SOLUTION INTRAVENOUS at 11:29

## 2024-10-12 RX ADMIN — HYDROCORTISONE 20 MILLIGRAM(S): 5 TABLET ORAL at 11:28

## 2024-10-12 NOTE — PROVIDER CONTACT NOTE (CRITICAL VALUE NOTIFICATION) - RECOMMENDATIONS
repeat lactate in 4 hours after IV boluses, continue IV fluids, consider Midodrine for blood pressure assistance

## 2024-10-12 NOTE — PROVIDER CONTACT NOTE (CRITICAL VALUE NOTIFICATION) - ACTION/TREATMENT ORDERED:
No new orders at this time.
MD smith.
MD Rothman aware and assessed patient at bedside. Lactate to be redrawn in 4 hours, will continue IV fluids and tylenol as needed.

## 2024-10-12 NOTE — PROGRESS NOTE ADULT - SUBJECTIVE AND OBJECTIVE BOX
Pt seen, wbc slightly elevated therefore staying in hospital, otherwise feeling ok    MEDICATIONS  (STANDING):  aMIOdarone    Tablet 100 milliGRAM(s) Oral daily  apixaban 5 milliGRAM(s) Oral every 12 hours  chlorhexidine 4% Liquid 1 Application(s) Topical <User Schedule>  hydrocortisone 10 milliGRAM(s) Oral at bedtime  hydrocortisone 20 milliGRAM(s) Oral daily  levothyroxine 100 MICROGram(s) Oral daily  meropenem Injectable 1000 milliGRAM(s) IV Push every 8 hours  metoprolol succinate ER 50 milliGRAM(s) Oral every 12 hours  pantoprazole  Injectable 40 milliGRAM(s) IV Push daily  potassium chloride  10 mEq/100 mL IVPB 10 milliEquivalent(s) IV Intermittent every 1 hour  sodium chloride 0.9% Bolus 500 milliLiter(s) IV Bolus once  sodium chloride 0.9%. 1000 milliLiter(s) (125 mL/Hr) IV Continuous <Continuous>  vancomycin  IVPB 750 milliGRAM(s) IV Intermittent every 12 hours    MEDICATIONS  (PRN):      ROS  no cp sob    Vital Signs Last 24 Hrs  T(C): 36.7 (12 Oct 2024 17:48), Max: 38.8 (12 Oct 2024 13:00)  T(F): 98 (12 Oct 2024 17:48), Max: 101.8 (12 Oct 2024 13:00)  HR: 80 (12 Oct 2024 17:48) (75 - 98)  BP: 91/63 (12 Oct 2024 17:48) (76/54 - 132/81)  BP(mean): --  RR: 18 (12 Oct 2024 17:48) (18 - 18)  SpO2: 94% (12 Oct 2024 17:48) (94% - 100%)    Parameters below as of 12 Oct 2024 17:48  Patient On (Oxygen Delivery Method): room air        PE  NAD  Awake, alert    No c/c/e  No rash grossly  FROM                          10.5   15.29 )-----------( 457      ( 12 Oct 2024 06:11 )             34.1       10-12    134[L]  |  104  |  11  ----------------------------<  114[H]  3.3[L]   |  22  |  1.01    Ca    8.3[L]      12 Oct 2024 17:05  Mg     1.9     10-11

## 2024-10-12 NOTE — PROGRESS NOTE ADULT - SUBJECTIVE AND OBJECTIVE BOX
HOSPITALIST ATTENDING PROGRESS NOTE    Chart and meds reviewed.  Patient seen and examined.    CC: Sepsis    Subjective: No acute issues overnight. BP normal. No fever    All other systems reviewed and found to be negative with the exception of what has been described above.    MEDICATIONS  (STANDING):  aMIOdarone    Tablet 100 milliGRAM(s) Oral daily  amoxicillin  500 milliGRAM(s)/clavulanate 1 Tablet(s) Oral two times a day  apixaban 5 milliGRAM(s) Oral every 12 hours  chlorhexidine 4% Liquid 1 Application(s) Topical <User Schedule>  ertapenem  IVPB 1000 milliGRAM(s) IV Intermittent every 24 hours  hydrocortisone 10 milliGRAM(s) Oral at bedtime  hydrocortisone 20 milliGRAM(s) Oral daily  levothyroxine 100 MICROGram(s) Oral daily  metoprolol succinate ER 50 milliGRAM(s) Oral every 12 hours  pantoprazole  Injectable 40 milliGRAM(s) IV Push daily    MEDICATIONS  (PRN):      Vital Signs Last 24 Hrs  T(C): 37.2 (12 Oct 2024 09:00), Max: 37.2 (12 Oct 2024 09:00)  T(F): 99 (12 Oct 2024 09:00), Max: 99 (12 Oct 2024 09:00)  HR: 93 (12 Oct 2024 09:00) (75 - 102)  BP: 132/81 (12 Oct 2024 09:00) (97/70 - 132/81)  RR: 18 (12 Oct 2024 09:00) (18 - 18)  SpO2: 98% (12 Oct 2024 09:00) (97% - 99%)    Parameters below as of 12 Oct 2024 09:00  Patient On (Oxygen Delivery Method): room air    GEN: :NAD  HEENT:  pupils equal and reactive, EOMI, no oropharyngeal lesions, erythema, exudates, oral thrush  NECK:   supple, no carotid bruits  CV:  +S1, +S2, regular, no murmurs  RESP:   lungs clear to auscultation bilaterally, no wheezing, rales, rhonchi, good air entry bilaterally  GI:  abdomen soft, non-tender, non-distended, normal BS + SUBHA drain  EXT:  no clubbing, no cyanosis, no edema, no calf pain, swelling or erythema  NEURO:  AAOX3, no focal neurological deficits, follows all commands, able to move extremities spontaneously  SKIN:  no ulcers, lesions or rashes    LABS:                          10.5   15.29 )-----------( 457      ( 12 Oct 2024 06:11 )             34.1     10-12    138  |  105  |  12  ----------------------------<  72  3.9   |  27  |  0.62    Ca    9.2      12 Oct 2024 06:11  Mg     1.9     10-11      Urinalysis Basic - ( 12 Oct 2024 06:11 )  Color: x / Appearance: x / SG: x / pH: x  Gluc: 72 mg/dL / Ketone: x  / Bili: x / Urobili: x   Blood: x / Protein: x / Nitrite: x   Leuk Esterase: x / RBC: x / WBC x   Sq Epi: x / Non Sq Epi: x / Bacteria: x     HOSPITALIST ATTENDING PROGRESS NOTE    Chart and meds reviewed.  Patient seen and examined.    CC: Sepsis    Subjective: No acute issues overnight. BP normal. Fever this afternoon    All other systems reviewed and found to be negative with the exception of what has been described above.    MEDICATIONS  (STANDING):  aMIOdarone    Tablet 100 milliGRAM(s) Oral daily  amoxicillin  500 milliGRAM(s)/clavulanate 1 Tablet(s) Oral two times a day  apixaban 5 milliGRAM(s) Oral every 12 hours  chlorhexidine 4% Liquid 1 Application(s) Topical <User Schedule>  ertapenem  IVPB 1000 milliGRAM(s) IV Intermittent every 24 hours  hydrocortisone 10 milliGRAM(s) Oral at bedtime  hydrocortisone 20 milliGRAM(s) Oral daily  levothyroxine 100 MICROGram(s) Oral daily  metoprolol succinate ER 50 milliGRAM(s) Oral every 12 hours  pantoprazole  Injectable 40 milliGRAM(s) IV Push daily    MEDICATIONS  (PRN):      Vital Signs Last 24 Hrs  T(C): 37.2 (12 Oct 2024 09:00), Max: 37.2 (12 Oct 2024 09:00)  T(F): 99 (12 Oct 2024 09:00), Max: 99 (12 Oct 2024 09:00)  HR: 93 (12 Oct 2024 09:00) (75 - 102)  BP: 132/81 (12 Oct 2024 09:00) (97/70 - 132/81)  RR: 18 (12 Oct 2024 09:00) (18 - 18)  SpO2: 98% (12 Oct 2024 09:00) (97% - 99%)    Parameters below as of 12 Oct 2024 09:00  Patient On (Oxygen Delivery Method): room air    GEN: :NAD  HEENT:  pupils equal and reactive, EOMI, no oropharyngeal lesions, erythema, exudates, oral thrush  NECK:   supple, no carotid bruits  CV:  +S1, +S2, regular, no murmurs  RESP:   lungs clear to auscultation bilaterally, no wheezing, rales, rhonchi, good air entry bilaterally  GI:  abdomen soft, non-tender, non-distended, normal BS + SUBHA drain  EXT:  no clubbing, no cyanosis, no edema, no calf pain, swelling or erythema  NEURO:  AAOX3, no focal neurological deficits, follows all commands, able to move extremities spontaneously  SKIN:  no ulcers, lesions or rashes    LABS:                          10.5   15.29 )-----------( 457      ( 12 Oct 2024 06:11 )             34.1     10-12    138  |  105  |  12  ----------------------------<  72  3.9   |  27  |  0.62    Ca    9.2      12 Oct 2024 06:11  Mg     1.9     10-11      Urinalysis Basic - ( 12 Oct 2024 06:11 )  Color: x / Appearance: x / SG: x / pH: x  Gluc: 72 mg/dL / Ketone: x  / Bili: x / Urobili: x   Blood: x / Protein: x / Nitrite: x   Leuk Esterase: x / RBC: x / WBC x   Sq Epi: x / Non Sq Epi: x / Bacteria: x

## 2024-10-12 NOTE — PROGRESS NOTE ADULT - ASSESSMENT
RCC IV/ papillary with liver metastases : Unknown primary  S/P Liver directed therapies , prior TKI/ immune therapy ( MSI-H) and recently Tivolizumab + Nivolumab  Multiple septic episodes/ Now with source likely from abscess  Improved on antibiotics  Anemia / multifactorial    PLAN    Antibiotics  Hydration  S/p IR guided drain placement  S/p CBD stent exchange  physical therapy  DVT prophylaxis  Monitor counts  Will hold on therapy ( TKI ) for now  repeat ct planned for today    Thank you for the courtesy of this consultation and we will continue to follow.    Dusty Marcial MD  [facility]  Cell: [phone]

## 2024-10-12 NOTE — PROGRESS NOTE ADULT - ASSESSMENT
78yo F with PMH: Renal Cell Carcinoma with mets to liver, IR embolization of a liver lesion on 7/30 Diverticulitis, HTN, HLD, Hypothyroid was recently, recent new onset Afib/RVR in August 2024 started on Eliquis, GI Bleed after ERCP- resolved without intervention, started on eliquis during admit for Afib. She was found to have a liver abscess with sepsis.     # Septic shock secondary to Hepatic abscess  - Off Pressors, downgrade to MS floor   - SP IR drain, monitor output   - ID on board  - FU cultures  - SP meropenem and vancomycin  - To be DC home on augmentin and ertapenem to complete till 11/4   - Repeat CT scans show multiple abscess  - SP Replacement of SUBHA drain to drain additional abscess  - WBC 13  - FU IR recommendations and ID  - Slight hypotension and tacycardia 10/11  - WBC still elevated, repeat CT 10/12    # Adrenal insufficiency  - Continue hydrocortisone     # P. Afib  - Stable  - Continue eliquis  - Continue metoprolol  - Continue amiodarone    # Gerd  - Continue protonix    # Hypothyroid  - Stable  - Continue levtothyroxine    # DVT prophylaxis  - Continue eliquis    80yo F with PMH: Renal Cell Carcinoma with mets to liver, IR embolization of a liver lesion on 7/30 Diverticulitis, HTN, HLD, Hypothyroid was recently, recent new onset Afib/RVR in August 2024 started on Eliquis, GI Bleed after ERCP- resolved without intervention, started on eliquis during admit for Afib. She was found to have a liver abscess with sepsis.     # Septic shock secondary to Hepatic abscess  - Off Pressors, downgrade to MS floor   - SP IR drain, monitor output   - ID on board  - FU cultures  - SP meropenem and vancomycin  - To be DC home on augmentin and ertapenem to complete till 11/4   - Repeat CT scans show multiple abscess  - SP Replacement of SUBHA drain to drain additional abscess  - WBC 13  - FU IR recommendations and ID  - Slight hypotension and tacycardia 10/11  - WBC still elevated, and febrile repeat CT 10/12  - Start Vanco to expand coverage, ID consult   - Check blood cultures    # Adrenal insufficiency  - Continue hydrocortisone     # P. Afib  - Stable  - Continue eliquis  - Continue metoprolol  - Continue amiodarone    # Gerd  - Continue protonix    # Hypothyroid  - Stable  - Continue levtothyroxine    # DVT prophylaxis  - Continue eliquis    80yo F with PMH: Renal Cell Carcinoma with mets to liver, IR embolization of a liver lesion on 7/30 Diverticulitis, HTN, HLD, Hypothyroid was recently, recent new onset Afib/RVR in August 2024 started on Eliquis, GI Bleed after ERCP- resolved without intervention, started on eliquis during admit for Afib. She was found to have a liver abscess with sepsis.     # Septic shock secondary to Hepatic abscess  - Off Pressors, downgrade to MS floor   - SP IR drain, monitor output   - ID on board  - FU cultures  - SP meropenem and vancomycin  - To be DC home on augmentin and ertapenem to complete till 11/4   - Repeat CT scans show multiple abscess  - SP Replacement of SUBHA drain to drain additional abscess  - WBC 13  - FU IR recommendations and ID  - Slight hypotension and tacycardia 10/11  - WBC still elevated, and febrile repeat CT 10/12  - Restart Vanco and meropenem  to expand coverage  - ID on board  - Check blood cultures    # Adrenal insufficiency  - Continue hydrocortisone     # P. Afib  - Stable  - Continue eliquis  - Continue metoprolol  - Continue amiodarone    # Gerd  - Continue protonix    # Hypothyroid  - Stable  - Continue levtothyroxine    # DVT prophylaxis  - Continue eliquis    80yo F with PMH: Renal Cell Carcinoma with mets to liver, IR embolization of a liver lesion on 7/30 Diverticulitis, HTN, HLD, Hypothyroid was recently, recent new onset Afib/RVR in August 2024 started on Eliquis, GI Bleed after ERCP- resolved without intervention, started on eliquis during admit for Afib. She was found to have a liver abscess with sepsis.     # Septic shock secondary to Hepatic abscess  - Off Pressors, downgrade to MS floor   - SP IR drain, monitor output   - FU cultures: Klebsiella and Enterobacter  - SP meropenem and vancomycin  - Repeat CT scans show multiple abscess  - SP Replacement of SUBHA drain to drain additional abscess  - FU IR recommendations and ID  - WBC still elevated, and febrile repeat CT 10/12  - Restart Vanco and meropenem  to expand coverage  - ID on board  - Check blood cultures    # Adrenal insufficiency  - Continue hydrocortisone     # P. Afib  - Stable  - Continue eliquis  - Continue metoprolol  - Continue amiodarone    # Gerd  - Continue protonix    # Hypothyroid  - Stable  - Continue levtothyroxine    # DVT prophylaxis  - Continue eliquis    78yo F with PMH: Renal Cell Carcinoma with mets to liver, IR embolization of a liver lesion on 7/30 Diverticulitis, HTN, HLD, Hypothyroid was recently, recent new onset Afib/RVR in August 2024 started on Eliquis, GI Bleed after ERCP- resolved without intervention, started on eliquis during admit for Afib. She was found to have a liver abscess with sepsis.     # Septic shock secondary to Hepatic abscess  - Off Pressors, downgrade to MS floor   - SP IR drain, monitor output   - FU cultures: Klebsiella and Enterobacter  - SP meropenem and vancomycin  - Repeat CT scans show multiple abscess  - SP Replacement of SUBHA drain to drain additional abscess  - FU IR recommendations and ID  - WBC still elevated, and febrile repeat CT 10/12  - Restart Vanco and meropenem  to expand coverage  - ID on board  - Check blood cultures    # Adrenal insufficiency  - Continue hydrocortisone  - Blood pressure low this afternoon  - May need stress dose steroids  - Bolus  followed by 125/hour    # P. Afib  - Stable  - Continue eliquis  - Continue metoprolol  - Continue amiodarone    # Gerd  - Continue protonix    # Hypothyroid  - Stable  - Continue levtothyroxine    # DVT prophylaxis  - Continue eliquis    80yo F with PMH: Renal Cell Carcinoma with mets to liver, IR embolization of a liver lesion on 7/30 Diverticulitis, HTN, HLD, Hypothyroid was recently, recent new onset Afib/RVR in August 2024 started on Eliquis, GI Bleed after ERCP- resolved without intervention, started on eliquis during admit for Afib. She was found to have a liver abscess with sepsis.     # Septic shock secondary to Hepatic abscess  - Off Pressors, downgrade to MS floor   - SP IR drain, monitor output   - FU cultures: Klebsiella and Enterobacter  - SP meropenem and vancomycin  - Repeat CT scans show multiple abscess  - SP Replacement of SUBHA drain to drain additional abscess  - FU IR recommendations and ID  - WBC still elevated, and febrile repeat CT 10/12  - Restart Vanco and meropenem  to expand coverage  - ID on board  - Check blood cultures    # Adrenal insufficiency  - Continue hydrocortisone  - Blood pressure low this afternoon  - May need stress dose steroids  - Bolus NS 1000 followed by 125/hour    # P. Afib  - Stable  - Continue eliquis  - Continue metoprolol  - Continue amiodarone    # Gerd  - Continue protonix    # Hypothyroid  - Stable  - Continue levtothyroxine    # DVT prophylaxis  - Continue eliquis    78yo F with PMH: Renal Cell Carcinoma with mets to liver, IR embolization of a liver lesion on 7/30 Diverticulitis, HTN, HLD, Hypothyroid was recently, recent new onset Afib/RVR in August 2024 started on Eliquis, GI Bleed after ERCP- resolved without intervention, started on eliquis during admit for Afib. She was found to have a liver abscess with sepsis.     # Septic shock secondary to Hepatic abscess  - Off Pressors, downgrade to MS floor   - SP IR drain, monitor output   - FU cultures: Klebsiella and Enterobacter  - SP meropenem and vancomycin  - Repeat CT scans show multiple abscess  - SP Replacement of SUBHA drain to drain additional abscess  - FU IR recommendations and ID  - WBC still elevated, and febrile repeat CT 10/12  - Restart Vanco and meropenem  to expand coverage  - ID on board  - Check blood cultures    # Adrenal insufficiency  - Continue hydrocortisone  - Blood pressure low this afternoon  - May need stress dose steroids  - Check lactate  - Bolus NS 1000 followed by 125/hour    # P. Afib  - Stable  - Continue eliquis  - Continue metoprolol  - Continue amiodarone    # Gerd  - Continue protonix    # Hypothyroid  - Stable  - Continue levtothyroxine    # DVT prophylaxis  - Continue eliquis    78yo F with PMH: Renal Cell Carcinoma with mets to liver, IR embolization of a liver lesion on 7/30 Diverticulitis, HTN, HLD, Hypothyroid was recently, recent new onset Afib/RVR in August 2024 started on Eliquis, GI Bleed after ERCP- resolved without intervention, started on eliquis during admit for Afib. She was found to have a liver abscess with sepsis.     # Septic shock secondary to Hepatic abscess  - Off Pressors, downgrade to MS floor   - SP IR drain, monitor output   - FU cultures: Klebsiella and Enterobacter  - SP meropenem and vancomycin  - Repeat CT scans show multiple abscess  - SP Replacement of SUBHA drain to drain additional abscess  - FU IR recommendations and ID  - WBC still elevated, and febrile  -  repeat CT 10/12  - Restart Vanco and meropenem  to expand coverage  - ID on board  - Check blood cultures  - Lactate 2.8, Fluid bolus followed by maintenance  - Check lactate at 8    # Adrenal insufficiency  - Continue hydrocortisone  - Blood pressure low this afternoon  - May need stress dose steroids    # P. Afib  - Stable  - Continue eliquis  - Continue metoprolol  - Continue amiodarone    # Gerd  - Continue protonix    # Hypothyroid  - Stable  - Continue levtothyroxine    # DVT prophylaxis  - Continue eliquis

## 2024-10-12 NOTE — PROVIDER CONTACT NOTE (CRITICAL VALUE NOTIFICATION) - TEST AND RESULT REPORTED:
Abscess culture 10/9/24 gram stain moderate polymorphonuclear leukocytes per low power field, rare gram positive cocci in pairs per oil power field
blood cultures gram pos rods, gram neg rods. gram pos cocci pairs
lactate 8.1
Culture-Abscess from 9/30/24
elevated lactate

## 2024-10-13 LAB
ALBUMIN SERPL ELPH-MCNC: 1.7 G/DL — LOW (ref 3.3–5)
ALP SERPL-CCNC: 396 U/L — HIGH (ref 40–120)
ALT FLD-CCNC: 169 U/L — HIGH (ref 12–78)
ANION GAP SERPL CALC-SCNC: 4 MMOL/L — LOW (ref 5–17)
AST SERPL-CCNC: 141 U/L — HIGH (ref 15–37)
BILIRUB SERPL-MCNC: 0.6 MG/DL — SIGNIFICANT CHANGE UP (ref 0.2–1.2)
BUN SERPL-MCNC: 9 MG/DL — SIGNIFICANT CHANGE UP (ref 7–23)
CALCIUM SERPL-MCNC: 8.3 MG/DL — LOW (ref 8.5–10.1)
CHLORIDE SERPL-SCNC: 108 MMOL/L — SIGNIFICANT CHANGE UP (ref 96–108)
CO2 SERPL-SCNC: 25 MMOL/L — SIGNIFICANT CHANGE UP (ref 22–31)
CREAT SERPL-MCNC: 0.6 MG/DL — SIGNIFICANT CHANGE UP (ref 0.5–1.3)
EGFR: 92 ML/MIN/1.73M2 — SIGNIFICANT CHANGE UP
GLUCOSE SERPL-MCNC: 91 MG/DL — SIGNIFICANT CHANGE UP (ref 70–99)
HCT VFR BLD CALC: 30.3 % — LOW (ref 34.5–45)
HGB BLD-MCNC: 9.4 G/DL — LOW (ref 11.5–15.5)
MCHC RBC-ENTMCNC: 26.9 PG — LOW (ref 27–34)
MCHC RBC-ENTMCNC: 31 GM/DL — LOW (ref 32–36)
MCV RBC AUTO: 86.6 FL — SIGNIFICANT CHANGE UP (ref 80–100)
PLATELET # BLD AUTO: 421 K/UL — HIGH (ref 150–400)
POTASSIUM SERPL-MCNC: 4 MMOL/L — SIGNIFICANT CHANGE UP (ref 3.5–5.3)
POTASSIUM SERPL-SCNC: 4 MMOL/L — SIGNIFICANT CHANGE UP (ref 3.5–5.3)
PROT SERPL-MCNC: 5.5 GM/DL — LOW (ref 6–8.3)
RBC # BLD: 3.5 M/UL — LOW (ref 3.8–5.2)
RBC # FLD: 18.6 % — HIGH (ref 10.3–14.5)
SODIUM SERPL-SCNC: 137 MMOL/L — SIGNIFICANT CHANGE UP (ref 135–145)
WBC # BLD: 16.98 K/UL — HIGH (ref 3.8–10.5)
WBC # FLD AUTO: 16.98 K/UL — HIGH (ref 3.8–10.5)

## 2024-10-13 PROCEDURE — 99233 SBSQ HOSP IP/OBS HIGH 50: CPT

## 2024-10-13 RX ADMIN — HYDROCORTISONE 10 MILLIGRAM(S): 5 TABLET ORAL at 22:42

## 2024-10-13 RX ADMIN — Medication 125 MILLILITER(S): at 18:22

## 2024-10-13 RX ADMIN — AMIODARONE HYDROCHLORIDE 100 MILLIGRAM(S): 50 INJECTION, SOLUTION INTRAVENOUS at 11:00

## 2024-10-13 RX ADMIN — HYDROCORTISONE 20 MILLIGRAM(S): 5 TABLET ORAL at 11:00

## 2024-10-13 RX ADMIN — Medication 125 MILLILITER(S): at 01:10

## 2024-10-13 RX ADMIN — MEROPENEM 1000 MILLIGRAM(S): 500 INJECTION INTRAVENOUS at 01:11

## 2024-10-13 RX ADMIN — APIXABAN 5 MILLIGRAM(S): 5 TABLET, FILM COATED ORAL at 11:00

## 2024-10-13 RX ADMIN — VANCOMYCIN HCL-SODIUM CHLORIDE IV SOLN 1.5 GM/250ML-0.9% 250 MILLIGRAM(S): 1.5-0.9/25 SOLUTION at 22:42

## 2024-10-13 RX ADMIN — Medication 100 MICROGRAM(S): at 05:36

## 2024-10-13 RX ADMIN — Medication 125 MILLILITER(S): at 10:57

## 2024-10-13 RX ADMIN — MEROPENEM 1000 MILLIGRAM(S): 500 INJECTION INTRAVENOUS at 10:57

## 2024-10-13 RX ADMIN — MEROPENEM 1000 MILLIGRAM(S): 500 INJECTION INTRAVENOUS at 18:22

## 2024-10-13 RX ADMIN — VANCOMYCIN HCL-SODIUM CHLORIDE IV SOLN 1.5 GM/250ML-0.9% 250 MILLIGRAM(S): 1.5-0.9/25 SOLUTION at 11:00

## 2024-10-13 RX ADMIN — Medication 50 MILLIGRAM(S): at 11:00

## 2024-10-13 RX ADMIN — PANTOPRAZOLE SODIUM 40 MILLIGRAM(S): 40 TABLET, DELAYED RELEASE ORAL at 10:58

## 2024-10-13 RX ADMIN — CHLORHEXIDINE GLUCONATE ORAL RINSE 1 APPLICATION(S): 1.2 SOLUTION DENTAL at 11:00

## 2024-10-13 RX ADMIN — APIXABAN 5 MILLIGRAM(S): 5 TABLET, FILM COATED ORAL at 22:42

## 2024-10-13 NOTE — PROGRESS NOTE ADULT - SUBJECTIVE AND OBJECTIVE BOX
HOSPITALIST ATTENDING PROGRESS NOTE    Chart and meds reviewed.  Patient seen and examined.    CC: Hepatic abscess    Subjective: Feeling better this am. No fever this am.     All other systems reviewed and found to be negative with the exception of what has been described above.    MEDICATIONS  (STANDING):  aMIOdarone    Tablet 100 milliGRAM(s) Oral daily  apixaban 5 milliGRAM(s) Oral every 12 hours  chlorhexidine 4% Liquid 1 Application(s) Topical <User Schedule>  hydrocortisone 10 milliGRAM(s) Oral at bedtime  hydrocortisone 20 milliGRAM(s) Oral daily  levothyroxine 100 MICROGram(s) Oral daily  meropenem Injectable 1000 milliGRAM(s) IV Push every 8 hours  metoprolol succinate ER 50 milliGRAM(s) Oral every 12 hours  pantoprazole  Injectable 40 milliGRAM(s) IV Push daily  sodium chloride 0.9%. 1000 milliLiter(s) (125 mL/Hr) IV Continuous <Continuous>  vancomycin  IVPB 750 milliGRAM(s) IV Intermittent every 12 hours    MEDICATIONS  (PRN):   Vital Signs Last 24 Hrs  T(C): 36.5 (13 Oct 2024 09:02), Max: 38.8 (12 Oct 2024 13:00)  T(F): 97.7 (13 Oct 2024 09:02), Max: 101.8 (12 Oct 2024 13:00)  HR: 81 (13 Oct 2024 09:02) (71 - 98)  BP: 124/79 (13 Oct 2024 09:02) (76/54 - 124/79)  BP(mean): --  RR: 18 (13 Oct 2024 09:02) (18 - 18)  SpO2: 98% (13 Oct 2024 09:02) (94% - 100%)    Parameters below as of 13 Oct 2024 09:02  Patient On (Oxygen Delivery Method): room air    GEN: Frail  HEENT:  pupils equal and reactive, EOMI, no oropharyngeal lesions, erythema, exudates, oral thrush  NECK:   supple, no carotid bruits  CV:  +S1, +S2, regular, no murmurs  RESP:   lungs clear to auscultation bilaterally, no wheezing, rales, rhonchi, good air entry bilaterally  GI:  abdomen soft, non-tender, non-distended, normal BS + SUBHA   EXT:  no clubbing, no cyanosis, no edema, no calf pain, swelling or erythema  NEURO:  AAOX3, no focal neurological deficits, follows all commands, able to move extremities spontaneously  SKIN:  no ulcers, lesions or rashes    LABS:                          9.4    16.98 )-----------( 421      ( 13 Oct 2024 06:34 )             30.3     10-13    137  |  108  |  9   ----------------------------<  91  4.0   |  25  |  0.60    Ca    8.3[L]      13 Oct 2024 06:34    TPro  5.5[L]  /  Alb  1.7[L]  /  TBili  0.6  /  DBili  x   /  AST  141[H]  /  ALT  169[H]  /  AlkPhos  396[H]  10-13    LIVER FUNCTIONS - ( 13 Oct 2024 06:34 )  Alb: 1.7 g/dL / Pro: 5.5 gm/dL / ALK PHOS: 396 U/L / ALT: 169 U/L / AST: 141 U/L / GGT: x           Urinalysis Basic - ( 13 Oct 2024 06:34 )  Color: x / Appearance: x / SG: x / pH: x  Gluc: 91 mg/dL / Ketone: x  / Bili: x / Urobili: x   Blood: x / Protein: x / Nitrite: x   Leuk Esterase: x / RBC: x / WBC x   Sq Epi: x / Non Sq Epi: x / Bacteria: x    Lactate, Blood: 1.4 mmol/L (10-12 @ 20:52)  Lactate, Blood: 2.8 mmol/L (10-12 @ 17:05)      Troponin Trend: Lactate, Blood: 1.4 mmol/L (10-12 @ 20:52)  Lactate, Blood: 2.8 mmol/L (10-12 @ 17:05)     CT Abdomen w/ IV Cont (10.12.24 @ 11:02) >  IMPRESSION:  Interval replacement of the segment 8 percutaneous right hepatic drain   with grossly similar appearance of a complex collection measuring   approximately 6.0 x 6.9 cm. There is a heterogeneous component   posteriorly that appear to represent tumor on the comparison MRI.    There is a second heterogeneous collection measuring approximately 5.1 x   5.9 cm centered within segments 5 and 8 of the liver that appears to be   in continuity with the gallbladder versus a collection within the   gallbladder fossa if there is a prior history of cholecystectomy. There   appears to be focal thickening and hyperenhancement of the cystic duct   and gallbladder neck.    The additional hepatic metastases are somewhat suboptimally assessed and   were better seen on the comparison MRI.

## 2024-10-13 NOTE — PROGRESS NOTE ADULT - SUBJECTIVE AND OBJECTIVE BOX
Pt seen, resting, ct with multiple hepatic abscesses    MEDICATIONS  (STANDING):  aMIOdarone    Tablet 100 milliGRAM(s) Oral daily  apixaban 5 milliGRAM(s) Oral every 12 hours  chlorhexidine 4% Liquid 1 Application(s) Topical <User Schedule>  hydrocortisone 10 milliGRAM(s) Oral at bedtime  hydrocortisone 20 milliGRAM(s) Oral daily  levothyroxine 100 MICROGram(s) Oral daily  meropenem Injectable 1000 milliGRAM(s) IV Push every 8 hours  metoprolol succinate ER 50 milliGRAM(s) Oral every 12 hours  pantoprazole  Injectable 40 milliGRAM(s) IV Push daily  sodium chloride 0.9%. 1000 milliLiter(s) (125 mL/Hr) IV Continuous <Continuous>  vancomycin  IVPB 750 milliGRAM(s) IV Intermittent every 12 hours    MEDICATIONS  (PRN):      ROS  + fever      Vital Signs Last 24 Hrs  T(C): 36.5 (13 Oct 2024 09:02), Max: 38.8 (12 Oct 2024 13:00)  T(F): 97.7 (13 Oct 2024 09:02), Max: 101.8 (12 Oct 2024 13:00)  HR: 81 (13 Oct 2024 09:02) (71 - 98)  BP: 124/79 (13 Oct 2024 09:02) (76/54 - 124/79)  BP(mean): --  RR: 18 (13 Oct 2024 09:02) (18 - 18)  SpO2: 98% (13 Oct 2024 09:02) (94% - 100%)    Parameters below as of 13 Oct 2024 09:02  Patient On (Oxygen Delivery Method): room air        PE  NAD  Awake, alert  Anicteric, MMM  RRR  CTAB  Abd soft, NT, ND  No c/c/e  No rash grossly  FROM                          9.4    16.98 )-----------( 421      ( 13 Oct 2024 06:34 )             30.3       10-13    137  |  108  |  9   ----------------------------<  91  4.0   |  25  |  0.60    Ca    8.3[L]      13 Oct 2024 06:34    TPro  5.5[L]  /  Alb  1.7[L]  /  TBili  0.6  /  DBili  x   /  AST  141[H]  /  ALT  169[H]  /  AlkPhos  396[H]  10-13

## 2024-10-13 NOTE — PROGRESS NOTE ADULT - ASSESSMENT
RCC IV/ papillary with liver metastases : Unknown primary  S/P Liver directed therapies , prior TKI/ immune therapy ( MSI-H) and recently Tivolizumab + Nivolumab  Multiple septic episodes/ Now with source likely from abscess  Improved on antibiotics  Anemia / multifactorial    PLAN    Antibiotics  Hydration  S/p IR guided drain placement  S/p CBD stent exchange  physical therapy  DVT prophylaxis  Monitor counts  Will hold on therapy ( TKI ) for now  Repeat CT reviewed will share with Dr Brito from [facility]    Thank you for the courtesy of this consultation and we will continue to follow.    Dusty Marcial MD  [facility]  Cell: [phone]

## 2024-10-13 NOTE — PROGRESS NOTE ADULT - ASSESSMENT
78yo F with PMH: Renal Cell Carcinoma with mets to liver, IR embolization of a liver lesion on 7/30 Diverticulitis, HTN, HLD, Hypothyroid was recently, recent new onset Afib/RVR in August 2024 started on Eliquis, GI Bleed after ERCP- resolved without intervention, started on eliquis during admit for Afib. She was found to have a liver abscess with sepsis.     # Septic shock secondary to Hepatic abscess  - Off Pressors, downgrade to MS floor   - SP IR drain, monitor output   - FU cultures: Klebsiella and Enterobacter  - SP meropenem and vancomycin  - Repeat CT scans show multiple abscess  - SP Replacement of SUBHA drain to drain additional abscess  - FU IR recommendations and ID  - WBC still elevated, and febrile  -  repeat CT 10/12  - Restart Vanco and meropenem  to expand coverage  - ID on board  - FU  blood cultures  - Lactate 2.8, Fluid bolus followed by maintenance  - Worsening LFTS due to abscess/Tumor burder  - Palliative follow up for GOC    # Adrenal insufficiency  - Continue hydrocortisone    # P. Afib  - Stable  - Continue eliquis  - Continue metoprolol  - Continue amiodarone    # Gerd  - Continue protonix    # Hypothyroid  - Stable  - Continue levothyroine     # DVT prophylaxis  - Continue eliquis

## 2024-10-14 DIAGNOSIS — C64.9 MALIGNANT NEOPLASM OF UNSPECIFIED KIDNEY, EXCEPT RENAL PELVIS: ICD-10-CM

## 2024-10-14 LAB
ALBUMIN SERPL ELPH-MCNC: 1.6 G/DL — LOW (ref 3.3–5)
ALP SERPL-CCNC: 328 U/L — HIGH (ref 40–120)
ALT FLD-CCNC: 110 U/L — HIGH (ref 12–78)
ANION GAP SERPL CALC-SCNC: 3 MMOL/L — LOW (ref 5–17)
AST SERPL-CCNC: 61 U/L — HIGH (ref 15–37)
BILIRUB SERPL-MCNC: 0.5 MG/DL — SIGNIFICANT CHANGE UP (ref 0.2–1.2)
BUN SERPL-MCNC: 6 MG/DL — LOW (ref 7–23)
CALCIUM SERPL-MCNC: 8.5 MG/DL — SIGNIFICANT CHANGE UP (ref 8.5–10.1)
CHLORIDE SERPL-SCNC: 109 MMOL/L — HIGH (ref 96–108)
CO2 SERPL-SCNC: 26 MMOL/L — SIGNIFICANT CHANGE UP (ref 22–31)
CREAT SERPL-MCNC: 0.55 MG/DL — SIGNIFICANT CHANGE UP (ref 0.5–1.3)
CULTURE RESULTS: ABNORMAL
EGFR: 94 ML/MIN/1.73M2 — SIGNIFICANT CHANGE UP
GLUCOSE SERPL-MCNC: 85 MG/DL — SIGNIFICANT CHANGE UP (ref 70–99)
HCT VFR BLD CALC: 29 % — LOW (ref 34.5–45)
HGB BLD-MCNC: 9 G/DL — LOW (ref 11.5–15.5)
MCHC RBC-ENTMCNC: 26.6 PG — LOW (ref 27–34)
MCHC RBC-ENTMCNC: 31 GM/DL — LOW (ref 32–36)
MCV RBC AUTO: 85.8 FL — SIGNIFICANT CHANGE UP (ref 80–100)
ORGANISM # SPEC MICROSCOPIC CNT: ABNORMAL
ORGANISM # SPEC MICROSCOPIC CNT: ABNORMAL
ORGANISM # SPEC MICROSCOPIC CNT: SIGNIFICANT CHANGE UP
PLATELET # BLD AUTO: 469 K/UL — HIGH (ref 150–400)
POTASSIUM SERPL-MCNC: 3.6 MMOL/L — SIGNIFICANT CHANGE UP (ref 3.5–5.3)
POTASSIUM SERPL-SCNC: 3.6 MMOL/L — SIGNIFICANT CHANGE UP (ref 3.5–5.3)
PROT SERPL-MCNC: 5.3 GM/DL — LOW (ref 6–8.3)
RBC # BLD: 3.38 M/UL — LOW (ref 3.8–5.2)
RBC # FLD: 18.8 % — HIGH (ref 10.3–14.5)
SODIUM SERPL-SCNC: 138 MMOL/L — SIGNIFICANT CHANGE UP (ref 135–145)
SPECIMEN SOURCE: SIGNIFICANT CHANGE UP
WBC # BLD: 10.65 K/UL — HIGH (ref 3.8–10.5)
WBC # FLD AUTO: 10.65 K/UL — HIGH (ref 3.8–10.5)

## 2024-10-14 PROCEDURE — 99233 SBSQ HOSP IP/OBS HIGH 50: CPT

## 2024-10-14 PROCEDURE — 99222 1ST HOSP IP/OBS MODERATE 55: CPT

## 2024-10-14 RX ADMIN — HYDROCORTISONE 10 MILLIGRAM(S): 5 TABLET ORAL at 21:19

## 2024-10-14 RX ADMIN — VANCOMYCIN HCL-SODIUM CHLORIDE IV SOLN 1.5 GM/250ML-0.9% 250 MILLIGRAM(S): 1.5-0.9/25 SOLUTION at 09:07

## 2024-10-14 RX ADMIN — PANTOPRAZOLE SODIUM 40 MILLIGRAM(S): 40 TABLET, DELAYED RELEASE ORAL at 09:05

## 2024-10-14 RX ADMIN — MEROPENEM 1000 MILLIGRAM(S): 500 INJECTION INTRAVENOUS at 03:20

## 2024-10-14 RX ADMIN — Medication 50 MILLIGRAM(S): at 09:06

## 2024-10-14 RX ADMIN — Medication 50 MILLIGRAM(S): at 21:18

## 2024-10-14 RX ADMIN — APIXABAN 5 MILLIGRAM(S): 5 TABLET, FILM COATED ORAL at 09:06

## 2024-10-14 RX ADMIN — VANCOMYCIN HCL-SODIUM CHLORIDE IV SOLN 1.5 GM/250ML-0.9% 250 MILLIGRAM(S): 1.5-0.9/25 SOLUTION at 21:18

## 2024-10-14 RX ADMIN — HYDROCORTISONE 20 MILLIGRAM(S): 5 TABLET ORAL at 09:05

## 2024-10-14 RX ADMIN — Medication 125 MILLILITER(S): at 09:00

## 2024-10-14 RX ADMIN — MEROPENEM 1000 MILLIGRAM(S): 500 INJECTION INTRAVENOUS at 18:11

## 2024-10-14 RX ADMIN — Medication 125 MILLILITER(S): at 01:38

## 2024-10-14 RX ADMIN — Medication 100 MICROGRAM(S): at 06:10

## 2024-10-14 RX ADMIN — Medication 125 MILLILITER(S): at 18:11

## 2024-10-14 RX ADMIN — AMIODARONE HYDROCHLORIDE 100 MILLIGRAM(S): 50 INJECTION, SOLUTION INTRAVENOUS at 09:05

## 2024-10-14 RX ADMIN — APIXABAN 5 MILLIGRAM(S): 5 TABLET, FILM COATED ORAL at 21:18

## 2024-10-14 RX ADMIN — CHLORHEXIDINE GLUCONATE ORAL RINSE 1 APPLICATION(S): 1.2 SOLUTION DENTAL at 09:06

## 2024-10-14 RX ADMIN — MEROPENEM 1000 MILLIGRAM(S): 500 INJECTION INTRAVENOUS at 11:06

## 2024-10-14 NOTE — PROGRESS NOTE ADULT - ASSESSMENT
80yo F with PMH: Renal Cell Carcinoma with mets to liver, IR embolization of a liver lesion on 7/30 Diverticulitis, HTN, HLD, Hypothyroid was recently, recent new onset Afib/RVR in August 2024 started on Eliquis, GI Bleed after ERCP- resolved without intervention, started on eliquis during admit for Afib. She was found to have a liver abscess with sepsis.     # Septic shock secondary to Hepatic abscess  - Off Pressors, downgrade to MS floor   - SP IR drain, monitor output   - FU cultures: Klebsiella and Enterobacter  - SP meropenem and vancomycin  - Repeat CT scans show multiple abscess  - SP Replacement of SUBHA drain to drain additional abscess  - FU IR recommendations and ID  - WBC still elevated, and febrile  -  repeat CT 10/12  - Restart Vanco and meropenem  to expand coverage  - ID on board  - FU  blood cultures  - Lactate 2.8, Fluid bolus followed by maintenance  - Worsening LFTS due to abscess/Tumor burder  - Palliative follow up for GOC    # Adrenal insufficiency  - Continue hydrocortisone    # P. Afib  - Stable  - Continue eliquis  - Continue metoprolol  - Continue amiodarone    # Gerd  - Continue protonix    # Hypothyroid  - Stable  - Continue levothyroine     # DVT prophylaxis  - Continue eliquis     # FU IR recs on tuesday, DC planning for possible home IV abx. Would monitor a few more days. 78yo F with PMH: Renal Cell Carcinoma with mets to liver, IR embolization of a liver lesion on 7/30 Diverticulitis, HTN, HLD, Hypothyroid was recently, recent new onset Afib/RVR in August 2024 started on Eliquis, GI Bleed after ERCP- resolved without intervention, started on eliquis during admit for Afib. She was found to have a liver abscess with sepsis.     # Septic shock secondary to Hepatic abscess  - Off Pressors, downgrade to MS floor   - SP IR drain, monitor output   - FU cultures: Klebsiella and Enterobacter  - SP meropenem and vancomycin  - Repeat CT scans show multiple abscess  - SP Replacement of SUBHA drain to drain additional abscess  - FU IR recommendations and ID  - WBC still elevated, and febrile  -  repeat CT 10/12  - Restart Vanco and meropenem  to expand coverage  - ID on board  - FU  blood cultures  - Lactate 2.8, Fluid bolus followed by maintenance  - Worsening LFTS due to abscess/Tumor burder  - Palliative follow up for GOC    # Renal cell ca with mets  - Oncology on board  - Recent Liver embolization    # Adrenal insufficiency  - Continue hydrocortisone    # P. Afib  - Stable  - Continue eliquis  - Continue metoprolol  - Continue amiodarone    # Gerd  - Continue protonix    # Hypothyroid  - Stable  - Continue levothyroine     # DVT prophylaxis  - Continue eliquis     # FU IR recs on tuesday, DC planning for possible home IV abx. Would monitor a few more days. 80yo F with PMH: Renal Cell Carcinoma with mets to liver, IR embolization of a liver lesion on 7/30 Diverticulitis, HTN, HLD, Hypothyroid was recently, recent new onset Afib/RVR in August 2024 started on Eliquis, GI Bleed after ERCP- resolved without intervention, started on eliquis during admit for Afib. She was found to have a liver abscess with sepsis.     # Septic shock secondary to Hepatic abscess  - Off Pressors, downgrade to MS floor   - SP IR drain, monitor output   - FU cultures: Klebsiella and Enterobacter  - SP meropenem and vancomycin  - Repeat CT scans show multiple abscess  - SP Replacement of SUBHA drain to drain additional abscess  - FU IR recommendations and ID  - WBC still elevated, and febrile  -  repeat CT 10/12 see above  - Restart Vanco and meropenem  to expand coverage  - ID on board  - blood cultures NTD  - Repeat OR cultures Kleb and Entero still  - Lactate 2.8, Fluid bolus followed by maintenance  - Worsening LFTS due to abscess/Tumor burder  - Palliative follow up for GOC    # Renal cell ca with mets  - Oncology on board  - Recent Liver embolization    # Adrenal insufficiency  - Continue hydrocortisone    # P. Afib  - Stable  - Continue eliquis  - Continue metoprolol  - Continue amiodarone    # Gerd  - Continue protonix    # Hypothyroid  - Stable  - Continue levothyroine     # DVT prophylaxis  - Continue eliquis     # FU IR recs on tuesday, DC planning for possible home IV abx. Would monitor a few more days. 80yo F with PMH: Renal Cell Carcinoma with mets to liver, IR embolization of a liver lesion on 7/30 Diverticulitis, HTN, HLD, Hypothyroid was recently, recent new onset Afib/RVR in August 2024 started on Eliquis, GI Bleed after ERCP- resolved without intervention, started on eliquis during admit for Afib. She was found to have a liver abscess with sepsis.     # Septic shock secondary to Hepatic abscess  - Off Pressors, downgrade to MS floor   - SP IR drain, monitor output   - FU cultures: Klebsiella and Enterobacter  - SP meropenem and vancomycin  - Repeat CT scans show multiple abscess  - SP Replacement of SUBHA drain to drain additional abscess 10/12  - WBC still elevated, and febrile 10/12  -  repeat CT 10/12 see above  - Restart Vanco and meropenem  to expand coverage  - ID on board  - blood cultures NTD  - Repeat OR cultures Kleb and Entero   - Lactate 2.8, Fluid bolus followed by maintenance  - Worsening LFTS due to abscess/Tumor burder  - Palliative follow up for GOC    # Renal cell ca with mets  - Oncology on board  - Recent Liver embolization 7/30    # Adrenal insufficiency  - Continue hydrocortisone    # P. Afib  - Stable  - Continue eliquis  - Continue metoprolol  - Continue amiodarone    # Gerd  - Continue protonix    # Hypothyroid  - Stable  - Continue levothyroine     # DVT prophylaxis  - Continue eliquis     # FU IR recs on tuesday, DC planning for possible home IV abx. Would monitor a few more days.

## 2024-10-14 NOTE — PROGRESS NOTE ADULT - ASSESSMENT
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    Dusty Marcial MD  [facility]  Cell: [phone]

## 2024-10-14 NOTE — CONSULT NOTE ADULT - ASSESSMENT
Pt is a 79y old Female with hx of Renal Cell Carcinoma with mets to liver, IR embolization of a liver lesion on 7/30 Diverticulitis, HTN, HLD, Hypothyroid was recently, recent new onset Afib/RVR in August 2024 started on Eliquis, GI Bleed after ERCP- resolved without intervention, started on eliquis during admit for Afib. She was found to have a liver abscess with sepsis. Palliative medicine consulted to help establish GOC and advance care planning     1) Hepatic Masses   - came in requiring Pressors - patient improving   - S/P IR drain x2   - CT scan shows multiple abscesses  -  ID consult appreciated - may need long term antibiotics     2) Renal Cell Carcinoma with mets   - oncology consult appreciated     We discussed Palliative Care team being a supportive team when a patient has ongoing illnesses.  We also discussed that it is not an end of life care service, but can help navigate symptoms and emotional support through out their hospital stay here.    Ethical and Legal Aspects: NA  Capacity- pt has capacity   HCP/Surrogate: none on chart at this time   Code Status- Full code   MOLST-  Dispo Plan- to return home     Discussed With: Dr. Cruz     Case coordinated with attending and SW and RN     Time Spent: 60 minutes including the care, coordination and counseling of this patient, 50% of which was spent coordinating and counseling.

## 2024-10-14 NOTE — PROGRESS NOTE ADULT - ASSESSMENT
78yo F with PMH: Renal Cell Carcinoma with mets to liver, IR embolization of a liver lesion on 7/30, Diverticulitis, HTN, HLD, Hypothyroidism,  recent new onset Afib/RVR in August 2024 started on Eliquis, GI Bleed after ERCP- resolved without intervention, liver abscesses, an admission on  8/2-8/15 for septic shock requiring pressors and critical care admission, Found to have dilated common bile duct, elevated LFTs and likely cholangitis/choledocholithiasis with stone, s/p ERCP with sphincterotomy/stone removal and stent placement on 8/5/24, admitted 9/9-9/14 for septic shock, requiring levophed and critical care admission, blood cultures during admission had Enterobacter, sensitive to Cipro. Noted liver abscess on MRI, felt to be too small for biopsy at the time, decision for prolonged antibiotic course made. Discharged on Cipro/Flagyl to complete her therapy, this was completed on Monday 9/23. Then patient was admitted on 9/29 for evaluation of fever to 103, nausea, vomiting, diarrhea, similar to her presentation of her 9/9-9/14 admission, when admitted on 9/29 was so hypotensive has required levophed after fluids. The patient is still on pressors after fluids without improvement. Initial lactate was 8, but has improved to normal. The patient is alert and oriented and has mild difuse abdominal pain but overall her GI symptoms are improved.     1. Liver abscess s/p drainage with ENFA, KLPN. Sepsis with ENFA, ENFAE, KLPN and CLPE. Metastatic renal cell Ca with mets to the liver. CRF stage 3. Immunocompromised host.   -Patient admitted with septic shock secondary to multiple organisms most likely due to GI or liver origin, due to liver abscesses or masses  - completed appropriate course of meropenem and vancomycin and was put on ertapenem and augmentin in anticipation of discharge; however had fever, hypotension after the IR procedure of new drain placement, thus meropenem and vancomycin were restarted  - anticipate IR evaluation on 10/15  - if all goes well, hopefully will be able to discharge home on ertapenem  one gram daily to complete antibiotics on 11/5/24 with weekly cbc, cmp, esr, crp plus augmentin 500 mg po q 12 hours with same end date  - midline is in place    2. other issues; per medicine    [facility] abx token not applicable at this time

## 2024-10-14 NOTE — PROGRESS NOTE ADULT - SUBJECTIVE AND OBJECTIVE BOX
HOSPITALIST ATTENDING PROGRESS NOTE    Chart and meds reviewed.  Patient seen and examined.    CC: Abd pain    Subjective: Feels better, afebrile and tolerating po.     All other systems reviewed and found to be negative with the exception of what has been described above.    MEDICATIONS  (STANDING):  aMIOdarone    Tablet 100 milliGRAM(s) Oral daily  apixaban 5 milliGRAM(s) Oral every 12 hours  chlorhexidine 4% Liquid 1 Application(s) Topical <User Schedule>  hydrocortisone 10 milliGRAM(s) Oral at bedtime  hydrocortisone 20 milliGRAM(s) Oral daily  levothyroxine 100 MICROGram(s) Oral daily  meropenem Injectable 1000 milliGRAM(s) IV Push every 8 hours  metoprolol succinate ER 50 milliGRAM(s) Oral every 12 hours  pantoprazole  Injectable 40 milliGRAM(s) IV Push daily  sodium chloride 0.9%. 1000 milliLiter(s) (125 mL/Hr) IV Continuous <Continuous>  vancomycin  IVPB 750 milliGRAM(s) IV Intermittent every 12 hours    MEDICATIONS  (PRN):    Vital Signs Last 24 Hrs  T(C): 36.6 (14 Oct 2024 08:38), Max: 36.7 (13 Oct 2024 21:50)  T(F): 97.8 (14 Oct 2024 08:38), Max: 98.1 (13 Oct 2024 21:50)  HR: 87 (14 Oct 2024 08:38) (81 - 88)  BP: 130/79 (14 Oct 2024 08:38) (103/70 - 130/79)  RR: 18 (14 Oct 2024 08:38) (18 - 18)  SpO2: 98% (14 Oct 2024 08:38) (96% - 98%)    Parameters below as of 14 Oct 2024 08:38  Patient On (Oxygen Delivery Method): room air      GEN: NAD  HEENT:  pupils equal and reactive, EOMI, no oropharyngeal lesions, erythema, exudates, oral thrush  NECK:   supple, no carotid bruits  CV:  +S1, +S2, regular, no murmurs  RESP:   lungs clear to auscultation bilaterally, no wheezing, rales, rhonchi, good air entry bilaterally  GI:  abdomen soft, non-tender, non-distended, normal BS + SUBHA drain  EXT:  no clubbing, no cyanosis, no edema, no calf pain, swelling or erythema  NEURO:  AAOX3, no focal neurological deficits, follows all commands, able to move extremities spontaneously  SKIN:  no ulcers, lesions or rashes    LABS:                          9.0    10.65 )-----------( 469      ( 14 Oct 2024 07:19 )             29.0     10-14    138  |  109[H]  |  6[L]  ----------------------------<  85  3.6   |  26  |  0.55    Ca    8.5      14 Oct 2024 07:19    TPro  5.3[L]  /  Alb  1.6[L]  /  TBili  0.5  /  DBili  x   /  AST  61[H]  /  ALT  110[H]  /  AlkPhos  328[H]  10-14    LIVER FUNCTIONS - ( 14 Oct 2024 07:19 )  Alb: 1.6 g/dL / Pro: 5.3 gm/dL / ALK PHOS: 328 U/L / ALT: 110 U/L / AST: 61 U/L / GGT: x           Urinalysis Basic - ( 14 Oct 2024 07:19 )  Color: x / Appearance: x / SG: x / pH: x  Gluc: 85 mg/dL / Ketone: x  / Bili: x / Urobili: x   Blood: x / Protein: x / Nitrite: x   Leuk Esterase: x / RBC: x / WBC x   Sq Epi: x / Non Sq Epi: x / Bacteria: x    < from: CT Abdomen w/ IV Cont (10.12.24 @ 11:02) >    IMPRESSION:  Interval replacement of the segment 8 percutaneous right hepatic drain   with grossly similar appearance of a complex collection measuring   approximately 6.0 x 6.9 cm. There is a heterogeneous component   posteriorly that appear to represent tumor on the comparison MRI.    There is a second heterogeneous collection measuring approximately 5.1 x   5.9 cm centered within segments 5 and 8 of the liver that appears to be   in continuity with the gallbladder versus a collection within the   gallbladder fossa if there is a prior history of cholecystectomy. There   appears to be focal thickening and hyperenhancement of the cystic duct   and gallbladder neck.    The additional hepatic metastases are somewhat suboptimally assessed and   were better seen on the comparison MRI.        --- End of Report ---    < end of copied text >  < from: MR Abdomen w/wo IV Cont (10.01.24 @ 13:23) >  IMPRESSION:  New liquefied collections in the right hepatic lobe and liver dome   concerning for new abscess and/or biloma versus liquefied tissue   necrosis. Large wedge-shaped area of hypoenhancement surrounding the new   collection, likely represents hepatic infarct and post embolization   changes.    The collection/abscess around the pigtail of the percutaneous drainage   catheter is grossly unchanged.    Decreasing left hepatic lobe collection /abscess.    New and enlarging hepatic metastases, increased in size and number since   prior MRI.    Decreased size and avascularity of the treated metastasis in segment   8/5/4B status post post embolization. Unchanged left hepatic lobe   metastasis with some persistent vascularity likely residual viable tumor.    New small non-occlusive right hepatic vein filling defect next to the   abscess concerning for developing thrombosis.            < end of copied text >

## 2024-10-14 NOTE — CONSULT NOTE ADULT - SUBJECTIVE AND OBJECTIVE BOX
HPI: Pt is a 79y old Female with hx of       PAIN: ( )Yes   ( )No  Level:  Location:  Intensity:    /10  Quality:  Aggravating Factors:  Alleviating Factors:  Radiation:  Duration/Timing:  Impact on ADLs:    DYSPNEA: ( ) Yes  ( ) No  Level:    PAST MEDICAL & SURGICAL HISTORY:  Hypertension  High cholesterol  Diverticulitis  History of diverticulitis  Hypothyroidism  Renal cell cancer  Metastasis to liver  Paroxysmal atrial fibrillation  Cholelithiasis with cholangitis  History of biliary stent insertion  History of tonsillectomy  History of laparotomy  History of arthroscopy  H/O bilateral oophorectomy  S/P surgical removal of pilonidal cyst    SOCIAL HX:    Hx opiate tolerance ( )YES  ( )NO    Baseline ADLs  (Prior to Admission)  ( ) Independent   ( )Dependent    FAMILY HISTORY:    Review of Systems:    Anxiety-  Depression-  Physical Discomfort-  Dyspnea-  Constipation-  Diarrhea-  Nausea-  Vomiting-  Anorexia-  Weight Loss-   Cough-  Secretions-  Fatigue-  Weakness-  Delirium-    All other systems reviewed and negative  Unable to obtain/Limited due to:      PHYSICAL EXAM:    Vital Signs Last 24 Hrs  T(C): 36.6 (14 Oct 2024 08:38), Max: 36.7 (13 Oct 2024 21:50)  T(F): 97.8 (14 Oct 2024 08:38), Max: 98.1 (13 Oct 2024 21:50)  HR: 87 (14 Oct 2024 08:38) (81 - 88)  BP: 130/79 (14 Oct 2024 08:38) (103/70 - 130/79)  RR: 18 (14 Oct 2024 08:38) (18 - 18)  SpO2: 98% (14 Oct 2024 08:38) (96% - 98%)    Parameters below as of 14 Oct 2024 08:38  Patient On (Oxygen Delivery Method): room air    Daily Weight in k.5 (14 Oct 2024 05:41)    PPSV2:   %  FAST:    General:  Mental Status:  HEENT:  Lungs:  Cardiac:  GI:  :  Ext:  Neuro:      LABS:                        9.0    10.65 )-----------( 469      ( 14 Oct 2024 07:19 )             29.0     10-14    138  |  109[H]  |  6[L]  ----------------------------<  85  3.6   |  26  |  0.55    Ca    8.5      14 Oct 2024 07:19    TPro  5.3[L]  /  Alb  1.6[L]  /  TBili  0.5  /  DBili  x   /  AST  61[H]  /  ALT  110[H]  /  AlkPhos  328[H]  10-14      Albumin: Albumin: 1.6 g/dL (10-14 @ 07:19)      Allergies    Crestor (Other)  Lipitor (Unknown)  tivozanib (Faint)  Cabometyx (Unknown)  Dyazide (Faint)  statins (Unknown)  bacitracin (Rash)  Zetia (Unknown)  Norvasc (Faint)    Intolerances      MEDICATIONS  (STANDING):  aMIOdarone    Tablet 100 milliGRAM(s) Oral daily  apixaban 5 milliGRAM(s) Oral every 12 hours  chlorhexidine 4% Liquid 1 Application(s) Topical <User Schedule>  hydrocortisone 10 milliGRAM(s) Oral at bedtime  hydrocortisone 20 milliGRAM(s) Oral daily  levothyroxine 100 MICROGram(s) Oral daily  meropenem Injectable 1000 milliGRAM(s) IV Push every 8 hours  metoprolol succinate ER 50 milliGRAM(s) Oral every 12 hours  pantoprazole  Injectable 40 milliGRAM(s) IV Push daily  sodium chloride 0.9%. 1000 milliLiter(s) (125 mL/Hr) IV Continuous <Continuous>  vancomycin  IVPB 750 milliGRAM(s) IV Intermittent every 12 hours    MEDICATIONS  (PRN):      RADIOLOGY/ADDITIONAL STUDIES: HPI: Pt is a 79y old Female with hx of Renal Cell Carcinoma with mets to liver, IR embolization of a liver lesion on  Diverticulitis, HTN, HLD, Hypothyroid was recently, recent new onset Afib/RVR in 2024 started on Eliquis, GI Bleed after ERCP- resolved without intervention, started on eliquis during admit for Afib. She was found to have a liver abscess with sepsis. Palliative medicine consulted to help establish GOC and advance care planning     10/14/2024 pt seen and examined with no family at bedside, patient denies any complaints sitting up in chair. GOC meeting introduced today, will continue to follow along     PAIN: ( )Yes   (x )No  DYSPNEA: ( ) Yes  (x ) No  Level:    PAST MEDICAL & SURGICAL HISTORY:  Hypertension  High cholesterol  Diverticulitis  History of diverticulitis  Hypothyroidism  Renal cell cancer  Metastasis to liver  Paroxysmal atrial fibrillation  Cholelithiasis with cholangitis  History of biliary stent insertion  History of tonsillectomy  History of laparotomy  History of arthroscopy  H/O bilateral oophorectomy  S/P surgical removal of pilonidal cyst    SOCIAL HX: lives in a home with her eldest son     Hx opiate tolerance ( )YES  (x )NO    Baseline ADLs  (Prior to Admission)  (x ) Independent   ( )Dependent    FAMILY HISTORY:    Review of Systems:    All other systems reviewed and negative    PHYSICAL EXAM:    Vital Signs Last 24 Hrs  T(C): 36.6 (14 Oct 2024 08:38), Max: 36.7 (13 Oct 2024 21:50)  T(F): 97.8 (14 Oct 2024 08:38), Max: 98.1 (13 Oct 2024 21:50)  HR: 87 (14 Oct 2024 08:38) (81 - 88)  BP: 130/79 (14 Oct 2024 08:38) (103/70 - 130/79)  RR: 18 (14 Oct 2024 08:38) (18 - 18)  SpO2: 98% (14 Oct 2024 08:38) (96% - 98%)    Parameters below as of 14 Oct 2024 08:38  Patient On (Oxygen Delivery Method): room air    Daily Weight in k.5 (14 Oct 2024 05:41)    PPSV2: 70  %    General: pleasant female in chair, NAD   Mental Status: alert and oriented   HEENT: MMM   Lungs: diminished breath sounds b/l   Cardiac: s1s2 +   GI: nontender nondistended +BS   : +voiding   Ext: CHILD   Neuro: intact       LABS:                        9.0    10.65 )-----------( 469      ( 14 Oct 2024 07:19 )             29.0     10-14    138  |  109[H]  |  6[L]  ----------------------------<  85  3.6   |  26  |  0.55    Ca    8.5      14 Oct 2024 07:19    TPro  5.3[L]  /  Alb  1.6[L]  /  TBili  0.5  /  DBili  x   /  AST  61[H]  /  ALT  110[H]  /  AlkPhos  328[H]  10-14      Albumin: Albumin: 1.6 g/dL (10-14 @ 07:19)      Allergies    Crestor (Other)  Lipitor (Unknown)  tivozanib (Faint)  Cabometyx (Unknown)  Dyazide (Faint)  statins (Unknown)  bacitracin (Rash)  Zetia (Unknown)  Norvasc (Faint)    Intolerances      MEDICATIONS  (STANDING):  aMIOdarone    Tablet 100 milliGRAM(s) Oral daily  apixaban 5 milliGRAM(s) Oral every 12 hours  chlorhexidine 4% Liquid 1 Application(s) Topical <User Schedule>  hydrocortisone 10 milliGRAM(s) Oral at bedtime  hydrocortisone 20 milliGRAM(s) Oral daily  levothyroxine 100 MICROGram(s) Oral daily  meropenem Injectable 1000 milliGRAM(s) IV Push every 8 hours  metoprolol succinate ER 50 milliGRAM(s) Oral every 12 hours  pantoprazole  Injectable 40 milliGRAM(s) IV Push daily  sodium chloride 0.9%. 1000 milliLiter(s) (125 mL/Hr) IV Continuous <Continuous>  vancomycin  IVPB 750 milliGRAM(s) IV Intermittent every 12 hours    MEDICATIONS  (PRN):      RADIOLOGY/ADDITIONAL STUDIES:    ACC: [id number] EXAM:  CT ABDOMEN ONLY IC   ORDERED BY: FARRUKH MASTERSON     PROCEDURE DATE:  10/12/2024    INTERPRETATION:  CLINICAL INFORMATION: Hepatic abscess. 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  COMPARISON: CT abdomen and pelvis dated 10/8/2024.  CONTRAST/COMPLICATIONS:  IV Contrast: Omnipaque 350  90 cc administered   0 cc discarded  Oral Contrast: NONE  Complications: None reported at time of study completion  PROCEDURE:  CT of the Abdomen was performed.  Sagittal and coronal reformats were performed.  FINDINGS:  LOWER CHEST: Persistent small right pleural effusion with adjacent right   lower lobe atelectasis. Unchanged4 x 6 mm left lower lobe pleural-based   nodule (2-15), possibly a pleural-based lymph node.  LIVER: There is a large heterogeneous collection of fluid and gas within   segment 8 of the liver that is difficult to accurately measure though the   fluid component appears to measure approximately 6.0 x 6.9 cm (2-14) and   previously measured approximately approximately 4.9 x 7.8 cm on the   comparison study by personal measurement, though likely communicates with   multiple tubular areas of low-attenuation peripherally and posteriorly.   There is a percutaneous drainage catheter is again noted within the   collection, though appears to been replaced since the prior CT. There is   a similar-appearing 5.1 x 5.9 cm septated collection within segments 5   and 8 of the liver (2-25), previously measuring 5.3 x 5.7 cm by personal   measurement as well as unchanged small ill-defined collections within the   left hepatic lobe measuring up to 2.6 cm in size (2-31). There is also a   1.8 cm mixed solid/cystic lesion within segment 3 of the liver (2-33).   Secure dressings is a metastasis. There are a few linear nonenhancing   foci within segment 3 of the liver (2-33 and 2-37) corresponding to   dilated biliary radicles on the comparison MRI. The solid components on   the MRI are not visible assessed by single phase contrast CT technique.  BILE DUCTS: A plastic bile duct stent is again noted within the extra   hepatic bile duct with tips within the right intrahepatic duct and   proximal duodenum.  GALLBLADDER: There is focal wall thickening and hyperenhancement of the   gallbladder neck and cystic duct (602:36/37). The margins of the   gallbladder appear to be inseparable from the right-sided hepatic   collection with apparent direct communication (602:29-32).  SPLEEN: Within normal limits.  PANCREAS: Within normal limits.  ADRENALS: Within normal limits.  KIDNEYS/URETERS: There are a few left parapelvic and bilateral cortical   cysts measuring up to 1.9 cm within the left kidney. No renal calculi or   hydronephrosis.  VISUALIZED PELVIS:  BOWEL: Small diverticulum arising from the second portion of the   duodenum. No bowel obstruction.  PERITONEUM/RETROPERITONEUM: No free fluid identified within the abdomen   or pelvis. There is aprominent cisterna chyli measuring approximately   2.0 cm in size (2-28).  VESSELS: Atherosclerotic changes.  LYMPH NODES: No lymphadenopathy.  ABDOMINAL WALL: Within normal limits.  BONES: Within normal limits.    IMPRESSION:  Interval replacement of the segment 8 percutaneous right hepatic drain   with grossly similar appearance of a complex collection measuring   approximately 6.0 x 6.9 cm. There is a heterogeneous component   posteriorly that appear to represent tumor on the comparison MRI.  There is a second heterogeneous collection measuring approximately 5.1 x   5.9 cm centered within segments 5 and 8 of the liver that appears to be   in continuity with the gallbladder versus a collection within the   gallbladder fossa if there is a prior history of cholecystectomy. There   appears to be focal thickening and hyperenhancement of the cystic duct   and gallbladder neck.  The additional hepatic metastases are somewhat suboptimally assessed and   were better seen on the comparison MRI.      < from: CT Abdomen w/ IV Cont (10.08.24 @ 08:18) >    ACC: [id number] EXAM:  CT ABDOMEN ONLY IC   ORDERED BY: FARRUKH MASTERSON     *** ADDENDUM # 1 ***    Correction to the report:    The fluid collection on series 2, image 11 at the dome is new since .    The fluid collection on 2 series 2, image 22) was over measured:  Corrected measurements are 5.3 x 1.8 cm. It is stable from prior imaging   dating back to at least 2024. This is adjacent to tumor   located immediately to its left. Given stability and history of tumor   ablationthis could represent fluid collection or necrotic tumor.      PROCEDURE DATE:  10/08/2024    INTERPRETATION:  CLINICAL INFORMATION: Hepatic abscess drain check  COMPARISON: MRI 2024, CT 2024  CONTRAST/COMPLICATIONS:  IV Contrast: Omnipaque 350  90 cc administered   0 cc discarded  Oral Contrast: NONE  Complications: None reported at time of study completion    PROCEDURE:  CT of the Abdomen was performed.  Sagittal and coronal reformats were performed.    FINDINGS:  LOWER CHEST: Within normal limits.    LIVER: Interval placement of pigtail drainage catheter with tip in   segment 8 of the liver. Persistent loculated fluid collections anterior   to the drain measure 5.7 x 2.9 cm (2; 11) and 5.5 x 5.3 cm (2; 22).   Multiple additional smaller loculated collections are also noted.   Heterogeneous enhancing tumor is noted in both lobes, unchanged.  BILE DUCTS: Normal caliber. Plastic common duct stent inplace.  GALLBLADDER: Within normal limits.  SPLEEN: Within normal limits.  PANCREAS: Within normal limits.  ADRENALS: Within normal limits.  KIDNEYS/URETERS: Left renal parenchymal cyst and bilateral parapelvic   cysts.    VISUALIZED PORTIONS:  BOWEL: Within normal limits.  PERITONEUM/RETROPERITONEUM: Within normal limits.  VESSELS: Within normal limits.  LYMPH NODES: No lymphadenopathy.  ABDOMINAL WALL: Unchanged 2.7 x 1.7 cm soft tissue metastasis in the   right lateral abdominal wall (2; 37).  BONES: No lytic or blastic lesion.    IMPRESSION:  Multiple persistent hepatic abscesses as described. Interventional   radiology consultation recommended.

## 2024-10-14 NOTE — PROGRESS NOTE ADULT - SUBJECTIVE AND OBJECTIVE BOX
Date of service: 10-14-24 @ 10:27      Patient events over preceding days noted; back on meropenem and vancomycin; had midline placed; no specific complaints today  afebrile, eating well      ROS: no fever or chills; denies dizziness, no HA, no SOB or cough, no abdominal pain, no diarrhea or constipation; no dysuria, no urinary frequency, no legs pain, no rashes    MEDICATIONS  (STANDING):  aMIOdarone    Tablet 100 milliGRAM(s) Oral daily  apixaban 5 milliGRAM(s) Oral every 12 hours  chlorhexidine 4% Liquid 1 Application(s) Topical <User Schedule>  hydrocortisone 20 milliGRAM(s) Oral daily  hydrocortisone 10 milliGRAM(s) Oral at bedtime  levothyroxine 100 MICROGram(s) Oral daily  meropenem Injectable 1000 milliGRAM(s) IV Push every 8 hours  metoprolol succinate ER 50 milliGRAM(s) Oral every 12 hours  pantoprazole  Injectable 40 milliGRAM(s) IV Push daily  sodium chloride 0.9%. 1000 milliLiter(s) (125 mL/Hr) IV Continuous <Continuous>  vancomycin  IVPB 750 milliGRAM(s) IV Intermittent every 12 hours    MEDICATIONS  (PRN):      Vital Signs Last 24 Hrs  T(C): 36.6 (14 Oct 2024 08:38), Max: 36.7 (13 Oct 2024 21:50)  T(F): 97.8 (14 Oct 2024 08:38), Max: 98.1 (13 Oct 2024 21:50)  HR: 87 (14 Oct 2024 08:38) (81 - 88)  BP: 130/79 (14 Oct 2024 08:38) (103/70 - 130/79)  BP(mean): --  RR: 18 (14 Oct 2024 08:38) (18 - 18)  SpO2: 98% (14 Oct 2024 08:38) (96% - 98%)    Parameters below as of 14 Oct 2024 08:38  Patient On (Oxygen Delivery Method): room air            Physical Exam:        Constitutional: frail looking  HEENT: NC/AT, EOMI, PERRLA, conjunctivae clear; ears and nose atraumatic; pharynx clear  Neck: supple; thyroid not palpable  Back: no tenderness  Respiratory: respiratory effort normal; clear to auscultation  Cardiovascular: S1S2 regular, no murmurs  Abdomen: soft, mildly tender, not distended, positive BS; no liver or spleen organomegaly; right sided drain   Genitourinary: no suprapubic tenderness  Musculoskeletal: no muscle tenderness, no joint swelling or tenderness  Neurological/ Psychiatric: AxOx3, judgement and insight normal;  moving all extremities  Skin: no rashes; no palpable lesions    Labs: reviewed    Labs:                        9.0    10.65 )-----------( 469      ( 14 Oct 2024 07:19 )             29.0     10-14    138  |  109[H]  |  6[L]  ----------------------------<  85  3.6   |  26  |  0.55    Ca    8.5      14 Oct 2024 07:19    TPro  5.3[L]  /  Alb  1.6[L]  /  TBili  0.5  /  DBili  x   /  AST  61[H]  /  ALT  110[H]  /  AlkPhos  328[H]  10-14           Cultures:       Culture - Blood (collected 10-12-24 @ 13:23)  Source: .Blood BLOOD  Preliminary Report (10-13-24 @ 19:01):    No growth at 24 hours    Culture - Blood (collected 10-12-24 @ 13:21)  Source: .Blood BLOOD  Preliminary Report (10-13-24 @ 19:01):    No growth at 24 hours    Culture - Abscess with Gram Stain (collected 10-09-24 @ 11:30)  Source: .Abscess  Gram Stain (10-09-24 @ 18:29):    Moderate polymorphonuclear leukocytes per low power field    Rare Gram positive cocci in pairs per oil power field  Preliminary Report (10-10-24 @ 15:17):    Rare Klebsiella pneumoniae    Numerous Enterococcus faecalis  Organism: Klebsiella pneumoniae  Enterococcus faecalis (10-11-24 @ 15:21)  Organism: Enterococcus faecalis (10-11-24 @ 15:21)      Method Type: ARLEY      -  Ampicillin: S <=2 Predicts results to ampicillin/sulbactam, amoxacillin-clavulanate and  piperacillin-tazobactam.      -  Vancomycin: S 2  Organism: Klebsiella pneumoniae (10-11-24 @ 08:46)      Method Type: ARLEY      -  Amoxicillin/Clavulanic Acid: S <=8/4      -  Ampicillin: R >16 These ampicillin results predict results for amoxicillin      -  Ampicillin/Sulbactam: S 8/4      -  Aztreonam: S <=4      -  Cefazolin: S <=2      -  Cefepime: S <=2      -  Cefoxitin: S <=8      -  Ceftriaxone: S <=1      -  Ciprofloxacin: S <=0.25      -  Ertapenem: S <=0.5      -  Gentamicin: S <=2      -  Imipenem: S <=1      -  Levofloxacin: S <=0.5      -  Meropenem: S <=1      -  Piperacillin/Tazobactam: S <=8      -  Tobramycin: S <=2      -  Trimethoprim/Sulfamethoxazole: S <=0.5/9.5                    < from: CT Abdomen w/ IV Cont (10.08.24 @ 08:18) >    ACC: [id number] EXAM:  CT ABDOMEN ONLY IC   ORDERED BY: FARRUKH MASTERSON     PROCEDURE DATE:  10/08/2024          INTERPRETATION:  CLINICAL INFORMATION: Hepatic abscess drain check    COMPARISON: MRI October 01, 2024, CT September 29, 2024    CONTRAST/COMPLICATIONS:  IV Contrast: Omnipaque 350  90 cc administered   0 cc discarded  Oral Contrast: NONE  Complications: None reported at time of study completion    PROCEDURE:  CT of the Abdomen was performed.  Sagittal and coronal reformats were performed.    FINDINGS:  LOWER CHEST: Within normal limits.    LIVER: Interval placement of pigtail drainage catheter with tip in   segment 8 of the liver. Persistent loculated fluid collections anterior   to the drain measure 5.7 x 2.9 cm (2; 11) and 5.5x 5.3 cm (2; 22).   Multiple additional smaller loculated collections are also noted.   Heterogeneous enhancing tumor is noted in both lobes, unchanged.  BILE DUCTS: Normal caliber. Plastic common duct stent in place.  GALLBLADDER: Within normal limits.  SPLEEN: Within normal limits.  PANCREAS: Within normal limits.  ADRENALS: Within normal limits.  KIDNEYS/URETERS: Left renal parenchymal cyst and bilateral parapelvic   cysts.    VISUALIZED PORTIONS:  BOWEL: Within normal limits.  PERITONEUM/RETROPERITONEUM: Within normal limits.  VESSELS: Within normal limits.  LYMPH NODES: No lymphadenopathy.  ABDOMINAL WALL: Unchanged 2.7 x 1.7 cm soft tissue metastasis in the   right lateral abdominal wall (2; 37).  BONES: No lytic or blastic lesion.    IMPRESSION:  Multiple persistent hepatic abscesses as described. Interventional   radiology consultation recommended.    < end of copied text >          Radiology: all available radiological tests reviewed    Advanced directives addressed: full resuscitation

## 2024-10-14 NOTE — CONSULT NOTE ADULT - CONVERSATION DETAILS
I met with the patient at the bedside and reviewed the role of palliative medicine. The patient shared that she lived at home with her son and was able to maintain her own ADL. The patient's  recently passed away from MDS at an inpatient hospice.  We discussed that the patient, at this time, has no complaints of symptoms.   We also discussed advance directives patient shared that she has never thought of these items for herself but acknowledge that it is important. She states that she would like to speak further with her son before putting anything in place. I left MOLST and HCP at the bedside - and explained that our team would fill it out with her if she would like to put anything in place. The patient states that she would like some time to focus on.   Emotional Support provided will have follow-up meeting tomorrow

## 2024-10-14 NOTE — PROGRESS NOTE ADULT - SUBJECTIVE AND OBJECTIVE BOX
Pt seen, sitting upright, comfortable today    MEDICATIONS  (STANDING):  aMIOdarone    Tablet 100 milliGRAM(s) Oral daily  apixaban 5 milliGRAM(s) Oral every 12 hours  chlorhexidine 4% Liquid 1 Application(s) Topical <User Schedule>  hydrocortisone 10 milliGRAM(s) Oral at bedtime  hydrocortisone 20 milliGRAM(s) Oral daily  levothyroxine 100 MICROGram(s) Oral daily  meropenem Injectable 1000 milliGRAM(s) IV Push every 8 hours  metoprolol succinate ER 50 milliGRAM(s) Oral every 12 hours  pantoprazole  Injectable 40 milliGRAM(s) IV Push daily  sodium chloride 0.9%. 1000 milliLiter(s) (125 mL/Hr) IV Continuous <Continuous>  vancomycin  IVPB 750 milliGRAM(s) IV Intermittent every 12 hours    MEDICATIONS  (PRN):      ROS  No fever, sweats, chills       Vital Signs Last 24 Hrs  T(C): 36.6 (14 Oct 2024 08:38), Max: 36.7 (13 Oct 2024 21:50)  T(F): 97.8 (14 Oct 2024 08:38), Max: 98.1 (13 Oct 2024 21:50)  HR: 87 (14 Oct 2024 08:38) (81 - 88)  BP: 130/79 (14 Oct 2024 08:38) (103/70 - 130/79)  BP(mean): --  RR: 18 (14 Oct 2024 08:38) (18 - 18)  SpO2: 98% (14 Oct 2024 08:38) (96% - 98%)    Parameters below as of 14 Oct 2024 08:38  Patient On (Oxygen Delivery Method): room air        PE  NAD  Awake, alert     No rash grossly  FROM                          9.0    10.65 )-----------( 469      ( 14 Oct 2024 07:19 )             29.0       10-14    138  |  109[H]  |  6[L]  ----------------------------<  85  3.6   |  26  |  0.55    Ca    8.5      14 Oct 2024 07:19    TPro  5.3[L]  /  Alb  1.6[L]  /  TBili  0.5  /  DBili  x   /  AST  61[H]  /  ALT  110[H]  /  AlkPhos  328[H]  10-14

## 2024-10-15 LAB
ALBUMIN SERPL ELPH-MCNC: 1.6 G/DL — LOW (ref 3.3–5)
ALP SERPL-CCNC: 358 U/L — HIGH (ref 40–120)
ALT FLD-CCNC: 81 U/L — HIGH (ref 12–78)
ANION GAP SERPL CALC-SCNC: 7 MMOL/L — SIGNIFICANT CHANGE UP (ref 5–17)
AST SERPL-CCNC: 38 U/L — HIGH (ref 15–37)
BILIRUB SERPL-MCNC: 0.5 MG/DL — SIGNIFICANT CHANGE UP (ref 0.2–1.2)
BUN SERPL-MCNC: 5 MG/DL — LOW (ref 7–23)
CALCIUM SERPL-MCNC: 8.3 MG/DL — LOW (ref 8.5–10.1)
CHLORIDE SERPL-SCNC: 114 MMOL/L — HIGH (ref 96–108)
CO2 SERPL-SCNC: 24 MMOL/L — SIGNIFICANT CHANGE UP (ref 22–31)
CREAT SERPL-MCNC: 0.54 MG/DL — SIGNIFICANT CHANGE UP (ref 0.5–1.3)
EGFR: 95 ML/MIN/1.73M2 — SIGNIFICANT CHANGE UP
GLUCOSE SERPL-MCNC: 76 MG/DL — SIGNIFICANT CHANGE UP (ref 70–99)
HCT VFR BLD CALC: 29.6 % — LOW (ref 34.5–45)
HGB BLD-MCNC: 9.1 G/DL — LOW (ref 11.5–15.5)
MCHC RBC-ENTMCNC: 26.3 PG — LOW (ref 27–34)
MCHC RBC-ENTMCNC: 30.7 GM/DL — LOW (ref 32–36)
MCV RBC AUTO: 85.5 FL — SIGNIFICANT CHANGE UP (ref 80–100)
PLATELET # BLD AUTO: 494 K/UL — HIGH (ref 150–400)
POTASSIUM SERPL-MCNC: 3.4 MMOL/L — LOW (ref 3.5–5.3)
POTASSIUM SERPL-SCNC: 3.4 MMOL/L — LOW (ref 3.5–5.3)
PROT SERPL-MCNC: 5.2 GM/DL — LOW (ref 6–8.3)
RBC # BLD: 3.46 M/UL — LOW (ref 3.8–5.2)
RBC # FLD: 18.6 % — HIGH (ref 10.3–14.5)
SODIUM SERPL-SCNC: 145 MMOL/L — SIGNIFICANT CHANGE UP (ref 135–145)
WBC # BLD: 8.55 K/UL — SIGNIFICANT CHANGE UP (ref 3.8–10.5)
WBC # FLD AUTO: 8.55 K/UL — SIGNIFICANT CHANGE UP (ref 3.8–10.5)

## 2024-10-15 PROCEDURE — 99232 SBSQ HOSP IP/OBS MODERATE 35: CPT

## 2024-10-15 RX ADMIN — VANCOMYCIN HCL-SODIUM CHLORIDE IV SOLN 1.5 GM/250ML-0.9% 250 MILLIGRAM(S): 1.5-0.9/25 SOLUTION at 10:35

## 2024-10-15 RX ADMIN — HYDROCORTISONE 10 MILLIGRAM(S): 5 TABLET ORAL at 21:03

## 2024-10-15 RX ADMIN — MEROPENEM 1000 MILLIGRAM(S): 500 INJECTION INTRAVENOUS at 10:35

## 2024-10-15 RX ADMIN — HYDROCORTISONE 20 MILLIGRAM(S): 5 TABLET ORAL at 10:35

## 2024-10-15 RX ADMIN — CHLORHEXIDINE GLUCONATE ORAL RINSE 1 APPLICATION(S): 1.2 SOLUTION DENTAL at 14:17

## 2024-10-15 RX ADMIN — Medication 125 MILLILITER(S): at 14:15

## 2024-10-15 RX ADMIN — PANTOPRAZOLE SODIUM 40 MILLIGRAM(S): 40 TABLET, DELAYED RELEASE ORAL at 10:35

## 2024-10-15 RX ADMIN — MEROPENEM 1000 MILLIGRAM(S): 500 INJECTION INTRAVENOUS at 18:35

## 2024-10-15 RX ADMIN — VANCOMYCIN HCL-SODIUM CHLORIDE IV SOLN 1.5 GM/250ML-0.9% 250 MILLIGRAM(S): 1.5-0.9/25 SOLUTION at 21:04

## 2024-10-15 RX ADMIN — Medication 50 MILLIGRAM(S): at 21:03

## 2024-10-15 RX ADMIN — Medication 50 MILLIGRAM(S): at 10:35

## 2024-10-15 RX ADMIN — AMIODARONE HYDROCHLORIDE 100 MILLIGRAM(S): 50 INJECTION, SOLUTION INTRAVENOUS at 10:35

## 2024-10-15 RX ADMIN — APIXABAN 5 MILLIGRAM(S): 5 TABLET, FILM COATED ORAL at 14:15

## 2024-10-15 RX ADMIN — MEROPENEM 1000 MILLIGRAM(S): 500 INJECTION INTRAVENOUS at 04:09

## 2024-10-15 NOTE — PROGRESS NOTE ADULT - PROBLEM SELECTOR PLAN 1
s/p drainage
- CX; Few Gram positive cocci and Gram Negative Montrell  - Flush drain as ordered.  - CT completed, awaiting official read
- Reviewed with Dr. Falcon, no IR intervention for now as pt feels well, no longer septic. Plan to review imaging with Dr. Carvajal tomorrow as well as he is familiar with the pt. Plan for IR drain check at some point if outputs remain low.
- flush drain as ordered  - change dressing every 2-3 days or whenever soiled  - Can consider drain check/removal if drain outputs are <10cc over 24 hrs
s/p drainage

## 2024-10-15 NOTE — PROGRESS NOTE ADULT - SUBJECTIVE AND OBJECTIVE BOX
78yo F with PMH: Renal Cell Carcinoma with mets to liver, IR embolization of a liver lesion on 7/30 Diverticulitis, HTN, HLD, Hypothyroid was recently, recent new onset Afib/RVR in August 2024 started on Eliquis, GI Bleed after ERCP- resolved without intervention, started on eliquis during admit for Afib. liver abscesses now s/p IR drainage. Drain was removed and replaced on 10/9. Possible new fluid on CT. Pt seen at bedside, feeling well, no complaints.       Vital Signs Last 24 Hrs  T(C): 36.6 (15 Oct 2024 15:31), Max: 36.6 (15 Oct 2024 15:31)  T(F): 97.8 (15 Oct 2024 15:31), Max: 97.8 (15 Oct 2024 15:31)  HR: 77 (15 Oct 2024 15:31) (74 - 77)  BP: 132/78 (15 Oct 2024 15:31) (118/75 - 134/76)  BP(mean): --  RR: 18 (15 Oct 2024 15:31) (16 - 18)  SpO2: 99% (15 Oct 2024 15:31) (98% - 100%)    Parameters below as of 15 Oct 2024 15:31  Patient On (Oxygen Delivery Method): room air        GENERAL APPEARANCE: Well developed, in no acute distress.    LUNGS: Auscultation of the lungs revealed normal breath sounds without any other adventitious sounds or rubs.    CARDIOVASCULAR: There was a regular rate and rhythm    ABDOMEN: Soft and nontender with normal bowel sounds.     NEUROLOGIC: Alert and oriented x 3. Normal affect.       CBC                        9.1    8.55  )-----------( 494      ( 15 Oct 2024 07:18 )             29.6       Chemistry  10-15    145  |  114[H]  |  5[L]  ----------------------------<  76  3.4[L]   |  24  |  0.54    Ca    8.3[L]      15 Oct 2024 07:18    TPro  5.2[L]  /  Alb  1.6[L]  /  TBili  0.5  /  DBili  x   /  AST  38[H]  /  ALT  81[H]  /  AlkPhos  358[H]  10-15      < from: CT Abdomen w/ IV Cont (10.12.24 @ 11:02) >  IMPRESSION:  Interval replacement of the segment 8 percutaneous right hepatic drain   with grossly similar appearance of a complex collection measuring   approximately 6.0 x 6.9 cm. There is a heterogeneous component   posteriorly that appear to represent tumor on the comparison MRI.    There is a second heterogeneous collection measuring approximately 5.1 x   5.9 cm centered within segments 5 and 8 of the liver that appears to be   in continuity with the gallbladder versus a collection within the   gallbladder fossa if there is a prior history of cholecystectomy. There   appears to be focal thickening and hyperenhancement of the cystic duct   and gallbladder neck.    The additional hepatic metastases are somewhat suboptimally assessed and   were better seen on the comparison MRI.    < end of copied text >

## 2024-10-15 NOTE — PROGRESS NOTE ADULT - SUBJECTIVE AND OBJECTIVE BOX
Patient is a 79y old  Female who presents with a chief complaint of fever, abd pain, nausea, diarrhea (15 Oct 2024 16:38)      INTERVAL HPI/OVERNIGHT EVENTS: Patient seen and examined at bedside. Denies any acute complaints. Plan for IR evaluation of abdominal fluid collection on f/u CT.     MEDICATIONS  (STANDING):  aMIOdarone    Tablet 100 milliGRAM(s) Oral daily  apixaban 5 milliGRAM(s) Oral every 12 hours  chlorhexidine 4% Liquid 1 Application(s) Topical <User Schedule>  hydrocortisone 10 milliGRAM(s) Oral at bedtime  hydrocortisone 20 milliGRAM(s) Oral daily  levothyroxine 100 MICROGram(s) Oral daily  meropenem Injectable 1000 milliGRAM(s) IV Push every 8 hours  metoprolol succinate ER 50 milliGRAM(s) Oral every 12 hours  pantoprazole  Injectable 40 milliGRAM(s) IV Push daily  sodium chloride 0.9%. 1000 milliLiter(s) (125 mL/Hr) IV Continuous <Continuous>  vancomycin  IVPB 750 milliGRAM(s) IV Intermittent every 12 hours    MEDICATIONS  (PRN):      Allergies    Crestor (Other)  Lipitor (Unknown)  tivozanib (Faint)  Cabometyx (Unknown)  Dyazide (Faint)  statins (Unknown)  bacitracin (Rash)  Zetia (Unknown)  Norvasc (Faint)    Intolerances        REVIEW OF SYSTEMS:  CONSTITUTIONAL: No fever or chills  HEENT:  No headache, no sore throat  RESPIRATORY: No cough, wheezing, or shortness of breath  CARDIOVASCULAR: No chest pain, palpitations  GASTROINTESTINAL: No abd pain, nausea, vomiting, or diarrhea  GENITOURINARY: No dysuria, frequency, or hematuria  NEUROLOGICAL: no focal weakness or dizziness  MUSCULOSKELETAL: no myalgias     Vital Signs Last 24 Hrs  T(C): 36.6 (15 Oct 2024 15:31), Max: 36.6 (15 Oct 2024 15:31)  T(F): 97.8 (15 Oct 2024 15:31), Max: 97.8 (15 Oct 2024 15:31)  HR: 77 (15 Oct 2024 15:31) (74 - 77)  BP: 132/78 (15 Oct 2024 15:31) (118/75 - 134/76)  BP(mean): --  RR: 18 (15 Oct 2024 15:31) (16 - 18)  SpO2: 99% (15 Oct 2024 15:31) (98% - 100%)    Parameters below as of 15 Oct 2024 15:31  Patient On (Oxygen Delivery Method): room air        PHYSICAL EXAM:  GENERAL: NAD, seated comfortably in chair  HEENT: anicteric, moist mucous membranes  CHEST/LUNG:  CTA b/l, no rales, wheezes, or rhonchi  HEART:  RRR, S1, S2  ABDOMEN: +SUBHA drain, soft, nontender, nondistended  EXTREMITIES: no edema, cyanosis, or calf tenderness  NERVOUS SYSTEM: answers questions and follows commands appropriately    LABS:                        9.1    8.55  )-----------( 494      ( 15 Oct 2024 07:18 )             29.6     CBC Full  -  ( 15 Oct 2024 07:18 )  WBC Count : 8.55 K/uL  Hemoglobin : 9.1 g/dL  Hematocrit : 29.6 %  Platelet Count - Automated : 494 K/uL  Mean Cell Volume : 85.5 fl  Mean Cell Hemoglobin : 26.3 pg  Mean Cell Hemoglobin Concentration : 30.7 gm/dL  Auto Neutrophil # : x  Auto Lymphocyte # : x  Auto Monocyte # : x  Auto Eosinophil # : x  Auto Basophil # : x  Auto Neutrophil % : x  Auto Lymphocyte % : x  Auto Monocyte % : x  Auto Eosinophil % : x  Auto Basophil % : x    15 Oct 2024 07:18    145    |  114    |  5      ----------------------------<  76     3.4     |  24     |  0.54     Ca    8.3        15 Oct 2024 07:18    TPro  5.2    /  Alb  1.6    /  TBili  0.5    /  DBili  x      /  AST  38     /  ALT  81     /  AlkPhos  358    15 Oct 2024 07:18      Urinalysis Basic - ( 15 Oct 2024 07:18 )    Color: x / Appearance: x / SG: x / pH: x  Gluc: 76 mg/dL / Ketone: x  / Bili: x / Urobili: x   Blood: x / Protein: x / Nitrite: x   Leuk Esterase: x / RBC: x / WBC x   Sq Epi: x / Non Sq Epi: x / Bacteria: x      CAPILLARY BLOOD GLUCOSE            Culture - Blood (collected 10-12-24 @ 13:23)  Source: .Blood BLOOD  Preliminary Report (10-15-24 @ 19:02):    No growth at 72 Hours    Culture - Blood (collected 10-12-24 @ 13:21)  Source: .Blood BLOOD  Preliminary Report (10-15-24 @ 19:01):    No growth at 72 Hours    Culture - Abscess with Gram Stain (collected 10-09-24 @ 11:30)  Source: .Abscess  Gram Stain (10-09-24 @ 18:29):    Moderate polymorphonuclear leukocytes per low power field    Rare Gram positive cocci in pairs per oil power field  Final Report (10-14-24 @ 11:17):    Rare Klebsiella pneumoniae    Numerous Enterococcus faecalis  Organism: Klebsiella pneumoniae  Enterococcus faecalis (10-14-24 @ 11:17)  Organism: Enterococcus faecalis (10-14-24 @ 11:17)      Method Type: ARLEY      -  Ampicillin: S <=2 Predicts results to ampicillin/sulbactam, amoxacillin-clavulanate and  piperacillin-tazobactam.      -  Vancomycin: S 2  Organism: Klebsiella pneumoniae (10-14-24 @ 11:17)      Method Type: ARLEY      -  Amoxicillin/Clavulanic Acid: S <=8/4      -  Ampicillin: R >16 These ampicillin results predict results for amoxicillin      -  Ampicillin/Sulbactam: S 8/4      -  Aztreonam: S <=4      -  Cefazolin: S <=2      -  Cefepime: S <=2      -  Cefoxitin: S <=8      -  Ceftriaxone: S <=1      -  Ciprofloxacin: S <=0.25      -  Ertapenem: S <=0.5      -  Gentamicin: S <=2      -  Imipenem: S <=1      -  Levofloxacin: S <=0.5      -  Meropenem: S <=1      -  Piperacillin/Tazobactam: S <=8      -  Tobramycin: S <=2      -  Trimethoprim/Sulfamethoxazole: S <=0.5/9.5        RADIOLOGY & ADDITIONAL TESTS:    Personally reviewed.     Consultant(s) Notes Reviewed:  [x] YES  [ ] NO

## 2024-10-15 NOTE — PROGRESS NOTE ADULT - ASSESSMENT
78yo F with PMH: Renal Cell Carcinoma with mets to liver, IR embolization of a liver lesion on 7/30, Diverticulitis, HTN, HLD, Hypothyroidism,  recent new onset Afib/RVR in August 2024 started on Eliquis, GI Bleed after ERCP- resolved without intervention, liver abscesses, an admission on  8/2-8/15 for septic shock requiring pressors and critical care admission, Found to have dilated common bile duct, elevated LFTs and likely cholangitis/choledocholithiasis with stone, s/p ERCP with sphincterotomy/stone removal and stent placement on 8/5/24, admitted 9/9-9/14 for septic shock, requiring levophed and critical care admission, blood cultures during admission had Enterobacter, sensitive to Cipro. Noted liver abscess on MRI, felt to be too small for biopsy at the time, decision for prolonged antibiotic course made. Discharged on Cipro/Flagyl to complete her therapy, this was completed on Monday 9/23. Then patient was admitted on 9/29 for evaluation of fever to 103, nausea, vomiting, diarrhea, similar to her presentation of her 9/9-9/14 admission, when admitted on 9/29 was so hypotensive has required levophed after fluids. The patient is still on pressors after fluids without improvement. Initial lactate was 8, but has improved to normal. The patient is alert and oriented and has mild difuse abdominal pain but overall her GI symptoms are improved.     1. Liver abscess s/p drainage with ENFA, KLPN. Sepsis with ENFA, ENFAE, KLPN and CLPE. Metastatic renal cell Ca with mets to the liver. CRF stage 3. Immunocompromised host.   -Patient admitted with septic shock secondary to multiple organisms most likely due to GI or liver origin, due to liver abscesses or masses  - completed appropriate course of meropenem and vancomycin and was put on ertapenem and augmentin in anticipation of discharge; however had fever, hypotension after the IR procedure of new drain placement, thus meropenem and vancomycin were restarted  - anticipate IR evaluation on 10/15  - if all goes well, hopefully will be able to discharge home on ertapenem  one gram daily to complete antibiotics on 11/5/24 with weekly cbc, cmp, esr, crp plus augmentin 500 mg po q 12 hours with same end date  - midline is in place    2. other issues; per medicine    [facility] abx token not applicable at this time

## 2024-10-15 NOTE — PROGRESS NOTE ADULT - SUBJECTIVE AND OBJECTIVE BOX
Date of service: 10-15-24 @ 11:16      Patient sitting in chair; wants to go home, anxious for IR procedure, afebrile, no complaints      ROS: no fever or chills; denies dizziness, no HA, no SOB or cough, no abdominal pain, no diarrhea or constipation; no dysuria, no urinary frequency, no legs pain, no rashes    MEDICATIONS  (STANDING):  aMIOdarone    Tablet 100 milliGRAM(s) Oral daily  apixaban 5 milliGRAM(s) Oral every 12 hours  chlorhexidine 4% Liquid 1 Application(s) Topical <User Schedule>  hydrocortisone 10 milliGRAM(s) Oral at bedtime  hydrocortisone 20 milliGRAM(s) Oral daily  levothyroxine 100 MICROGram(s) Oral daily  meropenem Injectable 1000 milliGRAM(s) IV Push every 8 hours  metoprolol succinate ER 50 milliGRAM(s) Oral every 12 hours  pantoprazole  Injectable 40 milliGRAM(s) IV Push daily  sodium chloride 0.9%. 1000 milliLiter(s) (125 mL/Hr) IV Continuous <Continuous>  vancomycin  IVPB 750 milliGRAM(s) IV Intermittent every 12 hours    MEDICATIONS  (PRN):      Vital Signs Last 24 Hrs  T(C): 36.3 (15 Oct 2024 08:55), Max: 36.9 (14 Oct 2024 16:12)  T(F): 97.4 (15 Oct 2024 08:55), Max: 98.4 (14 Oct 2024 16:12)  HR: 74 (15 Oct 2024 08:55) (64 - 84)  BP: 118/77 (15 Oct 2024 08:55) (109/87 - 128/67)  BP(mean): --  RR: 16 (15 Oct 2024 08:55) (16 - 18)  SpO2: 100% (15 Oct 2024 08:55) (96% - 100%)    Parameters below as of 15 Oct 2024 08:55  Patient On (Oxygen Delivery Method): room air            Physical Exam:        Constitutional: frail looking  HEENT: NC/AT, EOMI, PERRLA, conjunctivae clear; ears and nose atraumatic; pharynx clear  Neck: supple; thyroid not palpable  Back: no tenderness  Respiratory: respiratory effort normal; clear to auscultation  Cardiovascular: S1S2 regular, no murmurs  Abdomen: soft, mildly tender, not distended, positive BS; no liver or spleen organomegaly; right sided drain   Genitourinary: no suprapubic tenderness  Musculoskeletal: no muscle tenderness, no joint swelling or tenderness  Neurological/ Psychiatric: AxOx3, judgement and insight normal;  moving all extremities  Skin: no rashes; no palpable lesions    Labs: reviewed    Labs:                        9.0    10.65 )-----------( 469      ( 14 Oct 2024 07:19 )             29.0     10-14    138  |  109[H]  |  6[L]  ----------------------------<  85  3.6   |  26  |  0.55    Ca    8.5      14 Oct 2024 07:19    TPro  5.3[L]  /  Alb  1.6[L]  /  TBili  0.5  /  DBili  x   /  AST  61[H]  /  ALT  110[H]  /  AlkPhos  328[H]  10-14           Cultures:       Culture - Blood (collected 10-12-24 @ 13:23)  Source: .Blood BLOOD  Preliminary Report (10-13-24 @ 19:01):    No growth at 24 hours    Culture - Blood (collected 10-12-24 @ 13:21)  Source: .Blood BLOOD  Preliminary Report (10-13-24 @ 19:01):    No growth at 24 hours    Culture - Abscess with Gram Stain (collected 10-09-24 @ 11:30)  Source: .Abscess  Gram Stain (10-09-24 @ 18:29):    Moderate polymorphonuclear leukocytes per low power field    Rare Gram positive cocci in pairs per oil power field  Preliminary Report (10-10-24 @ 15:17):    Rare Klebsiella pneumoniae    Numerous Enterococcus faecalis  Organism: Klebsiella pneumoniae  Enterococcus faecalis (10-11-24 @ 15:21)  Organism: Enterococcus faecalis (10-11-24 @ 15:21)      Method Type: ARLEY      -  Ampicillin: S <=2 Predicts results to ampicillin/sulbactam, amoxacillin-clavulanate and  piperacillin-tazobactam.      -  Vancomycin: S 2  Organism: Klebsiella pneumoniae (10-11-24 @ 08:46)      Method Type: ARLEY      -  Amoxicillin/Clavulanic Acid: S <=8/4      -  Ampicillin: R >16 These ampicillin results predict results for amoxicillin      -  Ampicillin/Sulbactam: S 8/4      -  Aztreonam: S <=4      -  Cefazolin: S <=2      -  Cefepime: S <=2      -  Cefoxitin: S <=8      -  Ceftriaxone: S <=1      -  Ciprofloxacin: S <=0.25      -  Ertapenem: S <=0.5      -  Gentamicin: S <=2      -  Imipenem: S <=1      -  Levofloxacin: S <=0.5      -  Meropenem: S <=1      -  Piperacillin/Tazobactam: S <=8      -  Tobramycin: S <=2      -  Trimethoprim/Sulfamethoxazole: S <=0.5/9.5                    < from: CT Abdomen w/ IV Cont (10.08.24 @ 08:18) >    ACC: [id number] EXAM:  CT ABDOMEN ONLY IC   ORDERED BY: FARRUKH MASTERSON     PROCEDURE DATE:  10/08/2024          INTERPRETATION:  CLINICAL INFORMATION: Hepatic abscess drain check    COMPARISON: MRI October 01, 2024, CT September 29, 2024    CONTRAST/COMPLICATIONS:  IV Contrast: Omnipaque 350  90 cc administered   0 cc discarded  Oral Contrast: NONE  Complications: None reported at time of study completion    PROCEDURE:  CT of the Abdomen was performed.  Sagittal and coronal reformats were performed.    FINDINGS:  LOWER CHEST: Within normal limits.    LIVER: Interval placement of pigtail drainage catheter with tip in   segment 8 of the liver. Persistent loculated fluid collections anterior   to the drain measure 5.7 x 2.9 cm (2; 11) and 5.5x 5.3 cm (2; 22).   Multiple additional smaller loculated collections are also noted.   Heterogeneous enhancing tumor is noted in both lobes, unchanged.  BILE DUCTS: Normal caliber. Plastic common duct stent in place.  GALLBLADDER: Within normal limits.  SPLEEN: Within normal limits.  PANCREAS: Within normal limits.  ADRENALS: Within normal limits.  KIDNEYS/URETERS: Left renal parenchymal cyst and bilateral parapelvic   cysts.    VISUALIZED PORTIONS:  BOWEL: Within normal limits.  PERITONEUM/RETROPERITONEUM: Within normal limits.  VESSELS: Within normal limits.  LYMPH NODES: No lymphadenopathy.  ABDOMINAL WALL: Unchanged 2.7 x 1.7 cm soft tissue metastasis in the   right lateral abdominal wall (2; 37).  BONES: No lytic or blastic lesion.    IMPRESSION:  Multiple persistent hepatic abscesses as described. Interventional   radiology consultation recommended.    < end of copied text >          Radiology: all available radiological tests reviewed    Advanced directives addressed: full resuscitation

## 2024-10-15 NOTE — PROGRESS NOTE ADULT - ASSESSMENT
78yo F with hepatic abscess drain, referred to IR with possible new collection  - WBC WNL  - VSS  - IR drain with minimal output or clear bile

## 2024-10-15 NOTE — PROGRESS NOTE ADULT - ASSESSMENT
80yo F with PMH: Renal Cell Carcinoma with mets to liver, IR embolization of a liver lesion on 7/30 Diverticulitis, HTN, HLD, Hypothyroid was recently, recent new onset Afib/RVR in August 2024 started on Eliquis, GI Bleed after ERCP- resolved without intervention, started on eliquis during admit for Afib. She was found to have a liver abscess with sepsis.     #Septic shock 2/2 hepatic abscess  - Off pressors, downgraded to floors 10/4  - S/p IR drain, monitor output   - Repeat blood cx negative  - Abscess fluid cx growing Klebsiella and Enterobacter  - S/p replacement of SUBHA drain to drain additional abscess 10/12  - Repeat CT 10/12: Fluid collection 5.3 x 1.8 cm is stable from prior imaging from 9/9/2024. This is adjacent to tumor located immediately to its left. Given stability and history of tumor ablation this could represent fluid collection or necrotic tumor.  - Continue IV Meropenem and IV Vancomycin  - IR consulted- follow up IR Dr. Alena fulton tomorrow. Plan for drain check at some point if outputs remain low per IR.   - Worsening LFTS due to abscess/Tumor burder  - Palliative recs noted    #Renal Cell Cancer with mets  - Oncology following  - Recent Liver embolization 7/30    #Adrenal insufficiency  - Continue Hydrocortisone    #Paroxysmal Afib  - Continue Eliquis  - Continue Metoprolol and Amiodarone    #GERD  - Continue Protonix    #Hypothyroidism  - Continue Levothyroxine     #DVT ppx  - Continue Eliquis     #Dispo: Follow up IR Dr. Alena fulton tomorrow. Plan for drain check at some point if outputs remain low per IR.     Discussed with patient's brother and sister in law at bedside. All updates provided and questions answered.

## 2024-10-15 NOTE — PROGRESS NOTE ADULT - NUTRITIONAL ASSESSMENT
This patient has been assessed with a concern for Malnutrition and has been determined to have a diagnosis/diagnoses of Moderate protein-calorie malnutrition.    This patient is being managed with:   Diet Regular-  Entered: Oct  3 2024  1:19PM  
This patient has been assessed with a concern for Malnutrition and has been determined to have a diagnosis/diagnoses of Moderate protein-calorie malnutrition.    This patient is being managed with:   Diet Regular-  Entered: Oct  3 2024  1:19PM  
This patient has been assessed with a concern for Malnutrition and has been determined to have a diagnosis/diagnoses of Moderate protein-calorie malnutrition.    This patient is being managed with:   Diet Regular-  Entered: Oct 10 2024  7:36AM  
This patient has been assessed with a concern for Malnutrition and has been determined to have a diagnosis/diagnoses of Moderate protein-calorie malnutrition.    This patient is being managed with:   Diet Regular-  Entered: Oct  3 2024  1:19PM  
This patient has been assessed with a concern for Malnutrition and has been determined to have a diagnosis/diagnoses of Moderate protein-calorie malnutrition.    This patient is being managed with:   Diet Regular-  Entered: Oct 10 2024  7:36AM  
This patient has been assessed with a concern for Malnutrition and has been determined to have a diagnosis/diagnoses of Moderate protein-calorie malnutrition.    This patient is being managed with:   Diet NPO-  Entered: Oct  3 2024  6:26AM  
This patient has been assessed with a concern for Malnutrition and has been determined to have a diagnosis/diagnoses of Moderate protein-calorie malnutrition.    This patient is being managed with:   Diet Regular-  Entered: Oct  3 2024  1:19PM  
This patient has been assessed with a concern for Malnutrition and has been determined to have a diagnosis/diagnoses of Moderate protein-calorie malnutrition.    This patient is being managed with:   Diet NPO-  Entered: Oct  3 2024  6:26AM  
This patient has been assessed with a concern for Malnutrition and has been determined to have a diagnosis/diagnoses of Moderate protein-calorie malnutrition.    This patient is being managed with:   Diet Regular-  Entered: Oct  3 2024  1:19PM  
This patient has been assessed with a concern for Malnutrition and has been determined to have a diagnosis/diagnoses of Moderate protein-calorie malnutrition.    This patient is being managed with:   Diet NPO after Midnight-     NPO Start Date: 01-Oct-2024   NPO Start Time: 23:59  Entered: Oct  1 2024  8:37AM    Diet Regular-  Entered: Oct  1 2024  8:37AM  
This patient has been assessed with a concern for Malnutrition and has been determined to have a diagnosis/diagnoses of Moderate protein-calorie malnutrition.    This patient is being managed with:   Diet NPO-  Entered: Oct  3 2024  6:26AM  
This patient has been assessed with a concern for Malnutrition and has been determined to have a diagnosis/diagnoses of Moderate protein-calorie malnutrition.    This patient is being managed with:   Diet NPO-  NPO for Procedure/Test     NPO Start Date: 09-Oct-2024   NPO Start Time: 08:48  Entered: Oct  9 2024  8:48AM    Diet Regular-  Entered: Oct  3 2024  1:19PM  
This patient has been assessed with a concern for Malnutrition and has been determined to have a diagnosis/diagnoses of Moderate protein-calorie malnutrition.    This patient is being managed with:   Diet Regular-  Entered: Oct  3 2024  1:19PM  
This patient has been assessed with a concern for Malnutrition and has been determined to have a diagnosis/diagnoses of Moderate protein-calorie malnutrition.    This patient is being managed with:   Diet Regular-  Entered: Oct 10 2024  7:36AM  
This patient has been assessed with a concern for Malnutrition and has been determined to have a diagnosis/diagnoses of Moderate protein-calorie malnutrition.    This patient is being managed with:   Diet Regular-  Entered: Oct  1 2024  8:37AM

## 2024-10-16 VITALS
TEMPERATURE: 98 F | DIASTOLIC BLOOD PRESSURE: 73 MMHG | OXYGEN SATURATION: 99 % | HEART RATE: 71 BPM | RESPIRATION RATE: 18 BRPM | SYSTOLIC BLOOD PRESSURE: 141 MMHG

## 2024-10-16 LAB
ALBUMIN SERPL ELPH-MCNC: 1.7 G/DL — LOW (ref 3.3–5)
ALP SERPL-CCNC: 406 U/L — HIGH (ref 40–120)
ALT FLD-CCNC: 66 U/L — SIGNIFICANT CHANGE UP (ref 12–78)
ANION GAP SERPL CALC-SCNC: 7 MMOL/L — SIGNIFICANT CHANGE UP (ref 5–17)
AST SERPL-CCNC: 37 U/L — SIGNIFICANT CHANGE UP (ref 15–37)
BASOPHILS # BLD AUTO: 0.04 K/UL — SIGNIFICANT CHANGE UP (ref 0–0.2)
BASOPHILS NFR BLD AUTO: 0.4 % — SIGNIFICANT CHANGE UP (ref 0–2)
BILIRUB SERPL-MCNC: 0.4 MG/DL — SIGNIFICANT CHANGE UP (ref 0.2–1.2)
BUN SERPL-MCNC: 6 MG/DL — LOW (ref 7–23)
CALCIUM SERPL-MCNC: 8.3 MG/DL — LOW (ref 8.5–10.1)
CHLORIDE SERPL-SCNC: 112 MMOL/L — HIGH (ref 96–108)
CO2 SERPL-SCNC: 23 MMOL/L — SIGNIFICANT CHANGE UP (ref 22–31)
CREAT SERPL-MCNC: 0.55 MG/DL — SIGNIFICANT CHANGE UP (ref 0.5–1.3)
EGFR: 94 ML/MIN/1.73M2 — SIGNIFICANT CHANGE UP
EOSINOPHIL # BLD AUTO: 0.12 K/UL — SIGNIFICANT CHANGE UP (ref 0–0.5)
EOSINOPHIL NFR BLD AUTO: 1.1 % — SIGNIFICANT CHANGE UP (ref 0–6)
GLUCOSE SERPL-MCNC: 82 MG/DL — SIGNIFICANT CHANGE UP (ref 70–99)
HCT VFR BLD CALC: 34.7 % — SIGNIFICANT CHANGE UP (ref 34.5–45)
HGB BLD-MCNC: 10.7 G/DL — LOW (ref 11.5–15.5)
IMM GRANULOCYTES NFR BLD AUTO: 2 % — HIGH (ref 0–0.9)
LYMPHOCYTES # BLD AUTO: 0.66 K/UL — LOW (ref 1–3.3)
LYMPHOCYTES # BLD AUTO: 6.2 % — LOW (ref 13–44)
MAGNESIUM SERPL-MCNC: 1.5 MG/DL — LOW (ref 1.6–2.6)
MCHC RBC-ENTMCNC: 26.2 PG — LOW (ref 27–34)
MCHC RBC-ENTMCNC: 30.8 GM/DL — LOW (ref 32–36)
MCV RBC AUTO: 84.8 FL — SIGNIFICANT CHANGE UP (ref 80–100)
MONOCYTES # BLD AUTO: 0.99 K/UL — HIGH (ref 0–0.9)
MONOCYTES NFR BLD AUTO: 9.4 % — SIGNIFICANT CHANGE UP (ref 2–14)
NEUTROPHILS # BLD AUTO: 8.56 K/UL — HIGH (ref 1.8–7.4)
NEUTROPHILS NFR BLD AUTO: 80.9 % — HIGH (ref 43–77)
PHOSPHATE SERPL-MCNC: 2 MG/DL — LOW (ref 2.5–4.5)
PLATELET # BLD AUTO: 516 K/UL — HIGH (ref 150–400)
POTASSIUM SERPL-MCNC: 3.1 MMOL/L — LOW (ref 3.5–5.3)
POTASSIUM SERPL-SCNC: 3.1 MMOL/L — LOW (ref 3.5–5.3)
PROT SERPL-MCNC: 5.6 GM/DL — LOW (ref 6–8.3)
RBC # BLD: 4.09 M/UL — SIGNIFICANT CHANGE UP (ref 3.8–5.2)
RBC # FLD: 18.7 % — HIGH (ref 10.3–14.5)
SODIUM SERPL-SCNC: 142 MMOL/L — SIGNIFICANT CHANGE UP (ref 135–145)
WBC # BLD: 10.58 K/UL — HIGH (ref 3.8–10.5)
WBC # FLD AUTO: 10.58 K/UL — HIGH (ref 3.8–10.5)

## 2024-10-16 PROCEDURE — 99231 SBSQ HOSP IP/OBS SF/LOW 25: CPT

## 2024-10-16 PROCEDURE — 99232 SBSQ HOSP IP/OBS MODERATE 35: CPT

## 2024-10-16 RX ORDER — ERTAPENEM 1 G/1
1000 INJECTION, POWDER, LYOPHILIZED, FOR SOLUTION INTRAMUSCULAR; INTRAVENOUS EVERY 24 HOURS
Refills: 0 | Status: DISCONTINUED | OUTPATIENT
Start: 2024-10-16 | End: 2024-10-16

## 2024-10-16 RX ADMIN — APIXABAN 5 MILLIGRAM(S): 5 TABLET, FILM COATED ORAL at 13:53

## 2024-10-16 RX ADMIN — AMIODARONE HYDROCHLORIDE 100 MILLIGRAM(S): 50 INJECTION, SOLUTION INTRAVENOUS at 09:52

## 2024-10-16 RX ADMIN — Medication 125 MILLILITER(S): at 05:27

## 2024-10-16 RX ADMIN — PANTOPRAZOLE SODIUM 40 MILLIGRAM(S): 40 TABLET, DELAYED RELEASE ORAL at 09:51

## 2024-10-16 RX ADMIN — CHLORHEXIDINE GLUCONATE ORAL RINSE 1 APPLICATION(S): 1.2 SOLUTION DENTAL at 09:56

## 2024-10-16 RX ADMIN — Medication 50 MILLIGRAM(S): at 09:52

## 2024-10-16 RX ADMIN — ERTAPENEM 120 MILLIGRAM(S): 1 INJECTION, POWDER, LYOPHILIZED, FOR SOLUTION INTRAMUSCULAR; INTRAVENOUS at 11:35

## 2024-10-16 RX ADMIN — MEROPENEM 1000 MILLIGRAM(S): 500 INJECTION INTRAVENOUS at 01:27

## 2024-10-16 RX ADMIN — Medication 1 TABLET(S): at 11:35

## 2024-10-16 RX ADMIN — HYDROCORTISONE 20 MILLIGRAM(S): 5 TABLET ORAL at 09:52

## 2024-10-16 RX ADMIN — Medication 100 MICROGRAM(S): at 05:27

## 2024-10-16 RX ADMIN — APIXABAN 5 MILLIGRAM(S): 5 TABLET, FILM COATED ORAL at 01:25

## 2024-10-16 NOTE — PROGRESS NOTE ADULT - PROVIDER SPECIALTY LIST ADULT
Heme/Onc
Heme/Onc
Hospitalist
Infectious Disease
Infectious Disease
Intervent Radiology
Intervent Radiology
Critical Care
Critical Care
Heme/Onc
Hospitalist
Infectious Disease
Intervent Radiology
Intervent Radiology
Critical Care
Gastroenterology
Heme/Onc
Hospitalist
Infectious Disease
Intervent Radiology
Critical Care
Critical Care
Heme/Onc
Hospitalist
Infectious Disease
Intervent Radiology
Critical Care
Hospitalist
Intervent Radiology
Intervent Radiology
Critical Care

## 2024-10-16 NOTE — PROGRESS NOTE ADULT - SUBJECTIVE AND OBJECTIVE BOX
Date of service: 10-16-24 @ 11:00    Patient comfortable, afebrile, wants to go home, is eating well, no complaints        ROS: no fever or chills; denies dizziness, no HA, no SOB or cough, no abdominal pain, no diarrhea or constipation; no dysuria, no urinary frequency, no legs pain, no rashes    MEDICATIONS  (STANDING):  aMIOdarone    Tablet 100 milliGRAM(s) Oral daily  amoxicillin  500 milliGRAM(s)/clavulanate 1 Tablet(s) Oral two times a day  apixaban 5 milliGRAM(s) Oral every 12 hours  chlorhexidine 4% Liquid 1 Application(s) Topical <User Schedule>  ertapenem  IVPB 1000 milliGRAM(s) IV Intermittent every 24 hours  hydrocortisone 10 milliGRAM(s) Oral at bedtime  hydrocortisone 20 milliGRAM(s) Oral daily  levothyroxine 100 MICROGram(s) Oral daily  metoprolol succinate ER 50 milliGRAM(s) Oral every 12 hours  pantoprazole  Injectable 40 milliGRAM(s) IV Push daily  sodium chloride 0.9%. 1000 milliLiter(s) (125 mL/Hr) IV Continuous <Continuous>    MEDICATIONS  (PRN):      Vital Signs Last 24 Hrs  T(C): 36.6 (16 Oct 2024 09:06), Max: 36.6 (15 Oct 2024 15:31)  T(F): 97.8 (16 Oct 2024 09:06), Max: 97.8 (15 Oct 2024 15:31)  HR: 71 (16 Oct 2024 09:06) (71 - 77)  BP: 141/73 (16 Oct 2024 09:06) (132/78 - 141/73)  BP(mean): --  RR: 18 (16 Oct 2024 09:06) (17 - 18)  SpO2: 99% (16 Oct 2024 09:06) (98% - 100%)    Parameters below as of 16 Oct 2024 09:06  Patient On (Oxygen Delivery Method): room air            Physical Exam:        Constitutional: frail looking  HEENT: NC/AT, EOMI, PERRLA, conjunctivae clear; ears and nose atraumatic; pharynx clear  Neck: supple; thyroid not palpable  Back: no tenderness  Respiratory: respiratory effort normal; clear to auscultation  Cardiovascular: S1S2 regular, no murmurs  Abdomen: soft, mildly tender, not distended, positive BS; no liver or spleen organomegaly; right sided drain   Genitourinary: no suprapubic tenderness  Musculoskeletal: no muscle tenderness, no joint swelling or tenderness  Neurological/ Psychiatric: AxOx3, judgement and insight normal;  moving all extremities  Skin: no rashes; no palpable lesions    Labs: reviewed    Labs:                        9.0    10.65 )-----------( 469      ( 14 Oct 2024 07:19 )             29.0     10-14    138  |  109[H]  |  6[L]  ----------------------------<  85  3.6   |  26  |  0.55    Ca    8.5      14 Oct 2024 07:19    TPro  5.3[L]  /  Alb  1.6[L]  /  TBili  0.5  /  DBili  x   /  AST  61[H]  /  ALT  110[H]  /  AlkPhos  328[H]  10-14           Cultures:       Culture - Blood (collected 10-12-24 @ 13:23)  Source: .Blood BLOOD  Preliminary Report (10-13-24 @ 19:01):    No growth at 24 hours    Culture - Blood (collected 10-12-24 @ 13:21)  Source: .Blood BLOOD  Preliminary Report (10-13-24 @ 19:01):    No growth at 24 hours    Culture - Abscess with Gram Stain (collected 10-09-24 @ 11:30)  Source: .Abscess  Gram Stain (10-09-24 @ 18:29):    Moderate polymorphonuclear leukocytes per low power field    Rare Gram positive cocci in pairs per oil power field  Preliminary Report (10-10-24 @ 15:17):    Rare Klebsiella pneumoniae    Numerous Enterococcus faecalis  Organism: Klebsiella pneumoniae  Enterococcus faecalis (10-11-24 @ 15:21)  Organism: Enterococcus faecalis (10-11-24 @ 15:21)      Method Type: ARLEY      -  Ampicillin: S <=2 Predicts results to ampicillin/sulbactam, amoxacillin-clavulanate and  piperacillin-tazobactam.      -  Vancomycin: S 2  Organism: Klebsiella pneumoniae (10-11-24 @ 08:46)      Method Type: ARLEY      -  Amoxicillin/Clavulanic Acid: S <=8/4      -  Ampicillin: R >16 These ampicillin results predict results for amoxicillin      -  Ampicillin/Sulbactam: S 8/4      -  Aztreonam: S <=4      -  Cefazolin: S <=2      -  Cefepime: S <=2      -  Cefoxitin: S <=8      -  Ceftriaxone: S <=1      -  Ciprofloxacin: S <=0.25      -  Ertapenem: S <=0.5      -  Gentamicin: S <=2      -  Imipenem: S <=1      -  Levofloxacin: S <=0.5      -  Meropenem: S <=1      -  Piperacillin/Tazobactam: S <=8      -  Tobramycin: S <=2      -  Trimethoprim/Sulfamethoxazole: S <=0.5/9.5                    < from: CT Abdomen w/ IV Cont (10.08.24 @ 08:18) >    ACC: [id number] EXAM:  CT ABDOMEN ONLY IC   ORDERED BY: FARRUKH MASTERSON     PROCEDURE DATE:  10/08/2024          INTERPRETATION:  CLINICAL INFORMATION: Hepatic abscess drain check    COMPARISON: MRI October 01, 2024, CT September 29, 2024    CONTRAST/COMPLICATIONS:  IV Contrast: Omnipaque 350  90 cc administered   0 cc discarded  Oral Contrast: NONE  Complications: None reported at time of study completion    PROCEDURE:  CT of the Abdomen was performed.  Sagittal and coronal reformats were performed.    FINDINGS:  LOWER CHEST: Within normal limits.    LIVER: Interval placement of pigtail drainage catheter with tip in   segment 8 of the liver. Persistent loculated fluid collections anterior   to the drain measure 5.7 x 2.9 cm (2; 11) and 5.5x 5.3 cm (2; 22).   Multiple additional smaller loculated collections are also noted.   Heterogeneous enhancing tumor is noted in both lobes, unchanged.  BILE DUCTS: Normal caliber. Plastic common duct stent in place.  GALLBLADDER: Within normal limits.  SPLEEN: Within normal limits.  PANCREAS: Within normal limits.  ADRENALS: Within normal limits.  KIDNEYS/URETERS: Left renal parenchymal cyst and bilateral parapelvic   cysts.    VISUALIZED PORTIONS:  BOWEL: Within normal limits.  PERITONEUM/RETROPERITONEUM: Within normal limits.  VESSELS: Within normal limits.  LYMPH NODES: No lymphadenopathy.  ABDOMINAL WALL: Unchanged 2.7 x 1.7 cm soft tissue metastasis in the   right lateral abdominal wall (2; 37).  BONES: No lytic or blastic lesion.    IMPRESSION:  Multiple persistent hepatic abscesses as described. Interventional   radiology consultation recommended.    < end of copied text >          Radiology: all available radiological tests reviewed    Advanced directives addressed: full resuscitation

## 2024-10-16 NOTE — PROGRESS NOTE ADULT - REASON FOR ADMISSION
fever, abd pain, nausea, diarrhea

## 2024-10-16 NOTE — PROGRESS NOTE ADULT - ASSESSMENT
78yo F with PMH: Renal Cell Carcinoma with mets to liver, IR embolization of a liver lesion on 7/30, Diverticulitis, HTN, HLD, Hypothyroidism,  recent new onset Afib/RVR in August 2024 started on Eliquis, GI Bleed after ERCP- resolved without intervention, liver abscesses, an admission on  8/2-8/15 for septic shock requiring pressors and critical care admission, Found to have dilated common bile duct, elevated LFTs and likely cholangitis/choledocholithiasis with stone, s/p ERCP with sphincterotomy/stone removal and stent placement on 8/5/24, admitted 9/9-9/14 for septic shock, requiring levophed and critical care admission, blood cultures during admission had Enterobacter, sensitive to Cipro. Noted liver abscess on MRI, felt to be too small for biopsy at the time, decision for prolonged antibiotic course made. Discharged on Cipro/Flagyl to complete her therapy, this was completed on Monday 9/23. Then patient was admitted on 9/29 for evaluation of fever to 103, nausea, vomiting, diarrhea, similar to her presentation of her 9/9-9/14 admission, when admitted on 9/29 was so hypotensive has required levophed after fluids. The patient is still on pressors after fluids without improvement. Initial lactate was 8, but has improved to normal. The patient is alert and oriented and has mild difuse abdominal pain but overall her GI symptoms are improved.     1. Liver abscess s/p drainage with ENFA, KLPN. Sepsis with ENFA, ENFAE, KLPN and CLPE. Metastatic renal cell Ca with mets to the liver. CRF stage 3. Immunocompromised host.   -Patient admitted with septic shock secondary to multiple organisms most likely due to GI or liver origin, due to liver abscesses or masses  - completed appropriate course of meropenem and vancomycin and was put on ertapenem and augmentin in anticipation of discharge; however had fever, hypotension after the IR procedure of new drain placement, thus meropenem and vancomycin were restarted  - anticipate IR evaluation on 10/15--- no further drain placements, will need outpatient follow up  - okay from infectious disease standpoint  to discharge home on ertapenem  one gram daily to complete antibiotics on 11/5/24 with weekly cbc, cmp, esr, crp plus augmentin 500 mg po q 12 hours with same end date  - midline is in place    2. other issues; per medicine    [facility] abx token not applicable at this time

## 2024-10-16 NOTE — PROGRESS NOTE ADULT - SUBJECTIVE AND OBJECTIVE BOX
Interventional Radiology Follow-Up Note.    80yo F with PMH: Renal Cell Carcinoma with mets to liver, IR embolization of a liver lesion on 7/30 Diverticulitis, HTN, HLD, Hypothyroid was recently, recent new onset Afib/RVR in August 2024 started on Eliquis, GI Bleed after ERCP- resolved without intervention, started on eliquis during admit for Afib. liver abscesses now s/p IR drainage. Drain was removed and replaced on 10/9.      Medication:   aMIOdarone    Tablet: (10-16)  amoxicillin  500 milliGRAM(s)/clavulanate: (10-16)  apixaban: (10-16)  ertapenem  IVPB: (10-16)  meropenem Injectable: (10-16)  metoprolol succinate ER: (10-16)  vancomycin  IVPB: (10-15)    Vitals:   T(F): 97.8, Max: 97.8 (15:31)  HR: 71  BP: 141/73  RR: 18  SpO2: 99%    Physical Exam:  General: Nontoxic, in NAD.  Abdomen: soft, NTND.   Drain Device: Drain intact attached to gravity drain bag. Drain with SS fluid.  Flushed with 10cc NS w/o difficulty. Dressing clean, dry, intact.   24hr Drain output: 24cc    LABS:  WBC 10.58 / Hgb 10.7 / Hct 34.7 / Plt 516  Na -- / K -- / CO2 -- / Cl -- / BUN -- / Cr -- / Glucose --  ALT -- / AST -- / Alk Phos -- / Tbili --  Ptt -- / Pt -- / INR --    < from: CT Abdomen w/ IV Cont (10.12.24 @ 11:02) >    IMPRESSION:  Interval replacement of the segment 8 percutaneous right hepatic drain   with grossly similar appearance of a complex collection measuring   approximately 6.0 x 6.9 cm. There is a heterogeneous component   posteriorly that appear to represent tumor on the comparison MRI.    There is a second heterogeneous collection measuring approximately 5.1 x   5.9 cm centered within segments 5 and 8 of the liver that appears to be   in continuity with the gallbladder versus a collection within the   gallbladder fossa if there is a prior history of cholecystectomy. There   appears to be focal thickening and hyperenhancement of the cystic duct   and gallbladder neck.    The additional hepatic metastases are somewhat suboptimally assessed and   were better seen on the comparison MRI.    < end of copied text >      Assessment/Plan: 80yo F with PMH: Renal Cell Carcinoma with mets to liver, IR embolization of a liver lesion on 7/30 Diverticulitis, HTN, HLD, Hypothyroid was recently, recent new onset Afib/RVR in August 2024 started on Eliquis, GI Bleed after ERCP- resolved without intervention, started on eliquis during admit for Afib. liver abscesses now s/p IR drainage. Drain was removed and replaced on 10/9.    - CT reviewed with Dr. Carvajal, the second collection that is described on CT scan has been there on prior CT scans and it represents necrosis/tumor. Since pt is not clinically septic and is feeling better there is no role for drainage.   - Flush drain as ordered.  - Change dressing q3 days or when dressing is saturated.  -  Pt to follow up as out pt for tube evaluation. IR phone number is [phone].   - Pt should continue to flush the drain as out pt.  - May benefit from VNS services for drain care.

## 2024-10-21 DIAGNOSIS — I48.0 PAROXYSMAL ATRIAL FIBRILLATION: ICD-10-CM

## 2024-10-21 DIAGNOSIS — A41.9 SEPSIS, UNSPECIFIED ORGANISM: ICD-10-CM

## 2024-10-21 DIAGNOSIS — E83.42 HYPOMAGNESEMIA: ICD-10-CM

## 2024-10-21 DIAGNOSIS — K21.9 GASTRO-ESOPHAGEAL REFLUX DISEASE WITHOUT ESOPHAGITIS: ICD-10-CM

## 2024-10-21 DIAGNOSIS — E03.9 HYPOTHYROIDISM, UNSPECIFIED: ICD-10-CM

## 2024-10-21 DIAGNOSIS — R65.21 SEVERE SEPSIS WITH SEPTIC SHOCK: ICD-10-CM

## 2024-10-21 DIAGNOSIS — I10 ESSENTIAL (PRIMARY) HYPERTENSION: ICD-10-CM

## 2024-10-21 DIAGNOSIS — E78.5 HYPERLIPIDEMIA, UNSPECIFIED: ICD-10-CM

## 2024-10-21 DIAGNOSIS — C78.7 SECONDARY MALIGNANT NEOPLASM OF LIVER AND INTRAHEPATIC BILE DUCT: ICD-10-CM

## 2024-10-21 DIAGNOSIS — Z88.8 ALLERGY STATUS TO OTHER DRUGS, MEDICAMENTS AND BIOLOGICAL SUBSTANCES: ICD-10-CM

## 2024-10-21 DIAGNOSIS — E16.2 HYPOGLYCEMIA, UNSPECIFIED: ICD-10-CM

## 2024-10-21 DIAGNOSIS — E87.20 ACIDOSIS, UNSPECIFIED: ICD-10-CM

## 2024-10-21 DIAGNOSIS — C64.9 MALIGNANT NEOPLASM OF UNSPECIFIED KIDNEY, EXCEPT RENAL PELVIS: ICD-10-CM

## 2024-10-21 DIAGNOSIS — E87.6 HYPOKALEMIA: ICD-10-CM

## 2024-10-21 DIAGNOSIS — R74.01 ELEVATION OF LEVELS OF LIVER TRANSAMINASE LEVELS: ICD-10-CM

## 2024-10-21 DIAGNOSIS — N17.9 ACUTE KIDNEY FAILURE, UNSPECIFIED: ICD-10-CM

## 2024-10-21 DIAGNOSIS — E27.40 UNSPECIFIED ADRENOCORTICAL INSUFFICIENCY: ICD-10-CM

## 2024-10-21 DIAGNOSIS — D75.839 THROMBOCYTOSIS, UNSPECIFIED: ICD-10-CM

## 2024-10-21 DIAGNOSIS — E44.0 MODERATE PROTEIN-CALORIE MALNUTRITION: ICD-10-CM

## 2024-10-21 DIAGNOSIS — E83.39 OTHER DISORDERS OF PHOSPHORUS METABOLISM: ICD-10-CM

## 2024-10-21 DIAGNOSIS — Z79.01 LONG TERM (CURRENT) USE OF ANTICOAGULANTS: ICD-10-CM

## 2024-10-21 DIAGNOSIS — R57.1 HYPOVOLEMIC SHOCK: ICD-10-CM

## 2024-10-23 ENCOUNTER — APPOINTMENT (OUTPATIENT)
Dept: GASTROENTEROLOGY | Facility: CLINIC | Age: 79
End: 2024-10-23
Payer: MEDICARE

## 2024-10-23 VITALS
SYSTOLIC BLOOD PRESSURE: 108 MMHG | HEIGHT: 61 IN | WEIGHT: 140 LBS | BODY MASS INDEX: 26.43 KG/M2 | DIASTOLIC BLOOD PRESSURE: 72 MMHG

## 2024-10-23 DIAGNOSIS — Z09 ENCOUNTER FOR FOLLOW-UP EXAMINATION AFTER COMPLETED TREATMENT FOR CONDITIONS OTHER THAN MALIGNANT NEOPLASM: ICD-10-CM

## 2024-10-23 DIAGNOSIS — K75.0 ABSCESS OF LIVER: ICD-10-CM

## 2024-10-23 PROCEDURE — 99214 OFFICE O/P EST MOD 30 MIN: CPT

## 2024-10-24 ENCOUNTER — OUTPATIENT (OUTPATIENT)
Dept: OUTPATIENT SERVICES | Facility: HOSPITAL | Age: 79
LOS: 1 days | Discharge: HOME | End: 2024-10-24
Payer: MEDICARE

## 2024-10-24 DIAGNOSIS — Z98.890 OTHER SPECIFIED POSTPROCEDURAL STATES: Chronic | ICD-10-CM

## 2024-10-24 DIAGNOSIS — Z90.722 ACQUIRED ABSENCE OF OVARIES, BILATERAL: Chronic | ICD-10-CM

## 2024-10-24 DIAGNOSIS — Z48.03 ENCOUNTER FOR CHANGE OR REMOVAL OF DRAINS: ICD-10-CM

## 2024-10-24 DIAGNOSIS — Z90.89 ACQUIRED ABSENCE OF OTHER ORGANS: Chronic | ICD-10-CM

## 2024-10-24 PROCEDURE — 49424 ASSESS CYST CONTRAST INJECT: CPT

## 2024-10-24 PROCEDURE — C1769: CPT

## 2024-10-24 PROCEDURE — 76080 X-RAY EXAM OF FISTULA: CPT | Mod: 26

## 2024-10-24 PROCEDURE — 76080 X-RAY EXAM OF FISTULA: CPT

## 2024-10-28 DIAGNOSIS — A41.4 SEPSIS DUE TO ANAEROBES: ICD-10-CM

## 2024-10-28 DIAGNOSIS — E78.00 PURE HYPERCHOLESTEROLEMIA, UNSPECIFIED: ICD-10-CM

## 2024-10-28 DIAGNOSIS — A41.59 OTHER GRAM-NEGATIVE SEPSIS: ICD-10-CM

## 2024-10-28 DIAGNOSIS — Z96.89 PRESENCE OF OTHER SPECIFIED FUNCTIONAL IMPLANTS: ICD-10-CM

## 2024-10-28 DIAGNOSIS — A41.81 SEPSIS DUE TO ENTEROCOCCUS: ICD-10-CM

## 2024-10-28 DIAGNOSIS — Z79.890 HORMONE REPLACEMENT THERAPY: ICD-10-CM

## 2024-10-29 NOTE — CHART NOTE - NSCHARTNOTEFT_GEN_A_CORE
HPI: As per medical record,   80yo F with PMH: Renal Cell Carcinoma with mets to liver, IR embolization of a liver lesion on 7/30 Diverticulitis, HTN, HLD, Hypothyroid was recently, recent new onset Afib/RVR in August 2024 started on Eliquis, GI Bleed after ERCP- resolved without intervention, started on eliquis during admit for Afib. liver abscesses.   admitted 8/2-8/15 for septic shock requiring pressors and critical care admission, Found to have dilated common bile duct, elevated LFTs and likely cholangitis/choledocholithiasis with stone, s/p ERCP with sphincterotomy/stone removal and stent placement on 8/5/24  admitted 9/9-9/14 for septic shock, requiring levophed and critical care admission, blood cultures during admission had Enterobacter, sensitive to Cipro. Noted liver abscess on MRI, felt to be too small for biopsy at the time, decision for prolong antibiotic course made. Discharged on Cipro/Flagyl to complete her therapy, this was completed on Monday 9/23.   She was doing well until today, when she developed fever to 103+, nausea, vomiting, diarrhea-brown, which she states is exactly how she presented for 9/9-9/14 admission.  Found to be hypotensive in ED, give 3 liters sepsis fluids without improvement and started on levophed for BP augmentation. ICU Consulted (29 Sep 2024 11:29)      PERTINENT PMH REVIEWED:  [ x ] YES [ ] NO           Primary Contact: annette June ([phone])    HCP [  ] Surrogate [  x ] Guardian [   ]    Mental Status: [ x ] Alert  [ x ] Oriented [  ] Confused [  ] Lethargic   Concerns of Depression [  ] none reported to this SW  Anxiety [   ] none reported to this SW  Baseline ADLs (prior to admission):  Independent [ ] moderately [ ] fully   Dependent   [ ] moderately [ ]fully    Family Meeting attendees: Pt participated in discussion with Liu Sy     Anticipated Grief: Patient[  ] Family [  ]    Caregiver Squires Assessed: Yes [  ] No [  ]    Druze: Religion    Spiritual Concerns: None reported.  available PRN.     Goals of Care: Continue current medical management    Previous Services: [facility]    ADVANCE DIRECTIVES:  [ ] YES [ x ] NO   - Full Code  - Provided with blank HCP & MOLST     Anticipated D/C Plan: return home                     Summary: Palliative SW met with patient to follow up and offer support. Palliative SW role explained. Pt OOB to chair, visiting with her brother. She appears alert, oriented with stable mood and able to make needs known. Pt is recently  and resides home with son Slade. Pt acknowledges meeting with Pall NP Kerrie yesterday. She states that she has the blank HCP and MOLST and hasn't completed them and does not have questions at this time. Pt denies pain/discomfort at this time. Emotional support provided. Our team will continue to follow.
Patient returned from IR.  HR in the 170sDrainage is bloody    Plan:  Bolus 1 L  Stat CBC  Change Levo to eduardo  Amio 150 IV x 1    Patient not SOB or in Pain
Spoke with patient sister, who called regarding fever to 101.9; the patient is taking in fluids, breakfast; has no issues with nausea, vomiting or diarrhea; feels mildly foggy; there is no redness or swelling at the midline site; is taking the invanz and augmentin; had the liver drain removed a few days ago given lack of further drainage. Will continue current therapy, discussed with Dr. Brownlee will speak with patient, any clinical changes patient will come to ED. Will continue po augmentin indefinetly once iv antibiotics are completed.

## 2024-11-06 ENCOUNTER — APPOINTMENT (OUTPATIENT)
Dept: MRI IMAGING | Facility: CLINIC | Age: 79
End: 2024-11-06

## 2024-11-06 ENCOUNTER — OUTPATIENT (OUTPATIENT)
Dept: OUTPATIENT SERVICES | Facility: HOSPITAL | Age: 79
LOS: 1 days | End: 2024-11-06
Payer: MEDICARE

## 2024-11-06 DIAGNOSIS — Z98.890 OTHER SPECIFIED POSTPROCEDURAL STATES: Chronic | ICD-10-CM

## 2024-11-06 PROCEDURE — 74183 MRI ABD W/O CNTR FLWD CNTR: CPT | Mod: 26,MH

## 2024-11-06 PROCEDURE — 74183 MRI ABD W/O CNTR FLWD CNTR: CPT

## 2024-11-06 PROCEDURE — A9585: CPT

## 2024-11-20 ENCOUNTER — APPOINTMENT (OUTPATIENT)
Dept: ORTHOPEDIC SURGERY | Facility: CLINIC | Age: 79
End: 2024-11-20
Payer: MEDICARE

## 2024-11-20 ENCOUNTER — NON-APPOINTMENT (OUTPATIENT)
Age: 79
End: 2024-11-20

## 2024-11-20 VITALS — BODY MASS INDEX: 24.73 KG/M2 | WEIGHT: 131 LBS | HEIGHT: 61 IN

## 2024-11-20 DIAGNOSIS — S92.351A DISPLACED FRACTURE OF FIFTH METATARSAL BONE, RIGHT FOOT, INITIAL ENCOUNTER FOR CLOSED FRACTURE: ICD-10-CM

## 2024-11-20 PROCEDURE — 99203 OFFICE O/P NEW LOW 30 MIN: CPT

## 2024-11-21 ENCOUNTER — DOCTOR'S OFFICE (OUTPATIENT)
Facility: LOCATION | Age: 79
Setting detail: OPHTHALMOLOGY
End: 2024-11-21
Payer: MEDICARE

## 2024-11-21 VITALS — HEIGHT: 55 IN

## 2024-11-21 DIAGNOSIS — H43.393: ICD-10-CM

## 2024-11-21 DIAGNOSIS — H26.492: ICD-10-CM

## 2024-11-21 DIAGNOSIS — Z96.1: ICD-10-CM

## 2024-11-21 DIAGNOSIS — H35.363: ICD-10-CM

## 2024-11-21 PROCEDURE — 92014 COMPRE OPH EXAM EST PT 1/>: CPT | Performed by: OPHTHALMOLOGY

## 2024-11-21 ASSESSMENT — KERATOMETRY
OD_AXISANGLE_DEGREES: 160
OS_K1POWER_DIOPTERS: 41.25
OS_AXISANGLE_DEGREES: 015
OD_K2POWER_DIOPTERS: 41.75
METHOD_AUTO_MANUAL: AUTO
OS_K2POWER_DIOPTERS: 41.50
OD_K1POWER_DIOPTERS: 41.25

## 2024-11-21 ASSESSMENT — REFRACTION_MANIFEST
OS_SPHERE: -0.25
OD_CYLINDER: -1.00
OS_VA1: 20/40
OD_SPHERE: -0.50
OS_SPHERE: -0.50
OS_AXIS: 090
OS_CYLINDER: -0.50
OD_AXIS: 090
OS_VA1: 20/20-
OS_AXIS: 080
OS_CYLINDER: -1.00
OD_VA1: 20/20

## 2024-11-21 ASSESSMENT — REFRACTION_AUTOREFRACTION
OD_SPHERE: -0.50
OD_CYLINDER: -0.75
OS_CYLINDER: -1.50
OS_SPHERE: +0.25
OD_AXIS: 096
OS_AXIS: 078

## 2024-11-21 ASSESSMENT — VISUAL ACUITY
OS_BCVA: 20/20
OD_BCVA: 20/20-2

## 2024-11-21 ASSESSMENT — TONOMETRY
OS_IOP_MMHG: 17
OD_IOP_MMHG: 17

## 2024-11-21 ASSESSMENT — REFRACTION_CURRENTRX
OD_SPHERE: +2.25
OD_VPRISM_DIRECTION: SV
OS_VPRISM_DIRECTION: SV
OS_OVR_VA: 20/
OD_OVR_VA: 20/
OS_SPHERE: +2.25

## 2024-11-30 ENCOUNTER — INPATIENT (INPATIENT)
Facility: HOSPITAL | Age: 79
LOS: 4 days | Discharge: HOME HEALTH | DRG: 395 | End: 2024-12-05
Attending: INTERNAL MEDICINE | Admitting: STUDENT IN AN ORGANIZED HEALTH CARE EDUCATION/TRAINING PROGRAM
Payer: MEDICARE

## 2024-11-30 VITALS
RESPIRATION RATE: 14 BRPM | DIASTOLIC BLOOD PRESSURE: 72 MMHG | HEIGHT: 61 IN | HEART RATE: 97 BPM | SYSTOLIC BLOOD PRESSURE: 90 MMHG

## 2024-11-30 DIAGNOSIS — K63.89 OTHER SPECIFIED DISEASES OF INTESTINE: ICD-10-CM

## 2024-11-30 DIAGNOSIS — Z98.890 OTHER SPECIFIED POSTPROCEDURAL STATES: Chronic | ICD-10-CM

## 2024-11-30 DIAGNOSIS — Z90.89 ACQUIRED ABSENCE OF OTHER ORGANS: Chronic | ICD-10-CM

## 2024-11-30 DIAGNOSIS — Z90.722 ACQUIRED ABSENCE OF OVARIES, BILATERAL: Chronic | ICD-10-CM

## 2024-11-30 LAB
ALBUMIN SERPL ELPH-MCNC: 1.4 G/DL — LOW (ref 3.3–5)
ALBUMIN SERPL ELPH-MCNC: 2.6 G/DL — LOW (ref 3.3–5)
ALP SERPL-CCNC: 263 U/L — HIGH (ref 40–120)
ALP SERPL-CCNC: 461 U/L — HIGH (ref 40–120)
ALT FLD-CCNC: 25 U/L — SIGNIFICANT CHANGE UP (ref 12–78)
ALT FLD-CCNC: 38 U/L — SIGNIFICANT CHANGE UP (ref 12–78)
ANION GAP SERPL CALC-SCNC: 12 MMOL/L — SIGNIFICANT CHANGE UP (ref 5–17)
ANION GAP SERPL CALC-SCNC: 5 MMOL/L — SIGNIFICANT CHANGE UP (ref 5–17)
ANION GAP SERPL CALC-SCNC: 7 MMOL/L — SIGNIFICANT CHANGE UP (ref 5–17)
APPEARANCE UR: ABNORMAL
APTT BLD: 45.8 SEC — HIGH (ref 24.5–35.6)
AST SERPL-CCNC: 50 U/L — HIGH (ref 15–37)
AST SERPL-CCNC: 51 U/L — HIGH (ref 15–37)
BACTERIA # UR AUTO: NEGATIVE /HPF — SIGNIFICANT CHANGE UP
BASOPHILS # BLD AUTO: 0 K/UL — SIGNIFICANT CHANGE UP (ref 0–0.2)
BASOPHILS NFR BLD AUTO: 0 % — SIGNIFICANT CHANGE UP (ref 0–2)
BILIRUB SERPL-MCNC: 1.1 MG/DL — SIGNIFICANT CHANGE UP (ref 0.2–1.2)
BILIRUB SERPL-MCNC: 1.6 MG/DL — HIGH (ref 0.2–1.2)
BILIRUB UR-MCNC: ABNORMAL
BLD GP AB SCN SERPL QL: SIGNIFICANT CHANGE UP
BUN SERPL-MCNC: 18 MG/DL — SIGNIFICANT CHANGE UP (ref 7–23)
BUN SERPL-MCNC: 25 MG/DL — HIGH (ref 7–23)
BUN SERPL-MCNC: 35 MG/DL — HIGH (ref 7–23)
CALCIUM SERPL-MCNC: 6.6 MG/DL — LOW (ref 8.5–10.1)
CALCIUM SERPL-MCNC: 7.5 MG/DL — LOW (ref 8.5–10.1)
CALCIUM SERPL-MCNC: 9.1 MG/DL — SIGNIFICANT CHANGE UP (ref 8.5–10.1)
CAST: >63 /LPF — HIGH (ref 0–4)
CHLORIDE SERPL-SCNC: 107 MMOL/L — SIGNIFICANT CHANGE UP (ref 96–108)
CHLORIDE SERPL-SCNC: 109 MMOL/L — HIGH (ref 96–108)
CHLORIDE SERPL-SCNC: 97 MMOL/L — SIGNIFICANT CHANGE UP (ref 96–108)
CO2 SERPL-SCNC: 17 MMOL/L — LOW (ref 22–31)
CO2 SERPL-SCNC: 18 MMOL/L — LOW (ref 22–31)
CO2 SERPL-SCNC: 20 MMOL/L — LOW (ref 22–31)
COLOR SPEC: SIGNIFICANT CHANGE UP
COMMENT - URINE: SIGNIFICANT CHANGE UP
CREAT SERPL-MCNC: 1 MG/DL — SIGNIFICANT CHANGE UP (ref 0.5–1.3)
CREAT SERPL-MCNC: 1.49 MG/DL — HIGH (ref 0.5–1.3)
CREAT SERPL-MCNC: 2.11 MG/DL — HIGH (ref 0.5–1.3)
DIFF PNL FLD: NEGATIVE — SIGNIFICANT CHANGE UP
EGFR: 24 ML/MIN/1.73M2 — LOW
EGFR: 36 ML/MIN/1.73M2 — LOW
EGFR: 58 ML/MIN/1.73M2 — LOW
EOSINOPHIL # BLD AUTO: 0.35 K/UL — SIGNIFICANT CHANGE UP (ref 0–0.5)
EOSINOPHIL NFR BLD AUTO: 2 % — SIGNIFICANT CHANGE UP (ref 0–6)
FLUAV AG NPH QL: SIGNIFICANT CHANGE UP
FLUBV AG NPH QL: SIGNIFICANT CHANGE UP
GLUCOSE BLDC GLUCOMTR-MCNC: 140 MG/DL — HIGH (ref 70–99)
GLUCOSE BLDC GLUCOMTR-MCNC: 171 MG/DL — HIGH (ref 70–99)
GLUCOSE BLDC GLUCOMTR-MCNC: 34 MG/DL — CRITICAL LOW (ref 70–99)
GLUCOSE SERPL-MCNC: 163 MG/DL — HIGH (ref 70–99)
GLUCOSE SERPL-MCNC: 572 MG/DL — CRITICAL HIGH (ref 70–99)
GLUCOSE SERPL-MCNC: 65 MG/DL — LOW (ref 70–99)
GLUCOSE UR QL: NEGATIVE MG/DL — SIGNIFICANT CHANGE UP
HCT VFR BLD CALC: 32.1 % — LOW (ref 34.5–45)
HCT VFR BLD CALC: 36.1 % — SIGNIFICANT CHANGE UP (ref 34.5–45)
HCT VFR BLD CALC: 44.7 % — SIGNIFICANT CHANGE UP (ref 34.5–45)
HGB BLD-MCNC: 11.4 G/DL — LOW (ref 11.5–15.5)
HGB BLD-MCNC: 13.9 G/DL — SIGNIFICANT CHANGE UP (ref 11.5–15.5)
HGB BLD-MCNC: 9.7 G/DL — LOW (ref 11.5–15.5)
HYALINE CASTS # UR AUTO: PRESENT
INR BLD: 1.79 RATIO — HIGH (ref 0.85–1.16)
KETONES UR-MCNC: NEGATIVE MG/DL — SIGNIFICANT CHANGE UP
LACTATE SERPL-SCNC: 2.7 MMOL/L — HIGH (ref 0.7–2)
LACTATE SERPL-SCNC: 2.8 MMOL/L — HIGH (ref 0.7–2)
LEUKOCYTE ESTERASE UR-ACNC: ABNORMAL
LG PLATELETS BLD QL AUTO: SLIGHT — SIGNIFICANT CHANGE UP
LYMPHOCYTES # BLD AUTO: 1.06 K/UL — SIGNIFICANT CHANGE UP (ref 1–3.3)
LYMPHOCYTES # BLD AUTO: 6 % — LOW (ref 13–44)
MANUAL SMEAR VERIFICATION: SIGNIFICANT CHANGE UP
MCHC RBC-ENTMCNC: 26.4 PG — LOW (ref 27–34)
MCHC RBC-ENTMCNC: 26.7 PG — LOW (ref 27–34)
MCHC RBC-ENTMCNC: 26.8 PG — LOW (ref 27–34)
MCHC RBC-ENTMCNC: 30.2 G/DL — LOW (ref 32–36)
MCHC RBC-ENTMCNC: 31.1 G/DL — LOW (ref 32–36)
MCHC RBC-ENTMCNC: 31.6 G/DL — LOW (ref 32–36)
MCV RBC AUTO: 84.7 FL — SIGNIFICANT CHANGE UP (ref 80–100)
MCV RBC AUTO: 85 FL — SIGNIFICANT CHANGE UP (ref 80–100)
MCV RBC AUTO: 88.4 FL — SIGNIFICANT CHANGE UP (ref 80–100)
MONOCYTES # BLD AUTO: 1.24 K/UL — HIGH (ref 0–0.9)
MONOCYTES NFR BLD AUTO: 7 % — SIGNIFICANT CHANGE UP (ref 2–14)
NEUTROPHILS # BLD AUTO: 14.52 K/UL — HIGH (ref 1.8–7.4)
NEUTROPHILS NFR BLD AUTO: 82 % — HIGH (ref 43–77)
NITRITE UR-MCNC: NEGATIVE — SIGNIFICANT CHANGE UP
NRBC # BLD: 0 /100 WBCS — SIGNIFICANT CHANGE UP (ref 0–0)
NRBC # BLD: SIGNIFICANT CHANGE UP /100 WBCS (ref 0–0)
PH UR: 5 — SIGNIFICANT CHANGE UP (ref 5–8)
PLAT MORPH BLD: NORMAL — SIGNIFICANT CHANGE UP
PLATELET # BLD AUTO: 237 K/UL — SIGNIFICANT CHANGE UP (ref 150–400)
PLATELET # BLD AUTO: 259 K/UL — SIGNIFICANT CHANGE UP (ref 150–400)
PLATELET # BLD AUTO: 418 K/UL — HIGH (ref 150–400)
POTASSIUM SERPL-MCNC: 3.7 MMOL/L — SIGNIFICANT CHANGE UP (ref 3.5–5.3)
POTASSIUM SERPL-MCNC: 4.2 MMOL/L — SIGNIFICANT CHANGE UP (ref 3.5–5.3)
POTASSIUM SERPL-MCNC: 4.7 MMOL/L — SIGNIFICANT CHANGE UP (ref 3.5–5.3)
POTASSIUM SERPL-SCNC: 3.7 MMOL/L — SIGNIFICANT CHANGE UP (ref 3.5–5.3)
POTASSIUM SERPL-SCNC: 4.2 MMOL/L — SIGNIFICANT CHANGE UP (ref 3.5–5.3)
POTASSIUM SERPL-SCNC: 4.7 MMOL/L — SIGNIFICANT CHANGE UP (ref 3.5–5.3)
PROT SERPL-MCNC: 4.1 GM/DL — LOW (ref 6–8.3)
PROT SERPL-MCNC: 7.1 GM/DL — SIGNIFICANT CHANGE UP (ref 6–8.3)
PROT UR-MCNC: 100 MG/DL
PROTHROM AB SERPL-ACNC: 21 SEC — HIGH (ref 9.9–13.4)
RBC # BLD: 3.63 M/UL — LOW (ref 3.8–5.2)
RBC # BLD: 4.26 M/UL — SIGNIFICANT CHANGE UP (ref 3.8–5.2)
RBC # BLD: 5.26 M/UL — HIGH (ref 3.8–5.2)
RBC # FLD: 18.5 % — HIGH (ref 10.3–14.5)
RBC # FLD: 18.6 % — HIGH (ref 10.3–14.5)
RBC # FLD: 18.8 % — HIGH (ref 10.3–14.5)
RBC BLD AUTO: NORMAL — SIGNIFICANT CHANGE UP
RBC CASTS # UR COMP ASSIST: 4 /HPF — SIGNIFICANT CHANGE UP (ref 0–4)
RSV RNA NPH QL NAA+NON-PROBE: SIGNIFICANT CHANGE UP
SARS-COV-2 RNA SPEC QL NAA+PROBE: SIGNIFICANT CHANGE UP
SODIUM SERPL-SCNC: 127 MMOL/L — LOW (ref 135–145)
SODIUM SERPL-SCNC: 131 MMOL/L — LOW (ref 135–145)
SODIUM SERPL-SCNC: 134 MMOL/L — LOW (ref 135–145)
SP GR SPEC: 1.02 — SIGNIFICANT CHANGE UP (ref 1–1.03)
SQUAMOUS # UR AUTO: 4 /HPF — SIGNIFICANT CHANGE UP (ref 0–5)
TROPONIN I, HIGH SENSITIVITY RESULT: 124.81 NG/L — HIGH
TROPONIN I, HIGH SENSITIVITY RESULT: 128.2 NG/L — HIGH
UROBILINOGEN FLD QL: 0.2 MG/DL — SIGNIFICANT CHANGE UP (ref 0.2–1)
VARIANT LYMPHS # BLD: 3 % — SIGNIFICANT CHANGE UP (ref 0–6)
WBC # BLD: 17.71 K/UL — HIGH (ref 3.8–10.5)
WBC # BLD: 8.2 K/UL — SIGNIFICANT CHANGE UP (ref 3.8–10.5)
WBC # BLD: 9.92 K/UL — SIGNIFICANT CHANGE UP (ref 3.8–10.5)
WBC # FLD AUTO: 17.71 K/UL — HIGH (ref 3.8–10.5)
WBC # FLD AUTO: 8.2 K/UL — SIGNIFICANT CHANGE UP (ref 3.8–10.5)
WBC # FLD AUTO: 9.92 K/UL — SIGNIFICANT CHANGE UP (ref 3.8–10.5)
WBC UR QL: 5 /HPF — SIGNIFICANT CHANGE UP (ref 0–5)

## 2024-11-30 PROCEDURE — 36430 TRANSFUSION BLD/BLD COMPNT: CPT

## 2024-11-30 PROCEDURE — 84436 ASSAY OF TOTAL THYROXINE: CPT

## 2024-11-30 PROCEDURE — 83735 ASSAY OF MAGNESIUM: CPT

## 2024-11-30 PROCEDURE — C1889: CPT

## 2024-11-30 PROCEDURE — 87070 CULTURE OTHR SPECIMN AEROBIC: CPT

## 2024-11-30 PROCEDURE — 86900 BLOOD TYPING SEROLOGIC ABO: CPT

## 2024-11-30 PROCEDURE — 71045 X-RAY EXAM CHEST 1 VIEW: CPT | Mod: 26,77

## 2024-11-30 PROCEDURE — 97163 PT EVAL HIGH COMPLEX 45 MIN: CPT | Mod: GP

## 2024-11-30 PROCEDURE — 87641 MR-STAPH DNA AMP PROBE: CPT

## 2024-11-30 PROCEDURE — P9016: CPT

## 2024-11-30 PROCEDURE — 80048 BASIC METABOLIC PNL TOTAL CA: CPT

## 2024-11-30 PROCEDURE — C1769: CPT

## 2024-11-30 PROCEDURE — 84443 ASSAY THYROID STIM HORMONE: CPT

## 2024-11-30 PROCEDURE — 83605 ASSAY OF LACTIC ACID: CPT

## 2024-11-30 PROCEDURE — 82962 GLUCOSE BLOOD TEST: CPT

## 2024-11-30 PROCEDURE — C9399: CPT

## 2024-11-30 PROCEDURE — 86901 BLOOD TYPING SEROLOGIC RH(D): CPT

## 2024-11-30 PROCEDURE — 87640 STAPH A DNA AMP PROBE: CPT

## 2024-11-30 PROCEDURE — 86850 RBC ANTIBODY SCREEN: CPT

## 2024-11-30 PROCEDURE — 71045 X-RAY EXAM CHEST 1 VIEW: CPT | Mod: 26

## 2024-11-30 PROCEDURE — 99291 CRITICAL CARE FIRST HOUR: CPT

## 2024-11-30 PROCEDURE — 80053 COMPREHEN METABOLIC PANEL: CPT

## 2024-11-30 PROCEDURE — 85027 COMPLETE CBC AUTOMATED: CPT

## 2024-11-30 PROCEDURE — 84484 ASSAY OF TROPONIN QUANT: CPT

## 2024-11-30 PROCEDURE — 87184 SC STD DISK METHOD PER PLATE: CPT

## 2024-11-30 PROCEDURE — 86923 COMPATIBILITY TEST ELECTRIC: CPT

## 2024-11-30 PROCEDURE — 88307 TISSUE EXAM BY PATHOLOGIST: CPT | Mod: 26

## 2024-11-30 PROCEDURE — C1751: CPT

## 2024-11-30 PROCEDURE — 97530 THERAPEUTIC ACTIVITIES: CPT | Mod: GP

## 2024-11-30 PROCEDURE — C1729: CPT

## 2024-11-30 PROCEDURE — 87186 SC STD MICRODIL/AGAR DIL: CPT

## 2024-11-30 PROCEDURE — 93010 ELECTROCARDIOGRAM REPORT: CPT

## 2024-11-30 PROCEDURE — 87077 CULTURE AEROBIC IDENTIFY: CPT

## 2024-11-30 PROCEDURE — 88307 TISSUE EXAM BY PATHOLOGIST: CPT

## 2024-11-30 PROCEDURE — 84100 ASSAY OF PHOSPHORUS: CPT

## 2024-11-30 PROCEDURE — 82140 ASSAY OF AMMONIA: CPT

## 2024-11-30 PROCEDURE — 97116 GAIT TRAINING THERAPY: CPT | Mod: GP

## 2024-11-30 PROCEDURE — 85610 PROTHROMBIN TIME: CPT

## 2024-11-30 PROCEDURE — 85025 COMPLETE CBC W/AUTO DIFF WBC: CPT

## 2024-11-30 PROCEDURE — 74176 CT ABD & PELVIS W/O CONTRAST: CPT | Mod: 26,MC

## 2024-11-30 PROCEDURE — 71045 X-RAY EXAM CHEST 1 VIEW: CPT

## 2024-11-30 PROCEDURE — 49405 IMAGE CATH FLUID COLXN VISC: CPT

## 2024-11-30 PROCEDURE — 36415 COLL VENOUS BLD VENIPUNCTURE: CPT

## 2024-11-30 RX ORDER — CIPROFLOXACIN HCL 750 MG
400 TABLET ORAL ONCE
Refills: 0 | Status: COMPLETED | OUTPATIENT
Start: 2024-11-30 | End: 2024-11-30

## 2024-11-30 RX ORDER — SODIUM CHLORIDE 9 MG/ML
10 INJECTION, SOLUTION INTRAMUSCULAR; INTRAVENOUS; SUBCUTANEOUS
Refills: 0 | Status: DISCONTINUED | OUTPATIENT
Start: 2024-11-30 | End: 2024-12-02

## 2024-11-30 RX ORDER — NOREPINEPHRINE BITARTRATE 1 MG/ML
0.05 INJECTION, SOLUTION, CONCENTRATE INTRAVENOUS
Qty: 8 | Refills: 0 | Status: DISCONTINUED | OUTPATIENT
Start: 2024-11-30 | End: 2024-12-01

## 2024-11-30 RX ORDER — SODIUM CHLORIDE 9 MG/ML
1000 INJECTION, SOLUTION INTRAMUSCULAR; INTRAVENOUS; SUBCUTANEOUS
Refills: 0 | Status: DISCONTINUED | OUTPATIENT
Start: 2024-11-30 | End: 2024-12-02

## 2024-11-30 RX ORDER — SODIUM CHLORIDE 9 MG/ML
1000 INJECTION, SOLUTION INTRAMUSCULAR; INTRAVENOUS; SUBCUTANEOUS ONCE
Refills: 0 | Status: COMPLETED | OUTPATIENT
Start: 2024-11-30 | End: 2024-11-30

## 2024-11-30 RX ORDER — PROTHROMBIN COMPLEX CONCENTRATE (HUMAN) 25.5; 16.5; 24; 22; 22; 26 [IU]/ML; [IU]/ML; [IU]/ML; [IU]/ML; [IU]/ML; [IU]/ML
1500 POWDER, FOR SOLUTION INTRAVENOUS ONCE
Refills: 0 | Status: COMPLETED | OUTPATIENT
Start: 2024-11-30 | End: 2024-11-30

## 2024-11-30 RX ORDER — CHLORHEXIDINE GLUCONATE 1.2 MG/ML
1 RINSE ORAL
Refills: 0 | Status: DISCONTINUED | OUTPATIENT
Start: 2024-11-30 | End: 2024-12-01

## 2024-11-30 RX ORDER — SODIUM CHLORIDE 9 MG/ML
1000 INJECTION, SOLUTION INTRAMUSCULAR; INTRAVENOUS; SUBCUTANEOUS ONCE
Refills: 0 | Status: DISCONTINUED | OUTPATIENT
Start: 2024-11-30 | End: 2024-12-02

## 2024-11-30 RX ORDER — PANTOPRAZOLE SODIUM 40 MG/1
40 TABLET, DELAYED RELEASE ORAL DAILY
Refills: 0 | Status: DISCONTINUED | OUTPATIENT
Start: 2024-11-30 | End: 2024-12-02

## 2024-11-30 RX ORDER — PROTHROMBIN COMPLEX CONCENTRATE (HUMAN) 25.5; 16.5; 24; 22; 22; 26 [IU]/ML; [IU]/ML; [IU]/ML; [IU]/ML; [IU]/ML; [IU]/ML
1500 POWDER, FOR SOLUTION INTRAVENOUS ONCE
Refills: 0 | Status: DISCONTINUED | OUTPATIENT
Start: 2024-11-30 | End: 2024-11-30

## 2024-11-30 RX ORDER — METRONIDAZOLE 500 MG/1
500 TABLET ORAL ONCE
Refills: 0 | Status: COMPLETED | OUTPATIENT
Start: 2024-11-30 | End: 2024-11-30

## 2024-11-30 RX ORDER — SODIUM CHLORIDE 9 MG/ML
2000 INJECTION, SOLUTION INTRAMUSCULAR; INTRAVENOUS; SUBCUTANEOUS ONCE
Refills: 0 | Status: COMPLETED | OUTPATIENT
Start: 2024-11-30 | End: 2024-11-30

## 2024-11-30 RX ORDER — VITAMIN E (DL,TOCOPHERYL ACET) 22.5 MG/ML
2 DROPS ORAL
Refills: 0 | DISCHARGE

## 2024-11-30 RX ORDER — CHLORHEXIDINE GLUCONATE 1.2 MG/ML
1 RINSE ORAL
Refills: 0 | Status: DISCONTINUED | OUTPATIENT
Start: 2024-11-30 | End: 2024-12-02

## 2024-11-30 RX ORDER — OLMESARTAN MEDOXOMIL 20 MG/1
2 TABLET, FILM COATED ORAL
Refills: 0 | DISCHARGE

## 2024-11-30 RX ORDER — FENTANYL 12 UG/H
25 PATCH, EXTENDED RELEASE TRANSDERMAL ONCE
Refills: 0 | Status: COMPLETED | OUTPATIENT
Start: 2024-11-30 | End: 2024-11-30

## 2024-11-30 RX ORDER — HEPARIN SODIUM,PORCINE 1000/ML
5000 VIAL (ML) INJECTION EVERY 8 HOURS
Refills: 0 | Status: DISCONTINUED | OUTPATIENT
Start: 2024-12-01 | End: 2024-12-05

## 2024-11-30 RX ORDER — ERTAPENEM 1 G/1
500 INJECTION, POWDER, LYOPHILIZED, FOR SOLUTION INTRAMUSCULAR; INTRAVENOUS EVERY 24 HOURS
Refills: 0 | Status: DISCONTINUED | OUTPATIENT
Start: 2024-11-30 | End: 2024-12-01

## 2024-11-30 RX ORDER — LEVOTHYROXINE SODIUM 150 MCG
50 TABLET ORAL AT BEDTIME
Refills: 0 | Status: DISCONTINUED | OUTPATIENT
Start: 2024-11-30 | End: 2024-12-02

## 2024-11-30 RX ADMIN — PROTHROMBIN COMPLEX CONCENTRATE (HUMAN) 400 INTERNATIONAL UNIT(S): 25.5; 16.5; 24; 22; 22; 26 POWDER, FOR SOLUTION INTRAVENOUS at 16:37

## 2024-11-30 RX ADMIN — NOREPINEPHRINE BITARTRATE 5.47 MICROGRAM(S)/KG/MIN: 1 INJECTION, SOLUTION, CONCENTRATE INTRAVENOUS at 15:28

## 2024-11-30 RX ADMIN — Medication 50 MILLILITER(S): at 16:37

## 2024-11-30 RX ADMIN — SODIUM CHLORIDE 1000 MILLILITER(S): 9 INJECTION, SOLUTION INTRAMUSCULAR; INTRAVENOUS; SUBCUTANEOUS at 16:00

## 2024-11-30 RX ADMIN — Medication 50 MICROGRAM(S): at 23:07

## 2024-11-30 RX ADMIN — Medication 324 MILLIGRAM(S): at 13:47

## 2024-11-30 RX ADMIN — METRONIDAZOLE 100 MILLIGRAM(S): 500 TABLET ORAL at 14:43

## 2024-11-30 RX ADMIN — Medication 200 MILLIGRAM(S): at 13:18

## 2024-11-30 RX ADMIN — SODIUM CHLORIDE 2000 MILLILITER(S): 9 INJECTION, SOLUTION INTRAMUSCULAR; INTRAVENOUS; SUBCUTANEOUS at 13:05

## 2024-11-30 RX ADMIN — SODIUM CHLORIDE 1000 MILLILITER(S): 9 INJECTION, SOLUTION INTRAMUSCULAR; INTRAVENOUS; SUBCUTANEOUS at 15:02

## 2024-11-30 RX ADMIN — SODIUM CHLORIDE 100 MILLILITER(S): 9 INJECTION, SOLUTION INTRAMUSCULAR; INTRAVENOUS; SUBCUTANEOUS at 22:04

## 2024-11-30 NOTE — ED ADULT NURSE NOTE - NSSEPSISSUSPECTED_ED_A_ED
Quality 226: Preventive Care And Screening: Tobacco Use: Screening And Cessation Intervention: Patient screened for tobacco use and is an ex/non-smoker Quality 130: Documentation Of Current Medications In The Medical Record: Current Medications Documented Quality 111:Pneumonia Vaccination Status For Older Adults: Pneumococcal Vaccination Previously Received Quality 431: Preventive Care And Screening: Unhealthy Alcohol Use - Screening: Patient screened for unhealthy alcohol use using a single question and scores less than 2 times per year Detail Level: Detailed Quality 402: Tobacco Use And Help With Quitting Among Adolescents: Patient screened for tobacco and never smoked Yes

## 2024-11-30 NOTE — ED ADULT NURSE NOTE - OBJECTIVE STATEMENT
pt BIBEMS from home w/ 2 days of RUQ and weakness. +vomiting and diarrhea. -urinary s/s. Pt has hx of liver ca w/ known abscesses on liver. States she is on oral abx for sepsis. Unable to obtain oral temp in triage, primary RN aware.

## 2024-11-30 NOTE — ED ADULT TRIAGE NOTE - CHIEF COMPLAINT QUOTE
pt BIBEMS from home w/ 2 days of RLQ and weakness. +vomiting and diarrhea. -urinary s/s. Pt has hx of liver ca w/ known abscesses on liver. States she is on oral abx for sepsis. Unable to obtain oral temp in triage, primary RN aware. pt BIBEMS from home w/ 2 days of RUQ and weakness. +vomiting and diarrhea. -urinary s/s. Pt has hx of liver ca w/ known abscesses on liver. States she is on oral abx for sepsis. Unable to obtain oral temp in triage, primary RN aware.

## 2024-11-30 NOTE — ED PROVIDER NOTE - PHYSICAL EXAMINATION
Gen:  Well appearing in NAD  Head:  NC/AT  HEENT: pupils perrl, no pharyngeal erythema, uvula midline  Cardiac: S1S2, RRR  Abd: Soft, non tender  Resp: No distress, CTA   musculoskeletal: no deformities, no swelling, strength +5/+5  Skin: warm and dry as visualized, no rashes  Neuro: CHILD, aao x 4  Psych: alert, cooperative, appropriate mood and affect for situation Gen:  Ill appearing in NAD  Head:  NC/AT  HEENT: pupils perrl,no pharyngeal erythema, uvula midline  Cardiac: S1S2, RRR  Abd: Soft, ruq ttpr  Resp: No distress, CTA   musculoskeletal:: no deformities, no swelling, strength +5/+5  Skin: warm and dry as visualized, no rashes  Neuro: CHILD, aao x 4  Psych:alert, cooperative, appropriate mood and affect for situation

## 2024-11-30 NOTE — H&P ADULT - NSHPLABSRESULTS_GEN_ALL_CORE
< from: CT Abdomen and Pelvis No Cont (11.30.24 @ 14:23) >    IMPRESSION:  Pneumatosis of the cecum andascending colon of concern for bowel   ischemia.    Redemonstration of hepatic abscesses and hepatic metastases.    Interval enlargement of bibasilar pulmonary metastases.    Examination findings were conveyed to Dr. Cortes by Dr. Smith at 1500   hours on 11/30/2024 with read back.        --- End of Report ---    < end of copied text >    < from: Xray Chest 1 View- PORTABLE-Urgent (Xray Chest 1 View- PORTABLE-Urgent .) (11.30.24 @ 14:14) >    INTERPRETATION:  Exam:XR CHEST URGENT    clinical history:fever    Bibasilar atelectasis or infiltrates. No obvious effusion. No   pneumothorax.    IMPRESSION: Bibasilar infiltrates or atelectasis    --- End of Report ---    < end of copied text >

## 2024-11-30 NOTE — H&P ADULT - MUSCULOSKELETAL
no joint swelling/no joint erythema/strength 5/5 bilateral upper extremities/strength 5/5 bilateral lower extremities negative

## 2024-11-30 NOTE — ED PROVIDER NOTE - PROGRESS NOTE DETAILS
Received call from radiologist patient has pneumatosis appearing to come from the cecum.  Called Dr. Romero surgeon on-call.  Want to call on-call IR and GI Dr. Lopez as they have placed drains before however will hold off on this call until Dr. Romero from surgery gives us recommendations.  Patient to be admitted likely ICU.  She is also requesting pain medications will give fentanyl. ELSY Cortes DO spoke with Gi, Spoke with Dr. Romero pt tba  to icu, Dr. Arita, is going to surgery and may get IR intervention unsure, however IR and icu spoke. ELSY Cortes DO

## 2024-11-30 NOTE — PROVIDER CONTACT NOTE (EICU) - BACKGROUND
80 y/o F with Renal Cell cancer with liver mets , presented with increasing fatigue. Pt with Hypertension , BP in the 180's . Fever 100.7 , On Levophed received 3.0L of fluids  CT abdomen Pelvis shows Pneumatosis in the Caecal wall

## 2024-11-30 NOTE — CONSULT NOTE ADULT - ASSESSMENT
78 yo fem with metastatic renal cell carcinoma to lung, liver, cecal pneumatosis, liver abscesses, renal failure on eliquis for afib  -Will take patient to OR for lap Rt colectomy/ileostomy. Poor prognosis overall since she has metastatic RCC, survival rate about 6m-12m. patient understood her sick condition. If we don't do surgery cecum will perforate.   _liver abscesses will be drained by Dr Ramírez IR later this week  -IV NS 1000ml x2 (already received 2lts)  -IV Abx  -K centra  -ICU admission

## 2024-11-30 NOTE — ED PROVIDER NOTE - CLINICAL SUMMARY MEDICAL DECISION MAKING FREE TEXT BOX
Pt with enterobacter abscess of liver secondary to renal cancer, on Opdivo. Pt had MRI last week, increased abscesses. Plan for sepsis workup and IV Cipro and Flagyl. Pt with enterobacter abscess of liver secondary to renal cancer, on Opdivo. Pt had MRI last week, increased abscesses. Plan for sepsis workup and IV Cipro and Flagyl.    IR, Surgery, GI and ICU consulted and spoke with tba to icu B Sebastian PARRA

## 2024-11-30 NOTE — PATIENT PROFILE ADULT - ...
30-Nov-2024 21:58:47 Solar[employer] Pregnancy And Lactation Text: This medication is Pregnancy Category B and is considered safe. There is some data to suggest avoiding during the third trimester. It is unknown if this medication is excreted in breast milk.

## 2024-11-30 NOTE — CONSULT NOTE ADULT - SUBJECTIVE AND OBJECTIVE BOX
78 yo fem h/o renal cell carcinoma with lung and liver metastasis 78 yo fem h/o renal cell carcinoma with lung and liver metastasis dx 3 years ago on Tyrosine Kinase inhibitor had liver abscesses drained percutaneously last month, c/o Rt sided abdominals pain x last 2-3 days, no appetite. WBC 17,000. Creat 2. she takes eliquis for afib. Ct Abd showed several liver abscesses, pneumatosis of cecum and ascending  colon, lactate 2.7, Hypotensive on levophed. She has h/o  CBd stent by Dr Turner.    "date of service"24 @ 16:23    HPI:      PAST MEDICAL & SURGICAL HISTORY:  Hypertension      High cholesterol      Diverticulitis      History of diverticulitis      Hypothyroidism      Renal cell cancer      Metastasis to liver      Paroxysmal atrial fibrillation      Cholelithiasis with cholangitis      History of biliary stent insertion      History of tonsillectomy      History of laparotomy      History of arthroscopy      H/O bilateral oophorectomy      S/P surgical removal of pilonidal cyst          24 @ 16:23  REVIEW OF SYSTEMS:    CONSTITUTIONAL: No weakness, fevers or chills  EYES/ENT: No visual changes;  No vertigo or throat pain   NECK: No pain or stiffness  RESPIRATORY: No cough, wheezing, hemoptysis; No shortness of breath  CARDIOVASCULAR: No chest pain or palpitations  GASTROINTESTINAL: abdominal  pain. nausea, vomiting, no hematemesis; No diarrhea or constipation. No melena or hematochezia.  GENITOURINARY: No dysuria, frequency or hematuria  NEUROLOGICAL: No numbness or weakness  SKIN: No itching, burning, rashes, or lesions   All other review of systems is negative unless indicated above.    MEDICATIONS  (STANDING):  dextrose 50% Injectable 50 milliLiter(s) IV Push Once  fentaNYL    Injectable 25 MICROGram(s) IV Push Once  norepinephrine Infusion 0.05 MICROgram(s)/kG/Min (5.47 mL/Hr) IV Continuous <Continuous>  prothrombin complex concentrate IVPB (KCENTRA) 1500 International Unit(s) IV Intermittent once  sodium chloride 0.9% Bolus 1000 milliLiter(s) IV Bolus once  sodium chloride 0.9% Bolus 1000 milliLiter(s) IV Bolus once    MEDICATIONS  (PRN):      Allergies    Zetia (Unknown)  Cabometyx (Unknown)  Lipitor (Unknown)  Norvasc (Faint)  Crestor (Other)  Dyazide (Faint)  statins (Unknown)  bacitracin (Rash)  tivozanib (Faint)    Intolerances        SOCIAL HISTORY:    FAMILY HISTORY:      Vital Signs Last 24 Hrs  T(C): 37.6 (2024 13:32), Max: 37.6 (2024 13:32)  T(F): 99.7 (2024 13:32), Max: 99.7 (2024 13:32)  HR: 93 (:15) (93 - 98)  BP: 86/55 (:15) (76/62 - 90/72)  BP(mean): 66 (:15) (63 - 71)  RR: 19 (:15) (14 - 24)  SpO2: 100% (:15) (90% - 100%)    Parameters below as of :15  Patient On (Oxygen Delivery Method): room air        .24 @ 16:23  VITAL SIGNS:  T(C): 37.6 (30-24 @ 13:32), Max: 37.6 (30-24 @ 13:32)  T(F): 99.7 (30-24 @ 13:32), Max: 99.7 (30-24 @ 13:32)  HR: 93 (-24 @ 13:15) (93 - 98)  BP: 86/55 (30-24 @ 13:15) (76/62 - 90/72)  BP(mean): 66 (30-24 @ 13:15) (63 - 71)  RR: 19 (24 @ 13:15) (14 - 24)  SpO2: 100% (24 @ 13:15) (90% - 100%)  Wt(kg): --    PHYSICAL EXAM:    Constitutional: resting comfortably in bed; NAD  Eyes: PERRL, EOMI, anicteric sclera  ENT: no nasal discharge; uvula midline, no oropharyngeal erythema or exudates  Neck: supple; no JVD or thyromegaly  Respiratory: CTA B/L; no W/R/R, no retractions  Cardiac: +S1/S2; RRR; no murmurs  Gastrointestinal: RLQ rebound  Back: spine midline, no bony tenderness or step-offs; no CVAT B/L  Extremities: no clubbing or cyanosis; no peripheral edema  Musculoskeletal: NROM x4; no joint swelling, tenderness or erythema  Vascular: 2+ radial, femoral, DP/PT pulses B/L  Dermatologic: skin warm, dry and intact; no rashes, wounds, or scars  Lymphatic: no submandibular or cervical LAD  Neurologic: AAOx3; CNII-XII grossly intact; no focal deficits  Psychiatric: affect and characteristics of appearance, verbalizations, behaviors are appropriate    LABS:                        13.9   17.71 )-----------( 418      ( 2024 12:46 )             44.7           127[L]  |  97  |  35[H]  ----------------------------<  65[L]  4.7   |  18[L]  |  2.11[H]    Ca    9.1      2024 12:46    TPro  7.1  /  Alb  2.6[L]  /  TBili  1.6[H]  /  DBili  x   /  AST  51[H]  /  ALT  38  /  AlkPhos  461[H]  11-30      PT/INR - ( 2024 12:46 )   PT: 21.0 sec;   INR: 1.79 ratio         PTT - ( 2024 12:46 )  PTT:45.8 sec    Urinalysis Basic - ( 2024 13:11 )    Color: Dark Yellow / Appearance: Cloudy / S.023 / pH: x  Gluc: x / Ketone: Negative mg/dL  / Bili: Moderate / Urobili: 0.2 mg/dL   Blood: x / Protein: 100 mg/dL / Nitrite: Negative   Leuk Esterase: Small / RBC: 4 /HPF / WBC 5 /HPF   Sq Epi: x / Non Sq Epi: 4 /HPF / Bacteria: Negative /HPF        RADIOLOGY & ADDITIONAL STUDIES:  ct< from: CT Abdomen and Pelvis No Cont (24 @ 14:23) >  ROCEDURE:  CT of the Abdomen and Pelvis was performed.  Sagittal and coronal reformats were performed.    FINDINGS:  LOWER CHEST: Interval enlargement of several bibasilar subcentimeter   pulmonary nodules. Reference right lower lobe nodule (2, 11) measures 0.7   cm, previously 0.4 cm. Coronary artery, aortic valvular and mitral   annular calcifications.    LIVER: Redemonstration of hepatic abscesses, less well demonstrated on   noncontrast study:  Segment 8 (2, 15) measures 3.5 x 2.3 cm, previously 4.6 x 3.5 cm  Segment 5/8 (2, 33) measures 5.6 x 4.5 cm, previously 5.6 x 3.9 cm.  Smaller abscesses cannot be resolved. Known hepatic metastases are   discerned but not measurable.  BILE DUCTS: CBD stent. Pneumobilia.  GALLBLADDER: Not visualized.  SPLEEN: Calcified granulomas.  PANCREAS: Within normal limits.  ADRENALS: Withinnormal limits.  KIDNEYS/URETERS: Within normal limits.    BLADDER: Small air bubble in bladder lumen.  REPRODUCTIVE ORGANS: Uterus and adnexa within normal limits.    BOWEL: No bowel obstruction. Pneumatosis in the cecum and ascending   colon. Appendix is normal.  PERITONEUM/RETROPERITONEUM: Within normal limits.  VESSELS: Atheromatous calcifications.  LYMPH NODES: No lymphadenopathy.  ABDOMINAL WALL: Within normal limits.  BONES: Degenerative changes.    IMPRESSION:  Pneumatosis of the cecum andascending colon of concern for bowel   ischemia.    Redemonstration of hepatic abscesses and hepatic metastases.    Interval enlargement of bibasilar pulmonary metastases.    < end of copied text >

## 2024-11-30 NOTE — ED PROVIDER NOTE - WR ORDER NAME 1
[facility] Pulmonary Medicine    [address] [facility] [address]    Phone:  [phone]    Fax:  [phone]       Thank You for choosing us for your health care visit. We are glad to serve you and happy to provide you with this summary of your visit. Please help us to ensure we have accurate records. If you find anything that needs to be changed, please let our staff know as soon as possible.          Your Demographic Information     Patient Name Sex Jorge Mejia A Male 1961       Ethnic Group Patient Race    Not of  or  Origin White      Your Visit Details     Date & Time Provider Department    2017 4:30 PM Dinesh Cohen MD [facility] Pulmonary Medicine      Your Upcoming Appointment*(Max 10)       9:20 AM CST   Bone Densitometry with [facility]  Breast Imaging ([facility])    [address] [facility] [address]   [phone]            2017 11:00 AM CDT   Medicare Wellness Visit with Follow-Up with Neel Dotson MD   [facility] ([facility])    [address] [facility] [address] [phone]            2017 10:30 AM CDT   RED FOLLOW-UP with John Herrera MD   [facility] PSYCHIATRY ([facility])    [address] [facility] [address] [phone]            2017  9:00 AM CDT   Follow-up Visit with Enedina Lundberg MD   [facility] Neurology ([facility])    [address] [facility] [address] [phone]            2017  9:30 AM CDT   Follow-up Visit with Dinesh Cohen MD   [facility] Pulmonary Medicine ([facility])    [address] [facility] [address] [phone]            2017 10:30 AM CST   Follow-up Visit with Kurt Lerma MD   [facility] Cardiology ([facility])    [address] [address] [place] [address]      [phone]              Conditions Discussed Today or Order-Related Diagnoses        Comments    Chronic obstructive pulmonary disease with acute exacerbation            Your Vitals Were     BP Pulse Resp Height Weight SpO2    152/80 100 18 5' 5\" (1.651 m) 220 lb (99.8 kg) 96%    BMI Smoking Status                36.61 kg/m2 Current Every Day Smoker          Medications Prescribed or Re-Ordered Today     doxycycline hyclate (VIBRAMYCIN) 100 MG capsule    Sig - Route: Take 1 capsule by mouth 2 times daily for 8 days. - Oral    Class: Eprescribe    Pharmacy: [facility] [place] [address] Ph #: [phone]    Cosign for Ordering: Required by Dinesh Cohen MD    methylPREDNISolone (MEDROL DOSEPAK) 4 MG tablet    Sig: follow package directions    Class: Eprescribe    Pharmacy: [facility] [place] [address] Ph #: [phone]    Cosign for Ordering: Required by Dinesh Cohen MD    umeclidinium (INCRUSE ELLIPTA) 62.5 MCG/INH inhaler    Sig - Route: Inhale 1 puff into the lungs daily. - Inhalation    Class: Eprescribe    Pharmacy: [facility] [place] [address] Ph #: [phone]    Cosign for Ordering: Required by Dinesh Cohen MD    albuterol-ipratropium 2.5 mg/0.5 mg (DUONEB) 0.5-2.5 (3) MG/3ML nebulizer solution    Sig - Route: Take 3 mLs by nebulization every 4 hours as needed for Wheezing or Shortness of Breath. - Nebulization    Class: Eprescribe    Pharmacy: [facility] [place] [address] Ph #: [phone]    Cosign for Ordering: Required by Dinesh Cohen MD    fluticasone-salmeterol (ADVAIR DISKUS) 500-50 MCG/DOSE inhaler    Sig - Route: Inhale 1 puff into the lungs 2 times daily. - Inhalation    Class: Eprescribe    Pharmacy: [facility] [place] [address] Ph #: [phone]    Cosign for Ordering: Required by Dinesh Cohen MD       Your Current Medications Are        Disp Refills Start End    umeclidinium (INCRUSE ELLIPTA) 62.5 MCG/INH inhaler 1 each 5 2017     Sig - Route: Inhale 1 puff into the lungs daily. - Inhalation    Class: Eprescribe    Cosign for Ordering: Required by Dinesh Cohen MD    albuterol-ipratropium 2.5 mg/0.5 mg (DUONEB) 0.5-2.5 (3) MG/3ML nebulizer solution 360 mL 12 2017     Sig - Route: Take 3 mLs by nebulization every 4 hours as needed for Wheezing or Shortness of Breath. - Nebulization    Class: Eprescribe    Cosign for Ordering: Required by Dinesh Cohen MD    fluticasone-salmeterol (ADVAIR DISKUS) 500-50 MCG/DOSE inhaler 1 each 5 2017     Sig - Route: Inhale 1 puff into the lungs 2 times daily. - Inhalation    Class: Eprescribe    Cosign for Ordering: Required by Dinesh Cohen MD    fenofibrate (TRICOR) 145 MG tablet 30 tablet 5 2017     Sig - Route: Take 1 tablet by mouth daily. - Oral    Class: Eprescribe    atorvastatin (LIPITOR) 40 MG tablet 30 tablet 5 2017     Sig - Route: Take 1 tablet by mouth daily. - Oral    Class: Eprescribe    gabapentin (NEURONTIN) 600 MG tablet 180 tablet 1 2017     Sig: One po qam, noon, and pm and 3 po qhs    Class: Eprescribe    ARIPiprazole (ABILIFY) 5 MG tablet 30 tablet 2 2017     Sig - Route: Take 1 tablet by mouth every morning. - Oral    Class: Eprescribe    benztropine (COGENTIN) 1 MG tablet 30 tablet 2 2017     Sig: One po qhs    Class: Eprescribe    cloZAPine (CLOZARIL) 100 MG tablet 120 tablet 2 2017     Si po qhs    Class: Eprescribe    DULoxetine (CYMBALTA) 30 MG capsule 30 capsule 2 2017     Sig: One po qam    Class: Eprescribe    DULoxetine (CYMBALTA) 60 MG capsule 30 capsule 2 2017     Sig: One po qd    Class: Eprescribe    QUEtiapine (SEROQUEL) 100 MG tablet 30 tablet 2 2017     Sig: One po qhs    Class: Eprescribe    QUEtiapine (SEROQUEL) 300 MG tablet 60 tablet 2 2017     Si  po qhs    Class: Eprescribe    haloperidol (HALDOL) 2 MG tablet 30 tablet 2 1/30/2017     Sig: One po qhs    Class: Eprescribe    LORazepam (ATIVAN) 1 MG tablet 60 tablet 2 1/30/2017     Sig: Two po qhs    baclofen (LIORESAL) 10 MG tablet 15 tablet 5 1/16/2017     Sig: TAKE ONE-HALF TABLET BY MOUTH at night for spasticity    Class: Eprescribe    carbidopa-levodopa (SINEMET CR)  MG per CR tablet 90 tablet 0 1/16/2017     Sig: Take one tablet by mouth 3 times daily at 8 AM, 5 PM, and 10 PM after meals.    Class: Eprescribe    carbidopa-levodopa (SINEMET)  MG per tablet 120 tablet 0 1/16/2017     Sig: Take one tablet by mouth 4 times daily at 8 AM, Noon, 5 PM, and 10 PM.    Class: Eprescribe    baclofen (LIORESAL) 10 MG tablet 30 tablet 5 1/16/2017     Sig: Half to 1 tablet every 8 h as needed FOR MUSCLE SPASMS/CRAMPS    Class: Eprescribe    Notes to Pharmacy: In addition to 1/2 tab qhs    sildenafil (REVATIO) 20 MG tablet 50 tablet 2 1/12/2017     Sig: Please take 2-5 tablets 1 hour prior to sexual activity.    Class: Eprescribe    esomeprazole (NEXIUM) 40 MG capsule 28 capsule 1 1/9/2017     Sig - Route: Take 1 capsule by mouth daily (with dinner). - Oral    Class: Eprescribe    montelukast (SINGULAIR) 10 MG tablet 28 tablet 1 1/9/2017     Sig - Route: Take 1 tablet by mouth nightly. - Oral    Class: Eprescribe    lisinopril (ZESTRIL) 10 MG tablet 28 tablet 1 1/9/2017     Sig - Route: Take 1 tablet by mouth daily. - Oral    Class: Eprescribe    metFORMIN (GLUCOPHAGE) 500 MG tablet 60 tablet 2 1/9/2017     Sig - Route: Take 1 tablet by mouth 2 times daily. - Oral    Class: Eprescribe    ferrous sulfate 325 (65 FE) MG tablet 56 tablet 1 1/9/2017     Sig - Route: Take 1 tablet by mouth 2 times daily. - Oral    Class: Eprescribe    tamsulosin (FLOMAX) 0.4 MG Cap 28 capsule 1 1/9/2017     Sig - Route: Take 1 capsule by mouth every morning. - Oral    Class: Eprescribe    sucralfate (CARAFATE) 1 G tablet 112  tablet 1 1/9/2017     Sig - Route: Take 1 tablet by mouth 4 times daily. - Oral    Class: Eprescribe    albuterol (PROAIR HFA) 108 (90 BASE) MCG/ACT inhaler 8.5 g 2 1/4/2017     Sig - Route: Inhale 2 puffs into the lungs every 4 hours as needed for Shortness of Breath or Wheezing. - Inhalation    Class: Eprescribe    aspirin (HM ASPIRIN) 81 MG chewable tablet 28 tablet 5 12/8/2016     Sig - Route: Chew 1 tablet by mouth daily. - Oral    Class: Eprescribe    clopidogrel (PLAVIX) 75 MG tablet 28 tablet 11 12/8/2016     Sig - Route: Take 1 tablet by mouth every morning. - Oral    Class: Eprescribe    metoPROLOL (TOPROL-XL) 25 MG 24 hr tablet 28 tablet 11 12/8/2016     Sig - Route: Take 1 tablet by mouth daily. - Oral    Class: Eprescribe    benztropine (COGENTIN) 0.5 MG tablet 30 tablet 1 11/14/2016     Sig - Route: Take 1 tablet by mouth nightly. - Oral    Class: Eprescribe    SOFTCLIX LANCETS Misc 100 each 11 10/7/2016     Sig: Test blood sugar daily E11.9    Class: Eprescribe    DISPENSE 1 each 0 10/7/2016     Sig: Accu Check meter E11.9    blood glucose test strips (ACCU-CHEK COMFORT CURVE) 100 strip 11 10/7/2016     Sig: Test blood sugar 1 times daily as directed. Diagnosis: E11.9. Meter: Accu Check    Class: Eprescribe    docusate sodium (STOOL SOFTENER) 250 MG capsule 60 capsule 11 9/5/2016     Sig: One po bid    Class: Eprescribe    HYDROcodone-acetaminophen (NORCO) 5-325 MG per tablet 30 tablet 0 8/16/2016     Sig: Take one tablet by mouth once daily as needed for pain    Cosign for Ordering: Accepted by Neel Dotson MD on 8/16/2016 12:04 PM    nicotine (NICODERM) 14 MG/24HR 28 patch 2 4/14/2016     Sig: APPLY ONE PATCH TOPICALLY ONCE DAILY    Class: Eprescribe    polyethylene glycol (MIRALAX) powder 527 g 5 12/9/2015     Sig: DISSOLVE 17 GRAMS IN 8OZ OF WATER AND DRINK DAILY AS NEEDED    Class: Eprescribe    acetaminophen (TYLENOL) 325 MG tablet        Sig - Route: Take 650 mg by mouth every 6 hours as  needed for Pain or Fever. Dose: 2 tablets (=650mg) - Oral    Class: Historical Med    diclofenac (VOLTAREN) 1 % gel        Sig - Route: Apply topically 4 times daily. Apply to the affected area(s) as needed. - Topical    Class: Historical Med    triamcinolone (ARISTOCORT) 0.1 % cream 15 g 5 8/13/2015     Sig - Route: Apply topically 2 times daily. As needed - Topical    Class: Eprescribe    albuterol 108 (90 BASE) MCG/ACT inhaler 1 Inhaler 0 2/23/2015     Sig - Route: Inhale 2 puffs into the lungs every 4 hours as needed for Shortness of Breath or Wheezing. - Inhalation    Class: Eprescribe    betamethasone diprop (DIPROLENE AF) 0.05 % cream 30 g 0 6/2/2014     Sig: Apply twice a day to affected area.    Class: Eprescribe    doxycycline hyclate (VIBRAMYCIN) 100 MG capsule 16 capsule 0 2/27/2017 3/7/2017    Sig - Route: Take 1 capsule by mouth 2 times daily for 8 days. - Oral    Class: Eprescribe    Cosign for Ordering: Required by Dinesh Cohen MD    methylPREDNISolone (MEDROL DOSEPAK) 4 MG tablet 21 tablet 0 2/27/2017     Sig: follow package directions    Class: Eprescribe    Cosign for Ordering: Required by Dinesh Cohen MD      Allergies     No Known Allergies      Immunizations History as of 2/27/2017     Name Date    Hepatitis B Adult 10/15/1997, 4/1/1997, 3/4/1997    INFLUENZA QUADRIVALENT 11/14/2015  9:48 AM    Influenza 9/14/2011, 11/8/2010, 10/12/2010, 10/22/2008, 12/11/2006    Influenza Quadrivalent Preservative Free 10/4/2016  8:46 AM    Pneumococcal Polysaccharide Adult 11/14/2015  9:46 AM, 12/11/2006      Problem List as of 2/27/2017     Asthma    GERD    PTSD (post-traumatic stress disorder)    Schizoaffective disorder    Chronic airway obstruction, not elsewhere classified    DM (diabetes mellitus)    Alcoholic polyneuropathy    Obstructive sleep apnea on CPAP    Parkinson disease    Numbness and tingling    Organic brain syndrome    Organic mood disorder    HTN (hypertension)    Trigger  thumb    Elevated sedimentation rate    Non morbid obesity due to excess calories    Nicotine dependence with nicotine-induced disorder    Pleural effusion, left              Patient Instructions    If you are feeling no improvement, or feeling worse by Thursday (3/2/2017) call Reshma NUNN at [phone] (Dr. Cohen's nurse)  Call Monday no matter what and let me know how you feel       MEDICATION: MEDROL DOSE PACK  MEDROL DOSE PACK (generic name: methyl prednisolone) is a steroid medicine. It reduces inflammation, swelling and allergic reactions in all parts of the body.  DIRECTIONS FOR USE:  -- Take this medicine with food to reduce stomach irritation. Take the medicine according to the schedule on the package label.  -- Do not take with grapefruit or grapefruit juice.   -- If you have taken this medicine for more than three weeks, do not stop it suddenly. You will need to reduce the dose gradually. Contact your doctor for advice on how to do this.  WHAT TO WATCH FOR:  POSSIBLE SIDE EFFECTS from SHORT TERM USE (less than two weeks): Nausea, upset stomach --> Take the medicine with food. Increased appetite, temporary weight gain from fluid retention --> These side effects go away once the medicine is stopped. Headache, dizziness, irritability, restlessness, insomnia --> Contact your doctor if these symptoms persist or become severe, lowering the dose or taking smaller doses more often may help. Bleeding from the stomach (red or black vomit, black stools) --> Contact your doctor or return to this facility at once.  ---------- IMPORTANT ----------  MEDICAL CONDITIONS: Before starting this medicine, be sure your doctor knows if you have any of the following conditions:   -- History of stomach ulcer, vomiting blood or bloody stools, psychosis, depression or seizures  -- Pregnancy or breast feeding  -- Liver or kidney disease, high blood pressure, heart disease, colitis or diverticulitis, diabetes, low thyroid,  myasthenia gravis; any active viral, fungal or bacterial infections  DRUG INTERACTIONS: Before starting this medicine, be sure your doctor knows if you are taking any of the following drugs:  -- Aspirin, other anti-inflammatory medicine [ibuprofen (Motrin, Advil), naproxen (Naprosyn, Aleve) and others]  -- Barbiturates, Dilantin (phenytoin), rifampin, Coumadin (warfarin), thiazide diuretics [Lasix (furosemide) and other \"water pills\"]  WARNINGS:  -- If you are diabetic, this medicine may make your blood sugar go higher. Monitor your blood sugars daily and report any problems to your doctor.   -- Limit your use of alcohol. It increases the risk of stomach ulcers when taken with this medicine.  [NOTE: This information topic may not include all directions, precautions, medical conditions, drug/food interactions and warnings for this drug. Check with your doctor, nurse or pharmacist for any questions that you may have.]  © 20005334-0319 [facility], [address] [place] [address]. All rights reserved. This information is not intended as a substitute for professional medical care. Always follow your healthcare professional's instructions.      MEDICATION: DOXYCYCLINE   - 1 tablet 2 times a day  You have been prescribed an antibiotic drug known as Doxycycline (brand name: Vibramycin). It is a form of tetracycline antibiotic used to treat infections.  DIRECTIONS FOR USE:  Doxycycline may be taken with milk or food to prevent upset stomach. Do not take within 1 hour of bedtime. Take the medicine at regular intervals. If the label says “every 12 hours,” this means twice a day. Doses don’t have to be exactly 12 hours apart, but should be spread out evenly twice a day. Take all of the medicine until it is gone, even if you are feeling better. This will ensure the infection is fully treated.  WHAT TO WATCH OUR FOR:  POSSIBLE SIDE EFFECTS: Nausea, vomiting, heartburn, upper abdominal pain, diarrhea (Contact your  doctor if any of these symptoms persist or become severe). White spots in the mouth (thrush), vaginal itching or discharge (Contact your doctor).  ALLERGIC REACTION: Rash, itching, swelling, trouble breathing or swallowing (Contact your doctor or return to this facility promptly).  MEDICAL CONDITIONS: Before starting this medicine, be sure your doctor knows if you have any of the following conditions:  · If you are in the last half of pregnancy or are breastfeeding  · Less than 9 years of age  · Reaction to tetracycline-type drugs in the past  · Kidney or liver disease  DRUG INTERACTION: Before starting this medicine, be sure your doctor knows if you are taking any of the following:  · Penicillin-type antibiotic, phenobarbital, birth control pill  · Coumadin (warfarin), Tegretol (carbamazepine)  WARNING:  · Sensitivity to sunlight may develop. Therefore, you should avoid the sun or use sunscreen for the next several weeks.  · Avoid alcohol when taking this medicine.  · This medicine may become harmful when past the expiration date. Throw away any leftover pills.  · Do not take the following medicines 3 hours before or 3 hours after a dose of doxycycline:  ¨ Antacids, laxatives  ¨ Pepto-Bismol, calcium, iron supplements  [NOTE: This information topic may not include all directions, precautions, medical conditions, drug/food interactions and warnings for this drug. Check with your doctor, nurse, or pharmacist for any questions that you may have.]  © 5039-4672 [facility], [address] [place] [address], [place] [address]. All rights reserved. This information is not intended as a substitute for professional medical care. Always follow your healthcare professional's instructions.    We are also sending refills in on your Incruse, Duoneb, Advair    If you feel significantly worse, please call 911 for assistance or go directly to the ER.              Xray Chest 1 View- PORTABLE-Urgent

## 2024-11-30 NOTE — PATIENT PROFILE ADULT - FALL HARM RISK - HARM RISK INTERVENTIONS

## 2024-11-30 NOTE — ED PROVIDER NOTE - OBJECTIVE STATEMENT
79 year old female with PMHx of HTN, HLD, hypothyroidism, sepsis, renal cancer, on immunotherapy, follows with Dr. Saunders for heme-onc, and liver cancer with known abscesses on liver BIB EMS to the ED c/o weakness, RUQ abdominal pain with associated vomiting and diarrhea x2 days. Pt states she is currently on oral antibiotics for sepsis. Pt has also been admitted recently multiple times in the past for sepsis. Pt denies any urinary symptoms. 79 year old female with PMHx of HTN, HLD, hypothyroidism, sepsis, renal cancer with metastasis to the liver, on immunotherapy (last on Monday) BIB EMS to the ED c/o weakness, RUQ abdominal pain with associated vomiting and diarrhea since yesterday. Pt states she is currently on oral antibiotics (Augmentin) for sepsis. Pt has also been admitted recently multiple times in the past for sepsis. Pt denies any urinary symptoms. Pt had a recent MRI two weeks ago. Pt follows with Dr. Saunders for heme-onc.

## 2024-11-30 NOTE — H&P ADULT - HISTORY OF PRESENT ILLNESS
78yo F with PMH: Renal Cell Carcinoma with mets to liver, IR embolization of a liver lesion on 7/30 Diverticulitis, HTN, HLD, Hypothyroid new onset Afib/RVR in August 2024 started on Eliquis, GI Bleed after ERCP- resolved without intervention, started on eliquis during admit for Afib. liver abscesses    admitted 8/2-8/15 for septic shock requiring pressors and critical care admission, Found to have dilated common bile duct, elevated LFTs and likely cholangitis/choledocholithiasis with stone, s/p ERCP with sphincterotomy/stone removal and stent placement on 8/5/24      admitted 9/9-9/14 for septic shock, requiring levophed and critical care admission, blood cultures during admission had Enterobacter, sensitive to Cipro. Noted liver abscess on MRI, felt to be too small for biopsy at the time, decision for prolong antibiotic course made. Discharged on Cipro/Flagyl to complete her therapy, this was completed on Monday 9/23.     admitted 9/29-10/16 for septic shock requiring pressore  78yo F with PMH: Renal Cell Carcinoma with mets to liver, IR embolization of a liver lesion on 7/30 Diverticulitis, HTN, HLD, Hypothyroid new onset Afib/RVR in August 2024 started on Eliquis, GI Bleed after ERCP- resolved without intervention, started on eliquis during admit for Afib. liver abscesses    admitted 8/2-8/15 for septic shock requiring pressors and critical care admission, Found to have dilated common bile duct, elevated LFTs and likely cholangitis/choledocholithiasis with stone, s/p ERCP with sphincterotomy/stone removal and stent placement on 8/5/24      admitted 9/9-9/14 for septic shock, requiring levophed and critical care admission, blood cultures during admission had Enterobacter, sensitive to Cipro. Noted liver abscess on MRI, felt to be too small for biopsy at the time, decision for prolong antibiotic course made. Discharged on Cipro/Flagyl to complete her therapy, this was completed on Monday 9/23.     admitted 9/29-10/16 septic shock 2/2 hepatic abscess with Klebsiella, Enterococcus faecalis requiring pressors , Enterococcus faecium and Clostridium perfringens growing in blood cultures. Pt had IR drain placed and output was monitored closely. Abscess fluid culture grew Klebsiella, Enterococcus faecalis. Discharged on IV Invanz and PO Augmentin until 11/5.    Patient presented to ED with fatigue and fevers and severe abdominal pain in ED SBP in 80's unresponsive to IVF 5L overall and started on levophed, poor venous access requiring CVC insertion. CT shows Pneumatosis of the cecum and liver abscesses stable from MRI on 11/6 however increased from CT on 10/12. Patient planned for surgical hemicolectomy for pneumatosis today with potential IR intervention later in the week.

## 2024-11-30 NOTE — H&P ADULT - ASSESSMENT
80yo F with PMH: Renal Cell Carcinoma with mets to liver, patient now with septic shock in setting of pneumatosis and liver abbesses requiring vasopressors,  #Severe sepsis with septic shock  #  NEURO: Patient at baseline mental status. Consider morphine for pain management    CV: Sepsis requiring levophed titrate to maintain MAP>70 continue IFV. History of afib consider restarting IV ammio hold metoprolol due to BP. Troponin elevated likely demand ischemia   RESP: Patient 98% on 2L NC no acute distress continue to titrate to maintain SpO2 >92%  RENAL: PRANAY in setting of sepsis, continue IVF, close monitoring of UOP. Maintain K>4 and Mg>2    GI: Patient planned for hemicolectomy this evening for pneumatosis, Possible IR intervention for liver abbesses later in the week will continue to monitor clinical course.    ENDO: Hypothyroidism start   ID:  HEME:  DISPO:   80yo F with PMH: Renal Cell Carcinoma with mets to liver, patient now with septic shock in setting of pneumatosis and liver abbesses requiring vasopressors,  #Severe sepsis with septic shock  #  NEURO: Patient at baseline mental status. Consider morphine for pain management    CV: Sepsis requiring levophed titrate to maintain MAP>70 continue IFV. History of afib consider restarting IV ammio hold metoprolol due to BP. Troponin elevated likely demand ischemia   RESP: Patient 98% on 2L NC no acute distress continue to titrate to maintain SpO2 >92%  RENAL: PRANAY in setting of sepsis, continue IVF, close monitoring of UOP. Maintain K>4 and Mg>2    GI: NPO, Patient planned for hemicolectomy this evening for pneumatosis, Possible IR intervention for liver abbesses later in the week will continue to monitor clinical course. Protonix gi ppx   ENDO: Hypothyroidism start levothyroxine check levels. Hypoglycemic in ED given D50, Maintain glucose 100-180  ID: Septic shock likely due to pneumatosis and liver abbesses, pending blood culture consider starting ertapenem and metronidazole will consult ID     HEME: Hold Eliquis give Kcentra for reversal   DISPO: ICU    Critical Care Time (EXCLUSIVE of any non bundled procedures) :  81 minutes were spent assessing the patient's presenting problems of acute illness that pose a high probability of life threatening  deterioration or end organ damage / dysfunction.  Medical desicion making includes initiation / continuation of plan or care review data/ labwork/ radiographic study, direct patient care bedside ,  discussions with  consultants regarding care,  evaluation and interpretation of vital signs, any necessary ventilator management,   NIV or BIPAP changes  or initiation,    discussions with multidisipliary team,  am or pm rounds, discussions of goals of care with patient and family all non-inclusive of procedures.    Date of entry of this note is equal to the date of services rendered    Case discussed with Dr. Ramírez, Dr. Arita, Dr. Duran and DAMEON  80yo F with PMH: Renal Cell Carcinoma with mets to liver, patient now with septic shock in setting of pneumatosis and liver abbesses requiring vasopressors,    #Severe sepsis with septic shock  #Pneumatosis of the cecum  #Liver abceses   #Metastatic kidney cancer   #PRANAY  #Hypothyroidism   #Afib      NEURO: Patient at baseline mental status. Consider morphine for pain management    CV: Sepsis requiring levophed titrate to maintain MAP>70 continue IFV. History of afib consider restarting IV ammio hold metoprolol due to BP. Troponin elevated likely demand ischemia   RESP: Patient 98% on 2L NC no acute distress continue to titrate to maintain SpO2 >92%  RENAL: PRANAY in setting of sepsis, continue IVF, close monitoring of UOP. Maintain K>4 and Mg>2    GI: NPO, Patient planned for hemicolectomy this evening for pneumatosis, Possible IR intervention for liver abbesses later in the week will continue to monitor clinical course. Protonix gi ppx   ENDO: Hypothyroidism start levothyroxine check levels. Hypoglycemic in ED given D50, Maintain glucose 100-180  ID: Septic shock likely due to pneumatosis and liver abbesses, pending blood culture consider starting ertapenem and metronidazole will consult ID     HEME: Hold Eliquis give Kcentra for reversal   DISPO: ICU    Critical Care Time (EXCLUSIVE of any non bundled procedures) :  81 minutes were spent assessing the patient's presenting problems of acute illness that pose a high probability of life threatening  deterioration or end organ damage / dysfunction.  Medical desicion making includes initiation / continuation of plan or care review data/ labwork/ radiographic study, direct patient care bedside ,  discussions with  consultants regarding care,  evaluation and interpretation of vital signs, any necessary ventilator management,   NIV or BIPAP changes  or initiation,    discussions with multidisipliary team,  am or pm rounds, discussions of goals of care with patient and family all non-inclusive of procedures.    Date of entry of this note is equal to the date of services rendered    Case discussed with Dr. Ramírez, Dr. Arita, Dr. Duran and DAMEON

## 2024-12-01 LAB
ALBUMIN SERPL ELPH-MCNC: 1.9 G/DL — LOW (ref 3.3–5)
ALP SERPL-CCNC: 325 U/L — HIGH (ref 40–120)
ALT FLD-CCNC: 31 U/L — SIGNIFICANT CHANGE UP (ref 12–78)
AMMONIA BLD-MCNC: 38 UMOL/L — HIGH (ref 11–32)
ANION GAP SERPL CALC-SCNC: 6 MMOL/L — SIGNIFICANT CHANGE UP (ref 5–17)
AST SERPL-CCNC: 61 U/L — HIGH (ref 15–37)
BASOPHILS # BLD AUTO: 0.01 K/UL — SIGNIFICANT CHANGE UP (ref 0–0.2)
BASOPHILS NFR BLD AUTO: 0.1 % — SIGNIFICANT CHANGE UP (ref 0–2)
BILIRUB SERPL-MCNC: 0.6 MG/DL — SIGNIFICANT CHANGE UP (ref 0.2–1.2)
BUN SERPL-MCNC: 15 MG/DL — SIGNIFICANT CHANGE UP (ref 7–23)
CALCIUM SERPL-MCNC: 7.8 MG/DL — LOW (ref 8.5–10.1)
CHLORIDE SERPL-SCNC: 111 MMOL/L — HIGH (ref 96–108)
CO2 SERPL-SCNC: 21 MMOL/L — LOW (ref 22–31)
CREAT SERPL-MCNC: 0.79 MG/DL — SIGNIFICANT CHANGE UP (ref 0.5–1.3)
CULTURE RESULTS: NO GROWTH — SIGNIFICANT CHANGE UP
EGFR: 77 ML/MIN/1.73M2 — SIGNIFICANT CHANGE UP
EOSINOPHIL # BLD AUTO: 0 K/UL — SIGNIFICANT CHANGE UP (ref 0–0.5)
EOSINOPHIL NFR BLD AUTO: 0 % — SIGNIFICANT CHANGE UP (ref 0–6)
GLUCOSE BLDC GLUCOMTR-MCNC: 126 MG/DL — HIGH (ref 70–99)
GLUCOSE BLDC GLUCOMTR-MCNC: 129 MG/DL — HIGH (ref 70–99)
GLUCOSE BLDC GLUCOMTR-MCNC: 135 MG/DL — HIGH (ref 70–99)
GLUCOSE SERPL-MCNC: 143 MG/DL — HIGH (ref 70–99)
HCT VFR BLD CALC: 37.3 % — SIGNIFICANT CHANGE UP (ref 34.5–45)
HGB BLD-MCNC: 11.7 G/DL — SIGNIFICANT CHANGE UP (ref 11.5–15.5)
IMM GRANULOCYTES NFR BLD AUTO: 0.5 % — SIGNIFICANT CHANGE UP (ref 0–0.9)
INR BLD: 1.53 RATIO — HIGH (ref 0.85–1.16)
LYMPHOCYTES # BLD AUTO: 0.27 K/UL — LOW (ref 1–3.3)
LYMPHOCYTES # BLD AUTO: 2.4 % — LOW (ref 13–44)
MAGNESIUM SERPL-MCNC: 1.6 MG/DL — SIGNIFICANT CHANGE UP (ref 1.6–2.6)
MCHC RBC-ENTMCNC: 26.8 PG — LOW (ref 27–34)
MCHC RBC-ENTMCNC: 31.4 G/DL — LOW (ref 32–36)
MCV RBC AUTO: 85.4 FL — SIGNIFICANT CHANGE UP (ref 80–100)
MONOCYTES # BLD AUTO: 0.24 K/UL — SIGNIFICANT CHANGE UP (ref 0–0.9)
MONOCYTES NFR BLD AUTO: 2.2 % — SIGNIFICANT CHANGE UP (ref 2–14)
MRSA PCR RESULT.: SIGNIFICANT CHANGE UP
NEUTROPHILS # BLD AUTO: 10.52 K/UL — HIGH (ref 1.8–7.4)
NEUTROPHILS NFR BLD AUTO: 94.8 % — HIGH (ref 43–77)
PHOSPHATE SERPL-MCNC: 3.3 MG/DL — SIGNIFICANT CHANGE UP (ref 2.5–4.5)
PLAT MORPH BLD: NORMAL — SIGNIFICANT CHANGE UP
PLATELET # BLD AUTO: 249 K/UL — SIGNIFICANT CHANGE UP (ref 150–400)
POTASSIUM SERPL-MCNC: 4.6 MMOL/L — SIGNIFICANT CHANGE UP (ref 3.5–5.3)
POTASSIUM SERPL-SCNC: 4.6 MMOL/L — SIGNIFICANT CHANGE UP (ref 3.5–5.3)
PROT SERPL-MCNC: 5.6 GM/DL — LOW (ref 6–8.3)
PROTHROM AB SERPL-ACNC: 17.5 SEC — HIGH (ref 9.9–13.4)
RBC # BLD: 4.37 M/UL — SIGNIFICANT CHANGE UP (ref 3.8–5.2)
RBC # FLD: 18 % — HIGH (ref 10.3–14.5)
RBC BLD AUTO: SIGNIFICANT CHANGE UP
S AUREUS DNA NOSE QL NAA+PROBE: SIGNIFICANT CHANGE UP
SODIUM SERPL-SCNC: 138 MMOL/L — SIGNIFICANT CHANGE UP (ref 135–145)
SPECIMEN SOURCE: SIGNIFICANT CHANGE UP
T4 AB SER-ACNC: 11 UG/DL — SIGNIFICANT CHANGE UP (ref 4.6–12)
TROPONIN I, HIGH SENSITIVITY RESULT: 49.67 NG/L — SIGNIFICANT CHANGE UP
TSH SERPL-MCNC: 1.49 UU/ML — SIGNIFICANT CHANGE UP (ref 0.34–4.82)
WBC # BLD: 11.1 K/UL — HIGH (ref 3.8–10.5)
WBC # FLD AUTO: 11.1 K/UL — HIGH (ref 3.8–10.5)

## 2024-12-01 PROCEDURE — 99233 SBSQ HOSP IP/OBS HIGH 50: CPT

## 2024-12-01 RX ORDER — ACETAMINOPHEN 500MG 500 MG/1
1000 TABLET, COATED ORAL ONCE
Refills: 0 | Status: COMPLETED | OUTPATIENT
Start: 2024-12-01 | End: 2024-12-01

## 2024-12-01 RX ORDER — CHLORHEXIDINE GLUCONATE 1.2 MG/ML
1 RINSE ORAL DAILY
Refills: 0 | Status: DISCONTINUED | OUTPATIENT
Start: 2024-12-01 | End: 2024-12-01

## 2024-12-01 RX ORDER — MEROPENEM 500 MG/1
1000 INJECTION, POWDER, FOR SOLUTION INTRAVENOUS EVERY 8 HOURS
Refills: 0 | Status: DISCONTINUED | OUTPATIENT
Start: 2024-12-01 | End: 2024-12-01

## 2024-12-01 RX ORDER — MEROPENEM 500 MG/1
1000 INJECTION, POWDER, FOR SOLUTION INTRAVENOUS EVERY 8 HOURS
Refills: 0 | Status: DISCONTINUED | OUTPATIENT
Start: 2024-12-01 | End: 2024-12-04

## 2024-12-01 RX ADMIN — Medication 5000 UNIT(S): at 05:57

## 2024-12-01 RX ADMIN — Medication 50 MICROGRAM(S): at 21:55

## 2024-12-01 RX ADMIN — Medication 4 MILLIGRAM(S): at 08:12

## 2024-12-01 RX ADMIN — CHLORHEXIDINE GLUCONATE 1 APPLICATION(S): 1.2 RINSE ORAL at 06:35

## 2024-12-01 RX ADMIN — SODIUM CHLORIDE 100 MILLILITER(S): 9 INJECTION, SOLUTION INTRAMUSCULAR; INTRAVENOUS; SUBCUTANEOUS at 05:57

## 2024-12-01 RX ADMIN — MEROPENEM 1000 MILLIGRAM(S): 500 INJECTION, POWDER, FOR SOLUTION INTRAVENOUS at 13:11

## 2024-12-01 RX ADMIN — Medication 5000 UNIT(S): at 21:54

## 2024-12-01 RX ADMIN — Medication 4 MILLIGRAM(S): at 15:57

## 2024-12-01 RX ADMIN — SODIUM CHLORIDE 100 MILLILITER(S): 9 INJECTION, SOLUTION INTRAMUSCULAR; INTRAVENOUS; SUBCUTANEOUS at 20:17

## 2024-12-01 RX ADMIN — Medication 5000 UNIT(S): at 13:08

## 2024-12-01 RX ADMIN — PANTOPRAZOLE SODIUM 40 MILLIGRAM(S): 40 TABLET, DELAYED RELEASE ORAL at 11:25

## 2024-12-01 RX ADMIN — Medication 4 MILLIGRAM(S): at 08:30

## 2024-12-01 RX ADMIN — MEROPENEM 1000 MILLIGRAM(S): 500 INJECTION, POWDER, FOR SOLUTION INTRAVENOUS at 21:55

## 2024-12-01 RX ADMIN — ACETAMINOPHEN 500MG 400 MILLIGRAM(S): 500 TABLET, COATED ORAL at 13:05

## 2024-12-01 RX ADMIN — Medication 25 GRAM(S): at 08:12

## 2024-12-01 NOTE — CONSULT NOTE ADULT - SUBJECTIVE AND OBJECTIVE BOX
Patient well-known to Dr. Saunders   January 2022  diagnosed renal cell carcinoma, papillary variant with liver metastases   previously treated with nivolumab cabozantinib, initial response however subsequent progressive upper abdominal adenopathy  - treatment changed ipilimumab plus nivolumab and Tivozanib, which required 50% dose reduction  -  July 9, July 30 2024 IR embolization of liver lesion, at [facility]   September 29– - October 16 septic shock secondary to hepatic abscesses with Klebsiella, Enterococcus faecalis requiring extended ICU stay,   treated with IR drain IV Invanz and oral Augmentin.     now presents with several days increasing abdominal discomfort fever and fatigue     past medical history includes but not limited to diverticulitis hypertension hyperlipidemia hypothyroid A-fib     patient found to have pneumatosis ,  subsequent right hemicolectomy  last p.m.,  currently in ICU     patient in ICU bed sitting upright awake alert in no acute distress  NCAT   neck supple   lungs clear   heart S1-S2   abdomen soft,   no bowel sounds noted   extremities without pitting edema   sensory and motor grossly intact    bWBC Count : 11.10 K/uL  RBC Count : 4.37 M/uL  Hemoglobin : 11.7 g/dL  Hematocrit : 37.3 %  Platelet Count - Automated : 249 K/uL  Mean Cell Volume : 85.4 fl  Mean Cell Hemoglobin : 26.8 pg  Mean Cell Hemoglobin Concentration : 31.4 g/dL  Auto Neutrophil # : 10.52 K/uL  Auto Lymphocyte # : 0.27 K/uL  Auto Monocyte # : 0.24 K/uL  Auto Eosinophil # : 0.00 K/uL  Auto Basophil # : 0.01 K/uL  Auto Neutrophil % : 94.8 %  Auto Lymphocyte % : 2.4 %  Auto Monocyte % : 2.2 %  Auto Eosinophil % : 0.0 %  Auto Basophil % : 0.1 %      12-01    138  |  111[H]  |  15  ----------------------------<  143[H]  4.6   |  21[L]  |  0.79    Ca    7.8[L]      01 Dec 2024 06:00  Phos  3.3     12-01  Mg     1.6     12-01    TPro  5.6[L]  /  Alb  1.9[L]  /  TBili  0.6  /  DBili  x   /  AST  61[H]  /  ALT  31  /  AlkPhos  325[H]  12-01          PT/INR - ( 01 Dec 2024 06:00 )   PT: 17.5 sec;   INR: 1.53 ratio         PTT - ( 30 Nov 2024 12:46 )  PTT:45.8 sec    Vital Signs Last 24 Hrs  T(C): 35.7 (01 Dec 2024 08:00), Max: 36.6 (01 Dec 2024 00:00)  T(F): 96.3 (01 Dec 2024 08:00), Max: 97.8 (01 Dec 2024 00:00)  HR: 82 (01 Dec 2024 09:00) (75 - 92)  BP: 125/94 (01 Dec 2024 08:00) (90/61 - 125/94)  BP(mean): 104 (01 Dec 2024 08:00) (71 - 104)  RR: 11 (01 Dec 2024 09:00) (11 - 20)  SpO2: 94% (01 Dec 2024 09:00) (94% - 100%)     CT Abdomen and Pelvis No Cont (11.30.24 @ 14:23) >   CT ABDOMEN AND PELVIS   ORDERED BY: KAROL CHAMORRO     PROCEDURE DATE:  11/30/2024     INTERPRETATION:  CLINICAL INFORMATION: Metastatic renal cancer with   hepatic abscesses. Presents with right upper quadrant pain.    COMPARISON: CT abdomen pelvis dated 10/12/2024. Abdominal MRI dated   11/6/2024.    CONTRAST/COMPLICATIONS:  IV Contrast: NONE  Oral Contrast: NONE      PROCEDURE:  CT of the Abdomen and Pelvis was performed.  Sagittal and coronal reformats were performed.    FINDINGS:  LOWER CHEST: Interval enlargement of several bibasilar subcentimeter   pulmonary nodules. Reference right lower lobe nodule (2, 11) measures 0.7   cm, previously 0.4 cm. Coronary artery, aortic valvular and mitral   annular calcifications.    LIVER: Redemonstration of hepatic abscesses, less well demonstrated on   noncontrast study:  Segment 8 (2, 15) measures 3.5 x 2.3 cm, previously 4.6 x 3.5 cm  Segment 5/8 (2, 33) measures 5.6 x 4.5 cm, previously 5.6 x 3.9 cm.  Smaller abscesses cannot be resolved. Known hepatic metastases are   discerned but not measurable.  BILE DUCTS: CBD stent. Pneumobilia.  GALLBLADDER: Not visualized.  SPLEEN: Calcified granulomas.  PANCREAS: Within normal limits.  ADRENALS: Withinnormal limits.  KIDNEYS/URETERS: Within normal limits.    BLADDER: Small air bubble in bladder lumen.  REPRODUCTIVE ORGANS: Uterus and adnexa within normal limits.    BOWEL: No bowel obstruction. Pneumatosis in the cecum and ascending   colon. Appendix is normal.  PERITONEUM/RETROPERITONEUM: Within normal limits.  VESSELS: Atheromatous calcifications.  LYMPH NODES: No lymphadenopathy.  ABDOMINAL WALL: Within normal limits.  BONES: Degenerative changes.    IMPRESSION:  Pneumatosis of the cecum andascending colon of concern for bowel   ischemia.    Redemonstration of hepatic abscesses and hepatic metastases.    Interval enlargement of bibasilar pulmonary metastases.    Examination findings were conveyed to Dr. Cortes by Dr. Smith at 1500   hours on 11/30/2024 with read back.          < end of copied text >

## 2024-12-01 NOTE — PROGRESS NOTE ADULT - ASSESSMENT
78yo F with PMH: Renal Cell Carcinoma with mets to liver, patient now with septic shock in setting of pneumatosis and liver abbesses requiring vasopressors,  s/p Right hemicolectomy with ileostomy creation. Has not required pressers postoperatively     PLAN  - Monitor for BF  - NPO  - Pressers PRN  - Interval drainage of hepatic abscess  - IV Abx  - Pain control prn  - Nausea control PRN  - Hold Eliquis for now  - Hold chemical DVT today        Case to be discussed with Dr. Duran and updated

## 2024-12-01 NOTE — CONSULT NOTE ADULT - ASSESSMENT
79-year-old female  with 2-year plus history metastatic renal cell carcinoma   history numerous courses immunotherapy ,  IR  embolization of liver metastases,   history of hepatic abscesses ,sp  drainage and IV and p.o. antibiotics   followed closely by ID     now sp  right hemicolectomy for  pneumatosis of the cecum,  persistent liver  abscesses,  progressive increase in pulmonary metastases on abdominal scan.     plan   as discussed with ICU attending Dr. Valle   management  by ICU, infectious disease, surgery.    post surgical care  continue antimicrobial therapy,    possible IR drainage of persistent liver  abscesses    Hold Tivozanib.     once stable from ID , and surgical standpoint, Dr Saunders  can then review additional palliative options for apparent progressive renal cell carcinoma.

## 2024-12-01 NOTE — PROGRESS NOTE ADULT - ASSESSMENT
78 yo fem s/p Rt colectomy/ileostomy/ liver mets/abscesses  -sips/clear OK  -d/c jose alfredo  -Will need liver abscesses drainage by IR (poor prognosis overall due to liver mets)  -IV Abx  -ID consult (might  need PICC)  -SCd  -HSQ

## 2024-12-01 NOTE — CONSULT NOTE ADULT - SUBJECTIVE AND OBJECTIVE BOX
Patient is a 79y old  Female who presents with a chief complaint of   HPI:  78yo F with PMH: Renal Cell Carcinoma with mets to liver, IR embolization of a liver lesion on 7/30 Diverticulitis, HTN, HLD, Hypothyroid new onset Afib/RVR in August 2024 started on Eliquis, GI Bleed after ERCP- resolved without intervention, started on eliquis during admit for Afib. liver abscesses, prior admitted 8/2-8/15 for septic shock requiring pressors and critical care admission, Found to have dilated common bile duct, elevated LFTs and likely cholangitis/choledocholithiasis with stone, s/p ERCP with sphincterotomy/stone removal and stent placement on 8/5/24, admitted 9/9-9/14 for septic shock, requiring levophed and critical care admission, blood cultures during admission had Enterobacter, sensitive to Cipro. Noted liver abscess on MRI, felt to be too small for biopsy at the time, decision for prolong antibiotic course made. Discharged on Cipro/Flagyl to complete her therapy, this was completed on Monday 9/23. , admitted 9/29-10/16 septic shock 2/2 hepatic abscess with Klebsiella, Enterococcus faecalis requiring pressors , Enterococcus faecium and Clostridium perfringens growing in blood cultures. Pt had IR drain placed and output was monitored closely. Abscess fluid culture grew Klebsiella, Enterococcus faecalis. Discharged on IV Invanz and PO Augmentin until 11/5., Patient presented to ED with fatigue and fevers and severe abdominal pain in ED SBP in 80's unresponsive to IVF 5L overall and started on levophed, poor venous access requiring CVC insertion. CT shows Pneumatosis of the cecum and liver abscesses stable from MRI on 11/6 however increased from CT on 10/12. Patient planned for surgical hemicolectomy for pneumatosis today with potential IR intervention later in the week. (30 Nov 2024 17:34)  Above HPI reviewed and reconciled with patient    79F with Renal Cell Carcinoma with mets to liver, IR embolization of a liver lesion on 7/30 Diverticulitis, HTN, HLD, Hypothyroid new onset Afib/RVR in August 2024 started on Eliquis, GI Bleed after ERCP- resolved without intervention, started on eliquis during admit for Afib. liver abscesses, multiple admissions prior for liver abscess, most recently on 9/29-10/16 septic shock 2/2 hepatic abscess with Klebsiella, Enterococcus faecalis requiring pressors , Enterococcus faecium and Clostridium perfringens growing in blood cultures. Pt had IR drain placed and output was monitored closely. Abscess fluid culture grew Klebsiella, Enterococcus faecalis. completed IV Invanz and PO Augmentin on 11/5, presents 11/30 with abdominal pain, fever and fatigue  Afebrile on admission, on RA, but hypotensive  CT with Pneumatosis of the cecum and liver abscesses   s/p Laparoscopic right colectomy (11/30) with pus  WBC 17 > 11  Cr 0.79  UA negative  CXR atelectasis  RVP negative  Prior culture with Pan S Kleb, Pan S E feacalis, E faecium R Amp, S Vanco    PAST MEDICAL & SURGICAL HISTORY:  Hypertension      High cholesterol      Diverticulitis      History of diverticulitis      Hypothyroidism      Renal cell cancer      Metastasis to liver      Paroxysmal atrial fibrillation      Cholelithiasis with cholangitis      History of biliary stent insertion      History of tonsillectomy      History of laparotomy      History of arthroscopy      H/O bilateral oophorectomy      S/P surgical removal of pilonidal cyst        FAMILY HISTORY:    Social Hx: Denies alcohol, tobacco, recreational drug use    Allergies  Zetia (Unknown)  Cabometyx (Unknown)  Lipitor (Unknown)  Norvasc (Faint)  Crestor (Other)  Dyazide (Faint)  statins (Unknown)  bacitracin (Rash)  tivozanib (Faint)    ANTIMICROBIALS (past 90 days)  MEDICATIONS  (STANDING):  ciprofloxacin   IVPB   200 mL/Hr IV Intermittent (11-30-24 @ 13:18)    metroNIDAZOLE  IVPB   100 mL/Hr IV Intermittent (11-30-24 @ 14:43)        ACTIVE ANTIMICROBIALS  s/p Cipro Flagyl x1 (11/30)    MEDICATIONS  (STANDING):  heparin   Injectable 5000 every 8 hours  levothyroxine Injectable 50 at bedtime  morphine  - Injectable 4 every 4 hours PRN  pantoprazole  Injectable 40 daily      REVIEW OF SYSTEMS  [  ] ROS unobtainable because:    [  x] All other systems negative except as noted below:	    Constitutional:  [x ] fever [ ] chills  [ ] weight loss  [ ] weakness  Skin:  [ ] rash [ ] phlebitis	  Eyes: [ ] icterus [ ] pain  [ ] discharge	  ENMT: [ ] sore throat  [ ] thrush [ ] ulcers [ ] exudates  Respiratory: [ ] dyspnea [ ] hemoptysis [ ] cough [ ] sputum	  Cardiovascular:  [ ] chest pain [ ] palpitations [ ] edema	  Gastrointestinal:  [ ] nausea [ ] vomiting [ ] diarrhea [ ] constipation [x ] pain	  Genitourinary:  [ ] dysuria [ ] frequency [ ] hematuria [ ] discharge [ ] flank pain  [ ] incontinence  Musculoskeletal:  [ ] myalgias [ ] arthralgias [ ] arthritis  [ ] back pain  Neurological:  [ ] headache [ ] seizures  [ ] confusion/altered mental status  Psychiatric:  [ ] anxiety [ ] depression	  Hematology/Lymphatics:  [ ] lymphadenopathy  Endocrine:  [ ] adrenal [ ] thyroid  Allergic/Immunologic:	 [ ] transplant [ ] seasonal    Vital Signs Last 24 Hrs  T(C): 36.4 (01 Dec 2024 04:00), Max: 37.6 (30 Nov 2024 13:32)  T(F): 97.5 (01 Dec 2024 04:00), Max: 99.7 (30 Nov 2024 13:32)  HR: 83 (01 Dec 2024 07:00) (75 - 98)  BP: 110/80 (01 Dec 2024 06:00) (76/62 - 116/86)  BP(mean): 90 (01 Dec 2024 06:00) (63 - 94)  RR: 18 (01 Dec 2024 07:00) (14 - 24)  SpO2: 100% (01 Dec 2024 07:00) (90% - 100%)    Parameters below as of 01 Dec 2024 04:00  Patient On (Oxygen Delivery Method): nasal cannula  O2 Flow (L/min): 2      Physical Exam:  Constitutional:  frail, NAD  Head/Eyes: no icterus  LUNGS:  CTA  CVS:  regular rhythm  Abd:  soft, diffuse-tender; mild-distended  Ext:  no edema  Vascular:  IV site no erythema tenderness or discharge  Neuro: AAO X 3, non- focal    Labs: all available labs reviewed                        11.7   11.10 )-----------( 249      ( 01 Dec 2024 06:00 )             37.3     12-01    138  |  111[H]  |  15  ----------------------------<  143[H]  4.6   |  21[L]  |  0.79    Ca    7.8[L]      01 Dec 2024 06:00  Phos  3.3     12-01  Mg     1.6     12-01    TPro  5.6[L]  /  Alb  1.9[L]  /  TBili  0.6  /  DBili  x   /  AST  61[H]  /  ALT  31  /  AlkPhos  325[H]  12-01     LIVER FUNCTIONS - ( 01 Dec 2024 06:00 )  Alb: 1.9 g/dL / Pro: 5.6 gm/dL / ALK PHOS: 325 U/L / ALT: 31 U/L / AST: 61 U/L / GGT: x           Urinalysis Basic - ( 01 Dec 2024 06:00 )    Color: x / Appearance: x / SG: x / pH: x  Gluc: 143 mg/dL / Ketone: x  / Bili: x / Urobili: x   Blood: x / Protein: x / Nitrite: x   Leuk Esterase: x / RBC: x / WBC x   Sq Epi: x / Non Sq Epi: x / Bacteria: x          Radiology: all available radiological tests reviewed    < from: Xray Chest 1 View- PORTABLE-Urgent (Xray Chest 1 View- PORTABLE-Urgent .) (11.30.24 @ 14:14) >  Bibasilar atelectasis or infiltrates. No obvious effusion. No   pneumothorax.    IMPRESSION: Bibasilar infiltrates or atelectasis    < end of copied text >  < from: CT Abdomen and Pelvis No Cont (11.30.24 @ 14:23) >  IMPRESSION:  Pneumatosis of the cecum andascending colon of concern for bowel   ischemia.    Redemonstration of hepatic abscesses and hepatic metastases.    Interval enlargement of bibasilar pulmonary metastases.    < end of copied text >    Advanced directives addressed: full resuscitation

## 2024-12-01 NOTE — PROGRESS NOTE ADULT - ASSESSMENT
80 y/o female PMH RCC with metastasis to liver s/p IR embolization & biliary stent, HTN, HLD, pA.fib on apixaban, hypothyroid, recent hospitalizations for liver abscesses and sepsis, presented for fever, fatigue, and abdominal pain      Found to have pneumatosis of cecum and ascending colon, along with multiple liver abscesses and increasing pulmonary mets     Underwent laparoscopic R hemicolectomy with ileostomy on 11/30       Problem list:     Severe sepsis with septic shock (POA) due to colon ischemia/pneumatosis   Prerenal PRANAY       Plan:     -pain mx with prn Percocet and morphine  -BP and HR stable, in NSR, will resume cardiac meds when taking po   -resp stable on NC, incentive spirometry   -NPO, minimal amount of clears ok per surgery   -PPI daily (home med)   -continue meropenem, previous cultures reviewed, trend WBC and temp curve. ID eval   -IR consult for drainage of liver abscess   -renal fn improving, will d/c Marcos. Trend and replete lytes prn   -DVT ppx with sc heparin   -PT, OOB  -d/c lines    Plan d/w surgery  78 y/o female PMH RCC with metastasis to liver s/p IR embolization & biliary stent, HTN, HLD, pA.fib on apixaban, hypothyroid, recent hospitalizations for liver abscesses and sepsis, presented for fever, fatigue, and abdominal pain      Found to have pneumatosis of cecum and ascending colon, along with multiple liver abscesses and increasing pulmonary mets     Underwent laparoscopic R hemicolectomy with ileostomy on 11/30       Problem list:     Severe sepsis with septic shock (POA) due to colon ischemia/pneumatosis   Prerenal PRANAY       Plan:     -pain mx with prn Percocet and morphine  -BP and HR stable, off Levo. In NSR, will resume cardiac meds when taking po   -resp stable on NC, incentive spirometry   -NPO, minimal amount of clears ok per surgery   -PPI daily (home med)   -continue meropenem, previous cultures reviewed, trend WBC and temp curve. ID eval   -IR consult for drainage of liver abscess   -renal fn improving, will d/c Marcos. Trend and replete lytes prn   -DVT ppx with sc heparin   -PT, OOB  -d/c lines    Plan d/w surgery

## 2024-12-01 NOTE — CONSULT NOTE ADULT - ASSESSMENT
Assessment:  79F with Renal Cell Carcinoma with mets to liver, IR embolization of a liver lesion on 7/30 Diverticulitis, HTN, HLD, Hypothyroid new onset Afib/RVR in August 2024 started on Eliquis, GI Bleed after ERCP- resolved without intervention, started on eliquis during admit for Afib. liver abscesses, multiple admissions prior for liver abscess, most recently on 9/29-10/16 septic shock 2/2 hepatic abscess with Klebsiella, Enterococcus faecalis requiring pressors , Enterococcus faecium and Clostridium perfringens growing in blood cultures. Pt had IR drain placed and output was monitored closely. Abscess fluid culture grew Klebsiella, Enterococcus faecalis. completed IV Invanz and PO Augmentin on 11/5, presents 11/30 with abdominal pain, fever and fatigue  Afebrile on admission, on RA, but hypotensive  CT with Pneumatosis of the cecum and liver abscesses   s/p Laparoscopic right colectomy (11/30) with pus  WBC 17 > 11  Cr 0.79  UA negative  CXR atelectasis  RVP negative  Prior culture with Pan S Kleb, Pan S E feacalis, E faecium R Amp, S Vanco    Antimicrobials:  s/p Cipro Flagyl x1 (11/30)  Meropenem 1G q8 (12/1 --- )    Impression:   #Septic Shock  #Pneumatosis of the cecum  #Liver abscess  #Leukocytosis  #Metastatic kidney cancer   #PRANAY    Recommendations:  - start Meropenem 1G q8   - monitor temperature curve  - trend WBC  - side effects of antibiotic discussed, tolerating abx well so far  - follow up BCx x2  - Surgery following  - IR eval for possible drainage of hepatic abscesses   - ICU care    Clinical team may change from intravenous to oral antibiotics when the following criteria are met:   1. Patient is clinically improving/stable       a)	Improved signs and symptoms of infection from initial presentation       b)	Afebrile for 24 hours       c)	Leukocytosis trending towards normal range   2. Patient is tolerating oral intake   3. Initial/repeat blood cultures are negative OR do not need to wait for preliminary blood cultures to result    Cannot advise changing to oral antibiotic therapy until culture sensitivity is available.

## 2024-12-02 LAB
ALBUMIN SERPL ELPH-MCNC: 1.8 G/DL — LOW (ref 3.3–5)
ALP SERPL-CCNC: 293 U/L — HIGH (ref 40–120)
ALT FLD-CCNC: 25 U/L — SIGNIFICANT CHANGE UP (ref 12–78)
ANION GAP SERPL CALC-SCNC: 1 MMOL/L — LOW (ref 5–17)
AST SERPL-CCNC: 52 U/L — HIGH (ref 15–37)
BILIRUB SERPL-MCNC: 0.4 MG/DL — SIGNIFICANT CHANGE UP (ref 0.2–1.2)
BUN SERPL-MCNC: 15 MG/DL — SIGNIFICANT CHANGE UP (ref 7–23)
CALCIUM SERPL-MCNC: 8.3 MG/DL — LOW (ref 8.5–10.1)
CHLORIDE SERPL-SCNC: 113 MMOL/L — HIGH (ref 96–108)
CO2 SERPL-SCNC: 23 MMOL/L — SIGNIFICANT CHANGE UP (ref 22–31)
CREAT SERPL-MCNC: 0.77 MG/DL — SIGNIFICANT CHANGE UP (ref 0.5–1.3)
EGFR: 79 ML/MIN/1.73M2 — SIGNIFICANT CHANGE UP
GLUCOSE SERPL-MCNC: 111 MG/DL — HIGH (ref 70–99)
GRAM STN FLD: SIGNIFICANT CHANGE UP
GRAM STN FLD: SIGNIFICANT CHANGE UP
HCT VFR BLD CALC: 32.4 % — LOW (ref 34.5–45)
HGB BLD-MCNC: 9.9 G/DL — LOW (ref 11.5–15.5)
LACTATE SERPL-SCNC: 1 MMOL/L — SIGNIFICANT CHANGE UP (ref 0.7–2)
MAGNESIUM SERPL-MCNC: 2.1 MG/DL — SIGNIFICANT CHANGE UP (ref 1.6–2.6)
MCHC RBC-ENTMCNC: 26.2 PG — LOW (ref 27–34)
MCHC RBC-ENTMCNC: 30.6 G/DL — LOW (ref 32–36)
MCV RBC AUTO: 85.7 FL — SIGNIFICANT CHANGE UP (ref 80–100)
PHOSPHATE SERPL-MCNC: 2.3 MG/DL — LOW (ref 2.5–4.5)
PLATELET # BLD AUTO: 298 K/UL — SIGNIFICANT CHANGE UP (ref 150–400)
POTASSIUM SERPL-MCNC: 4.9 MMOL/L — SIGNIFICANT CHANGE UP (ref 3.5–5.3)
POTASSIUM SERPL-SCNC: 4.9 MMOL/L — SIGNIFICANT CHANGE UP (ref 3.5–5.3)
PROT SERPL-MCNC: 5.2 GM/DL — LOW (ref 6–8.3)
RBC # BLD: 3.78 M/UL — LOW (ref 3.8–5.2)
RBC # FLD: 18.2 % — HIGH (ref 10.3–14.5)
SODIUM SERPL-SCNC: 137 MMOL/L — SIGNIFICANT CHANGE UP (ref 135–145)
SPECIMEN SOURCE: SIGNIFICANT CHANGE UP
SPECIMEN SOURCE: SIGNIFICANT CHANGE UP
WBC # BLD: 13.68 K/UL — HIGH (ref 3.8–10.5)
WBC # FLD AUTO: 13.68 K/UL — HIGH (ref 3.8–10.5)

## 2024-12-02 PROCEDURE — 99222 1ST HOSP IP/OBS MODERATE 55: CPT

## 2024-12-02 PROCEDURE — 99233 SBSQ HOSP IP/OBS HIGH 50: CPT

## 2024-12-02 PROCEDURE — 49405 IMAGE CATH FLUID COLXN VISC: CPT

## 2024-12-02 RX ORDER — OXYCODONE HYDROCHLORIDE 30 MG/1
5 TABLET ORAL EVERY 6 HOURS
Refills: 0 | Status: DISCONTINUED | OUTPATIENT
Start: 2024-12-02 | End: 2024-12-05

## 2024-12-02 RX ORDER — SODIUM,POTASSIUM PHOSPHATES 278-250MG
1 POWDER IN PACKET (EA) ORAL
Refills: 0 | Status: COMPLETED | OUTPATIENT
Start: 2024-12-02 | End: 2024-12-02

## 2024-12-02 RX ORDER — ACETAMINOPHEN 500MG 500 MG/1
650 TABLET, COATED ORAL EVERY 6 HOURS
Refills: 0 | Status: DISCONTINUED | OUTPATIENT
Start: 2024-12-02 | End: 2024-12-05

## 2024-12-02 RX ORDER — HYDROCORTISONE ACETATE 25 MG/ML
10 VIAL (ML) INJECTION THREE TIMES A DAY
Refills: 0 | Status: DISCONTINUED | OUTPATIENT
Start: 2024-12-02 | End: 2024-12-03

## 2024-12-02 RX ORDER — PANTOPRAZOLE SODIUM 40 MG/1
40 TABLET, DELAYED RELEASE ORAL
Refills: 0 | Status: DISCONTINUED | OUTPATIENT
Start: 2024-12-02 | End: 2024-12-05

## 2024-12-02 RX ORDER — LEVOTHYROXINE SODIUM 150 MCG
100 TABLET ORAL DAILY
Refills: 0 | Status: DISCONTINUED | OUTPATIENT
Start: 2024-12-02 | End: 2024-12-05

## 2024-12-02 RX ORDER — AMIODARONE HYDROCHLORIDE 200 MG/1
100 TABLET ORAL DAILY
Refills: 0 | Status: DISCONTINUED | OUTPATIENT
Start: 2024-12-02 | End: 2024-12-05

## 2024-12-02 RX ADMIN — Medication 5000 UNIT(S): at 23:53

## 2024-12-02 RX ADMIN — MEROPENEM 1000 MILLIGRAM(S): 500 INJECTION, POWDER, FOR SOLUTION INTRAVENOUS at 16:36

## 2024-12-02 RX ADMIN — MEROPENEM 1000 MILLIGRAM(S): 500 INJECTION, POWDER, FOR SOLUTION INTRAVENOUS at 05:35

## 2024-12-02 RX ADMIN — MEROPENEM 1000 MILLIGRAM(S): 500 INJECTION, POWDER, FOR SOLUTION INTRAVENOUS at 23:54

## 2024-12-02 RX ADMIN — Medication 1 PACKET(S): at 23:53

## 2024-12-02 RX ADMIN — Medication 10 MILLIGRAM(S): at 16:37

## 2024-12-02 RX ADMIN — Medication 5000 UNIT(S): at 05:36

## 2024-12-02 RX ADMIN — CHLORHEXIDINE GLUCONATE 1 APPLICATION(S): 1.2 RINSE ORAL at 05:37

## 2024-12-02 RX ADMIN — SODIUM CHLORIDE 100 MILLILITER(S): 9 INJECTION, SOLUTION INTRAMUSCULAR; INTRAVENOUS; SUBCUTANEOUS at 05:36

## 2024-12-02 RX ADMIN — Medication 10 MILLIGRAM(S): at 23:52

## 2024-12-02 RX ADMIN — Medication 1 PACKET(S): at 17:55

## 2024-12-02 RX ADMIN — Medication 1 MILLIGRAM(S): at 23:57

## 2024-12-02 NOTE — PROGRESS NOTE ADULT - GASTROINTESTINAL COMMENTS
R sided ileostomy with pink stoma, air and green stool in bag
R sided ileostomy with pink stoma, air and stool in bag

## 2024-12-02 NOTE — PROGRESS NOTE ADULT - ASSESSMENT
1. 79-year-old female  with 2-year plus history metastatic renal cell carcinoma   history numerous courses immunotherapy ,  IR  embolization of liver metastases,   history of hepatic abscesses ,sp  drainage and IV and p.o. antibiotics   followed closely by ID      2. pneumatosis coli ?  Ischemic bowel?   now sp  right hemicolectomy for  pneumatosis of the cecum,  persistent liver  abscesses,  progressive increase in pulmonary metastases on abdominal scan.    3. Shall hold Tivozanib as it is a VEFG inhibitor and can be associated with both arterial and venous thromboembolism    DW Dr Saunders.    DVT prophylaxis

## 2024-12-02 NOTE — BRIEF OPERATIVE NOTE - OPERATION/FINDINGS
1) 12F drain placed into right lateral hepatic collection, bilious output, C+S sent, drain left in place to gravity  2) 17G needle aspiration of hepatic dome collection, bilious output, C+S sent. No drain left in place. 
cecum/ascending colon pneumatosis

## 2024-12-02 NOTE — CONSULT NOTE ADULT - ASSESSMENT
full note to follow 79 year old woman with widely metastatic RCC, prior cholangitis with cbd stent, chronic liver abscesses, recurrent admissions for septic shock over the last few months,  admitted with sepsis and found to have R colon pneumatosis requiring emergent R hemicolectomy and IR drains. Consulted for sepsis related to abscesses and cbd stent.     LFT's normal.   No issue with cbd stent.   Liver abscesses are larger than september, but stable in size from november but agree with IR drainage as she is now sick/ill in hospital with sepsis.   ID recs for abx, follow up cultures.   Leave cbd stent in for now just had R hemicolectomy and stent not obstructed.   Will remove as outpaitent.   Diet per ICU, surgery.   Not clear what caused R pneumatosis; septic emoblus?? ischemia? Isolated R colon issues related to SMA issues as this feeds area. Can get CTA at some point (but again now R colon is out so may be a moot point).   Signing off.

## 2024-12-02 NOTE — PROGRESS NOTE ADULT - ASSESSMENT
Assessment:  79F with Renal Cell Carcinoma with mets to liver, IR embolization of a liver lesion on 7/30 Diverticulitis, HTN, HLD, Hypothyroid new onset Afib/RVR in August 2024 started on Eliquis, GI Bleed after ERCP- resolved without intervention, started on eliquis during admit for Afib. liver abscesses, multiple admissions prior for liver abscess, most recently on 9/29-10/16 septic shock 2/2 hepatic abscess with Klebsiella, Enterococcus faecalis requiring pressors , Enterococcus faecium and Clostridium perfringens growing in blood cultures. Pt had IR drain placed and output was monitored closely. Abscess fluid culture grew Klebsiella, Enterococcus faecalis. completed IV Invanz and PO Augmentin on 11/5, presents 11/30 with abdominal pain, fever and fatigue  Afebrile on admission, on RA, but hypotensive  CT with Pneumatosis of the cecum and liver abscesses   WBC 17 > 11  Cr 0.79  UA negative, UCx negative  CXR atelectasis  RVP negative  BCx 11/30 negative  Prior culture with Pan S Kleb, Pan S E feacalis, E faecium R Amp, S Vanco    Antimicrobials:  s/p Cipro Flagyl x1 (11/30)  Meropenem 1G q8 (12/1 --- )    Impression:   #Septic Shock  #Pneumatosis of the cecum, Ischemic Bowel  #Liver abscess  #Leukocytosis  #Metastatic kidney cancer   #PRANAY  - s/p Laparoscopic right colectomy (11/30) with pus  - remains afebrile, WBC still elevated    Recommendations:  - continue empiric Meropenem 1G q8 (Day #2)  - monitor temperature curve  - trend WBC  - side effects of antibiotic discussed, tolerating abx well so far  - Surgery following  - IR eval for possible drainage of hepatic abscesses   - ICU care    Clinical team may change from intravenous to oral antibiotics when the following criteria are met:   1. Patient is clinically improving/stable       a)	Improved signs and symptoms of infection from initial presentation       b)	Afebrile for 24 hours       c)	Leukocytosis trending towards normal range   2. Patient is tolerating oral intake   3. Initial/repeat blood cultures are negative OR do not need to wait for preliminary blood cultures to result    Cannot advise changing to oral antibiotic therapy until culture sensitivity is available.

## 2024-12-02 NOTE — PHYSICAL THERAPY INITIAL EVALUATION ADULT - PERTINENT HX OF CURRENT PROBLEM, REHAB EVAL
78 yo fem s/p Rt colectomy/ileostomy/ liver mets/abscesses, Renal cell carcinoma. PMH RCC with metastasis to liver s/p IR embolization & biliary stent, HTN, HLD, pA.fib on apixaban, hypothyroid, recent hospitalizations for liver abscesses and sepsis, presented for fever, fatigue, and abdominal pain    -  July 9, July 30 2024 IR embolization of liver lesion, at [facility]/[place]  September 29– - October 16 septic shock secondary to hepatic abscesses with Klebsiella, Enterococcus faecalis requiring extended ICU stay.  -Will need liver abscesses drainage by IR (poor prognosis overall due to liver mets) 87.1

## 2024-12-02 NOTE — PROGRESS NOTE ADULT - NEUROLOGICAL
normal/cranial nerves II-XII intact/sensation intact/responds to pain/responds to verbal commands/no spontaneous movement negative

## 2024-12-02 NOTE — CONSULT NOTE ADULT - SUBJECTIVE AND OBJECTIVE BOX
Patient is a 79y old  Female who presents with a chief complaint of abdominal pain (02 Dec 2024 16:52)      HPI:  78yo F with PMH: Renal Cell Carcinoma with mets to liver, IR embolization of a liver lesion on 7/30 Diverticulitis, HTN, HLD, Hypothyroid new onset Afib/RVR in August 2024 started on Eliquis, GI Bleed after ERCP- resolved without intervention, started on eliquis during admit for Afib. liver abscesses    admitted 8/2-8/15 for septic shock requiring pressors and critical care admission, Found to have dilated common bile duct, elevated LFTs and likely cholangitis/choledocholithiasis with stone, s/p ERCP with sphincterotomy/stone removal and stent placement on 8/5/24      admitted 9/9-9/14 for septic shock, requiring levophed and critical care admission, blood cultures during admission had Enterobacter, sensitive to Cipro. Noted liver abscess on MRI, felt to be too small for biopsy at the time, decision for prolong antibiotic course made. Discharged on Cipro/Flagyl to complete her therapy, this was completed on Monday 9/23.     admitted 9/29-10/16 septic shock 2/2 hepatic abscess with Klebsiella, Enterococcus faecalis requiring pressors , Enterococcus faecium and Clostridium perfringens growing in blood cultures. Pt had IR drain placed and output was monitored closely. Abscess fluid culture grew Klebsiella, Enterococcus faecalis. Discharged on IV Invanz and PO Augmentin until 11/5.    Patient presented to ED with fatigue and fevers and severe abdominal pain in ED SBP in 80's unresponsive to IVF 5L overall and started on levophed, poor venous access requiring CVC insertion. CT shows Pneumatosis of the cecum and liver abscesses stable from MRI on 11/6 however increased from CT on 10/12. Patient planned for surgical hemicolectomy for pneumatosis today with potential IR intervention later in the week.    (30 Nov 2024 17:34)      PAST MEDICAL & SURGICAL HISTORY:  Hypertension      High cholesterol      Diverticulitis      History of diverticulitis      Hypothyroidism      Renal cell cancer      Metastasis to liver      Paroxysmal atrial fibrillation      Cholelithiasis with cholangitis      History of biliary stent insertion      History of tonsillectomy      History of laparotomy      History of arthroscopy      H/O bilateral oophorectomy      S/P surgical removal of pilonidal cyst          MEDICATIONS  (STANDING):  aMIOdarone    Tablet 100 milliGRAM(s) Oral daily  heparin   Injectable 5000 Unit(s) SubCutaneous every 8 hours  hydrocortisone 10 milliGRAM(s) Oral three times a day  levothyroxine 100 MICROGram(s) Oral daily  meropenem Injectable 1000 milliGRAM(s) IV Push every 8 hours  pantoprazole    Tablet 40 milliGRAM(s) Oral before breakfast  potassium phosphate / sodium phosphate Powder (PHOS-NaK) 1 Packet(s) Oral four times a day    MEDICATIONS  (PRN):  acetaminophen     Tablet .. 650 milliGRAM(s) Oral every 6 hours PRN Mild Pain (1 - 3)  morphine  - Injectable 1 milliGRAM(s) IV Push every 4 hours PRN Severe Pain (7 - 10)  oxyCODONE    IR 5 milliGRAM(s) Oral every 6 hours PRN Moderate Pain (4 - 6)      Allergies    Zetia (Unknown)  Cabometyx (Unknown)  Lipitor (Unknown)  Norvasc (Faint)  Crestor (Other)  Dyazide (Faint)  statins (Unknown)  bacitracin (Rash)  tivozanib (Faint)    Intolerances        SOCIAL HISTORY:    FAMILY HISTORY:      REVIEW OF SYSTEMS:    CONSTITUTIONAL: No weakness, fevers or chills  EYES/ENT: No visual changes;  No vertigo or throat pain   NECK: No pain or stiffness  RESPIRATORY: No cough, wheezing, hemoptysis; No shortness of breath  CARDIOVASCULAR: No chest pain or palpitations  GASTROINTESTINAL: No abdominal or epigastric pain. No nausea, vomiting, or hematemesis; No diarrhea or constipation. No melena or hematochezia.  GENITOURINARY: No dysuria, frequency or hematuria  NEUROLOGICAL: No numbness or weakness  SKIN: No itching, burning, rashes, or lesions   PSYCH: Normal mood and affect  All other review of systems is negative unless indicated above.    Vital Signs Last 24 Hrs  T(C): 36.6 (02 Dec 2024 09:00), Max: 36.6 (02 Dec 2024 09:00)  T(F): 97.9 (02 Dec 2024 09:00), Max: 97.9 (02 Dec 2024 09:00)  HR: 70 (02 Dec 2024 16:15) (69 - 90)  BP: 145/91 (02 Dec 2024 16:15) (91/69 - 145/91)  BP(mean): 109 (02 Dec 2024 16:15) (76 - 114)  RR: 18 (02 Dec 2024 16:15) (10 - 24)  SpO2: 81% (02 Dec 2024 16:15) (81% - 100%)    Parameters below as of 02 Dec 2024 04:00  Patient On (Oxygen Delivery Method): room air        PHYSICAL EXAM:    Constitutional: No acute distress, well-developed, non-toxic appearing  HEENT: masked, good phonation, not icteric  Neck: supple, no lymphadenopathy  Respiratory: clear to ascultation bilaterally, no wheezing  Cardiovascular: S1 and S2, regular rate and rhythm, no murmurs rubs or gallops  Gastrointestinal: soft, non-tender, non-distended, +bowel sounds, no rebound or guarding, no surgical scars, no drains  Extremities: No peripheral edema, no cyanosis or clubbing  Vascular: 2+ peripheral pulses, no venous stasis  Neurological: A/O x 3, no focal deficits, no asterixis  Psychiatric: Normal mood, normal affect  Skin: No rashes, not jaundiced    LABS:                        9.9    13.68 )-----------( 298      ( 02 Dec 2024 05:31 )             32.4     12-02    137  |  113[H]  |  15  ----------------------------<  111[H]  4.9   |  23  |  0.77    Ca    8.3[L]      02 Dec 2024 05:31  Phos  2.3     12-02  Mg     2.1     12-02    TPro  5.2[L]  /  Alb  1.8[L]  /  TBili  0.4  /  DBili  x   /  AST  52[H]  /  ALT  25  /  AlkPhos  293[H]  12-02    PT/INR - ( 01 Dec 2024 06:00 )   PT: 17.5 sec;   INR: 1.53 ratio           LIVER FUNCTIONS - ( 02 Dec 2024 05:31 )  Alb: 1.8 g/dL / Pro: 5.2 gm/dL / ALK PHOS: 293 U/L / ALT: 25 U/L / AST: 52 U/L / GGT: x             RADIOLOGY & ADDITIONAL STUDIES: Patient is a 79y old  Female who presents with a chief complaint of abdominal pain (02 Dec 2024 16:52)    79 year old woman with widely metastatic RCC, prior cholangitis with cbd stent, chronic liver abscesses, recurrent admissions for septic shock over the last few months,  admitted with sepsis and found to have R colon pneumatosis requiring emergent R hemicolectomy and IR drains. Consulted for sepsis related to abscesses and cbd stent.     Patient was admitted for acute R sided abdominal pain. Pain was constant, 9/10, sudden onset, dull, non radiating, without known alleviating or exacerbating factors. CT scan showed liver abscesses and R colon/cecal pneumatosis. Was admitted with sepsis, septic shock and she went for emergent R hemicolectomuy and is s/p IR drainage. Currently feels well. Has no abdominal pain. Is tolerating her liquid diet. No current fevers or chills. Tolerating drains. No post prandial vomiting.     Of note, I saw patient in early november. Follow up imaging showed larger liver abscesses. She had prior drains removed before this imaging as they had zero output and no fistula on imaging. She was asymptomatic with no pain or fevers. I discussed her care with IR and ID, since she was asymptomatic at the time plan was to continue abx and repeat imaging with low threshold for hospital admission.       PAST MEDICAL & SURGICAL HISTORY:  Hypertension      High cholesterol      Diverticulitis      History of diverticulitis      Hypothyroidism      Renal cell cancer      Metastasis to liver      Paroxysmal atrial fibrillation      Cholelithiasis with cholangitis      History of biliary stent insertion      History of tonsillectomy      History of laparotomy      History of arthroscopy      H/O bilateral oophorectomy      S/P surgical removal of pilonidal cyst    liver abcesses    recurrent sepsis    cholangitis, ERCP      MEDICATIONS  (STANDING):  aMIOdarone    Tablet 100 milliGRAM(s) Oral daily  heparin   Injectable 5000 Unit(s) SubCutaneous every 8 hours  hydrocortisone 10 milliGRAM(s) Oral three times a day  levothyroxine 100 MICROGram(s) Oral daily  meropenem Injectable 1000 milliGRAM(s) IV Push every 8 hours  pantoprazole    Tablet 40 milliGRAM(s) Oral before breakfast  potassium phosphate / sodium phosphate Powder (PHOS-NaK) 1 Packet(s) Oral four times a day    MEDICATIONS  (PRN):  acetaminophen     Tablet .. 650 milliGRAM(s) Oral every 6 hours PRN Mild Pain (1 - 3)  morphine  - Injectable 1 milliGRAM(s) IV Push every 4 hours PRN Severe Pain (7 - 10)  oxyCODONE    IR 5 milliGRAM(s) Oral every 6 hours PRN Moderate Pain (4 - 6)      Allergies    Zetia (Unknown)  Cabometyx (Unknown)  Lipitor (Unknown)  Norvasc (Faint)  Crestor (Other)  Dyazide (Faint)  statins (Unknown)  bacitracin (Rash)  tivozanib (Faint)    Intolerances    SOCIAL HISTORY:  no smoking, drinking or drugs    FAMILY HISTORY:  no colon or stomach cancer    REVIEW OF SYSTEMS:    CONSTITUTIONAL: No weakness, fevers or chills  EYES/ENT: No visual changes;  No vertigo or throat pain   NECK: No pain or stiffness  RESPIRATORY: No cough, wheezing, hemoptysis; No shortness of breath  CARDIOVASCULAR: No chest pain or palpitations  GASTROINTESTINAL: see HPI  GENITOURINARY: No dysuria, frequency or hematuria  NEUROLOGICAL: No numbness or weakness  SKIN: No itching, burning, rashes, or lesions   PSYCH: Normal mood and affect  All other review of systems is negative unless indicated above.    Vital Signs Last 24 Hrs  T(C): 36.6 (02 Dec 2024 09:00), Max: 36.6 (02 Dec 2024 09:00)  T(F): 97.9 (02 Dec 2024 09:00), Max: 97.9 (02 Dec 2024 09:00)  HR: 70 (02 Dec 2024 16:15) (69 - 90)  BP: 145/91 (02 Dec 2024 16:15) (91/69 - 145/91)  BP(mean): 109 (02 Dec 2024 16:15) (76 - 114)  RR: 18 (02 Dec 2024 16:15) (10 - 24)  SpO2: 81% (02 Dec 2024 16:15) (81% - 100%)    Parameters below as of 02 Dec 2024 04:00  Patient On (Oxygen Delivery Method): room air    PHYSICAL EXAM:    Constitutional: No acute distress, non-toxic appearing, sitting up in bed drinking liquids  HEENT: unmasked, good phonation, not icteric  Neck: supple, no lymphadenopathy  Respiratory: unlabored  Gastrointestinal: soft, non-tender, non-distended, +bowel sounds, no rebound or guarding, + drains c/d/i draining pus  Extremities: No peripheral edema, no cyanosis or clubbing  Vascular: 2+ peripheral pulses, no venous stasis  Neurological: A/O x 3, no focal deficits, no asterixis  Psychiatric: Normal mood, normal affect  Skin: No rashes, not jaundiced    LABS:                        9.9    13.68 )-----------( 298      ( 02 Dec 2024 05:31 )             32.4     12-02    137  |  113[H]  |  15  ----------------------------<  111[H]  4.9   |  23  |  0.77    Ca    8.3[L]      02 Dec 2024 05:31  Phos  2.3     12-02  Mg     2.1     12-02    TPro  5.2[L]  /  Alb  1.8[L]  /  TBili  0.4  /  DBili  x   /  AST  52[H]  /  ALT  25  /  AlkPhos  293[H]  12-02    PT/INR - ( 01 Dec 2024 06:00 )   PT: 17.5 sec;   INR: 1.53 ratio           LIVER FUNCTIONS - ( 02 Dec 2024 05:31 )  Alb: 1.8 g/dL / Pro: 5.2 gm/dL / ALK PHOS: 293 U/L / ALT: 25 U/L / AST: 52 U/L / GGT: x             RADIOLOGY & ADDITIONAL STUDIES: CT with R sided cecal pneumatosis, large liver abscesses (stable but larger than earlier this year)

## 2024-12-02 NOTE — PRE PROCEDURE NOTE - PRE PROCEDURE EVALUATION
Interventional Radiology    HPI: 79F with Renal Cell Carcinoma with mets to liver, IR embolization of a liver lesion on 7/30 Diverticulitis, HTN, HLD, Hypothyroid new onset Afib/RVR in August 2024 started on Eliquis, GI Bleed after ERCP- resolved without intervention, started on eliquis during admit for Afib. liver abscesses, multiple admissions prior for liver abscess, most recently on 9/29-10/16 septic shock 2/2 hepatic abscess with Klebsiella, Enterococcus faecalis requiring pressors , Enterococcus faecium and Clostridium perfringens growing in blood cultures. Pt had IR drain placed and output was monitored closely. Abscess fluid culture grew Klebsiella, Enterococcus faecalis. completed IV Invanz and PO Augmentin on 11/5, presents 11/30 with abdominal pain, fever and fatigue, Afebrile on admission, on RA, but hypotensive.  CT showing pneumatosis of cecum and ascending colon, along with multiple liver abscesses and increasing pulmonary mets. Underwent laparoscopic R hemicolectomy with ileostomy on 11/30.  IR consulted on 11/30 for hepatic drain evaluation.    NPO: yes    Allergies: Zetia (Unknown)  Cabometyx (Unknown)  Lipitor (Unknown)  Norvasc (Faint)  Crestor (Other)  Dyazide (Faint)  statins (Unknown)  bacitracin (Rash)  tivozanib (Faint)    Medications (Abx/Cardiac/Anticoagulation/Blood Products)  aspirin  chewable: 324 milliGRAM(s) Oral (11-30 @ 13:47)  ciprofloxacin   IVPB: 200 mL/Hr IV Intermittent (11-30 @ 13:18)  heparin   Injectable: 5000 Unit(s) SubCutaneous (12-02 @ 05:36)  meropenem Injectable: 1000 milliGRAM(s) IV Push (12-02 @ 05:35)  metroNIDAZOLE  IVPB: 100 mL/Hr IV Intermittent (11-30 @ 14:43)  norepinephrine Infusion: 5.47 mL/Hr IV Continuous (11-30 @ 14:50)  prothrombin complex concentrate IVPB (KCENTRA): 400 mL/Hr IV Intermittent (11-30 @ 16:37)    Data:    T(C): 36.5  HR: 71  BP: 98/82  RR: 11  SpO2: 97%         MEDICATIONS  (STANDING):  chlorhexidine 4% Liquid 1 Application(s) Topical <User Schedule>  heparin   Injectable 5000 Unit(s) SubCutaneous every 8 hours  levothyroxine 100 MICROGram(s) Oral daily  meropenem Injectable 1000 milliGRAM(s) IV Push every 8 hours  pantoprazole  Injectable 40 milliGRAM(s) IV Push daily  potassium phosphate / sodium phosphate Powder (PHOS-NaK) 1 Packet(s) Oral four times a day with meals  sodium chloride 0.9%. 1000 milliLiter(s) (100 mL/Hr) IV Continuous <Continuous>    MEDICATIONS  (PRN):  acetaminophen     Tablet .. 650 milliGRAM(s) Oral every 6 hours PRN Mild Pain (1 - 3)  morphine  - Injectable 1 milliGRAM(s) IV Push every 4 hours PRN Severe Pain (7 - 10)  oxyCODONE    IR 5 milliGRAM(s) Oral every 6 hours PRN Moderate Pain (4 - 6)  sodium chloride 0.9% lock flush 10 milliLiter(s) IV Push every 1 hour PRN Pre/post blood products, medications, blood draw, and to maintain line patency      -WBC 13.68 / HgB 9.9 / Hct 32.4 / Plt 298  -Na 137 / Cl 113 / BUN 15 / Glucose 111  -K 4.9 / CO2 23 / Cr 0.77  -ALT 25 / Alk Phos 293 / T.Bili 0.4  -INR1.53    Imaging: < from: CT Abdomen and Pelvis No Cont (11.30.24 @ 14:23) >      LIVER: Redemonstration of hepatic abscesses, less well demonstrated on   noncontrast study:  Segment 8 (2, 15) measures 3.5 x 2.3 cm, previously 4.6 x 3.5 cm  Segment 5/8 (2, 33) measures 5.6 x 4.5 cm, previously 5.6 x 3.9 cm.  Smaller abscesses cannot be resolved. Known hepatic metastases are   discerned but not measurable.    IMPRESSION:  Pneumatosis of the cecum and ascending colon of concern for bowel   ischemia.    Redemonstration of hepatic abscesses and hepatic metastases.    Interval enlargement of bibasilar pulmonary metastases.      Plan:  80 y/o female PMH RCC with metastasis to liver s/p IR embolization & biliary stent, HTN, HLD, pA.fib on apixaban, hypothyroid, recent hospitalizations for liver abscesses and sepsis, presented for fever, fatigue, and abdominal pain . Found to have pneumatosis of cecum and ascending colon, along with multiple liver abscesses and increasing pulmonary mets. Underwent laparoscopic R hemicolectomy with ileostomy on 11/30     IR consulted for hepatic abscess drain eval on 11/30.       Plan:   - case reviewed and approved for ct guided hepatic abscess collection drain placement in IR with anesthesia  planned for today 12/2.  - current (cbc, coags, bmp, T&S)  - hold AC x24hrs  - Cont to keep pt NPO   - d/w primary team   - case reviewed with Dr. Armetta Sadaf Jalili, IR PA-C, available on TEAMS

## 2024-12-02 NOTE — PROGRESS NOTE ADULT - ASSESSMENT
79F with Renal Cell Carcinoma with mets to liver, IR embolization of a liver lesion on 7/30 Diverticulitis, HTN, HLD, Hypothyroid new onset Afib/RVR in August 2024 started on Eliquis, GI Bleed after ERCP- resolved without intervention, started on eliquis during admit for Afib. liver abscesses, multiple admissions prior for liver abscess, most recently on 9/29-10/16 septic shock 2/2 hepatic abscess presented to ED on 11/30 with worsening abdominal pain, fever and fatigue. Patient was admitted to the ICU for septic shock, she was found to have pneumatosis of cecum, she is S/P Right hemicolectomy with ileostomy with good function. She was also found to have additional hepatic abscess for which IR drainage is scheduled for 12/2. Patient seen at bed, off pressors and feeling well. Complains of some pain by the surgical site but otherwise stable.     # Septic Shock Secondary to Pneumatosis of Cecum and Ascending Colon  # Hepatic Abscess secondary to Metastatic RCC  - SP ICU stay, off pressors  - SP Right hemicolectomy with ileostomy 11/30  - Surgery on board  - Continue meropenem   - Blood cultures NTD  - Urine culture negative  - ID following  - Pain management  - Monitor BP and Temps  - For IR drainage of hepatic abscess 12/2    # Leukocytosis  - Likely due to above  - WBC 13 12/2  - Trend    # Anemia  - Likely dilutional and blood loss  - Stable for now  - Trend    # Adrenal insufficiency  - Continue hydrocortisone  - Monitor BP and BS    # GERD  # GI PPx  - Continue Protonix     # PRANAY  - Resolved  - Continue IVF for now    # RCC with Mets  - Hold Tivozanib as per onc  - Consider palliative care options    # P. Afib  - Off eliquis for now for IR   - Hold metoprolol and ARB as BP is low  - Continue amiodarone  - Restart eliquis once cleared by surgery    # Hypothyroid  - Continue levothyroxine    # DVT prophylaxis  - Heparin 5000 q 8

## 2024-12-02 NOTE — PHYSICAL THERAPY INITIAL EVALUATION ADULT - GENERAL OBSERVATIONS, REHAB EVAL
Pt. received semi supine in bed ICU +IV + CM BP 98/82 agreeable to PT. Bed mob with SBA, sit to stand and amb w/o AD CGA 5 ft to BSC. Pt. stated has her R foot 5th toe fx has surgical shoe to wear for OOB and amb. Pt. with LLD requires L shoe with orthotic to even out B/LLE's shawn

## 2024-12-02 NOTE — PROGRESS NOTE ADULT - ASSESSMENT
POD#2 s/p RT colectomy/liver mets/liver abscesses  -IR liver abscesses drainage  -PICC  -ID consult  -liquid diet  -HSQ

## 2024-12-03 LAB
ANION GAP SERPL CALC-SCNC: 2 MMOL/L — LOW (ref 5–17)
BUN SERPL-MCNC: 13 MG/DL — SIGNIFICANT CHANGE UP (ref 7–23)
CALCIUM SERPL-MCNC: 8.4 MG/DL — LOW (ref 8.5–10.1)
CHLORIDE SERPL-SCNC: 111 MMOL/L — HIGH (ref 96–108)
CO2 SERPL-SCNC: 25 MMOL/L — SIGNIFICANT CHANGE UP (ref 22–31)
CREAT SERPL-MCNC: 0.62 MG/DL — SIGNIFICANT CHANGE UP (ref 0.5–1.3)
EGFR: 92 ML/MIN/1.73M2 — SIGNIFICANT CHANGE UP
GLUCOSE SERPL-MCNC: 91 MG/DL — SIGNIFICANT CHANGE UP (ref 70–99)
GRAM STN FLD: ABNORMAL
HCT VFR BLD CALC: 33.4 % — LOW (ref 34.5–45)
HGB BLD-MCNC: 10.2 G/DL — LOW (ref 11.5–15.5)
MAGNESIUM SERPL-MCNC: 1.9 MG/DL — SIGNIFICANT CHANGE UP (ref 1.6–2.6)
MCHC RBC-ENTMCNC: 26.4 PG — LOW (ref 27–34)
MCHC RBC-ENTMCNC: 30.5 G/DL — LOW (ref 32–36)
MCV RBC AUTO: 86.5 FL — SIGNIFICANT CHANGE UP (ref 80–100)
PHOSPHATE SERPL-MCNC: 2.1 MG/DL — LOW (ref 2.5–4.5)
PLATELET # BLD AUTO: 277 K/UL — SIGNIFICANT CHANGE UP (ref 150–400)
POTASSIUM SERPL-MCNC: 4.7 MMOL/L — SIGNIFICANT CHANGE UP (ref 3.5–5.3)
POTASSIUM SERPL-SCNC: 4.7 MMOL/L — SIGNIFICANT CHANGE UP (ref 3.5–5.3)
RBC # BLD: 3.86 M/UL — SIGNIFICANT CHANGE UP (ref 3.8–5.2)
RBC # FLD: 18.3 % — HIGH (ref 10.3–14.5)
SODIUM SERPL-SCNC: 138 MMOL/L — SIGNIFICANT CHANGE UP (ref 135–145)
WBC # BLD: 7.54 K/UL — SIGNIFICANT CHANGE UP (ref 3.8–10.5)
WBC # FLD AUTO: 7.54 K/UL — SIGNIFICANT CHANGE UP (ref 3.8–10.5)

## 2024-12-03 PROCEDURE — 99233 SBSQ HOSP IP/OBS HIGH 50: CPT

## 2024-12-03 PROCEDURE — 99231 SBSQ HOSP IP/OBS SF/LOW 25: CPT | Mod: FS

## 2024-12-03 RX ORDER — HYDROCORTISONE ACETATE 25 MG/ML
10 VIAL (ML) INJECTION THREE TIMES A DAY
Refills: 0 | Status: COMPLETED | OUTPATIENT
Start: 2024-12-03 | End: 2024-12-03

## 2024-12-03 RX ORDER — LOSARTAN POTASSIUM 100 MG/1
25 TABLET, FILM COATED ORAL DAILY
Refills: 0 | Status: DISCONTINUED | OUTPATIENT
Start: 2024-12-03 | End: 2024-12-05

## 2024-12-03 RX ORDER — HYDROCORTISONE ACETATE 25 MG/ML
20 VIAL (ML) INJECTION DAILY
Refills: 0 | Status: DISCONTINUED | OUTPATIENT
Start: 2024-12-04 | End: 2024-12-05

## 2024-12-03 RX ORDER — HYDROCORTISONE ACETATE 25 MG/ML
10 VIAL (ML) INJECTION DAILY
Refills: 0 | Status: DISCONTINUED | OUTPATIENT
Start: 2024-12-04 | End: 2024-12-05

## 2024-12-03 RX ADMIN — Medication 5000 UNIT(S): at 23:41

## 2024-12-03 RX ADMIN — ACETAMINOPHEN 500MG 650 MILLIGRAM(S): 500 TABLET, COATED ORAL at 15:28

## 2024-12-03 RX ADMIN — Medication 10 MILLIGRAM(S): at 15:28

## 2024-12-03 RX ADMIN — PANTOPRAZOLE SODIUM 40 MILLIGRAM(S): 40 TABLET, DELAYED RELEASE ORAL at 10:27

## 2024-12-03 RX ADMIN — Medication 10 MILLIGRAM(S): at 05:13

## 2024-12-03 RX ADMIN — Medication 10 MILLIGRAM(S): at 23:41

## 2024-12-03 RX ADMIN — ACETAMINOPHEN 500MG 650 MILLIGRAM(S): 500 TABLET, COATED ORAL at 23:42

## 2024-12-03 RX ADMIN — MEROPENEM 1000 MILLIGRAM(S): 500 INJECTION, POWDER, FOR SOLUTION INTRAVENOUS at 10:26

## 2024-12-03 RX ADMIN — Medication 5000 UNIT(S): at 15:29

## 2024-12-03 RX ADMIN — MEROPENEM 1000 MILLIGRAM(S): 500 INJECTION, POWDER, FOR SOLUTION INTRAVENOUS at 18:32

## 2024-12-03 RX ADMIN — Medication 100 MICROGRAM(S): at 05:14

## 2024-12-03 RX ADMIN — Medication 1 MILLIGRAM(S): at 00:32

## 2024-12-03 RX ADMIN — AMIODARONE HYDROCHLORIDE 100 MILLIGRAM(S): 200 TABLET ORAL at 10:34

## 2024-12-03 NOTE — PROGRESS NOTE ADULT - ASSESSMENT
POD#3 s/p lap RT colectomy/liver abscesses  -Reg diet  -ID consult for IV Abx  -OOB  -poss d/c home tomorrow

## 2024-12-03 NOTE — PROGRESS NOTE ADULT - ASSESSMENT
80yo F with liver abscess s/p IR drainage  - WBC WNL  - Pt states she feels much better  - 15cc cloudy bile output into bag

## 2024-12-03 NOTE — PROGRESS NOTE ADULT - ASSESSMENT
1. 79-year-old female  with 2-year plus history metastatic renal cell carcinoma   history numerous courses immunotherapy ,  IR  embolization of liver metastases,   history of hepatic abscesses ,sp  drainage and IV and p.o. antibiotics   followed closely by ID  on empiric meropenem with continued improvement      2. pneumatosis coli ?  Ischemic bowel?   now sp  right hemicolectomy for  pneumatosis of the cecum,  persistent liver  abscesses,  progressive increase in pulmonary metastases on abdominal scan.    3. Shall hold Tivozanib as it is a VEFG inhibitor and can be associated with both arterial and venous thromboembolism    DW Dr Saundesr.    DVT prophylaxis    Dusty Marcial MD  [facility]  Cell: [phone]

## 2024-12-03 NOTE — PROGRESS NOTE ADULT - ASSESSMENT
A/P:    # Septic Shock Secondary to Pneumatosis of Cecum and Ascending Colon  # Hepatic Abscess secondary to Metastatic RCC  - SP ICU stay, off pressors  - SP Right hemicolectomy with ileostomy 11/30  - sp IR drainage of hepatic abscess 12/2  - Surgery on board  - Continue meropenem   - Blood cultures NTD  - Urine culture negative  - ID following  - Pain management  - Monitor BP and Temps  - For IR drainage of hepatic abscess 12/2    # Leukocytosis  - Likely due to above  - WBC 7.54 12/3  - Trend    # Anemia  - Likely dilutional and blood loss  - Stable for now  - Trend    # Adrenal insufficiency  - Continue hydrocortisone  - Monitor BP and BS    # GERD  # GI PPx  - Continue Protonix     # PRANAY  - Resolved      # RCC with Mets  - Hold Tivozanib as per onc  - Consider palliative care options    # P. Afib  - Off eliquis for now for IR   - Hold metoprolol as BP is low  - Continue amiodarone  - Restart eliquis once cleared by surgery    # Hypothyroid  - Continue levothyroxine    # DVT prophylaxis  - Heparin 5000 q 8

## 2024-12-03 NOTE — PROGRESS NOTE ADULT - ASSESSMENT
Assessment:  79F with Renal Cell Carcinoma with mets to liver, IR embolization of a liver lesion on 7/30 Diverticulitis, HTN, HLD, Hypothyroid new onset Afib/RVR in August 2024 started on Eliquis, GI Bleed after ERCP- resolved without intervention, started on eliquis during admit for Afib. liver abscesses, multiple admissions prior for liver abscess, most recently on 9/29-10/16 septic shock 2/2 hepatic abscess with Klebsiella, Enterococcus faecalis requiring pressors , Enterococcus faecium and Clostridium perfringens growing in blood cultures. Pt had IR drain placed and output was monitored closely. Abscess fluid culture grew Klebsiella, Enterococcus faecalis. completed IV Invanz and PO Augmentin on 11/5, presents 11/30 with abdominal pain, fever and fatigue  Afebrile on admission, on RA, but hypotensive  CT with Pneumatosis of the cecum and liver abscesses   WBC 17 > 11  Cr 0.79  UA negative, UCx negative  CXR atelectasis  RVP negative  BCx 11/30 negative  Prior culture with Pan S Kleb, Pan S E feacalis, E faecium R Amp, S Vanco    Antimicrobials:  s/p Cipro Flagyl x1 (11/30)  Meropenem 1G q8 (12/1 --- )    Impression:   #Septic Shock  #Pneumatosis of the cecum, Ischemic Bowel  #Liver abscess  #Leukocytosis  #Metastatic kidney cancer   #PRANAY  - s/p Laparoscopic right colectomy (11/30) with pus  - remains afebrile, WBC normalized    Recommendations:  - continue empiric Meropenem 1G q8 (Day #3)  - monitor temperature curve  - trend WBC  - side effects of antibiotic discussed, tolerating abx well so far  - Surgery following  - IR eval placed drain in liver  - cultures pending  - most likely will go home with iv antibiotics in next 2-3 days; have discussed this with patient and family  - also discussed tapering solucortef and blood pressure medications with medicine team to monitor    [facility] token not applicable as all rx will be iv

## 2024-12-03 NOTE — PROGRESS NOTE ADULT - PROBLEM SELECTOR PLAN 1
- Pt feels much better, WBC WNL  - Drain flushed at bedside with 10cc sterile saline with no issues  - Continue to monitor drain output

## 2024-12-04 ENCOUNTER — TRANSCRIPTION ENCOUNTER (OUTPATIENT)
Age: 79
End: 2024-12-04

## 2024-12-04 LAB
-  LEVOFLOXACIN: SIGNIFICANT CHANGE UP
-  LEVOFLOXACIN: SIGNIFICANT CHANGE UP
-  MINOCYCLINE: SIGNIFICANT CHANGE UP
-  MINOCYCLINE: SIGNIFICANT CHANGE UP
-  TRIMETHOPRIM/SULFAMETHOXAZOLE: SIGNIFICANT CHANGE UP
-  TRIMETHOPRIM/SULFAMETHOXAZOLE: SIGNIFICANT CHANGE UP
ALBUMIN SERPL ELPH-MCNC: 2 G/DL — LOW (ref 3.3–5)
ALP SERPL-CCNC: 396 U/L — HIGH (ref 40–120)
ALT FLD-CCNC: 24 U/L — SIGNIFICANT CHANGE UP (ref 12–78)
ANION GAP SERPL CALC-SCNC: 2 MMOL/L — LOW (ref 5–17)
AST SERPL-CCNC: 42 U/L — HIGH (ref 15–37)
BASOPHILS # BLD AUTO: 0 K/UL — SIGNIFICANT CHANGE UP (ref 0–0.2)
BASOPHILS NFR BLD AUTO: 0 % — SIGNIFICANT CHANGE UP (ref 0–2)
BILIRUB SERPL-MCNC: 0.5 MG/DL — SIGNIFICANT CHANGE UP (ref 0.2–1.2)
BUN SERPL-MCNC: 11 MG/DL — SIGNIFICANT CHANGE UP (ref 7–23)
CALCIUM SERPL-MCNC: 8.5 MG/DL — SIGNIFICANT CHANGE UP (ref 8.5–10.1)
CHLORIDE SERPL-SCNC: 111 MMOL/L — HIGH (ref 96–108)
CO2 SERPL-SCNC: 26 MMOL/L — SIGNIFICANT CHANGE UP (ref 22–31)
CREAT SERPL-MCNC: 0.55 MG/DL — SIGNIFICANT CHANGE UP (ref 0.5–1.3)
EGFR: 94 ML/MIN/1.73M2 — SIGNIFICANT CHANGE UP
EOSINOPHIL # BLD AUTO: 0.02 K/UL — SIGNIFICANT CHANGE UP (ref 0–0.5)
EOSINOPHIL NFR BLD AUTO: 0.4 % — SIGNIFICANT CHANGE UP (ref 0–6)
GLUCOSE SERPL-MCNC: 85 MG/DL — SIGNIFICANT CHANGE UP (ref 70–99)
GRAM STN FLD: ABNORMAL
HCT VFR BLD CALC: 33.7 % — LOW (ref 34.5–45)
HGB BLD-MCNC: 10.4 G/DL — LOW (ref 11.5–15.5)
IMM GRANULOCYTES NFR BLD AUTO: 0.4 % — SIGNIFICANT CHANGE UP (ref 0–0.9)
LYMPHOCYTES # BLD AUTO: 0.49 K/UL — LOW (ref 1–3.3)
LYMPHOCYTES # BLD AUTO: 9.1 % — LOW (ref 13–44)
MCHC RBC-ENTMCNC: 26.6 PG — LOW (ref 27–34)
MCHC RBC-ENTMCNC: 30.9 G/DL — LOW (ref 32–36)
MCV RBC AUTO: 86.2 FL — SIGNIFICANT CHANGE UP (ref 80–100)
METHOD TYPE: SIGNIFICANT CHANGE UP
MONOCYTES # BLD AUTO: 0.37 K/UL — SIGNIFICANT CHANGE UP (ref 0–0.9)
MONOCYTES NFR BLD AUTO: 6.9 % — SIGNIFICANT CHANGE UP (ref 2–14)
NEUTROPHILS # BLD AUTO: 4.5 K/UL — SIGNIFICANT CHANGE UP (ref 1.8–7.4)
NEUTROPHILS NFR BLD AUTO: 83.2 % — HIGH (ref 43–77)
PLATELET # BLD AUTO: 284 K/UL — SIGNIFICANT CHANGE UP (ref 150–400)
POTASSIUM SERPL-MCNC: 3.8 MMOL/L — SIGNIFICANT CHANGE UP (ref 3.5–5.3)
POTASSIUM SERPL-SCNC: 3.8 MMOL/L — SIGNIFICANT CHANGE UP (ref 3.5–5.3)
PROT SERPL-MCNC: 5.4 GM/DL — LOW (ref 6–8.3)
RBC # BLD: 3.91 M/UL — SIGNIFICANT CHANGE UP (ref 3.8–5.2)
RBC # FLD: 18.3 % — HIGH (ref 10.3–14.5)
SODIUM SERPL-SCNC: 139 MMOL/L — SIGNIFICANT CHANGE UP (ref 135–145)
SURGICAL PATHOLOGY STUDY: SIGNIFICANT CHANGE UP
WBC # BLD: 5.4 K/UL — SIGNIFICANT CHANGE UP (ref 3.8–10.5)
WBC # FLD AUTO: 5.4 K/UL — SIGNIFICANT CHANGE UP (ref 3.8–10.5)

## 2024-12-04 PROCEDURE — 99233 SBSQ HOSP IP/OBS HIGH 50: CPT

## 2024-12-04 RX ORDER — MINOCYCLINE HYDROCHLORIDE 50 MG/1
200 CAPSULE, COATED PELLETS ORAL
Refills: 0 | Status: DISCONTINUED | OUTPATIENT
Start: 2024-12-04 | End: 2024-12-05

## 2024-12-04 RX ORDER — ERTAPENEM 1 G/1
1000 INJECTION, POWDER, LYOPHILIZED, FOR SOLUTION INTRAMUSCULAR; INTRAVENOUS EVERY 24 HOURS
Refills: 0 | Status: DISCONTINUED | OUTPATIENT
Start: 2024-12-04 | End: 2024-12-05

## 2024-12-04 RX ADMIN — Medication 5000 UNIT(S): at 06:24

## 2024-12-04 RX ADMIN — Medication 5000 UNIT(S): at 21:41

## 2024-12-04 RX ADMIN — ACETAMINOPHEN 500MG 650 MILLIGRAM(S): 500 TABLET, COATED ORAL at 00:30

## 2024-12-04 RX ADMIN — ACETAMINOPHEN 500MG 650 MILLIGRAM(S): 500 TABLET, COATED ORAL at 21:44

## 2024-12-04 RX ADMIN — MINOCYCLINE HYDROCHLORIDE 200 MILLIGRAM(S): 50 CAPSULE, COATED PELLETS ORAL at 21:41

## 2024-12-04 RX ADMIN — Medication 10 MILLIGRAM(S): at 13:49

## 2024-12-04 RX ADMIN — Medication 100 MICROGRAM(S): at 06:24

## 2024-12-04 RX ADMIN — MEROPENEM 1000 MILLIGRAM(S): 500 INJECTION, POWDER, FOR SOLUTION INTRAVENOUS at 08:48

## 2024-12-04 RX ADMIN — AMIODARONE HYDROCHLORIDE 100 MILLIGRAM(S): 200 TABLET ORAL at 08:45

## 2024-12-04 RX ADMIN — ERTAPENEM 120 MILLIGRAM(S): 1 INJECTION, POWDER, LYOPHILIZED, FOR SOLUTION INTRAMUSCULAR; INTRAVENOUS at 16:47

## 2024-12-04 RX ADMIN — ACETAMINOPHEN 500MG 650 MILLIGRAM(S): 500 TABLET, COATED ORAL at 06:40

## 2024-12-04 RX ADMIN — Medication 20 MILLIGRAM(S): at 08:46

## 2024-12-04 RX ADMIN — PANTOPRAZOLE SODIUM 40 MILLIGRAM(S): 40 TABLET, DELAYED RELEASE ORAL at 06:24

## 2024-12-04 RX ADMIN — MEROPENEM 1000 MILLIGRAM(S): 500 INJECTION, POWDER, FOR SOLUTION INTRAVENOUS at 00:59

## 2024-12-04 RX ADMIN — LOSARTAN POTASSIUM 25 MILLIGRAM(S): 100 TABLET, FILM COATED ORAL at 08:46

## 2024-12-04 RX ADMIN — ACETAMINOPHEN 500MG 650 MILLIGRAM(S): 500 TABLET, COATED ORAL at 22:44

## 2024-12-04 RX ADMIN — Medication 5000 UNIT(S): at 13:50

## 2024-12-04 NOTE — DISCHARGE NOTE PROVIDER - ATTENDING DISCHARGE PHYSICAL EXAMINATION:
GEN: NAD, comfortable, resting in bed  HEENT: NC/AT, EOMI, PERRLA, MMM, clear conjunctiva and sclera, normal hearing, no nasal discharge, throat clear, no thrush, normal dentition.   NECK: supple, no JVD, no LAD, no thyromegaly  BACK:  ROM intact, no spinal/paraspinal tenderness  CV: S1S2, RRR, no mumur  RESP: good air movement, CTABL, no rales, rhonchi or wheezing, respirations unlabored  ABD: +BS, soft, ileostomy in place, hepatic drain in place.   EXT: +2 radial and pedal pulses, no edema, no calve tenderness

## 2024-12-04 NOTE — PROGRESS NOTE ADULT - PROBLEM SELECTOR PLAN 1
Caller: Chirag Parikh    Continue to flush as ordered  - Can plan for IR drain check when output is < 10cc/day, possibly before discharge  - As per pt, she may be discharged tomorrow  - If pt is discharged with drain, may benefits from VNS

## 2024-12-04 NOTE — PROGRESS NOTE ADULT - ASSESSMENT
Assessment:  79F with Renal Cell Carcinoma with mets to liver, IR embolization of a liver lesion on 7/30 Diverticulitis, HTN, HLD, Hypothyroid new onset Afib/RVR in August 2024 started on Eliquis, GI Bleed after ERCP- resolved without intervention, started on eliquis during admit for Afib. liver abscesses, multiple admissions prior for liver abscess, most recently on 9/29-10/16 septic shock 2/2 hepatic abscess with Klebsiella, Enterococcus faecalis requiring pressors , Enterococcus faecium and Clostridium perfringens growing in blood cultures. Pt had IR drain placed and output was monitored closely. Abscess fluid culture grew Klebsiella, Enterococcus faecalis. completed IV Invanz and PO Augmentin on 11/5, presents 11/30 with abdominal pain, fever and fatigue  Afebrile on admission, on RA, but hypotensive  CT with Pneumatosis of the cecum and liver abscesses   WBC 17 > 11  Cr 0.79  UA negative, UCx negative  CXR atelectasis  RVP negative  BCx 11/30 negative  Prior culture with Pan S Kleb, Pan S E feacalis, E faecium R Amp, S Vanco    Antimicrobials:  s/p Cipro Flagyl x1 (11/30)  Meropenem 1G q8 (12/1 --- ); have changed to ertapenem one gram daily to continue until 12/25 with weekly cbc, cmp, esr,crp; ordered midline  Ordered minocycline 200 mg po q 12 hours for S. maltophilia in liver abscess culture    Impression:   #Septic Shock  #Pneumatosis of the cecum, Ischemic Bowel  #Liver abscess  #Leukocytosis  #Metastatic kidney cancer   #PRANAY  - s/p Laparoscopic right colectomy (11/30) with pus  - remains afebrile, WBC normalized    Recommendations:  - continue Ertapenem one gram daily until 12/25; ordered midline  - minocycline 200 mg po q 12 hours until 12/25  - monitor temperature curve  - trend WBC  - side effects of antibiotic discussed, tolerating abx well so far  - Surgery following  - IR eval placed drain in liver  - most likely will go home with iv antibiotics in next 2-3 days; have discussed this with patient and family  - also discussed tapering solucortef and blood pressure medications with medicine team to monitor    [facility] token not applicable as all rx will be iv

## 2024-12-04 NOTE — PROGRESS NOTE ADULT - ASSESSMENT
78yo F with liver abscess s/p IR drainage  - WBC WNL  - Pt states she continues to feel good  - 15cc cloudy bile output into bag

## 2024-12-04 NOTE — DISCHARGE NOTE PROVIDER - HOSPITAL COURSE
78 yo fem h/o metastatic renal cell carcinoma to lung and liver, h/o liver abscesses, afib on eliquis who presented to hospital with abdominal pain, septic shock, found to have pneumatosis of ascending colon. She received K centra and was taken to the operating room, had a lap Right colectomy/end ileostomy, doing well postop. She also had percutaneous drainage of liver abscess by IR. 78 yo fem h/o metastatic renal cell carcinoma to lung and liver, h/o liver abscesses, afib on eliquis who presented to hospital with abdominal pain, septic shock, found to have pneumatosis of ascending colon. She received K centra and was taken to the operating room, had a lap Right colectomy/end ileostomy, doing well postop. She also had percutaneous drainage of liver abscess by IR.       # Septic Shock Secondary to Pneumatosis of Cecum and Ascending Colon  # Hepatic Abscess secondary to Metastatic RCC  S/p ICU stay, off pressors. S/p Right hemicolectomy with ileostomy 11/30. S/p IR drainage of hepatic abscess 12/2. Blood cultures NTD  - ID following: - continue Ertapenem one gram daily until 12/25; s/p midline                         - minocycline 200 mg po q 12 hours until 12/25  - Surgery input appreciated; Ileostomy teaching done by Wound Care, supplies ordered  - IR drainage of hepatic abscess 12/2       -Hepatic drain in place      - Continue to flushes as ordered by IR      - Follow up with IR as outpatient for drain removal next week     # Leukocytosis, resolved  - Likely due to above  - Cont Abx as mentioned above    # Anemia  - Likely dilutional and blood loss  - Stable for now    # Adrenal insufficiency  - Continue hydrocortisone    # GERD  # GI PPx  - Continue Protonix     # PRANAY  - Resolved    # RCC with Mets  - Hold Tivozanib as per onc  - Consider palliative care options    # P. Afib  - Off eliquis for now for IR   - Resume metoprolol  - Continue amiodarone  - Restart eliquis once cleared by surgery as outpatient    # Hypothyroid  - Continue levothyroxine

## 2024-12-04 NOTE — DISCHARGE NOTE PROVIDER - CARE PROVIDER_API CALL
Ezekiel Duran.  Surgery  [address] [place] [id number]  Phone: [phone]  Fax: [phone]  Scheduled Appointment: 12/13/2024 09:30 AM   Ezekiel Duran.  Surgery  [address] [place] [id number]  Phone: [phone]  Fax: [phone]  Scheduled Appointment: 12/12/2024 09:00 AM

## 2024-12-04 NOTE — PROGRESS NOTE ADULT - ASSESSMENT
A/P:    # Septic Shock Secondary to Pneumatosis of Cecum and Ascending Colon  # Hepatic Abscess secondary to Metastatic RCC  - SP ICU stay, off pressors  - SP Right hemicolectomy with ileostomy 11/30- patient will need teaching for Ileostomy care before dc- Wound care consult is in place for the teaching   - sp IR drainage of hepatic abscess 12/2  - Surgery on board  - Continue meropenem   - Blood cultures NTD  - Urine culture negative  - ID following: - continue Ertapenem one gram daily until 12/25; ordered midline by Dr Sims on 12/4/24                         - minocycline 200 mg po q 12 hours until 12/25  - Pain management  - Monitor BP and Temps  - IR drainage of hepatic abscess 12/2    -hepatic drain in place    -  - Continue to flush as ordered by IR      - Can plan for IR drain check when output is < 10cc/day, possibly before discharge       - If pt is discharged with drain, may benefits from VNS.    # Leukocytosis  - Likely due to above  - WBC 7.54 12/3  - Trend    # Anemia  - Likely dilutional and blood loss  - Stable for now  - Trend    # Adrenal insufficiency  - Continue hydrocortisone  - Monitor BP and BS    # GERD  # GI PPx  - Continue Protonix     # PRANAY  - Resolved      # RCC with Mets  - Hold Tivozanib as per onc  - Consider palliative care options    # P. Afib  - Off eliquis for now for IR   - Hold metoprolol as BP is low  - Continue amiodarone  - Restart eliquis once cleared by surgery    # Hypothyroid  - Continue levothyroxine    # DVT prophylaxis  - Heparin 5000 q 8      Before DC needs to pay attention to the following:   - SP Right hemicolectomy with ileostomy 11/30- patient will need teaching for Ileostomy care before dc- Wound care consult is in place for the teaching   - ID following: - continue Ertapenem one gram daily until 12/25; ordered midline by Dr Sims on 12/4/24                        - minocycline 200 mg po q 12 hours until 12/25  - IR drainage of hepatic abscess 12/2    -hepatic drain in place    -  - Continue to flush as ordered by IR      - Can plan for IR drain check when output is < 10cc/day, possibly before discharge       - If pt is discharged with drain, may benefits from VNS.

## 2024-12-04 NOTE — DISCHARGE NOTE PROVIDER - NSDCFUSCHEDAPPT_GEN_ALL_CORE_FT
[facility]  ORTHOSUR [address] [facility]  Scheduled Appointment: 12/11/2024

## 2024-12-04 NOTE — DISCHARGE NOTE PROVIDER - NSDCMRMEDTOKEN_GEN_ALL_CORE_FT
amiodarone 100 mg oral tablet: 1 tab(s) orally once a day  amoxicillin-clavulanate 500 mg-125 mg oral tablet: 1 tab(s) orally 2 times a day  apixaban 5 mg oral tablet: 1 tab(s) orally every 12 hours  Benicar 20 mg oral tablet: 2 tab(s) orally once a day  hydrocortisone 10 mg oral tablet: 1 tab(s) orally 3 times a day  levothyroxine 100 mcg (0.1 mg) oral tablet: 1 tab(s) orally once a day  metoprolol succinate 50 mg oral tablet, extended release: 1 tab(s) orally 2 times a day  Protonix 40 mg oral delayed release tablet: 1 tab(s) orally once a day   acetaminophen 325 mg oral tablet: 2 tab(s) orally every 6 hours As needed Mild Pain (1 - 3)  amiodarone 100 mg oral tablet: 1 tab(s) orally once a day  Benicar 20 mg oral tablet: 2 tab(s) orally once a day  hydrocortisone 10 mg oral tablet: 1 tab(s) orally 3 times a day  levothyroxine 100 mcg (0.1 mg) oral tablet: 1 tab(s) orally once a day  metoprolol succinate 50 mg oral tablet, extended release: 1 tab(s) orally 2 times a day  minocycline 100 mg oral capsule: 2 cap(s) orally 2 times a day  Protonix 40 mg oral delayed release tablet: 1 tab(s) orally once a day

## 2024-12-04 NOTE — DISCHARGE NOTE PROVIDER - PROVIDER TOKENS
PROVIDER:[TOKEN:[[id number]],SCHEDULEDAPPT:[12/13/2024],SCHEDULEDAPPTTIME:[09:30 AM]] PROVIDER:[TOKEN:[[id number]],SCHEDULEDAPPT:[12/12/2024],SCHEDULEDAPPTTIME:[09:00 AM]]

## 2024-12-04 NOTE — DISCHARGE NOTE PROVIDER - NSDCCPCAREPLAN_GEN_ALL_CORE_FT
PRINCIPAL DISCHARGE DIAGNOSIS  Diagnosis: Pneumatosis coli  Assessment and Plan of Treatment:       SECONDARY DISCHARGE DIAGNOSES  Diagnosis: Septic shock  Assessment and Plan of Treatment:     Diagnosis: Carcinoma, renal cell  Assessment and Plan of Treatment:

## 2024-12-05 ENCOUNTER — TRANSCRIPTION ENCOUNTER (OUTPATIENT)
Age: 79
End: 2024-12-05

## 2024-12-05 VITALS
RESPIRATION RATE: 18 BRPM | TEMPERATURE: 97 F | DIASTOLIC BLOOD PRESSURE: 96 MMHG | SYSTOLIC BLOOD PRESSURE: 147 MMHG | HEART RATE: 98 BPM | OXYGEN SATURATION: 98 %

## 2024-12-05 LAB
-  AMPICILLIN: SIGNIFICANT CHANGE UP
-  VANCOMYCIN: SIGNIFICANT CHANGE UP
ANION GAP SERPL CALC-SCNC: 4 MMOL/L — LOW (ref 5–17)
BASOPHILS # BLD AUTO: 0.01 K/UL — SIGNIFICANT CHANGE UP (ref 0–0.2)
BASOPHILS NFR BLD AUTO: 0.1 % — SIGNIFICANT CHANGE UP (ref 0–2)
BUN SERPL-MCNC: 12 MG/DL — SIGNIFICANT CHANGE UP (ref 7–23)
CALCIUM SERPL-MCNC: 8.7 MG/DL — SIGNIFICANT CHANGE UP (ref 8.5–10.1)
CHLORIDE SERPL-SCNC: 108 MMOL/L — SIGNIFICANT CHANGE UP (ref 96–108)
CO2 SERPL-SCNC: 26 MMOL/L — SIGNIFICANT CHANGE UP (ref 22–31)
CREAT SERPL-MCNC: 0.55 MG/DL — SIGNIFICANT CHANGE UP (ref 0.5–1.3)
CULTURE RESULTS: SIGNIFICANT CHANGE UP
CULTURE RESULTS: SIGNIFICANT CHANGE UP
EGFR: 94 ML/MIN/1.73M2 — SIGNIFICANT CHANGE UP
EOSINOPHIL # BLD AUTO: 0.12 K/UL — SIGNIFICANT CHANGE UP (ref 0–0.5)
EOSINOPHIL NFR BLD AUTO: 1.5 % — SIGNIFICANT CHANGE UP (ref 0–6)
GLUCOSE SERPL-MCNC: 83 MG/DL — SIGNIFICANT CHANGE UP (ref 70–99)
HCT VFR BLD CALC: 36.3 % — SIGNIFICANT CHANGE UP (ref 34.5–45)
HGB BLD-MCNC: 11.3 G/DL — LOW (ref 11.5–15.5)
IMM GRANULOCYTES NFR BLD AUTO: 0.6 % — SIGNIFICANT CHANGE UP (ref 0–0.9)
LYMPHOCYTES # BLD AUTO: 0.76 K/UL — LOW (ref 1–3.3)
LYMPHOCYTES # BLD AUTO: 9.7 % — LOW (ref 13–44)
MCHC RBC-ENTMCNC: 26.7 PG — LOW (ref 27–34)
MCHC RBC-ENTMCNC: 31.1 G/DL — LOW (ref 32–36)
MCV RBC AUTO: 85.6 FL — SIGNIFICANT CHANGE UP (ref 80–100)
METHOD TYPE: SIGNIFICANT CHANGE UP
MONOCYTES # BLD AUTO: 0.68 K/UL — SIGNIFICANT CHANGE UP (ref 0–0.9)
MONOCYTES NFR BLD AUTO: 8.7 % — SIGNIFICANT CHANGE UP (ref 2–14)
NEUTROPHILS # BLD AUTO: 6.21 K/UL — SIGNIFICANT CHANGE UP (ref 1.8–7.4)
NEUTROPHILS NFR BLD AUTO: 79.4 % — HIGH (ref 43–77)
PLATELET # BLD AUTO: 309 K/UL — SIGNIFICANT CHANGE UP (ref 150–400)
POTASSIUM SERPL-MCNC: 3.7 MMOL/L — SIGNIFICANT CHANGE UP (ref 3.5–5.3)
POTASSIUM SERPL-SCNC: 3.7 MMOL/L — SIGNIFICANT CHANGE UP (ref 3.5–5.3)
RBC # BLD: 4.24 M/UL — SIGNIFICANT CHANGE UP (ref 3.8–5.2)
RBC # FLD: 18.6 % — HIGH (ref 10.3–14.5)
SODIUM SERPL-SCNC: 138 MMOL/L — SIGNIFICANT CHANGE UP (ref 135–145)
SPECIMEN SOURCE: SIGNIFICANT CHANGE UP
SPECIMEN SOURCE: SIGNIFICANT CHANGE UP
WBC # BLD: 7.83 K/UL — SIGNIFICANT CHANGE UP (ref 3.8–10.5)
WBC # FLD AUTO: 7.83 K/UL — SIGNIFICANT CHANGE UP (ref 3.8–10.5)

## 2024-12-05 PROCEDURE — 99239 HOSP IP/OBS DSCHRG MGMT >30: CPT

## 2024-12-05 PROCEDURE — 99222 1ST HOSP IP/OBS MODERATE 55: CPT | Mod: FS

## 2024-12-05 RX ORDER — MINOCYCLINE HYDROCHLORIDE 50 MG/1
2 CAPSULE, COATED PELLETS ORAL
Qty: 80 | Refills: 0
Start: 2024-12-05 | End: 2024-12-24

## 2024-12-05 RX ORDER — ACETAMINOPHEN 500MG 500 MG/1
2 TABLET, COATED ORAL
Qty: 0 | Refills: 0 | DISCHARGE
Start: 2024-12-05

## 2024-12-05 RX ORDER — AMOXICILLIN/POTASSIUM CLAV 250-125 MG
1 TABLET ORAL
Qty: 48 | Refills: 0 | DISCHARGE

## 2024-12-05 RX ADMIN — Medication 5000 UNIT(S): at 05:12

## 2024-12-05 RX ADMIN — PANTOPRAZOLE SODIUM 40 MILLIGRAM(S): 40 TABLET, DELAYED RELEASE ORAL at 05:11

## 2024-12-05 RX ADMIN — Medication 20 MILLIGRAM(S): at 12:46

## 2024-12-05 RX ADMIN — Medication 100 MICROGRAM(S): at 05:12

## 2024-12-05 RX ADMIN — AMIODARONE HYDROCHLORIDE 100 MILLIGRAM(S): 200 TABLET ORAL at 12:47

## 2024-12-05 RX ADMIN — LOSARTAN POTASSIUM 25 MILLIGRAM(S): 100 TABLET, FILM COATED ORAL at 12:55

## 2024-12-05 RX ADMIN — Medication 10 MILLIGRAM(S): at 17:19

## 2024-12-05 RX ADMIN — ERTAPENEM 120 MILLIGRAM(S): 1 INJECTION, POWDER, LYOPHILIZED, FOR SOLUTION INTRAMUSCULAR; INTRAVENOUS at 16:41

## 2024-12-05 RX ADMIN — MINOCYCLINE HYDROCHLORIDE 200 MILLIGRAM(S): 50 CAPSULE, COATED PELLETS ORAL at 12:46

## 2024-12-05 NOTE — PROGRESS NOTE ADULT - SUBJECTIVE AND OBJECTIVE BOX
80 yo fem s/p lap Rt colectomy/end ileostomy (pneumatosis of ascending colon), liver abscesses, mets, off pressures, good U/O    Abd : RLQ pink stoma minimal stool           12-01-24 @ 10:22    Vital Signs Last 24 Hrs  T(C): 35.7 (01 Dec 2024 08:00), Max: 37.6 (30 Nov 2024 13:32)  T(F): 96.3 (01 Dec 2024 08:00), Max: 99.7 (30 Nov 2024 13:32)  HR: 82 (01 Dec 2024 09:00) (75 - 98)  BP: 125/94 (01 Dec 2024 08:00) (76/62 - 125/94)  BP(mean): 104 (01 Dec 2024 08:00) (63 - 104)  RR: 11 (01 Dec 2024 09:00) (11 - 24)  SpO2: 94% (01 Dec 2024 09:00) (90% - 100%)    Parameters below as of 01 Dec 2024 09:00  Patient On (Oxygen Delivery Method): room air                              11.7   11.10 )-----------( 249      ( 01 Dec 2024 06:00 )             37.3       12-01    138  |  111[H]  |  15  ----------------------------<  143[H]  4.6   |  21[L]  |  0.79    Ca    7.8[L]      01 Dec 2024 06:00  Phos  3.3     12-01  Mg     1.6     12-01    TPro  5.6[L]  /  Alb  1.9[L]  /  TBili  0.6  /  DBili  x   /  AST  61[H]  /  ALT  31  /  AlkPhos  325[H]  12-01      LIVER FUNCTIONS - ( 01 Dec 2024 06:00 )  Alb: 1.9 g/dL / Pro: 5.6 gm/dL / ALK PHOS: 325 U/L / ALT: 31 U/L / AST: 61 U/L / GGT: x             MEDICATIONS  (STANDING):  chlorhexidine 2% Cloths 1 Application(s) Topical <User Schedule>  chlorhexidine 4% Liquid 1 Application(s) Topical <User Schedule>  heparin   Injectable 5000 Unit(s) SubCutaneous every 8 hours  levothyroxine Injectable 50 MICROGram(s) IV Push at bedtime  meropenem Injectable 1000 milliGRAM(s) IV Push every 8 hours  pantoprazole  Injectable 40 milliGRAM(s) IV Push daily  sodium chloride 0.9% Bolus 1000 milliLiter(s) IV Bolus once  sodium chloride 0.9%. 1000 milliLiter(s) (100 mL/Hr) IV Continuous <Continuous>    MEDICATIONS  (PRN):  morphine  - Injectable 4 milliGRAM(s) IV Push every 4 hours PRN Moderate Pain (4 - 6)  sodium chloride 0.9% lock flush 10 milliLiter(s) IV Push every 1 hour PRN Pre/post blood products, medications, blood draw, and to maintain line patency      MEDICATIONS  (STANDING):  chlorhexidine 2% Cloths 1 Application(s) Topical <User Schedule>  chlorhexidine 4% Liquid 1 Application(s) Topical <User Schedule>  heparin   Injectable 5000 Unit(s) SubCutaneous every 8 hours  levothyroxine Injectable 50 MICROGram(s) IV Push at bedtime  meropenem Injectable 1000 milliGRAM(s) IV Push every 8 hours  pantoprazole  Injectable 40 milliGRAM(s) IV Push daily  sodium chloride 0.9% Bolus 1000 milliLiter(s) IV Bolus once  sodium chloride 0.9%. 1000 milliLiter(s) (100 mL/Hr) IV Continuous <Continuous>      
Date of Service:12-02-24 @ 09:30  Interval History/ROS: Afebrile overnight, on RA comfortable, WBC 13, so far BCx negative, reports no fever or chills      REVIEW OF SYSTEMS  [  ] ROS unobtainable because:    [ x ] All other systems negative except as noted below    Constitutional:  [ ] fever [ ] chills  [ ] weight loss  [ ]night sweat  [ ]poor appetite/PO intake [ ]fatigue   Skin:  [ ] rash [ ] phlebitis	  Eyes: [ ] icterus [ ] pain  [ ] discharge	  ENMT: [ ] sore throat  [ ] thrush [ ] ulcers [ ] exudates [ ]anosmia  Respiratory: [ ] dyspnea [ ] hemoptysis [ ] cough [ ] sputum	  Cardiovascular:  [ ] chest pain [ ] palpitations [ ] edema	  Gastrointestinal:  [ ] nausea [ ] vomiting [ ] diarrhea [ ] constipation [ ] pain	  Genitourinary:  [ ] dysuria [ ] frequency [ ] hematuria [ ] discharge [ ] flank pain  [ ] incontinence  Musculoskeletal:  [ ] myalgias [ ] arthralgias [ ] arthritis  [ ] back pain  Neurological:  [ ] headache [ ] weakness [ ] seizures  [ ] confusion/altered mental status    Allergies  Zetia (Unknown)  Cabometyx (Unknown)  Lipitor (Unknown)  Norvasc (Faint)  Crestor (Other)  Dyazide (Faint)  statins (Unknown)  bacitracin (Rash)  tivozanib (Faint)        ANTIMICROBIALS:    meropenem Injectable 1000 every 8 hours        OTHER MEDS: MEDICATIONS  (STANDING):  heparin   Injectable 5000 every 8 hours  levothyroxine Injectable 50 at bedtime  morphine  - Injectable 2 every 4 hours PRN  oxycodone    5 mG/acetaminophen 325 mG 1 every 4 hours PRN  pantoprazole  Injectable 40 daily      Vital Signs Last 24 Hrs  T(F): 97.7 (12-02-24 @ 04:00), Max: 99.7 (11-30-24 @ 13:32)    Vital Signs Last 24 Hrs  HR: 76 (12-02-24 @ 08:00) (69 - 101)  BP: 119/76 (12-02-24 @ 08:00) (91/69 - 135/76)  RR: 12 (12-02-24 @ 08:00)  SpO2: 94% (12-02-24 @ 08:00) (62% - 99%)  Wt(kg): --    EXAM:    Constitutional:  frail, NAD  Head/Eyes: no icterus  LUNGS:  CTA  CVS:  regular rhythm  Abd:  soft, diffuse-tender; mild-distended  Ext:  no edema  Vascular:  IV site no erythema tenderness or discharge  Neuro: AAO X 3, non- focal    Labs:                        9.9    13.68 )-----------( 298      ( 02 Dec 2024 05:31 )             32.4     12-02    137  |  113[H]  |  15  ----------------------------<  111[H]  4.9   |  23  |  0.77    Ca    8.3[L]      02 Dec 2024 05:31  Phos  2.3     12-02  Mg     2.1     12-02    TPro  5.2[L]  /  Alb  1.8[L]  /  TBili  0.4  /  DBili  x   /  AST  52[H]  /  ALT  25  /  AlkPhos  293[H]  12-02      WBC Trend:  WBC Count: 13.68 (12-02-24 @ 05:31)  WBC Count: 11.10 (12-01-24 @ 06:00)  WBC Count: 9.92 (11-30-24 @ 22:00)  WBC Count: 8.20 (11-30-24 @ 16:50)      Creatine Trend:  Creatinine: 0.77 (12-02)  Creatinine: 0.79 (12-01)  Creatinine: 1.00 (11-30)  Creatinine: 1.49 (11-30)      Liver Biochemical Testing Trend:  Alanine Aminotransferase (ALT/SGPT): 25 (12-02)  Alanine Aminotransferase (ALT/SGPT): 31 (12-01)  Alanine Aminotransferase (ALT/SGPT): 25 (11-30)  Alanine Aminotransferase (ALT/SGPT): 38 (11-30)  Alanine Aminotransferase (ALT/SGPT): 66 (10-16)  Aspartate Aminotransferase (AST/SGOT): 52 (12-02-24 @ 05:31)  Aspartate Aminotransferase (AST/SGOT): 61 (12-01-24 @ 06:00)  Aspartate Aminotransferase (AST/SGOT): 50 (11-30-24 @ 16:50)  Aspartate Aminotransferase (AST/SGOT): 51 (11-30-24 @ 12:46)  Aspartate Aminotransferase (AST/SGOT): 37 (10-16-24 @ 06:58)  Bilirubin Total: 0.4 (12-02)  Bilirubin Total: 0.6 (12-01)  Bilirubin Total: 1.1 (11-30)  Bilirubin Total: 1.6 (11-30)  Bilirubin Total: 0.4 (10-16)      Trend LDH      Urinalysis Basic - ( 02 Dec 2024 05:31 )    Color: x / Appearance: x / SG: x / pH: x  Gluc: 111 mg/dL / Ketone: x  / Bili: x / Urobili: x   Blood: x / Protein: x / Nitrite: x   Leuk Esterase: x / RBC: x / WBC x   Sq Epi: x / Non Sq Epi: x / Bacteria: x        MICROBIOLOGY:    MRSA PCR Result.: NotDetec (11-30-24 @ 21:30)  MRSA PCR Result.: NotDetec (09-29-24 @ 12:50)  MRSA PCR Result.: Detected (09-10-24 @ 05:40)      Urinalysis with Rflx Culture (collected 30 Nov 2024 13:11)    Culture - Urine (collected 30 Nov 2024 13:11)  Source: Clean Catch None  Final Report:    No growth    Culture - Blood (collected 30 Nov 2024 12:46)  Source: .Blood BLOOD  Preliminary Report:    No growth at 24 hours    Culture - Blood (collected 30 Nov 2024 12:39)  Source: .Blood BLOOD  Preliminary Report:    No growth at 24 hours    Culture - Blood (collected 12 Oct 2024 13:23)  Source: .Blood BLOOD  Final Report:    No growth at 5 days    Culture - Blood (collected 12 Oct 2024 13:21)  Source: .Blood BLOOD  Final Report:    No growth at 5 days    Culture - Abscess with Gram Stain (collected 09 Oct 2024 11:30)  Source: .Abscess  Final Report:    Rare Klebsiella pneumoniae    Numerous Enterococcus faecalis  Organism: Klebsiella pneumoniae  Enterococcus faecalis  Organism: Enterococcus faecalis    Sensitivities:      Method Type: ARLEY      -  Ampicillin: S <=2 Predicts results to ampicillin/sulbactam, amoxacillin-clavulanate and  piperacillin-tazobactam.      -  Vancomycin: S 2  Organism: Klebsiella pneumoniae    Sensitivities:      Method Type: ARLEY      -  Amoxicillin/Clavulanic Acid: S <=8/4      -  Ampicillin: R >16 These ampicillin results predict results for amoxicillin      -  Ampicillin/Sulbactam: S 8/4      -  Aztreonam: S <=4      -  Cefazolin: S <=2      -  Cefepime: S <=2      -  Cefoxitin: S <=8      -  Ceftriaxone: S <=1      -  Ciprofloxacin: S <=0.25      -  Ertapenem: S <=0.5      -  Gentamicin: S <=2      -  Imipenem: S <=1      -  Levofloxacin: S <=0.5      -  Meropenem: S <=1      -  Piperacillin/Tazobactam: S <=8      -  Tobramycin: S <=2      -  Trimethoprim/Sulfamethoxazole: S <=0.5/9.5    Culture - Blood (collected 02 Oct 2024 12:11)  Source: .Blood BLOOD  Final Report:    No growth at 5 days    Culture - Blood (collected 02 Oct 2024 12:06)  Source: .Blood BLOOD  Final Report:    No growth at 5 days    Culture - Abscess with Gram Stain (collected 30 Sep 2024 15:00)  Source: .Abscess  Final Report:    Few Enterococcus faecalis    Rare Klebsiella pneumoniae  Organism: Enterococcus faecalis  Klebsiella pneumoniae  Organism: Klebsiella pneumoniae    Sensitivities:      Method Type: ARLEY      -  Amoxicillin/Clavulanic Acid: S <=8/4      -  Ampicillin: R >16 These ampicillin results predict results for amoxicillin      -  Ampicillin/Sulbactam: S 8/4      -  Aztreonam: S <=4      -  Cefazolin: S <=2      -  Cefepime: S <=2      -  Cefoxitin: S <=8      -  Ceftriaxone: S <=1      -  Ciprofloxacin: S <=0.25      -  Ertapenem: S <=0.5      -  Gentamicin: S <=2      -  Imipenem: S <=1      -  Levofloxacin: S <=0.5      -  Meropenem: S <=1      -  Piperacillin/Tazobactam: S <=8      -  Tobramycin: S <=2      -  Trimethoprim/Sulfamethoxazole: S <=0.5/9.5  Organism: Enterococcus faecalis    Sensitivities:      Method Type: ARLEY      -  Ampicillin: S <=2 Predicts results to ampicillin/sulbactam, amoxacillin-clavulanate and  piperacillin-tazobactam.      -  Vancomycin: S 2    Lactate, Blood: 1.0 (12-02 @ 05:31)  Lactate, Blood: 2.8 (11-30 @ 15:28)  Lactate, Blood: 2.7 (11-30 @ 12:39)    Sedimentation Rate, Erythrocyte: 94 mm/hr (08-11-24 @ 07:08)      RADIOLOGY:  imaging below personally reviewed    Xray Chest 1 View- PORTABLE-Urgent:  (30 Nov 2024 14:14)                  
Interventional Radiology Follow-Up Note.         This is a 79y Female s/p drainage of hepatic abscess on 12/3 in Interventional Radiology with Dr. Carvajal.   Denies abdominal pain.          Medication:     aMIOdarone    Tablet: (12-05)  ertapenem  IVPB: (12-04)  heparin   Injectable: (12-05)  losartan: (12-05)  meropenem Injectable: (12-04)  minocycline: (12-05)    Vitals:   T(F): 98.5, Max: 98.5 (08:00)  HR: 84  BP: 141/93  RR: 17  SpO2: 98%    Physical Exam:  General: Nontoxic, in NAD.  Abdomen: soft, NTND.    Drain Device: Drain intact attached to gravity drain bag.  Flushed with 10cc NS w/o difficulty. Dressing clean, dry, intact.   24hr Drain output: 12cc    LABS:  WBC 7.83 / Hgb 11.3 / Hct 36.3 / Plt 309  Na 138 / K 3.7 / CO2 26 / Cl 108 / BUN 12 / Cr 0.55 / Glucose 83  ALT -- / AST -- / Alk Phos -- / Tbili --  Ptt -- / Pt -- / INR --      Assessment/Plan:  78yo F with PMH: Renal Cell Carcinoma with mets to liver, IR embolization of a liver lesion on 7/30 Diverticulitis, HTN, HLD, Hypothyroid new onset Afib/RVR in August 2024 started on Eliquis, GI Bleed after ERCP- resolved without intervention, started on eliquis during admit for Afib. liver abscesses.  Patient presented to ED with fatigue and fevers and severe abdominal pain in ED SBP in 80's unresponsive to IVF 5L overall and started on levophed, poor venous access requiring CVC insertion. CT shows Pneumatosis of the cecum and liver abscesses stable from MRI on 11/6 however increased from CT on 10/12. Pt now s/p IR liver abscess drain/aspiration.     - Drain with 12cc output, WBC WNL.  - Will plan for drain removal as out put early next week, when the out put is less than 10cc/24hrs.  - Flush drain as ordered  - Change dressing q3 days or when dressing is saturated.  - IR phone number for follow up is [phone].  - Pt may benefit from VNS services. Pt to continue 10cc daily flushes and q 3 dressing changes after discharge.            
POD#2 s/p Rt colectomy/ileostomy, doing well, tolerating sips    Abd minimal ostomy output, dry stapled wound          12-02-24 @ 09:52    Vital Signs Last 24 Hrs  T(C): 36.5 (02 Dec 2024 04:00), Max: 36.5 (01 Dec 2024 20:00)  T(F): 97.7 (02 Dec 2024 04:00), Max: 97.7 (01 Dec 2024 20:00)  HR: 71 (02 Dec 2024 09:00) (69 - 101)  BP: 98/82 (02 Dec 2024 09:00) (91/69 - 135/76)  BP(mean): 88 (02 Dec 2024 09:00) (75 - 108)  RR: 11 (02 Dec 2024 09:00) (10 - 21)  SpO2: 97% (02 Dec 2024 09:00) (62% - 99%)    Parameters below as of 02 Dec 2024 04:00  Patient On (Oxygen Delivery Method): room air                              9.9    13.68 )-----------( 298      ( 02 Dec 2024 05:31 )             32.4       12-02    137  |  113[H]  |  15  ----------------------------<  111[H]  4.9   |  23  |  0.77    Ca    8.3[L]      02 Dec 2024 05:31  Phos  2.3     12-02  Mg     2.1     12-02    TPro  5.2[L]  /  Alb  1.8[L]  /  TBili  0.4  /  DBili  x   /  AST  52[H]  /  ALT  25  /  AlkPhos  293[H]  12-02      LIVER FUNCTIONS - ( 02 Dec 2024 05:31 )  Alb: 1.8 g/dL / Pro: 5.2 gm/dL / ALK PHOS: 293 U/L / ALT: 25 U/L / AST: 52 U/L / GGT: x             MEDICATIONS  (STANDING):  chlorhexidine 4% Liquid 1 Application(s) Topical <User Schedule>  heparin   Injectable 5000 Unit(s) SubCutaneous every 8 hours  levothyroxine 100 MICROGram(s) Oral daily  meropenem Injectable 1000 milliGRAM(s) IV Push every 8 hours  pantoprazole  Injectable 40 milliGRAM(s) IV Push daily  potassium phosphate / sodium phosphate Powder (PHOS-NaK) 1 Packet(s) Oral four times a day with meals  sodium chloride 0.9%. 1000 milliLiter(s) (100 mL/Hr) IV Continuous <Continuous>    MEDICATIONS  (PRN):  acetaminophen     Tablet .. 650 milliGRAM(s) Oral every 6 hours PRN Mild Pain (1 - 3)  morphine  - Injectable 1 milliGRAM(s) IV Push every 4 hours PRN Severe Pain (7 - 10)  oxyCODONE    IR 5 milliGRAM(s) Oral every 6 hours PRN Moderate Pain (4 - 6)  sodium chloride 0.9% lock flush 10 milliLiter(s) IV Push every 1 hour PRN Pre/post blood products, medications, blood draw, and to maintain line patency      MEDICATIONS  (STANDING):  chlorhexidine 4% Liquid 1 Application(s) Topical <User Schedule>  heparin   Injectable 5000 Unit(s) SubCutaneous every 8 hours  levothyroxine 100 MICROGram(s) Oral daily  meropenem Injectable 1000 milliGRAM(s) IV Push every 8 hours  pantoprazole  Injectable 40 milliGRAM(s) IV Push daily  potassium phosphate / sodium phosphate Powder (PHOS-NaK) 1 Packet(s) Oral four times a day with meals  sodium chloride 0.9%. 1000 milliLiter(s) (100 mL/Hr) IV Continuous <Continuous>      
POD#3 s/p lap Rt colectomy/ileostomy s/p liver abscess drainage, doing well    Abd soft, dry stapled wound          12-03-24 @ 08:41    Vital Signs Last 24 Hrs  T(C): 36.9 (03 Dec 2024 08:08), Max: 37 (02 Dec 2024 20:00)  T(F): 98.5 (03 Dec 2024 08:08), Max: 98.6 (02 Dec 2024 20:00)  HR: 80 (03 Dec 2024 08:08) (69 - 81)  BP: 129/77 (03 Dec 2024 08:08) (98/82 - 145/91)  BP(mean): 100 (02 Dec 2024 20:00) (88 - 114)  RR: 18 (03 Dec 2024 08:08) (11 - 24)  SpO2: 95% (03 Dec 2024 08:08) (81% - 100%)    Parameters below as of 03 Dec 2024 08:08  Patient On (Oxygen Delivery Method): room air                              10.2   7.54  )-----------( 277      ( 03 Dec 2024 07:19 )             33.4       12-03    138  |  111[H]  |  13  ----------------------------<  91  4.7   |  25  |  0.62    Ca    8.4[L]      03 Dec 2024 07:19  Phos  2.1     12-03  Mg     1.9     12-03    TPro  5.2[L]  /  Alb  1.8[L]  /  TBili  0.4  /  DBili  x   /  AST  52[H]  /  ALT  25  /  AlkPhos  293[H]  12-02      LIVER FUNCTIONS - ( 02 Dec 2024 05:31 )  Alb: 1.8 g/dL / Pro: 5.2 gm/dL / ALK PHOS: 293 U/L / ALT: 25 U/L / AST: 52 U/L / GGT: x             MEDICATIONS  (STANDING):  aMIOdarone    Tablet 100 milliGRAM(s) Oral daily  heparin   Injectable 5000 Unit(s) SubCutaneous every 8 hours  hydrocortisone 10 milliGRAM(s) Oral three times a day  levothyroxine 100 MICROGram(s) Oral daily  meropenem Injectable 1000 milliGRAM(s) IV Push every 8 hours  pantoprazole    Tablet 40 milliGRAM(s) Oral before breakfast    MEDICATIONS  (PRN):  acetaminophen     Tablet .. 650 milliGRAM(s) Oral every 6 hours PRN Mild Pain (1 - 3)  morphine  - Injectable 1 milliGRAM(s) IV Push every 4 hours PRN Severe Pain (7 - 10)  oxyCODONE    IR 5 milliGRAM(s) Oral every 6 hours PRN Moderate Pain (4 - 6)      MEDICATIONS  (STANDING):  aMIOdarone    Tablet 100 milliGRAM(s) Oral daily  heparin   Injectable 5000 Unit(s) SubCutaneous every 8 hours  hydrocortisone 10 milliGRAM(s) Oral three times a day  levothyroxine 100 MICROGram(s) Oral daily  meropenem Injectable 1000 milliGRAM(s) IV Push every 8 hours  pantoprazole    Tablet 40 milliGRAM(s) Oral before breakfast      
Patient is seen and examined. Chart is reviewed. started on regular diet today.       MEDICATIONS  (STANDING):  aMIOdarone    Tablet 100 milliGRAM(s) Oral daily  heparin   Injectable 5000 Unit(s) SubCutaneous every 8 hours  hydrocortisone 10 milliGRAM(s) Oral three times a day  levothyroxine 100 MICROGram(s) Oral daily  losartan 25 milliGRAM(s) Oral daily  meropenem Injectable 1000 milliGRAM(s) IV Push every 8 hours  pantoprazole    Tablet 40 milliGRAM(s) Oral before breakfast    MEDICATIONS  (PRN):  acetaminophen     Tablet .. 650 milliGRAM(s) Oral every 6 hours PRN Mild Pain (1 - 3)  morphine  - Injectable 1 milliGRAM(s) IV Push every 4 hours PRN Severe Pain (7 - 10)  oxyCODONE    IR 5 milliGRAM(s) Oral every 6 hours PRN Moderate Pain (4 - 6)      Vital Signs Last 24 Hrs  T(C): 36.9 (03 Dec 2024 08:08), Max: 37 (02 Dec 2024 20:00)  T(F): 98.5 (03 Dec 2024 08:08), Max: 98.6 (02 Dec 2024 20:00)  HR: 80 (03 Dec 2024 08:08) (69 - 81)  BP: 129/77 (03 Dec 2024 08:08) (123/94 - 145/91)  BP(mean): 100 (02 Dec 2024 20:00) (91 - 110)  RR: 18 (03 Dec 2024 08:08) (11 - 20)  SpO2: 95% (03 Dec 2024 08:08) (81% - 100%)    Parameters below as of 03 Dec 2024 08:08  Patient On (Oxygen Delivery Method): room air        GEN: NAD, comfortable, resting in bed  HEENT: NC/AT, EOMI, PERRLA, MMM, clear conjunctiva and sclera, normal hearing, no nasal discharge, throat clear, no thrush, normal dentition.   NECK: supple, no JVD, no LAD, no thyromegaly  BACK:  ROM intact, no spinal/paraspinal tenderness  CV: S1S2, RRR, no mumur  RESP: good air movement, CTABL, no rales, rhonchi or wheezing, respirations unlabored  ABD: +BS, soft, ileostomy in place, hepatic drain in place.   EXT: +2 radial and pedal pulses, no edema, no calve tenderness  SKIN: No visible Rashes or Ulcers  MSK: ROM intact in all extremities  NEURO:  sensory and CN 2-12 Grossly intact, no motor deficits, AOx3  PSYCH: normal behavior         LABS:                          10.2   7.54  )-----------( 277      ( 03 Dec 2024 07:19 )             33.4     03 Dec 2024 07:19    138    |  111    |  13     ----------------------------<  91     4.7     |  25     |  0.62     Ca    8.4        03 Dec 2024 07:19  Phos  2.1       03 Dec 2024 07:19  Mg     1.9       03 Dec 2024 07:19    TPro  5.2    /  Alb  1.8    /  TBili  0.4    /  DBili  x      /  AST  52     /  ALT  25     /  AlkPhos  293    02 Dec 2024 05:31    LIVER FUNCTIONS - ( 02 Dec 2024 05:31 )  Alb: 1.8 g/dL / Pro: 5.2 gm/dL / ALK PHOS: 293 U/L / ALT: 25 U/L / AST: 52 U/L / GGT: x             CAPILLARY BLOOD GLUCOSE            Urinalysis Basic - ( 03 Dec 2024 07:19 )    Color: x / Appearance: x / SG: x / pH: x  Gluc: 91 mg/dL / Ketone: x  / Bili: x / Urobili: x   Blood: x / Protein: x / Nitrite: x   Leuk Esterase: x / RBC: x / WBC x   Sq Epi: x / Non Sq Epi: x / Bacteria: x        RADIOLOGY:        
Patient seen, comfortable, feeling well overall    MEDICATIONS  (STANDING):  aMIOdarone    Tablet 100 milliGRAM(s) Oral daily  heparin   Injectable 5000 Unit(s) SubCutaneous every 8 hours  hydrocortisone 10 milliGRAM(s) Oral three times a day  levothyroxine 100 MICROGram(s) Oral daily  losartan 25 milliGRAM(s) Oral daily  meropenem Injectable 1000 milliGRAM(s) IV Push every 8 hours  pantoprazole    Tablet 40 milliGRAM(s) Oral before breakfast    MEDICATIONS  (PRN):  acetaminophen     Tablet .. 650 milliGRAM(s) Oral every 6 hours PRN Mild Pain (1 - 3)  morphine  - Injectable 1 milliGRAM(s) IV Push every 4 hours PRN Severe Pain (7 - 10)  oxyCODONE    IR 5 milliGRAM(s) Oral every 6 hours PRN Moderate Pain (4 - 6)      ROS  No fever, sweats, chills  No epistaxis, HA, sore throat      Vital Signs Last 24 Hrs  T(C): 36.9 (03 Dec 2024 17:20), Max: 37 (02 Dec 2024 20:00)  T(F): 98.4 (03 Dec 2024 17:20), Max: 98.6 (02 Dec 2024 20:00)  HR: 96 (03 Dec 2024 17:20) (74 - 96)  BP: 127/84 (03 Dec 2024 17:20) (127/84 - 144/79)  BP(mean): 100 (02 Dec 2024 20:00) (100 - 100)  RR: 18 (03 Dec 2024 17:20) (12 - 18)  SpO2: 97% (03 Dec 2024 17:20) (95% - 98%)    Parameters below as of 03 Dec 2024 17:20  Patient On (Oxygen Delivery Method): room air        PE  NAD  Awake, alert    No rash grossly  FROM                          10.2   7.54  )-----------( 277      ( 03 Dec 2024 07:19 )             33.4       12-03    138  |  111[H]  |  13  ----------------------------<  91  4.7   |  25  |  0.62    Ca    8.4[L]      03 Dec 2024 07:19  Phos  2.1     12-03  Mg     1.9     12-03    TPro  5.2[L]  /  Alb  1.8[L]  /  TBili  0.4  /  DBili  x   /  AST  52[H]  /  ALT  25  /  AlkPhos  293[H]  12-02      
    Date of service: 12-03-24 @ 14:40        Patient lying in bed  Afebrile, willing and waiting to learn how to change ostomy bad  Had drain placed on 12/2 by IR for liver lesions  Was seen by physical therapy    ROS: no fever or chills; denies dizziness, no HA, no SOB or cough, no abdominal pain, no diarrhea or constipation; no dysuria, no urinary frequency, no legs pain, no rashes    MEDICATIONS  (STANDING):  aMIOdarone    Tablet 100 milliGRAM(s) Oral daily  heparin   Injectable 5000 Unit(s) SubCutaneous every 8 hours  hydrocortisone 10 milliGRAM(s) Oral three times a day  levothyroxine 100 MICROGram(s) Oral daily  meropenem Injectable 1000 milliGRAM(s) IV Push every 8 hours  pantoprazole    Tablet 40 milliGRAM(s) Oral before breakfast    MEDICATIONS  (PRN):  acetaminophen     Tablet .. 650 milliGRAM(s) Oral every 6 hours PRN Mild Pain (1 - 3)  morphine  - Injectable 1 milliGRAM(s) IV Push every 4 hours PRN Severe Pain (7 - 10)  oxyCODONE    IR 5 milliGRAM(s) Oral every 6 hours PRN Moderate Pain (4 - 6)      Vital Signs Last 24 Hrs  T(C): 36.9 (03 Dec 2024 08:08), Max: 37 (02 Dec 2024 20:00)  T(F): 98.5 (03 Dec 2024 08:08), Max: 98.6 (02 Dec 2024 20:00)  HR: 80 (03 Dec 2024 08:08) (69 - 81)  BP: 129/77 (03 Dec 2024 08:08) (120/109 - 145/91)  BP(mean): 100 (02 Dec 2024 20:00) (91 - 114)  RR: 18 (03 Dec 2024 08:08) (11 - 24)  SpO2: 95% (03 Dec 2024 08:08) (81% - 100%)    Parameters below as of 03 Dec 2024 08:08  Patient On (Oxygen Delivery Method): room air            Physical Exam:              EXAM:    Constitutional:  frail, NAD  Head/Eyes: no icterus  LUNGS:  CTA  CVS:  regular rhythm  Abd:  soft, diffuse-tender; mild-distended, ostomy in place; percutaneous drain in place  Ext:  no edema  Vascular:  IV site no erythema tenderness or discharge  Neuro: AAO X 3, non- focal    Labs:                   Labs:                        10.2   7.54  )-----------( 277      ( 03 Dec 2024 07:19 )             33.4     12-03    138  |  111[H]  |  13  ----------------------------<  91  4.7   |  25  |  0.62    Ca    8.4[L]      03 Dec 2024 07:19  Phos  2.1     12-03  Mg     1.9     12-03    TPro  5.2[L]  /  Alb  1.8[L]  /  TBili  0.4  /  DBili  x   /  AST  52[H]  /  ALT  25  /  AlkPhos  293[H]  12-02           Cultures:       Culture - Abscess with Gram Stain (collected 12-02-24 @ 15:12)  Source: .Abscess  Gram Stain (12-02-24 @ 23:58):    Rare polymorphonuclear leukocytes seen per low power field    No organisms seen per oil power field    Culture - Abscess with Gram Stain (collected 12-02-24 @ 15:10)  Source: .Abscess  Gram Stain (12-02-24 @ 23:57):    Rare polymorphonuclear leukocytes seen per low power field    No organisms seen per oil power field    Urinalysis with Rflx Culture (collected 11-30-24 @ 13:11)    Culture - Urine (collected 11-30-24 @ 13:11)  Source: Clean Catch None  Final Report (12-01-24 @ 15:16):    No growth    Culture - Blood (collected 11-30-24 @ 12:46)  Source: .Blood BLOOD  Preliminary Report (12-02-24 @ 19:01):    No growth at 48 Hours    Culture - Blood (collected 11-30-24 @ 12:39)  Source: .Blood BLOOD  Preliminary Report (12-02-24 @ 19:01):    No growth at 48 Hours                    RADIOLOGY:  imaging below personally reviewed    Xray Chest 1 View- PORTABLE-Urgent:  (30 Nov 2024 14:14)        < from: CT Abdomen and Pelvis No Cont (11.30.24 @ 14:23) >    ACC: [id number] EXAM:  CT ABDOMEN AND PELVIS   ORDERED BY: KAROL CHAMORRO     PROCEDURE DATE:  11/30/2024          INTERPRETATION:  CLINICAL INFORMATION: Metastatic renal cancer with   hepatic abscesses. Presents with right upper quadrant pain.    COMPARISON: CT abdomen pelvis dated 10/12/2024. Abdominal MRI dated   11/6/2024.    CONTRAST/COMPLICATIONS:  IV Contrast: NONE  Oral Contrast: NONE      PROCEDURE:  CT of the Abdomen and Pelvis was performed.  Sagittal and coronal reformats were performed.    FINDINGS:  LOWER CHEST: Interval enlargement of several bibasilar subcentimeter   pulmonary nodules. Reference right lower lobe nodule (2, 11) measures 0.7   cm, previously 0.4 cm. Coronary artery, aortic valvular and mitral   annular calcifications.    LIVER: Redemonstration of hepatic abscesses, less well demonstrated on   noncontrast study:  Segment 8 (2, 15) measures 3.5 x 2.3 cm, previously 4.6 x 3.5 cm  Segment 5/8 (2, 33) measures 5.6 x 4.5 cm, previously 5.6 x 3.9 cm.  Smaller abscesses cannot be resolved. Known hepatic metastases are   discerned but not measurable.  BILE DUCTS: CBD stent. Pneumobilia.  GALLBLADDER: Not visualized.  SPLEEN: Calcified granulomas.  PANCREAS: Within normal limits.  ADRENALS: Withinnormal limits.  KIDNEYS/URETERS: Within normal limits.    BLADDER: Small air bubble in bladder lumen.  REPRODUCTIVE ORGANS: Uterus and adnexa within normal limits.    BOWEL: No bowel obstruction. Pneumatosis in the cecum and ascending   colon. Appendix is normal.  PERITONEUM/RETROPERITONEUM: Within normal limits.  VESSELS: Atheromatous calcifications.  LYMPH NODES: No lymphadenopathy.  ABDOMINAL WALL: Within normal limits.  BONES: Degenerative changes.    IMPRESSION:  Pneumatosis of the cecum andascending colon of concern for bowel   ischemia.    Redemonstration of hepatic abscesses and hepatic metastases.    Interval enlargement of bibasilar pulmonary metastases.    < end of copied text >            
79F with Renal Cell Carcinoma with mets to liver, IR embolization of a liver lesion on 7/30 Diverticulitis, HTN, HLD, Hypothyroid new onset Afib/RVR in August 2024 started on Eliquis, GI Bleed after ERCP- resolved without intervention, started on eliquis during admit for Afib. liver abscesses, multiple admissions prior for liver abscess, most recently on 9/29-10/16 septic shock 2/2 hepatic abscess presented to ED on 11/30 with worsening abdominal pain, fever and fatigue. Patient was admitted to the ICU for septic shock, she was found to have pneumatosis of cecum, she is S/P Right hemicolectomy with ileostomy with good function. She was also found to have additional hepatic abscess for which IR drainage is scheduled for 12/2.     Patient seen at bed, off pressors and feeling well. Complains of some pain by the surgical site but otherwise stable.       PMHx  PAST MEDICAL HISTORY:  Cholelithiasis with cholangitis   Diverticulitis   High cholesterol   History of biliary stent insertion   History of diverticulitis   Hypertension   Hypothyroidism   Metastasis to liver   Paroxysmal atrial fibrillation   Renal cell cancer.     PAST SURGICAL HISTORY:  H/O bilateral oophorectomy   History of arthroscopy   History of laparotomy   History of tonsillectomy   S/P surgical removal of pilonidal cyst.      MEDICATIONS  (STANDING):  heparin   Injectable 5000 Unit(s) SubCutaneous every 8 hours  levothyroxine 100 MICROGram(s) Oral daily  meropenem Injectable 1000 milliGRAM(s) IV Push every 8 hours  potassium phosphate / sodium phosphate Powder (PHOS-NaK) 1 Packet(s) Oral four times a day with meals  sodium chloride 0.9%. 1000 milliLiter(s) (100 mL/Hr) IV Continuous <Continuous>    MEDICATIONS  (PRN):  acetaminophen     Tablet .. 650 milliGRAM(s) Oral every 6 hours PRN Mild Pain (1 - 3)  morphine  - Injectable 1 milliGRAM(s) IV Push every 4 hours PRN Severe Pain (7 - 10)  oxyCODONE    IR 5 milliGRAM(s) Oral every 6 hours PRN Moderate Pain (4 - 6)    Vital Signs Last 24 Hrs  T(C): 36.6 (02 Dec 2024 09:00), Max: 36.6 (02 Dec 2024 09:00)  T(F): 97.9 (02 Dec 2024 09:00), Max: 97.9 (02 Dec 2024 09:00)  HR: 79 (02 Dec 2024 10:00) (69 - 101)  BP: 109/76 (02 Dec 2024 10:00) (91/69 - 135/76)  BP(mean): 91 (02 Dec 2024 10:00) (75 - 108)  RR: 20 (02 Dec 2024 10:00) (10 - 21)  SpO2: 99% (02 Dec 2024 10:00) (62% - 99%)    Parameters below as of 02 Dec 2024 04:00  Patient On (Oxygen Delivery Method): room air    GEN: NAD  HEENT:  pupils equal and reactive, EOMI, no oropharyngeal lesions, erythema, exudates, oral thrush  NECK:   supple, no carotid bruits  CV:  +S1, +S2, regular, no murmurs  RESP:   lungs clear to auscultation bilaterally, no wheezing, rales, rhonchi, good air entry bilaterally  GI:  abdomen soft, non-tender, non-distended, normal BS + Ileostomy. Incisions CDI  EXT:  no clubbing, no cyanosis, no edema, no calf pain, swelling or erythema  NEURO:  AAOX3, no focal neurological deficits, follows all commands, able to move extremities spontaneously  SKIN:  no ulcers, lesions or rashes    LABS:                          9.9    13.68 )-----------( 298      ( 02 Dec 2024 05:31 )             32.4     12-02    137  |  113[H]  |  15  ----------------------------<  111[H]  4.9   |  23  |  0.77    Ca    8.3[L]      02 Dec 2024 05:31  Phos  2.3     12-02  Mg     2.1     12-02    TPro  5.2[L]  /  Alb  1.8[L]  /  TBili  0.4  /  DBili  x   /  AST  52[H]  /  ALT  25  /  AlkPhos  293[H]  12-02    LIVER FUNCTIONS - ( 02 Dec 2024 05:31 )  Alb: 1.8 g/dL / Pro: 5.2 gm/dL / ALK PHOS: 293 U/L / ALT: 25 U/L / AST: 52 U/L / GGT: x           PT/INR - ( 01 Dec 2024 06:00 )   PT: 17.5 sec;   INR: 1.53 ratio    PTT - ( 30 Nov 2024 12:46 )  PTT:45.8 sec    Urinalysis Basic - ( 02 Dec 2024 05:31 )  Color: x / Appearance: x / SG: x / pH: x  Gluc: 111 mg/dL / Ketone: x  / Bili: x / Urobili: x   Blood: x / Protein: x / Nitrite: x   Leuk Esterase: x / RBC: x / WBC x   Sq Epi: x / Non Sq Epi: x / Bacteria: x    : CT Abdomen and Pelvis No Cont (11.30.24 @ 14:23) >  IMPRESSION:  Pneumatosis of the cecum andascending colon of concern for bowel   ischemia.    Redemonstration of hepatic abscesses and hepatic metastases.    Interval enlargement of bibasilar pulmonary metastases.    Examination findings were conveyed to Dr. Cortes by Dr. Smith at 1500   hours on 11/30/2024 with read back.      
Patient is a 79y old  Female who presents with a chief complaint of   Allergies    Zetia (Unknown)  Cabometyx (Unknown)  Lipitor (Unknown)  Norvasc (Faint)  Crestor (Other)  Dyazide (Faint)  statins (Unknown)  bacitracin (Rash)  tivozanib (Faint)    Intolerances      REVIEW OF SYSTEMS: SEE BELOW       ICU Vital Signs Last 24 Hrs  T(C): 36.4 (01 Dec 2024 04:00), Max: 37.6 (30 Nov 2024 13:32)  T(F): 97.5 (01 Dec 2024 04:00), Max: 99.7 (30 Nov 2024 13:32)  HR: 83 (01 Dec 2024 07:00) (75 - 98)  BP: 110/80 (01 Dec 2024 06:00) (76/62 - 116/86)  BP(mean): 90 (01 Dec 2024 06:00) (63 - 94)  ABP: 121/65 (01 Dec 2024 07:00) (96/58 - 126/68)  ABP(mean): 87 (01 Dec 2024 07:00) (72 - 90)  RR: 18 (01 Dec 2024 07:00) (14 - 24)  SpO2: 100% (01 Dec 2024 07:00) (90% - 100%)    O2 Parameters below as of 01 Dec 2024 04:00  Patient On (Oxygen Delivery Method): nasal cannula  O2 Flow (L/min): 2          CAPILLARY BLOOD GLUCOSE      POCT Blood Glucose.: 135 mg/dL (01 Dec 2024 06:00)  POCT Blood Glucose.: 140 mg/dL (30 Nov 2024 23:04)  POCT Blood Glucose.: 171 mg/dL (30 Nov 2024 19:57)  POCT Blood Glucose.: 34 mg/dL (30 Nov 2024 16:27)      I&O's Summary    30 Nov 2024 07:01  -  01 Dec 2024 07:00  --------------------------------------------------------  IN: 889 mL / OUT: 1100 mL / NET: -211 mL            MEDICATIONS  (STANDING):  chlorhexidine 2% Cloths 1 Application(s) Topical <User Schedule>  chlorhexidine 4% Liquid 1 Application(s) Topical <User Schedule>  heparin   Injectable 5000 Unit(s) SubCutaneous every 8 hours  levothyroxine Injectable 50 MICROGram(s) IV Push at bedtime  meropenem Injectable 1000 milliGRAM(s) IV Push every 8 hours  pantoprazole  Injectable 40 milliGRAM(s) IV Push daily  sodium chloride 0.9% Bolus 1000 milliLiter(s) IV Bolus once  sodium chloride 0.9%. 1000 milliLiter(s) (100 mL/Hr) IV Continuous <Continuous>      MEDICATIONS  (PRN):  morphine  - Injectable 4 milliGRAM(s) IV Push every 4 hours PRN Moderate Pain (4 - 6)  sodium chloride 0.9% lock flush 10 milliLiter(s) IV Push every 1 hour PRN Pre/post blood products, medications, blood draw, and to maintain line patency      PHYSICAL EXAM: SEE BELOW                          11.7   11.10 )-----------( 249      ( 01 Dec 2024 06:00 )             37.3       12-01    138  |  111[H]  |  15  ----------------------------<  143[H]  4.6   |  21[L]  |  0.79    Ca    7.8[L]      01 Dec 2024 06:00  Phos  3.3     12-01  Mg     1.6     12-01    TPro  5.6[L]  /  Alb  1.9[L]  /  TBili  0.6  /  DBili  x   /  AST  61[H]  /  ALT  31  /  AlkPhos  325[H]  12-01    Lactate 2.8           11-30 @ 15:28    Lactate 2.7           11-30 @ 12:39          PT/INR - ( 01 Dec 2024 06:00 )   PT: 17.5 sec;   INR: 1.53 ratio         PTT - ( 30 Nov 2024 12:46 )  PTT:45.8 sec  Urinalysis Basic - ( 01 Dec 2024 06:00 )    Color: x / Appearance: x / SG: x / pH: x  Gluc: 143 mg/dL / Ketone: x  / Bili: x / Urobili: x   Blood: x / Protein: x / Nitrite: x   Leuk Esterase: x / RBC: x / WBC x   Sq Epi: x / Non Sq Epi: x / Bacteria: x              
Patient seen and examined at the bedside this AM.  Pain is controlled, No bowl function.  AVSS          PAST MEDICAL & SURGICAL HISTORY:  Hypertension      High cholesterol      Diverticulitis      History of diverticulitis      Hypothyroidism      Renal cell cancer      Metastasis to liver      Paroxysmal atrial fibrillation      Cholelithiasis with cholangitis      History of biliary stent insertion      History of tonsillectomy      History of laparotomy      History of arthroscopy      H/O bilateral oophorectomy      S/P surgical removal of pilonidal cyst          MEDICATIONS  (STANDING):  chlorhexidine 2% Cloths 1 Application(s) Topical <User Schedule>  chlorhexidine 4% Liquid 1 Application(s) Topical <User Schedule>  ertapenem  IVPB 500 milliGRAM(s) IV Intermittent every 24 hours  heparin   Injectable 5000 Unit(s) SubCutaneous every 8 hours  levothyroxine Injectable 50 MICROGram(s) IV Push at bedtime  norepinephrine Infusion 0.05 MICROgram(s)/kG/Min (5.47 mL/Hr) IV Continuous <Continuous>  pantoprazole  Injectable 40 milliGRAM(s) IV Push daily  sodium chloride 0.9% Bolus 1000 milliLiter(s) IV Bolus once  sodium chloride 0.9%. 1000 milliLiter(s) (100 mL/Hr) IV Continuous <Continuous>    MEDICATIONS  (PRN):  morphine  - Injectable 4 milliGRAM(s) IV Push every 4 hours PRN Moderate Pain (4 - 6)  sodium chloride 0.9% lock flush 10 milliLiter(s) IV Push every 1 hour PRN Pre/post blood products, medications, blood draw, and to maintain line patency      Allergies    Zetia (Unknown)  Cabometyx (Unknown)  Lipitor (Unknown)  Norvasc (Faint)  Crestor (Other)  Dyazide (Faint)  statins (Unknown)  bacitracin (Rash)  tivozanib (Faint)    Intolerances        SOCIAL HISTORY:    FAMILY HISTORY:          Physical Exam:  GENERAL: NAD, AOx3, well developed, well nourished  HEAD: Atraumatic, normocephalic  EYES: EOMI, PERRLA, conjunctiva and sclera clear  ENT: CVC in right IJ  NECK: supple, No JVD, midline trachea  CHEST/LUNG: clear to auscultation b/l, no rales, rhonchi, wheezing or rubs. unlabored respirations  Heart: S1, S2 normal, RRR w/o murmur  ABDOMEN: soft, appropriately tender, no stool from ostomy, dressing c/d/i  EXTREMITIES: +2 peripheral pulses, brisk cap refill. no clubbing, cyanosis or edema, Brie in Lt radial  NERVOUS SYSTEM: AOx3, speech clear, no neuro-deficits  MSK: full ROM, no deformities  SKIN: warm to touch, no rash or lesions        Vital Signs Last 24 Hrs  T(C): 36.6 (01 Dec 2024 00:00), Max: 37.6 (30 Nov 2024 13:32)  T(F): 97.8 (01 Dec 2024 00:00), Max: 99.7 (30 Nov 2024 13:32)  HR: 85 (01 Dec 2024 03:00) (75 - 98)  BP: 103/69 (01 Dec 2024 03:00) (76/62 - 115/80)  BP(mean): 81 (01 Dec 2024 03:00) (63 - 92)  RR: 16 (01 Dec 2024 03:00) (14 - 24)  SpO2: 100% (01 Dec 2024 03:00) (90% - 100%)    Parameters below as of 01 Dec 2024 00:00  Patient On (Oxygen Delivery Method): nasal cannula  O2 Flow (L/min): 2      I&O's Summary    30 Nov 2024 07:01  -  01 Dec 2024 03:36  --------------------------------------------------------  IN: 0 mL / OUT: 325 mL / NET: -325 mL            LABS:                        11.4   9.92  )-----------( 259      ( 30 Nov 2024 22:00 )             36.1     11-30    134[L]  |  109[H]  |  18  ----------------------------<  163[H]  4.2   |  20[L]  |  1.00    Ca    7.5[L]      30 Nov 2024 22:00    TPro  4.1[L]  /  Alb  1.4[L]  /  TBili  1.1  /  DBili  x   /  AST  50[H]  /  ALT  25  /  AlkPhos  263[H]  11-30    PT/INR - ( 30 Nov 2024 12:46 )   PT: 21.0 sec;   INR: 1.79 ratio         PTT - ( 30 Nov 2024 12:46 )  PTT:45.8 sec  Urinalysis Basic - ( 30 Nov 2024 22:00 )    Color: x / Appearance: x / SG: x / pH: x  Gluc: 163 mg/dL / Ketone: x  / Bili: x / Urobili: x   Blood: x / Protein: x / Nitrite: x   Leuk Esterase: x / RBC: x / WBC x   Sq Epi: x / Non Sq Epi: x / Bacteria: x      CAPILLARY BLOOD GLUCOSE      POCT Blood Glucose.: 140 mg/dL (30 Nov 2024 23:04)  POCT Blood Glucose.: 171 mg/dL (30 Nov 2024 19:57)  POCT Blood Glucose.: 34 mg/dL (30 Nov 2024 16:27)    LIVER FUNCTIONS - ( 30 Nov 2024 16:50 )  Alb: 1.4 g/dL / Pro: 4.1 gm/dL / ALK PHOS: 263 U/L / ALT: 25 U/L / AST: 50 U/L / GGT: x             Cultures:      RADIOLOGY & ADDITIONAL STUDIES:        
      Date of service: 12-04-24 @ 14:15    Patient lying in bed; family at bedside  Afebrile, no specific complaints        ROS: no fever or chills; denies dizziness, no HA, no SOB or cough, no abdominal pain, no diarrhea or constipation; no dysuria, no urinary frequency, no legs pain, no rashes    MEDICATIONS  (STANDING):  aMIOdarone    Tablet 100 milliGRAM(s) Oral daily  ertapenem  IVPB 1000 milliGRAM(s) IV Intermittent every 24 hours  heparin   Injectable 5000 Unit(s) SubCutaneous every 8 hours  hydrocortisone 20 milliGRAM(s) Oral daily  hydrocortisone 10 milliGRAM(s) Oral daily  levothyroxine 100 MICROGram(s) Oral daily  losartan 25 milliGRAM(s) Oral daily  minocycline 200 milliGRAM(s) Oral two times a day  pantoprazole    Tablet 40 milliGRAM(s) Oral before breakfast    MEDICATIONS  (PRN):  acetaminophen     Tablet .. 650 milliGRAM(s) Oral every 6 hours PRN Mild Pain (1 - 3)  morphine  - Injectable 1 milliGRAM(s) IV Push every 4 hours PRN Severe Pain (7 - 10)  oxyCODONE    IR 5 milliGRAM(s) Oral every 6 hours PRN Moderate Pain (4 - 6)      Vital Signs Last 24 Hrs  T(C): 36.3 (04 Dec 2024 08:14), Max: 36.9 (03 Dec 2024 17:20)  T(F): 97.3 (04 Dec 2024 08:14), Max: 98.4 (03 Dec 2024 17:20)  HR: 74 (04 Dec 2024 08:14) (74 - 96)  BP: 148/96 (04 Dec 2024 08:14) (127/84 - 148/96)  BP(mean): --  RR: 18 (04 Dec 2024 08:14) (18 - 18)  SpO2: 96% (04 Dec 2024 08:14) (96% - 98%)    Parameters below as of 04 Dec 2024 08:14  Patient On (Oxygen Delivery Method): room air            Physical Exam:        Physical Exam:              EXAM:    Constitutional:  frail, NAD  Head/Eyes: no icterus  LUNGS:  CTA  CVS:  regular rhythm  Abd:  soft, diffuse-tender; mild-distended, ostomy in place; percutaneous drain in place  Ext:  no edema  Vascular:  IV site no erythema tenderness or discharge  Neuro: AAO X 3, non- focal    Labs:                   Labs:                        Labs:                        10.4   5.40  )-----------( 284      ( 04 Dec 2024 07:41 )             33.7     12-04    139  |  111[H]  |  11  ----------------------------<  85  3.8   |  26  |  0.55    Ca    8.5      04 Dec 2024 07:41  Phos  2.1     12-03  Mg     1.9     12-03    TPro  5.4[L]  /  Alb  2.0[L]  /  TBili  0.5  /  DBili  x   /  AST  42[H]  /  ALT  24  /  AlkPhos  396[H]  12-04           Cultures:       Culture - Abscess with Gram Stain (collected 12-02-24 @ 15:12)  Source: .Abscess  Gram Stain (12-02-24 @ 23:58):    Rare polymorphonuclear leukocytes seen per low power field    No organisms seen per oil power field  Preliminary Report (12-03-24 @ 17:27):    Culture in progress    Culture - Abscess with Gram Stain (collected 12-02-24 @ 15:10)  Source: .Abscess  Gram Stain (12-03-24 @ 18:05):    Rare polymorphonuclear leukocytes seen per low power field    No organisms seen per oil power field  Preliminary Report (12-03-24 @ 18:05):    Rare Stenotrophomonas maltophilia    Urinalysis with Rflx Culture (collected 11-30-24 @ 13:11)    Culture - Urine (collected 11-30-24 @ 13:11)  Source: Clean Catch None  Final Report (12-01-24 @ 15:16):    No growth    Culture - Blood (collected 11-30-24 @ 12:46)  Source: .Blood BLOOD  Preliminary Report (12-03-24 @ 19:00):    No growth at 72 Hours    Culture - Blood (collected 11-30-24 @ 12:39)  Source: .Blood BLOOD  Preliminary Report (12-03-24 @ 19:00):    No growth at 72 Hours                  RADIOLOGY:  imaging below personally reviewed    Xray Chest 1 View- PORTABLE-Urgent:  (30 Nov 2024 14:14)        < from: CT Abdomen and Pelvis No Cont (11.30.24 @ 14:23) >    ACC: [id number] EXAM:  CT ABDOMEN AND PELVIS   ORDERED BY: KAROL CHAMORRO     PROCEDURE DATE:  11/30/2024          INTERPRETATION:  CLINICAL INFORMATION: Metastatic renal cancer with   hepatic abscesses. Presents with right upper quadrant pain.    COMPARISON: CT abdomen pelvis dated 10/12/2024. Abdominal MRI dated   11/6/2024.    CONTRAST/COMPLICATIONS:  IV Contrast: NONE  Oral Contrast: NONE      PROCEDURE:  CT of the Abdomen and Pelvis was performed.  Sagittal and coronal reformats were performed.    FINDINGS:  LOWER CHEST: Interval enlargement of several bibasilar subcentimeter   pulmonary nodules. Reference right lower lobe nodule (2, 11) measures 0.7   cm, previously 0.4 cm. Coronary artery, aortic valvular and mitral   annular calcifications.    LIVER: Redemonstration of hepatic abscesses, less well demonstrated on   noncontrast study:  Segment 8 (2, 15) measures 3.5 x 2.3 cm, previously 4.6 x 3.5 cm  Segment 5/8 (2, 33) measures 5.6 x 4.5 cm, previously 5.6 x 3.9 cm.  Smaller abscesses cannot be resolved. Known hepatic metastases are   discerned but not measurable.  BILE DUCTS: CBD stent. Pneumobilia.  GALLBLADDER: Not visualized.  SPLEEN: Calcified granulomas.  PANCREAS: Within normal limits.  ADRENALS: Withinnormal limits.  KIDNEYS/URETERS: Within normal limits.    BLADDER: Small air bubble in bladder lumen.  REPRODUCTIVE ORGANS: Uterus and adnexa within normal limits.    BOWEL: No bowel obstruction. Pneumatosis in the cecum and ascending   colon. Appendix is normal.  PERITONEUM/RETROPERITONEUM: Within normal limits.  VESSELS: Atheromatous calcifications.  LYMPH NODES: No lymphadenopathy.  ABDOMINAL WALL: Within normal limits.  BONES: Degenerative changes.    IMPRESSION:  Pneumatosis of the cecum andascending colon of concern for bowel   ischemia.    Redemonstration of hepatic abscesses and hepatic metastases.    Interval enlargement of bibasilar pulmonary metastases.    < end of copied text >            
    Date of service: 12-05-24 @ 13:25      Patient comfortable, awaiting RN for ostomy teaching; afebrile, comfortable      ROS: no fever or chills; denies dizziness, no HA, no SOB or cough, no abdominal pain, no diarrhea or constipation; no dysuria, no urinary frequency, no legs pain, no rashes    MEDICATIONS  (STANDING):  aMIOdarone    Tablet 100 milliGRAM(s) Oral daily  ertapenem  IVPB 1000 milliGRAM(s) IV Intermittent every 24 hours  heparin   Injectable 5000 Unit(s) SubCutaneous every 8 hours  hydrocortisone 20 milliGRAM(s) Oral daily  hydrocortisone 10 milliGRAM(s) Oral daily  levothyroxine 100 MICROGram(s) Oral daily  losartan 25 milliGRAM(s) Oral daily  minocycline 200 milliGRAM(s) Oral two times a day  pantoprazole    Tablet 40 milliGRAM(s) Oral before breakfast    MEDICATIONS  (PRN):  acetaminophen     Tablet .. 650 milliGRAM(s) Oral every 6 hours PRN Mild Pain (1 - 3)  morphine  - Injectable 1 milliGRAM(s) IV Push every 4 hours PRN Severe Pain (7 - 10)  oxyCODONE    IR 5 milliGRAM(s) Oral every 6 hours PRN Moderate Pain (4 - 6)      Vital Signs Last 24 Hrs  T(C): 36.9 (05 Dec 2024 08:00), Max: 36.9 (05 Dec 2024 08:00)  T(F): 98.5 (05 Dec 2024 08:00), Max: 98.5 (05 Dec 2024 08:00)  HR: 84 (05 Dec 2024 08:00) (84 - 87)  BP: 141/93 (05 Dec 2024 08:00) (141/93 - 147/97)  BP(mean): --  RR: 17 (05 Dec 2024 08:00) (16 - 18)  SpO2: 98% (05 Dec 2024 08:00) (94% - 98%)    Parameters below as of 05 Dec 2024 08:00  Patient On (Oxygen Delivery Method): room air            Physical Exam:              EXAM:    Constitutional:  frail, NAD  Head/Eyes: no icterus  LUNGS:  CTA  CVS:  regular rhythm  Abd:  soft, diffuse-tender; mild-distended, ostomy in place; percutaneous drain in place  Ext:  no edema  Vascular:  IV site no erythema tenderness or discharge  Neuro: AAO X 3, non- focal    Labs:                   Labs:                        Labs:                        10.4   5.40  )-----------( 284      ( 04 Dec 2024 07:41 )             33.7     12-04    139  |  111[H]  |  11  ----------------------------<  85  3.8   |  26  |  0.55    Ca    8.5      04 Dec 2024 07:41  Phos  2.1     12-03  Mg     1.9     12-03    TPro  5.4[L]  /  Alb  2.0[L]  /  TBili  0.5  /  DBili  x   /  AST  42[H]  /  ALT  24  /  AlkPhos  396[H]  12-04           Cultures:       Culture - Abscess with Gram Stain (collected 12-02-24 @ 15:12)  Source: .Abscess  Gram Stain (12-02-24 @ 23:58):    Rare polymorphonuclear leukocytes seen per low power field    No organisms seen per oil power field  Preliminary Report (12-03-24 @ 17:27):    Culture in progress    Culture - Abscess with Gram Stain (collected 12-02-24 @ 15:10)  Source: .Abscess  Gram Stain (12-03-24 @ 18:05):    Rare polymorphonuclear leukocytes seen per low power field    No organisms seen per oil power field  Preliminary Report (12-03-24 @ 18:05):    Rare Stenotrophomonas maltophilia    Urinalysis with Rflx Culture (collected 11-30-24 @ 13:11)    Culture - Urine (collected 11-30-24 @ 13:11)  Source: Clean Catch None  Final Report (12-01-24 @ 15:16):    No growth    Culture - Blood (collected 11-30-24 @ 12:46)  Source: .Blood BLOOD  Preliminary Report (12-03-24 @ 19:00):    No growth at 72 Hours    Culture - Blood (collected 11-30-24 @ 12:39)  Source: .Blood BLOOD  Preliminary Report (12-03-24 @ 19:00):    No growth at 72 Hours                  RADIOLOGY:  imaging below personally reviewed    Xray Chest 1 View- PORTABLE-Urgent:  (30 Nov 2024 14:14)        < from: CT Abdomen and Pelvis No Cont (11.30.24 @ 14:23) >    ACC: [id number] EXAM:  CT ABDOMEN AND PELVIS   ORDERED BY: KAROL CHAMORRO     PROCEDURE DATE:  11/30/2024          INTERPRETATION:  CLINICAL INFORMATION: Metastatic renal cancer with   hepatic abscesses. Presents with right upper quadrant pain.    COMPARISON: CT abdomen pelvis dated 10/12/2024. Abdominal MRI dated   11/6/2024.    CONTRAST/COMPLICATIONS:  IV Contrast: NONE  Oral Contrast: NONE      PROCEDURE:  CT of the Abdomen and Pelvis was performed.  Sagittal and coronal reformats were performed.    FINDINGS:  LOWER CHEST: Interval enlargement of several bibasilar subcentimeter   pulmonary nodules. Reference right lower lobe nodule (2, 11) measures 0.7   cm, previously 0.4 cm. Coronary artery, aortic valvular and mitral   annular calcifications.    LIVER: Redemonstration of hepatic abscesses, less well demonstrated on   noncontrast study:  Segment 8 (2, 15) measures 3.5 x 2.3 cm, previously 4.6 x 3.5 cm  Segment 5/8 (2, 33) measures 5.6 x 4.5 cm, previously 5.6 x 3.9 cm.  Smaller abscesses cannot be resolved. Known hepatic metastases are   discerned but not measurable.  BILE DUCTS: CBD stent. Pneumobilia.  GALLBLADDER: Not visualized.  SPLEEN: Calcified granulomas.  PANCREAS: Within normal limits.  ADRENALS: Withinnormal limits.  KIDNEYS/URETERS: Within normal limits.    BLADDER: Small air bubble in bladder lumen.  REPRODUCTIVE ORGANS: Uterus and adnexa within normal limits.    BOWEL: No bowel obstruction. Pneumatosis in the cecum and ascending   colon. Appendix is normal.  PERITONEUM/RETROPERITONEUM: Within normal limits.  VESSELS: Atheromatous calcifications.  LYMPH NODES: No lymphadenopathy.  ABDOMINAL WALL: Within normal limits.  BONES: Degenerative changes.    IMPRESSION:  Pneumatosis of the cecum andascending colon of concern for bowel   ischemia.    Redemonstration of hepatic abscesses and hepatic metastases.    Interval enlargement of bibasilar pulmonary metastases.    < end of copied text >            
80 yo fem s/p Lap RT colectomy, doing well  Abd functioning stoma, stapled wounds          12-05-24 @ 10:31    Vital Signs Last 24 Hrs  T(C): 36.9 (05 Dec 2024 08:00), Max: 36.9 (05 Dec 2024 08:00)  T(F): 98.5 (05 Dec 2024 08:00), Max: 98.5 (05 Dec 2024 08:00)  HR: 84 (05 Dec 2024 08:00) (84 - 87)  BP: 141/93 (05 Dec 2024 08:00) (141/93 - 147/97)  BP(mean): --  RR: 17 (05 Dec 2024 08:00) (16 - 18)  SpO2: 98% (05 Dec 2024 08:00) (94% - 98%)    Parameters below as of 05 Dec 2024 08:00  Patient On (Oxygen Delivery Method): room air                              11.3   7.83  )-----------( 309      ( 05 Dec 2024 06:56 )             36.3       12-05    138  |  108  |  12  ----------------------------<  83  3.7   |  26  |  0.55    Ca    8.7      05 Dec 2024 06:56    TPro  5.4[L]  /  Alb  2.0[L]  /  TBili  0.5  /  DBili  x   /  AST  42[H]  /  ALT  24  /  AlkPhos  396[H]  12-04      LIVER FUNCTIONS - ( 04 Dec 2024 07:41 )  Alb: 2.0 g/dL / Pro: 5.4 gm/dL / ALK PHOS: 396 U/L / ALT: 24 U/L / AST: 42 U/L / GGT: x             MEDICATIONS  (STANDING):  aMIOdarone    Tablet 100 milliGRAM(s) Oral daily  ertapenem  IVPB 1000 milliGRAM(s) IV Intermittent every 24 hours  heparin   Injectable 5000 Unit(s) SubCutaneous every 8 hours  hydrocortisone 20 milliGRAM(s) Oral daily  hydrocortisone 10 milliGRAM(s) Oral daily  levothyroxine 100 MICROGram(s) Oral daily  losartan 25 milliGRAM(s) Oral daily  minocycline 200 milliGRAM(s) Oral two times a day  pantoprazole    Tablet 40 milliGRAM(s) Oral before breakfast    MEDICATIONS  (PRN):  acetaminophen     Tablet .. 650 milliGRAM(s) Oral every 6 hours PRN Mild Pain (1 - 3)  morphine  - Injectable 1 milliGRAM(s) IV Push every 4 hours PRN Severe Pain (7 - 10)  oxyCODONE    IR 5 milliGRAM(s) Oral every 6 hours PRN Moderate Pain (4 - 6)      MEDICATIONS  (STANDING):  aMIOdarone    Tablet 100 milliGRAM(s) Oral daily  ertapenem  IVPB 1000 milliGRAM(s) IV Intermittent every 24 hours  heparin   Injectable 5000 Unit(s) SubCutaneous every 8 hours  hydrocortisone 20 milliGRAM(s) Oral daily  hydrocortisone 10 milliGRAM(s) Oral daily  levothyroxine 100 MICROGram(s) Oral daily  losartan 25 milliGRAM(s) Oral daily  minocycline 200 milliGRAM(s) Oral two times a day  pantoprazole    Tablet 40 milliGRAM(s) Oral before breakfast      
Patient is seen and examined. Chart is reviewed. no new complain.    Gen: No fever, chills, weakness  ENT: No visual changes or throat pain  Neck: No pain or stiffness  Respiratory: No cough or wheezing  Cardiovascular: No chest pain or palpitations  Gastrointestinal: No abdominal pain, nausea, vomiting, constipation, or diarrhea  Hematologic: No easy bleeding or bruising  Neurologic: No numbness or focal weakness  Psych: No depression or insomnia  Skin: No rash or itching      MEDICATIONS  (STANDING):  aMIOdarone    Tablet 100 milliGRAM(s) Oral daily  ertapenem  IVPB 1000 milliGRAM(s) IV Intermittent every 24 hours  heparin   Injectable 5000 Unit(s) SubCutaneous every 8 hours  hydrocortisone 20 milliGRAM(s) Oral daily  hydrocortisone 10 milliGRAM(s) Oral daily  levothyroxine 100 MICROGram(s) Oral daily  losartan 25 milliGRAM(s) Oral daily  minocycline 200 milliGRAM(s) Oral two times a day  pantoprazole    Tablet 40 milliGRAM(s) Oral before breakfast    MEDICATIONS  (PRN):  acetaminophen     Tablet .. 650 milliGRAM(s) Oral every 6 hours PRN Mild Pain (1 - 3)  morphine  - Injectable 1 milliGRAM(s) IV Push every 4 hours PRN Severe Pain (7 - 10)  oxyCODONE    IR 5 milliGRAM(s) Oral every 6 hours PRN Moderate Pain (4 - 6)      Vital Signs Last 24 Hrs  T(C): 36.3 (04 Dec 2024 08:14), Max: 36.9 (03 Dec 2024 17:20)  T(F): 97.3 (04 Dec 2024 08:14), Max: 98.4 (03 Dec 2024 17:20)  HR: 74 (04 Dec 2024 08:14) (74 - 96)  BP: 148/96 (04 Dec 2024 08:14) (127/84 - 148/96)  BP(mean): --  RR: 18 (04 Dec 2024 08:14) (18 - 18)  SpO2: 96% (04 Dec 2024 08:14) (96% - 98%)    Parameters below as of 04 Dec 2024 08:14  Patient On (Oxygen Delivery Method): room air      GEN: NAD, comfortable, resting in bed  HEENT: NC/AT, EOMI, PERRLA, MMM, clear conjunctiva and sclera, normal hearing, no nasal discharge, throat clear, no thrush, normal dentition.   NECK: supple, no JVD, no LAD, no thyromegaly  BACK:  ROM intact, no spinal/paraspinal tenderness  CV: S1S2, RRR, no mumur  RESP: good air movement, CTABL, no rales, rhonchi or wheezing, respirations unlabored  ABD: +BS, soft, ileostomy in place, hepatic drain in place.   EXT: +2 radial and pedal pulses, no edema, no calve tenderness  SKIN: No visible Rashes or Ulcers  MSK: ROM intact in all extremities  NEURO:  sensory and CN 2-12 Grossly intact, no motor deficits, AOx3  PSYCH: normal behavior     LABS:                          10.4   5.40  )-----------( 284      ( 04 Dec 2024 07:41 )             33.7     04 Dec 2024 07:41    139    |  111    |  11     ----------------------------<  85     3.8     |  26     |  0.55     Ca    8.5        04 Dec 2024 07:41  Phos  2.1       03 Dec 2024 07:19  Mg     1.9       03 Dec 2024 07:19    TPro  5.4    /  Alb  2.0    /  TBili  0.5    /  DBili  x      /  AST  42     /  ALT  24     /  AlkPhos  396    04 Dec 2024 07:41    LIVER FUNCTIONS - ( 04 Dec 2024 07:41 )  Alb: 2.0 g/dL / Pro: 5.4 gm/dL / ALK PHOS: 396 U/L / ALT: 24 U/L / AST: 42 U/L / GGT: x             CAPILLARY BLOOD GLUCOSE            Urinalysis Basic - ( 04 Dec 2024 07:41 )    Color: x / Appearance: x / SG: x / pH: x  Gluc: 85 mg/dL / Ketone: x  / Bili: x / Urobili: x   Blood: x / Protein: x / Nitrite: x   Leuk Esterase: x / RBC: x / WBC x   Sq Epi: x / Non Sq Epi: x / Bacteria: x        RADIOLOGY:        
Patient well-known to Dr. Saunders   January 2022  diagnosed renal cell carcinoma, papillary variant with liver metastases   previously treated with nivolumab cabozantinib, initial response however subsequent progressive upper abdominal adenopathy  - treatment changed ipilimumab plus nivolumab and Tivozanib, which required 50% dose reduction  -  July 9, July 30 2024 IR embolization of liver lesion, at [facility]   September 29– - October 16 septic shock secondary to hepatic abscesses with Klebsiella, Enterococcus faecalis requiring extended ICU stay,   treated with IR drain IV Invanz and oral Augmentin.    now s/p colon resection due to pneumatosis coli  recovering well    Home Medications:  amiodarone 100 mg oral tablet: 1 tab(s) orally once a day (30 Nov 2024 17:37)  amoxicillin-clavulanate 500 mg-125 mg oral tablet: 1 tab(s) orally 2 times a day (30 Nov 2024 17:35)  Benicar 20 mg oral tablet: 2 tab(s) orally once a day (30 Nov 2024 17:41)  hydrocortisone 10 mg oral tablet: 1 tab(s) orally 3 times a day (30 Nov 2024 17:41)  levothyroxine 100 mcg (0.1 mg) oral tablet: 1 tab(s) orally once a day (30 Nov 2024 17:37)  metoprolol succinate 50 mg oral tablet, extended release: 1 tab(s) orally 2 times a day (30 Nov 2024 17:37)  Tivozanib       MEDICATIONS  (STANDING):  aMIOdarone    Tablet 100 milliGRAM(s) Oral daily  heparin   Injectable 5000 Unit(s) SubCutaneous every 8 hours  hydrocortisone 10 milliGRAM(s) Oral three times a day  levothyroxine 100 MICROGram(s) Oral daily  meropenem Injectable 1000 milliGRAM(s) IV Push every 8 hours  pantoprazole    Tablet 40 milliGRAM(s) Oral before breakfast  potassium phosphate / sodium phosphate Powder (PHOS-NaK) 1 Packet(s) Oral four times a day  sodium chloride 0.9%. 1000 milliLiter(s) (100 mL/Hr) IV Continuous <Continuous>    MEDICATIONS  (PRN):  acetaminophen     Tablet .. 650 milliGRAM(s) Oral every 6 hours PRN Mild Pain (1 - 3)  morphine  - Injectable 1 milliGRAM(s) IV Push every 4 hours PRN Severe Pain (7 - 10)  oxyCODONE    IR 5 milliGRAM(s) Oral every 6 hours PRN Moderate Pain (4 - 6)        ROS  abdominal pain  weakness    OE  appears comfortable  drains in place  colostomy                          9.9    13.68 )-----------( 298      ( 02 Dec 2024 05:31 )             32.4     12-02    137  |  113[H]  |  15  ----------------------------<  111[H]  4.9   |  23  |  0.77    Ca    8.3[L]      02 Dec 2024 05:31  Phos  2.3     12-02  Mg     2.1     12-02    TPro  5.2[L]  /  Alb  1.8[L]  /  TBili  0.4  /  DBili  x   /  AST  52[H]  /  ALT  25  /  AlkPhos  293[H]  12-02    LIVER FUNCTIONS - ( 02 Dec 2024 05:31 )  Alb: 1.8 g/dL / Pro: 5.2 gm/dL / ALK PHOS: 293 U/L / ALT: 25 U/L / AST: 52 U/L / GGT: x           PT/INR - ( 01 Dec 2024 06:00 )   PT: 17.5 sec;   INR: 1.53 ratio                CT Abdomen and Pelvis No Cont (11.30.24 @ 14:23) >   CT ABDOMEN AND PELVIS   ORDERED BY: KAROL CHAMORRO     PROCEDURE DATE:  11/30/2024     INTERPRETATION:  CLINICAL INFORMATION: Metastatic renal cancer with   hepatic abscesses. Presents with right upper quadrant pain.    COMPARISON: CT abdomen pelvis dated 10/12/2024. Abdominal MRI dated   11/6/2024.    CONTRAST/COMPLICATIONS:  IV Contrast: NONE  Oral Contrast: NONE      PROCEDURE:  CT of the Abdomen and Pelvis was performed.  Sagittal and coronal reformats were performed.    FINDINGS:  LOWER CHEST: Interval enlargement of several bibasilar subcentimeter   pulmonary nodules. Reference right lower lobe nodule (2, 11) measures 0.7   cm, previously 0.4 cm. Coronary artery, aortic valvular and mitral   annular calcifications.    LIVER: Redemonstration of hepatic abscesses, less well demonstrated on   noncontrast study:  Segment 8 (2, 15) measures 3.5 x 2.3 cm, previously 4.6 x 3.5 cm  Segment 5/8 (2, 33) measures 5.6 x 4.5 cm, previously 5.6 x 3.9 cm.  Smaller abscesses cannot be resolved. Known hepatic metastases are   discerned but not measurable.  BILE DUCTS: CBD stent. Pneumobilia.  GALLBLADDER: Not visualized.  SPLEEN: Calcified granulomas.  PANCREAS: Within normal limits.  ADRENALS: Withinnormal limits.  KIDNEYS/URETERS: Within normal limits.    BLADDER: Small air bubble in bladder lumen.  REPRODUCTIVE ORGANS: Uterus and adnexa within normal limits.    BOWEL: No bowel obstruction. Pneumatosis in the cecum and ascending   colon. Appendix is normal.  PERITONEUM/RETROPERITONEUM: Within normal limits.  VESSELS: Atheromatous calcifications.  LYMPH NODES: No lymphadenopathy.  ABDOMINAL WALL: Within normal limits.  BONES: Degenerative changes.    IMPRESSION:  Pneumatosis of the cecum andascending colon of concern for bowel   ischemia.    Redemonstration of hepatic abscesses and hepatic metastases.    Interval enlargement of bibasilar pulmonary metastases.    Examination findings were conveyed to Dr. Cortes by Dr. Smith at 1500   hours on 11/30/2024 with read back.          < end of copied text >  
80yo F with PMH: Renal Cell Carcinoma with mets to liver, IR embolization of a liver lesion on 7/30 Diverticulitis, HTN, HLD, Hypothyroid new onset Afib/RVR in August 2024 started on Eliquis, GI Bleed after ERCP- resolved without intervention, started on eliquis during admit for Afib. liver abscesses.  Patient presented to ED with fatigue and fevers and severe abdominal pain in ED SBP in 80's unresponsive to IVF 5L overall and started on levophed, poor venous access requiring CVC insertion. CT shows Pneumatosis of the cecum and liver abscesses stable from MRI on 11/6 however increased from CT on 10/12. Pt now s/p IR liver abscess drain/aspiration.       Vital Signs Last 24 Hrs  T(C): 36.3 (04 Dec 2024 08:14), Max: 36.9 (03 Dec 2024 17:20)  T(F): 97.3 (04 Dec 2024 08:14), Max: 98.4 (03 Dec 2024 17:20)  HR: 74 (04 Dec 2024 08:14) (74 - 96)  BP: 148/96 (04 Dec 2024 08:14) (127/84 - 148/96)  BP(mean): --  RR: 18 (04 Dec 2024 08:14) (18 - 18)  SpO2: 96% (04 Dec 2024 08:14) (96% - 98%)    Parameters below as of 04 Dec 2024 08:14  Patient On (Oxygen Delivery Method): room air        GENERAL APPEARANCE: Well developed, in no acute distress.    LUNGS: Auscultation of the lungs revealed normal breath sounds without any other adventitious sounds or rubs.    CARDIOVASCULAR: There was a regular rate and rhythm    ABDOMEN: Soft and nontender with normal bowel sounds.     NEUROLOGIC: Alert and oriented x 3. Normal affect.       CBC                        10.4   5.40  )-----------( 284      ( 04 Dec 2024 07:41 )             33.7       Chemistry  12-04    139  |  111[H]  |  11  ----------------------------<  85  3.8   |  26  |  0.55    Ca    8.5      04 Dec 2024 07:41  Phos  2.1     12-03  Mg     1.9     12-03    TPro  5.4[L]  /  Alb  2.0[L]  /  TBili  0.5  /  DBili  x   /  AST  42[H]  /  ALT  24  /  AlkPhos  396[H]  12-04          
80yo F with PMH: Renal Cell Carcinoma with mets to liver, IR embolization of a liver lesion on 7/30 Diverticulitis, HTN, HLD, Hypothyroid new onset Afib/RVR in August 2024 started on Eliquis, GI Bleed after ERCP- resolved without intervention, started on eliquis during admit for Afib. liver abscesses.  Patient presented to ED with fatigue and fevers and severe abdominal pain in ED SBP in 80's unresponsive to IVF 5L overall and started on levophed, poor venous access requiring CVC insertion. CT shows Pneumatosis of the cecum and liver abscesses stable from MRI on 11/6 however increased from CT on 10/12. Pt now s/p IR liver abscess drain/aspiration.     Vital Signs Last 24 Hrs  T(C): 36.9 (03 Dec 2024 08:08), Max: 37 (02 Dec 2024 20:00)  T(F): 98.5 (03 Dec 2024 08:08), Max: 98.6 (02 Dec 2024 20:00)  HR: 80 (03 Dec 2024 08:08) (74 - 81)  BP: 129/77 (03 Dec 2024 08:08) (129/77 - 144/79)  BP(mean): 100 (02 Dec 2024 20:00) (100 - 100)  RR: 18 (03 Dec 2024 08:08) (12 - 18)  SpO2: 95% (03 Dec 2024 08:08) (95% - 98%)    Parameters below as of 03 Dec 2024 08:08  Patient On (Oxygen Delivery Method): room air        GENERAL APPEARANCE: Well developed, in no acute distress.    LUNGS: Auscultation of the lungs revealed normal breath sounds without any other adventitious sounds or rubs.    CARDIOVASCULAR: There was a regular rate and rhythm    ABDOMEN: Soft and nontender with normal bowel sounds.     NEUROLOGIC: Alert and oriented x 3. Normal affect.       CBC                        10.2   7.54  )-----------( 277      ( 03 Dec 2024 07:19 )             33.4       Chemistry  12-03    138  |  111[H]  |  13  ----------------------------<  91  4.7   |  25  |  0.62    Ca    8.4[L]      03 Dec 2024 07:19  Phos  2.1     12-03  Mg     1.9     12-03    TPro  5.2[L]  /  Alb  1.8[L]  /  TBili  0.4  /  DBili  x   /  AST  52[H]  /  ALT  25  /  AlkPhos  293[H]  12-02          
Patient is a 79y old  Female who presents with a chief complaint of abdominal pain weakness (01 Dec 2024 13:49)    Allergies    Zetia (Unknown)  Cabometyx (Unknown)  Lipitor (Unknown)  Norvasc (Faint)  Crestor (Other)  Dyazide (Faint)  statins (Unknown)  bacitracin (Rash)  tivozanib (Faint)    Intolerances      REVIEW OF SYSTEMS: SEE BELOW       ICU Vital Signs Last 24 Hrs  T(C): 36.6 (02 Dec 2024 09:00), Max: 36.6 (02 Dec 2024 09:00)  T(F): 97.9 (02 Dec 2024 09:00), Max: 97.9 (02 Dec 2024 09:00)  HR: 79 (02 Dec 2024 10:00) (69 - 101)  BP: 109/76 (02 Dec 2024 10:00) (91/69 - 135/76)  BP(mean): 91 (02 Dec 2024 10:00) (75 - 108)  ABP: --  ABP(mean): --  RR: 20 (02 Dec 2024 10:00) (10 - 21)  SpO2: 99% (02 Dec 2024 10:00) (62% - 99%)    O2 Parameters below as of 02 Dec 2024 04:00  Patient On (Oxygen Delivery Method): room air            CAPILLARY BLOOD GLUCOSE      POCT Blood Glucose.: 126 mg/dL (01 Dec 2024 22:34)  POCT Blood Glucose.: 129 mg/dL (01 Dec 2024 16:16)      I&O's Summary    01 Dec 2024 07:01  -  02 Dec 2024 07:00  --------------------------------------------------------  IN: 2368 mL / OUT: 150 mL / NET: 2218 mL            MEDICATIONS  (STANDING):  heparin   Injectable 5000 Unit(s) SubCutaneous every 8 hours  levothyroxine 100 MICROGram(s) Oral daily  meropenem Injectable 1000 milliGRAM(s) IV Push every 8 hours  pantoprazole  Injectable 40 milliGRAM(s) IV Push daily  potassium phosphate / sodium phosphate Powder (PHOS-NaK) 1 Packet(s) Oral four times a day with meals  sodium chloride 0.9%. 1000 milliLiter(s) (100 mL/Hr) IV Continuous <Continuous>      MEDICATIONS  (PRN):  acetaminophen     Tablet .. 650 milliGRAM(s) Oral every 6 hours PRN Mild Pain (1 - 3)  morphine  - Injectable 1 milliGRAM(s) IV Push every 4 hours PRN Severe Pain (7 - 10)  oxyCODONE    IR 5 milliGRAM(s) Oral every 6 hours PRN Moderate Pain (4 - 6)      PHYSICAL EXAM: SEE BELOW                          9.9    13.68 )-----------( 298      ( 02 Dec 2024 05:31 )             32.4       12-02    137  |  113[H]  |  15  ----------------------------<  111[H]  4.9   |  23  |  0.77    Ca    8.3[L]      02 Dec 2024 05:31  Phos  2.3     12-02  Mg     2.1     12-02    TPro  5.2[L]  /  Alb  1.8[L]  /  TBili  0.4  /  DBili  x   /  AST  52[H]  /  ALT  25  /  AlkPhos  293[H]  12-02    Lactate 1.0           12-02 @ 05:31          PT/INR - ( 01 Dec 2024 06:00 )   PT: 17.5 sec;   INR: 1.53 ratio         PTT - ( 30 Nov 2024 12:46 )  PTT:45.8 sec  Urinalysis Basic - ( 02 Dec 2024 05:31 )    Color: x / Appearance: x / SG: x / pH: x  Gluc: 111 mg/dL / Ketone: x  / Bili: x / Urobili: x   Blood: x / Protein: x / Nitrite: x   Leuk Esterase: x / RBC: x / WBC x   Sq Epi: x / Non Sq Epi: x / Bacteria: x      Clean Catch None   No growth -- 11-30 @ 13:11  .Blood BLOOD   No growth at 24 hours -- 11-30 @ 12:46  .Blood BLOOD   No growth at 24 hours -- 11-30 @ 12:39

## 2024-12-05 NOTE — PROGRESS NOTE ADULT - PROVIDER SPECIALTY LIST ADULT
Critical Care
Hospitalist
Infectious Disease
Surgery
Critical Care
Heme/Onc
Infectious Disease
Infectious Disease
Intervent Radiology
Surgery
Heme/Onc
Infectious Disease
Surgery
Hospitalist
Hospitalist
Intervent Radiology
Intervent Radiology

## 2024-12-05 NOTE — ADVANCED PRACTICE NURSE CONSULT - ASSESSMENT
Patient received in 5S resting in bed, awake, alert, oriented x 4, pleasant and cooperative. Risks and benefits of midline catheter placement explained to patient with verbal understanding. Gave verbal consent after all questions answered. Time out and hand hygiene performed. Site prepped with chlorhexidine. Draped in sterile fashion. Lidocaine 1% 2cc injected to site subdermally prior to start of procedure. Using aseptic technique, single lumen 12cm BioFlo Midline with [employer] 4F inserted to left brachial vein via ultrasound guidance. Flushes well with 20cc of NS. Brisk blood return present. End cap placed. Line secured to skin using statlock, biopatch and DSD applied. Minimal blood loss. Patient tolerated procedure well. No chest x ray needed to verify placement. Ok to use now.   Previous midline to left arm dc'd per policy.    Lot # [id number]

## 2024-12-05 NOTE — PROGRESS NOTE ADULT - ASSESSMENT
Assessment:  79F with Renal Cell Carcinoma with mets to liver, IR embolization of a liver lesion on 7/30 Diverticulitis, HTN, HLD, Hypothyroid new onset Afib/RVR in August 2024 started on Eliquis, GI Bleed after ERCP- resolved without intervention, started on eliquis during admit for Afib. liver abscesses, multiple admissions prior for liver abscess, most recently on 9/29-10/16 septic shock 2/2 hepatic abscess with Klebsiella, Enterococcus faecalis requiring pressors , Enterococcus faecium and Clostridium perfringens growing in blood cultures. Pt had IR drain placed and output was monitored closely. Abscess fluid culture grew Klebsiella, Enterococcus faecalis. completed IV Invanz and PO Augmentin on 11/5, presents 11/30 with abdominal pain, fever and fatigue  Afebrile on admission, on RA, but hypotensive  CT with Pneumatosis of the cecum and liver abscesses   WBC 17 > 11  Cr 0.79  UA negative, UCx negative  CXR atelectasis  RVP negative  BCx 11/30 negative  Prior culture with Pan S Kleb, Pan S E feacalis, E faecium R Amp, S Vanco    Antimicrobials:  s/p Cipro Flagyl x1 (11/30)  Meropenem 1G q8 (12/1 --- ); have changed to ertapenem one gram daily to continue until 12/25 with weekly cbc, cmp, esr,crp; ordered midline  Ordered minocycline 200 mg po q 12 hours for S. maltophilia in liver abscess culture to continue until 12/25    Impression:   #Septic Shock  #Pneumatosis of the cecum, Ischemic Bowel  #Liver abscess  #Leukocytosis  #Metastatic kidney cancer   #PRANAY  - s/p Laparoscopic right colectomy (11/30) with pus  - remains afebrile, WBC normalized    Recommendations:  - continue Ertapenem one gram daily until 12/25; ordered midline  - minocycline 200 mg po q 12 hours until 12/25  - monitor temperature curve  - trend WBC  - side effects of antibiotic discussed, tolerating abx well so far  - Surgery following  - IR eval placed drain in liver  - most likely will go home with iv antibiotics in next 2-3 days; have discussed this with patient and family; hopefully today 12/5, after RN to teach ostomy care      [facility] token not applicable as all rx will be iv

## 2024-12-05 NOTE — ADVANCED PRACTICE NURSE CONSULT - ASSESSMENT
Assessment:    Received patient sitting up in bed, awake,  made aware of purpose of CWON, agreeable to pouch change & ostomy teaching. End  Ileostomy  w/ Selin drainable pouching system applied  in place, barrier intact, no leakage. Pouch gently removed from pt's abdomen w/ water moistened gauze.     Type of ostomy: End Ileostomy   Location: RLQ  Size: Round and 1 3/8" protruding 1/4"  Mucosa: red, moist, remains edematous & slightly translucent   Peristomal skin: Intact   Mucocutaneous junction: intact  Abdominal contours: Rounded and soft   Output: ~150cc dark brown liquid effluent present in removed pouch  Midline/lap sites/ drains: scattered abdominal lap,   Right lateral Flank sallie    Patient re-pouched w/ Cropsey 2 1/4” CeraPlus flat barrier  (cut to 1 3/8" ), and Cropsey  2 1/4” drainable pouch w/ lock & roll closure.  Full ostomy lesson provided to patient.    Impression:   End Ileostomy given pt's stomal characteristics and abdominal topography, pt still requires flat pouching system to protect peristomal skin w/ shrinking post-op stomal size    Patient ready & willing to learn ostomy care this visit, observed entire pouch change, looking directly at stoma, asking  appropriate questions & able to verbalize key ostomy points. Patient again observed pouch opening/closing, again reviewed w/ patient that her stool consistency & frequency of End Ileostomy  will require her to empty pouch several times each day & again strongly encouraged patient start practicing pouch opening/closing & emptying as this skill is required prior to d/c home, patient agreeable to start practicing pouch emptying. Patient expressed feeling overwhelmed with everything,  much emotional support & encouragement provided to patient.     Reviewed with patient dietary restrictions, s/s & prevention of food obstruction & constipation. Also reviewed w/ patient lifestyle modifications (clothing, bathing/showering, physical activity). Written information regarding all above topics provided to patient.        Patient enrolled in Cropsey secure start and will require a 2 1/4" flat wafer and pouch system     Home supplies given to patient.

## 2024-12-05 NOTE — DISCHARGE NOTE NURSING/CASE MANAGEMENT/SOCIAL WORK - CASE MANAGER'S NAME
History  Chief Complaint   Patient presents with    Vomiting Blood     Patient states he vomited 4x starting this morning and saw chunks of blood in it  82-year-old male history of CAD, PE, seizure, presenting due to abdominal pain, hematemesis, chest pain  Patient states earlier today he had some nausea and 4 episodes of vomit he believes he saw small streaks of blood in 2 of the episodes  Says he still has mild nausea at this time  Patient also states that yesterday he had 2 episodes of chest pain with accompanying shortness of breath and states he had a sit-down at that time  Says that they resolved spontaneously  Denies any chest pain or shortness of breath today  Denies taking anything for his symptoms  Denies fever, chills, cough, known sick contacts, palpitations, urinary or bowel symptoms, weakness numbness or tingling extremities  Prior to Admission Medications   Prescriptions Last Dose Informant Patient Reported? Taking?    ARIPiprazole (ABILIFY) 15 mg tablet   No No   Sig: Take 1 tablet (15 mg total) by mouth daily   FLUoxetine (PROzac) 40 MG capsule   No No   Sig: Take 1 capsule (40 mg total) by mouth daily   OXcarbazepine (TRILEPTAL) 300 mg tablet   No No   Sig: Take 1 tablet (300 mg total) by mouth daily with breakfast   OXcarbazepine (TRILEPTAL) 600 mg tablet   No No   Sig: Take 1 tablet (600 mg total) by mouth every evening   amLODIPine (NORVASC) 10 mg tablet   No No   Sig: Take 1 tablet (10 mg total) by mouth daily   aspirin (ECOTRIN LOW STRENGTH) 81 mg EC tablet   No No   Sig: Take 1 tablet (81 mg total) by mouth daily   atorvastatin (LIPITOR) 40 mg tablet   No No   Sig: Take 2 tablets (80 mg total) by mouth daily with dinner   carvedilol (COREG) 6 25 mg tablet   No No   Sig: Take 1 tablet (6 25 mg total) by mouth 2 (two) times a day with meals   hydrOXYzine pamoate (VISTARIL) 50 mg capsule   Yes No   Sig: Take 50 mg by mouth daily at bedtime   isosorbide mononitrate (IMDUR) 30 mg 24 hr tablet   No No   Sig: Take 1 tablet (30 mg total) by mouth daily at bedtime   melatonin 3 mg   No No   Sig: Take 1 tablet (3 mg total) by mouth daily at bedtime   nicotine (NICODERM CQ) 21 mg/24 hr TD 24 hr patch   No No   Sig: Place 1 patch on the skin daily   nitroglycerin (NITROSTAT) 0 4 mg SL tablet   Yes No   Sig: Place 0 4 mg under the tongue   pantoprazole (PROTONIX) 20 mg tablet   No No   Sig: Take 1 tablet (20 mg total) by mouth daily   sucralfate (CARAFATE) 1 g tablet   No No   Sig: Take 1 tablet (1 g total) by mouth 4 (four) times a day   traZODone (DESYREL) 50 mg tablet   Yes No   Sig: Take 50 mg by mouth daily at bedtime   zolpidem (AMBIEN) 10 mg tablet   No No   Sig: Take 1 tablet (10 mg total) by mouth daily at bedtime      Facility-Administered Medications: None       Past Medical History:   Diagnosis Date    Adrenal adenoma     Anemia     Anxiety     Aspiration pneumonia (HCC)     Autism spectrum disorder     [facility]     Cervical stenosis of spine     Coronary artery disease     m[facility] non obstructive disease [facility]    Depression     [facility]     Erosive gastritis     GERD (gastroesophageal reflux disease)     Glaucoma     Hematemesis     Hepatitis C     History of electroconvulsive therapy     History of pulmonary embolus (PE)     History of transfusion     Hyperlipidemia     Hypertension     [facility]     MI, old     Pulmonary embolism (HCC)     Right Lung-Per Patient    Pulmonary embolism [facility]     Rectal bleeding     Respiratory failure ([facility]     Seizures ([facility] )     Sleep difficulties     Substance abuse ([facility] )        Past Surgical History:   Procedure Laterality Date    ANGIOPLASTY      self reported     CARDIAC CATHETERIZATION      COLONOSCOPY N/A 11/19/2018    Procedure: COLONOSCOPY;  Surgeon: Yosef Flood MD;  Location: [address] [place] [address] GI LAB;   Service: Gastroenterology   Roger Fields CORONARY ANGIOPLASTY WITH STENT PLACEMENT      EGD AND COLONOSCOPY N/A 11/28/2016    Procedure: EGD AND COLONOSCOPY;  Surgeon: Get Sanchez MD;  Location:  GI LAB; Service:     ESOPHAGOGASTRODUODENOSCOPY N/A 1/24/2017    Procedure: ESOPHAGOGASTRODUODENOSCOPY (EGD); Surgeon: Matteo Bergeron MD;  Location: [facility]; Service:     ESOPHAGOGASTRODUODENOSCOPY N/A 6/28/2017    Procedure: ESOPHAGOGASTRODUODENOSCOPY (EGD) with bx x2;  Surgeon: Get Sanchez MD;  Location: [facility]; Service: Gastroenterology    ESOPHAGOGASTRODUODENOSCOPY N/A 10/3/2018    Procedure: ESOPHAGOGASTRODUODENOSCOPY (EGD); Surgeon: Gabbi Amin MD;  Location: [facility]; Service: Gastroenterology    IVC FILTER INSERTION  02/2016    IVC FILTER INSERTION      VENA CAVA FILTER PLACEMENT      w/flurosc angiogr guidance / inferior        Family History   Problem Relation Age of Onset    Seizures Mother     Coronary artery disease Mother     Diabetes Mother     Heart attack Mother     Seizures Sister     Coronary artery disease Sister     Diabetes Father     Drug abuse Brother      I have reviewed and agree with the history as documented  E-Cigarette/Vaping    E-Cigarette Use Never User      E-Cigarette/Vaping Substances    Nicotine No     THC No     CBD No     Flavoring No     Other No     Unknown No      Social History     Tobacco Use    Smoking status: Current Every Day Smoker     Packs/day: 1 00     Years: 30 00     Pack years: 30 00     Types: Cigarettes    Smokeless tobacco: Never Used   Substance Use Topics    Alcohol use: Not Currently     Frequency: Never    Drug use: Not Currently     Types: Marijuana, Opium     Comment: 10/15/20  +opiates, THC        Review of Systems   Constitutional: Negative for chills and fever  HENT: Negative for ear pain and sore throat  Eyes: Negative for visual disturbance  Respiratory: Positive for chest tightness and shortness of breath  Negative for cough      Cardiovascular: Negative for chest pain and palpitations  Gastrointestinal: Positive for nausea and vomiting  Negative for abdominal pain  Genitourinary: Negative for dysuria  Musculoskeletal: Negative for arthralgias and back pain  Skin: Negative for color change and rash  Neurological: Negative for syncope  All other systems reviewed and are negative  Physical Exam  ED Triage Vitals [05/22/21 1651]   Temperature Pulse Respirations Blood Pressure SpO2   98 3 °F (36 8 °C) 98 18 136/86 98 %      Temp Source Heart Rate Source Patient Position - Orthostatic VS BP Location FiO2 (%)   Tympanic Monitor Sitting Right arm --      Pain Score       8             Orthostatic Vital Signs  Vitals:    05/22/21 1651 05/22/21 2024   BP: 136/86 136/89   Pulse: 98 85   Patient Position - Orthostatic VS: Sitting Lying       Physical Exam  Vitals signs and nursing note reviewed  Constitutional:       Appearance: He is well-developed  HENT:      Head: Normocephalic and atraumatic  Eyes:      Conjunctiva/sclera: Conjunctivae normal    Neck:      Musculoskeletal: Neck supple  Cardiovascular:      Rate and Rhythm: Normal rate and regular rhythm  Pulmonary:      Effort: Pulmonary effort is normal  No respiratory distress  Breath sounds: Normal breath sounds  Abdominal:      Palpations: Abdomen is soft  Tenderness: There is no abdominal tenderness  Skin:     General: Skin is warm and dry  Neurological:      Mental Status: He is alert and oriented to person, place, and time  Psychiatric:         Mood and Affect: Mood normal          Behavior: Behavior normal          Thought Content:  Thought content normal          Judgment: Judgment normal          ED Medications  Medications   aluminum-magnesium hydroxide-simethicone (MYLANTA) oral suspension 30 mL (30 mL Oral Given 5/22/21 1846)   ondansetron (ZOFRAN) injection 4 mg (4 mg Intravenous Given 5/22/21 1821)   Lidocaine Viscous HCl (XYLOCAINE) 2 % mucosal solution 15 mL (15 mL Swish & Swallow Given 5/22/21 2024)       Diagnostic Studies  Results Reviewed     Procedure Component Value Units Date/Time    Comprehensive metabolic panel [[id number]]  (Abnormal) Collected: 05/22/21 1821    Lab Status: Final result Specimen: Blood from Arm, Left Updated: 05/22/21 1924     Sodium 140 mmol/L      Potassium 4 6 mmol/L      Chloride 109 mmol/L      CO2 24 mmol/L      ANION GAP 7 mmol/L      BUN 11 mg/dL      Creatinine 1 28 mg/dL      Glucose 88 mg/dL      Calcium 9 6 mg/dL      AST 58 U/L      ALT 31 U/L      Alkaline Phosphatase 76 U/L      Total Protein 8 2 g/dL      Albumin 4 0 g/dL      Total Bilirubin 0 66 mg/dL      eGFR 77 ml/min/1 73sq m     Narrative:      [place] guidelines for Chronic Kidney Disease (CKD):     Stage 1 with normal or high GFR (GFR > 90 mL/min/1 73 square meters)    Stage 2 Mild CKD (GFR = 60-89 mL/min/1 73 square meters)    Stage 3A Moderate CKD (GFR = 45-59 mL/min/1 73 square meters)    Stage 3B Moderate CKD (GFR = 30-44 mL/min/1 73 square meters)    Stage 4 Severe CKD (GFR = 15-29 mL/min/1 73 square meters)    Stage 5 End Stage CKD (GFR <15 mL/min/1 73 square meters)  Note: GFR calculation is accurate only with a steady state creatinine    Lipase [[id number]]  (Normal) Collected: 05/22/21 1821    Lab Status: Final result Specimen: Blood from Arm, Left Updated: 05/22/21 1910     Lipase 112 u/L     Troponin I [[id number]]  (Normal) Collected: 05/22/21 1821    Lab Status: Final result Specimen: Blood from Arm, Left Updated: 05/22/21 1900     Troponin I <0 02 ng/mL     CBC and differential [[id number]]  (Abnormal) Collected: 05/22/21 1821    Lab Status: Final result Specimen: Blood from Arm, Left Updated: 05/22/21 1835     WBC 7 99 Thousand/uL      RBC 4 90 Million/uL      Hemoglobin 11 8 g/dL      Hematocrit 38 7 %      MCV 79 fL      MCH 24 1 pg      MCHC 30 5 g/dL      RDW 19 1 %      MPV 9 4 fL      Platelets 792 Thousands/uL nRBC 0 /100 WBCs      Neutrophils Relative 63 %      Immat GRANS % 1 %      Lymphocytes Relative 27 %      Monocytes Relative 6 %      Eosinophils Relative 2 %      Basophils Relative 1 %      Neutrophils Absolute 5 08 Thousands/µL      Immature Grans Absolute 0 04 Thousand/uL      Lymphocytes Absolute 2 17 Thousands/µL      Monocytes Absolute 0 48 Thousand/µL      Eosinophils Absolute 0 18 Thousand/µL      Basophils Absolute 0 04 Thousands/µL                  XR chest 1 view portable    (Results Pending)         Procedures  Procedures      ED Course      This EKG was interpreted by me  The EKG demonstrates Normal sinus rhythm, normal intervals and axis, normal QRS, non specific acute ST-T changes        HEART Risk Score      Most Recent Value   Heart Score Risk Calculator   History  1 Filed at: 05/23/2021 0139   ECG  0 Filed at: 05/23/2021 0139   Age  1 Filed at: 05/23/2021 0139   Risk Factors  2 Filed at: 05/23/2021 0139   Troponin  0 Filed at: 05/23/2021 0139   HEART Score  4 Filed at: 05/23/2021 0139              PERC Rule for PE      Most Recent Value   PERC Rule for PE   Age >=50  0 Filed at: 05/22/2021 1703   HR >=100  0 Filed at: 05/22/2021 1703   O2 Sat on room air < 95%  0 Filed at: 05/22/2021 1703   History of PE or DVT  0 Filed at: 05/22/2021 1703   Recent trauma or surgery  0 Filed at: 05/22/2021 1703   Hemoptysis  0 Filed at: 05/22/2021 1703   Exogenous estrogen  0 Filed at: 05/22/2021 1703   Unilateral leg swelling  0 Filed at: 05/22/2021 1703   PERC Rule for PE Results  0 Filed at: 05/22/2021 1703                          MDM  Number of Diagnoses or Management Options  Abdominal pain:   Hematemesis:   Diagnosis management comments: Negative troponin, no acute findings cardiac workup  Benign abdominal exam   Patient states abdominal pain improved Mylanta and viscous lidocaine  Discussed with patient the option for admission and he states he feels well at this time prefers to be discharged    Says he has close follow-up and outpatient appointments with his cardiologist and primary care physician  Strict return precautions advised patient discharged in stable condition      Disposition  Final diagnoses:   Hematemesis   Abdominal pain     Time reflects when diagnosis was documented in both MDM as applicable and the Disposition within this note     Time User Action Codes Description Comment    5/22/2021  8:21 PM Susana Hanley Add [K92 0] Hematemesis     5/22/2021  8:21 PM Susana Hanley Add [R10 9] Abdominal pain       ED Disposition     ED Disposition Condition Date/Time Comment    Discharge Stable Sat May 22, 2021  8:21 PM Simona Nunez discharge to home/self care              Follow-up Information    None         Discharge Medication List as of 5/22/2021  8:21 PM      CONTINUE these medications which have NOT CHANGED    Details   amLODIPine (NORVASC) 10 mg tablet Take 1 tablet (10 mg total) by mouth daily, Starting Wed 10/21/2020, Normal      ARIPiprazole (ABILIFY) 15 mg tablet Take 1 tablet (15 mg total) by mouth daily, Starting Thu 10/22/2020, Normal      aspirin (ECOTRIN LOW STRENGTH) 81 mg EC tablet Take 1 tablet (81 mg total) by mouth daily, Starting Wed 10/21/2020, Until Fri 11/20/2020, Normal      atorvastatin (LIPITOR) 40 mg tablet Take 2 tablets (80 mg total) by mouth daily with dinner, Starting Wed 10/21/2020, Normal      carvedilol (COREG) 6 25 mg tablet Take 1 tablet (6 25 mg total) by mouth 2 (two) times a day with meals, Starting Wed 10/21/2020, Normal      FLUoxetine (PROzac) 40 MG capsule Take 1 capsule (40 mg total) by mouth daily, Starting Wed 10/21/2020, Until Fri 11/20/2020, Normal      hydrOXYzine pamoate (VISTARIL) 50 mg capsule Take 50 mg by mouth daily at bedtime, Historical Med      isosorbide mononitrate (IMDUR) 30 mg 24 hr tablet Take 1 tablet (30 mg total) by mouth daily at bedtime, Starting Wed 10/21/2020, Normal      melatonin 3 mg Take 1 tablet (3 mg total) by mouth daily at bedtime, Starting Wed 10/21/2020, Normal      nicotine (NICODERM CQ) 21 mg/24 hr TD 24 hr patch Place 1 patch on the skin daily, Starting Mon 4/19/2021, Normal      nitroglycerin (NITROSTAT) 0 4 mg SL tablet Place 0 4 mg under the tongue, Historical Med      OXcarbazepine (TRILEPTAL) 300 mg tablet Take 1 tablet (300 mg total) by mouth daily with breakfast, Starting Wed 10/21/2020, Until Fri 11/20/2020, Normal      OXcarbazepine (TRILEPTAL) 600 mg tablet Take 1 tablet (600 mg total) by mouth every evening, Starting Wed 10/21/2020, Until Fri 11/20/2020, Normal      pantoprazole (PROTONIX) 20 mg tablet Take 1 tablet (20 mg total) by mouth daily, Starting Wed 10/21/2020, Until Fri 11/20/2020, Normal      sucralfate (CARAFATE) 1 g tablet Take 1 tablet (1 g total) by mouth 4 (four) times a day, Starting Wed 10/21/2020, Normal      traZODone (DESYREL) 50 mg tablet Take 50 mg by mouth daily at bedtime, Historical Med      zolpidem (AMBIEN) 10 mg tablet Take 1 tablet (10 mg total) by mouth daily at bedtime, Starting Wed 10/21/2020, Normal           No discharge procedures on file  PDMP Review       Value Time User    PDMP Reviewed  Yes 4/18/2021 10:24 AM Denis Kim MD           ED Provider  Attending physically available and evaluated Isabel Cantu I managed the patient along with the ED Attending      Electronically Signed by         Rosalia Mane DO  05/23/21 0140 Monserrat Rouse RN

## 2024-12-05 NOTE — DISCHARGE NOTE NURSING/CASE MANAGEMENT/SOCIAL WORK - FINANCIAL ASSISTANCE
[facility] provides services at a reduced cost to those who are determined to be eligible through [facility]’s financial assistance program. Information regarding [facility]’s financial assistance program can be found by going to [email or web address] or by calling [phone].

## 2024-12-05 NOTE — DISCHARGE NOTE NURSING/CASE MANAGEMENT/SOCIAL WORK - PATIENT PORTAL LINK FT
You can access the FollowMyHealth Patient Portal offered by [facility] by registering at the following website: [email or web address]. By joining [employer]’s FollowMyHealth portal, you will also be able to view your health information using other applications (apps) compatible with our system.

## 2024-12-05 NOTE — ADVANCED PRACTICE NURSE CONSULT - RECOMMEDATIONS
Pouch change: 	  -Gently remove pouch water moistened gauze   -Cleanse stoma site with water moistened gauze, gently pat dry  -Measure stoma, currently 1 3/8"  -Trace and cut current size on Selin 2 1/4” CeraPlus flat barrier   -No Sting Barrier to Peristomal skin   -Apply barrier to patient's skin  -Attach Selin 2 1/4” drainable lock and roll pouch onto barrier  Empty pouch when 1/3 to no more than 1/2 full of effluent/flatus. Change pouching system q 3 - 4 days and prn for leaking. Next pouch change- 12/8-12/9.

## 2024-12-05 NOTE — PROGRESS NOTE ADULT - ASSESSMENT
80 yo fem s/p lap RT colectomy/liver abscess drainage  -Awaiting for nurse ostomy teaching/ostomy supply  -d/c home on IV Abx

## 2024-12-07 LAB
CULTURE RESULTS: ABNORMAL
CULTURE RESULTS: ABNORMAL
ORGANISM # SPEC MICROSCOPIC CNT: ABNORMAL
ORGANISM # SPEC MICROSCOPIC CNT: SIGNIFICANT CHANGE UP
ORGANISM # SPEC MICROSCOPIC CNT: SIGNIFICANT CHANGE UP
SPECIMEN SOURCE: SIGNIFICANT CHANGE UP
SPECIMEN SOURCE: SIGNIFICANT CHANGE UP

## 2024-12-09 ENCOUNTER — OUTPATIENT (OUTPATIENT)
Dept: OUTPATIENT SERVICES | Facility: HOSPITAL | Age: 79
LOS: 1 days | Discharge: HOME | End: 2024-12-09
Payer: MEDICARE

## 2024-12-09 ENCOUNTER — RESULT REVIEW (OUTPATIENT)
Age: 79
End: 2024-12-09

## 2024-12-09 DIAGNOSIS — Z98.890 OTHER SPECIFIED POSTPROCEDURAL STATES: Chronic | ICD-10-CM

## 2024-12-09 DIAGNOSIS — Z90.722 ACQUIRED ABSENCE OF OVARIES, BILATERAL: Chronic | ICD-10-CM

## 2024-12-09 DIAGNOSIS — Z90.89 ACQUIRED ABSENCE OF OTHER ORGANS: Chronic | ICD-10-CM

## 2024-12-09 PROCEDURE — 76080 X-RAY EXAM OF FISTULA: CPT | Mod: 26

## 2024-12-09 PROCEDURE — 49424 ASSESS CYST CONTRAST INJECT: CPT

## 2024-12-09 PROCEDURE — 76080 X-RAY EXAM OF FISTULA: CPT

## 2024-12-10 DIAGNOSIS — K75.0 ABSCESS OF LIVER: ICD-10-CM

## 2024-12-11 ENCOUNTER — APPOINTMENT (OUTPATIENT)
Dept: ORTHOPEDIC SURGERY | Facility: CLINIC | Age: 79
End: 2024-12-11
Payer: MEDICARE

## 2024-12-11 DIAGNOSIS — S92.351A DISPLACED FRACTURE OF FIFTH METATARSAL BONE, RIGHT FOOT, INITIAL ENCOUNTER FOR CLOSED FRACTURE: ICD-10-CM

## 2024-12-11 PROCEDURE — 73630 X-RAY EXAM OF FOOT: CPT | Mod: RT

## 2024-12-11 PROCEDURE — 99213 OFFICE O/P EST LOW 20 MIN: CPT

## 2024-12-14 ENCOUNTER — INPATIENT (INPATIENT)
Facility: HOSPITAL | Age: 79
LOS: 8 days | Discharge: HOME HEALTH | DRG: 871 | End: 2024-12-23
Attending: INTERNAL MEDICINE | Admitting: INTERNAL MEDICINE
Payer: MEDICARE

## 2024-12-14 VITALS
WEIGHT: 133.16 LBS | RESPIRATION RATE: 16 BRPM | TEMPERATURE: 98 F | DIASTOLIC BLOOD PRESSURE: 61 MMHG | SYSTOLIC BLOOD PRESSURE: 70 MMHG | HEIGHT: 61 IN | HEART RATE: 114 BPM

## 2024-12-14 DIAGNOSIS — C78.01 SECONDARY MALIGNANT NEOPLASM OF RIGHT LUNG: ICD-10-CM

## 2024-12-14 DIAGNOSIS — E03.9 HYPOTHYROIDISM, UNSPECIFIED: ICD-10-CM

## 2024-12-14 DIAGNOSIS — E78.00 PURE HYPERCHOLESTEROLEMIA, UNSPECIFIED: ICD-10-CM

## 2024-12-14 DIAGNOSIS — R65.21 SEVERE SEPSIS WITH SEPTIC SHOCK: ICD-10-CM

## 2024-12-14 DIAGNOSIS — I82.612 ACUTE EMBOLISM AND THROMBOSIS OF SUPERFICIAL VEINS OF LEFT UPPER EXTREMITY: ICD-10-CM

## 2024-12-14 DIAGNOSIS — E27.40 UNSPECIFIED ADRENOCORTICAL INSUFFICIENCY: ICD-10-CM

## 2024-12-14 DIAGNOSIS — D84.9 IMMUNODEFICIENCY, UNSPECIFIED: ICD-10-CM

## 2024-12-14 DIAGNOSIS — Z90.722 ACQUIRED ABSENCE OF OVARIES, BILATERAL: Chronic | ICD-10-CM

## 2024-12-14 DIAGNOSIS — E86.0 DEHYDRATION: ICD-10-CM

## 2024-12-14 DIAGNOSIS — Z90.89 ACQUIRED ABSENCE OF OTHER ORGANS: Chronic | ICD-10-CM

## 2024-12-14 DIAGNOSIS — A41.9 SEPSIS, UNSPECIFIED ORGANISM: ICD-10-CM

## 2024-12-14 DIAGNOSIS — C78.7 SECONDARY MALIGNANT NEOPLASM OF LIVER AND INTRAHEPATIC BILE DUCT: ICD-10-CM

## 2024-12-14 DIAGNOSIS — Z98.890 OTHER SPECIFIED POSTPROCEDURAL STATES: Chronic | ICD-10-CM

## 2024-12-14 DIAGNOSIS — I48.0 PAROXYSMAL ATRIAL FIBRILLATION: ICD-10-CM

## 2024-12-14 DIAGNOSIS — E16.2 HYPOGLYCEMIA, UNSPECIFIED: ICD-10-CM

## 2024-12-14 DIAGNOSIS — I10 ESSENTIAL (PRIMARY) HYPERTENSION: ICD-10-CM

## 2024-12-14 DIAGNOSIS — Z79.890 HORMONE REPLACEMENT THERAPY: ICD-10-CM

## 2024-12-14 DIAGNOSIS — Z93.3 COLOSTOMY STATUS: ICD-10-CM

## 2024-12-14 DIAGNOSIS — Z88.8 ALLERGY STATUS TO OTHER DRUGS, MEDICAMENTS AND BIOLOGICAL SUBSTANCES: ICD-10-CM

## 2024-12-14 DIAGNOSIS — K75.0 ABSCESS OF LIVER: ICD-10-CM

## 2024-12-14 DIAGNOSIS — E87.1 HYPO-OSMOLALITY AND HYPONATREMIA: ICD-10-CM

## 2024-12-14 DIAGNOSIS — K52.9 NONINFECTIVE GASTROENTERITIS AND COLITIS, UNSPECIFIED: ICD-10-CM

## 2024-12-14 DIAGNOSIS — D68.69 OTHER THROMBOPHILIA: ICD-10-CM

## 2024-12-14 DIAGNOSIS — Z90.722 ACQUIRED ABSENCE OF OVARIES, BILATERAL: ICD-10-CM

## 2024-12-14 DIAGNOSIS — N17.9 ACUTE KIDNEY FAILURE, UNSPECIFIED: ICD-10-CM

## 2024-12-14 DIAGNOSIS — C64.9 MALIGNANT NEOPLASM OF UNSPECIFIED KIDNEY, EXCEPT RENAL PELVIS: ICD-10-CM

## 2024-12-14 DIAGNOSIS — E43 UNSPECIFIED SEVERE PROTEIN-CALORIE MALNUTRITION: ICD-10-CM

## 2024-12-14 LAB — GLUCOSE BLDC GLUCOMTR-MCNC: 68 MG/DL — LOW (ref 70–99)

## 2024-12-14 PROCEDURE — 71045 X-RAY EXAM CHEST 1 VIEW: CPT | Mod: 26

## 2024-12-14 PROCEDURE — 73060 X-RAY EXAM OF HUMERUS: CPT | Mod: 26,LT

## 2024-12-14 PROCEDURE — 99291 CRITICAL CARE FIRST HOUR: CPT

## 2024-12-14 RX ORDER — DEXTROSE MONOHYDRATE 25 G/50ML
15 INJECTION, SOLUTION INTRAVENOUS ONCE
Refills: 0 | Status: COMPLETED | OUTPATIENT
Start: 2024-12-14 | End: 2024-12-14

## 2024-12-14 RX ORDER — DEXTROSE MONOHYDRATE 25 G/50ML
50 INJECTION, SOLUTION INTRAVENOUS ONCE
Refills: 0 | Status: COMPLETED | OUTPATIENT
Start: 2024-12-14 | End: 2024-12-14

## 2024-12-14 RX ORDER — SODIUM CHLORIDE 9 MG/ML
1000 INJECTION, SOLUTION INTRAMUSCULAR; INTRAVENOUS; SUBCUTANEOUS ONCE
Refills: 0 | Status: COMPLETED | OUTPATIENT
Start: 2024-12-14 | End: 2024-12-14

## 2024-12-14 RX ADMIN — DEXTROSE MONOHYDRATE 50 MILLILITER(S): 25 INJECTION, SOLUTION INTRAVENOUS at 23:40

## 2024-12-14 RX ADMIN — SODIUM CHLORIDE 1000 MILLILITER(S): 9 INJECTION, SOLUTION INTRAMUSCULAR; INTRAVENOUS; SUBCUTANEOUS at 23:59

## 2024-12-14 RX ADMIN — DEXTROSE MONOHYDRATE 15 GRAM(S): 25 INJECTION, SOLUTION INTRAVENOUS at 23:00

## 2024-12-14 RX ADMIN — SODIUM CHLORIDE 1000 MILLILITER(S): 9 INJECTION, SOLUTION INTRAMUSCULAR; INTRAVENOUS; SUBCUTANEOUS at 23:40

## 2024-12-14 RX ADMIN — DEXTROSE MONOHYDRATE 15 GRAM(S): 25 INJECTION, SOLUTION INTRAVENOUS at 23:50

## 2024-12-14 NOTE — ED ADULT TRIAGE NOTE - CHIEF COMPLAINT QUOTE
BIBEMS c/o weakness and abdominal pain since this morning. Pt has ostomy bag placed 2 weeks ago with increasing output. Pt was visited by home care nurse today who thought she was dehydrated. Pt unable to ambulate. Denies CP, SOB. BG 45 in triage.

## 2024-12-15 DIAGNOSIS — E16.2 HYPOGLYCEMIA, UNSPECIFIED: ICD-10-CM

## 2024-12-15 LAB
ADD ON TEST-SPECIMEN IN LAB: SIGNIFICANT CHANGE UP
ALBUMIN SERPL ELPH-MCNC: 1.8 G/DL — LOW (ref 3.3–5)
ALP SERPL-CCNC: 431 U/L — HIGH (ref 40–120)
ALT FLD-CCNC: 35 U/L — SIGNIFICANT CHANGE UP (ref 12–78)
ANION GAP SERPL CALC-SCNC: 8 MMOL/L — SIGNIFICANT CHANGE UP (ref 5–17)
ANION GAP SERPL CALC-SCNC: 8 MMOL/L — SIGNIFICANT CHANGE UP (ref 5–17)
ANISOCYTOSIS BLD QL: SLIGHT — SIGNIFICANT CHANGE UP
APPEARANCE UR: CLEAR — SIGNIFICANT CHANGE UP
AST SERPL-CCNC: 40 U/L — HIGH (ref 15–37)
BASOPHILS # BLD AUTO: 0 K/UL — SIGNIFICANT CHANGE UP (ref 0–0.2)
BASOPHILS NFR BLD AUTO: 0 % — SIGNIFICANT CHANGE UP (ref 0–2)
BILIRUB SERPL-MCNC: 1.2 MG/DL — SIGNIFICANT CHANGE UP (ref 0.2–1.2)
BILIRUB UR-MCNC: NEGATIVE — SIGNIFICANT CHANGE UP
BUN SERPL-MCNC: 41 MG/DL — HIGH (ref 7–23)
BUN SERPL-MCNC: 70 MG/DL — HIGH (ref 7–23)
C DIFF GDH STL QL: NEGATIVE — SIGNIFICANT CHANGE UP
C DIFF GDH STL QL: SIGNIFICANT CHANGE UP
CALCIUM SERPL-MCNC: 8.7 MG/DL — SIGNIFICANT CHANGE UP (ref 8.5–10.1)
CALCIUM SERPL-MCNC: 9.1 MG/DL — SIGNIFICANT CHANGE UP (ref 8.5–10.1)
CHLORIDE SERPL-SCNC: 100 MMOL/L — SIGNIFICANT CHANGE UP (ref 96–108)
CHLORIDE SERPL-SCNC: 112 MMOL/L — HIGH (ref 96–108)
CO2 SERPL-SCNC: 17 MMOL/L — LOW (ref 22–31)
CO2 SERPL-SCNC: 22 MMOL/L — SIGNIFICANT CHANGE UP (ref 22–31)
COLOR SPEC: YELLOW — SIGNIFICANT CHANGE UP
CREAT SERPL-MCNC: 1.42 MG/DL — HIGH (ref 0.5–1.3)
CREAT SERPL-MCNC: 2.93 MG/DL — HIGH (ref 0.5–1.3)
DIFF PNL FLD: NEGATIVE — SIGNIFICANT CHANGE UP
EGFR: 16 ML/MIN/1.73M2 — LOW
EGFR: 38 ML/MIN/1.73M2 — LOW
EOSINOPHIL # BLD AUTO: 0.31 K/UL — SIGNIFICANT CHANGE UP (ref 0–0.5)
EOSINOPHIL NFR BLD AUTO: 2 % — SIGNIFICANT CHANGE UP (ref 0–6)
GI PCR PANEL: SIGNIFICANT CHANGE UP
GLUCOSE BLDC GLUCOMTR-MCNC: 117 MG/DL — HIGH (ref 70–99)
GLUCOSE BLDC GLUCOMTR-MCNC: 118 MG/DL — HIGH (ref 70–99)
GLUCOSE BLDC GLUCOMTR-MCNC: 135 MG/DL — HIGH (ref 70–99)
GLUCOSE BLDC GLUCOMTR-MCNC: 204 MG/DL — HIGH (ref 70–99)
GLUCOSE BLDC GLUCOMTR-MCNC: 55 MG/DL — LOW (ref 70–99)
GLUCOSE BLDC GLUCOMTR-MCNC: 77 MG/DL — SIGNIFICANT CHANGE UP (ref 70–99)
GLUCOSE SERPL-MCNC: 155 MG/DL — HIGH (ref 70–99)
GLUCOSE SERPL-MCNC: 261 MG/DL — HIGH (ref 70–99)
GLUCOSE UR QL: NEGATIVE MG/DL — SIGNIFICANT CHANGE UP
HCT VFR BLD CALC: 38.2 % — SIGNIFICANT CHANGE UP (ref 34.5–45)
HCT VFR BLD CALC: 39.2 % — SIGNIFICANT CHANGE UP (ref 34.5–45)
HGB BLD-MCNC: 12.1 G/DL — SIGNIFICANT CHANGE UP (ref 11.5–15.5)
HGB BLD-MCNC: 12.2 G/DL — SIGNIFICANT CHANGE UP (ref 11.5–15.5)
KETONES UR-MCNC: NEGATIVE MG/DL — SIGNIFICANT CHANGE UP
LACTATE SERPL-SCNC: 2.2 MMOL/L — HIGH (ref 0.7–2)
LACTATE SERPL-SCNC: 5.7 MMOL/L — CRITICAL HIGH (ref 0.7–2)
LEUKOCYTE ESTERASE UR-ACNC: NEGATIVE — SIGNIFICANT CHANGE UP
LYMPHOCYTES # BLD AUTO: 20 % — SIGNIFICANT CHANGE UP (ref 13–44)
LYMPHOCYTES # BLD AUTO: 3.09 K/UL — SIGNIFICANT CHANGE UP (ref 1–3.3)
MAGNESIUM SERPL-MCNC: 1.7 MG/DL — SIGNIFICANT CHANGE UP (ref 1.6–2.6)
MANUAL SMEAR VERIFICATION: SIGNIFICANT CHANGE UP
MCHC RBC-ENTMCNC: 26.5 PG — LOW (ref 27–34)
MCHC RBC-ENTMCNC: 26.7 PG — LOW (ref 27–34)
MCHC RBC-ENTMCNC: 30.9 G/DL — LOW (ref 32–36)
MCHC RBC-ENTMCNC: 31.9 G/DL — LOW (ref 32–36)
MCV RBC AUTO: 83 FL — SIGNIFICANT CHANGE UP (ref 80–100)
MCV RBC AUTO: 86.3 FL — SIGNIFICANT CHANGE UP (ref 80–100)
MONOCYTES # BLD AUTO: 0.77 K/UL — SIGNIFICANT CHANGE UP (ref 0–0.9)
MONOCYTES NFR BLD AUTO: 5 % — SIGNIFICANT CHANGE UP (ref 2–14)
NEUTROPHILS # BLD AUTO: 11.29 K/UL — HIGH (ref 1.8–7.4)
NEUTROPHILS NFR BLD AUTO: 73 % — SIGNIFICANT CHANGE UP (ref 43–77)
NITRITE UR-MCNC: NEGATIVE — SIGNIFICANT CHANGE UP
NRBC # BLD: 0 /100 WBCS — SIGNIFICANT CHANGE UP (ref 0–0)
NRBC # BLD: SIGNIFICANT CHANGE UP /100 WBCS (ref 0–0)
PH UR: 5 — SIGNIFICANT CHANGE UP (ref 5–8)
PLAT MORPH BLD: NORMAL — SIGNIFICANT CHANGE UP
PLATELET # BLD AUTO: 347 K/UL — SIGNIFICANT CHANGE UP (ref 150–400)
PLATELET # BLD AUTO: 371 K/UL — SIGNIFICANT CHANGE UP (ref 150–400)
POIKILOCYTOSIS BLD QL AUTO: SLIGHT — SIGNIFICANT CHANGE UP
POTASSIUM SERPL-MCNC: 4.4 MMOL/L — SIGNIFICANT CHANGE UP (ref 3.5–5.3)
POTASSIUM SERPL-MCNC: 4.7 MMOL/L — SIGNIFICANT CHANGE UP (ref 3.5–5.3)
POTASSIUM SERPL-SCNC: 4.4 MMOL/L — SIGNIFICANT CHANGE UP (ref 3.5–5.3)
POTASSIUM SERPL-SCNC: 4.7 MMOL/L — SIGNIFICANT CHANGE UP (ref 3.5–5.3)
PROT SERPL-MCNC: 4.3 GM/DL — LOW (ref 6–8.3)
PROT UR-MCNC: NEGATIVE MG/DL — SIGNIFICANT CHANGE UP
RBC # BLD: 4.54 M/UL — SIGNIFICANT CHANGE UP (ref 3.8–5.2)
RBC # BLD: 4.6 M/UL — SIGNIFICANT CHANGE UP (ref 3.8–5.2)
RBC # FLD: 17.7 % — HIGH (ref 10.3–14.5)
RBC # FLD: 18 % — HIGH (ref 10.3–14.5)
RBC BLD AUTO: ABNORMAL
SODIUM SERPL-SCNC: 130 MMOL/L — LOW (ref 135–145)
SODIUM SERPL-SCNC: 137 MMOL/L — SIGNIFICANT CHANGE UP (ref 135–145)
SP GR SPEC: 1.02 — SIGNIFICANT CHANGE UP (ref 1–1.03)
TROPONIN I, HIGH SENSITIVITY RESULT: 8.28 NG/L — SIGNIFICANT CHANGE UP
TSH SERPL-MCNC: 0.76 UU/ML — SIGNIFICANT CHANGE UP (ref 0.34–4.82)
UROBILINOGEN FLD QL: 0.2 MG/DL — SIGNIFICANT CHANGE UP (ref 0.2–1)
WBC # BLD: 15.46 K/UL — HIGH (ref 3.8–10.5)
WBC # BLD: 22.76 K/UL — HIGH (ref 3.8–10.5)
WBC # FLD AUTO: 15.46 K/UL — HIGH (ref 3.8–10.5)
WBC # FLD AUTO: 22.76 K/UL — HIGH (ref 3.8–10.5)

## 2024-12-15 PROCEDURE — 76937 US GUIDE VASCULAR ACCESS: CPT | Mod: 26

## 2024-12-15 PROCEDURE — 99222 1ST HOSP IP/OBS MODERATE 55: CPT

## 2024-12-15 PROCEDURE — 74177 CT ABD & PELVIS W/CONTRAST: CPT | Mod: 26,MC

## 2024-12-15 PROCEDURE — 87507 IADNA-DNA/RNA PROBE TQ 12-25: CPT

## 2024-12-15 PROCEDURE — 83735 ASSAY OF MAGNESIUM: CPT

## 2024-12-15 PROCEDURE — 85027 COMPLETE CBC AUTOMATED: CPT

## 2024-12-15 PROCEDURE — 93010 ELECTROCARDIOGRAM REPORT: CPT

## 2024-12-15 PROCEDURE — 84443 ASSAY THYROID STIM HORMONE: CPT

## 2024-12-15 PROCEDURE — 87640 STAPH A DNA AMP PROBE: CPT

## 2024-12-15 PROCEDURE — 36620 INSERTION CATHETER ARTERY: CPT

## 2024-12-15 PROCEDURE — 80076 HEPATIC FUNCTION PANEL: CPT

## 2024-12-15 PROCEDURE — 80053 COMPREHEN METABOLIC PANEL: CPT

## 2024-12-15 PROCEDURE — 36415 COLL VENOUS BLD VENIPUNCTURE: CPT

## 2024-12-15 PROCEDURE — 84100 ASSAY OF PHOSPHORUS: CPT

## 2024-12-15 PROCEDURE — 80048 BASIC METABOLIC PNL TOTAL CA: CPT

## 2024-12-15 PROCEDURE — C1751: CPT

## 2024-12-15 PROCEDURE — 93971 EXTREMITY STUDY: CPT | Mod: LT

## 2024-12-15 PROCEDURE — 83605 ASSAY OF LACTIC ACID: CPT

## 2024-12-15 PROCEDURE — 82962 GLUCOSE BLOOD TEST: CPT

## 2024-12-15 PROCEDURE — 93306 TTE W/DOPPLER COMPLETE: CPT | Mod: 26

## 2024-12-15 PROCEDURE — 87449 NOS EACH ORGANISM AG IA: CPT

## 2024-12-15 PROCEDURE — 87641 MR-STAPH DNA AMP PROBE: CPT

## 2024-12-15 PROCEDURE — 97116 GAIT TRAINING THERAPY: CPT | Mod: GP

## 2024-12-15 PROCEDURE — 76000 FLUOROSCOPY <1 HR PHYS/QHP: CPT

## 2024-12-15 PROCEDURE — C1769: CPT

## 2024-12-15 PROCEDURE — 99291 CRITICAL CARE FIRST HOUR: CPT | Mod: 25

## 2024-12-15 PROCEDURE — 87324 CLOSTRIDIUM AG IA: CPT

## 2024-12-15 PROCEDURE — C1889: CPT

## 2024-12-15 PROCEDURE — 97530 THERAPEUTIC ACTIVITIES: CPT | Mod: GP

## 2024-12-15 RX ORDER — METRONIDAZOLE 250 MG/1
500 TABLET ORAL ONCE
Refills: 0 | Status: COMPLETED | OUTPATIENT
Start: 2024-12-15 | End: 2024-12-15

## 2024-12-15 RX ORDER — CHLORHEXIDINE GLUCONATE 1.2 MG/ML
1 RINSE ORAL
Refills: 0 | Status: DISCONTINUED | OUTPATIENT
Start: 2024-12-15 | End: 2024-12-23

## 2024-12-15 RX ORDER — ERTAPENEM SODIUM 1 G/1
500 INJECTION, POWDER, LYOPHILIZED, FOR SOLUTION INTRAMUSCULAR; INTRAVENOUS EVERY 24 HOURS
Refills: 0 | Status: DISCONTINUED | OUTPATIENT
Start: 2024-12-16 | End: 2024-12-17

## 2024-12-15 RX ORDER — VANCOMYCIN HYDROCHLORIDE 5 G/100ML
500 INJECTION, POWDER, LYOPHILIZED, FOR SOLUTION INTRAVENOUS ONCE
Refills: 0 | Status: COMPLETED | OUTPATIENT
Start: 2024-12-15 | End: 2024-12-15

## 2024-12-15 RX ORDER — NOREPINEPHRINE BITARTRATE 1 MG/ML
0.05 INJECTION INTRAVENOUS
Qty: 8 | Refills: 0 | Status: DISCONTINUED | OUTPATIENT
Start: 2024-12-15 | End: 2024-12-17

## 2024-12-15 RX ORDER — MINOCYCLINE HYDROCHLORIDE 100 MG/1
200 CAPSULE ORAL EVERY 12 HOURS
Refills: 0 | Status: DISCONTINUED | OUTPATIENT
Start: 2024-12-15 | End: 2024-12-23

## 2024-12-15 RX ORDER — APIXABAN 5 MG/1
2.5 TABLET, FILM COATED ORAL
Refills: 0 | Status: DISCONTINUED | OUTPATIENT
Start: 2024-12-15 | End: 2024-12-17

## 2024-12-15 RX ORDER — VANCOMYCIN HYDROCHLORIDE 5 G/100ML
1000 INJECTION, POWDER, LYOPHILIZED, FOR SOLUTION INTRAVENOUS ONCE
Refills: 0 | Status: COMPLETED | OUTPATIENT
Start: 2024-12-15 | End: 2024-12-15

## 2024-12-15 RX ORDER — SODIUM CHLORIDE 9 MG/ML
1000 INJECTION, SOLUTION INTRAVENOUS ONCE
Refills: 0 | Status: COMPLETED | OUTPATIENT
Start: 2024-12-15 | End: 2024-12-15

## 2024-12-15 RX ORDER — PANTOPRAZOLE 40 MG/1
40 TABLET, DELAYED RELEASE ORAL DAILY
Refills: 0 | Status: DISCONTINUED | OUTPATIENT
Start: 2024-12-15 | End: 2024-12-17

## 2024-12-15 RX ORDER — SODIUM BICARBONATE 84 MG/ML
0.31 INJECTION, SOLUTION INTRAVENOUS
Qty: 150 | Refills: 0 | Status: DISCONTINUED | OUTPATIENT
Start: 2024-12-15 | End: 2024-12-16

## 2024-12-15 RX ORDER — SODIUM CHLORIDE 9 MG/ML
1000 INJECTION, SOLUTION INTRAMUSCULAR; INTRAVENOUS; SUBCUTANEOUS ONCE
Refills: 0 | Status: DISCONTINUED | OUTPATIENT
Start: 2024-12-15 | End: 2024-12-15

## 2024-12-15 RX ORDER — ONDANSETRON 4 MG/1
4 TABLET ORAL ONCE
Refills: 0 | Status: COMPLETED | OUTPATIENT
Start: 2024-12-15 | End: 2024-12-15

## 2024-12-15 RX ORDER — MINOCYCLINE HYDROCHLORIDE 100 MG/1
200 CAPSULE ORAL ONCE
Refills: 0 | Status: COMPLETED | OUTPATIENT
Start: 2024-12-15 | End: 2024-12-15

## 2024-12-15 RX ORDER — MINOCYCLINE HYDROCHLORIDE 100 MG/1
CAPSULE ORAL
Refills: 0 | Status: DISCONTINUED | OUTPATIENT
Start: 2024-12-15 | End: 2024-12-23

## 2024-12-15 RX ORDER — HYDROCORTISONE 100 MG/60ML
100 ENEMA RECTAL EVERY 8 HOURS
Refills: 0 | Status: DISCONTINUED | OUTPATIENT
Start: 2024-12-15 | End: 2024-12-17

## 2024-12-15 RX ORDER — SODIUM CHLORIDE 9 MG/ML
1000 INJECTION, SOLUTION INTRAMUSCULAR; INTRAVENOUS; SUBCUTANEOUS ONCE
Refills: 0 | Status: COMPLETED | OUTPATIENT
Start: 2024-12-15 | End: 2024-12-15

## 2024-12-15 RX ORDER — SODIUM CHLORIDE 9 MG/ML
1000 INJECTION, SOLUTION INTRAMUSCULAR; INTRAVENOUS; SUBCUTANEOUS
Refills: 0 | Status: DISCONTINUED | OUTPATIENT
Start: 2024-12-15 | End: 2024-12-15

## 2024-12-15 RX ORDER — SODIUM CHLORIDE 9 MG/ML
1000 INJECTION, SOLUTION INTRAVENOUS
Refills: 0 | Status: DISCONTINUED | OUTPATIENT
Start: 2024-12-15 | End: 2024-12-15

## 2024-12-15 RX ORDER — LEVOTHYROXINE SODIUM 175 UG/1
75 TABLET ORAL AT BEDTIME
Refills: 0 | Status: DISCONTINUED | OUTPATIENT
Start: 2024-12-15 | End: 2024-12-23

## 2024-12-15 RX ORDER — AMIODARONE HYDROCHLORIDE 200 MG/1
100 TABLET ORAL DAILY
Refills: 0 | Status: DISCONTINUED | OUTPATIENT
Start: 2024-12-15 | End: 2024-12-23

## 2024-12-15 RX ORDER — ERTAPENEM SODIUM 1 G/1
1000 INJECTION, POWDER, LYOPHILIZED, FOR SOLUTION INTRAMUSCULAR; INTRAVENOUS ONCE
Refills: 0 | Status: COMPLETED | OUTPATIENT
Start: 2024-12-15 | End: 2024-12-15

## 2024-12-15 RX ORDER — FAMOTIDINE 20 MG/1
20 TABLET, FILM COATED ORAL ONCE
Refills: 0 | Status: COMPLETED | OUTPATIENT
Start: 2024-12-15 | End: 2024-12-15

## 2024-12-15 RX ADMIN — ERTAPENEM SODIUM 120 MILLIGRAM(S): 1 INJECTION, POWDER, LYOPHILIZED, FOR SOLUTION INTRAMUSCULAR; INTRAVENOUS at 04:38

## 2024-12-15 RX ADMIN — SODIUM CHLORIDE 1000 MILLILITER(S): 9 INJECTION, SOLUTION INTRAVENOUS at 08:28

## 2024-12-15 RX ADMIN — METRONIDAZOLE 100 MILLIGRAM(S): 250 TABLET ORAL at 09:27

## 2024-12-15 RX ADMIN — SODIUM BICARBONATE 125 MEQ/KG/HR: 84 INJECTION, SOLUTION INTRAVENOUS at 17:02

## 2024-12-15 RX ADMIN — PANTOPRAZOLE 40 MILLIGRAM(S): 40 TABLET, DELAYED RELEASE ORAL at 09:28

## 2024-12-15 RX ADMIN — VANCOMYCIN HYDROCHLORIDE 250 MILLIGRAM(S): 5 INJECTION, POWDER, LYOPHILIZED, FOR SOLUTION INTRAVENOUS at 09:27

## 2024-12-15 RX ADMIN — HYDROCORTISONE 100 MILLIGRAM(S): 100 ENEMA RECTAL at 04:35

## 2024-12-15 RX ADMIN — SODIUM CHLORIDE 1000 MILLILITER(S): 9 INJECTION, SOLUTION INTRAMUSCULAR; INTRAVENOUS; SUBCUTANEOUS at 01:00

## 2024-12-15 RX ADMIN — HYDROCORTISONE 100 MILLIGRAM(S): 100 ENEMA RECTAL at 21:43

## 2024-12-15 RX ADMIN — NOREPINEPHRINE BITARTRATE 5.66 MICROGRAM(S)/KG/MIN: 1 INJECTION INTRAVENOUS at 21:43

## 2024-12-15 RX ADMIN — LEVOTHYROXINE SODIUM 75 MICROGRAM(S): 175 TABLET ORAL at 21:43

## 2024-12-15 RX ADMIN — NOREPINEPHRINE BITARTRATE 5.66 MICROGRAM(S)/KG/MIN: 1 INJECTION INTRAVENOUS at 05:15

## 2024-12-15 RX ADMIN — SODIUM CHLORIDE 150 MILLILITER(S): 9 INJECTION, SOLUTION INTRAVENOUS at 08:28

## 2024-12-15 RX ADMIN — ONDANSETRON 4 MILLIGRAM(S): 4 TABLET ORAL at 02:16

## 2024-12-15 RX ADMIN — MINOCYCLINE HYDROCHLORIDE 100 MILLIGRAM(S): 100 CAPSULE ORAL at 08:29

## 2024-12-15 RX ADMIN — SODIUM CHLORIDE 150 MILLILITER(S): 9 INJECTION, SOLUTION INTRAVENOUS at 13:53

## 2024-12-15 RX ADMIN — APIXABAN 2.5 MILLIGRAM(S): 5 TABLET, FILM COATED ORAL at 21:43

## 2024-12-15 RX ADMIN — APIXABAN 2.5 MILLIGRAM(S): 5 TABLET, FILM COATED ORAL at 09:33

## 2024-12-15 RX ADMIN — MINOCYCLINE HYDROCHLORIDE 100 MILLIGRAM(S): 100 CAPSULE ORAL at 17:45

## 2024-12-15 RX ADMIN — FAMOTIDINE 20 MILLIGRAM(S): 20 TABLET, FILM COATED ORAL at 00:52

## 2024-12-15 RX ADMIN — HYDROCORTISONE 100 MILLIGRAM(S): 100 ENEMA RECTAL at 17:44

## 2024-12-15 NOTE — ED PROVIDER NOTE - OBJECTIVE STATEMENT
Pt. is a 80 yo F with hx of renal cell cancer; hx of metastatic liver lesions and abscesses ; hx of drainage of  liver abscesses s/p removal of drain from liver. hx of infectious colitis now 2 wks post right yasmani colectomy with colostomy.  Patient is getting IV antibiotics via left sided UE PICC line.  She states today she felt weak.  Visiting nurse took vitals at home which were normal but family states she has not been eating all day, last meal was 1 day ago.  Patient complains of increased reflux feeling, nausea, vomiting. +liquid stool output from colostomy. No gross blood.  No fevers. Upon arrival, patient is hypoglycemia with glucose in 40s. Surg: Nick

## 2024-12-15 NOTE — ED PROVIDER NOTE - NS ED ATTENDING STATEMENT MOD
Chief Complaint   Patient presents with   • Otitis Media   • Follow-up     Urgent Care f/u        Subjective     Sadie Mederos is a 29 y.o. female being seen for a follow up appointment today regarding ear infection. She was in the office 10 days ago, complaining of ear fullness. She was referred to allergy due to recurrent ear effusions. She started having severe pain in her right ear on Friday with cough and sinus congestion. She went to urgent care, and she was given Amoxil 875 mg Twice daily.         History of Present Illness     No Known Allergies      Current Outpatient Prescriptions:   •  ALPRAZolam (XANAX) 0.25 MG tablet, Take 1 tablet by mouth At Night As Needed for Anxiety., Disp: 30 tablet, Rfl: 0  •  amoxicillin (AMOXIL) 875 MG tablet, Take 1 tablet by mouth 2 (Two) Times a Day for 10 days., Disp: 20 tablet, Rfl: 0  •  BYSTOLIC 20 MG tablet, TAKE 1 TABLET BY MOUTH DAILY., Disp: 90 tablet, Rfl: 1  •  cetirizine (zyrTEC) 10 MG tablet, Take 10 mg by mouth Daily., Disp: , Rfl:   •  fluticasone (FLONASE) 50 MCG/ACT nasal spray, 2 sprays into each nostril Daily., Disp: , Rfl:   •  norethindrone (MICRONOR) 0.35 MG tablet, TAKE 1 TABLET BY MOUTH DAILY, Disp: , Rfl: 3  •  omeprazole (PriLOSEC) 40 MG capsule, TAKE 1 CAPSULE BY MOUTH ONCE A DAY, Disp: , Rfl: 11    The following portions of the patient's history were reviewed and updated as appropriate: allergies, current medications, past family history, past medical history, past social history, past surgical history and problem list.    Review of Systems   Constitutional: Negative for fever.   HENT: Positive for congestion, postnasal drip, rhinorrhea, sinus pain, sinus pressure, sneezing and sore throat.    Eyes: Negative.    Respiratory: Negative.    Cardiovascular: Negative for chest pain, palpitations and leg swelling.   Gastrointestinal: Negative.    Musculoskeletal: Negative.    Allergic/Immunologic: Positive for environmental allergies.   Neurological:  Negative.    Hematological: Negative.    Psychiatric/Behavioral: Negative.        Assessment     Physical Exam   Constitutional: She is oriented to person, place, and time. She appears well-developed and well-nourished.   HENT:   Right Ear: Tympanic membrane is injected. A middle ear effusion is present.   Left Ear: A middle ear effusion is present.   Nose: Nose normal.   Mouth/Throat: No oropharyngeal exudate.   Neck: Neck supple. No thyromegaly present.   Cardiovascular: Normal rate, regular rhythm and normal heart sounds.    No murmur heard.  Pulmonary/Chest: Breath sounds normal. No respiratory distress. She has no wheezes.   Musculoskeletal: She exhibits no edema.   Lymphadenopathy:     She has cervical adenopathy.   Neurological: She is alert and oriented to person, place, and time.   Psychiatric: She has a normal mood and affect. Her behavior is normal.   Vitals reviewed.      Plan     Her fasting labs were reviewed with the patient from last week.     Sadie was seen today for otitis media and follow-up.    Diagnoses and all orders for this visit:    Right acute otitis media  -     azithromycin (ZITHROMAX Z-ISAURO) 250 MG tablet; Take 2 tablets the first day, then 1 tablet daily for 4 days.       Diagnosis Plan   1. Right acute otitis media  azithromycin (ZITHROMAX Z-ISAURO) 250 MG tablet   2. Environmental allergies  montelukast (SINGULAIR) 10 MG tablet     Continue the Zyrtec and Flonase.    I have personally provided the amount of critical care time documented below excluding time spent on separate procedures.

## 2024-12-15 NOTE — PROGRESS NOTE ADULT - ASSESSMENT
A/P: 74 female presenting with hypotension from dehydration from a high output illostomy  RCC  Hepatic Abscess  PAF    Plan:  ICU    PULM: Known pulmonary mets    Cardio: Hypotension from Dehydration, continue to wean levo    Renal: Replace lytes as needed    GI: PO Diet,  D/W Dr. Turner and she will have the stent removed prior to D/C    ID: Continue Invanz and Aime as she was on    DOAC DVT Prophylaxis

## 2024-12-15 NOTE — H&P ADULT - HISTORY OF PRESENT ILLNESS
Ms. 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 Patient had drain placed by IR into R lateral hepatic collection and it was D/C'ed this Monday 12/09/2024. Patient was discharged on receiving Minocycline 200mg PO q12h for S. maltophilia in liver abscess and Ertapenem 1g daily for Enterococcus faecium. She was to continue these abx until 12/25/2024      She presents to ED this time because she was feeling very weak. Patient admits to poor PO intake. She has been having high output from her ostomy, son at bedside says they changed her bag x 3 today. In ED she received 4L IVF but remains hypotensive. Her labs are significant for a WBC of 15K, new renal dysfunction BUN 70 & SCr 2.93, and elevated transaminases. Patient had CT abdomen/pelvis IV contrast  Ms. 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 Patient had drain placed by IR into R lateral hepatic collection and it was D/C'ed this Monday 12/09/2024. Patient was discharged on receiving Minocycline 200mg PO q12h for S. maltophilia in liver abscess and Ertapenem 1g daily for Enterococcus faecium. She was to continue these abx until 12/25/2024      She presents to ED this time because she was feeling very weak. Patient admits to poor PO intake. She has been having high output from her ostomy, son at bedside says they changed her bag x 3 today. In ED she received 4L IVF but remains hypotensive. Her labs are significant for a WBC of 15K, lactate 5.7, new renal dysfunction BUN 70 & SCr 2.93, and elevated transaminases. Patient was also hypoglycemic to 40s requiring treatment w/dextrose.  Patient had CT abdomen/pelvis IV contrast reveals "inflammatory thickening of the wall of multiple contiguous loops of ileum c/w enteritis which could be 2/2 infection, IBD, or ischemia. There are numerous complex/heterogenous hepatic lesions w/are most compatible w/metastases, hepatic abscesses can have similar appearance and are not excluded". Patient was ordered Vancomycin IV, Ertapenem for abx by ED. They also ordered blood, urine cultures and Cdiff. I ordered patient PO vancomycin, stress dose steroids, and Levophed as well as additional IVF boluses

## 2024-12-15 NOTE — H&P ADULT - ASSESSMENT
Assessment:  Ms. 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 Patient had drain placed by IR into R lateral hepatic collection and it was D/C'ed this Monday 12/09/2024. Patient was discharged on receiving Minocycline 200mg PO q12h for S. maltophilia in liver abscess and Ertapenem 1g daily for Enterococcus faecium. She was to continue these abx until 12/25/2024      She presents to ED this time because she was feeling very weak. Patient admits to poor PO intake. She has been having high output from her ostomy, son at bedside says they changed her bag x 3 today. In ED she received 4L IVF but remains hypotensive. Her labs are significant for a WBC of 15K, lactate 5.7, new renal dysfunction BUN 70 & SCr 2.93, and elevated transaminases. Patient was also hypoglycemic to 40s requiring treatment w/dextrose.  Patient had CT abdomen/pelvis IV contrast reveals "inflammatory thickening of the wall of multiple contiguous loops of ileum c/w enteritis which could be 2/2 infection, IBD, or ischemia. There are numerous complex/heterogenous hepatic lesions w/are most compatible w/metastases, hepatic abscesses can have similar appearance and are not excluded". Patient was ordered Vancomycin IV, Ertapenem for abx by ED. They also ordered blood, urine cultures and Cdiff. I ordered patient PO vancomycin, stress dose steroids, and Levophed as well as additional IVF boluses     Problem List:  1. Shock, sepsis & hypovolemia 2/2 high output from ostomy   2. Concern for infectious colitis vs  3. Hepatic abscess   4. PRANAY  5. Lactic acidosis   6. Hypoglycemia   7. Dehydration   8. Hx of adrenal insufficiency on steroids at home   9. Hx of afib, recently resumed Apixaban   10. Hx of hypothyroidism     Plan:  Neuro:  Monitor mental status, avoid deliriogenic medications. Pain & fever control as needed    CV:  Shock multifactorial, sepsis and hypovolemia  Aggressive IVF resuscitation   Start levophed, actively titrating to maintain MAP>65mmHg   Hold home Metoprolol   Hold home Benicar in setting of shock and renal dysfunction     Pulm:  Respiratory status stable     Renal:  Lactic acidosis and PRANAY likely from hypovolemia and shock  IVF and vasopressor support  Trend lactic acid   Strict I/Os, goal UOP >0.5cc/kg/hr. Trend renal function and electrolytes, replete as needed. Avoid nephrotoxic agents    GI:  NPO except meds  PPI  Obtain surgery consult   ED spoke w/Dr. Martin who states patient is severely dehydrated and no surgical abnormality at this time     ID:   Patient w/history of liver abscess s/p drain removal 12/09/2024, most recently was growing S. maltophilia and Enterococcus faecium   Continue Minocycline and Ertapenem   1 dose vancomycin IV by ED  Ordered PO vancomycin and flagyl to cover for Cdiff given output   F/u blood & urine cultures  F/u Cdiff results   Possibly may need drain for liver abscesses once again     Heme:  Patient is on Eliquis, states she has resumed  Will reorder     Endo:  Patient has been hypoglycemic requiring dextrose, can be 2/2 adrenal insufficiency  Started patient on stress dose steroids of Solu-Cortef 100mg TID given she takes PO at home   Re-order synthroid, IV, obtain TSH, T4, T4   Check glucose q4h     Discussed case w/eICU attending, Dr. Meredith     CRITICAL CARE TIME SPENT: 55 minutes assessing presenting problems of acute illness, which pose high probability of life threatening deterioration or end organ damage/dysfunction, as well as medical decision making including initiating plan of care, reviewing data, reviewing radiologic exams, discussing with multidisciplinary team,  discussing goals of care with patient/family, and writing this note.  Non-inclusive of procedures performed  Date of entry of this note is equal to the date of services rendered

## 2024-12-15 NOTE — CONSULT NOTE ADULT - ASSESSMENT
79 yearr old woman with metastatic RCC to liver, afib on Apixaban, adrenal insufficiceny, with prior cholangitis s/p ERCP with development of liver abscesses, requiring IR drains that were removed but recent admission for septic shock from ischemic colon s/p R hemicolectomy and ileostomy, and replacement of IR drains, admitted with diarrhea/dehydration.     Patient with great improvement with just supportive care/rescucitation.   Not on pressors.   Ostomy output has decreased.   F/u stool studies (GI pcr, c diff)  Diet ok  F/u surgery recs regarding ostomy output.   Patient does have biliary stent and I've been trying to plan as outpatient but she keeps getting ill.   Once out of ICU and stable may do procedure this week.    79 yearr old woman with metastatic RCC to liver, afib on Apixaban, adrenal insufficiceny, with prior cholangitis s/p ERCP with development of liver abscesses, requiring IR drains that were removed but recent admission for septic shock from ischemic colon s/p R hemicolectomy and ileostomy, and replacement of IR drains, admitted with diarrhea/dehydration.     Patient with great improvement with just supportive care/rescucitation.   Not on pressors.   Ostomy output has decreased.   F/u stool studies (GI pcr)  Diet ok  F/u surgery recs regarding ostomy output.   Patient does have biliary stent and I've been trying to plan as outpatient but she keeps getting ill.   Once out of ICU and stable may do procedure this week.

## 2024-12-15 NOTE — H&P ADULT - NSHPPHYSICALEXAM_GEN_ALL_CORE
Physical Examination:  General: No acute distress  HEENT: Pupils equal, reactive to light.  Symmetric  PULM: Clear to auscultation bilaterally, no significant sputum production  CVS: Regular rate and rhythm  ABD: Soft, nondistended, nontender to light palpation, slightly tender to deep palpation of mid upper abdomen   EXT: No edema, nontender  SKIN: Warm and well perfused  NEURO: A&Ox3

## 2024-12-15 NOTE — CONSULT NOTE ADULT - ASSESSMENT
Ms. Quiros is a 78 YO F w/pmhx of RCC w/mets to liver s/p IR embolization and biliary stent, HTN, HLD, adrenal insufficiency on hydrocortisone,  Afib on Apixaban, hypothyroidism, recent hospitalization 09/29/2024-10/16/2024 for septic shock 2/2 hepatic abscess w/Klebsiella, Enterococcus faecalis, Enterococcus faecium, and Clostridium perfringens, patient had drain placed at that time, patient then presents to ED 11/30/2024 w/abdominal pain, fevers, shock and was found to have pneumatosis of cecum and ascending colon w/multiple liver abscesses w/increasing pulmonary mets s/p laparoscopic R hemicolectomy w/ileostomy on 11/30/2024. Patient had drain placed by IR into R lateral hepatic collection and it was D/C'ed this Monday 12/09/2024. Patient was discharged on receiving Minocycline 200mg PO q12h for S. maltophilia in liver abscess and Ertapenem 1g daily for Enterococcus faecium. She was to continue these abx until 12/25/2024 She presents to ED this time because she was feeling very weak. She has been having high output from her ostomy, son at bedside says they changed her bag x 3. In ED she received 4L IVF but remains hypotensive. Her labs are significant for a WBC of 15K, lactate 5.7, new renal dysfunction BUN 70 & SCr 2.93, and elevated transaminases. Patient had CT abdomen/pelvis IV contrast reveals "inflammatory thickening of the wall of multiple contiguous loops of ileum c/w enteritis which could be 2/2 infection, IBD, or ischemia. There are numerous complex/heterogenous hepatic lesions w/are most compatible w/metastases, hepatic abscesses can have similar appearance and are not excluded". Patient was ordered Vancomycin IV, Ertapenem for abx by ED.Pt given PO vancomycin, stress dose steroids, and Levophed as well as additional IVF boluses.     Sepsis. Enteritis. Liver abscess. Metastatic Renal Cell Cancer. Leukocytosis. PRANAY. Immunocompromised host   - gi eval noted  - not on pressors  - gi pcr neg f/u c diff pcr   - f/u blood cx   - on invanz lower dose to 500mg daily/minocycline 200mg BID completing long term abx course through 12/25   - drain in place  - fu cbc  - continue with antibiotic coverage  - monitor temps  - tolerating abx well so far; no side effects noted  - reason for abx use and side effects reviewed with patient  - supportive care    IV to Po abx token not applicable to case

## 2024-12-15 NOTE — ED PROVIDER NOTE - DATE/TIME 2
Addended by: SHYANNE ROSALES on: 1/30/2020 01:47 AM     Modules accepted: Orders    
15-Dec-2024 03:34

## 2024-12-15 NOTE — ED PROVIDER NOTE - PROGRESS NOTE DETAILS
Jth L NSS ordered as patient is persistently hypotensive.  Enteritis on CT, no perf or abscess of bowel.  WBC elevated and lactate pending as it was drawn but needed to be redrawn as patient is a difficult blood draw and there was insufficient amount of blood.  Patient has hx of septic shock from abscesses with multiple organisms and ICU consult initiated. SOOG: Discussed case with Dr. Romero who states patient is severely dehydrated.  No surgical abnormality at this time.  Will follow in the hospital.  Understands she will need IV fluids, antibiotics and ICU consult for hypotension.  Agrees with ED management.

## 2024-12-15 NOTE — ED PROVIDER NOTE - CLINICAL SUMMARY MEDICAL DECISION MAKING FREE TEXT BOX
80 yo F with increased ostomy output, decreased PO intake, severe dehydration, arriving with hypoglycemia.  Hx of septic shock due to liver abscesses; recent surgery/right hemicolectomy.      EKG, labs, CXR, CT abd/pelv.     Treat hypoglycemia, support blood pressure with IV fluids.  Contact general surgery Dr. Romero to update on patients return to hospital and labs. Spoke to Dr. Romero who agrees with admission and aggressive hydration.    ICU consulted and recommend adding C Diff testing as ostomy output is rapid. ICU evaluated and due to hypotension despite multiple liters of fluid, will admit to ICU.  Hydrocortisone also ordered by ICU PA.

## 2024-12-15 NOTE — PROGRESS NOTE ADULT - SUBJECTIVE AND OBJECTIVE BOX
78 yo fem metastatic renal cell carcinoma to liver, lungs, liver abscesses s/p Lpa RT colectomy 3 weeks ago due to ischemic ascending colon, admitted with dehydration    Awake, alert  dry oral mucosa  Abd soft, functioning ostomy          12-15-24 @ 09:10    Vital Signs Last 24 Hrs  T(C): 37.3 (15 Dec 2024 06:16), Max: 37.3 (15 Dec 2024 06:16)  T(F): 99.1 (15 Dec 2024 06:16), Max: 99.1 (15 Dec 2024 06:16)  HR: 80 (15 Dec 2024 08:00) (74 - 114)  BP: 86/67 (15 Dec 2024 08:00) (66/53 - 129/79)  BP(mean): 75 (15 Dec 2024 08:00) (59 - 94)  RR: 18 (15 Dec 2024 08:00) (14 - 22)  SpO2: 95% (15 Dec 2024 06:16) (95% - 100%)    Parameters below as of 15 Dec 2024 08:00  Patient On (Oxygen Delivery Method): room air                              12.1   15.46 )-----------( 347      ( 14 Dec 2024 23:54 )             39.2       12-14    130[L]  |  100  |  70[H]  ----------------------------<  261[H]  4.7   |  22  |  2.93[H]    Ca    9.1      14 Dec 2024 23:54  Mg     1.7     12-14    TPro  4.3[L]  /  Alb  1.8[L]  /  TBili  1.2  /  DBili  x   /  AST  40[H]  /  ALT  35  /  AlkPhos  431[H]  12-14      LIVER FUNCTIONS - ( 14 Dec 2024 23:54 )  Alb: 1.8 g/dL / Pro: 4.3 gm/dL / ALK PHOS: 431 U/L / ALT: 35 U/L / AST: 40 U/L / GGT: x             MEDICATIONS  (STANDING):  aMIOdarone    Tablet 100 milliGRAM(s) Oral daily  apixaban 2.5 milliGRAM(s) Oral two times a day  chlorhexidine 2% Cloths 1 Application(s) Topical <User Schedule>  hydrocortisone sodium succinate Injectable 100 milliGRAM(s) IV Push every 8 hours  lactated ringers. 1000 milliLiter(s) (150 mL/Hr) IV Continuous <Continuous>  levothyroxine Injectable 75 MICROGram(s) IV Push at bedtime  metroNIDAZOLE  IVPB 500 milliGRAM(s) IV Intermittent once  minocycline IVPB      minocycline IVPB 200 milliGRAM(s) IV Intermittent every 12 hours  norepinephrine Infusion 0.05 MICROgram(s)/kG/Min (5.66 mL/Hr) IV Continuous <Continuous>  pantoprazole  Injectable 40 milliGRAM(s) IV Push daily  vancomycin    Solution 500 milliGRAM(s) Oral once  vancomycin  IVPB. 1000 milliGRAM(s) IV Intermittent once    MEDICATIONS  (PRN):      MEDICATIONS  (STANDING):  aMIOdarone    Tablet 100 milliGRAM(s) Oral daily  apixaban 2.5 milliGRAM(s) Oral two times a day  chlorhexidine 2% Cloths 1 Application(s) Topical <User Schedule>  hydrocortisone sodium succinate Injectable 100 milliGRAM(s) IV Push every 8 hours  lactated ringers. 1000 milliLiter(s) (150 mL/Hr) IV Continuous <Continuous>  levothyroxine Injectable 75 MICROGram(s) IV Push at bedtime  metroNIDAZOLE  IVPB 500 milliGRAM(s) IV Intermittent once  minocycline IVPB      minocycline IVPB 200 milliGRAM(s) IV Intermittent every 12 hours  norepinephrine Infusion 0.05 MICROgram(s)/kG/Min (5.66 mL/Hr) IV Continuous <Continuous>  pantoprazole  Injectable 40 milliGRAM(s) IV Push daily  vancomycin    Solution 500 milliGRAM(s) Oral once  vancomycin  IVPB. 1000 milliGRAM(s) IV Intermittent once

## 2024-12-15 NOTE — GOALS OF CARE CONVERSATION - ADVANCED CARE PLANNING - CONVERSATION DETAILS
Spoke with patient and son at the bedside about code status. Both agree they want everything done including chest compressions and intubation.    FULL CODE

## 2024-12-15 NOTE — ED PROVIDER NOTE - CARE PLAN
1 Principal Discharge DX:	Hypoglycemia  Secondary Diagnosis:	Severe dehydration  Secondary Diagnosis:	PRANAY (acute kidney injury)  Secondary Diagnosis:	Enteritis

## 2024-12-15 NOTE — ED ADULT NURSE NOTE - OBJECTIVE STATEMENT
Pt presents toe ed c/o of generalized weakness. On arrival pt hypotensive systolic 70's, and hypoglycemic of 45. Pt endorses weakness, lethargy, decreased PO intake, and nausea. Pt denies chest pain, sob, diaphoresis, fever and chills at this time. PMHx of liver cancer, recent colostomy placed 2 weeks ago. Pt report normal output of colostomy. Pt arrives with PICC line in the left bicep. A&O x3,

## 2024-12-15 NOTE — PROGRESS NOTE ADULT - ASSESSMENT
78 yo fem metastatic renal cell carcinoma to liver, lungs, liver abscesses s/p Lpa RT colectomy 3 weeks ago due to ischemic ascending colon, admitted with dehydration  -Encourage PO fluid intake multiple times during the day to replenish ileostomy output  -Her lactate had been elevated in the past, unsure if due to liver disease and not lust due to dehydration  -wean off levophed  -REg diet  -hypogycemia due to liver mets/abscesses?

## 2024-12-15 NOTE — INPATIENT CERTIFICATION FOR MEDICARE PATIENTS - IN ORDER TO MEET MEDICARE REQUIREMENTS.
In order to meet Medicare requirements, the clinical documentation must support the information cited in the admission order.  Please be sure to provide detailed and clear documentation about the following in the admitting note/history and physical:
yes

## 2024-12-15 NOTE — PATIENT PROFILE ADULT - LEGAL HELP
Pt seen at bedside  Improved respiratory status  steroid administration reviewed  insulin administration reviewed  continued poor PO intake  planned for prednisone 20mg BID tomorrow  continue lantus 4 units at bedtime  lispro 3 units tid with meals.   statin  will follow no

## 2024-12-15 NOTE — PROGRESS NOTE ADULT - SUBJECTIVE AND OBJECTIVE BOX
CC:    HPI:  Ms. Quiros is a 78 YO F w/pmhx of RCC w/mets to liver s/p IR embolization and biliary stent, HTN, HLD, adrenal insufficiency on hydrocortisone,  Afib on Apixaban, hypothyroidism, recent hospitalization 09/29/2024-10/16/2024 for septic shock 2/2 hepatic abscess w/Klebsiella, Enterococcus faecalis, Enterococcus faecium, and Clostridium perfringens, patient had drain placed at that time, patient then presents to ED 11/30/2024 w/abdominal pain, fevers, shock and was found to have pneumatosis of cecum and ascending colon w/multiple liver abscesses w/increasing pulmonary mets s/p laparoscopic R hemicolectomy w/ileostomy on 11/30/2024. Patient had drain placed by IR into R lateral hepatic collection and it was D/C'ed this Monday 12/09/2024. Patient was discharged on receiving Minocycline 200mg PO q12h for S. maltophilia in liver abscess and Ertapenem 1g daily for Enterococcus faecium. She was to continue these abx until 12/25/2024    She presents to ED this time because she was feeling very weak. Patient admits to poor PO intake. She has been having high output from her ostomy, son at bedside says they changed her bag x 3 today. In ED she received 4L IVF but remains hypotensive. Her labs are significant for a WBC of 15K, lactate 5.7, new renal dysfunction BUN 70 & SCr 2.93, and elevated transaminases. Patient was also hypoglycemic to 40s requiring treatment w/dextrose.  Patient had CT abdomen/pelvis IV contrast reveals "inflammatory thickening of the wall of multiple contiguous loops of ileum c/w enteritis which could be 2/2 infection, IBD, or ischemia.     12/15: Seen in bed weak, on pressors, decreased ostomy output     PAST MEDICAL & SURGICAL HISTORY:  Hypertension      High cholesterol      Diverticulitis      History of diverticulitis      Hypothyroidism      Renal cell cancer      Metastasis to liver      Paroxysmal atrial fibrillation      Cholelithiasis with cholangitis      History of biliary stent insertion      History of tonsillectomy      History of laparotomy      History of arthroscopy      H/O bilateral oophorectomy      S/P surgical removal of pilonidal cyst          FAMILY HISTORY:      Social Hx:    Allergies    Lipitor (Unknown)  Dyazide (Faint)  statins (Unknown)  Zetia (Unknown)  lactose (Other (Severe); Diarrhea)  Crestor (Other)  tivozanib (Faint)  Norvasc (Faint)  bacitracin (Rash)  Cabometyx (Unknown)    Intolerances            ICU Vital Signs Last 24 Hrs  T(C): 36.3 (16 Dec 2024 09:00), Max: 36.7 (16 Dec 2024 04:00)  T(F): 97.3 (16 Dec 2024 09:00), Max: 98 (16 Dec 2024 04:00)  HR: 85 (16 Dec 2024 10:46) (67 - 95)  BP: 108/81 (16 Dec 2024 10:46) (101/78 - 108/81)  BP(mean): 89 (16 Dec 2024 10:46) (87 - 89)  ABP: 116/98 (16 Dec 2024 09:40) (82/52 - 116/98)  ABP(mean): 104 (16 Dec 2024 09:40) (63 - 104)  RR: 13 (16 Dec 2024 10:46) (11 - 24)  SpO2: 82% (16 Dec 2024 09:40) (82% - 100%)    O2 Parameters below as of 16 Dec 2024 08:00  Patient On (Oxygen Delivery Method): room air                I&O's Summary    15 Dec 2024 07:01  -  16 Dec 2024 07:00  --------------------------------------------------------  IN: 6524 mL / OUT: 4050 mL / NET: 2474 mL    16 Dec 2024 07:01  -  16 Dec 2024 12:04  --------------------------------------------------------  IN: 1072 mL / OUT: 400 mL / NET: 672 mL                              11.2   14.01 )-----------( 325      ( 16 Dec 2024 05:25 )             34.0       12-16    139  |  107  |  27[H]  ----------------------------<  226[H]  3.7   |  26  |  1.08    Ca    7.9[L]      16 Dec 2024 05:25  Phos  2.3     12-16  Mg     1.9     12-16    TPro  4.3[L]  /  Alb  1.8[L]  /  TBili  1.2  /  DBili  x   /  AST  40[H]  /  ALT  35  /  AlkPhos  431[H]  12-14                Urinalysis Basic - ( 16 Dec 2024 05:25 )    Color: x / Appearance: x / SG: x / pH: x  Gluc: 226 mg/dL / Ketone: x  / Bili: x / Urobili: x   Blood: x / Protein: x / Nitrite: x   Leuk Esterase: x / RBC: x / WBC x   Sq Epi: x / Non Sq Epi: x / Bacteria: x        MEDICATIONS  (STANDING):  aMIOdarone    Tablet 100 milliGRAM(s) Oral daily  apixaban 2.5 milliGRAM(s) Oral two times a day  chlorhexidine 2% Cloths 1 Application(s) Topical <User Schedule>  ertapenem  IVPB 500 milliGRAM(s) IV Intermittent every 24 hours  hydrocortisone sodium succinate Injectable 100 milliGRAM(s) IV Push every 8 hours  lactated ringers. 1000 milliLiter(s) (75 mL/Hr) IV Continuous <Continuous>  levothyroxine Injectable 75 MICROGram(s) IV Push at bedtime  minocycline IVPB      minocycline IVPB 200 milliGRAM(s) IV Intermittent every 12 hours  norepinephrine Infusion 0.05 MICROgram(s)/kG/Min (5.66 mL/Hr) IV Continuous <Continuous>  pantoprazole  Injectable 40 milliGRAM(s) IV Push daily    MEDICATIONS  (PRN):      DVT Prophylaxis:    Advanced Directives:  Discussed with:    Visit Information: 30 min    ** Time is exclusive of billed procedures and/or teaching and/or routine family updates.

## 2024-12-15 NOTE — CONSULT NOTE ADULT - ASSESSMENT
78yo F with extensive PMHx including RCC w/mets to liver s/p IR embolization and biliary stent, HTN, HLD, adrenal insufficiency on hydrocortisone,  Afib on Apixaban, hypothyroidism, recent hospitalization, septic shock 2/2 hepatic abscess s/pr IR drainage, pneumatosis of cecum and ascending colon s/p laparoscopic R hemicolectomy w/ ileostomy on 11/30/2024 presenting with   #Sepsis 2/2 ? abscess    # Severe enteritis with dehydration from high ileostomy output   # ? Ischemic SB       Plan   Continue resuscitation per ICU   IV abx   Replete stoma output 1:0.5 IVF   ID consults   Trend labs   IR consult to access for drainage of liver abscess

## 2024-12-15 NOTE — ED ADULT NURSE REASSESSMENT NOTE - NS ED NURSE REASSESS COMMENT FT1
Unable to establish IV access. MD Hodge made aware awaiting Xray to confirm Mid line access. US IV unsuccessful.

## 2024-12-15 NOTE — PROCEDURE NOTE - NSPROCDETAILS_GEN_ALL_CORE
location identified, draped/prepped, sterile technique used, needle inserted/introduced/positive blood return obtained via catheter/connected to a pressurized flush line/hemostasis with direct pressure, dressing applied/Seldinger technique

## 2024-12-16 ENCOUNTER — TRANSCRIPTION ENCOUNTER (OUTPATIENT)
Age: 79
End: 2024-12-16

## 2024-12-16 LAB
ANION GAP SERPL CALC-SCNC: 6 MMOL/L — SIGNIFICANT CHANGE UP (ref 5–17)
ANION GAP SERPL CALC-SCNC: 7 MMOL/L — SIGNIFICANT CHANGE UP (ref 5–17)
BUN SERPL-MCNC: 27 MG/DL — HIGH (ref 7–23)
BUN SERPL-MCNC: 31 MG/DL — HIGH (ref 7–23)
CALCIUM SERPL-MCNC: 7.9 MG/DL — LOW (ref 8.5–10.1)
CALCIUM SERPL-MCNC: 8.5 MG/DL — SIGNIFICANT CHANGE UP (ref 8.5–10.1)
CHLORIDE SERPL-SCNC: 107 MMOL/L — SIGNIFICANT CHANGE UP (ref 96–108)
CHLORIDE SERPL-SCNC: 109 MMOL/L — HIGH (ref 96–108)
CO2 SERPL-SCNC: 22 MMOL/L — SIGNIFICANT CHANGE UP (ref 22–31)
CO2 SERPL-SCNC: 26 MMOL/L — SIGNIFICANT CHANGE UP (ref 22–31)
CREAT SERPL-MCNC: 1.08 MG/DL — SIGNIFICANT CHANGE UP (ref 0.5–1.3)
CREAT SERPL-MCNC: 1.17 MG/DL — SIGNIFICANT CHANGE UP (ref 0.5–1.3)
EGFR: 48 ML/MIN/1.73M2 — LOW
EGFR: 53 ML/MIN/1.73M2 — LOW
GLUCOSE BLDC GLUCOMTR-MCNC: 124 MG/DL — HIGH (ref 70–99)
GLUCOSE BLDC GLUCOMTR-MCNC: 124 MG/DL — HIGH (ref 70–99)
GLUCOSE SERPL-MCNC: 226 MG/DL — HIGH (ref 70–99)
GLUCOSE SERPL-MCNC: 247 MG/DL — HIGH (ref 70–99)
HCT VFR BLD CALC: 34 % — LOW (ref 34.5–45)
HGB BLD-MCNC: 11.2 G/DL — LOW (ref 11.5–15.5)
LACTATE SERPL-SCNC: 3.7 MMOL/L — HIGH (ref 0.7–2)
LACTATE SERPL-SCNC: 3.8 MMOL/L — HIGH (ref 0.7–2)
MAGNESIUM SERPL-MCNC: 1.5 MG/DL — LOW (ref 1.6–2.6)
MAGNESIUM SERPL-MCNC: 1.9 MG/DL — SIGNIFICANT CHANGE UP (ref 1.6–2.6)
MCHC RBC-ENTMCNC: 26.6 PG — LOW (ref 27–34)
MCHC RBC-ENTMCNC: 32.9 G/DL — SIGNIFICANT CHANGE UP (ref 32–36)
MCV RBC AUTO: 80.8 FL — SIGNIFICANT CHANGE UP (ref 80–100)
MRSA PCR RESULT.: SIGNIFICANT CHANGE UP
PHOSPHATE SERPL-MCNC: 2.3 MG/DL — LOW (ref 2.5–4.5)
PHOSPHATE SERPL-MCNC: 2.3 MG/DL — LOW (ref 2.5–4.5)
PLATELET # BLD AUTO: 325 K/UL — SIGNIFICANT CHANGE UP (ref 150–400)
POTASSIUM SERPL-MCNC: 3.7 MMOL/L — SIGNIFICANT CHANGE UP (ref 3.5–5.3)
POTASSIUM SERPL-MCNC: 4 MMOL/L — SIGNIFICANT CHANGE UP (ref 3.5–5.3)
POTASSIUM SERPL-SCNC: 3.7 MMOL/L — SIGNIFICANT CHANGE UP (ref 3.5–5.3)
POTASSIUM SERPL-SCNC: 4 MMOL/L — SIGNIFICANT CHANGE UP (ref 3.5–5.3)
RBC # BLD: 4.21 M/UL — SIGNIFICANT CHANGE UP (ref 3.8–5.2)
RBC # FLD: 17.6 % — HIGH (ref 10.3–14.5)
S AUREUS DNA NOSE QL NAA+PROBE: SIGNIFICANT CHANGE UP
SODIUM SERPL-SCNC: 138 MMOL/L — SIGNIFICANT CHANGE UP (ref 135–145)
SODIUM SERPL-SCNC: 139 MMOL/L — SIGNIFICANT CHANGE UP (ref 135–145)
WBC # BLD: 14.01 K/UL — HIGH (ref 3.8–10.5)
WBC # FLD AUTO: 14.01 K/UL — HIGH (ref 3.8–10.5)

## 2024-12-16 PROCEDURE — 99291 CRITICAL CARE FIRST HOUR: CPT

## 2024-12-16 RX ORDER — MAGNESIUM SULFATE 500 MG/ML
2 INJECTION, SOLUTION INTRAMUSCULAR; INTRAVENOUS ONCE
Refills: 0 | Status: COMPLETED | OUTPATIENT
Start: 2024-12-16 | End: 2024-12-16

## 2024-12-16 RX ORDER — SODIUM CHLORIDE 9 MG/ML
1000 INJECTION, SOLUTION INTRAVENOUS ONCE
Refills: 0 | Status: COMPLETED | OUTPATIENT
Start: 2024-12-16 | End: 2024-12-16

## 2024-12-16 RX ORDER — POTASSIUM PHOSPHATE, MONOBASIC AND POTASSIUM PHOSPHATE, DIBASIC 224; 236 MG/ML; MG/ML
30 INJECTION, SOLUTION INTRAVENOUS ONCE
Refills: 0 | Status: COMPLETED | OUTPATIENT
Start: 2024-12-16 | End: 2024-12-16

## 2024-12-16 RX ORDER — ONDANSETRON 4 MG/1
4 TABLET ORAL EVERY 4 HOURS
Refills: 0 | Status: DISCONTINUED | OUTPATIENT
Start: 2024-12-16 | End: 2024-12-23

## 2024-12-16 RX ORDER — SODIUM CHLORIDE 9 MG/ML
1000 INJECTION, SOLUTION INTRAVENOUS
Refills: 0 | Status: DISCONTINUED | OUTPATIENT
Start: 2024-12-16 | End: 2024-12-17

## 2024-12-16 RX ADMIN — LEVOTHYROXINE SODIUM 75 MICROGRAM(S): 175 TABLET ORAL at 21:26

## 2024-12-16 RX ADMIN — MAGNESIUM SULFATE 25 GRAM(S): 500 INJECTION, SOLUTION INTRAMUSCULAR; INTRAVENOUS at 00:56

## 2024-12-16 RX ADMIN — SODIUM BICARBONATE 125 MEQ/KG/HR: 84 INJECTION, SOLUTION INTRAVENOUS at 00:15

## 2024-12-16 RX ADMIN — SODIUM CHLORIDE 1000 MILLILITER(S): 9 INJECTION, SOLUTION INTRAVENOUS at 00:43

## 2024-12-16 RX ADMIN — MINOCYCLINE HYDROCHLORIDE 100 MILLIGRAM(S): 100 CAPSULE ORAL at 17:58

## 2024-12-16 RX ADMIN — CHLORHEXIDINE GLUCONATE 1 APPLICATION(S): 1.2 RINSE ORAL at 05:29

## 2024-12-16 RX ADMIN — HYDROCORTISONE 100 MILLIGRAM(S): 100 ENEMA RECTAL at 05:19

## 2024-12-16 RX ADMIN — MINOCYCLINE HYDROCHLORIDE 100 MILLIGRAM(S): 100 CAPSULE ORAL at 05:19

## 2024-12-16 RX ADMIN — POTASSIUM PHOSPHATE, MONOBASIC AND POTASSIUM PHOSPHATE, DIBASIC 83.33 MILLIMOLE(S): 224; 236 INJECTION, SOLUTION INTRAVENOUS at 09:24

## 2024-12-16 RX ADMIN — SODIUM CHLORIDE 75 MILLILITER(S): 9 INJECTION, SOLUTION INTRAVENOUS at 09:24

## 2024-12-16 RX ADMIN — HYDROCORTISONE 100 MILLIGRAM(S): 100 ENEMA RECTAL at 21:26

## 2024-12-16 RX ADMIN — HYDROCORTISONE 100 MILLIGRAM(S): 100 ENEMA RECTAL at 14:14

## 2024-12-16 RX ADMIN — ERTAPENEM SODIUM 100 MILLIGRAM(S): 1 INJECTION, POWDER, LYOPHILIZED, FOR SOLUTION INTRAMUSCULAR; INTRAVENOUS at 04:32

## 2024-12-16 RX ADMIN — APIXABAN 2.5 MILLIGRAM(S): 5 TABLET, FILM COATED ORAL at 21:26

## 2024-12-16 RX ADMIN — SODIUM CHLORIDE 75 MILLILITER(S): 9 INJECTION, SOLUTION INTRAVENOUS at 22:51

## 2024-12-16 RX ADMIN — APIXABAN 2.5 MILLIGRAM(S): 5 TABLET, FILM COATED ORAL at 09:25

## 2024-12-16 RX ADMIN — ONDANSETRON 4 MILLIGRAM(S): 4 TABLET ORAL at 14:55

## 2024-12-16 RX ADMIN — AMIODARONE HYDROCHLORIDE 100 MILLIGRAM(S): 200 TABLET ORAL at 09:25

## 2024-12-16 RX ADMIN — PANTOPRAZOLE 40 MILLIGRAM(S): 40 TABLET, DELAYED RELEASE ORAL at 09:25

## 2024-12-16 NOTE — DIETITIAN INITIAL EVALUATION ADULT - ORAL INTAKE PTA/DIET HISTORY
reports high ostomy outpt, nausea and dry heaving PTA. Poor PO intake, poor appetite. Does drink premier protein shakes and Body Spring drinks. Lives w/ son who cooks/ shops for pt. Meeting <50-75% ENN x >1 mo

## 2024-12-16 NOTE — PROGRESS NOTE ADULT - ASSESSMENT
A/P: 74 female presenting with hypotension from dehydration from a high output illostomy  RCC  Hepatic Abscess  PAF    Plan:  ICU    PULM: Known pulmonary mets    Cardio: Hypotension from Dehydration, continue to wean levo    Renal: Replace lytes as needed    GI: PO Diet,  D/W Dr. Turner and she will have the stent removed prior to D/C    ID: Continue Invanz and Aime as she was on    DOAC DVT Prophylaxis    D/W Family

## 2024-12-16 NOTE — DIETITIAN INITIAL EVALUATION ADULT - ETIOLOGY
r/t decreased ability to meet increased nutrient needs 2/2 new ostomy w/ high outpt and dehydration on chronic GI distress and mets Ca

## 2024-12-16 NOTE — PHYSICAL THERAPY INITIAL EVALUATION ADULT - PERTINENT HX OF CURRENT PROBLEM, REHAB EVAL
80 YO F w/pmhx of RCC w/mets to liver s/p IR embolization and biliary stent, HTN, HLD, adrenal insufficiency on hydrocortisone,  Afib on Apixaban, hypothyroidism, recent hospitalization 09/29/2024-10/16/2024 for septic shock 2/2 hepatic abscess w/Klebsiella, Enterococcus faecalis, Enterococcus faecium, and Clostridium perfringens, patient had drain placed at that time, patient then presents to ED 11/30/2024 w/abdominal pain, fevers, shock and was found to have pneumatosis of cecum and ascending colon w/multiple liver abscesses w/increasing pulmonary mets s/p laparoscopic R hemicolectomy w/ileostomy on 11/30/2024. Patient had drain placed by IR into R lateral hepatic collection and it was D/C'd this Monday 12/09/2024. Patient was discharged on receiving Minocycline 200mg PO q12h for S. maltophilia in liver abscess and Ertapenem 1g daily for Enterococcus faecium. She was to continue these abx until 12/25/2024    She presents to ED this time because she was feeling very weak. Patient admits to poor PO intake. She has been having high output from her ostomy, son at bedside says they changed her bag x 3 today. In ED she received 4L IVF but remains hypotensive. Her labs are significant for a WBC of 15K, lactate 5.7, new renal dysfunction BUN 70 & SCr 2.93, and elevated transaminases. Patient was also hypoglycemic to 40s requiring treatment w/dextrose.  Patient had CT abdomen/pelvis IV contrast reveals "inflammatory thickening of the wall of multiple contiguous loops of ileum c/w enteritis which could be 2/2 infection, IBD, or ischemia. There are numerous complex/heterogenous hepatic lesions w/are most compatible w/metastases, hepatic abscesses can have similar appearance and are not excluded". Patient was ordered Vancomycin IV, Ertapenem for abx by ED. 78 YO F with PMH of RCC w/mets to liver s/p IR embolization and biliary stent, HTN, HLD, adrenal insufficiency on hydrocortisone,  Afib on Apixaban, hypothyroidism, recent hospitalization 09/29/2024-10/16/2024 for septic shock 2/2 hepatic abscess w/Klebsiella, Enterococcus faecalis, Enterococcus faecium, and Clostridium perfringens, patient had drain placed at that time, patient then presents to ED 11/30/2024 w/abdominal pain, fevers, shock and was found to have pneumatosis of cecum and ascending colon w/multiple liver abscesses w/increasing pulmonary mets s/p laparoscopic R hemicolectomy w/ileostomy on 11/30/2024. Patient had drain placed by IR into R lateral hepatic collection and it was D/C'd this Monday 12/09/2024. Patient was discharged on receiving Minocycline 200mg PO q12h for S. maltophilia in liver abscess and Ertapenem 1g daily for Enterococcus faecium. She was to continue these abx until 12/25/2024    She presents to ED this time because she was feeling very weak. Patient admits to poor PO intake. She has been having high output from her ostomy, son at bedside says they changed her bag x 3 today. In ED she received 4L IVF but remains hypotensive. Her labs are significant for a WBC of 15K, lactate 5.7, new renal dysfunction BUN 70 & SCr 2.93, and elevated transaminases. Patient was also hypoglycemic to 40s requiring treatment w/dextrose.  Patient had CT abdomen/pelvis IV contrast reveals "inflammatory thickening of the wall of multiple contiguous loops of ileum c/w enteritis which could be 2/2 infection, IBD, or ischemia. There are numerous complex/heterogenous hepatic lesions w/are most compatible w/metastases, hepatic abscesses can have similar appearance and are not excluded". Patient was ordered Vancomycin IV, Ertapenem for abx by ED.

## 2024-12-16 NOTE — PHYSICAL THERAPY INITIAL EVALUATION ADULT - GENERAL OBSERVATIONS, REHAB EVAL
Rec'd supine asleep in ICU bed ,arousable to verbal stim, has muiltiple IV lines including 3pumps to L forearm and R sided arterial line ( connected but not currently running) Purewick external urinary catheter ,ICU moniters ,last recorded SA=[id number]

## 2024-12-16 NOTE — PHYSICAL THERAPY INITIAL EVALUATION ADULT - NSACTIVITYREC_GEN_A_PT
out of bed to chair daily ( ie-meals ) amb with RW/1PA on unit with R zoltan-shoe WBAT  & L sneaker as tolerated

## 2024-12-16 NOTE — PHYSICAL THERAPY INITIAL EVALUATION ADULT - MARITAL STATUS
recently  , living with son Slade in [place]; daughter lives nearby as does her sister who can assist PRN/ recently  , living with eldest son Slade in [place]; youngest son lives nearby and can assist PRN ( has 2 grand-daughters from this son)  ,sister in [place] who can assist PRN/

## 2024-12-16 NOTE — DIETITIAN INITIAL EVALUATION ADULT - PERTINENT MEDS FT
MEDICATIONS  (STANDING):  aMIOdarone    Tablet 100 milliGRAM(s) Oral daily  apixaban 2.5 milliGRAM(s) Oral two times a day  chlorhexidine 2% Cloths 1 Application(s) Topical <User Schedule>  ertapenem  IVPB 500 milliGRAM(s) IV Intermittent every 24 hours  hydrocortisone sodium succinate Injectable 100 milliGRAM(s) IV Push every 8 hours  lactated ringers. 1000 milliLiter(s) (75 mL/Hr) IV Continuous <Continuous>  levothyroxine Injectable 75 MICROGram(s) IV Push at bedtime  minocycline IVPB      minocycline IVPB 200 milliGRAM(s) IV Intermittent every 12 hours  norepinephrine Infusion 0.05 MICROgram(s)/kG/Min (5.66 mL/Hr) IV Continuous <Continuous>  pantoprazole  Injectable 40 milliGRAM(s) IV Push daily    MEDICATIONS  (PRN):

## 2024-12-16 NOTE — DISCHARGE NOTE NURSING/CASE MANAGEMENT/SOCIAL WORK - PATIENT PORTAL LINK FT
You can access the FollowMyHealth Patient Portal offered by [facility] by registering at the following website: [email or web address]. By joining [facility]’s FollowMyHealth portal, you will also be able to view your health information using other applications (apps) compatible with our system.

## 2024-12-16 NOTE — DIETITIAN INITIAL EVALUATION ADULT - NSFNSGIIOFT_GEN_A_CORE
12-15-24 @ 07:01  -  12-16-24 @ 07:00  --------------------------------------------------------  OUT:    Colostomy (mL): 950 mL  Total OUT: 950 mL    Total NET: -950 mL

## 2024-12-16 NOTE — PROGRESS NOTE ADULT - ASSESSMENT
Assessment:  Ms. 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 Patient had drain placed by IR into R lateral hepatic collection and it was D/C'ed this Monday 12/09/2024. Patient was discharged on receiving Minocycline 200mg PO q12h for S. maltophilia in liver abscess and Ertapenem 1g daily for Enterococcus faecium. She was to continue these abx until 12/25/2024      She presents to ED this time because she was feeling very weak. Patient admits to poor PO intake. She has been having high output from her ostomy, son at bedside says they changed her bag x 3 today. In ED she received 4L IVF but remains hypotensive. Her labs are significant for a WBC of 15K, lactate 5.7, new renal dysfunction BUN 70 & SCr 2.93, and elevated transaminases. Patient was also hypoglycemic to 40s requiring treatment w/dextrose.  Patient had CT abdomen/pelvis IV contrast reveals "inflammatory thickening of the wall of multiple contiguous loops of ileum c/w enteritis which could be 2/2 infection, IBD, or ischemia. There are numerous complex/heterogenous hepatic lesions w/are most compatible w/metastases, hepatic abscesses can have similar appearance and are not excluded". Patient was ordered Vancomycin IV, Ertapenem for abx by ED. They also ordered blood, urine cultures and Cdiff. I ordered patient PO vancomycin, stress dose steroids, and Levophed as well as additional IVF boluses     Problem List:  1. Shock, sepsis & hypovolemia 2/2 high output from ostomy   2. Concern for infectious colitis vs  3. Hepatic abscess   4. PRANAY  5. Lactic acidosis   6. Hypoglycemia   7. Dehydration   8. Hx of adrenal insufficiency on steroids at home   9. Hx of afib, recently resumed Apixaban   10. Hx of hypothyroidism     Plan:  Neuro:  Monitor mental status, avoid deliriogenic medications. Pain & fever control as needed    CV:  Shock multifactorial, sepsis and hypovolemia  Aggressive IVF resuscitation   Levophed, actively titrating to maintain MAP>65mmHg   Hold home Metoprolol   Hold home Benicar in setting of shock and renal dysfunction     Pulm:  Respiratory status stable     Renal:  Lactic acidosis and PRANAY likely from hypovolemia and shock  IVF and vasopressor support  Trend lactic acid   Strict I/Os, goal UOP >0.5cc/kg/hr. Trend renal function and electrolytes, replete as needed. Avoid nephrotoxic agents    GI:  Diet as tolerated   PPI  Output from ostomy slowed down  Biliary stent removal when stable     ID:   Patient w/history of liver abscess s/p drain removal 12/09/2024, most recently was growing S. maltophilia and Enterococcus faecium   Continue Minocycline and Ertapenem   1 dose vancomycin IV by ED  F/u blood & urine cultures  Cdiff negative   Possibly may need drain for liver abscesses once again     Heme:  Eliquis    Endo:  Hypoglycemia improved  Stress dose steroids  TSH normal     CRITICAL CARE TIME SPENT: 35 minutes assessing presenting problems of acute illness, which pose high probability of life threatening deterioration or end organ damage/dysfunction, as well as medical decision making including initiating plan of care, reviewing data, reviewing radiologic exams, discussing with multidisciplinary team,  discussing goals of care with patient/family, and writing this note.  Non-inclusive of procedures performed  Date of entry of this note is equal to the date of services rendered   Assessment:  Ms. 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 Patient had drain placed by IR into R lateral hepatic collection and it was D/C'ed this Monday 12/09/2024. Patient was discharged on receiving Minocycline 200mg PO q12h for S. maltophilia in liver abscess and Ertapenem 1g daily for Enterococcus faecium. She was to continue these abx until 12/25/2024      She presents to ED this time because she was feeling very weak. Patient admits to poor PO intake. She has been having high output from her ostomy, son at bedside says they changed her bag x 3 today. In ED she received 4L IVF but remains hypotensive. Her labs are significant for a WBC of 15K, lactate 5.7, new renal dysfunction BUN 70 & SCr 2.93, and elevated transaminases. Patient was also hypoglycemic to 40s requiring treatment w/dextrose.  Patient had CT abdomen/pelvis IV contrast reveals "inflammatory thickening of the wall of multiple contiguous loops of ileum c/w enteritis which could be 2/2 infection, IBD, or ischemia. There are numerous complex/heterogenous hepatic lesions w/are most compatible w/metastases, hepatic abscesses can have similar appearance and are not excluded". Patient was ordered Vancomycin IV, Ertapenem for abx by ED. They also ordered blood, urine cultures and Cdiff. I ordered patient PO vancomycin, stress dose steroids, and Levophed as well as additional IVF boluses     Problem List:  1. Shock, sepsis & hypovolemia 2/2 high output from ostomy   2. Concern for infectious colitis vs  3. Hepatic abscess   4. PRANAY  5. Lactic acidosis   6. Hypoglycemia   7. Dehydration   8. Hx of adrenal insufficiency on steroids at home   9. Hx of afib, recently resumed Apixaban   10. Hx of hypothyroidism     Plan:  Neuro:  Monitor mental status, avoid deliriogenic medications. Pain & fever control as needed    CV:  Shock multifactorial, sepsis and hypovolemia  Aggressive IVF resuscitation   Levophed, actively titrating to maintain MAP>65mmHg   Hold home Metoprolol   Hold home Benicar in setting of shock and renal dysfunction     Pulm:  Respiratory status stable     Renal:  Lactic acidosis and PRANAY likely from hypovolemia and shock  Bicarb gtt  Trend lactic acid   Strict I/Os, goal UOP >0.5cc/kg/hr. Trend renal function and electrolytes, replete as needed. Avoid nephrotoxic agents    GI:  Diet as tolerated   PPI  Output from ostomy slowed down  Biliary stent removal when stable     ID:   Patient w/history of liver abscess s/p drain removal 12/09/2024, most recently was growing S. maltophilia and Enterococcus faecium   Continue Minocycline and Ertapenem   1 dose vancomycin IV by ED  F/u blood & urine cultures  Cdiff negative   Possibly may need drain for liver abscesses once again     Heme:  Eliquis    Endo:  Hypoglycemia improved  Stress dose steroids  TSH normal     CRITICAL CARE TIME SPENT: 35 minutes assessing presenting problems of acute illness, which pose high probability of life threatening deterioration or end organ damage/dysfunction, as well as medical decision making including initiating plan of care, reviewing data, reviewing radiologic exams, discussing with multidisciplinary team,  discussing goals of care with patient/family, and writing this note.  Non-inclusive of procedures performed  Date of entry of this note is equal to the date of services rendered

## 2024-12-16 NOTE — DISCHARGE NOTE NURSING/CASE MANAGEMENT/SOCIAL WORK - FINANCIAL ASSISTANCE
[facility] provides services at a reduced cost to those who are determined to be eligible through [facility]’s financial assistance program. Information regarding [facility]’s financial assistance program can be found by going to [email or web address] or by calling [phone].

## 2024-12-16 NOTE — DIETITIAN INITIAL EVALUATION ADULT - PERTINENT LABORATORY DATA
12-16    139  |  107  |  27[H]  ----------------------------<  226[H]  3.7   |  26  |  1.08    Ca    7.9[L]      16 Dec 2024 05:25  Phos  2.3     12-16  Mg     1.9     12-16    TPro  4.3[L]  /  Alb  1.8[L]  /  TBili  1.2  /  DBili  x   /  AST  40[H]  /  ALT  35  /  AlkPhos  431[H]  12-14  POCT Blood Glucose.: 124 mg/dL (12-16-24 @ 12:10)  A1C with Estimated Average Glucose Result: 4.8 % (09-30-24 @ 06:38)  A1C with Estimated Average Glucose Result: 5.4 % (08-05-24 @ 05:47)  A1C with Estimated Average Glucose Result: 5.3 % (12-21-23 @ 07:55)

## 2024-12-16 NOTE — PHYSICAL THERAPY INITIAL EVALUATION ADULT - PRECAUTIONS/LIMITATIONS, REHAB EVAL
RN sent patient a MY Chart message. She should call and schedule an appt.for her back pain.  RN will forward to Dr. Conrad to see if there is an available time he has or would recommend for his pt.  FAMILIA Felton     +ileostomy/fall precautions

## 2024-12-16 NOTE — PROGRESS NOTE ADULT - SUBJECTIVE AND OBJECTIVE BOX
Date of entry of this note is equal to the date of services rendered   Patient is a 79y old  Female who presents with a chief complaint of Shock (15 Dec 2024 12:59)    BRIEF HOSPITAL COURSE:   Ms. 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 Patient had drain placed by IR into R lateral hepatic collection and it was D/C'ed this Monday 12/09/2024. Patient was discharged on receiving Minocycline 200mg PO q12h for S. maltophilia in liver abscess and Ertapenem 1g daily for Enterococcus faecium. She was to continue these abx until 12/25/2024      She presents to ED this time because she was feeling very weak. Patient admits to poor PO intake. She has been having high output from her ostomy, son at bedside says they changed her bag x 3 today. In ED she received 4L IVF but remains hypotensive. Her labs are significant for a WBC of 15K, lactate 5.7, new renal dysfunction BUN 70 & SCr 2.93, and elevated transaminases. Patient was also hypoglycemic to 40s requiring treatment w/dextrose.  Patient had CT abdomen/pelvis IV contrast reveals "inflammatory thickening of the wall of multiple contiguous loops of ileum c/w enteritis which could be 2/2 infection, IBD, or ischemia. There are numerous complex/heterogenous hepatic lesions w/are most compatible w/metastases, hepatic abscesses can have similar appearance and are not excluded". Patient was ordered Vancomycin IV, Ertapenem for abx by ED. They also ordered blood, urine cultures and Cdiff. I ordered patient PO vancomycin, stress dose steroids, and Levophed as well as additional IVF boluses     Events last 24 hours:   - Renal function improving  - Lactic acid elevated, give 1L bolus  - Remains on levophed   - Mag low, replace   - Acidosis improved on Bicarb gtt   - Output from ostomy has slowed down     Review of Systems:  Negative besides mentioned above     PAST MEDICAL & SURGICAL HISTORY:  Hypertension  High cholesterol  Diverticulitis  History of diverticulitis  Hypothyroidism  Renal cell cancer  Metastasis to liver  Paroxysmal atrial fibrillation  Cholelithiasis with cholangitis  History of biliary stent insertion  History of tonsillectomy  History of laparotomy  History of arthroscopy  H/O bilateral oophorectomy  S/P surgical removal of pilonidal cyst    Medications:  ertapenem  IVPB 500 milliGRAM(s) IV Intermittent every 24 hours  minocycline IVPB      minocycline IVPB 200 milliGRAM(s) IV Intermittent every 12 hours  aMIOdarone    Tablet 100 milliGRAM(s) Oral daily  norepinephrine Infusion 0.05 MICROgram(s)/kG/Min IV Continuous <Continuous>  apixaban 2.5 milliGRAM(s) Oral two times a day  pantoprazole  Injectable 40 milliGRAM(s) IV Push daily  hydrocortisone sodium succinate Injectable 100 milliGRAM(s) IV Push every 8 hours  levothyroxine Injectable 75 MICROGram(s) IV Push at bedtime  sodium bicarbonate  Infusion 0.31 mEq/kG/Hr IV Continuous <Continuous>  chlorhexidine 2% Cloths 1 Application(s) Topical <User Schedule>    ICU Vital Signs Last 24 Hrs  T(C): 36.3 (16 Dec 2024 00:00), Max: 37.3 (15 Dec 2024 06:16)  T(F): 97.4 (16 Dec 2024 00:00), Max: 99.1 (15 Dec 2024 06:16)  HR: 80 (16 Dec 2024 00:00) (66 - 102)  BP: 94/72 (15 Dec 2024 12:00) (57/44 - 129/79)  BP(mean): 80 (15 Dec 2024 12:00) (49 - 94)  ABP: 89/56 (16 Dec 2024 00:00) (82/52 - 106/64)  ABP(mean): 70 (16 Dec 2024 00:00) (63 - 81)  RR: 14 (16 Dec 2024 00:00) (13 - 24)  SpO2: 98% (16 Dec 2024 00:00) (73% - 100%)  O2 Parameters below as of 16 Dec 2024 00:00  Patient On (Oxygen Delivery Method): room air    I&O's Detail    14 Dec 2024 07:01  -  15 Dec 2024 07:00  --------------------------------------------------------  IN:  Total IN: 0 mL    OUT:    Colostomy (mL): 900 mL  Total OUT: 900 mL    Total NET: -900 mL    15 Dec 2024 07:01  -  16 Dec 2024 00:58  --------------------------------------------------------  IN:    IV PiggyBack: 450 mL    Lactated Ringers: 1408 mL    Lactated Ringers Bolus: 2000 mL    Norepinephrine: 186 mL    Oral Fluid: 540 mL    Sodium Bicarbonate: 1046 mL  Total IN: 5630 mL    OUT:    Colostomy (mL): 550 mL    Voided (mL): 2400 mL  Total OUT: 2950 mL    Total NET: 2680 mL    LABS:                        12.2   22.76 )-----------( 371      ( 15 Dec 2024 13:27 )             38.2     12-15    138  |  109[H]  |  31[H]  ----------------------------<  247[H]  4.0   |  22  |  1.17    Ca    8.5      15 Dec 2024 23:45  Phos  2.3     12-15  Mg     1.5     12-15    TPro  4.3[L]  /  Alb  1.8[L]  /  TBili  1.2  /  DBili  x   /  AST  40[H]  /  ALT  35  /  AlkPhos  431[H]  12-14    POCT Blood Glucose.: 204 mg/dL (15 Dec 2024 20:28)    Urinalysis Basic - ( 15 Dec 2024 23:45 )  Color: x / Appearance: x / SG: x / pH: x  Gluc: 247 mg/dL / Ketone: x  / Bili: x / Urobili: x   Blood: x / Protein: x / Nitrite: x   Leuk Esterase: x / RBC: x / WBC x   Sq Epi: x / Non Sq Epi: x / Bacteria: x    Physical Examination:  General: No acute distress  HEENT: Pupils equal, reactive to light.  Symmetric  PULM: Clear to auscultation bilaterally, no significant sputum production  CVS: Regular rate and rhythm  ABD: Soft, nondistended, nontender to light palpation, slightly tender to deep palpation of mid upper abdomen   EXT: No edema, nontender  SKIN: Warm and well perfused  NEURO: A&Ox3  RADIOLOGY:   < from: CT Abdomen and Pelvis w/ IV Cont (12.15.24 @ 01:25) >    ACC: [id number] EXAM:  CT ABDOMEN AND PELVIS IC   ORDERED BY: BRYANT HAQ     PROCEDURE DATE:  12/15/2024      INTERPRETATION:  CLINICAL INFORMATION: 2 week status post colostomy with   hypoglycemia, abdominal pain, nausea and vomiting. Historyof metastatic   renal cancer, prior embolization of liver mass followed by liver   abscesses and septic shock. Recent colonic pneumatosis followed by right   hemicolectomy.    COMPARISON: CT 10/12/2024, MRI 11/6/2024, CT 12/20/2023    CONTRAST/COMPLICATIONS:  IV Contrast: Omnipaque 350  90 cc administered   0 cc discarded  Oral Contrast: NONE    PROCEDURE:  CT of the Abdomen and Pelvis was performed.  Sagittal and coronal reformats were performed.    FINDINGS:  LOWER CHEST: Numerous bibasilar pulmonary nodules, concerning for   metastatic disease. Reference 8 mm right lower lobe nodule noted on   series 2, image 2.    LIVER: Relatively atrophic left lobe with nodular contour.   Central/bilobar rim-enhancing fluid collections are overall decreased in   size since contrast enhanced CT 10/12/2024. These are favored to   represent abscesses. Known metastatic lesions are difficult to visualize   on this study. Reference collections as follows:    3.9 x 2.0 cm collection at the dome (2, 17),unchanged since 11/30/2024   when remeasured similarly. This previously contained a drainage catheter.  Segment 5/4 collection measures 4.7 x 4.4 cm (2, 26), previously 5.3 x   4.9 cm on 10/12/2024 when remeasured similarly.  Numerous additional loculated collections have similarly decreased since   prior studies.  BILE DUCTS: Normal caliber with biliary stent and pneumobilia.  GALLBLADDER: Within normal limits.  SPLEEN: Within normal limits.  PANCREAS: Within normal limits.  ADRENALS: Within normal limits.  KIDNEYS/URETERS: Left renal cyst. No hydronephrosis.    BLADDER: Within normal limits.  REPRODUCTIVE ORGANS: Uterus and adnexa within normal limits.    BOWEL: No bowel obstruction. Right hemicolectomy. Right lower quadrant   ileostomy. Wall thickening of numerous bilateral lower quadrant and   pelvic small bowel loops is new since prior, suggesting enteritis.  PERITONEUM/RETROPERITONEUM: No ascites or pneumoperitoneum.  VESSELS: Atherosclerotic changes. Hepatic and portal veins and SMV are   patent.  LYMPH NODES: No lymphadenopathy.  ABDOMINAL WALL: Within normal limits.  BONES: Degenerative changes.    IMPRESSION:  Rim-enhancing liver lesions have decreased in size since previous   studies, suggesting abscesses. Known liver metastases are difficult to   visualize on this study.    New extensive small bowel wall thickening, suggesting enteritis, of   unknown etiology.    Numerous pulmonary metastases again noted.    Preliminary findings were reported by [facility] radiologist Dr. Johnson on   12/15/2024 at 2:19 AM.    --- End of Report ---    CARLOS PASCAL MD; Attending Radiologist  This document has been electronically signed. Dec 15 2024  1:14PM    < end of copied text >

## 2024-12-16 NOTE — DIETITIAN INITIAL EVALUATION ADULT - ADD RECOMMEND
1) c/w regular diet; Encourage protein-rich foods, maximize food preferences, 2) Premier protein/ ensure max shake BID to optimize nutritional needs (provides 160 kcal, 30 g protein/ shake), 3) MVI w/ minerals daily to ensure 100% RDA met, 4) Consider adding thiamine 100 mg daily 2/2 poor PO intake/ malnutrition, 5) Consider adding appetite stimulant such as Remeron or Marinol 2/2 chronically poor appetite/ PO intake, 6) Monitor ostomy output, consider implementing bowel regimen if output remains high; maintain hydration and replete lytes prn, 7) Confirm goals of care regarding nutrition support, 8) Consider obtaining vitamin D 25OH level to assess nutriture, 9) consider checking B6, B12, thiamine, folate, carnitine, and copper levels as malnutrition in cause these to be deficient. RD will continue to monitor PO intake, labs, hydration, and wt prn.

## 2024-12-16 NOTE — PHYSICAL THERAPY INITIAL EVALUATION ADULT - GAIT DISTANCE, PT EVAL
NA assisted with IV poles , Purewick disconnected from wall suction and replaced to LCWS after return to bedside chair/150 feet

## 2024-12-16 NOTE — DIETITIAN NUTRITION RISK NOTIFICATION - TREATMENT: THE FOLLOWING DIET HAS BEEN RECOMMENDED
Diet, Regular:   Supplement Feeding Modality:  Oral  Ensure Enlive Cans or Servings Per Day:  3       Frequency:  Three Times a day (12-15-24 @ 21:17) [Active]

## 2024-12-16 NOTE — DIETITIAN INITIAL EVALUATION ADULT - NS FNS DIET ORDER
Diet, Regular:   Supplement Feeding Modality:  Oral  Ensure Enlive Cans or Servings Per Day:  3       Frequency:  Three Times a day (12-15-24 @ 21:17)

## 2024-12-16 NOTE — PHYSICAL THERAPY INITIAL EVALUATION ADULT - ACTIVE RANGE OF MOTION EXAMINATION, REHAB EVAL
sarah. upper extremity Active ROM was WNL (within normal limits)/bilateral  lower extremity Active ROM was WFL (within functional limits)

## 2024-12-16 NOTE — PHYSICAL THERAPY INITIAL EVALUATION ADULT - DIAGNOSIS, PT EVAL
shock secondary to high ostomy output, hepatic abscesses & mets ,recent R hemicolectomy & ileostomy 11/30/24 shock secondary to high ostomy output, metastatic renal cell carcinoma to lung,liver ; hepatic abscesses & mets ,recent R hemicolectomy & ileostomy 11/30/24

## 2024-12-16 NOTE — PHYSICAL THERAPY INITIAL EVALUATION ADULT - ADDITIONAL COMMENTS
pt was receieving home care services thru [facility] infusion therapy pt was receiving home care services thru [facility] infusion therapy, was very pleased with nursing care and felt she was doing great until this set-back; pt noted transient visual blurring associated with hypotension/low BP prior to admission

## 2024-12-16 NOTE — PROGRESS NOTE ADULT - SUBJECTIVE AND OBJECTIVE BOX
HPI:  Ms. Quiros is a 78 YO F w/pmhx of RCC w/mets to liver s/p IR embolization and biliary stent, HTN, HLD, adrenal insufficiency on hydrocortisone,  Afib on Apixaban, hypothyroidism, recent hospitalization 09/29/2024-10/16/2024 for septic shock 2/2 hepatic abscess w/Klebsiella, Enterococcus faecalis, Enterococcus faecium, and Clostridium perfringens, patient had drain placed at that time, patient then presents to ED 11/30/2024 w/abdominal pain, fevers, shock and was found to have pneumatosis of cecum and ascending colon w/multiple liver abscesses w/increasing pulmonary mets s/p laparoscopic R hemicolectomy w/ileostomy on 11/30/2024. Patient had drain placed by IR into R lateral hepatic collection and it was D/C'ed this Monday 12/09/2024. Patient was discharged on receiving Minocycline 200mg PO q12h for S. maltophilia in liver abscess and Ertapenem 1g daily for Enterococcus faecium. She was to continue these abx until 12/25/2024    She presents to ED this time because she was feeling very weak. Patient admits to poor PO intake. She has been having high output from her ostomy, son at bedside says they changed her bag x 3 today. In ED she received 4L IVF but remains hypotensive. Her labs are significant for a WBC of 15K, lactate 5.7, new renal dysfunction BUN 70 & SCr 2.93, and elevated transaminases. Patient was also hypoglycemic to 40s requiring treatment w/dextrose.  Patient had CT abdomen/pelvis IV contrast reveals "inflammatory thickening of the wall of multiple contiguous loops of ileum c/w enteritis which could be 2/2 infection, IBD, or ischemia.     12/15: Seen in bed weak, on pressors, decreased ostomy output   12/16: Still on pressors, feeling better, tolerating PO       PAST MEDICAL & SURGICAL HISTORY:  Hypertension      High cholesterol      Diverticulitis      History of diverticulitis      Hypothyroidism      Renal cell cancer      Metastasis to liver      Paroxysmal atrial fibrillation      Cholelithiasis with cholangitis      History of biliary stent insertion      History of tonsillectomy      History of laparotomy      History of arthroscopy      H/O bilateral oophorectomy      S/P surgical removal of pilonidal cyst          FAMILY HISTORY:      Social Hx:    Allergies    Lipitor (Unknown)  Dyazide (Faint)  statins (Unknown)  Zetia (Unknown)  lactose (Other (Severe); Diarrhea)  Crestor (Other)  tivozanib (Faint)  Norvasc (Faint)  bacitracin (Rash)  Cabometyx (Unknown)    Intolerances            ICU Vital Signs Last 24 Hrs  T(C): 36.3 (16 Dec 2024 09:00), Max: 36.7 (16 Dec 2024 04:00)  T(F): 97.3 (16 Dec 2024 09:00), Max: 98 (16 Dec 2024 04:00)  HR: 85 (16 Dec 2024 10:46) (67 - 95)  BP: 108/81 (16 Dec 2024 10:46) (101/78 - 108/81)  BP(mean): 89 (16 Dec 2024 10:46) (87 - 89)  ABP: 116/98 (16 Dec 2024 09:40) (82/52 - 116/98)  ABP(mean): 104 (16 Dec 2024 09:40) (63 - 104)  RR: 13 (16 Dec 2024 10:46) (11 - 24)  SpO2: 82% (16 Dec 2024 09:40) (82% - 100%)    O2 Parameters below as of 16 Dec 2024 08:00  Patient On (Oxygen Delivery Method): room air                I&O's Summary    15 Dec 2024 07:01  -  16 Dec 2024 07:00  --------------------------------------------------------  IN: 6524 mL / OUT: 4050 mL / NET: 2474 mL    16 Dec 2024 07:01  -  16 Dec 2024 12:14  --------------------------------------------------------  IN: 1072 mL / OUT: 400 mL / NET: 672 mL                              11.2   14.01 )-----------( 325      ( 16 Dec 2024 05:25 )             34.0       12-16    139  |  107  |  27[H]  ----------------------------<  226[H]  3.7   |  26  |  1.08    Ca    7.9[L]      16 Dec 2024 05:25  Phos  2.3     12-16  Mg     1.9     12-16    TPro  4.3[L]  /  Alb  1.8[L]  /  TBili  1.2  /  DBili  x   /  AST  40[H]  /  ALT  35  /  AlkPhos  431[H]  12-14                Urinalysis Basic - ( 16 Dec 2024 05:25 )    Color: x / Appearance: x / SG: x / pH: x  Gluc: 226 mg/dL / Ketone: x  / Bili: x / Urobili: x   Blood: x / Protein: x / Nitrite: x   Leuk Esterase: x / RBC: x / WBC x   Sq Epi: x / Non Sq Epi: x / Bacteria: x        MEDICATIONS  (STANDING):  aMIOdarone    Tablet 100 milliGRAM(s) Oral daily  apixaban 2.5 milliGRAM(s) Oral two times a day  chlorhexidine 2% Cloths 1 Application(s) Topical <User Schedule>  ertapenem  IVPB 500 milliGRAM(s) IV Intermittent every 24 hours  hydrocortisone sodium succinate Injectable 100 milliGRAM(s) IV Push every 8 hours  lactated ringers. 1000 milliLiter(s) (75 mL/Hr) IV Continuous <Continuous>  levothyroxine Injectable 75 MICROGram(s) IV Push at bedtime  minocycline IVPB      minocycline IVPB 200 milliGRAM(s) IV Intermittent every 12 hours  norepinephrine Infusion 0.05 MICROgram(s)/kG/Min (5.66 mL/Hr) IV Continuous <Continuous>  pantoprazole  Injectable 40 milliGRAM(s) IV Push daily    MEDICATIONS  (PRN):      DVT Prophylaxis: DOAC    Advanced Directives:  Discussed with:    Visit Information: 30 min    ** Time is exclusive of billed procedures and/or teaching and/or routine family updates.

## 2024-12-16 NOTE — PHYSICAL THERAPY INITIAL EVALUATION ADULT - LEVEL OF INDEPENDENCE: SIT/STAND, REHAB EVAL
mildly unsteady on assuming standing with a bit of posterior sway requiring posterior guarding, suggested pt sit up in chair 10 mins before attempting ambulation to allow accommodation to vertical posturing ( VX=334/72 seated)/minimum assist (75% patients effort)

## 2024-12-16 NOTE — PHYSICAL THERAPY INITIAL EVALUATION ADULT - PATIENT/FAMILY/SIGNIFICANT OTHER GOALS STATEMENT, PT EVAL
pt reports she sustained a recent R 5th toe fx being treated with surgi-shoe only ,must wear L sneaker due to thickness of sole of surgical shoe to prevent leg length discrepancy /gait instability

## 2024-12-16 NOTE — PHYSICAL THERAPY INITIAL EVALUATION ADULT - LEVEL OF INDEPENDENCE: STAND/SIT, REHAB EVAL
out of bed to high back chair at bedside before and after ambulating on unit with RW/contact guard/minimum assist (75% patients effort)

## 2024-12-17 LAB
ANION GAP SERPL CALC-SCNC: 9 MMOL/L — SIGNIFICANT CHANGE UP (ref 5–17)
BUN SERPL-MCNC: 24 MG/DL — HIGH (ref 7–23)
CALCIUM SERPL-MCNC: 8.5 MG/DL — SIGNIFICANT CHANGE UP (ref 8.5–10.1)
CHLORIDE SERPL-SCNC: 107 MMOL/L — SIGNIFICANT CHANGE UP (ref 96–108)
CO2 SERPL-SCNC: 23 MMOL/L — SIGNIFICANT CHANGE UP (ref 22–31)
CREAT SERPL-MCNC: 0.95 MG/DL — SIGNIFICANT CHANGE UP (ref 0.5–1.3)
EGFR: 62 ML/MIN/1.73M2 — SIGNIFICANT CHANGE UP
GLUCOSE SERPL-MCNC: 100 MG/DL — HIGH (ref 70–99)
HCT VFR BLD CALC: 34.8 % — SIGNIFICANT CHANGE UP (ref 34.5–45)
HGB BLD-MCNC: 11.2 G/DL — LOW (ref 11.5–15.5)
MAGNESIUM SERPL-MCNC: 1.7 MG/DL — SIGNIFICANT CHANGE UP (ref 1.6–2.6)
MCHC RBC-ENTMCNC: 26.4 PG — LOW (ref 27–34)
MCHC RBC-ENTMCNC: 32.2 G/DL — SIGNIFICANT CHANGE UP (ref 32–36)
MCV RBC AUTO: 82.1 FL — SIGNIFICANT CHANGE UP (ref 80–100)
PHOSPHATE SERPL-MCNC: 3.1 MG/DL — SIGNIFICANT CHANGE UP (ref 2.5–4.5)
PLATELET # BLD AUTO: 205 K/UL — SIGNIFICANT CHANGE UP (ref 150–400)
POTASSIUM SERPL-MCNC: 4.2 MMOL/L — SIGNIFICANT CHANGE UP (ref 3.5–5.3)
POTASSIUM SERPL-SCNC: 4.2 MMOL/L — SIGNIFICANT CHANGE UP (ref 3.5–5.3)
RBC # BLD: 4.24 M/UL — SIGNIFICANT CHANGE UP (ref 3.8–5.2)
RBC # FLD: 18 % — HIGH (ref 10.3–14.5)
SODIUM SERPL-SCNC: 139 MMOL/L — SIGNIFICANT CHANGE UP (ref 135–145)
WBC # BLD: 14.01 K/UL — HIGH (ref 3.8–10.5)
WBC # FLD AUTO: 14.01 K/UL — HIGH (ref 3.8–10.5)

## 2024-12-17 PROCEDURE — 99232 SBSQ HOSP IP/OBS MODERATE 35: CPT

## 2024-12-17 RX ORDER — ERTAPENEM SODIUM 1 G/1
1000 INJECTION, POWDER, LYOPHILIZED, FOR SOLUTION INTRAMUSCULAR; INTRAVENOUS EVERY 24 HOURS
Refills: 0 | Status: DISCONTINUED | OUTPATIENT
Start: 2024-12-18 | End: 2024-12-23

## 2024-12-17 RX ORDER — HYDROCORTISONE 100 MG/60ML
10 ENEMA RECTAL AT BEDTIME
Refills: 0 | Status: DISCONTINUED | OUTPATIENT
Start: 2024-12-17 | End: 2024-12-19

## 2024-12-17 RX ORDER — LOPERAMIDE HCL 1 MG/5 ML
2 LIQUID (ML) ORAL
Refills: 0 | Status: DISCONTINUED | OUTPATIENT
Start: 2024-12-17 | End: 2024-12-19

## 2024-12-17 RX ORDER — PANTOPRAZOLE 40 MG/1
40 TABLET, DELAYED RELEASE ORAL
Refills: 0 | Status: DISCONTINUED | OUTPATIENT
Start: 2024-12-17 | End: 2024-12-23

## 2024-12-17 RX ORDER — HYDROCORTISONE 100 MG/60ML
20 ENEMA RECTAL DAILY
Refills: 0 | Status: DISCONTINUED | OUTPATIENT
Start: 2024-12-18 | End: 2024-12-19

## 2024-12-17 RX ORDER — MIDODRINE HYDROCHLORIDE 5 MG/1
10 TABLET ORAL EVERY 8 HOURS
Refills: 0 | Status: DISCONTINUED | OUTPATIENT
Start: 2024-12-17 | End: 2024-12-23

## 2024-12-17 RX ADMIN — LEVOTHYROXINE SODIUM 75 MICROGRAM(S): 175 TABLET ORAL at 22:10

## 2024-12-17 RX ADMIN — MINOCYCLINE HYDROCHLORIDE 100 MILLIGRAM(S): 100 CAPSULE ORAL at 06:50

## 2024-12-17 RX ADMIN — AMIODARONE HYDROCHLORIDE 100 MILLIGRAM(S): 200 TABLET ORAL at 11:10

## 2024-12-17 RX ADMIN — MINOCYCLINE HYDROCHLORIDE 100 MILLIGRAM(S): 100 CAPSULE ORAL at 18:39

## 2024-12-17 RX ADMIN — PANTOPRAZOLE 40 MILLIGRAM(S): 40 TABLET, DELAYED RELEASE ORAL at 11:35

## 2024-12-17 RX ADMIN — APIXABAN 2.5 MILLIGRAM(S): 5 TABLET, FILM COATED ORAL at 11:37

## 2024-12-17 RX ADMIN — HYDROCORTISONE 100 MILLIGRAM(S): 100 ENEMA RECTAL at 06:50

## 2024-12-17 RX ADMIN — MIDODRINE HYDROCHLORIDE 10 MILLIGRAM(S): 5 TABLET ORAL at 22:10

## 2024-12-17 RX ADMIN — SODIUM CHLORIDE 75 MILLILITER(S): 9 INJECTION, SOLUTION INTRAVENOUS at 13:58

## 2024-12-17 RX ADMIN — CHLORHEXIDINE GLUCONATE 1 APPLICATION(S): 1.2 RINSE ORAL at 05:56

## 2024-12-17 RX ADMIN — Medication 2 MILLIGRAM(S): at 22:10

## 2024-12-17 RX ADMIN — MIDODRINE HYDROCHLORIDE 10 MILLIGRAM(S): 5 TABLET ORAL at 13:58

## 2024-12-17 RX ADMIN — HYDROCORTISONE 10 MILLIGRAM(S): 100 ENEMA RECTAL at 23:19

## 2024-12-17 RX ADMIN — Medication 2 MILLIGRAM(S): at 11:36

## 2024-12-17 RX ADMIN — ERTAPENEM SODIUM 100 MILLIGRAM(S): 1 INJECTION, POWDER, LYOPHILIZED, FOR SOLUTION INTRAMUSCULAR; INTRAVENOUS at 04:19

## 2024-12-17 NOTE — PROGRESS NOTE ADULT - ASSESSMENT
74 female presenting with hypotension from dehydration from a high output illostomy  RCC  Hepatic Abscess  PAF    Plan:       PULM: Known pulmonary mets on room air  Cardio: Hypotension from Dehydration, levo weaned off, continue hydration  Renal: Replace lytes as needed, PRANAY now improved  GI: PO Diet,  D/W Dr. Turner and she will have the stent removed prior to D/C  ID: Continue Invanz and Aime as she was on through 12/25  DOAC DVT Prophylaxis  Endo change to come hydrocortisone doses 74 female presenting with hypotension from dehydration from a high output illostomy  RCC  Hepatic Abscess  PAF    Plan:       PULM: Known pulmonary mets on room air  Cardio: Hypotension from Dehydration, levo weaned off, continue hydration  Renal: Replace lytes as needed, PRANAY now improved  GI: PO Diet,  D/W Dr. Turner and she will have the stent removed prior to D/C  ID: Continue Invanz and Aime as she was on through 12/25  DOAC DVT Prophylaxis, hold apixiban for possible procedure thursday  Endo change to come hydrocortisone doses

## 2024-12-17 NOTE — PROGRESS NOTE ADULT - ASSESSMENT
A:    79yFemale    Here for:    1. Shock, sepsis & hypovolemia 2/2 high output from ostomy   2. Concern for infectious colitis vs  3. Hepatic abscess   4. PRANAY  5. Lactic acidosis   6. Hypoglycemia   7. Dehydration   8. Hx of adrenal insufficiency on steroids at home   9. Hx of afib, recently resumed Apixaban   10. Hx of hypothyroidism     This patient requires critical care for support of one or more vital organ systems with a high probability of imminent or life threatening deterioration in his/her condition    P:    shock actively weaning vasopressors, continuing resuscitation  IS, OOB as able  Cont IVF  PRANAY improving, monitor UOP, alkali therapy as needed  Diet as able  Cont broad spectrum abx, f/u Cx's, fever curve, white count  Minimize invasive lines/catheters  DOAC  BG < 180  Stress dose steroids      Dispo: Cont critical care.    Date of entry of this note is equal to the date of services rendered    TOTAL CRITICAL CARE TIME: 35 minutes  (EXCLUSIVE of any non bundled procedures)    Note: This is a critically ill patient. Time spent has been in salvage of life, limb and vital organ systems. This time INCLUDES time spent directly as this patient's bedside with evaluation and management, review of chart including review of laboratory and imaging studies, interpretation of vital signs and cardiac output measurements, any necessary ventilator management, and time spent discussing plan of care with patient and family, including goals of care discussion. This also includes time spent in multidisciplinary discussion with care team and various consultants to optimize treatment plan. This time may NOT include various procedures, which will be noted seperately.

## 2024-12-17 NOTE — PROGRESS NOTE ADULT - SUBJECTIVE AND OBJECTIVE BOX
Interval History:     Patient bp better, MAP ok     feels better, decreased ostomy output     started on immodium     creatinine now normal    HPI:       Ms. Quiros is a 78 YO F w/pmhx of RCC w/mets to liver s/p IR embolization and biliary stent,  adrenal insufficiency on hydrocortisone,  Afib on Apixaban, hypothyroidism, recent hospitalization 09/29/2024-10/16/2024 for septic shock 2/2 hepatic abscess w/Klebsiella, Enterococcus faecalis, Enterococcus faecium, and Clostridium perfringens, patient had drain placed at that time, patient then presents to ED 11/30/2024 w/abdominal pain, fevers, shock and was found to have pneumatosis of cecum and ascending colon w/multiple liver abscesses w/increasing pulmonary mets s/p laparoscopic R hemicolectomy w/ileostomy on 11/30/2024. Patient had drain placed by IR into R lateral hepatic collection and it was D/C'ed this Monday 12/09/2024. Patient was discharged on receiving Minocycline 200mg PO q12h for S. maltophilia in liver abscess and Ertapenem 1g daily for Enterococcus faecium. She is  to continue these abx until 12/25/2024    She presents to ED 12/16  because she was feeling very weak. Patient admits to poor PO intake. She has been having high output from her ostomy, son at bedside says they changed her bag x 3 today. In ED she received 4L IVF but remains hypotensive. Her labs are significant for a WBC of 15K, lactate 5.7, new renal dysfunction BUN 70 & SCr 2.93, and elevated transaminases. Patient was also hypoglycemic to 40s requiring treatment w/dextrose.  Patient had CT abdomen/pelvis IV contrast reveals "inflammatory thickening of the wall of multiple contiguous loops of ileum c/w enteritis which could be 2/2 infection, IBD, or ischemia.     12/17 off pressors feelling better, wbc 14     MEDICATIONS  (STANDING):  aMIOdarone    Tablet 100 milliGRAM(s) Oral daily  apixaban 2.5 milliGRAM(s) Oral two times a day  chlorhexidine 2% Cloths 1 Application(s) Topical <User Schedule>  ertapenem  IVPB 500 milliGRAM(s) IV Intermittent every 24 hours  hydrocortisone 10 milliGRAM(s) Oral at bedtime  lactated ringers. 1000 milliLiter(s) (75 mL/Hr) IV Continuous <Continuous>  levothyroxine Injectable 75 MICROGram(s) IV Push at bedtime  loperamide 2 milliGRAM(s) Oral two times a day  midodrine 10 milliGRAM(s) Oral every 8 hours  minocycline IVPB      minocycline IVPB 200 milliGRAM(s) IV Intermittent every 12 hours  pantoprazole    Tablet 40 milliGRAM(s) Oral before breakfast    MEDICATIONS  (PRN):  ondansetron Injectable 4 milliGRAM(s) IV Push every 4 hours PRN Nausea and/or Vomiting    ICU Vital Signs Last 24 Hrs  T(C): 36.7 (17 Dec 2024 08:00), Max: 36.7 (17 Dec 2024 08:00)  T(F): 98 (17 Dec 2024 08:00), Max: 98 (17 Dec 2024 08:00)  HR: 86 (17 Dec 2024 12:00) (68 - 113)  BP: 86/58 (17 Dec 2024 12:00) (84/70 - 121/78)  BP(mean): 66 (17 Dec 2024 12:00) (66 - 100)  ABP: 58/55 (16 Dec 2024 19:00) (58/55 - 110/105)  ABP(mean): 57 (16 Dec 2024 19:00) (57 - 102)  RR: 23 (17 Dec 2024 12:00) (10 - 26)  SpO2: 100% (17 Dec 2024 12:00) (93% - 100%)    O2 Parameters below as of 17 Dec 2024 12:00  Patient On (Oxygen Delivery Method): room air    Physical Exam    General - awake, alert  HEENT - nc/at  neck supple  lungs - clear  cv rrr   abdomen is soft, ileostomy functioning  extremity warm alert  neuro awake, and alert  skin no rash    I&O's Summary    16 Dec 2024 07:01  -  17 Dec 2024 07:00  --------------------------------------------------------  IN: 3586.8 mL / OUT: 2550 mL / NET: 1036.8 mL                       11.2   14.01 )-----------( 205      ( 17 Dec 2024 06:45 )             34.8       12-17    139  |  107  |  24[H]  ----------------------------<  100[H]  4.2   |  23  |  0.95    Ca    8.5      17 Dec 2024 06:45  Phos  3.1     12-17  Mg     1.7     12-17    Urinalysis Basic - ( 17 Dec 2024 06:45 )    Color: x / Appearance: x / SG: x / pH: x  Gluc: 100 mg/dL / Ketone: x  / Bili: x / Urobili: x   Blood: x / Protein: x / Nitrite: x   Leuk Esterase: x / RBC: x / WBC x   Sq Epi: x / Non Sq Epi: x / Bacteria: x    DVT Prophylaxis:   apixiban                                                              Advanced Directives: Full Code         Labs, Notes, Orders, radiologic studies reviewed and care coordinated with multidisciplinary team

## 2024-12-17 NOTE — PROGRESS NOTE ADULT - SUBJECTIVE AND OBJECTIVE BOX
80 yo fem dehydration/ metastatic renal cell carcinoma/liver/lung mets, doing ok, ileost output 400/600ml    Abd soft, liquid/apple sauce consistency stool in ostomy bag          12-17-24 @ 08:27    Vital Signs Last 24 Hrs  T(C): 36.2 (17 Dec 2024 02:00), Max: 36.6 (16 Dec 2024 20:00)  T(F): 97.1 (17 Dec 2024 02:00), Max: 97.8 (16 Dec 2024 20:00)  HR: 76 (17 Dec 2024 07:00) (68 - 103)  BP: 97/72 (17 Dec 2024 07:00) (84/70 - 121/78)  BP(mean): 83 (17 Dec 2024 07:00) (67 - 100)  RR: 19 (17 Dec 2024 07:00) (10 - 26)  SpO2: 100% (17 Dec 2024 07:00) (82% - 100%)    Parameters below as of 17 Dec 2024 06:00  Patient On (Oxygen Delivery Method): room air                              11.2   14.01 )-----------( 205      ( 17 Dec 2024 06:45 )             34.8       12-17    139  |  107  |  24[H]  ----------------------------<  100[H]  4.2   |  23  |  0.95    Ca    8.5      17 Dec 2024 06:45  Phos  3.1     12-17  Mg     1.7     12-17            MEDICATIONS  (STANDING):  aMIOdarone    Tablet 100 milliGRAM(s) Oral daily  apixaban 2.5 milliGRAM(s) Oral two times a day  chlorhexidine 2% Cloths 1 Application(s) Topical <User Schedule>  ertapenem  IVPB 500 milliGRAM(s) IV Intermittent every 24 hours  hydrocortisone sodium succinate Injectable 100 milliGRAM(s) IV Push every 8 hours  lactated ringers. 1000 milliLiter(s) (75 mL/Hr) IV Continuous <Continuous>  levothyroxine Injectable 75 MICROGram(s) IV Push at bedtime  loperamide 2 milliGRAM(s) Oral two times a day  minocycline IVPB      minocycline IVPB 200 milliGRAM(s) IV Intermittent every 12 hours  norepinephrine Infusion 0.05 MICROgram(s)/kG/Min (5.66 mL/Hr) IV Continuous <Continuous>  pantoprazole  Injectable 40 milliGRAM(s) IV Push daily    MEDICATIONS  (PRN):  ondansetron Injectable 4 milliGRAM(s) IV Push every 4 hours PRN Nausea and/or Vomiting      MEDICATIONS  (STANDING):  aMIOdarone    Tablet 100 milliGRAM(s) Oral daily  apixaban 2.5 milliGRAM(s) Oral two times a day  chlorhexidine 2% Cloths 1 Application(s) Topical <User Schedule>  ertapenem  IVPB 500 milliGRAM(s) IV Intermittent every 24 hours  hydrocortisone sodium succinate Injectable 100 milliGRAM(s) IV Push every 8 hours  lactated ringers. 1000 milliLiter(s) (75 mL/Hr) IV Continuous <Continuous>  levothyroxine Injectable 75 MICROGram(s) IV Push at bedtime  loperamide 2 milliGRAM(s) Oral two times a day  minocycline IVPB      minocycline IVPB 200 milliGRAM(s) IV Intermittent every 12 hours  norepinephrine Infusion 0.05 MICROgram(s)/kG/Min (5.66 mL/Hr) IV Continuous <Continuous>  pantoprazole  Injectable 40 milliGRAM(s) IV Push daily

## 2024-12-17 NOTE — CONSULT NOTE ADULT - ASSESSMENT
79-year-old female  with 2-year plus history metastatic renal cell carcinoma history numerous courses immunotherapy ,  IR  embolization of liver metastases, history of hepatic abscesses, sp  drainage and IV and p.o. antibiotic/stent followed closely by ID recent dc with sepsis on long term abx now re admitted with shock likely sepsis from possible enteritis.     1) Sepsis/enteritis  as per ID - on invanz lower dose to 500mg daily/minocycline 200mg BID completing long term abx course through 12/25   her pressor requirement has decreased and seems to be responding to abx      2. pneumatosis coli ?  Ischemic bowel?   now sp  right hemicolectomy for  pneumatosis of the cecum  surgery following    3. high ostomy output  started on imodium, fluids for dehydration  weaned off pressors and weaning off steroids    4. met rcc   Shall hold Tivozanib as it is a VEFG inhibitor and can be associated with both arterial and venous thromboembolism and possible bowel findings    f/u with Dr Saunders after dc      Dusty Marcial MD  [facility]  Cell: [phone]

## 2024-12-17 NOTE — PROGRESS NOTE ADULT - ASSESSMENT
Ms. 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 Patient had drain placed by IR into R lateral hepatic collection and it was D/C'ed this Monday 12/09/2024. Patient was discharged on receiving Minocycline 200mg PO q12h for S. maltophilia in liver abscess and Ertapenem 1g daily for Enterococcus faecium. She was to continue these abx until 12/25/2024 She presents to ED this time because she was feeling very weak. She has been having high output from her ostomy, son at bedside says they changed her bag x 3. In ED she received 4L IVF but remains hypotensive. Her labs are significant for a WBC of 15K, lactate 5.7, new renal dysfunction BUN 70 & SCr 2.93, and elevated transaminases. Patient had CT abdomen/pelvis IV contrast reveals "inflammatory thickening of the wall of multiple contiguous loops of ileum c/w enteritis which could be 2/2 infection, IBD, or ischemia. There are numerous complex/heterogenous hepatic lesions w/are most compatible w/metastases, hepatic abscesses can have similar appearance and are not excluded". Patient was ordered Vancomycin IV, Ertapenem for abx by ED.Pt given PO vancomycin, stress dose steroids, and Levophed as well as additional IVF boluses.     Sepsis. Enteritis. Liver abscess. Metastatic Renal Cell Cancer. Leukocytosis. PRANAY. Immunocompromised host   - gi eval noted  - not on pressors  - gi pcr neg f/u c diff pcr ---negative  - f/u blood cx   - on invanz lower dose to 500mg daily/minocycline 200mg BID completing long term abx course through 12/25   - fu cbc  - continue with antibiotic coverage  - monitor temps  - tolerating abx well so far; no side effects noted  - reason for abx use and side effects reviewed with patient  - supportive care    [facility] token not applicable due to patient on long term antibiotics

## 2024-12-17 NOTE — PROGRESS NOTE ADULT - SUBJECTIVE AND OBJECTIVE BOX
Date of service: 12-17-24 @ 11:25    Patient seen in am; sitting in chair  Afebrile, is off pressors  Has no appetite        ROS: no fever or chills; denies dizziness, no HA, no SOB or cough, no abdominal pain, no  constipation; no dysuria, no urinary frequency, no legs pain, no rashes    MEDICATIONS  (STANDING):  aMIOdarone    Tablet 100 milliGRAM(s) Oral daily  apixaban 2.5 milliGRAM(s) Oral two times a day  chlorhexidine 2% Cloths 1 Application(s) Topical <User Schedule>  ertapenem  IVPB 500 milliGRAM(s) IV Intermittent every 24 hours  hydrocortisone sodium succinate Injectable 100 milliGRAM(s) IV Push every 8 hours  lactated ringers. 1000 milliLiter(s) (75 mL/Hr) IV Continuous <Continuous>  levothyroxine Injectable 75 MICROGram(s) IV Push at bedtime  loperamide 2 milliGRAM(s) Oral two times a day  minocycline IVPB      minocycline IVPB 200 milliGRAM(s) IV Intermittent every 12 hours  pantoprazole  Injectable 40 milliGRAM(s) IV Push daily    MEDICATIONS  (PRN):  ondansetron Injectable 4 milliGRAM(s) IV Push every 4 hours PRN Nausea and/or Vomiting      Vital Signs Last 24 Hrs  T(C): 36.2 (17 Dec 2024 02:00), Max: 36.6 (16 Dec 2024 20:00)  T(F): 97.1 (17 Dec 2024 02:00), Max: 97.8 (16 Dec 2024 20:00)  HR: 73 (17 Dec 2024 09:00) (68 - 103)  BP: 101/71 (17 Dec 2024 09:00) (84/70 - 121/78)  BP(mean): 81 (17 Dec 2024 09:00) (67 - 100)  RR: 14 (17 Dec 2024 09:00) (10 - 26)  SpO2: 100% (17 Dec 2024 09:00) (93% - 100%)    Parameters below as of 17 Dec 2024 06:00  Patient On (Oxygen Delivery Method): room air            Physical Exam:                PE:  Constitutional: NAD  HEENT: NC/AT, EOMI, PERRLA, conjunctivae clear; ears and nose atraumatic; pharynx benign  Neck: supple; thyroid not palpable  Back: no tenderness  Respiratory: respiratory effort normal; clear to auscultation  Cardiovascular: S1S2 regular, no murmurs  Abdomen: soft,  tender, not distended, positive BS; liver and spleen WNL; ostomy in place  Genitourinary: no suprapubic tenderness  Lymphatic: no LN palpable  Musculoskeletal: no muscle tenderness, no joint swelling or tenderness  Extremities: no pedal edema  Neurological/ Psychiatric: AxOx3, Judgement and insight normal;  moving all extremities  Skin: no rashes; no palpable lesions    Labs: all available labs reviewed                        Labs:                        11.2   14.01 )-----------( 205      ( 17 Dec 2024 06:45 )             34.8     12-17    139  |  107  |  24[H]  ----------------------------<  100[H]  4.2   |  23  |  0.95    Ca    8.5      17 Dec 2024 06:45  Phos  3.1     12-17  Mg     1.7     12-17             Cultures:       Urinalysis with Rflx Culture (collected 12-15-24 @ 04:31)    Culture - Blood (collected 12-15-24 @ 03:56)  Source: .Blood BLOOD  Preliminary Report (12-17-24 @ 11:01):    No growth at 48 Hours    Culture - Blood (collected 12-15-24 @ 03:56)  Source: .Blood BLOOD  Preliminary Report (12-17-24 @ 11:01):    No growth at 48 Hours          Radiology: all available radiological tests reviewed  < from: CT Abdomen and Pelvis w/ IV Cont (12.15.24 @ 01:25) >      INTERPRETATION:  [facility] RADIOLOGIST PRELIMINARY REPORT    PROCEDURE INFORMATION:  Exam: CT Abdomen And Pelvis With Contrast  Exam date and time: 12/15/2024 1:21 AM  Age: 79 years old  Clinical indication: Hypoglycemia, abd pain, nausea, vomiting    TECHNIQUE:  Imaging protocol: Computed tomography of the abdomen and pelvis with   contrast.  Contrast material: IV: OMNIPAQUE 350; Contrast volume: 90 ml; Contrast   route:  IV;    COMPARISON:  CT ABDOMEN AND PELVIS 11/30/2024 2:21 PM    FINDINGS:  Lungs: There are numerous bilateral pulmonary nodules, most compatible   with  metastases.    Liver: There are numerous complex/heterogeneous hepatic lesions which are   most  compatible appearance with metastases. Hepatic abscesses can have a  similar  appearance and are not excluded.  Gallbladder and biliary ducts: Biliary stent present. Expected   pneumobilia. No  biliary ductal dilatation. Gallbladder is absent.  Pancreas: Unremarkable.  Spleen: Normal.  Adrenal glands: Normal. No mass.  Kidneys and ureters: Left renal pelvic cysts. Small incidental left renal   cyst.  Stomach and bowel: Right lower quadrant ileocolic surgical anastomosis.   Right  lower quadrant ileostomy. Inflammatory thickening of the wall of multiple  contiguous loops of ileum in the mid to lower right abdomen, compatible   with  enteritis which could be due to infection, inflammatory bowel disease, or  ischemia. No specific ancillary findings of bowel ischemia. No   pneumatosis or  portal/mesenteric venous gas. No intestinal obstruction.  Appendix: Normal appendix.    Intraperitoneal space: No pneumoperitoneum or abscess.  Vasculature: See &quot;Stomach and bowel&quot; finding.  Lymph nodes: Unremarkable.  Urinary bladder: Urinary bladder is distendedbut otherwise unremarkable.  Reproductive: Unremarkable as visualized.  Bones/joints: Unremarkable. No acute fracture.  Soft tissues: Unremarkable.    IMPRESSION:  1.   Inflammatory thickening of the wall of multiple contiguous loops of   ileum  in the mid to lower right abdomen, compatible with enteritis which could   be due  to infection, inflammatory bowel disease, or ischemia. No specific   ancillary  findings of bowel ischemia.  2.   There are numerous bilateral pulmonary nodules, most compatible with  metastases.  3.   There are numerous complex/heterogeneous hepatic lesions which are   most  compatible appearance with metastases. Hepatic abscesses can have a   similar  appearance and are not excluded.      Advanced directives addressed: full resuscitation

## 2024-12-17 NOTE — PROGRESS NOTE ADULT - SUBJECTIVE AND OBJECTIVE BOX
ICU  Note    HPI:    S:    Pt seen and examined  Ms. 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 Patient had drain placed by IR into R lateral hepatic collection and it was D/C'ed this Monday 12/09/2024. Patient was discharged on receiving Minocycline 200mg PO q12h for S. maltophilia in liver abscess and Ertapenem 1g daily for Enterococcus faecium. She was to continue these abx until 12/25/2024      She presents to ED this time because she was feeling very weak. Patient admits to poor PO intake. She has been having high output from her ostomy, son at bedside says they changed her bag x 3 today. In ED she received 4L IVF but remains hypotensive. Her labs are significant for a WBC of 15K, lactate 5.7, new renal dysfunction BUN 70 & SCr 2.93, and elevated transaminases. Patient was also hypoglycemic to 40s requiring treatment w/dextrose.  Patient had CT abdomen/pelvis IV contrast reveals "inflammatory thickening of the wall of multiple contiguous loops of ileum c/w enteritis which could be 2/2 infection, IBD, or ischemia. There are numerous complex/heterogenous hepatic lesions w/are most compatible w/metastases, hepatic abscesses can have similar appearance and are not excluded". Patient was ordered Vancomycin IV, Ertapenem for abx by ED. They also ordered blood, urine cultures and Cdiff. I ordered patient PO vancomycin, stress dose steroids, and Levophed as well as additional IVF boluses    12/17: Remains on pressors being actively titrated, continuing IVF    ROS: + weakness  improving      Allergies    Lipitor (Unknown)  Dyazide (Faint)  statins (Unknown)  Zetia (Unknown)  Crestor (Other)  tivozanib (Faint)  Norvasc (Faint)  bacitracin (Rash)  Cabometyx (Unknown)    Intolerances        MEDICATIONS  (STANDING):  aMIOdarone    Tablet 100 milliGRAM(s) Oral daily  chlorhexidine 2% Cloths 1 Application(s) Topical <User Schedule>  ertapenem  IVPB 1000 milliGRAM(s) IV Intermittent every 24 hours  hydrocortisone 20 milliGRAM(s) Oral daily  hydrocortisone 10 milliGRAM(s) Oral at bedtime  levothyroxine Injectable 75 MICROGram(s) IV Push at bedtime  loperamide 2 milliGRAM(s) Oral two times a day  midodrine 10 milliGRAM(s) Oral every 8 hours  minocycline IVPB      minocycline IVPB 200 milliGRAM(s) IV Intermittent every 12 hours  pantoprazole    Tablet 40 milliGRAM(s) Oral before breakfast    MEDICATIONS  (PRN):  ondansetron Injectable 4 milliGRAM(s) IV Push every 4 hours PRN Nausea and/or Vomiting      Drug Dosing Weight  Height (cm): 154.9 (17 Dec 2024 21:08)  Weight (kg): 60.4 (17 Dec 2024 21:08)  BMI (kg/m2): 25.2 (17 Dec 2024 21:08)  BSA (m2): 1.59 (17 Dec 2024 21:08)        PAST MEDICAL & SURGICAL HISTORY:  Hypertension      High cholesterol      Diverticulitis      History of diverticulitis      Hypothyroidism      Renal cell cancer      Metastasis to liver      Paroxysmal atrial fibrillation      Cholelithiasis with cholangitis      History of biliary stent insertion      History of tonsillectomy      History of laparotomy      History of arthroscopy      H/O bilateral oophorectomy      S/P surgical removal of pilonidal cyst          FAMILY HISTORY:        REVIEW OF SYSTEMS    UNLESS OTHERWISE NOTED IN HPI above:    Constitutional:  No Weight Change, No Fever, No Chills, No Night Sweats, No Fatigue, No Malaise  ENT/Mouth:  No Hearing Changes, No Ear Pain, No Nasal Congestion, No  Sinus Pain, No Hoarseness, No sore throat, No Rhinorrhea, No Swallowing  Difficulty  Eyes:  No Eye Pain, No Swelling, No Redness, No Foreign Body, No Discharge, No Vision Changes  Cardiovascular:  No Chest Pain, No SOB, No PND, No Dyspnea on Exertion,  No Orthopnea, No Claudication, No Edema, No Palpitations  Respiratory:  No Cough, No Sputum, No Wheezing, No Smoke Exposure, No Dyspnea  Gastrointestinal:  No Nausea, No Vomiting, No Diarrhea, No  Constipation, No Pain, No Heartburn, No Anorexia, No Dysphagia, No  Hematochezia, No Melena, No Flatulence, No Jaundice  Genitourinary:  No Dysmenorrhea, No DUB, No Dyspareunia, No Dysuria, No  Urinary Frequency, No Hematuria, No Urinary Incontinence, No Urgency,  No Flank Pain, No Urinary Flow Changes, No Hesitancy  Musculoskeletal:  No Arthralgias, No Myalgias, No Joint Swelling, No  Joint Stiffness, No Back Pain, No Neck Pain, No Injury History  Skin:  No Skin Lesions, No Pruritis, No Hair Changes, No Breast/Skin Changes, No Nipple Discharge  Neuro:  No Weakness, No Numbness, No Paresthesias, No Loss of  Consciousness, No Syncope, No Dizziness, No Headache, No Coordination  Changes, No Recent Falls  Psych:  No Anxiety/Panic, No Depression, No Insomnia, No Personality  Changes, No Delusions, No Rumination, No SI/HI/AH/VH, No Social Issues,  No Memory Changes, No Violence/Abuse Hx., No Eating Concerns  Heme/Lymph:  No Bruising, No Bleeding, No Transfusions History, No Lymphadenopathy  Endocrine:  No Polyuria, No Polydipsia, No Temperature Intolerance    O:    ICU Vital Signs Last 24 Hrs  T(C): 36.3 (18 Dec 2024 13:50), Max: 36.4 (18 Dec 2024 00:00)  T(F): 97.4 (18 Dec 2024 13:50), Max: 97.5 (18 Dec 2024 00:00)  HR: 61 (18 Dec 2024 13:50) (61 - 86)  BP: 110/81 (18 Dec 2024 13:50) (91/61 - 125/79)  BP(mean): 72 (18 Dec 2024 12:00) (71 - 102)  ABP: --  ABP(mean): --  RR: 18 (18 Dec 2024 13:50) (9 - 22)  SpO2: 100% (18 Dec 2024 13:50) (96% - 100%)    O2 Parameters below as of 18 Dec 2024 13:50  Patient On (Oxygen Delivery Method): room air                I&O's Detail    17 Dec 2024 07:01  -  18 Dec 2024 07:00  --------------------------------------------------------  IN:    IV PiggyBack: 250 mL    Lactated Ringers: 809 mL    Oral Fluid: 1160 mL  Total IN: 2219 mL    OUT:    Colostomy (mL): 650 mL    Voided (mL): 1750 mL  Total OUT: 2400 mL    Total NET: -181 mL      18 Dec 2024 07:01  -  18 Dec 2024 17:52  --------------------------------------------------------  IN:    IV PiggyBack: 550 mL  Total IN: 550 mL    OUT:    Colostomy (mL): 400 mL    Voided (mL): 400 mL  Total OUT: 800 mL    Total NET: -250 mL              PE:    General: No acute distress  HEENT: Pupils equal, reactive to light.  Symmetric  PULM: Clear to auscultation bilaterally, no significant sputum production  CVS: Regular rate and rhythm  ABD: Soft, nondistended, nontender to light palpation, slightly tender to deep palpation of mid upper abdomen; + ostomy output, viable  EXT: No edema, nontender  SKIN: Warm and well perfused  NEURO: A&Ox3    LABS:    CBC Full  -  ( 18 Dec 2024 05:31 )  WBC Count : 12.02 K/uL  RBC Count : 3.66 M/uL  Hemoglobin : 9.7 g/dL  Hematocrit : 30.1 %  Platelet Count - Automated : 165 K/uL  Mean Cell Volume : 82.2 fl  Mean Cell Hemoglobin : 26.5 pg  Mean Cell Hemoglobin Concentration : 32.2 g/dL  Auto Neutrophil # : x  Auto Lymphocyte # : x  Auto Monocyte # : x  Auto Eosinophil # : x  Auto Basophil # : x  Auto Neutrophil % : x  Auto Lymphocyte % : x  Auto Monocyte % : x  Auto Eosinophil % : x  Auto Basophil % : x    12-18    138  |  108  |  36[H]  ----------------------------<  79  4.3   |  26  |  0.89    Ca    8.2[L]      18 Dec 2024 05:31  Phos  2.2     12-18  Mg     1.8     12-18    TPro  4.5[L]  /  Alb  1.9[L]  /  TBili  1.3[H]  /  DBili  x   /  AST  203[H]  /  ALT  163[H]  /  AlkPhos  416[H]  12-18      Urinalysis Basic - ( 18 Dec 2024 05:31 )    Color: x / Appearance: x / SG: x / pH: x  Gluc: 79 mg/dL / Ketone: x  / Bili: x / Urobili: x   Blood: x / Protein: x / Nitrite: x   Leuk Esterase: x / RBC: x / WBC x   Sq Epi: x / Non Sq Epi: x / Bacteria: x      CAPILLARY BLOOD GLUCOSE            LIVER FUNCTIONS - ( 18 Dec 2024 05:31 )  Alb: 1.9 g/dL / Pro: 4.5 gm/dL / ALK PHOS: 416 U/L / ALT: 163 U/L / AST: 203 U/L / GGT: x

## 2024-12-17 NOTE — PROGRESS NOTE ADULT - ASSESSMENT
80 yo fem dehydration  -Will start imodium 2mg BID to slow down ostomy output  -Wean off IV steroids (Iv hydrocort 100mg q8)  -REg diet/ensure  -OOB

## 2024-12-18 DIAGNOSIS — K21.9 GASTRO-ESOPHAGEAL REFLUX DISEASE WITHOUT ESOPHAGITIS: ICD-10-CM

## 2024-12-18 DIAGNOSIS — E03.9 HYPOTHYROIDISM, UNSPECIFIED: ICD-10-CM

## 2024-12-18 DIAGNOSIS — N17.9 ACUTE KIDNEY FAILURE, UNSPECIFIED: ICD-10-CM

## 2024-12-18 DIAGNOSIS — D64.9 ANEMIA, UNSPECIFIED: ICD-10-CM

## 2024-12-18 DIAGNOSIS — Z88.8 ALLERGY STATUS TO OTHER DRUGS, MEDICAMENTS AND BIOLOGICAL SUBSTANCES: ICD-10-CM

## 2024-12-18 DIAGNOSIS — Z79.2 LONG TERM (CURRENT) USE OF ANTIBIOTICS: ICD-10-CM

## 2024-12-18 DIAGNOSIS — B95.2 ENTEROCOCCUS AS THE CAUSE OF DISEASES CLASSIFIED ELSEWHERE: ICD-10-CM

## 2024-12-18 DIAGNOSIS — E27.40 UNSPECIFIED ADRENOCORTICAL INSUFFICIENCY: ICD-10-CM

## 2024-12-18 DIAGNOSIS — Z79.01 LONG TERM (CURRENT) USE OF ANTICOAGULANTS: ICD-10-CM

## 2024-12-18 DIAGNOSIS — Z11.52 ENCOUNTER FOR SCREENING FOR COVID-19: ICD-10-CM

## 2024-12-18 DIAGNOSIS — Z88.1 ALLERGY STATUS TO OTHER ANTIBIOTIC AGENTS: ICD-10-CM

## 2024-12-18 DIAGNOSIS — Z90.722 ACQUIRED ABSENCE OF OVARIES, BILATERAL: ICD-10-CM

## 2024-12-18 DIAGNOSIS — D72.829 ELEVATED WHITE BLOOD CELL COUNT, UNSPECIFIED: ICD-10-CM

## 2024-12-18 DIAGNOSIS — C78.01 SECONDARY MALIGNANT NEOPLASM OF RIGHT LUNG: ICD-10-CM

## 2024-12-18 DIAGNOSIS — B96.89 OTHER SPECIFIED BACTERIAL AGENTS AS THE CAUSE OF DISEASES CLASSIFIED ELSEWHERE: ICD-10-CM

## 2024-12-18 DIAGNOSIS — C64.9 MALIGNANT NEOPLASM OF UNSPECIFIED KIDNEY, EXCEPT RENAL PELVIS: ICD-10-CM

## 2024-12-18 DIAGNOSIS — I10 ESSENTIAL (PRIMARY) HYPERTENSION: ICD-10-CM

## 2024-12-18 DIAGNOSIS — Z79.890 HORMONE REPLACEMENT THERAPY: ICD-10-CM

## 2024-12-18 DIAGNOSIS — Z79.52 LONG TERM (CURRENT) USE OF SYSTEMIC STEROIDS: ICD-10-CM

## 2024-12-18 DIAGNOSIS — I48.0 PAROXYSMAL ATRIAL FIBRILLATION: ICD-10-CM

## 2024-12-18 DIAGNOSIS — K63.89 OTHER SPECIFIED DISEASES OF INTESTINE: ICD-10-CM

## 2024-12-18 DIAGNOSIS — A41.9 SEPSIS, UNSPECIFIED ORGANISM: ICD-10-CM

## 2024-12-18 DIAGNOSIS — Z96.89 PRESENCE OF OTHER SPECIFIED FUNCTIONAL IMPLANTS: ICD-10-CM

## 2024-12-18 DIAGNOSIS — E16.2 HYPOGLYCEMIA, UNSPECIFIED: ICD-10-CM

## 2024-12-18 DIAGNOSIS — Z79.899 OTHER LONG TERM (CURRENT) DRUG THERAPY: ICD-10-CM

## 2024-12-18 DIAGNOSIS — R65.21 SEVERE SEPSIS WITH SEPTIC SHOCK: ICD-10-CM

## 2024-12-18 DIAGNOSIS — C78.7 SECONDARY MALIGNANT NEOPLASM OF LIVER AND INTRAHEPATIC BILE DUCT: ICD-10-CM

## 2024-12-18 DIAGNOSIS — Z79.61 LONG TERM (CURRENT) USE OF IMMUNOMODULATOR: ICD-10-CM

## 2024-12-18 LAB
ALBUMIN SERPL ELPH-MCNC: 1.9 G/DL — LOW (ref 3.3–5)
ALP SERPL-CCNC: 416 U/L — HIGH (ref 40–120)
ALT FLD-CCNC: 163 U/L — HIGH (ref 12–78)
ANION GAP SERPL CALC-SCNC: 4 MMOL/L — LOW (ref 5–17)
AST SERPL-CCNC: 203 U/L — HIGH (ref 15–37)
BILIRUB SERPL-MCNC: 1.3 MG/DL — HIGH (ref 0.2–1.2)
BUN SERPL-MCNC: 36 MG/DL — HIGH (ref 7–23)
CALCIUM SERPL-MCNC: 8.2 MG/DL — LOW (ref 8.5–10.1)
CHLORIDE SERPL-SCNC: 108 MMOL/L — SIGNIFICANT CHANGE UP (ref 96–108)
CO2 SERPL-SCNC: 26 MMOL/L — SIGNIFICANT CHANGE UP (ref 22–31)
CREAT SERPL-MCNC: 0.89 MG/DL — SIGNIFICANT CHANGE UP (ref 0.5–1.3)
EGFR: 67 ML/MIN/1.73M2 — SIGNIFICANT CHANGE UP
GLUCOSE SERPL-MCNC: 79 MG/DL — SIGNIFICANT CHANGE UP (ref 70–99)
HCT VFR BLD CALC: 30.1 % — LOW (ref 34.5–45)
HGB BLD-MCNC: 9.7 G/DL — LOW (ref 11.5–15.5)
LACTATE SERPL-SCNC: 1.6 MMOL/L — SIGNIFICANT CHANGE UP (ref 0.7–2)
MAGNESIUM SERPL-MCNC: 1.8 MG/DL — SIGNIFICANT CHANGE UP (ref 1.6–2.6)
MCHC RBC-ENTMCNC: 26.5 PG — LOW (ref 27–34)
MCHC RBC-ENTMCNC: 32.2 G/DL — SIGNIFICANT CHANGE UP (ref 32–36)
MCV RBC AUTO: 82.2 FL — SIGNIFICANT CHANGE UP (ref 80–100)
PHOSPHATE SERPL-MCNC: 2.2 MG/DL — LOW (ref 2.5–4.5)
PLATELET # BLD AUTO: 165 K/UL — SIGNIFICANT CHANGE UP (ref 150–400)
POTASSIUM SERPL-MCNC: 4.3 MMOL/L — SIGNIFICANT CHANGE UP (ref 3.5–5.3)
POTASSIUM SERPL-SCNC: 4.3 MMOL/L — SIGNIFICANT CHANGE UP (ref 3.5–5.3)
PROT SERPL-MCNC: 4.5 GM/DL — LOW (ref 6–8.3)
RBC # BLD: 3.66 M/UL — LOW (ref 3.8–5.2)
RBC # FLD: 18.3 % — HIGH (ref 10.3–14.5)
SODIUM SERPL-SCNC: 138 MMOL/L — SIGNIFICANT CHANGE UP (ref 135–145)
WBC # BLD: 12.02 K/UL — HIGH (ref 3.8–10.5)
WBC # FLD AUTO: 12.02 K/UL — HIGH (ref 3.8–10.5)

## 2024-12-18 PROCEDURE — 99223 1ST HOSP IP/OBS HIGH 75: CPT

## 2024-12-18 RX ORDER — SODIUM PHOSPHATE, MONOBASIC, MONOHYDRATE AND SODIUM PHOSPHATE, DIBASIC ANHYDROUS 142; 276 MG/ML; MG/ML
15 INJECTION, SOLUTION INTRAVENOUS ONCE
Refills: 0 | Status: COMPLETED | OUTPATIENT
Start: 2024-12-18 | End: 2024-12-18

## 2024-12-18 RX ADMIN — Medication 2 MILLIGRAM(S): at 10:28

## 2024-12-18 RX ADMIN — AMIODARONE HYDROCHLORIDE 100 MILLIGRAM(S): 200 TABLET ORAL at 10:28

## 2024-12-18 RX ADMIN — HYDROCORTISONE 20 MILLIGRAM(S): 100 ENEMA RECTAL at 08:41

## 2024-12-18 RX ADMIN — MIDODRINE HYDROCHLORIDE 10 MILLIGRAM(S): 5 TABLET ORAL at 21:33

## 2024-12-18 RX ADMIN — MIDODRINE HYDROCHLORIDE 10 MILLIGRAM(S): 5 TABLET ORAL at 05:38

## 2024-12-18 RX ADMIN — MINOCYCLINE HYDROCHLORIDE 100 MILLIGRAM(S): 100 CAPSULE ORAL at 19:57

## 2024-12-18 RX ADMIN — SODIUM PHOSPHATE, MONOBASIC, MONOHYDRATE AND SODIUM PHOSPHATE, DIBASIC ANHYDROUS 62.5 MILLIMOLE(S): 142; 276 INJECTION, SOLUTION INTRAVENOUS at 08:41

## 2024-12-18 RX ADMIN — MINOCYCLINE HYDROCHLORIDE 100 MILLIGRAM(S): 100 CAPSULE ORAL at 05:39

## 2024-12-18 RX ADMIN — ERTAPENEM SODIUM 120 MILLIGRAM(S): 1 INJECTION, POWDER, LYOPHILIZED, FOR SOLUTION INTRAMUSCULAR; INTRAVENOUS at 05:38

## 2024-12-18 RX ADMIN — PANTOPRAZOLE 40 MILLIGRAM(S): 40 TABLET, DELAYED RELEASE ORAL at 05:39

## 2024-12-18 RX ADMIN — LEVOTHYROXINE SODIUM 75 MICROGRAM(S): 175 TABLET ORAL at 21:33

## 2024-12-18 RX ADMIN — MIDODRINE HYDROCHLORIDE 10 MILLIGRAM(S): 5 TABLET ORAL at 13:27

## 2024-12-18 RX ADMIN — Medication 2 MILLIGRAM(S): at 21:33

## 2024-12-18 RX ADMIN — HYDROCORTISONE 10 MILLIGRAM(S): 100 ENEMA RECTAL at 21:32

## 2024-12-18 NOTE — PROGRESS NOTE ADULT - SUBJECTIVE AND OBJECTIVE BOX
Date of service: 12-18-24 @ 11:53      Patient sitting in chair; afebrile, off pressors  Plans to have ERCP tomorrow   Hopefully will be discharged on this coming weekend      ROS: no fever or chills; denies dizziness, no HA, no SOB or cough, no abdominal pain, no diarrhea or constipation; no dysuria, no urinary frequency, no legs pain, no rashes    MEDICATIONS  (STANDING):  aMIOdarone    Tablet 100 milliGRAM(s) Oral daily  chlorhexidine 2% Cloths 1 Application(s) Topical <User Schedule>  ertapenem  IVPB 1000 milliGRAM(s) IV Intermittent every 24 hours  hydrocortisone 20 milliGRAM(s) Oral daily  hydrocortisone 10 milliGRAM(s) Oral at bedtime  levothyroxine Injectable 75 MICROGram(s) IV Push at bedtime  loperamide 2 milliGRAM(s) Oral two times a day  midodrine 10 milliGRAM(s) Oral every 8 hours  minocycline IVPB      minocycline IVPB 200 milliGRAM(s) IV Intermittent every 12 hours  pantoprazole    Tablet 40 milliGRAM(s) Oral before breakfast    MEDICATIONS  (PRN):  ondansetron Injectable 4 milliGRAM(s) IV Push every 4 hours PRN Nausea and/or Vomiting      Vital Signs Last 24 Hrs  T(C): 36.3 (18 Dec 2024 08:00), Max: 36.9 (17 Dec 2024 16:00)  T(F): 97.3 (18 Dec 2024 08:00), Max: 98.4 (17 Dec 2024 16:00)  HR: 64 (18 Dec 2024 08:00) (62 - 89)  BP: 100/63 (18 Dec 2024 08:00) (86/58 - 125/79)  BP(mean): 75 (18 Dec 2024 08:00) (66 - 102)  RR: 11 (18 Dec 2024 08:00) (9 - 23)  SpO2: 98% (18 Dec 2024 08:00) (96% - 100%)    Parameters below as of 18 Dec 2024 08:00  Patient On (Oxygen Delivery Method): room air            Physical Exam:              PE:  Constitutional: NAD  HEENT: NC/AT, EOMI, PERRLA, conjunctivae clear; ears and nose atraumatic; pharynx benign  Neck: supple; thyroid not palpable  Back: no tenderness  Respiratory: respiratory effort normal; clear to auscultation  Cardiovascular: S1S2 regular, no murmurs  Abdomen: soft,  tender, not distended, positive BS; liver and spleen WNL; ostomy in place  Genitourinary: no suprapubic tenderness  Lymphatic: no LN palpable  Musculoskeletal: no muscle tenderness, no joint swelling or tenderness  Extremities: no pedal edema  Neurological/ Psychiatric: AxOx3, Judgement and insight normal;  moving all extremities  Skin: no rashes; no palpable lesions    Labs: all available labs reviewed                        Labs:                          Labs:                        9.7    12.02 )-----------( 165      ( 18 Dec 2024 05:31 )             30.1     12-18    138  |  108  |  36[H]  ----------------------------<  79  4.3   |  26  |  0.89    Ca    8.2[L]      18 Dec 2024 05:31  Phos  2.2     12-18  Mg     1.8     12-18    TPro  4.5[L]  /  Alb  1.9[L]  /  TBili  1.3[H]  /  DBili  x   /  AST  203[H]  /  ALT  163[H]  /  AlkPhos  416[H]  12-18           Cultures:       Urinalysis with Rflx Culture (collected 12-15-24 @ 04:31)    Culture - Blood (collected 12-15-24 @ 03:56)  Source: .Blood BLOOD  Preliminary Report (12-18-24 @ 11:01):    No growth at 72 Hours    Culture - Blood (collected 12-15-24 @ 03:56)  Source: .Blood BLOOD  Preliminary Report (12-18-24 @ 11:01):    No growth at 72 Hours              Radiology: all available radiological tests reviewed  < from: CT Abdomen and Pelvis w/ IV Cont (12.15.24 @ 01:25) >      INTERPRETATION:  [facility] RADIOLOGIST PRELIMINARY REPORT    PROCEDURE INFORMATION:  Exam: CT Abdomen And Pelvis With Contrast  Exam date and time: 12/15/2024 1:21 AM  Age: 79 years old  Clinical indication: Hypoglycemia, abd pain, nausea, vomiting    TECHNIQUE:  Imaging protocol: Computed tomography of the abdomen and pelvis with   contrast.  Contrast material: IV: OMNIPAQUE 350; Contrast volume: 90 ml; Contrast   route:  IV;    COMPARISON:  CT ABDOMEN AND PELVIS 11/30/2024 2:21 PM    FINDINGS:  Lungs: There are numerous bilateral pulmonary nodules, most compatible   with  metastases.    Liver: There are numerous complex/heterogeneous hepatic lesions which are   most  compatible appearance with metastases. Hepatic abscesses can have a  similar  appearance and are not excluded.  Gallbladder and biliary ducts: Biliary stent present. Expected   pneumobilia. No  biliary ductal dilatation. Gallbladder is absent.  Pancreas: Unremarkable.  Spleen: Normal.  Adrenal glands: Normal. No mass.  Kidneys and ureters: Left renal pelvic cysts. Small incidental left renal   cyst.  Stomach and bowel: Right lower quadrant ileocolic surgical anastomosis.   Right  lower quadrant ileostomy. Inflammatory thickening of the wall of multiple  contiguous loops of ileum in the mid to lower right abdomen, compatible   with  enteritis which could be due to infection, inflammatory bowel disease, or  ischemia. No specific ancillary findings of bowel ischemia. No   pneumatosis or  portal/mesenteric venous gas. No intestinal obstruction.  Appendix: Normal appendix.    Intraperitoneal space: No pneumoperitoneum or abscess.  Vasculature: See &quot;Stomach and bowel&quot; finding.  Lymph nodes: Unremarkable.  Urinary bladder: Urinary bladder is distendedbut otherwise unremarkable.  Reproductive: Unremarkable as visualized.  Bones/joints: Unremarkable. No acute fracture.  Soft tissues: Unremarkable.    IMPRESSION:  1.   Inflammatory thickening of the wall of multiple contiguous loops of   ileum  in the mid to lower right abdomen, compatible with enteritis which could   be due  to infection, inflammatory bowel disease, or ischemia. No specific   ancillary  findings of bowel ischemia.  2.   There are numerous bilateral pulmonary nodules, most compatible with  metastases.  3.   There are numerous complex/heterogeneous hepatic lesions which are   most  compatible appearance with metastases. Hepatic abscesses can have a   similar  appearance and are not excluded.      Advanced directives addressed: full resuscitation

## 2024-12-18 NOTE — PROGRESS NOTE ADULT - SUBJECTIVE AND OBJECTIVE BOX
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 Here with hypotension / high out put from ileostomy improved with anti motility agents.  Feels a little better today; no sig diarrhea. No pains  + weakness, decreased appetite.         Allergies    Lipitor (Unknown)  Dyazide (Faint)  statins (Unknown)  Zetia (Unknown)  lactose (Other (Severe); Diarrhea)  Crestor (Other)  tivozanib (Faint)  Norvasc (Faint)  bacitracin (Rash)  Cabometyx (Unknown)    Intolerances        MEDICATIONS  (STANDING):  aMIOdarone    Tablet 100 milliGRAM(s) Oral daily  chlorhexidine 2% Cloths 1 Application(s) Topical <User Schedule>  ertapenem  IVPB 1000 milliGRAM(s) IV Intermittent every 24 hours  hydrocortisone 20 milliGRAM(s) Oral daily  hydrocortisone 10 milliGRAM(s) Oral at bedtime  levothyroxine Injectable 75 MICROGram(s) IV Push at bedtime  loperamide 2 milliGRAM(s) Oral two times a day  midodrine 10 milliGRAM(s) Oral every 8 hours  minocycline IVPB      minocycline IVPB 200 milliGRAM(s) IV Intermittent every 12 hours  pantoprazole    Tablet 40 milliGRAM(s) Oral before breakfast    MEDICATIONS  (PRN):  ondansetron Injectable 4 milliGRAM(s) IV Push every 4 hours PRN Nausea and/or Vomiting      Vital Signs Last 24 Hrs  T(C): 36.3 (18 Dec 2024 08:00), Max: 36.9 (17 Dec 2024 16:00)  T(F): 97.3 (18 Dec 2024 08:00), Max: 98.4 (17 Dec 2024 16:00)  HR: 64 (18 Dec 2024 08:00) (62 - 113)  BP: 100/63 (18 Dec 2024 08:00) (85/58 - 125/79)  BP(mean): 75 (18 Dec 2024 08:00) (66 - 102)  RR: 11 (18 Dec 2024 08:00) (9 - 23)  SpO2: 98% (18 Dec 2024 08:00) (96% - 100%)    Parameters below as of 18 Dec 2024 08:00  Patient On (Oxygen Delivery Method): room air        PHYSICAL EXAM:    GENERAL: NAD,   HEAD:  Atraumatic, Normocephalic  EYES: EOMI, PERRLA, conjunctiva and sclera clear    NECK: Supple, No JVD, Normal thyroid  NERVOUS SYSTEM:    CHEST/LUNG: Clear to percussion bilaterally; No rales, rhonchi,   HEART: Regular rate and rhythm;   ABDOMEN: Soft, Nontender  +Ostomy   EXTREMITIES:   edema: +  LYMPH: No lymphadenopathy noted        LABS:                        9.7    12.02 )-----------( 165      ( 18 Dec 2024 05:31 )             30.1     12-18    138  |  108  |  36[H]  ----------------------------<  79  4.3   |  26  |  0.89    Ca    8.2[L]      18 Dec 2024 05:31  Phos  2.2     12-18  Mg     1.8     12-18    TPro  4.5[L]  /  Alb  1.9[L]  /  TBili  1.3[H]  /  DBili  x   /  AST  203[H]  /  ALT  163[H]  /  AlkPhos  416[H]  12-18      Urinalysis Basic - ( 18 Dec 2024 05:31 )    Color: x / Appearance: x / SG: x / pH: x  Gluc: 79 mg/dL / Ketone: x  / Bili: x / Urobili: x   Blood: x / Protein: x / Nitrite: x   Leuk Esterase: x / RBC: x / WBC x   Sq Epi: x / Non Sq Epi: x / Bacteria: x          RADIOLOGY & ADDITIONAL STUDIES:    < from: CT Abdomen and Pelvis w/ IV Cont (12.15.24 @ 01:25) >  INTERPRETATION:  CLINICAL INFORMATION: 2 week status post colostomy with   hypoglycemia, abdominal pain, nausea and vomiting. Historyof metastatic   renal cancer, prior embolization of liver mass followed by liver   abscesses and septic shock. Recent colonic pneumatosis followed by right   hemicolectomy.    COMPARISON: CT 10/12/2024, MRI 11/6/2024, CT 12/20/2023    CONTRAST/COMPLICATIONS:  IV Contrast: Omnipaque 350  90 cc administered   0 cc discarded  Oral Contrast: NONE  .    PROCEDURE:  CT of the Abdomen and Pelvis was performed.  Sagittal and coronal reformats were performed.    FINDINGS:  LOWER CHEST: Numerous bibasilar pulmonary nodules, concerning for   metastatic disease. Reference 8 mm right lower lobe nodule noted on   series 2, image 2.    LIVER: Relatively atrophic left lobe with nodular contour.   Central/bilobar rim-enhancing fluid collections are overall decreased in   size since contrast enhanced CT 10/12/2024. These are favored to   represent abscesses. Known metastatic lesions are difficult to visualize   on this study. Reference collections as follows:    3.9 x 2.0 cm collection at the dome (2, 17),unchanged since 11/30/2024   when remeasured similarly. This previously contained a drainage catheter.  Segment 5/4 collection measures 4.7 x 4.4 cm (2, 26), previously 5.3 x   4.9 cm on 10/12/2024 when remeasured similarly.  Numerous additional loculated collections have similarly decreased since   prior studies.  BILE DUCTS: Normal caliber with biliary stent and pneumobilia.  GALLBLADDER: Within normal limits.  SPLEEN: Within normal limits.  PANCREAS: Within normal limits.  ADRENALS: Within normal limits.  KIDNEYS/URETERS: Left renal cyst. No hydronephrosis.    BLADDER: Within normal limits.  REPRODUCTIVE ORGANS: Uterus and adnexa within normal limits.    BOWEL: No bowel obstruction. Right hemicolectomy. Right lower quadrant   ileostomy. Wall thickening of numerous bilateral lower quadrant and   pelvic small bowel loops is new since prior, suggesting enteritis.  PERITONEUM/RETROPERITONEUM: No ascites or pneumoperitoneum.  VESSELS: Atherosclerotic changes. Hepatic and portal veins and SMV are   patent.  LYMPH NODES: No lymphadenopathy.  ABDOMINAL WALL: Within normal limits.  BONES: Degenerative changes.    IMPRESSION:  Rim-enhancing liver lesions have decreased in size since previous   studies, suggesting abscesses. Known liver metastases are difficult to   visualize on this study.    New extensive small bowel wall thickening, suggesting enteritis, of   unknown etiology.    Numerous pulmonary metastases again noted.    Preliminary findings were reported by [facility] radiologist Dr. Johnson on   12/15/2024 at 2:19 AM.    --- End of Report ---    < end of copied text >        PATHOLOGY:

## 2024-12-18 NOTE — PROGRESS NOTE ADULT - ASSESSMENT
Ms. Quiros is a 78 YO F w/pmhx of RCC w/mets to liver s/p IR embolization and biliary stent, HTN, HLD, adrenal insufficiency on hydrocortisone,  Afib on Apixaban, hypothyroidism, recent hospitalization 09/29/2024-10/16/2024 for septic shock 2/2 hepatic abscess w/Klebsiella, Enterococcus faecalis, Enterococcus faecium, and Clostridium perfringens, patient had drain placed at that time, patient then presents to ED 11/30/2024 w/abdominal pain, fevers, shock and was found to have pneumatosis of cecum and ascending colon w/multiple liver abscesses w/increasing pulmonary mets s/p laparoscopic R hemicolectomy w/ileostomy on 11/30/2024. Patient had drain placed by IR into R lateral hepatic collection and it was D/C'ed this Monday 12/09/2024. Patient was discharged on receiving Minocycline 200mg PO q12h for S. maltophilia in liver abscess and Ertapenem 1g daily for Enterococcus faecium. She was to continue these abx until 12/25/2024 She presents to ED this time because she was feeling very weak. She has been having high output from her ostomy, son at bedside says they changed her bag x 3. In ED she received 4L IVF but remains hypotensive. Her labs are significant for a WBC of 15K, lactate 5.7, new renal dysfunction BUN 70 & SCr 2.93, and elevated transaminases. Patient had CT abdomen/pelvis IV contrast reveals "inflammatory thickening of the wall of multiple contiguous loops of ileum c/w enteritis which could be 2/2 infection, IBD, or ischemia. There are numerous complex/heterogenous hepatic lesions w/are most compatible w/metastases, hepatic abscesses can have similar appearance and are not excluded". Patient was ordered Vancomycin IV, Ertapenem for abx by ED.Pt given PO vancomycin, stress dose steroids, and Levophed as well as additional IVF boluses.     Sepsis. Enteritis. Liver abscess. Metastatic Renal Cell Cancer. Leukocytosis. PRANAY. Immunocompromised host   - gi eval noted  - not on pressors  - gi pcr neg f/u c diff pcr ---negative  - f/u blood cx   - on invanz dose now increased to  1000mg daily/minocycline 200mg BID completing long term abx course which will now prolong to 1/8  - had my staff call [facility] to take care of change of date for ending on 12/25 to 1/8; the oral minocycline patient has but may need refill upon discharge with her meds upon discharge; will discuss with hospitalist  - fu cbc  - continue with antibiotic coverage  - monitor temps  - tolerating abx well so far; no side effects noted  - reason for abx use and side effects reviewed with patient  - supportive care    [facility] token not applicable due to patient on long term antibiotics

## 2024-12-18 NOTE — PROGRESS NOTE ADULT - SUBJECTIVE AND OBJECTIVE BOX
80 y/o F w/pmhx of RCC w/mets to liver s/p IR embolization and biliary stent, HTN, HLD, adrenal insufficiency on hydrocortisone,  Afib on Apixaban, hypothyroidism, recent hospitalization 09/29/2024-10/16/2024 for septic shock 2/2 hepatic abscess w/Klebsiella, Enterococcus faecalis, Enterococcus faecium, and Clostridium perfringens, patient had drain placed at that time, patient then presents to ED 11/30/2024 w/abdominal pain, fevers, shock and was found to have pneumatosis of cecum and ascending colon w/multiple liver abscesses w/increasing pulmonary mets s/p laparoscopic R hemicolectomy w/ileostomy on 11/30/2024. Patient had drain placed by IR into R lateral hepatic collection and it was D/C'ed earlier this week. Patient was discharged on receiving Minocycline 200mg PO q12h for S. maltophilia in liver abscess and Ertapenem 1g daily for Enterococcus faecium. She was to continue these abx until 12/25/2024.  She now presented to the ED with severe weakness. She had not been eating or drinking much and was having high ostomy output. Since admission has had    CT abdomen/pelvis IV contrast revealing  "inflammatory thickening of the wall of multiple contiguous loops of ileum c/w enteritis which could be 2/2 infection, IBD, or ischemia. There are numerous complex/heterogenous hepatic lesions w/are most compatible w/metastases, hepatic abscesses can have similar appearance and are not excluded. SHe is now in ICU and states she is feeling improved but had been requiring pressor support.    Medical progress:   Complaints:  State of mind:   Estimated Day of Discharge:     REVIEW OF SYSTEMS:  General: NAD, hemodynamically stable  HEENT:  Eyes:  No visual loss, blurred vision, double vision or yellow sclerae. Ears, Nose, Throat:  No hearing loss, sneezing, congestion, runny nose or sore throat.  SKIN:  No rash or itching.  CARDIOVASCULAR:  No chest pain, chest pressure or chest discomfort. No palpitations or edema.  RESPIRATORY:  No shortness of breath, cough or sputum.  GASTROINTESTINAL:  No anorexia, nausea, vomiting or diarrhea. No abdominal pain or blood.  NEUROLOGICAL:  No headache, dizziness, syncope, paralysis, ataxia, numbness or tingling in the extremities. No change in bowel or bladder control.  MUSCULOSKELETAL:  No muscle, back pain, joint pain or stiffness.  HEMATOLOGIC:  No anemia, bleeding or bruising.  LYMPHATICS:  No enlarged nodes. No history of splenectomy.  ENDOCRINOLOGIC:  No reports of sweating, cold or heat intolerance. No polyuria or polydipsia.  ALLERGIES:  No history of asthma, hives, eczema or rhinitis.     Physical Exam:   General - awake, alert  HEENT - nc/at  neck supple  lungs - clear  cv rrr   abdomen is soft, ileostomy functioning  extremity warm alert  neuro awake, and alert  skin no rash      CBC Full  -  ( 18 Dec 2024 05:31 )  WBC Count : 12.02 K/uL  RBC Count : 3.66 M/uL  Hemoglobin : 9.7 g/dL  Hematocrit : 30.1 %  Platelet Count - Automated : 165 K/uL  Mean Cell Volume : 82.2 fl  Mean Cell Hemoglobin : 26.5 pg  Mean Cell Hemoglobin Concentration : 32.2 g/dL  Auto Neutrophil # : x  Auto Lymphocyte # : x  Auto Monocyte # : x  Auto Eosinophil # : x  Auto Basophil # : x  Auto Neutrophil % : x  Auto Lymphocyte % : x  Auto Monocyte % : x  Auto Eosinophil % : x  Auto Basophil % : x      Urinalysis Basic - ( 18 Dec 2024 05:31 )    Color: x / Appearance: x / SG: x / pH: x  Gluc: 79 mg/dL / Ketone: x  / Bili: x / Urobili: x   Blood: x / Protein: x / Nitrite: x   Leuk Esterase: x / RBC: x / WBC x   Sq Epi: x / Non Sq Epi: x / Bacteria: x      12-18    138  |  108  |  36[H]  ----------------------------<  79  4.3   |  26  |  0.89    Ca    8.2[L]      18 Dec 2024 05:31  Phos  2.2     12-18  Mg     1.8     12-18    TPro  4.5[L]  /  Alb  1.9[L]  /  TBili  1.3[H]  /  DBili  x   /  AST  203[H]  /  ALT  163[H]  /  AlkPhos  416[H]  12-18    74 female presenting with hypotension from dehydration from a high output illostomy    # Septic shock in an immunocompromised host  # Hepatic Abscess  # Enteritis  # Immunocompromised host  # PRANAY most likely ATN in the setting of septic shock  - Hypotension from Dehydration, levo weaned off, continue hydration  - Invanz lower dose to 500mg daily/minocycline 200mg BID completing long term abx course through 12/25     # Adrenal insufficiency  - adrenal insufficiency on hydrocortisone    # Hypovolumic hyponatremia  - received hydration    # Metastatic Renal Cell Cancer  - continue with oncology follow up    # PAF  - on AC    # Dehydration  - start imodium 2mg BID to slow down ostomy output  - Wean off IV steroids (Iv hydrocort 100mg q8)  - surgery following     # Severe malnutrition    DOAC DVT Prophylaxis, hold apixaban for possible procedure Thursday              78 y/o F w/pmhx of RCC w/mets to liver s/p IR embolization and biliary stent, HTN, HLD, adrenal insufficiency on hydrocortisone,  Afib on Apixaban, hypothyroidism, recent hospitalization 09/29/2024-10/16/2024 for septic shock 2/2 hepatic abscess w/Klebsiella, Enterococcus faecalis, Enterococcus faecium, and Clostridium perfringens, patient had drain placed at that time, patient then presents to ED 11/30/2024 w/abdominal pain, fevers, shock and was found to have pneumatosis of cecum and ascending colon w/multiple liver abscesses w/increasing pulmonary mets s/p laparoscopic R hemicolectomy w/ileostomy on 11/30/2024. Patient had drain placed by IR into R lateral hepatic collection and it was D/C'ed earlier this week. Patient was discharged on receiving Minocycline 200mg PO q12h for S. maltophilia in liver abscess and Ertapenem 1g daily for Enterococcus faecium. She was to continue these abx until 12/25/2024.  She now presented to the ED with severe weakness. She had not been eating or drinking much and was having high ostomy output. Since admission has had     Medical progress: Comfortable. Sitting up. Discussed with Dr. CALI and Dr. Turner  Complaints: no new complaints  State of mind: normal  Estimated Day of Discharge: 12/20 on IV antibiotics  Plan: Plan for ERCP tomorrow. NPO after midnight    REVIEW OF SYSTEMS:  General: NAD, hemodynamically stable  HEENT:  Eyes:  No visual loss, blurred vision, double vision or yellow sclerae. Ears, Nose, Throat:  No hearing loss, sneezing, congestion, runny nose or sore throat.  SKIN:  No rash or itching.  CARDIOVASCULAR:  No chest pain, chest pressure or chest discomfort. No palpitations or edema.  RESPIRATORY:  No shortness of breath, cough or sputum.  GASTROINTESTINAL:  No anorexia, nausea, vomiting or diarrhea. No abdominal pain or blood.  NEUROLOGICAL:  No headache, dizziness, syncope, paralysis, ataxia, numbness or tingling in the extremities. No change in bowel or bladder control.  MUSCULOSKELETAL:  No muscle, back pain, joint pain or stiffness.  HEMATOLOGIC:  No anemia, bleeding or bruising.  LYMPHATICS:  No enlarged nodes. No history of splenectomy.  ENDOCRINOLOGIC:  No reports of sweating, cold or heat intolerance. No polyuria or polydipsia.  ALLERGIES:  No history of asthma, hives, eczema or rhinitis.     Physical Exam:   General: deconditioned, frail. chronically ill appearing  HEENT - nc/at  neck supple  lungs - clear  cv rrr   GI: Abdomen is soft -  ostomy site clean. stools well formed.   extremity warm alert  neuro awake, and alert  skin no rash    Labs:   CBC Full  -  ( 18 Dec 2024 05:31 )  WBC Count : 12.02 K/uL  RBC Count : 3.66 M/uL  Hemoglobin : 9.7 g/dL  Hematocrit : 30.1 %  Platelet Count - Automated : 165 K/uL  Mean Cell Volume : 82.2 fl  Mean Cell Hemoglobin : 26.5 pg  Mean Cell Hemoglobin Concentration : 32.2 g/dL  Auto Neutrophil # : x  Auto Lymphocyte # : x  Auto Monocyte # : x  Auto Eosinophil # : x  Auto Basophil # : x  Auto Neutrophil % : x  Auto Lymphocyte % : x  Auto Monocyte % : x  Auto Eosinophil % : x  Auto Basophil % : x      Urinalysis Basic - ( 18 Dec 2024 05:31 )    Color: x / Appearance: x / SG: x / pH: x  Gluc: 79 mg/dL / Ketone: x  / Bili: x / Urobili: x   Blood: x / Protein: x / Nitrite: x   Leuk Esterase: x / RBC: x / WBC x   Sq Epi: x / Non Sq Epi: x / Bacteria: x      12-18    138  |  108  |  36[H]  ----------------------------<  79  4.3   |  26  |  0.89    Ca    8.2[L]      18 Dec 2024 05:31  Phos  2.2     12-18  Mg     1.8     12-18    TPro  4.5[L]  /  Alb  1.9[L]  /  TBili  1.3[H]  /  DBili  x   /  AST  203[H]  /  ALT  163[H]  /  AlkPhos  416[H]  12-18    74 female presenting with hypotension from dehydration from a high output illostomy    # Septic shock in an immunocompromised host  # Hepatic Abscess  # Enteritis  # Immunocompromised host  # PRANAY most likely ATN in the setting of septic shock  - Hypotension from Dehydration, levo weaned off, continue hydration  - Invanz lower dose to 500mg daily/minocycline 200mg BID completing long term abx course through 12/25   - patient ot have an ercp tomorrow    # Adrenal insufficiency  - adrenal insufficiency on hydrocortisone    # Hypovolumic hyponatremia  - received hydration    # Metastatic Renal Cell Cancer  - continue with oncology follow up    # PAF  - on AC    # Dehydration  - start imodium 2mg BID to slow down ostomy output  - Wean off IV steroids (Iv hydrocort 100mg q8)  - surgery following     # Severe malnutrition    DOAC DVT Prophylaxis, hold apixaban for possible procedure Thursday              78 y/o F w/pmhx of RCC w/mets to liver s/p IR embolization and biliary stent, HTN, HLD, adrenal insufficiency on hydrocortisone,  Afib on Apixaban, hypothyroidism, recent hospitalization 09/29/2024-10/16/2024 for septic shock 2/2 hepatic abscess w/Klebsiella, Enterococcus faecalis, Enterococcus faecium, and Clostridium perfringens, patient had drain placed at that time, patient then presents to ED 11/30/2024 w/abdominal pain, fevers, shock and was found to have pneumatosis of cecum and ascending colon w/multiple liver abscesses w/increasing pulmonary mets s/p laparoscopic R hemicolectomy w/ileostomy on 11/30/2024. Patient had drain placed by IR into R lateral hepatic collection and it was D/C'ed earlier this week. Patient was discharged on receiving Minocycline 200mg PO q12h for S. maltophilia in liver abscess and Ertapenem 1g daily for Enterococcus faecium. She was to continue these abx until 12/25/2024.  She now presented to the ED with severe weakness. She had not been eating or drinking much and was having high ostomy output. Since admission has had     Medical progress: Comfortable. Sitting up. Discussed with Dr. CALI and Dr. Turner. Care dicussed with Dr. Blackburn -  patient's abx regiment has been extended to - 1/8  Complaints: no new complaints  State of mind: normal  Estimated Day of Discharge: 12/20    Plan: Plan for ERCP tomorrow. NPO after midnight    REVIEW OF SYSTEMS:  General: NAD, hemodynamically stable  HEENT:  Eyes:  No visual loss, blurred vision, double vision or yellow sclerae. Ears, Nose, Throat:  No hearing loss, sneezing, congestion, runny nose or sore throat.  SKIN:  No rash or itching.  CARDIOVASCULAR:  No chest pain, chest pressure or chest discomfort. No palpitations or edema.  RESPIRATORY:  No shortness of breath, cough or sputum.  GASTROINTESTINAL:  No anorexia, nausea, vomiting or diarrhea. No abdominal pain or blood.  NEUROLOGICAL:  No headache, dizziness, syncope, paralysis, ataxia, numbness or tingling in the extremities. No change in bowel or bladder control.  MUSCULOSKELETAL:  No muscle, back pain, joint pain or stiffness.  HEMATOLOGIC:  No anemia, bleeding or bruising.  LYMPHATICS:  No enlarged nodes. No history of splenectomy.  ENDOCRINOLOGIC:  No reports of sweating, cold or heat intolerance. No polyuria or polydipsia.  ALLERGIES:  No history of asthma, hives, eczema or rhinitis.     Physical Exam:   General: deconditioned, frail. chronically ill appearing  HEENT - nc/at  neck supple  lungs - clear  cv rrr   GI: Abdomen is soft -  ostomy site clean. stools well formed.   extremity warm alert  neuro awake, and alert  skin no rash    Labs:   CBC Full  -  ( 18 Dec 2024 05:31 )  WBC Count : 12.02 K/uL  RBC Count : 3.66 M/uL  Hemoglobin : 9.7 g/dL  Hematocrit : 30.1 %  Platelet Count - Automated : 165 K/uL  Mean Cell Volume : 82.2 fl  Mean Cell Hemoglobin : 26.5 pg  Mean Cell Hemoglobin Concentration : 32.2 g/dL  Auto Neutrophil # : x  Auto Lymphocyte # : x  Auto Monocyte # : x  Auto Eosinophil # : x  Auto Basophil # : x  Auto Neutrophil % : x  Auto Lymphocyte % : x  Auto Monocyte % : x  Auto Eosinophil % : x  Auto Basophil % : x      Urinalysis Basic - ( 18 Dec 2024 05:31 )    Color: x / Appearance: x / SG: x / pH: x  Gluc: 79 mg/dL / Ketone: x  / Bili: x / Urobili: x   Blood: x / Protein: x / Nitrite: x   Leuk Esterase: x / RBC: x / WBC x   Sq Epi: x / Non Sq Epi: x / Bacteria: x      12-18    138  |  108  |  36[H]  ----------------------------<  79  4.3   |  26  |  0.89    Ca    8.2[L]      18 Dec 2024 05:31  Phos  2.2     12-18  Mg     1.8     12-18    TPro  4.5[L]  /  Alb  1.9[L]  /  TBili  1.3[H]  /  DBili  x   /  AST  203[H]  /  ALT  163[H]  /  AlkPhos  416[H]  12-18    74 female presenting with hypotension from dehydration from a high output illostomy    # Septic shock in an immunocompromised host  # Severe dehydration secondary to high output from ostomy  # Hepatic Abscess  # Enteritis  # Immunocompromised host  # PRANAY secondary to severe dehydration  - Hypotension from Dehydration, levo weaned off, continue hydration  - Invanz lower dose to 500mg daily/minocycline 200mg BID completing long term abx course through 1/8  - patient ot have an ercp tomorrow    # Adrenal insufficiency  - adrenal insufficiency on hydrocortisone    # Hypovolumic hyponatremia  - received hydration    # Metastatic Renal Cell Cancer  - continue with oncology follow up    # PAF  - on AC    # Dehydration  - start imodium 2mg BID to slow down ostomy output  - Wean off IV steroids (Iv hydrocort 100mg q8)  - surgery following     # Severe malnutrition    DOAC DVT Prophylaxis, hold apixaban for possible procedure Thursday

## 2024-12-18 NOTE — PROGRESS NOTE ADULT - ASSESSMENT
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

## 2024-12-19 LAB
ANION GAP SERPL CALC-SCNC: 4 MMOL/L — LOW (ref 5–17)
BUN SERPL-MCNC: 44 MG/DL — HIGH (ref 7–23)
CALCIUM SERPL-MCNC: 8.5 MG/DL — SIGNIFICANT CHANGE UP (ref 8.5–10.1)
CHLORIDE SERPL-SCNC: 109 MMOL/L — HIGH (ref 96–108)
CO2 SERPL-SCNC: 25 MMOL/L — SIGNIFICANT CHANGE UP (ref 22–31)
CREAT SERPL-MCNC: 0.89 MG/DL — SIGNIFICANT CHANGE UP (ref 0.5–1.3)
EGFR: 67 ML/MIN/1.73M2 — SIGNIFICANT CHANGE UP
GLUCOSE BLDC GLUCOMTR-MCNC: 126 MG/DL — HIGH (ref 70–99)
GLUCOSE BLDC GLUCOMTR-MCNC: 131 MG/DL — HIGH (ref 70–99)
GLUCOSE BLDC GLUCOMTR-MCNC: 45 MG/DL — CRITICAL LOW (ref 70–99)
GLUCOSE BLDC GLUCOMTR-MCNC: 46 MG/DL — CRITICAL LOW (ref 70–99)
GLUCOSE BLDC GLUCOMTR-MCNC: 53 MG/DL — CRITICAL LOW (ref 70–99)
GLUCOSE BLDC GLUCOMTR-MCNC: 54 MG/DL — CRITICAL LOW (ref 70–99)
GLUCOSE BLDC GLUCOMTR-MCNC: 56 MG/DL — LOW (ref 70–99)
GLUCOSE BLDC GLUCOMTR-MCNC: 61 MG/DL — LOW (ref 70–99)
GLUCOSE BLDC GLUCOMTR-MCNC: 66 MG/DL — LOW (ref 70–99)
GLUCOSE BLDC GLUCOMTR-MCNC: 94 MG/DL — SIGNIFICANT CHANGE UP (ref 70–99)
GLUCOSE BLDC GLUCOMTR-MCNC: 98 MG/DL — SIGNIFICANT CHANGE UP (ref 70–99)
GLUCOSE SERPL-MCNC: 69 MG/DL — LOW (ref 70–99)
HCT VFR BLD CALC: 31.3 % — LOW (ref 34.5–45)
HGB BLD-MCNC: 10.2 G/DL — LOW (ref 11.5–15.5)
MCHC RBC-ENTMCNC: 26.5 PG — LOW (ref 27–34)
MCHC RBC-ENTMCNC: 32.6 G/DL — SIGNIFICANT CHANGE UP (ref 32–36)
MCV RBC AUTO: 81.3 FL — SIGNIFICANT CHANGE UP (ref 80–100)
PLATELET # BLD AUTO: 142 K/UL — LOW (ref 150–400)
POTASSIUM SERPL-MCNC: 4.1 MMOL/L — SIGNIFICANT CHANGE UP (ref 3.5–5.3)
POTASSIUM SERPL-SCNC: 4.1 MMOL/L — SIGNIFICANT CHANGE UP (ref 3.5–5.3)
RBC # BLD: 3.85 M/UL — SIGNIFICANT CHANGE UP (ref 3.8–5.2)
RBC # FLD: 18.6 % — HIGH (ref 10.3–14.5)
SODIUM SERPL-SCNC: 138 MMOL/L — SIGNIFICANT CHANGE UP (ref 135–145)
WBC # BLD: 8.63 K/UL — SIGNIFICANT CHANGE UP (ref 3.8–10.5)
WBC # FLD AUTO: 8.63 K/UL — SIGNIFICANT CHANGE UP (ref 3.8–10.5)

## 2024-12-19 PROCEDURE — 43275 ERCP REMOVE FORGN BODY DUCT: CPT

## 2024-12-19 PROCEDURE — 43264 ERCP REMOVE DUCT CALCULI: CPT

## 2024-12-19 PROCEDURE — 99448 NTRPROF PH1/NTRNET/EHR 21-30: CPT

## 2024-12-19 PROCEDURE — 99233 SBSQ HOSP IP/OBS HIGH 50: CPT

## 2024-12-19 RX ORDER — DEXTROSE MONOHYDRATE 25 G/50ML
50 INJECTION, SOLUTION INTRAVENOUS ONCE
Refills: 0 | Status: COMPLETED | OUTPATIENT
Start: 2024-12-19 | End: 2024-12-19

## 2024-12-19 RX ORDER — ONDANSETRON 4 MG/1
4 TABLET ORAL EVERY 6 HOURS
Refills: 0 | Status: DISCONTINUED | OUTPATIENT
Start: 2024-12-19 | End: 2024-12-19

## 2024-12-19 RX ORDER — FENTANYL 75 UG/H
25 PATCH, EXTENDED RELEASE TRANSDERMAL
Refills: 0 | Status: DISCONTINUED | OUTPATIENT
Start: 2024-12-19 | End: 2024-12-19

## 2024-12-19 RX ORDER — LOPERAMIDE HCL 1 MG/5 ML
2 LIQUID (ML) ORAL THREE TIMES A DAY
Refills: 0 | Status: DISCONTINUED | OUTPATIENT
Start: 2024-12-19 | End: 2024-12-21

## 2024-12-19 RX ORDER — SODIUM CHLORIDE 9 MG/ML
1000 INJECTION, SOLUTION INTRAVENOUS
Refills: 0 | Status: DISCONTINUED | OUTPATIENT
Start: 2024-12-19 | End: 2024-12-19

## 2024-12-19 RX ORDER — SODIUM CHLORIDE 9 MG/ML
1000 INJECTION, SOLUTION INTRAVENOUS
Refills: 0 | Status: DISCONTINUED | OUTPATIENT
Start: 2024-12-19 | End: 2024-12-21

## 2024-12-19 RX ORDER — APIXABAN 5 MG/1
2.5 TABLET, FILM COATED ORAL EVERY 12 HOURS
Refills: 0 | Status: DISCONTINUED | OUTPATIENT
Start: 2024-12-19 | End: 2024-12-23

## 2024-12-19 RX ORDER — HYDROCORTISONE 100 MG/60ML
100 ENEMA RECTAL EVERY 8 HOURS
Refills: 0 | Status: DISCONTINUED | OUTPATIENT
Start: 2024-12-19 | End: 2024-12-20

## 2024-12-19 RX ADMIN — Medication 2 MILLIGRAM(S): at 14:20

## 2024-12-19 RX ADMIN — AMIODARONE HYDROCHLORIDE 100 MILLIGRAM(S): 200 TABLET ORAL at 10:41

## 2024-12-19 RX ADMIN — HYDROCORTISONE 100 MILLIGRAM(S): 100 ENEMA RECTAL at 21:24

## 2024-12-19 RX ADMIN — DEXTROSE MONOHYDRATE 50 MILLILITER(S): 25 INJECTION, SOLUTION INTRAVENOUS at 13:34

## 2024-12-19 RX ADMIN — ERTAPENEM SODIUM 120 MILLIGRAM(S): 1 INJECTION, POWDER, LYOPHILIZED, FOR SOLUTION INTRAMUSCULAR; INTRAVENOUS at 06:02

## 2024-12-19 RX ADMIN — MINOCYCLINE HYDROCHLORIDE 100 MILLIGRAM(S): 100 CAPSULE ORAL at 07:06

## 2024-12-19 RX ADMIN — SODIUM CHLORIDE 80 MILLILITER(S): 9 INJECTION, SOLUTION INTRAVENOUS at 13:35

## 2024-12-19 RX ADMIN — MIDODRINE HYDROCHLORIDE 10 MILLIGRAM(S): 5 TABLET ORAL at 14:20

## 2024-12-19 RX ADMIN — MINOCYCLINE HYDROCHLORIDE 100 MILLIGRAM(S): 100 CAPSULE ORAL at 17:39

## 2024-12-19 RX ADMIN — MIDODRINE HYDROCHLORIDE 10 MILLIGRAM(S): 5 TABLET ORAL at 06:00

## 2024-12-19 RX ADMIN — HYDROCORTISONE 20 MILLIGRAM(S): 100 ENEMA RECTAL at 10:41

## 2024-12-19 RX ADMIN — LEVOTHYROXINE SODIUM 75 MICROGRAM(S): 175 TABLET ORAL at 21:25

## 2024-12-19 RX ADMIN — CHLORHEXIDINE GLUCONATE 1 APPLICATION(S): 1.2 RINSE ORAL at 09:51

## 2024-12-19 RX ADMIN — APIXABAN 2.5 MILLIGRAM(S): 5 TABLET, FILM COATED ORAL at 21:24

## 2024-12-19 RX ADMIN — HYDROCORTISONE 100 MILLIGRAM(S): 100 ENEMA RECTAL at 13:38

## 2024-12-19 RX ADMIN — Medication 2 MILLIGRAM(S): at 21:24

## 2024-12-19 RX ADMIN — MIDODRINE HYDROCHLORIDE 10 MILLIGRAM(S): 5 TABLET ORAL at 21:25

## 2024-12-19 NOTE — CONSULT NOTE ADULT - SUBJECTIVE AND OBJECTIVE BOX
[facility]   Ms. 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 Patient had drain placed by IR into R lateral hepatic collection and it was D/C'ed this Monday 12/09/2024. Patient was discharged on receiving Minocycline 200mg PO q12h for S. maltophilia in liver abscess and Ertapenem 1g daily for Enterococcus faecium. She was to continue these abx until 12/25/2024      She presents to ED 12/15 because she was feeling very weak. Patient admits to poor PO intake. She has been having high output from her ostomy.Patient was also hypoglycemic to 40s requiring treatment w/dextrose.  Patient had CT abdomen/pelvis IV contrast reveals "inflammatory thickening of the wall of multiple contiguous loops of ileum c/w enteritis which could be 2/2 infection, IBD, or ischemia. There are numerous complex/heterogenous hepatic lesions w/are most compatible w/metastases, hepatic abscesses can have similar appearance and are not excluded". Patient was ordered Vancomycin IV, Ertapenem for abx by ED. They also ordered blood, urine cultures and Cdiff.pt given aggreessive support including IV SOluCortef and Levophed   Steroids were tapered   today pt had ERCP   HYpoglcyemia noted post procedure  to 50's  eConsult requested    Pt was Admitted to [facility]   PAST MEDICAL & SURGICAL HISTORY:  Hypertension      High cholesterol      Diverticulitis        Hypothyroidism      Renal cell cancer      Metastasis to liver      Paroxysmal atrial fibrillation      Cholelithiasis with cholangitis      History of biliary stent insertion      History of tonsillectomy      History of laparotomy      History of arthroscopy      H/O bilateral oophorectomy      S/P surgical removal of pilonidal cyst          FAMILY HISTORY:      SOCIAL HISTORY:      MEDICATIONS  (STANDING):  aMIOdarone    Tablet 100 milliGRAM(s) Oral daily  chlorhexidine 2% Cloths 1 Application(s) Topical <User Schedule>  dextrose 5%. 1000 milliLiter(s) (80 mL/Hr) IV Continuous <Continuous>  ertapenem  IVPB 1000 milliGRAM(s) IV Intermittent every 24 hours  hydrocortisone sodium succinate Injectable 100 milliGRAM(s) IV Push every 8 hours  levothyroxine Injectable 75 MICROGram(s) IV Push at bedtime  loperamide 2 milliGRAM(s) Oral three times a day  midodrine 10 milliGRAM(s) Oral every 8 hours  minocycline IVPB      minocycline IVPB 200 milliGRAM(s) IV Intermittent every 12 hours  pantoprazole    Tablet 40 milliGRAM(s) Oral before breakfast    MEDICATIONS  (PRN):  ondansetron Injectable 4 milliGRAM(s) IV Push every 4 hours PRN Nausea and/or Vomiting      Allergies    Lipitor (Unknown)  Dyazide (Faint)  statins (Unknown)  Zetia (Unknown)  Crestor (Other)  tivozanib (Faint)  Norvasc (Faint)  bacitracin (Rash)  Cabometyx (Unknown)    Intolerances          CAPILLARY BLOOD GLUCOSE      POCT Blood Glucose.: 53 mg/dL (19 Dec 2024 13:14)  CAPILLARY BLOOD GLUCOSE      POCT Blood Glucose.: 53 mg/dL (19 Dec 2024 13:14)  POCT Blood Glucose.: 46 mg/dL (19 Dec 2024 13:10)  POCT Blood Glucose.: 66 mg/dL (19 Dec 2024 12:44)  POCT Blood Glucose.: 54 mg/dL (19 Dec 2024 12:41)  POCT Blood Glucose.: 61 mg/dL (19 Dec 2024 11:42)  POCT Blood Glucose.: 56 mg/dL (19 Dec 2024 11:11)  POCT Blood Glucose.: 45 mg/dL (19 Dec 2024 11:06)      PHYSICAL EXAM:    Vital Signs Last 24 Hrs  T(C): 36.2 (19 Dec 2024 13:58), Max: 37 (19 Dec 2024 05:29)  T(F): 97.2 (19 Dec 2024 13:58), Max: 98.6 (19 Dec 2024 05:29)  HR: 75 (19 Dec 2024 13:58) (55 - 85)  BP: 106/65 (19 Dec 2024 13:58) (100/64 - 121/75)  BP(mean): --  RR: 16 (19 Dec 2024 13:58) (8 - 18)  SpO2: 100% (19 Dec 2024 13:58) (97% - 100%)    Parameters below as of 19 Dec 2024 13:58  Patient On (Oxygen Delivery Method): room air          LABS:                        10.2   8.63  )-----------( 142      ( 19 Dec 2024 08:50 )             31.3     12-19    138  |  109[H]  |  44[H]  ----------------------------<  69[L]  4.1   |  25  |  0.89    Ca    8.5      19 Dec 2024 08:50  Phos  2.2     12-18  Mg     1.8     12-18    TPro  4.5[L]  /  Alb  1.9[L]  /  TBili  1.3[H]  /  DBili  x   /  AST  203[H]  /  ALT  163[H]  /  AlkPhos  416[H]  12-18    Urinalysis Basic - ( 19 Dec 2024 08:50 )    Color: x / Appearance: x / SG: x / pH: x  Gluc: 69 mg/dL / Ketone: x  / Bili: x / Urobili: x   Blood: x / Protein: x / Nitrite: x   Leuk Esterase: x / RBC: x / WBC x   Sq Epi: x / Non Sq Epi: x / Bacteria: x      LIVER FUNCTIONS - ( 18 Dec 2024 05:31 )  Alb: 1.9 g/dL / Pro: 4.5 gm/dL / ALK PHOS: 416 U/L / ALT: 163 U/L / AST: 203 U/L / GGT: x                         
Patient is a 79y old  Female who presents with a chief complaint of Shock (15 Dec 2024 11:13)    HPI:  Ms. Quiros is a 80 YO F w/pmhx of RCC w/mets to liver s/p IR embolization and biliary stent, HTN, HLD, adrenal insufficiency on hydrocortisone,  Afib on Apixaban, hypothyroidism, recent hospitalization 2024-10/16/2024 for septic shock 2/2 hepatic abscess w/Klebsiella, Enterococcus faecalis, Enterococcus faecium, and Clostridium perfringens, patient had drain placed at that time, patient then presents to ED 2024 w/abdominal pain, fevers, shock and was found to have pneumatosis of cecum and ascending colon w/multiple liver abscesses w/increasing pulmonary mets s/p laparoscopic R hemicolectomy w/ileostomy on 2024. Patient had drain placed by IR into R lateral hepatic collection and it was D/C'ed this 2024. Patient was discharged on receiving Minocycline 200mg PO q12h for S. maltophilia in liver abscess and Ertapenem 1g daily for Enterococcus faecium. She was to continue these abx until 2024 She presents to ED this time because she was feeling very weak. She has been having high output from her ostomy, son at bedside says they changed her bag x 3. In ED she received 4L IVF but remains hypotensive. Her labs are significant for a WBC of 15K, lactate 5.7, new renal dysfunction BUN 70 & SCr 2.93, and elevated transaminases. Patient had CT abdomen/pelvis IV contrast reveals "inflammatory thickening of the wall of multiple contiguous loops of ileum c/w enteritis which could be 2/2 infection, IBD, or ischemia. There are numerous complex/heterogenous hepatic lesions w/are most compatible w/metastases, hepatic abscesses can have similar appearance and are not excluded". Patient was ordered Vancomycin IV, Ertapenem for abx by ED.Pt given PO vancomycin, stress dose steroids, and Levophed as well as additional IVF boluses.     PMH: as above  PSH: as above  Meds: per reconciliation sheet, noted below  MEDICATIONS  (STANDING):  aMIOdarone    Tablet 100 milliGRAM(s) Oral daily  apixaban 2.5 milliGRAM(s) Oral two times a day  chlorhexidine 2% Cloths 1 Application(s) Topical <User Schedule>  hydrocortisone sodium succinate Injectable 100 milliGRAM(s) IV Push every 8 hours  lactated ringers. 1000 milliLiter(s) (150 mL/Hr) IV Continuous <Continuous>  levothyroxine Injectable 75 MICROGram(s) IV Push at bedtime  minocycline IVPB      minocycline IVPB 200 milliGRAM(s) IV Intermittent every 12 hours  norepinephrine Infusion 0.05 MICROgram(s)/kG/Min (5.66 mL/Hr) IV Continuous <Continuous>  pantoprazole  Injectable 40 milliGRAM(s) IV Push daily      Allergies    Lipitor (Unknown)  Dyazide (Faint)  statins (Unknown)  Zetia (Unknown)  Crestor (Other)  tivozanib (Faint)  Norvasc (Faint)  bacitracin (Rash)  Cabometyx (Unknown)    Intolerances      Social: no smoking, no alcohol, no illegal drugs; no recent travel, no exposure to TB  FAMILY HISTORY:     no history of premature cardiovascular disease in first degree relatives    ROS: the patient denies fever, no chills, no HA, no dizziness, no sore throat, no blurry vision, no CP, no palpitations, no SOB, no cough, +abdominal pain, + diarrhea, no N/V, no dysuria, no leg pain, no claudication, no rash, no joint aches, no rectal pain or bleeding, no night sweats    All other systems reviewed and are negative    Vital Signs Last 24 Hrs  T(C): 36.4 (15 Dec 2024 11:18), Max: 37.3 (15 Dec 2024 06:16)  T(F): 97.6 (15 Dec 2024 11:18), Max: 99.1 (15 Dec 2024 06:16)  HR: 70 (15 Dec 2024 12:00) (66 - 114)  BP: 94/72 (15 Dec 2024 12:00) (57/44 - 129/79)  BP(mean): 80 (15 Dec 2024 12:00) (49 - 94)  RR: 13 (15 Dec 2024 12:00) (13 - 22)  SpO2: 98% (15 Dec 2024 12:00) (73% - 100%)    Parameters below as of 15 Dec 2024 12:00  Patient On (Oxygen Delivery Method): room air      Daily Height in cm: 154.94 (14 Dec 2024 22:45)    Daily     PE:  Constitutional: NAD  HEENT: NC/AT, EOMI, PERRLA, conjunctivae clear; ears and nose atraumatic; pharynx benign  Neck: supple; thyroid not palpable  Back: no tenderness  Respiratory: respiratory effort normal; clear to auscultation  Cardiovascular: S1S2 regular, no murmurs  Abdomen: soft,  tender, not distended, positive BS; liver and spleen WNL  Genitourinary: no suprapubic tenderness  Lymphatic: no LN palpable  Musculoskeletal: no muscle tenderness, no joint swelling or tenderness  Extremities: no pedal edema  Neurological/ Psychiatric: AxOx3, Judgement and insight normal;  moving all extremities  Skin: no rashes; no palpable lesions    Labs: all available labs reviewed                        12.1   15.46 )-----------( 347      ( 14 Dec 2024 23:54 )             39.2         130[L]  |  100  |  70[H]  ----------------------------<  261[H]  4.7   |  22  |  2.93[H]    Ca    9.1      14 Dec 2024 23:54  Mg     1.7         TPro  4.3[L]  /  Alb  1.8[L]  /  TBili  1.2  /  DBili  x   /  AST  40[H]  /  ALT  35  /  AlkPhos  431[H]       LIVER FUNCTIONS - ( 14 Dec 2024 23:54 )  Alb: 1.8 g/dL / Pro: 4.3 gm/dL / ALK PHOS: 431 U/L / ALT: 35 U/L / AST: 40 U/L / GGT: x           Urinalysis Basic - ( 15 Dec 2024 04:31 )    Color: Yellow / Appearance: Clear / S.016 / pH: x  Gluc: x / Ketone: Negative mg/dL  / Bili: Negative / Urobili: 0.2 mg/dL   Blood: x / Protein: Negative mg/dL / Nitrite: Negative   Leuk Esterase: Negative / RBC: x / WBC x   Sq Epi: x / Non Sq Epi: x / Bacteria: x          Radiology: all available radiological tests reviewed  < from: CT Abdomen and Pelvis w/ IV Cont (12.15.24 @ 01:25) >      INTERPRETATION:  [facility] PRELIMINARY REPORT    PROCEDURE INFORMATION:  Exam: CT Abdomen And Pelvis With Contrast  Exam date and time: 12/15/2024 1:21 AM  Age: 79 years old  Clinical indication: Hypoglycemia, abd pain, nausea, vomiting    TECHNIQUE:  Imaging protocol: Computed tomography of the abdomen and pelvis with   contrast.  Contrast material: IV: OMNIPAQUE 350; Contrast volume: 90 ml; Contrast   route:  IV;    COMPARISON:  CT ABDOMEN AND PELVIS 2024 2:21 PM    FINDINGS:  Lungs: There are numerous bilateral pulmonary nodules, most compatible   with  metastases.    Liver: There are numerous complex/heterogeneous hepatic lesions which are   most  compatible appearance with metastases. Hepatic abscesses can have a  similar  appearance and are not excluded.  Gallbladder and biliary ducts: Biliary stent present. Expected   pneumobilia. No  biliary ductal dilatation. Gallbladder is absent.  Pancreas: Unremarkable.  Spleen: Normal.  Adrenal glands: Normal. No mass.  Kidneys and ureters: Left renal pelvic cysts. Small incidental left renal   cyst.  Stomach and bowel: Right lower quadrant ileocolic surgical anastomosis.   Right  lower quadrant ileostomy. Inflammatory thickening of the wall of multiple  contiguous loops of ileum in the mid to lower right abdomen, compatible   with  enteritis which could be due to infection, inflammatory bowel disease, or  ischemia. No specific ancillary findings of bowel ischemia. No   pneumatosis or  portal/mesenteric venous gas. No intestinal obstruction.  Appendix: Normal appendix.    Intraperitoneal space: No pneumoperitoneum or abscess.  Vasculature: See &quot;Stomach and bowel&quot; finding.  Lymph nodes: Unremarkable.  Urinary bladder: Urinary bladder is distendedbut otherwise unremarkable.  Reproductive: Unremarkable as visualized.  Bones/joints: Unremarkable. No acute fracture.  Soft tissues: Unremarkable.    IMPRESSION:  1.   Inflammatory thickening of the wall of multiple contiguous loops of   ileum  in the mid to lower right abdomen, compatible with enteritis which could   be due  to infection, inflammatory bowel disease, or ischemia. No specific   ancillary  findings of bowel ischemia.  2.   There are numerous bilateral pulmonary nodules, most compatible with  metastases.  3.   There are numerous complex/heterogeneous hepatic lesions which are   most  compatible appearance with metastases. Hepatic abscesses can have a   similar  appearance and are not excluded.      Advanced directives addressed: full resuscitation
Surgery Consultation    Ms. Quiros is a 80 YO F w/pmhx of RCC w/mets to liver s/p IR embolization and biliary stent, HTN, HLD, adrenal insufficiency on hydrocortisone,  Afib on Apixaban, hypothyroidism, recent hospitalization, septic shock 2/2 hepatic abscess s/pr IR drainage and removal, recent admission for pneumatosis of cecum and ascending colon s/p laparoscopic R hemicolectomy w/ ileostomy on 2024. Patient had drain placed by IR into R lateral hepatic collection and it was D/C'ed this 2024. Patient was discharged on receiving Minocycline 200mg PO q12h for S. maltophilia in liver abscess and Ertapenem 1g daily for Enterococcus faecium. She was to continue these abx until 2024      She presents to ED this time because she was feeling very weak. Patient admits to poor PO intake. She has been having high output from her ostomy, son at bedside says they changed her bag x 3 today. Denies fever, chills, nausea or vomiting. In ED she received 4L IVF but remains hypotensive. Started on pressors and transferred to ICU.         High cholesterol      Diverticulitis      History of diverticulitis      Hypothyroidism      Renal cell cancer      Metastasis to liver      Paroxysmal atrial fibrillation      Cholelithiasis with cholangitis      History of biliary stent insertion      History of tonsillectomy      History of laparotomy      History of arthroscopy      H/O bilateral oophorectomy      S/P surgical removal of pilonidal cyst        Allergies:  Lipitor (Unknown)  Dyazide (Faint)  statins (Unknown)  Zetia (Unknown)  Crestor (Other)  tivozanib (Faint)  Norvasc (Faint)  bacitracin (Rash)  Cabometyx (Unknown)    Home Medications:  acetaminophen 325 mg oral tablet: 2 tab(s) orally every 6 hours As needed Mild Pain (1 - 3)  amiodarone 100 mg oral tablet: 1 tab(s) orally once a day  Benicar 20 mg oral tablet: 2 tab(s) orally once a day  hydrocortisone 10 mg oral tablet: 1 tab(s) orally 3 times a day  levothyroxine 100 mcg (0.1 mg) oral tablet: 1 tab(s) orally once a day  metoprolol succinate 50 mg oral tablet, extended release: 1 tab(s) orally 2 times a day      Family History:  FAMILY HISTORY:      ROS:  Constitutional: Denies fever, fatigue or weight loss.  Skin: Denies rash.  Eyes: Denies recent vision problems or eye pain.  ENT: Denies congestion, ear pain, or sore throat.  Endocrine: Denies thyroid problems.  Cardiovascular: Denies chest pain or palpation.  Respiratory: Denies cough, shortness of breath, congestion, or wheezing.  Gastrointestinal: Denies abdominal pain, nausea, vomiting, or diarrhea.  Genitourinary: Denies dysuria.  Musculoskeletal: Denies joint swelling.  Neurologic: Denies headache.      PHYSICAL EXAM:  GENERAL: Lethargic, ill appearing   HEAD:  Atraumatic, Normocephalic  CHEST/LUNG: equal rise and fall of chest  HEART: Regular rate and rhythm; No murmurs, rubs, or gallops  ABDOMEN: Soft, epigastric/RUQ TTP, no gurading, ND. Stoma in situ   NEUROLOGY: responding appropriately, no focal deficits    Data:                        12.1   15.46 )-----------( 347      ( 14 Dec 2024 23:54 )             39.2     -14    130[L]  |  100  |  70[H]  ----------------------------<  261[H]  4.7   |  22  |  2.93[H]    Ca    9.1      14 Dec 2024 23:54  Mg     1.7     -    TPro  4.3[L]  /  Alb  1.8[L]  /  TBili  1.2  /  DBili  x   /  AST  40[H]  /  ALT  35  /  AlkPhos  431[H]  -      LIVER FUNCTIONS - ( 14 Dec 2024 23:54 )  Alb: 1.8 g/dL / Pro: 4.3 gm/dL / ALK PHOS: 431 U/L / ALT: 35 U/L / AST: 40 U/L / GGT: x           Urinalysis Basic - ( 15 Dec 2024 04:31 )    Color: Yellow / Appearance: Clear / S.016 / pH: x  Gluc: x / Ketone: Negative mg/dL  / Bili: Negative / Urobili: 0.2 mg/dL   Blood: x / Protein: Negative mg/dL / Nitrite: Negative   Leuk Esterase: Negative / RBC: x / WBC x   Sq Epi: x / Non Sq Epi: x / Bacteria: x        Radiology:    Assessment:     This is a 79y year old Female presenting with    Plan:  -Discussed with    -Will manage conservatively v Plan to go to OR   -NPO, IVF, supportive care  -IV antibiotics started  -Care per primary team    Surgical Team Contact Information  Spectralink: Ext: [phone] or [phone]  Pager: [phone]  
Patient is a 79y old  Female who presents with a chief complaint of Shock (15 Dec 2024 09:08)    79 yearr old woman with metastatic RCC to liver, afib on Apixaban, adrenal insufficiceny, with prior cholangitis s/p ERCP with development of liver abscesses, requiring IR drains that were removed but recent admission for septic shock from ischemic colon s/p R hemicolectomy and ileostomy, and replacement of IR drains, admitted with diarrhea/dehydration.     Patient was feeling well until the day prior to admission. She felt very unwell and weak. Felt dizzy but no LOC or syncope. Denies abodminal pain fevers or chills but her son noted that they had changed her ileostomy bag multiple times that day. No sick contacts. No travel history. has been in hospitalals and has been on antibiotics. No overt GI bleeding. Came to the ER where she was found to have elevated lactate and hypotension, given 4L and sent to ICU where she was on pressors and received high dose stress steroids. This AM patient off pressors and feeling much better.         PAST MEDICAL & SURGICAL HISTORY:  Hypertension      High cholesterol      Diverticulitis      History of diverticulitis      Hypothyroidism      Renal cell cancer      Metastasis to liver      Paroxysmal atrial fibrillation      Cholelithiasis with cholangitis      History of biliary stent insertion      History of tonsillectomy      History of laparotomy      History of arthroscopy      H/O bilateral oophorectomy      S/P surgical removal of pilonidal cyst          MEDICATIONS  (STANDING):  aMIOdarone    Tablet 100 milliGRAM(s) Oral daily  apixaban 2.5 milliGRAM(s) Oral two times a day  chlorhexidine 2% Cloths 1 Application(s) Topical <User Schedule>  hydrocortisone sodium succinate Injectable 100 milliGRAM(s) IV Push every 8 hours  lactated ringers. 1000 milliLiter(s) (150 mL/Hr) IV Continuous <Continuous>  levothyroxine Injectable 75 MICROGram(s) IV Push at bedtime  minocycline IVPB      minocycline IVPB 200 milliGRAM(s) IV Intermittent every 12 hours  norepinephrine Infusion 0.05 MICROgram(s)/kG/Min (5.66 mL/Hr) IV Continuous <Continuous>  pantoprazole  Injectable 40 milliGRAM(s) IV Push daily    MEDICATIONS  (PRN):      Allergies    Lipitor (Unknown)  Dyazide (Faint)  statins (Unknown)  Zetia (Unknown)  Crestor (Other)  tivozanib (Faint)  Norvasc (Faint)  bacitracin (Rash)  Cabometyx (Unknown)    Intolerances    SOCIAL HISTORY:  no smoking, drinking or drugs    FAMILY HISTORY:  no colon or stomach cancer    REVIEW OF SYSTEMS:    CONSTITUTIONAL: + weakness, no fevers or chills  EYES/ENT: No visual changes;  No vertigo or throat pain   NECK: No pain or stiffness  RESPIRATORY: No cough, wheezing, hemoptysis; No shortness of breath  CARDIOVASCULAR: No chest pain or palpitations  GASTROINTESTINAL: see HPI  GENITOURINARY: No dysuria, frequency or hematuria  NEUROLOGICAL: No numbness or weakness  SKIN: No itching, burning, rashes, or lesions   PSYCH: Normal mood and affect  All other review of systems is negative unless indicated above.    Vital Signs Last 24 Hrs  T(C): 37.3 (15 Dec 2024 06:16), Max: 37.3 (15 Dec 2024 06:16)  T(F): 99.1 (15 Dec 2024 06:16), Max: 99.1 (15 Dec 2024 06:16)  HR: 81 (15 Dec 2024 09:45) (71 - 114)  BP: 98/67 (15 Dec 2024 09:45) (66/53 - 129/79)  BP(mean): 77 (15 Dec 2024 09:45) (59 - 94)  RR: 22 (15 Dec 2024 09:45) (14 - 22)  SpO2: 99% (15 Dec 2024 09:45) (73% - 100%)    Parameters below as of 15 Dec 2024 08:00  Patient On (Oxygen Delivery Method): room air        PHYSICAL EXAM:    Constitutional: No acute distress, in ICU but non-toxic appearing, abx infusing  ostomy with only 150 cc this AM  HEENT: masked, good phonation, not icteric  Neck: supple, no lymphadenopathy  Respiratory: clear to ascultation bilaterally, no wheezing  Cardiovascular: S1 and S2, regular rate and rhythm, no murmurs rubs or gallops  Gastrointestinal: soft, non-tender, non-distended, +bowel sounds, no rebound or guarding, + surgical scars, +ostomy c/d/i  Extremities: No peripheral edema, no cyanosis or clubbing  Vascular: 2+ peripheral pulses, no venous stasis  Neurological: A/O x 3, no focal deficits, no asterixis  Psychiatric: Normal mood, normal affect  Skin: No rashes, not jaundiced    LABS:                        12.1   15.46 )-----------( 347      ( 14 Dec 2024 23:54 )             39.2     12-14    130[L]  |  100  |  70[H]  ----------------------------<  261[H]  4.7   |  22  |  2.93[H]    Ca    9.1      14 Dec 2024 23:54  Mg     1.7     12-14    TPro  4.3[L]  /  Alb  1.8[L]  /  TBili  1.2  /  DBili  x   /  AST  40[H]  /  ALT  35  /  AlkPhos  431[H]  12-14      LIVER FUNCTIONS - ( 14 Dec 2024 23:54 )  Alb: 1.8 g/dL / Pro: 4.3 gm/dL / ALK PHOS: 431 U/L / ALT: 35 U/L / AST: 40 U/L / GGT: x             RADIOLOGY & ADDITIONAL STUDIES: CT with enteritis and ?ischemia (cannot r/o)
Reason for consult: met RCC    HPI:  80 y/o F w/pmhx of RCC w/mets to liver s/p IR embolization and biliary stent, HTN, HLD, adrenal insufficiency on hydrocortisone,  Afib on Apixaban, hypothyroidism, recent hospitalization 09/29/2024-10/16/2024 for septic shock 2/2 hepatic abscess w/Klebsiella, Enterococcus faecalis, Enterococcus faecium, and Clostridium perfringens, patient had drain placed at that time, patient then presents to ED 11/30/2024 w/abdominal pain, fevers, shock and was found to have pneumatosis of cecum and ascending colon w/multiple liver abscesses w/increasing pulmonary mets s/p laparoscopic R hemicolectomy w/ileostomy on 11/30/2024. Patient had drain placed by IR into R lateral hepatic collection and it was D/C'ed earlier this week. Patient was discharged on receiving Minocycline 200mg PO q12h for S. maltophilia in liver abscess and Ertapenem 1g daily for Enterococcus faecium. She was to continue these abx until 12/25/2024.  She now presented to the ED with severe weakness. She had not been eating or drinking much and was having high ostomy output. Since admission has had    CT abdomen/pelvis IV contrast revealing  "inflammatory thickening of the wall of multiple contiguous loops of ileum c/w enteritis which could be 2/2 infection, IBD, or ischemia. There are numerous complex/heterogenous hepatic lesions w/are most compatible w/metastases, hepatic abscesses can have similar appearance and are not excluded. SHe is now in ICU and states she is feeling improved but had been requiring pressor support.      PAST MEDICAL & SURGICAL HISTORY:  Hypertension      High cholesterol      Diverticulitis      History of diverticulitis      Hypothyroidism      Renal cell cancer      Metastasis to liver      Paroxysmal atrial fibrillation      Cholelithiasis with cholangitis      History of biliary stent insertion      History of tonsillectomy      History of laparotomy      History of arthroscopy      H/O bilateral oophorectomy      S/P surgical removal of pilonidal cyst          FAMILY HISTORY:      Alochol: Denied  Smoking: Nonsmoker  Drug Use: Denied  Marital Status:         Allergies    Lipitor (Unknown)  Dyazide (Faint)  statins (Unknown)  Zetia (Unknown)  lactose (Other (Severe); Diarrhea)  Crestor (Other)  tivozanib (Faint)  Norvasc (Faint)  bacitracin (Rash)  Cabometyx (Unknown)    Intolerances        MEDICATIONS  (STANDING):  aMIOdarone    Tablet 100 milliGRAM(s) Oral daily  chlorhexidine 2% Cloths 1 Application(s) Topical <User Schedule>  hydrocortisone 10 milliGRAM(s) Oral at bedtime  lactated ringers. 1000 milliLiter(s) (75 mL/Hr) IV Continuous <Continuous>  levothyroxine Injectable 75 MICROGram(s) IV Push at bedtime  loperamide 2 milliGRAM(s) Oral two times a day  midodrine 10 milliGRAM(s) Oral every 8 hours  minocycline IVPB      minocycline IVPB 200 milliGRAM(s) IV Intermittent every 12 hours  pantoprazole    Tablet 40 milliGRAM(s) Oral before breakfast    MEDICATIONS  (PRN):  ondansetron Injectable 4 milliGRAM(s) IV Push every 4 hours PRN Nausea and/or Vomiting      ROS  No fever, sweats, chills        T(C): 36.9 (12-17-24 @ 16:00), Max: 36.9 (12-17-24 @ 16:00)  HR: 79 (12-17-24 @ 20:00) (68 - 113)  BP: 125/79 (12-17-24 @ 20:00) (84/70 - 125/79)  RR: 17 (12-17-24 @ 20:00) (10 - 23)  SpO2: 97% (12-17-24 @ 20:00) (96% - 100%)  Wt(kg): --    PE  NAD  Awake, alert    No rash grossly  FROM                          11.2   14.01 )-----------( 205      ( 17 Dec 2024 06:45 )             34.8       12-17    139  |  107  |  24[H]  ----------------------------<  100[H]  4.2   |  23  |  0.95    Ca    8.5      17 Dec 2024 06:45  Phos  3.1     12-17  Mg     1.7     12-17

## 2024-12-19 NOTE — PROGRESS NOTE ADULT - SUBJECTIVE AND OBJECTIVE BOX
Pt seen, for stent removal today, sitting upright in chair, feeling well overall    MEDICATIONS  (STANDING):  aMIOdarone    Tablet 100 milliGRAM(s) Oral daily  chlorhexidine 2% Cloths 1 Application(s) Topical <User Schedule>  ertapenem  IVPB 1000 milliGRAM(s) IV Intermittent every 24 hours  hydrocortisone 20 milliGRAM(s) Oral daily  hydrocortisone 10 milliGRAM(s) Oral at bedtime  levothyroxine Injectable 75 MICROGram(s) IV Push at bedtime  loperamide 2 milliGRAM(s) Oral two times a day  midodrine 10 milliGRAM(s) Oral every 8 hours  minocycline IVPB      minocycline IVPB 200 milliGRAM(s) IV Intermittent every 12 hours  pantoprazole    Tablet 40 milliGRAM(s) Oral before breakfast    MEDICATIONS  (PRN):  ondansetron Injectable 4 milliGRAM(s) IV Push every 4 hours PRN Nausea and/or Vomiting      ROS  No fever, sweats, chills       Vital Signs Last 24 Hrs  T(C): 36.3 (19 Dec 2024 09:14), Max: 37 (19 Dec 2024 05:29)  T(F): 97.4 (19 Dec 2024 09:14), Max: 98.6 (19 Dec 2024 05:29)  HR: 74 (19 Dec 2024 05:29) (61 - 85)  BP: 110/63 (19 Dec 2024 09:14) (101/64 - 110/81)  BP(mean): --  RR: 18 (19 Dec 2024 09:14) (18 - 18)  SpO2: 99% (19 Dec 2024 09:14) (97% - 100%)    Parameters below as of 19 Dec 2024 05:29  Patient On (Oxygen Delivery Method): room air        PE  NAD  Awake, alert     No rash grossly  FROM                          10.2   8.63  )-----------( 142      ( 19 Dec 2024 08:50 )             31.3       12-19    138  |  109[H]  |  44[H]  ----------------------------<  69[L]  4.1   |  25  |  0.89    Ca    8.5      19 Dec 2024 08:50  Phos  2.2     12-18  Mg     1.8     12-18    TPro  4.5[L]  /  Alb  1.9[L]  /  TBili  1.3[H]  /  DBili  x   /  AST  203[H]  /  ALT  163[H]  /  AlkPhos  416[H]  12-18

## 2024-12-19 NOTE — PROGRESS NOTE ADULT - ASSESSMENT
78 yo fem s/p CBD stent removal  -Will adjust imodium q8hr  -reg diet  -Hypoglycemia due to liver mets? abscesses?

## 2024-12-19 NOTE — PROGRESS NOTE ADULT - SUBJECTIVE AND OBJECTIVE BOX
78 yo fem s/p CBD stent removal,    Abd functionnig ostomy, emptied 4-5x/day          12-19-24 @ 13:07    Vital Signs Last 24 Hrs  T(C): 36.6 (19 Dec 2024 11:40), Max: 37 (19 Dec 2024 05:29)  T(F): 97.8 (19 Dec 2024 11:40), Max: 98.6 (19 Dec 2024 05:29)  HR: 55 (19 Dec 2024 12:30) (55 - 85)  BP: 120/73 (19 Dec 2024 12:30) (100/64 - 121/75)  BP(mean): --  RR: 10 (19 Dec 2024 12:30) (8 - 18)  SpO2: 97% (19 Dec 2024 12:30) (97% - 100%)    Parameters below as of 19 Dec 2024 12:30  Patient On (Oxygen Delivery Method): room air                              10.2   8.63  )-----------( 142      ( 19 Dec 2024 08:50 )             31.3       12-19    138  |  109[H]  |  44[H]  ----------------------------<  69[L]  4.1   |  25  |  0.89    Ca    8.5      19 Dec 2024 08:50  Phos  2.2     12-18  Mg     1.8     12-18    TPro  4.5[L]  /  Alb  1.9[L]  /  TBili  1.3[H]  /  DBili  x   /  AST  203[H]  /  ALT  163[H]  /  AlkPhos  416[H]  12-18      LIVER FUNCTIONS - ( 18 Dec 2024 05:31 )  Alb: 1.9 g/dL / Pro: 4.5 gm/dL / ALK PHOS: 416 U/L / ALT: 163 U/L / AST: 203 U/L / GGT: x             MEDICATIONS  (STANDING):  aMIOdarone    Tablet 100 milliGRAM(s) Oral daily  chlorhexidine 2% Cloths 1 Application(s) Topical <User Schedule>  ertapenem  IVPB 1000 milliGRAM(s) IV Intermittent every 24 hours  hydrocortisone 20 milliGRAM(s) Oral daily  hydrocortisone 10 milliGRAM(s) Oral at bedtime  lactated ringers. 1000 milliLiter(s) (75 mL/Hr) IV Continuous <Continuous>  levothyroxine Injectable 75 MICROGram(s) IV Push at bedtime  loperamide 2 milliGRAM(s) Oral three times a day  midodrine 10 milliGRAM(s) Oral every 8 hours  minocycline IVPB      minocycline IVPB 200 milliGRAM(s) IV Intermittent every 12 hours  pantoprazole    Tablet 40 milliGRAM(s) Oral before breakfast    MEDICATIONS  (PRN):  fentaNYL    Injectable 25 MICROGram(s) IV Push every 5 minutes PRN Moderate Pain (4 - 6)  ondansetron Injectable 4 milliGRAM(s) IV Push every 4 hours PRN Nausea and/or Vomiting  ondansetron Injectable 4 milliGRAM(s) IV Push every 6 hours PRN Nausea and/or Vomiting      MEDICATIONS  (STANDING):  aMIOdarone    Tablet 100 milliGRAM(s) Oral daily  chlorhexidine 2% Cloths 1 Application(s) Topical <User Schedule>  ertapenem  IVPB 1000 milliGRAM(s) IV Intermittent every 24 hours  hydrocortisone 20 milliGRAM(s) Oral daily  hydrocortisone 10 milliGRAM(s) Oral at bedtime  lactated ringers. 1000 milliLiter(s) (75 mL/Hr) IV Continuous <Continuous>  levothyroxine Injectable 75 MICROGram(s) IV Push at bedtime  loperamide 2 milliGRAM(s) Oral three times a day  midodrine 10 milliGRAM(s) Oral every 8 hours  minocycline IVPB      minocycline IVPB 200 milliGRAM(s) IV Intermittent every 12 hours  pantoprazole    Tablet 40 milliGRAM(s) Oral before breakfast

## 2024-12-19 NOTE — PROGRESS NOTE ADULT - ASSESSMENT
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)  S/P multiple liver directed therapies with IR   Hepatic Abscesses / improved  Biliary stent; possible source of infection;  to be  removed  Thrombophilia of malignancy on anticoagulation  S/P colectomy ischemic  bowel  Hypotension from fluid loss/ improved  Anemia / multifactorial  Malnutrition / low Alb    Plan    In terms of anti neoplastic therapy : on hold  She may get immune therapy in a few weeks if stable  Procrit for progressive anemia can hold for now as hgb > 10 today  Long term anti coagulation  Biliary stent removal planned for today  Nutrition evaluation  Physical therapy  We will see her in the office a week after discharge    Dusty Marcial MD  [facility]  Cell: [phone]

## 2024-12-19 NOTE — PROGRESS NOTE ADULT - SUBJECTIVE AND OBJECTIVE BOX
78 y/o F w/pmhx of RCC w/mets to liver s/p IR embolization and biliary stent, HTN, HLD, adrenal insufficiency on hydrocortisone,  Afib on Apixaban, hypothyroidism, recent hospitalization 09/29/2024-10/16/2024 for septic shock 2/2 hepatic abscess w/Klebsiella, Enterococcus faecalis, Enterococcus faecium, and Clostridium perfringens, patient had drain placed at that time, patient then presents to ED 11/30/2024 w/abdominal pain, fevers, shock and was found to have pneumatosis of cecum and ascending colon w/multiple liver abscesses w/increasing pulmonary mets s/p laparoscopic R hemicolectomy w/ileostomy on 11/30/2024. Patient had drain placed by IR into R lateral hepatic collection and it was D/C'ed earlier this week. Patient was discharged on receiving Minocycline 200mg PO q12h for S. maltophilia in liver abscess and Ertapenem 1g daily for Enterococcus faecium. She was to continue these abx until 12/25/2024.  She now presented to the ED with severe weakness. She had not been eating or drinking much and was having high ostomy output. Since admission has had     Medical progress: Comfortable. Sitting up. Discussed with Dr. CALI and Dr. Turner. Care dicussed with Dr. Blackburn -  patient's abx regiment has been extended to - 1/8  Complaints: no new complaints  State of mind: normal  Estimated Day of Discharge: 12/20    Plan: Plan for ERCP            General: deconditioned, frail. chronically ill appearing  HEENT - nc/at  neck supple  lungs - clear  cv rrr   GI: Abdomen is soft -  ostomy site clean. stools well formed.   extremity warm alert  neuro awake, and alert  skin no rash              # Septic shock in an immunocompromised host  # Severe dehydration secondary to high output from ostomy  # Hepatic Abscess  # Enteritis  # Immunocompromised host  # PRANAY secondary to severe dehydration  - Hypotension from Dehydration, levo weaned off, continue hydration  - Invanz lower dose to 500mg daily/minocycline 200mg BID completing long term abx course through 1/8  - patient ot have an ercp tomorrow    # Adrenal insufficiency  - adrenal insufficiency on hydrocortisone        # Metastatic Renal Cell Cancer  - continue with oncology follow up    # PAF  - on AC    # Dehydration  - start imodium 2mg BID to slow down ostomy output  - Wean off IV steroids (Iv hydrocort 100mg q8)  - surgery following     time spent 50 min            78 y/o F w/pmhx of RCC w/mets to liver s/p IR embolization and biliary stent, HTN, HLD, adrenal insufficiency on hydrocortisone,  Afib on Apixaban, hypothyroidism, recent hospitalization 09/29/2024-10/16/2024 for septic shock 2/2 hepatic abscess w/Klebsiella, Enterococcus faecalis, Enterococcus faecium, and Clostridium perfringens, patient had drain placed at that time, patient then presents to ED 11/30/2024 w/abdominal pain, fevers, shock and was found to have pneumatosis of cecum and ascending colon w/multiple liver abscesses w/increasing pulmonary mets s/p laparoscopic R hemicolectomy w/ileostomy on 11/30/2024. Patient had drain placed by IR into R lateral hepatic collection and it was D/C'ed earlier this week. Patient was discharged on receiving Minocycline 200mg PO q12h for S. maltophilia in liver abscess and Ertapenem 1g daily for Enterococcus faecium. She was to continue these abx until 12/25/2024.  She now presented to the ED with severe weakness. She had not been eating or drinking much and was having high ostomy output. Since admission has had     Medical progress: Comfortable. Sitting up. Discussed with Dr. CALI and Dr. Turner. Care dicussed with Dr. Blackburn -  patient's abx regiment has been extended to - 1/8  Complaints: no new complaints  State of mind: normal  Estimated Day of Discharge: 12/20      ERCP today uneventful but became hypoglycemic after procedure suspect adr insuff; IV glucose administered, start IVF D5 and stat hydrocortisone 100mg and Q8        HEENT - nc/at  neck supple  lungs - clear  cv rrr   GI: Abdomen is soft -  ostomy site clean. stools well formed.   extremity warm alert  neuro awake, and alert  skin no rash        # Hypoglycemia se to adrenal insuff  restart IV Hydrocortisone   IVF D5  Q2 BGM  case d/w Endocrine Dr Walker   trend also LFT- decreased glycogen stores)       # Septic shock in an immunocompromised host  # Severe dehydration secondary to high output from ostomy  # Hepatic Abscess/ liver mets  # Enteritis  # Immunocompromised host  # PRANAY secondary to severe dehydration    - Invanz lower dose to 500mg daily/minocycline 200mg BID completing long term abx course through 1/8  - s/p ERCP with stent removal, balloon sweep of sludge.          # Liver mets unknown primary  - continue with oncology follow up    # PAF  - on AC on hold pre procedure         time spent 50 min

## 2024-12-19 NOTE — CONSULT NOTE ADULT - ASSESSMENT
Pt with hypoglcyemia  post ERCP-    may be due to adrenal insufficiency    Dw Dr Pablo  who already initiated stress dose steroids    Agree with 0100 mg IV SOlucortef  q8 x 24 hrs  Accucchek q2 hr  woudl start with repeat at 2 PM     will see how can taper over next  day depending on pt clinical picture       Hypoglcyemia can also be  a result from impaired liver gluconeogenesis due to liver mets and abscesses     Hypothryoidism on IV Levothryoxine   can  check TSH  Free T4      if there are any further quesiotns please reach out to me       mac spent with   econsult and discuss ion with referring physician - 25 min

## 2024-12-20 LAB
ALBUMIN SERPL ELPH-MCNC: 1.9 G/DL — LOW (ref 3.3–5)
ALP SERPL-CCNC: 499 U/L — HIGH (ref 40–120)
ALT FLD-CCNC: 180 U/L — HIGH (ref 12–78)
ANION GAP SERPL CALC-SCNC: 4 MMOL/L — LOW (ref 5–17)
AST SERPL-CCNC: 127 U/L — HIGH (ref 15–37)
BILIRUB DIRECT SERPL-MCNC: 0.7 MG/DL — HIGH (ref 0–0.3)
BILIRUB INDIRECT FLD-MCNC: 0.4 MG/DL — SIGNIFICANT CHANGE UP (ref 0.2–1)
BILIRUB SERPL-MCNC: 1.1 MG/DL — SIGNIFICANT CHANGE UP (ref 0.2–1.2)
BUN SERPL-MCNC: 41 MG/DL — HIGH (ref 7–23)
CALCIUM SERPL-MCNC: 8.1 MG/DL — LOW (ref 8.5–10.1)
CHLORIDE SERPL-SCNC: 109 MMOL/L — HIGH (ref 96–108)
CO2 SERPL-SCNC: 24 MMOL/L — SIGNIFICANT CHANGE UP (ref 22–31)
CREAT SERPL-MCNC: 0.81 MG/DL — SIGNIFICANT CHANGE UP (ref 0.5–1.3)
CULTURE RESULTS: SIGNIFICANT CHANGE UP
CULTURE RESULTS: SIGNIFICANT CHANGE UP
EGFR: 75 ML/MIN/1.73M2 — SIGNIFICANT CHANGE UP
GLUCOSE BLDC GLUCOMTR-MCNC: 106 MG/DL — HIGH (ref 70–99)
GLUCOSE BLDC GLUCOMTR-MCNC: 119 MG/DL — HIGH (ref 70–99)
GLUCOSE BLDC GLUCOMTR-MCNC: 125 MG/DL — HIGH (ref 70–99)
GLUCOSE BLDC GLUCOMTR-MCNC: 127 MG/DL — HIGH (ref 70–99)
GLUCOSE BLDC GLUCOMTR-MCNC: 130 MG/DL — HIGH (ref 70–99)
GLUCOSE BLDC GLUCOMTR-MCNC: 99 MG/DL — SIGNIFICANT CHANGE UP (ref 70–99)
GLUCOSE SERPL-MCNC: 122 MG/DL — HIGH (ref 70–99)
POTASSIUM SERPL-MCNC: 4.3 MMOL/L — SIGNIFICANT CHANGE UP (ref 3.5–5.3)
POTASSIUM SERPL-SCNC: 4.3 MMOL/L — SIGNIFICANT CHANGE UP (ref 3.5–5.3)
PROT SERPL-MCNC: 4.2 GM/DL — LOW (ref 6–8.3)
SODIUM SERPL-SCNC: 137 MMOL/L — SIGNIFICANT CHANGE UP (ref 135–145)
SPECIMEN SOURCE: SIGNIFICANT CHANGE UP
SPECIMEN SOURCE: SIGNIFICANT CHANGE UP

## 2024-12-20 PROCEDURE — 99232 SBSQ HOSP IP/OBS MODERATE 35: CPT

## 2024-12-20 RX ORDER — HYDROCORTISONE 100 MG/60ML
75 ENEMA RECTAL EVERY 8 HOURS
Refills: 0 | Status: DISCONTINUED | OUTPATIENT
Start: 2024-12-20 | End: 2024-12-21

## 2024-12-20 RX ADMIN — Medication 2 MILLIGRAM(S): at 05:29

## 2024-12-20 RX ADMIN — ERTAPENEM SODIUM 120 MILLIGRAM(S): 1 INJECTION, POWDER, LYOPHILIZED, FOR SOLUTION INTRAMUSCULAR; INTRAVENOUS at 05:29

## 2024-12-20 RX ADMIN — CHLORHEXIDINE GLUCONATE 1 APPLICATION(S): 1.2 RINSE ORAL at 06:00

## 2024-12-20 RX ADMIN — PANTOPRAZOLE 40 MILLIGRAM(S): 40 TABLET, DELAYED RELEASE ORAL at 05:30

## 2024-12-20 RX ADMIN — HYDROCORTISONE 75 MILLIGRAM(S): 100 ENEMA RECTAL at 21:23

## 2024-12-20 RX ADMIN — Medication 2 MILLIGRAM(S): at 14:45

## 2024-12-20 RX ADMIN — Medication 2 MILLIGRAM(S): at 21:22

## 2024-12-20 RX ADMIN — LEVOTHYROXINE SODIUM 75 MICROGRAM(S): 175 TABLET ORAL at 21:24

## 2024-12-20 RX ADMIN — MINOCYCLINE HYDROCHLORIDE 100 MILLIGRAM(S): 100 CAPSULE ORAL at 18:41

## 2024-12-20 RX ADMIN — AMIODARONE HYDROCHLORIDE 100 MILLIGRAM(S): 200 TABLET ORAL at 14:45

## 2024-12-20 RX ADMIN — MIDODRINE HYDROCHLORIDE 10 MILLIGRAM(S): 5 TABLET ORAL at 14:45

## 2024-12-20 RX ADMIN — MINOCYCLINE HYDROCHLORIDE 100 MILLIGRAM(S): 100 CAPSULE ORAL at 06:05

## 2024-12-20 RX ADMIN — HYDROCORTISONE 100 MILLIGRAM(S): 100 ENEMA RECTAL at 05:29

## 2024-12-20 RX ADMIN — APIXABAN 2.5 MILLIGRAM(S): 5 TABLET, FILM COATED ORAL at 10:26

## 2024-12-20 RX ADMIN — MIDODRINE HYDROCHLORIDE 10 MILLIGRAM(S): 5 TABLET ORAL at 21:22

## 2024-12-20 RX ADMIN — SODIUM CHLORIDE 80 MILLILITER(S): 9 INJECTION, SOLUTION INTRAVENOUS at 03:20

## 2024-12-20 RX ADMIN — MIDODRINE HYDROCHLORIDE 10 MILLIGRAM(S): 5 TABLET ORAL at 05:30

## 2024-12-20 RX ADMIN — APIXABAN 2.5 MILLIGRAM(S): 5 TABLET, FILM COATED ORAL at 21:22

## 2024-12-20 RX ADMIN — SODIUM CHLORIDE 40 MILLILITER(S): 9 INJECTION, SOLUTION INTRAVENOUS at 15:58

## 2024-12-20 NOTE — PROGRESS NOTE ADULT - SUBJECTIVE AND OBJECTIVE BOX
78 y/o F w/pmhx of RCC w/mets to liver s/p IR embolization and biliary stent, HTN, HLD, adrenal insufficiency on hydrocortisone,  Afib on Apixaban, hypothyroidism, recent hospitalization 09/29/2024-10/16/2024 for septic shock 2/2 hepatic abscess w/Klebsiella, Enterococcus faecalis, Enterococcus faecium, and Clostridium perfringens, patient had drain placed at that time, patient then presents to ED 11/30/2024 w/abdominal pain, fevers, shock and was found to have pneumatosis of cecum and ascending colon w/multiple liver abscesses w/increasing pulmonary mets s/p laparoscopic R hemicolectomy w/ileostomy on 11/30/2024. Patient had drain placed by IR into R lateral hepatic collection and it was D/C'ed earlier this week. Patient was discharged on receiving Minocycline 200mg PO q12h for S. maltophilia in liver abscess and Ertapenem 1g daily for Enterococcus faecium. She was to continue these abx until 12/25/2024.  She now presented to the ED with severe weakness. She had not been eating or drinking much and was having high ostomy output.  -s/p ERCP developed hypoglycemia; now on stress dose steroids and D5 drip     12.20: no distress, colostomy empty, BGMs stable          REVIEW OF SYSTEMS:    CONSTITUTIONAL: No weakness, No fevers or chills  ENT: No ear ache, No sorethroat  NECK: No pain, No stiffness  RESPIRATORY: No cough, No wheezing, No hemoptysis; No dyspnea  CARDIOVASCULAR: No chest pain, No palpitations  GASTROINTESTINAL: No abd pain, No nausea, No vomiting, No hematemesis, No diarrhea or constipation. No melena, No hematochezia.  GENITOURINARY: No dysuria, No  hematuria  NEUROLOGICAL: No diplopia, No paresthesia, No motor dysfunction  MUSCULOSKELETAL: No arthralgia, No myalgia  SKIN: No rashes, or lesions   PSYCH: no anxiety, no suicidal ideation    All other review of systems is negative unless indicated above    Vital Signs Last 24 Hrs  T(C): 36.6 (20 Dec 2024 14:18), Max: 36.9 (20 Dec 2024 05:56)  T(F): 97.8 (20 Dec 2024 14:18), Max: 98.5 (20 Dec 2024 05:56)  HR: 78 (20 Dec 2024 14:18) (62 - 78)  BP: 117/69 (20 Dec 2024 14:18) (102/61 - 123/70)  BP(mean): --  RR: 18 (20 Dec 2024 14:18) (17 - 18)  SpO2: 100% (20 Dec 2024 14:18) (96% - 100%)    Parameters below as of 20 Dec 2024 14:18  Patient On (Oxygen Delivery Method): room air        PHYSICAL EXAM:    GENERAL: NAD  HEENT:  NC/AT, EOMI, PERRLA, No scleral icterus, Moist mucous membranes  NECK: Supple, No JVD  CNS:  Alert & Oriented X3, Motor Strength 5/5 B/L upper and lower extremities; DTRs 2+ intact   LUNG: Normal Breath sounds, Clear to auscultation bilaterally, No rales, No rhonchi, No wheezing  HEART: RRR; No murmurs, No rubs  ABDOMEN: +BS, ST/ND/NT, +colostomy present   GENITOURINARY: Voiding, Bladder not distended  EXTREMITIES:  2+ Peripheral Pulses, No clubbing, No cyanosis, No tibial edema  MUSCULOSKELTAL: Joints normal ROM, No TTP, No effusion  VAGINAL: deferred  SKIN: no rashes  RECTAL: deferred, not indicated  BREAST: deferred                          10.2   8.63  )-----------( 142      ( 19 Dec 2024 08:50 )             31.3     12-20    137  |  109[H]  |  41[H]  ----------------------------<  122[H]  4.3   |  24  |  0.81    Ca    8.1[L]      20 Dec 2024 06:50    TPro  4.2[L]  /  Alb  1.9[L]  /  TBili  1.1  /  DBili  0.7[H]  /  AST  127[H]  /  ALT  180[H]  /  AlkPhos  499[H]  12-20    Vancomycin levels:   Cultures:     MEDICATIONS  (STANDING):  aMIOdarone    Tablet 100 milliGRAM(s) Oral daily  apixaban 2.5 milliGRAM(s) Oral every 12 hours  chlorhexidine 2% Cloths 1 Application(s) Topical <User Schedule>  dextrose 5%. 1000 milliLiter(s) (40 mL/Hr) IV Continuous <Continuous>  ertapenem  IVPB 1000 milliGRAM(s) IV Intermittent every 24 hours  hydrocortisone sodium succinate Injectable 75 milliGRAM(s) IV Push every 8 hours  levothyroxine Injectable 75 MICROGram(s) IV Push at bedtime  loperamide 2 milliGRAM(s) Oral three times a day  midodrine 10 milliGRAM(s) Oral every 8 hours  minocycline IVPB 200 milliGRAM(s) IV Intermittent every 12 hours  pantoprazole    Tablet 40 milliGRAM(s) Oral before breakfast    MEDICATIONS  (PRN):  ondansetron Injectable 4 milliGRAM(s) IV Push every 4 hours PRN Nausea and/or Vomiting      all labs reviewed  all imaging reviewed      a/p:    # Hypoglycemia se to adrenal insufficiency   on  IV Hydrocortisone 100mg TID; will taper as tolerated   IVF D5, decrease rate to 40cc/hr  Q4hrs BGM  case d/w Endocrine Dr Walker       # Septic shock in an immunocompromised host  # Severe dehydration secondary to high output from ostomy  # Hepatic Abscess/ liver mets  drain removed few weeks ago  - Invanz lower dose to 500mg daily/minocycline 200mg BID completing long term abx course through 1/8    # PRANAY secondary to severe dehydration:   resolved       - s/p ERCP with stent removal, balloon sweep of sludge.    # Liver mets unknown primary  - continue with oncology follow up    # PAF  - on AC on hold pre procedure; will restart when ok with GI

## 2024-12-21 LAB
GLUCOSE BLDC GLUCOMTR-MCNC: 102 MG/DL — HIGH (ref 70–99)
GLUCOSE BLDC GLUCOMTR-MCNC: 104 MG/DL — HIGH (ref 70–99)
GLUCOSE BLDC GLUCOMTR-MCNC: 106 MG/DL — HIGH (ref 70–99)
GLUCOSE BLDC GLUCOMTR-MCNC: 109 MG/DL — HIGH (ref 70–99)
GLUCOSE BLDC GLUCOMTR-MCNC: 124 MG/DL — HIGH (ref 70–99)
GLUCOSE BLDC GLUCOMTR-MCNC: 90 MG/DL — SIGNIFICANT CHANGE UP (ref 70–99)

## 2024-12-21 PROCEDURE — 99232 SBSQ HOSP IP/OBS MODERATE 35: CPT

## 2024-12-21 RX ORDER — LOPERAMIDE HCL 1 MG/5 ML
2 LIQUID (ML) ORAL
Refills: 0 | Status: DISCONTINUED | OUTPATIENT
Start: 2024-12-21 | End: 2024-12-23

## 2024-12-21 RX ORDER — HYDROCORTISONE 100 MG/60ML
50 ENEMA RECTAL EVERY 8 HOURS
Refills: 0 | Status: DISCONTINUED | OUTPATIENT
Start: 2024-12-21 | End: 2024-12-22

## 2024-12-21 RX ADMIN — APIXABAN 2.5 MILLIGRAM(S): 5 TABLET, FILM COATED ORAL at 10:26

## 2024-12-21 RX ADMIN — HYDROCORTISONE 75 MILLIGRAM(S): 100 ENEMA RECTAL at 13:14

## 2024-12-21 RX ADMIN — APIXABAN 2.5 MILLIGRAM(S): 5 TABLET, FILM COATED ORAL at 22:55

## 2024-12-21 RX ADMIN — LEVOTHYROXINE SODIUM 75 MICROGRAM(S): 175 TABLET ORAL at 22:56

## 2024-12-21 RX ADMIN — HYDROCORTISONE 75 MILLIGRAM(S): 100 ENEMA RECTAL at 05:32

## 2024-12-21 RX ADMIN — MINOCYCLINE HYDROCHLORIDE 100 MILLIGRAM(S): 100 CAPSULE ORAL at 17:30

## 2024-12-21 RX ADMIN — MIDODRINE HYDROCHLORIDE 10 MILLIGRAM(S): 5 TABLET ORAL at 22:55

## 2024-12-21 RX ADMIN — MIDODRINE HYDROCHLORIDE 10 MILLIGRAM(S): 5 TABLET ORAL at 13:14

## 2024-12-21 RX ADMIN — PANTOPRAZOLE 40 MILLIGRAM(S): 40 TABLET, DELAYED RELEASE ORAL at 05:35

## 2024-12-21 RX ADMIN — AMIODARONE HYDROCHLORIDE 100 MILLIGRAM(S): 200 TABLET ORAL at 10:25

## 2024-12-21 RX ADMIN — HYDROCORTISONE 50 MILLIGRAM(S): 100 ENEMA RECTAL at 22:56

## 2024-12-21 RX ADMIN — MIDODRINE HYDROCHLORIDE 10 MILLIGRAM(S): 5 TABLET ORAL at 05:39

## 2024-12-21 RX ADMIN — Medication 2 MILLIGRAM(S): at 05:33

## 2024-12-21 RX ADMIN — MINOCYCLINE HYDROCHLORIDE 100 MILLIGRAM(S): 100 CAPSULE ORAL at 06:24

## 2024-12-21 RX ADMIN — CHLORHEXIDINE GLUCONATE 1 APPLICATION(S): 1.2 RINSE ORAL at 05:09

## 2024-12-21 RX ADMIN — ERTAPENEM SODIUM 120 MILLIGRAM(S): 1 INJECTION, POWDER, LYOPHILIZED, FOR SOLUTION INTRAMUSCULAR; INTRAVENOUS at 05:42

## 2024-12-21 NOTE — PROGRESS NOTE ADULT - SUBJECTIVE AND OBJECTIVE BOX
80 yo female h/o  metastatic renal cell carcinoma to liver, lungs, liver abscesses,  s/p Lpa RT colectomy on 11/30 due to ischemic ascending colitis,  HTN, HLD, adrenal insufficiency on hydrocortisone,  Afib on Apixaban, hypothyroidism, presents to the ED for dehydration due to high ostomy output.  - hospitalization 09/29/2024-10/16/2024 for septic shock 2/2 hepatic abscess w/Klebsiella, Enterococcus faecalis, Enterococcus faecium, and Clostridium perfringens, patient had drain  placed at that time by IR,  now removed, biliary stenting, discharged on IV Abx via PICC  -11/30/2024 readmitted for abdominal pain, fevers, shock and was found to have pneumatosis of cecum and ascending colon s/p laparoscopic R hemicolectomy w/ileostomy on 11/30/2024. Patient was discharged home on  Minocycline 200mg PO q12h for S. maltophilia and Ertapenem 1g daily for Enterococcus faecium growing in the liver abscess. Hepatic drain was removed   She now presented to the ED with severe weakness. She had not been eating or drinking much and was having high ostomy output.  -s/p ERCP for biliary stent removal,  developed hypoglycemia; now on stress dose steroids and D5 drip     12.20: no distress, colostomy empty, BGMs stable  12.21: no distress, no cp, no sob, no abd pain, small amount of formed feces in ostomy bag          REVIEW OF SYSTEMS:    CONSTITUTIONAL: No weakness, No fevers or chills  ENT: No ear ache, No sorethroat  NECK: No pain, No stiffness  RESPIRATORY: No cough, No wheezing, No hemoptysis; No dyspnea  CARDIOVASCULAR: No chest pain, No palpitations  GASTROINTESTINAL: No abd pain, No nausea, No vomiting, No hematemesis, No diarrhea or constipation. No melena, No hematochezia.  GENITOURINARY: No dysuria, No  hematuria  NEUROLOGICAL: No diplopia, No paresthesia, No motor dysfunction  MUSCULOSKELETAL: No arthralgia, No myalgia  SKIN: No rashes, or lesions   PSYCH: no anxiety, no suicidal ideation    All other review of systems is negative unless indicated above    Vital Signs Last 24 Hrs  T(C): 36.4 (21 Dec 2024 09:14), Max: 36.7 (20 Dec 2024 23:53)  T(F): 97.5 (21 Dec 2024 09:14), Max: 98.1 (20 Dec 2024 23:53)  HR: 74 (21 Dec 2024 13:14) (69 - 81)  BP: 112/69 (21 Dec 2024 13:14) (106/61 - 128/82)  BP(mean): --  RR: 18 (21 Dec 2024 09:14) (18 - 18)  SpO2: 99% (21 Dec 2024 09:14) (99% - 100%)    Parameters below as of 21 Dec 2024 09:14  Patient On (Oxygen Delivery Method): room air            PHYSICAL EXAM:    GENERAL: NAD  HEENT:  NC/AT, EOMI, PERRLA, No scleral icterus, Moist mucous membranes  NECK: Supple, No JVD  CNS:  Alert & Oriented X3, Motor Strength 5/5 B/L upper and lower extremities; DTRs 2+ intact   LUNG: Normal Breath sounds, Clear to auscultation bilaterally, No rales, No rhonchi, No wheezing  HEART: RRR; No murmurs, No rubs  ABDOMEN: +BS, ST/ND/NT, +colostomy present   GENITOURINARY: Voiding, Bladder not distended  EXTREMITIES:  2+ Peripheral Pulses, No clubbing, No cyanosis, No tibial edema  MUSCULOSKELTAL: Joints normal ROM, No TTP, No effusion  VAGINAL: deferred  SKIN: no rashes  RECTAL: deferred, not indicated  BREAST: deferred                          10.2   8.63  )-----------( 142      ( 19 Dec 2024 08:50 )             31.3     12-20    137  |  109[H]  |  41[H]  ----------------------------<  122[H]  4.3   |  24  |  0.81    Ca    8.1[L]      20 Dec 2024 06:50    TPro  4.2[L]  /  Alb  1.9[L]  /  TBili  1.1  /  DBili  0.7[H]  /  AST  127[H]  /  ALT  180[H]  /  AlkPhos  499[H]  12-20    Vancomycin levels:   Cultures:     MEDICATIONS  (STANDING):  aMIOdarone    Tablet 100 milliGRAM(s) Oral daily  apixaban 2.5 milliGRAM(s) Oral every 12 hours  chlorhexidine 2% Cloths 1 Application(s) Topical <User Schedule>  dextrose 5%. 1000 milliLiter(s) (40 mL/Hr) IV Continuous <Continuous>  ertapenem  IVPB 1000 milliGRAM(s) IV Intermittent every 24 hours  hydrocortisone sodium succinate Injectable 75 milliGRAM(s) IV Push every 8 hours  levothyroxine Injectable 75 MICROGram(s) IV Push at bedtime  loperamide 2 milliGRAM(s) Oral three times a day  midodrine 10 milliGRAM(s) Oral every 8 hours  minocycline IVPB 200 milliGRAM(s) IV Intermittent every 12 hours  pantoprazole    Tablet 40 milliGRAM(s) Oral before breakfast    MEDICATIONS  (PRN):  ondansetron Injectable 4 milliGRAM(s) IV Push every 4 hours PRN Nausea and/or Vomiting      all labs reviewed  all imaging reviewed      a/p:    # Hypoglycemia due  to adrenal insufficiency : BGMs stable   on  IV Hydrocortisone 75mg TID; will taper as tolerated   dc D5 drip today  Q4hrs BGM    case d/w Endocrine Dr Walker     # Septic shock in an immunocompromised host    # Severe dehydration secondary to high output from ostomy:  improved on Imodium    # Hepatic Abscess/ liver mets  -Rt lateral hepatic drain removed few weeks ago  -s/p ERCP with stent removal, balloon sweep of sludge.  - Invanz lower dose to 500mg daily/minocycline 200mg BID completing long term abx course via PICC until  1/8/25    # PRANAY secondary to severe dehydration:   resolved     # RCC/Hepatic and Pulmonary r mets  - continue with oncology follow up    # PAF  - c/w Apixaban , Amiodarone

## 2024-12-22 LAB
GLUCOSE BLDC GLUCOMTR-MCNC: 102 MG/DL — HIGH (ref 70–99)
GLUCOSE BLDC GLUCOMTR-MCNC: 128 MG/DL — HIGH (ref 70–99)
GLUCOSE BLDC GLUCOMTR-MCNC: 70 MG/DL — SIGNIFICANT CHANGE UP (ref 70–99)
GLUCOSE BLDC GLUCOMTR-MCNC: 85 MG/DL — SIGNIFICANT CHANGE UP (ref 70–99)

## 2024-12-22 PROCEDURE — 99232 SBSQ HOSP IP/OBS MODERATE 35: CPT

## 2024-12-22 RX ORDER — HYDROCORTISONE 100 MG/60ML
25 ENEMA RECTAL EVERY 8 HOURS
Refills: 0 | Status: DISCONTINUED | OUTPATIENT
Start: 2024-12-22 | End: 2024-12-23

## 2024-12-22 RX ADMIN — ERTAPENEM SODIUM 120 MILLIGRAM(S): 1 INJECTION, POWDER, LYOPHILIZED, FOR SOLUTION INTRAMUSCULAR; INTRAVENOUS at 05:51

## 2024-12-22 RX ADMIN — MIDODRINE HYDROCHLORIDE 10 MILLIGRAM(S): 5 TABLET ORAL at 14:20

## 2024-12-22 RX ADMIN — MINOCYCLINE HYDROCHLORIDE 100 MILLIGRAM(S): 100 CAPSULE ORAL at 20:57

## 2024-12-22 RX ADMIN — APIXABAN 2.5 MILLIGRAM(S): 5 TABLET, FILM COATED ORAL at 09:44

## 2024-12-22 RX ADMIN — HYDROCORTISONE 25 MILLIGRAM(S): 100 ENEMA RECTAL at 14:21

## 2024-12-22 RX ADMIN — HYDROCORTISONE 25 MILLIGRAM(S): 100 ENEMA RECTAL at 20:55

## 2024-12-22 RX ADMIN — LEVOTHYROXINE SODIUM 75 MICROGRAM(S): 175 TABLET ORAL at 21:07

## 2024-12-22 RX ADMIN — MIDODRINE HYDROCHLORIDE 10 MILLIGRAM(S): 5 TABLET ORAL at 21:07

## 2024-12-22 RX ADMIN — PANTOPRAZOLE 40 MILLIGRAM(S): 40 TABLET, DELAYED RELEASE ORAL at 05:50

## 2024-12-22 RX ADMIN — HYDROCORTISONE 50 MILLIGRAM(S): 100 ENEMA RECTAL at 05:49

## 2024-12-22 RX ADMIN — MINOCYCLINE HYDROCHLORIDE 100 MILLIGRAM(S): 100 CAPSULE ORAL at 09:44

## 2024-12-22 RX ADMIN — AMIODARONE HYDROCHLORIDE 100 MILLIGRAM(S): 200 TABLET ORAL at 09:44

## 2024-12-22 RX ADMIN — MIDODRINE HYDROCHLORIDE 10 MILLIGRAM(S): 5 TABLET ORAL at 05:49

## 2024-12-22 RX ADMIN — APIXABAN 2.5 MILLIGRAM(S): 5 TABLET, FILM COATED ORAL at 20:54

## 2024-12-22 NOTE — PROGRESS NOTE ADULT - SUBJECTIVE AND OBJECTIVE BOX
78 yo female h/o  metastatic renal cell carcinoma to liver, lungs, liver abscesses,  s/p Lpa RT colectomy on 11/30 due to ischemic ascending colitis,  HTN, HLD, adrenal insufficiency on hydrocortisone,  Afib on Apixaban, hypothyroidism, presents to the ED for dehydration due to high ostomy output.  - hospitalization 09/29/2024-10/16/2024 for septic shock 2/2 hepatic abscess w/Klebsiella, Enterococcus faecalis, Enterococcus faecium, and Clostridium perfringens, patient had drain  placed at that time by IR,  now removed, biliary stenting, discharged on IV Abx via PICC  -11/30/2024 readmitted for abdominal pain, fevers, shock and was found to have pneumatosis of cecum and ascending colon s/p laparoscopic R hemicolectomy w/ileostomy on 11/30/2024. Patient was discharged home on  Minocycline 200mg PO q12h for S. maltophilia and Ertapenem 1g daily for Enterococcus faecium growing in the liver abscess. Hepatic drain was removed   She now presented to the ED with severe weakness. She had not been eating or drinking much and was having high ostomy output.  -s/p ERCP for biliary stent removal,  developed hypoglycemia; now on stress dose steroids and D5 drip     12.20: no distress, colostomy empty, BGMs stable  12.21: no distress, no cp, no sob, no abd pain, small amount of formed feces in ostomy bag  12.22: no distress, no n/v/d, no abd pain         REVIEW OF SYSTEMS:    CONSTITUTIONAL: No weakness, No fevers or chills  ENT: No ear ache, No sorethroat  NECK: No pain, No stiffness  RESPIRATORY: No cough, No wheezing, No hemoptysis; No dyspnea  CARDIOVASCULAR: No chest pain, No palpitations  GASTROINTESTINAL: No abd pain, No nausea, No vomiting, No hematemesis, No diarrhea or constipation. No melena, No hematochezia.  GENITOURINARY: No dysuria, No  hematuria  NEUROLOGICAL: No diplopia, No paresthesia, No motor dysfunction  MUSCULOSKELETAL: No arthralgia, No myalgia  SKIN: No rashes, or lesions   PSYCH: no anxiety, no suicidal ideation    All other review of systems is negative unless indicated above    Vital Signs Last 24 Hrs  T(C): 36.4 (22 Dec 2024 09:08), Max: 36.8 (21 Dec 2024 22:30)  T(F): 97.5 (22 Dec 2024 09:08), Max: 98.3 (21 Dec 2024 22:30)  HR: 85 (22 Dec 2024 09:08) (73 - 85)  BP: 138/88 (22 Dec 2024 09:08) (98/65 - 138/88)  RR: 18 (22 Dec 2024 09:08) (17 - 18)  SpO2: 98% (22 Dec 2024 09:08) (98% - 100%)    Parameters below as of 22 Dec 2024 09:08  Patient On (Oxygen Delivery Method): room air              PHYSICAL EXAM:    GENERAL: NAD  HEENT:  NC/AT, EOMI, PERRLA, No scleral icterus, Moist mucous membranes  NECK: Supple, No JVD  CNS:  Alert & Oriented X3, Motor Strength 5/5 B/L upper and lower extremities; DTRs 2+ intact   LUNG: Normal Breath sounds, Clear to auscultation bilaterally, No rales, No rhonchi, No wheezing  HEART: RRR; No murmurs, No rubs  ABDOMEN: +BS, ST/ND/NT, +colostomy present   GENITOURINARY: Voiding, Bladder not distended  EXTREMITIES:  2+ Peripheral Pulses, No clubbing, No cyanosis, No tibial edema  MUSCULOSKELTAL: Joints normal ROM, No TTP, No effusion  VAGINAL: deferred  SKIN: no rashes  RECTAL: deferred, not indicated  BREAST: deferred                          10.2   8.63  )-----------( 142      ( 19 Dec 2024 08:50 )             31.3     12-20    137  |  109[H]  |  41[H]  ----------------------------<  122[H]  4.3   |  24  |  0.81    Ca    8.1[L]      20 Dec 2024 06:50    TPro  4.2[L]  /  Alb  1.9[L]  /  TBili  1.1  /  DBili  0.7[H]  /  AST  127[H]  /  ALT  180[H]  /  AlkPhos  499[H]  12-20    Vancomycin levels:   Cultures:     MEDICATIONS  (STANDING):  aMIOdarone    Tablet 100 milliGRAM(s) Oral daily  apixaban 2.5 milliGRAM(s) Oral every 12 hours  chlorhexidine 2% Cloths 1 Application(s) Topical <User Schedule>  dextrose 5%. 1000 milliLiter(s) (40 mL/Hr) IV Continuous <Continuous>  ertapenem  IVPB 1000 milliGRAM(s) IV Intermittent every 24 hours  hydrocortisone sodium succinate Injectable 75 milliGRAM(s) IV Push every 8 hours  levothyroxine Injectable 75 MICROGram(s) IV Push at bedtime  loperamide 2 milliGRAM(s) Oral three times a day  midodrine 10 milliGRAM(s) Oral every 8 hours  minocycline IVPB 200 milliGRAM(s) IV Intermittent every 12 hours  pantoprazole    Tablet 40 milliGRAM(s) Oral before breakfast    MEDICATIONS  (PRN):  ondansetron Injectable 4 milliGRAM(s) IV Push every 4 hours PRN Nausea and/or Vomiting      all labs reviewed  all imaging reviewed      a/p:    # Hypoglycemia due  to adrenal insufficiency : BGMs stable   on  IV Hydrocortisone 50mg TID; will taper to 25mg IV Tid  dc D5 drip today  Q4hrs BGM    case d/w Endocrine    # Septic shock in an immunocompromised host    # Severe dehydration secondary to high output from ostomy:  improved on Imodium    # Hepatic Abscess/ liver mets  -Rt lateral hepatic drain removed few weeks ago  -s/p ERCP with stent removal, balloon sweep of sludge.  - Invanz lower dose to 500mg daily/minocycline 200mg BID completing long term abx course via PICC until  1/8/25    # PRANAY secondary to severe dehydration:   resolved     # RCC/Hepatic and Pulmonary r mets  - continue with oncology follow up    # PAF  - c/w Apixaban , Amiodarone

## 2024-12-23 ENCOUNTER — TRANSCRIPTION ENCOUNTER (OUTPATIENT)
Age: 79
End: 2024-12-23

## 2024-12-23 VITALS
HEART RATE: 77 BPM | DIASTOLIC BLOOD PRESSURE: 86 MMHG | OXYGEN SATURATION: 99 % | TEMPERATURE: 97 F | SYSTOLIC BLOOD PRESSURE: 139 MMHG | RESPIRATION RATE: 16 BRPM

## 2024-12-23 LAB
GLUCOSE BLDC GLUCOMTR-MCNC: 78 MG/DL — SIGNIFICANT CHANGE UP (ref 70–99)
GLUCOSE BLDC GLUCOMTR-MCNC: 82 MG/DL — SIGNIFICANT CHANGE UP (ref 70–99)
GLUCOSE BLDC GLUCOMTR-MCNC: 95 MG/DL — SIGNIFICANT CHANGE UP (ref 70–99)

## 2024-12-23 PROCEDURE — 93971 EXTREMITY STUDY: CPT | Mod: 26,LT

## 2024-12-23 PROCEDURE — 99239 HOSP IP/OBS DSCHRG MGMT >30: CPT

## 2024-12-23 RX ORDER — HYDROCORTISONE ACETATE 25 MG/ML
1 VIAL (ML) INJECTION
Refills: 0 | DISCHARGE

## 2024-12-23 RX ORDER — HYDROCORTISONE 100 MG/60ML
1 ENEMA RECTAL
Qty: 30 | Refills: 0
Start: 2024-12-23

## 2024-12-23 RX ORDER — HYDROCORTISONE 100 MG/60ML
20 ENEMA RECTAL
Refills: 0 | Status: DISCONTINUED | OUTPATIENT
Start: 2024-12-23 | End: 2024-12-23

## 2024-12-23 RX ORDER — MINOCYCLINE HYDROCHLORIDE 100 MG/1
2 CAPSULE ORAL
Qty: 0 | Refills: 0 | DISCHARGE
Start: 2024-12-23

## 2024-12-23 RX ORDER — APIXABAN 5 MG/1
1 TABLET, FILM COATED ORAL
Qty: 60 | Refills: 0
Start: 2024-12-23

## 2024-12-23 RX ORDER — HYDROCORTISONE 100 MG/60ML
10 ENEMA RECTAL
Refills: 0 | Status: DISCONTINUED | OUTPATIENT
Start: 2024-12-23 | End: 2024-12-23

## 2024-12-23 RX ORDER — ERTAPENEM SODIUM 1 G/1
1 INJECTION, POWDER, LYOPHILIZED, FOR SOLUTION INTRAMUSCULAR; INTRAVENOUS
Qty: 0 | Refills: 0 | DISCHARGE
Start: 2024-12-23

## 2024-12-23 RX ORDER — APIXABAN 5 MG/1
1 TABLET, FILM COATED ORAL
Qty: 60 | Refills: 0 | DISCHARGE
Start: 2024-12-23

## 2024-12-23 RX ORDER — MINOCYCLINE HYDROCHLORIDE 100 MG/1
200 CAPSULE ORAL EVERY 12 HOURS
Refills: 0 | Status: DISCONTINUED | OUTPATIENT
Start: 2024-12-23 | End: 2024-12-23

## 2024-12-23 RX ADMIN — CHLORHEXIDINE GLUCONATE 1 APPLICATION(S): 1.2 RINSE ORAL at 14:19

## 2024-12-23 RX ADMIN — HYDROCORTISONE 25 MILLIGRAM(S): 100 ENEMA RECTAL at 06:06

## 2024-12-23 RX ADMIN — MIDODRINE HYDROCHLORIDE 10 MILLIGRAM(S): 5 TABLET ORAL at 14:13

## 2024-12-23 RX ADMIN — PANTOPRAZOLE 40 MILLIGRAM(S): 40 TABLET, DELAYED RELEASE ORAL at 06:07

## 2024-12-23 RX ADMIN — MINOCYCLINE HYDROCHLORIDE 200 MILLIGRAM(S): 100 CAPSULE ORAL at 14:13

## 2024-12-23 RX ADMIN — ERTAPENEM SODIUM 120 MILLIGRAM(S): 1 INJECTION, POWDER, LYOPHILIZED, FOR SOLUTION INTRAMUSCULAR; INTRAVENOUS at 06:05

## 2024-12-23 RX ADMIN — APIXABAN 2.5 MILLIGRAM(S): 5 TABLET, FILM COATED ORAL at 12:13

## 2024-12-23 RX ADMIN — HYDROCORTISONE 20 MILLIGRAM(S): 100 ENEMA RECTAL at 14:13

## 2024-12-23 RX ADMIN — AMIODARONE HYDROCHLORIDE 100 MILLIGRAM(S): 200 TABLET ORAL at 12:12

## 2024-12-23 RX ADMIN — MIDODRINE HYDROCHLORIDE 10 MILLIGRAM(S): 5 TABLET ORAL at 06:04

## 2024-12-23 NOTE — PROGRESS NOTE ADULT - SUBJECTIVE AND OBJECTIVE BOX
78 yo female h/o  metastatic renal cell carcinoma to liver, lungs, liver abscesses,  s/p Lpa RT colectomy on 11/30 due to ischemic ascending colitis,  HTN, HLD, adrenal insufficiency on hydrocortisone,  Afib on Apixaban, hypothyroidism, presents to the ED for dehydration due to high ostomy output.  - hospitalization 09/29/2024-10/16/2024 for septic shock 2/2 hepatic abscess w/Klebsiella, Enterococcus faecalis, Enterococcus faecium, and Clostridium perfringens, patient had drain  placed at that time by IR,  now removed, biliary stenting, discharged on IV Abx via PICC  -11/30/2024 readmitted for abdominal pain, fevers, shock and was found to have pneumatosis of cecum and ascending colon s/p laparoscopic R hemicolectomy w/ileostomy on 11/30/2024. Patient was discharged home on  Minocycline 200mg PO q12h for S. maltophilia and Ertapenem 1g daily for Enterococcus faecium growing in the liver abscess. Hepatic drain was removed   She now presented to the ED with severe weakness. She had not been eating or drinking much and was having high ostomy output.  -s/p ERCP for biliary stent removal,  developed hypoglycemia; now on stress dose steroids and D5 drip     12.20: no distress, colostomy empty, BGMs stable  12.21: no distress, no cp, no sob, no abd pain, small amount of formed feces in ostomy bag  12.22: no distress, no n/v/d, no abd pain   12.23: LUE edematous, no erythema, no cp, no sob        REVIEW OF SYSTEMS:    CONSTITUTIONAL: No weakness, No fevers or chills  ENT: No ear ache, No sorethroat  NECK: No pain, No stiffness  RESPIRATORY: No cough, No wheezing, No hemoptysis; No dyspnea  CARDIOVASCULAR: No chest pain, No palpitations  GASTROINTESTINAL: No abd pain, No nausea, No vomiting, No hematemesis, No diarrhea or constipation. No melena, No hematochezia.  GENITOURINARY: No dysuria, No  hematuria  NEUROLOGICAL: No diplopia, No paresthesia, No motor dysfunction  MUSCULOSKELETAL: No arthralgia, No myalgia  SKIN: No rashes, or lesions   PSYCH: no anxiety, no suicidal ideation    All other review of systems is negative unless indicated above    Vital Signs Last 24 Hrs  T(C): 36.4 (22 Dec 2024 09:08), Max: 36.8 (21 Dec 2024 22:30)  T(F): 97.5 (22 Dec 2024 09:08), Max: 98.3 (21 Dec 2024 22:30)  HR: 85 (22 Dec 2024 09:08) (73 - 85)  BP: 138/88 (22 Dec 2024 09:08) (98/65 - 138/88)  RR: 18 (22 Dec 2024 09:08) (17 - 18)  SpO2: 98% (22 Dec 2024 09:08) (98% - 100%)    Parameters below as of 22 Dec 2024 09:08  Patient On (Oxygen Delivery Method): room air              PHYSICAL EXAM:    GENERAL: NAD  HEENT:  NC/AT, EOMI, PERRLA, No scleral icterus, Moist mucous membranes  NECK: Supple, No JVD  CNS:  Alert & Oriented X3, Motor Strength 5/5 B/L upper and lower extremities; DTRs 2+ intact   LUNG: Normal Breath sounds, Clear to auscultation bilaterally, No rales, No rhonchi, No wheezing  HEART: RRR; No murmurs, No rubs  ABDOMEN: +BS, ST/ND/NT, +colostomy present   GENITOURINARY: Voiding, Bladder not distended  EXTREMITIES:  2+ Peripheral Pulses, No clubbing, No cyanosis, No tibial edema  MUSCULOSKELTAL: Joints normal ROM, No TTP, No effusion  VAGINAL: deferred  SKIN: no rashes  RECTAL: deferred, not indicated  BREAST: deferred                          10.2   8.63  )-----------( 142      ( 19 Dec 2024 08:50 )             31.3     12-20    137  |  109[H]  |  41[H]  ----------------------------<  122[H]  4.3   |  24  |  0.81    Ca    8.1[L]      20 Dec 2024 06:50    TPro  4.2[L]  /  Alb  1.9[L]  /  TBili  1.1  /  DBili  0.7[H]  /  AST  127[H]  /  ALT  180[H]  /  AlkPhos  499[H]  12-20    Vancomycin levels:   Cultures:     MEDICATIONS  (STANDING):  aMIOdarone    Tablet 100 milliGRAM(s) Oral daily  apixaban 2.5 milliGRAM(s) Oral every 12 hours  chlorhexidine 2% Cloths 1 Application(s) Topical <User Schedule>  dextrose 5%. 1000 milliLiter(s) (40 mL/Hr) IV Continuous <Continuous>  ertapenem  IVPB 1000 milliGRAM(s) IV Intermittent every 24 hours  hydrocortisone sodium succinate Injectable 75 milliGRAM(s) IV Push every 8 hours  levothyroxine Injectable 75 MICROGram(s) IV Push at bedtime  loperamide 2 milliGRAM(s) Oral three times a day  midodrine 10 milliGRAM(s) Oral every 8 hours  minocycline IVPB 200 milliGRAM(s) IV Intermittent every 12 hours  pantoprazole    Tablet 40 milliGRAM(s) Oral before breakfast    MEDICATIONS  (PRN):  ondansetron Injectable 4 milliGRAM(s) IV Push every 4 hours PRN Nausea and/or Vomiting      all labs reviewed  all imaging reviewed      a/p:    # Hypoglycemia due  to adrenal insufficiency : BGMs stable   on  IV Hydrocortisone 25mg TID; will change to HC orally 20mg AM and 10mg PM  off D5 drip   Q4hrs BGM    case d/w Endocrine    # Septic shock in an immunocompromised host    # Severe dehydration secondary to high output from ostomy:  improved on Imodium    # Hepatic Abscess/ liver mets  -Rt lateral hepatic drain removed few weeks ago  -s/p ERCP with stent removal, balloon sweep of sludge.  - Invanz lower dose to 500mg daily/minocycline 200mg BID completing long term abx course via midline until  1/8/25    #LUE edema: likely tissue infiltration from the IV:  will check LUE Sonogram  replace midline on right     # PRANAY secondary to severe dehydration:   resolved     # RCC/Hepatic and Pulmonary  mets  - continue with oncology follow up    # PAF  - c/w Apixaban , Amiodarone

## 2024-12-23 NOTE — PROGRESS NOTE ADULT - PROVIDER SPECIALTY LIST ADULT
Heme/Onc
Hospitalist
Infectious Disease
Surgery
Critical Care
Critical Care
Heme/Onc
Hospitalist
Surgery
Surgery
Heme/Onc
Hospitalist
Critical Care
Hospitalist
Infectious Disease
Infectious Disease
Critical Care
Critical Care

## 2024-12-23 NOTE — DISCHARGE NOTE PROVIDER - NSDCCPCAREPLAN_GEN_ALL_CORE_FT
PRINCIPAL DISCHARGE DIAGNOSIS  Diagnosis: Hypoglycemia  Assessment and Plan of Treatment: due to adrenal insufficiency      SECONDARY DISCHARGE DIAGNOSES  Diagnosis: PRANAY (acute kidney injury)  Assessment and Plan of Treatment: resolved after fluids

## 2024-12-23 NOTE — PROGRESS NOTE ADULT - SUBJECTIVE AND OBJECTIVE BOX
Pt seen, has LLE erythema and swelling  sitting upright in chair  no cp or sob    MEDICATIONS  (STANDING):  aMIOdarone    Tablet 100 milliGRAM(s) Oral daily  apixaban 2.5 milliGRAM(s) Oral every 12 hours  chlorhexidine 2% Cloths 1 Application(s) Topical <User Schedule>  ertapenem  IVPB 1000 milliGRAM(s) IV Intermittent every 24 hours  hydrocortisone 20 milliGRAM(s) Oral <User Schedule>  hydrocortisone 10 milliGRAM(s) Oral <User Schedule>  levothyroxine Injectable 75 MICROGram(s) IV Push at bedtime  midodrine 10 milliGRAM(s) Oral every 8 hours  minocycline 200 milliGRAM(s) Oral every 12 hours  pantoprazole    Tablet 40 milliGRAM(s) Oral before breakfast    MEDICATIONS  (PRN):  loperamide 2 milliGRAM(s) Oral two times a day PRN Diarrhea  ondansetron Injectable 4 milliGRAM(s) IV Push every 4 hours PRN Nausea and/or Vomiting      ROS  No fever, sweats, chills       Vital Signs Last 24 Hrs  T(C): 36.2 (23 Dec 2024 09:33), Max: 36.7 (22 Dec 2024 15:33)  T(F): 97.2 (23 Dec 2024 09:33), Max: 98.1 (22 Dec 2024 15:33)  HR: 77 (23 Dec 2024 09:33) (67 - 85)  BP: 139/86 (23 Dec 2024 09:33) (128/83 - 139/86)  BP(mean): --  RR: 16 (23 Dec 2024 09:33) (16 - 18)  SpO2: 99% (23 Dec 2024 09:33) (92% - 100%)    Parameters below as of 23 Dec 2024 09:33  Patient On (Oxygen Delivery Method): room air        PE  NAD  Awake, alert  Ext LUE swelling, redness  No rash grossly  FROM

## 2024-12-23 NOTE — PROGRESS NOTE ADULT - ASSESSMENT
Ms. Quiros is a 78 YO F w/pmhx of RCC w/mets to liver s/p IR embolization and biliary stent, HTN, HLD, adrenal insufficiency on hydrocortisone,  Afib on Apixaban, hypothyroidism, recent hospitalization 09/29/2024-10/16/2024 for septic shock 2/2 hepatic abscess w/Klebsiella, Enterococcus faecalis, Enterococcus faecium, and Clostridium perfringens, patient had drain placed at that time, patient then presents to ED 11/30/2024 w/abdominal pain, fevers, shock and was found to have pneumatosis of cecum and ascending colon w/multiple liver abscesses w/increasing pulmonary mets s/p laparoscopic R hemicolectomy w/ileostomy on 11/30/2024. Patient had drain placed by IR into R lateral hepatic collection and it was D/C'ed this Monday 12/09/2024. Patient was discharged on receiving Minocycline 200mg PO q12h for S. maltophilia in liver abscess and Ertapenem 1g daily for Enterococcus faecium. She was to continue these abx until 12/25/2024 She presents to ED this time because she was feeling very weak. She has been having high output from her ostomy, son at bedside says they changed her bag x 3. In ED she received 4L IVF but remains hypotensive. Her labs are significant for a WBC of 15K, lactate 5.7, new renal dysfunction BUN 70 & SCr 2.93, and elevated transaminases. Patient had CT abdomen/pelvis IV contrast reveals "inflammatory thickening of the wall of multiple contiguous loops of ileum c/w enteritis which could be 2/2 infection, IBD, or ischemia. There are numerous complex/heterogenous hepatic lesions w/are most compatible w/metastases, hepatic abscesses can have similar appearance and are not excluded". Patient was ordered Vancomycin IV, Ertapenem for abx by ED.Pt given PO vancomycin, stress dose steroids, and Levophed as well as additional IVF boluses.     Sepsis. Enteritis. Liver abscess. Metastatic Renal Cell Cancer. Leukocytosis. PRANAY. Immunocompromised host   - gi pcr neg f/u c diff pcr ---negative  - on invanz dose now increased to  1000mg daily/minocycline 200mg BID completing long term abx course which will now prolong to 1/8  - had my staff call [facility] to take care of change of date for ending on 12/25 to 1/8; the oral minocycline patient has but may need refill upon discharge with her meds upon discharge and all orders verified and in place  - midline evaluation in progress  - continue with antibiotic coverage  - monitor temps  - tolerating abx well so far; no side effects noted  - reason for abx use and side effects reviewed with patient  - supportive care  - okay from infectious disease point of view to discharge home    [facility] token not applicable due to patient on long term antibiotics

## 2024-12-23 NOTE — ADVANCED PRACTICE NURSE CONSULT - ASSESSMENT
Patient received in 102 resting in bed, awake, alert, oriented x 4, pleasant and cooperative. Family members at bedside. Risks and benefits of midline catheter placement explained to patient with verbal understanding. Gave verbal consent after all questions answered in detail. Time out and hand hygiene performed. Site prepped with chlorhexidine. Draped in sterile fashion. Lidocaine 1% 2.5cc injected to site prior to start of procedure. Using aeseptic technique, single lumen 10cm BioFlo Midline with [employer] 4F inserted to right brachial vein with ultrasound guidance. Flushes well with 20cc of NS with brisk blood return present. End cap placed. Line secured to skin using statlock, biopatch and DSD applied. Minimal blood loss. Arm circumference of 31cm noted. No chest x ray needed to verify placement. Previous 12cm midline to L brachial dc'd without difficulty. District RN aware.

## 2024-12-23 NOTE — DISCHARGE NOTE PROVIDER - DETAILS OF MALNUTRITION DIAGNOSIS/DIAGNOSES
This patient has been assessed with a concern for Malnutrition and was treated during this hospitalization for the following Nutrition diagnosis/diagnoses:     -  12/16/2024: Severe protein-calorie malnutrition

## 2024-12-23 NOTE — DISCHARGE NOTE PROVIDER - NSDCFUSCHEDAPPT_GEN_ALL_CORE_FT
[facility]  ORTHOSURG [address] [place] [address] S  Scheduled Appointment: 01/08/2025

## 2024-12-23 NOTE — PROGRESS NOTE ADULT - ASSESSMENT
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  S/P colectomy ischemic  bowel  Hypotension from fluid loss/ improved  Anemia / multifactorial  Malnutrition / low Alb  Hypoglycemia  LUE erythemia    Plan    In terms of anti neoplastic therapy : on hold  She may get immune therapy in a few weeks if stable  Procrit for progressive anemia  if hgb < 10  Long term anti coagulation, may need to increase dose if + thrombus seen on LUE US  Post biliary stent removal  Nutrition evaluation  Physical therapy  We will see her in the office a week after discharge  DC on abx until 1/8, will need access changed given LUE erythemia         Dusty Marcial MD  [facility]  Cell: [phone]

## 2024-12-23 NOTE — DISCHARGE NOTE PROVIDER - NSDCMRMEDTOKEN_GEN_ALL_CORE_FT
amiodarone 100 mg oral tablet: 1 tab(s) orally once a day  apixaban 5 mg oral tablet: 1 tab(s) orally  Benicar 20 mg oral tablet: 2 tab(s) orally once a day  ertapenem 1 g injection: 1 billion vector genomes injectable once a day  levothyroxine 100 mcg (0.1 mg) oral tablet: 1 tab(s) orally once a day  metoprolol succinate 50 mg oral tablet, extended release: 1 tab(s) orally 2 times a day  minocycline 100 mg oral capsule: 2 cap(s) orally every 12 hours  Protonix 40 mg oral delayed release tablet: 1 tab(s) orally once a day

## 2024-12-23 NOTE — DISCHARGE NOTE PROVIDER - HOSPITAL COURSE
78 yo female h/o  metastatic renal cell carcinoma to liver, lungs, liver abscesses,  s/p Lpa RT colectomy on 11/30 due to ischemic ascending colitis,  HTN, HLD, adrenal insufficiency on hydrocortisone,  Afib on Apixaban, hypothyroidism, presents to the ED for dehydration due to high ostomy output.  - hospitalization 09/29/2024-10/16/2024 for septic shock 2/2 hepatic abscess w/Klebsiella, Enterococcus faecalis, Enterococcus faecium, and Clostridium perfringens, patient had drain  placed at that time by IR,  now removed, biliary stenting, discharged on IV Abx via PICC  -11/30/2024 readmitted for abdominal pain, fevers, shock and was found to have pneumatosis of cecum and ascending colon s/p laparoscopic R hemicolectomy w/ileostomy on 11/30/2024. Patient was discharged home on  Minocycline 200mg PO q12h for S. maltophilia and Ertapenem 1g daily for Enterococcus faecium growing in the liver abscess. Hepatic drain was removed   She now presented to the ED with severe weakness. She had not been eating or drinking much and was having high ostomy output.  -s/p ERCP for biliary stent removal,  developed hypoglycemia; now on stress dose steroids and D5 drip     12.20: no distress, colostomy empty, BGMs stable  12.21: no distress, no cp, no sob, no abd pain, small amount of formed feces in ostomy bag  12.22: no distress, no n/v/d, no abd pain   12.23: LUE edematous, no erythema, no cp, no sob      Vital Signs Last 24 Hrs  T(C): 36.4 (22 Dec 2024 09:08), Max: 36.8 (21 Dec 2024 22:30)  T(F): 97.5 (22 Dec 2024 09:08), Max: 98.3 (21 Dec 2024 22:30)  HR: 85 (22 Dec 2024 09:08) (73 - 85)  BP: 138/88 (22 Dec 2024 09:08) (98/65 - 138/88)  RR: 18 (22 Dec 2024 09:08) (17 - 18)  SpO2: 98% (22 Dec 2024 09:08) (98% - 100%)    Parameters below as of 22 Dec 2024 09:08  Patient On (Oxygen Delivery Method): room air      PHYSICAL EXAM:    GENERAL: NAD  HEENT:  NC/AT, EOMI, PERRLA, No scleral icterus, Moist mucous membranes  NECK: Supple, No JVD  CNS:  Alert & Oriented X3, Motor Strength 5/5 B/L upper and lower extremities; DTRs 2+ intact   LUNG: Normal Breath sounds, Clear to auscultation bilaterally, No rales, No rhonchi, No wheezing  HEART: RRR; No murmurs, No rubs  ABDOMEN: +BS, ST/ND/NT, +colostomy present   GENITOURINARY: Voiding, Bladder not distended  EXTREMITIES:  2+ Peripheral Pulses, No clubbing, No cyanosis, No tibial edema  MUSCULOSKELTAL: Joints normal ROM, No TTP, No effusion  VAGINAL: deferred  SKIN: no rashes  RECTAL: deferred, not indicated  BREAST: deferred    a/p:    # Hypoglycemia due  to adrenal insufficiency : BGMs stable   on  IV Hydrocortisone 25mg TID; will change to HC orally 20mg AM and 10mg PM  off D5 drip   Q4hrs BGM    case d/w Endocrine    # Septic shock in an immunocompromised host    # Severe dehydration secondary to high output from ostomy:  improved on Imodium    # Hepatic Abscess/ liver mets  -Rt lateral hepatic drain removed few weeks ago  -s/p ERCP with stent removal, balloon sweep of sludge.  - Invanz lower dose to 500mg daily/minocycline 200mg BID completing long term abx course via midline until  1/8/25    #LUE edema: likely tissue infiltration from the IV:  will check LUE Sonogram: +superficial vein thrombus  replace midline on right   will increase Apixaban to 5mg Bid, d/w hematology    # PRANAY secondary to severe dehydration:   resolved     # RCC/Hepatic and Pulmonary  mets  - continue with oncology follow up    # PAF  - c/w Apixaban , Amiodarone , resume BB

## 2024-12-23 NOTE — PROGRESS NOTE ADULT - REASON FOR ADMISSION
Shock

## 2024-12-23 NOTE — DISCHARGE NOTE PROVIDER - CARE PROVIDER_API CALL
Chuy Saunders  Medical Oncology  [address] [facility] [address] [facility]  [place] [address]  Phone: [phone]  Fax: [phone]  Follow Up Time: 1 week

## 2024-12-23 NOTE — PROGRESS NOTE ADULT - SUBJECTIVE AND OBJECTIVE BOX
Date of service: 12-23-24 @ 10:40      Patient sitting in chair; no complaints, afebrile, still hospitalized, through weekend due to glycemic events  Anticipates discharge today after midline evaluation      ROS: no fever or chills; denies dizziness, no HA, no SOB or cough, no abdominal pain, no diarrhea or constipation; no dysuria, no urinary frequency, no legs pain, no rashes    MEDICATIONS  (STANDING):  aMIOdarone    Tablet 100 milliGRAM(s) Oral daily  apixaban 2.5 milliGRAM(s) Oral every 12 hours  chlorhexidine 2% Cloths 1 Application(s) Topical <User Schedule>  ertapenem  IVPB 1000 milliGRAM(s) IV Intermittent every 24 hours  levothyroxine Injectable 75 MICROGram(s) IV Push at bedtime  midodrine 10 milliGRAM(s) Oral every 8 hours  minocycline IVPB 200 milliGRAM(s) IV Intermittent every 12 hours  minocycline IVPB      pantoprazole    Tablet 40 milliGRAM(s) Oral before breakfast    MEDICATIONS  (PRN):  loperamide 2 milliGRAM(s) Oral two times a day PRN Diarrhea  ondansetron Injectable 4 milliGRAM(s) IV Push every 4 hours PRN Nausea and/or Vomiting      Vital Signs Last 24 Hrs  T(C): 36.2 (23 Dec 2024 09:33), Max: 36.7 (22 Dec 2024 15:33)  T(F): 97.2 (23 Dec 2024 09:33), Max: 98.1 (22 Dec 2024 15:33)  HR: 77 (23 Dec 2024 09:33) (67 - 85)  BP: 139/86 (23 Dec 2024 09:33) (128/83 - 139/86)  BP(mean): --  RR: 16 (23 Dec 2024 09:33) (16 - 18)  SpO2: 99% (23 Dec 2024 09:33) (92% - 100%)    Parameters below as of 23 Dec 2024 09:33  Patient On (Oxygen Delivery Method): room air            Physical Exam:            PE:  Constitutional: NAD  HEENT: NC/AT, EOMI, PERRLA, conjunctivae clear; ears and nose atraumatic; pharynx benign  Neck: supple; thyroid not palpable  Back: no tenderness  Respiratory: respiratory effort normal; clear to auscultation  Cardiovascular: S1S2 regular, no murmurs  Abdomen: soft,  tender, not distended, positive BS; liver and spleen WNL; ostomy in place  Genitourinary: no suprapubic tenderness  Lymphatic: no LN palpable  Musculoskeletal: no muscle tenderness, no joint swelling or tenderness  Extremities: no pedal edema. left upper extremity mild edema distal to midline  Neurological/ Psychiatric: AxOx3, Judgement and insight normal;  moving all extremities  Skin: no rashes; no palpable lesions    Labs: all available labs reviewed                        Labs:                          Labs:            Labs:                 Cultures:               Radiology: all available radiological tests reviewed  < from: CT Abdomen and Pelvis w/ IV Cont (12.15.24 @ 01:25) >      INTERPRETATION:  [facility] RADIOLOGIST PRELIMINARY REPORT    PROCEDURE INFORMATION:  Exam: CT Abdomen And Pelvis With Contrast  Exam date and time: 12/15/2024 1:21 AM  Age: 79 years old  Clinical indication: Hypoglycemia, abd pain, nausea, vomiting    TECHNIQUE:  Imaging protocol: Computed tomography of the abdomen and pelvis with   contrast.  Contrast material: IV: OMNIPAQUE 350; Contrast volume: 90 ml; Contrast   route:  IV;    COMPARISON:  CT ABDOMEN AND PELVIS 11/30/2024 2:21 PM    FINDINGS:  Lungs: There are numerous bilateral pulmonary nodules, most compatible   with  metastases.    Liver: There are numerous complex/heterogeneous hepatic lesions which are   most  compatible appearance with metastases. Hepatic abscesses can have a  similar  appearance and are not excluded.  Gallbladder and biliary ducts: Biliary stent present. Expected   pneumobilia. No  biliary ductal dilatation. Gallbladder is absent.  Pancreas: Unremarkable.  Spleen: Normal.  Adrenal glands: Normal. No mass.  Kidneys and ureters: Left renal pelvic cysts. Small incidental left renal   cyst.  Stomach and bowel: Right lower quadrant ileocolic surgical anastomosis.   Right  lower quadrant ileostomy. Inflammatory thickening of the wall of multiple  contiguous loops of ileum in the mid to lower right abdomen, compatible   with  enteritis which could be due to infection, inflammatory bowel disease, or  ischemia. No specific ancillary findings of bowel ischemia. No   pneumatosis or  portal/mesenteric venous gas. No intestinal obstruction.  Appendix: Normal appendix.    Intraperitoneal space: No pneumoperitoneum or abscess.  Vasculature: See &quot;Stomach and bowel&quot; finding.  Lymph nodes: Unremarkable.  Urinary bladder: Urinary bladder is distendedbut otherwise unremarkable.  Reproductive: Unremarkable as visualized.  Bones/joints: Unremarkable. No acute fracture.  Soft tissues: Unremarkable.    IMPRESSION:  1.   Inflammatory thickening of the wall of multiple contiguous loops of   ileum  in the mid to lower right abdomen, compatible with enteritis which could   be due  to infection, inflammatory bowel disease, or ischemia. No specific   ancillary  findings of bowel ischemia.  2.   There are numerous bilateral pulmonary nodules, most compatible with  metastases.  3.   There are numerous complex/heterogeneous hepatic lesions which are   most  compatible appearance with metastases. Hepatic abscesses can have a   similar  appearance and are not excluded.      Advanced directives addressed: full resuscitation

## 2024-12-23 NOTE — PROGRESS NOTE ADULT - NUTRITIONAL ASSESSMENT
This patient has been assessed with a concern for Malnutrition and has been determined to have a diagnosis/diagnoses of Severe protein-calorie malnutrition.    This patient is being managed with:   Diet Regular-  Supplement Feeding Modality:  Oral  Ensure Max Cans or Servings Per Day:  1       Frequency:  Three Times a day  Entered: Dec 17 2024  9:21AM  
This patient has been assessed with a concern for Malnutrition and has been determined to have a diagnosis/diagnoses of Severe protein-calorie malnutrition.    This patient is being managed with:   Diet NPO after Midnight-     NPO Start Date: 18-Dec-2024   NPO Start Time: 23:59  Except Medications  Entered: Dec 18 2024  2:23PM    Diet Regular-  Supplement Feeding Modality:  Oral  Ensure Max Cans or Servings Per Day:  1       Frequency:  Three Times a day  Entered: Dec 17 2024  9:21AM  
This patient has been assessed with a concern for Malnutrition and has been determined to have a diagnosis/diagnoses of Severe protein-calorie malnutrition.    This patient is being managed with:   Diet NPO after Midnight-     NPO Start Date: 18-Dec-2024   NPO Start Time: 23:59  Except Medications  Entered: Dec 18 2024  2:23PM    Diet Regular-  Supplement Feeding Modality:  Oral  Ensure Max Cans or Servings Per Day:  1       Frequency:  Three Times a day  Entered: Dec 17 2024  9:21AM  
This patient has been assessed with a concern for Malnutrition and has been determined to have a diagnosis/diagnoses of Severe protein-calorie malnutrition.    This patient is being managed with:   Diet Regular-  Supplement Feeding Modality:  Oral  Ensure Max Cans or Servings Per Day:  1       Frequency:  Three Times a day  Entered: Dec 17 2024  9:21AM  

## 2025-01-08 ENCOUNTER — APPOINTMENT (OUTPATIENT)
Dept: ORTHOPEDIC SURGERY | Facility: CLINIC | Age: 80
End: 2025-01-08

## 2025-02-06 ENCOUNTER — INPATIENT (INPATIENT)
Facility: HOSPITAL | Age: 80
LOS: 5 days | Discharge: HOSPICE | DRG: 871 | End: 2025-02-12
Attending: FAMILY MEDICINE | Admitting: HOSPITALIST
Payer: MEDICARE

## 2025-02-06 VITALS
HEIGHT: 61 IN | TEMPERATURE: 100 F | OXYGEN SATURATION: 100 % | DIASTOLIC BLOOD PRESSURE: 85 MMHG | RESPIRATION RATE: 69 BRPM | SYSTOLIC BLOOD PRESSURE: 116 MMHG | HEART RATE: 98 BPM

## 2025-02-06 DIAGNOSIS — Z90.89 ACQUIRED ABSENCE OF OTHER ORGANS: Chronic | ICD-10-CM

## 2025-02-06 DIAGNOSIS — A41.9 SEPSIS, UNSPECIFIED ORGANISM: ICD-10-CM

## 2025-02-06 DIAGNOSIS — Z90.722 ACQUIRED ABSENCE OF OVARIES, BILATERAL: Chronic | ICD-10-CM

## 2025-02-06 DIAGNOSIS — Z98.890 OTHER SPECIFIED POSTPROCEDURAL STATES: Chronic | ICD-10-CM

## 2025-02-06 LAB
ALBUMIN SERPL ELPH-MCNC: 2 G/DL — LOW (ref 3.3–5)
ALP SERPL-CCNC: 1198 U/L — SIGNIFICANT CHANGE UP (ref 40–120)
ALT FLD-CCNC: 155 U/L — HIGH (ref 12–78)
ANION GAP SERPL CALC-SCNC: 7 MMOL/L — SIGNIFICANT CHANGE UP (ref 5–17)
ANISOCYTOSIS BLD QL: SLIGHT — SIGNIFICANT CHANGE UP
APPEARANCE UR: ABNORMAL
APTT BLD: 34.1 SEC — SIGNIFICANT CHANGE UP (ref 24.5–35.6)
AST SERPL-CCNC: 118 U/L — HIGH (ref 15–37)
BACTERIA # UR AUTO: NEGATIVE /HPF — SIGNIFICANT CHANGE UP
BASE EXCESS BLDV CALC-SCNC: -2.2 MMOL/L — LOW (ref -2–3)
BASOPHILS # BLD AUTO: 0 K/UL — SIGNIFICANT CHANGE UP (ref 0–0.2)
BASOPHILS NFR BLD AUTO: 0 % — SIGNIFICANT CHANGE UP (ref 0–2)
BILIRUB SERPL-MCNC: 4 MG/DL — HIGH (ref 0.2–1.2)
BILIRUB UR-MCNC: ABNORMAL
BUN SERPL-MCNC: 42 MG/DL — HIGH (ref 7–23)
CALCIUM SERPL-MCNC: 10 MG/DL — SIGNIFICANT CHANGE UP (ref 8.5–10.1)
CAST: 4 /LPF — SIGNIFICANT CHANGE UP (ref 0–4)
CHLORIDE SERPL-SCNC: 99 MMOL/L — SIGNIFICANT CHANGE UP (ref 96–108)
CO2 SERPL-SCNC: 23 MMOL/L — SIGNIFICANT CHANGE UP (ref 22–31)
COLOR SPEC: SIGNIFICANT CHANGE UP
CREAT SERPL-MCNC: 0.89 MG/DL — SIGNIFICANT CHANGE UP (ref 0.5–1.3)
DACRYOCYTES BLD QL SMEAR: SLIGHT — SIGNIFICANT CHANGE UP
DIFF PNL FLD: NEGATIVE — SIGNIFICANT CHANGE UP
EGFR: 67 ML/MIN/1.73M2 — SIGNIFICANT CHANGE UP
EOSINOPHIL # BLD AUTO: 0 K/UL — SIGNIFICANT CHANGE UP (ref 0–0.5)
EOSINOPHIL NFR BLD AUTO: 0 % — SIGNIFICANT CHANGE UP (ref 0–6)
EPI CELLS # UR: PRESENT
FLUAV AG NPH QL: SIGNIFICANT CHANGE UP
FLUBV AG NPH QL: SIGNIFICANT CHANGE UP
GAS PNL BLDV: SIGNIFICANT CHANGE UP
GLUCOSE SERPL-MCNC: 72 MG/DL — SIGNIFICANT CHANGE UP (ref 70–99)
GLUCOSE UR QL: NEGATIVE MG/DL — SIGNIFICANT CHANGE UP
HCO3 BLDV-SCNC: 22 MMOL/L — SIGNIFICANT CHANGE UP (ref 22–29)
HCT VFR BLD CALC: 28.5 % — LOW (ref 34.5–45)
HGB BLD-MCNC: 9.8 G/DL — LOW (ref 11.5–15.5)
HYALINE CASTS # UR AUTO: SIGNIFICANT CHANGE UP
INR BLD: 1.42 RATIO — HIGH (ref 0.85–1.16)
KETONES UR-MCNC: NEGATIVE MG/DL — SIGNIFICANT CHANGE UP
LACTATE SERPL-SCNC: 2.1 MMOL/L — HIGH (ref 0.7–2)
LACTATE SERPL-SCNC: 2.7 MMOL/L — HIGH (ref 0.7–2)
LEUKOCYTE ESTERASE UR-ACNC: ABNORMAL
LYMPHOCYTES # BLD AUTO: 0.71 K/UL — LOW (ref 1–3.3)
LYMPHOCYTES # BLD AUTO: 4 % — LOW (ref 13–44)
MANUAL SMEAR VERIFICATION: SIGNIFICANT CHANGE UP
MCHC RBC-ENTMCNC: 26.6 PG — LOW (ref 27–34)
MCHC RBC-ENTMCNC: 34.4 G/DL — SIGNIFICANT CHANGE UP (ref 32–36)
MCV RBC AUTO: 77.4 FL — LOW (ref 80–100)
MONOCYTES # BLD AUTO: 1.23 K/UL — HIGH (ref 0–0.9)
MONOCYTES NFR BLD AUTO: 7 % — SIGNIFICANT CHANGE UP (ref 2–14)
NEUTROPHILS # BLD AUTO: 15.69 K/UL — HIGH (ref 1.8–7.4)
NEUTROPHILS NFR BLD AUTO: 89 % — HIGH (ref 43–77)
NITRITE UR-MCNC: POSITIVE
NRBC # BLD: 8 /100 WBCS — HIGH (ref 0–0)
NRBC # BLD: SIGNIFICANT CHANGE UP /100 WBCS (ref 0–0)
NRBC BLD-RTO: 8 /100 WBCS — HIGH (ref 0–0)
NRBC BLD-RTO: SIGNIFICANT CHANGE UP /100 WBCS (ref 0–0)
NT-PROBNP SERPL-SCNC: 942 PG/ML — HIGH (ref 0–450)
PCO2 BLDV: 37 MMHG — LOW (ref 39–42)
PH BLDV: 7.39 — SIGNIFICANT CHANGE UP (ref 7.32–7.43)
PH UR: 6 — SIGNIFICANT CHANGE UP (ref 5–8)
PLAT MORPH BLD: NORMAL — SIGNIFICANT CHANGE UP
PLATELET # BLD AUTO: 244 K/UL — SIGNIFICANT CHANGE UP (ref 150–400)
PO2 BLDV: 48 MMHG — HIGH (ref 25–45)
POLYCHROMASIA BLD QL SMEAR: SLIGHT — SIGNIFICANT CHANGE UP
POTASSIUM SERPL-MCNC: 4.3 MMOL/L — SIGNIFICANT CHANGE UP (ref 3.5–5.3)
POTASSIUM SERPL-SCNC: 4.3 MMOL/L — SIGNIFICANT CHANGE UP (ref 3.5–5.3)
PROT SERPL-MCNC: 4.9 GM/DL — LOW (ref 6–8.3)
PROT UR-MCNC: 30 MG/DL
PROTHROM AB SERPL-ACNC: 16.7 SEC — HIGH (ref 9.9–13.4)
RBC # BLD: 3.68 M/UL — LOW (ref 3.8–5.2)
RBC # FLD: 21.5 % — HIGH (ref 10.3–14.5)
RBC BLD AUTO: ABNORMAL
RBC CASTS # UR COMP ASSIST: 7 /HPF — HIGH (ref 0–4)
RSV RNA NPH QL NAA+NON-PROBE: SIGNIFICANT CHANGE UP
SAO2 % BLDV: 79 % — SIGNIFICANT CHANGE UP (ref 67–88)
SARS-COV-2 RNA SPEC QL NAA+PROBE: SIGNIFICANT CHANGE UP
SODIUM SERPL-SCNC: 129 MMOL/L — LOW (ref 135–145)
SP GR SPEC: 1.02 — SIGNIFICANT CHANGE UP (ref 1–1.03)
SQUAMOUS # UR AUTO: 1 /HPF — SIGNIFICANT CHANGE UP (ref 0–5)
TARGETS BLD QL SMEAR: SLIGHT — SIGNIFICANT CHANGE UP
TROPONIN I, HIGH SENSITIVITY RESULT: 12.82 NG/L — SIGNIFICANT CHANGE UP
URATE CRY FLD QL MICRO: PRESENT
UROBILINOGEN FLD QL: 0.2 MG/DL — SIGNIFICANT CHANGE UP (ref 0.2–1)
WBC # BLD: 17.63 K/UL — HIGH (ref 3.8–10.5)
WBC # FLD AUTO: 17.63 K/UL — HIGH (ref 3.8–10.5)
WBC UR QL: 2 /HPF — SIGNIFICANT CHANGE UP (ref 0–5)

## 2025-02-06 PROCEDURE — 82607 VITAMIN B-12: CPT

## 2025-02-06 PROCEDURE — 87324 CLOSTRIDIUM AG IA: CPT

## 2025-02-06 PROCEDURE — 97162 PT EVAL MOD COMPLEX 30 MIN: CPT | Mod: GP

## 2025-02-06 PROCEDURE — 83735 ASSAY OF MAGNESIUM: CPT

## 2025-02-06 PROCEDURE — 84100 ASSAY OF PHOSPHORUS: CPT

## 2025-02-06 PROCEDURE — 97530 THERAPEUTIC ACTIVITIES: CPT | Mod: GP

## 2025-02-06 PROCEDURE — 93010 ELECTROCARDIOGRAM REPORT: CPT

## 2025-02-06 PROCEDURE — 87449 NOS EACH ORGANISM AG IA: CPT

## 2025-02-06 PROCEDURE — 74177 CT ABD & PELVIS W/CONTRAST: CPT | Mod: 26

## 2025-02-06 PROCEDURE — 83540 ASSAY OF IRON: CPT

## 2025-02-06 PROCEDURE — 36415 COLL VENOUS BLD VENIPUNCTURE: CPT

## 2025-02-06 PROCEDURE — 87507 IADNA-DNA/RNA PROBE TQ 12-25: CPT

## 2025-02-06 PROCEDURE — 80053 COMPREHEN METABOLIC PANEL: CPT

## 2025-02-06 PROCEDURE — 83550 IRON BINDING TEST: CPT

## 2025-02-06 PROCEDURE — 82962 GLUCOSE BLOOD TEST: CPT

## 2025-02-06 PROCEDURE — 74177 CT ABD & PELVIS W/CONTRAST: CPT | Mod: MC

## 2025-02-06 PROCEDURE — 71045 X-RAY EXAM CHEST 1 VIEW: CPT | Mod: 26

## 2025-02-06 PROCEDURE — 85025 COMPLETE CBC W/AUTO DIFF WBC: CPT

## 2025-02-06 PROCEDURE — 80048 BASIC METABOLIC PNL TOTAL CA: CPT

## 2025-02-06 PROCEDURE — 99285 EMERGENCY DEPT VISIT HI MDM: CPT

## 2025-02-06 PROCEDURE — 97116 GAIT TRAINING THERAPY: CPT | Mod: GP

## 2025-02-06 PROCEDURE — 99223 1ST HOSP IP/OBS HIGH 75: CPT

## 2025-02-06 PROCEDURE — 85027 COMPLETE CBC AUTOMATED: CPT

## 2025-02-06 PROCEDURE — 83605 ASSAY OF LACTIC ACID: CPT

## 2025-02-06 PROCEDURE — 82728 ASSAY OF FERRITIN: CPT

## 2025-02-06 RX ORDER — METOPROLOL SUCCINATE 50 MG/1
1 TABLET, EXTENDED RELEASE ORAL
Refills: 0 | DISCHARGE

## 2025-02-06 RX ORDER — ACETAMINOPHEN, DIPHENHYDRAMINE HCL, PHENYLEPHRINE HCL 325; 25; 5 MG/1; MG/1; MG/1
3 TABLET ORAL AT BEDTIME
Refills: 0 | Status: DISCONTINUED | OUTPATIENT
Start: 2025-02-06 | End: 2025-02-12

## 2025-02-06 RX ORDER — LEVOTHYROXINE SODIUM 300 MCG
1 TABLET ORAL
Refills: 0 | DISCHARGE

## 2025-02-06 RX ORDER — BACTERIOSTATIC SODIUM CHLORIDE 0.9 %
1350 VIAL (ML) INJECTION ONCE
Refills: 0 | Status: COMPLETED | OUTPATIENT
Start: 2025-02-06 | End: 2025-02-06

## 2025-02-06 RX ORDER — MAGNESIUM, ALUMINUM HYDROXIDE 200-225/5
30 SUSPENSION, ORAL (FINAL DOSE FORM) ORAL EVERY 4 HOURS
Refills: 0 | Status: DISCONTINUED | OUTPATIENT
Start: 2025-02-06 | End: 2025-02-12

## 2025-02-06 RX ORDER — ACETAMINOPHEN 160 MG/5ML
650 SUSPENSION ORAL EVERY 6 HOURS
Refills: 0 | Status: DISCONTINUED | OUTPATIENT
Start: 2025-02-06 | End: 2025-02-12

## 2025-02-06 RX ORDER — LEVOTHYROXINE SODIUM 25 UG/1
100 TABLET ORAL DAILY
Refills: 0 | Status: DISCONTINUED | OUTPATIENT
Start: 2025-02-06 | End: 2025-02-12

## 2025-02-06 RX ORDER — FOLIC ACID 1 MG
1 TABLET ORAL DAILY
Refills: 0 | Status: DISCONTINUED | OUTPATIENT
Start: 2025-02-06 | End: 2025-02-12

## 2025-02-06 RX ORDER — APIXABAN 5 MG/1
5 TABLET, FILM COATED ORAL
Refills: 0 | Status: DISCONTINUED | OUTPATIENT
Start: 2025-02-06 | End: 2025-02-06

## 2025-02-06 RX ORDER — BACTERIOSTATIC SODIUM CHLORIDE 0.9 %
500 VIAL (ML) INJECTION ONCE
Refills: 0 | Status: COMPLETED | OUTPATIENT
Start: 2025-02-06 | End: 2025-02-06

## 2025-02-06 RX ORDER — PIPERACILLIN SODIUM AND TAZOBACTAM SODIUM 2; 250 G/50ML; MG/50ML
3.38 INJECTION, POWDER, FOR SOLUTION INTRAVENOUS ONCE
Refills: 0 | Status: COMPLETED | OUTPATIENT
Start: 2025-02-06 | End: 2025-02-06

## 2025-02-06 RX ORDER — HEPARIN SODIUM,PORCINE 10000/ML
5000 VIAL (ML) INJECTION EVERY 8 HOURS
Refills: 0 | Status: DISCONTINUED | OUTPATIENT
Start: 2025-02-06 | End: 2025-02-07

## 2025-02-06 RX ORDER — PIPERACILLIN SODIUM AND TAZOBACTAM SODIUM 2; 250 G/50ML; MG/50ML
3.38 INJECTION, POWDER, FOR SOLUTION INTRAVENOUS EVERY 8 HOURS
Refills: 0 | Status: DISCONTINUED | OUTPATIENT
Start: 2025-02-06 | End: 2025-02-09

## 2025-02-06 RX ORDER — AMIODARONE HYDROCHLORIDE 50 MG/ML
1 INJECTION, SOLUTION INTRAVENOUS
Refills: 0 | DISCHARGE

## 2025-02-06 RX ORDER — ONDANSETRON 4 MG/1
4 TABLET, ORALLY DISINTEGRATING ORAL EVERY 8 HOURS
Refills: 0 | Status: DISCONTINUED | OUTPATIENT
Start: 2025-02-06 | End: 2025-02-12

## 2025-02-06 RX ADMIN — PIPERACILLIN SODIUM AND TAZOBACTAM SODIUM 200 GRAM(S): 2; 250 INJECTION, POWDER, FOR SOLUTION INTRAVENOUS at 16:33

## 2025-02-06 RX ADMIN — Medication 500 MILLILITER(S): at 14:42

## 2025-02-06 RX ADMIN — Medication 5000 UNIT(S): at 23:18

## 2025-02-06 RX ADMIN — Medication 900 MILLILITER(S): at 16:31

## 2025-02-06 RX ADMIN — Medication 10 MILLIGRAM(S): at 23:18

## 2025-02-06 NOTE — PATIENT PROFILE ADULT - FALL HARM RISK - HARM RISK INTERVENTIONS

## 2025-02-06 NOTE — ED ADULT NURSE NOTE - NSFALLHARMRISKINTERV_ED_ALL_ED

## 2025-02-06 NOTE — ED ADULT TRIAGE NOTE - CHIEF COMPLAINT QUOTE
Pt BIBEMS from home for low WBC count as per outpatient labs and generalized fatiuge x 2 days. PMhx of HTN and liver ca in remission. Denies fevers, chills, pain or n/v/d. Difficulty obtaining SpO2 reading, pt taken to trauma room. unable to obtain oral temp. Primary RN and MD Son aware.

## 2025-02-06 NOTE — PHARMACOTHERAPY INTERVENTION NOTE - COMMENTS
Medication history complete, reviewed medication with patient and family at bedside and confirmed with [facility],

## 2025-02-06 NOTE — PATIENT PROFILE ADULT - PATIENT REPRESENTATIVE: ( YOU CAN CHOOSE ANY PERSON THAT CAN ASSIST YOU WITH YOUR HEALTH CARE PREFERENCES, DOES NOT HAVE TO BE A SPOUSE, IMMEDIATE FAMILY OR SIGNIFICANT OTHER/PARTNER)

## 2025-02-06 NOTE — H&P ADULT - VTE RISK ASSESSMENT
Patient is calling in asking for a refill of prednisone to [facility]. He will not be available to talk to until about 3:15 pm because he is at work. VTE Assessment already completed for this visit

## 2025-02-06 NOTE — ED ADULT NURSE NOTE - OBJECTIVE STATEMENT
Pt presents to ED BIBEMS from home c/o generalized weakness, low back pain and fatigue that started a few days ago and became worse today, w/ visual floaters when she woke up this AM. Pt states "I felt so weak I couldn't get out of bed," pt also states she had abnormal lab results, low WBC. (+) colostomy bag. Pt appears jaundice, states "I've been this color for a week." Upon assessment rectal temp 100.1, spO2 100% on RA, MD Contino at bedside. Pt endorses taking eliquis. PMH liver cancer in remission, cholelithiasis, diverticulitis, HLD, HTN, biliary stent insertion.

## 2025-02-06 NOTE — ED PROVIDER NOTE - OBJECTIVE STATEMENT
79-year-old WF BIBA from home to ED regarding outpatient blood tests yesterday high white count of 18 (WBC was 13 on 1/3/25), associated general weakness/fatigue progressively worsening over past week, today too weak to even stand up.  Patient denies F/C, urine complaints, chest pain, LE pain/swelling.  Decreased appetite but adequate p.o. intake.  Mild cough of clear sputum, mild SOB.  Patient with multiple bruising, states not new, bruising due to prior blood draws and easy bruisability due to AC medication.  Patient does complain of 2 to 3 days lower back pain without specific injury nor fall.    Multiple PMH including:  pA-Fib on Eliquis, renal cell CA mets to liver currently in remission, was on immunotherapy, last dose July '24; hypothyroid, HTN, high cholesterol, diverticulitis, biliary stent re: gallstones with cholangitis, Raynaud's phenomenon.  PCP: Sarkis    Oncology: Nicky

## 2025-02-06 NOTE — ED PROVIDER NOTE - PHYSICAL EXAMINATION
Gen'l: Older F adult, mild respiratory discomfort, no sentence shortening, + ill-appearing though non-toxic.  Head: NC/AT  Eyes: PERRL, EOMI, no obvious scleral icterus  ENT: O/P clear, mm mildly dry  CV:  Mildly tachycardic, normal radial pulse  Lungs: CTA, mild tachypnea, no accessory m. usage  GI: soft, NT, BS+  : Deferred, no flank nor CVAT  Neck: NT, supple w/o pain, no stiffness nor meningismus  MSK: CHILD x 4, no focal extremity swelling nor tenderness, B/L SLR 35 degrees w/o pain, normal motor.  Back NT & stable.  Skin: ? tactile warmth, no rash, mult. bruising scattered all over pt, iveth. B/L arms.  B/L fingers + cool to touch (known Raynaud's)  Neuro: A+O x 4, CN 2 -12 intact, normal speech, no focal motor/sensory deficits

## 2025-02-06 NOTE — ED ADULT NURSE NOTE - NSFALLRISKFACTORS_ED_ALL_ED
eliquis, generalized weakness/Coagulation: Bleeding disorder either through use of anticoagulants or underlying clinical condition(s)

## 2025-02-06 NOTE — ED PROVIDER NOTE - CLINICAL SUMMARY MEDICAL DECISION MAKING FREE TEXT BOX
79-year-old WF, mult., PMH incl: pA-Fib on Eliquis, renal cell CA mets to liver currently in remission, was on immunotherapy, last dose July '24; BIBA from home to ED regarding outpatient blood tests yesterday high white count of 18 (WBC was 13 on 1/3), associated general weakness/fatigue progressively worsening over past week, today too weak to even stand up.    Plan:  Infection w/u: EKG, CXR, labs incl. pan-culture, lactate, VBG, U/A viral swab, U/A; rectal temp, IVF.  Monitor, observe, reassess. 79-year-old WF, mult., PMH incl: pA-Fib on Eliquis, renal cell CA mets to liver currently in remission, was on immunotherapy, last dose July '24; BIBA from home to ED regarding outpatient blood tests yesterday high white count of 18 (WBC was 13 on 1/3), associated general weakness/fatigue progressively worsening over past week, today too weak to even stand up.    Plan:  Infection w/u: EKG, CXR, labs incl. pan-culture, lactate, VBG, U/A viral swab, U/A; rectal temp, IVF.  Monitor, observe, reassess.    16;15:  Chest x-ray wet read: MARLON.  Labs results notable for leukocytosis of 17.6 with elevated lactate 2.7.  I sepsis IVF being administered, as well as IV Zosyn to cover suspected intra-abdominal source for sepsis.  Elevated LFTs and bili of 4, prerenal azotemia with normal Cr.  UA micro negative.  Viral swab negative.  CT A/P ordered to evaluate for suspected intra-abdominal source of patient's underlying infection/sepsis.  Case discussed with Dr. Payne and 1 N. admission accepted, DNM status pending CT report.  Will follow.  Currently patient hemodynamically stable. 79-year-old WF, mult., PMH incl: pA-Fib on Eliquis, renal cell CA mets to liver currently in remission, was on immunotherapy, last dose July '24; BIBA from home to ED regarding outpatient blood tests yesterday high white count of 18 (WBC was 13 on 1/3), associated general weakness/fatigue progressively worsening over past week, today too weak to even stand up.    Plan:  Infection w/u: EKG, CXR, labs incl. pan-culture, lactate, VBG, U/A viral swab, U/A; rectal temp, IVF.  Monitor, observe, reassess.    16;15:  Chest x-ray wet read: MARLON.  Labs results notable for leukocytosis of 17.6 with elevated lactate 2.7.  Sepsis IVF being administered, as well as IV Zosyn to cover suspected intra-abdominal source for sepsis.  Elevated LFTs and Bili of 4, + prerenal azotemia with normal Cr.  U/A micro negative.  Viral swab negative.  CT A/P ordered to evaluate for suspected intra-abdominal source of patient's underlying infection/sepsis.  Case discussed with Dr. Payne and 1 N. admission accepted, DNM status pending CT report.  Will follow.  Currently patient hemodynamically stable.

## 2025-02-06 NOTE — H&P ADULT - CONVERSATION DETAILS
Pt wishes to be DNR/DDNI, Pt has a MOLST form at home and does not want to fill out a new one. States her son will bring it in the morning.

## 2025-02-06 NOTE — H&P ADULT - HISTORY OF PRESENT ILLNESS
HPI:  78 yo female h/o  metastatic renal cell carcinoma to liver, lungs, previously on immunotherapy. , liver abscesses,  s/p Lpa RT colectomy on  due to ischemic ascending colitis,  HTN, HLD, adrenal insufficiency on hydrocortisone,  Afib on Apixaban, hypothyroidism, presents to the ED regarding outpatient blood tests yesterday high white count of 18 (WBC was 13 on 1/3/25), associated general weakness/fatigue progressively worsening over past week, today too weak to even stand up.  Patient denies F/C, urine complaints, chest pain, LE pain/swelling.  Decreased appetite but adequate p.o. intake. Pt denies any fevers, chills, bodyaches, abdominal pain. In the ED VSS, WBC 17, LA 2.7 then 2.1. Imaging showed Mets and liver abscesses. of note patient completed a course of minocycline today.        PAST MEDICAL & SURGICAL HISTORY:  Hypertension      High cholesterol      Diverticulitis      History of diverticulitis      Hypothyroidism      Renal cell cancer      Metastasis to liver      Paroxysmal atrial fibrillation      Cholelithiasis with cholangitis      History of biliary stent insertion      History of tonsillectomy      History of laparotomy      History of arthroscopy      H/O bilateral oophorectomy      S/P surgical removal of pilonidal cyst        FAMILY HISTORY:    Social History:      Allergies    Zetia (Unknown)  Cabometyx (Unknown)  bacitracin (Rash)  tivozanib (Faint)  Lipitor (Unknown)  Norvasc (Faint)  Crestor (Other)  statins (Unknown)  Dyazide (Faint)    Intolerances        MEDICATIONS  (STANDING):  apixaban 5 milliGRAM(s) Oral two times a day  folic acid 1 milliGRAM(s) Oral daily  hydrocortisone 10 milliGRAM(s) Oral three times a day  levothyroxine 100 MICROGram(s) Oral daily  piperacillin/tazobactam IVPB.. 3.375 Gram(s) IV Intermittent every 8 hours    MEDICATIONS  (PRN):  acetaminophen     Tablet .. 650 milliGRAM(s) Oral every 6 hours PRN Temp greater or equal to 38C (100.4F), Mild Pain (1 - 3)  aluminum hydroxide/magnesium hydroxide/simethicone Suspension 30 milliLiter(s) Oral every 4 hours PRN Dyspepsia  melatonin 3 milliGRAM(s) Oral at bedtime PRN Insomnia  ondansetron Injectable 4 milliGRAM(s) IV Push every 8 hours PRN Nausea and/or Vomiting      ROS:  10 point ROS negative except for ones mentioned in HPI    PE   HEENT:  Head is normocephalic    Skin:  Warm and dry without any rash   NECK:  Supple without lymphadenopathy.   HEART:  Regular rate and rhythm. normal S1 and S2, No M/R/G  LUNGS:  Good ins/exp effort, no W/R/R/C  ABDOMEN:  Soft, nontender, nondistended with good bowel sounds heard  EXTREMITIES:  Without cyanosis, clubbing or edema.   NEUROLOGICAL:  Gross nonfocal      PEx  T(C): 36.9 (25 @ 17:00), Max: 37.8 (25 @ 13:25)  HR: 88 (25 @ 17:00) (88 - 98)  BP: 116/79 (25 @ 17:00) (116/79 - 116/85)  RR: 16 (25 @ 17:00) (16 - 69)  SpO2: 100% (25 @ 13:25) (81% - 100%)  Wt(kg): --                        9.8    17.63 )-----------( 244      ( 2025 13:49 )             28.5     02-    129[L]  |  99  |  42[H]  ----------------------------<  72  4.3   |  23  |  0.89    Ca    10.0      2025 13:49    TPro  4.9[L]  /  Alb  2.0[L]  /  TBili  4.0[H]  /  DBili  x   /  AST  118[H]  /  ALT  155[H]  /  AlkPhos  1198  02-06    CAPILLARY BLOOD GLUCOSE        PT/INR - ( 2025 13:49 )   PT: 16.7 sec;   INR: 1.42 ratio         PTT - ( 2025 13:49 )  PTT:34.1 sec  Urinalysis Basic - ( 2025 14:17 )    Color: Dark Yellow / Appearance: Cloudy / S.024 / pH: x  Gluc: x / Ketone: Negative mg/dL  / Bili: Small / Urobili: 0.2 mg/dL   Blood: x / Protein: 30 mg/dL / Nitrite: Positive   Leuk Esterase: Small / RBC: 7 /HPF / WBC 2 /HPF   Sq Epi: x / Non Sq Epi: 1 /HPF / Bacteria: Negative /HPF          Urinalysis with Rflx Culture (collected 25 @ 14:17)            Radiology/Imaging, I have personally reviewed:    IMPRESSION:  1.  Progression of metastatic disease in the visualized lower chest and   liver, as well as an abdominal wall metastasis along the right 10th rib   costochondral junction. New centrally necrotic para-aortic lymph node may   represent lymph node metastasis.    2.  Multiple hepatic abscesses, majorityof which are slightly decreased   in size compared to 12/15/2024.    3.  Previous biliary stent has been removed. No biliary ductal dilatation.

## 2025-02-06 NOTE — H&P ADULT - ASSESSMENT
80 yo female h/o  metastatic renal cell carcinoma to liver, lungs, previously on immunotherapy. , liver abscesses,  s/p Lpa RT colectomy on 11/30 due to ischemic ascending colitis,  HTN, HLD, adrenal insufficiency on hydrocortisone,  Afib on Apixaban, hypothyroidism, presents to the ED regarding outpatient blood tests yesterday high white count of 18 (WBC was 13 on 1/3/25), associated general weakness/fatigue progressively worsening over past week, today too weak to even stand up.         Assessment/Plan:    # Meets severe sepsis 2/2 Liver abscesses     - blood cultures  - Zosyn  - ID consult  - IR consult for aspiration  - trend LA       # RCC/Hepatic and Pulmonary  mets  - heme onc consult    # PAF  - holding  Apixaban for possible procedure , Amiodarone     #adrenal insufficiency on hydrocortisone    # Hypothyroidism  - cw synthroid     Code status: DNR DNI  Diet: Heart healthy  DVT ppx heparin sub q  Activity: Bedrest  Disposition: Admission for sepsis

## 2025-02-07 ENCOUNTER — TRANSCRIPTION ENCOUNTER (OUTPATIENT)
Age: 80
End: 2025-02-07

## 2025-02-07 LAB
ALBUMIN SERPL ELPH-MCNC: 1.4 G/DL — LOW (ref 3.3–5)
ALP SERPL-CCNC: 1018 U/L — HIGH (ref 40–120)
ALT FLD-CCNC: 137 U/L — HIGH (ref 12–78)
ANION GAP SERPL CALC-SCNC: 8 MMOL/L — SIGNIFICANT CHANGE UP (ref 5–17)
AST SERPL-CCNC: 110 U/L — HIGH (ref 15–37)
BILIRUB SERPL-MCNC: 4.3 MG/DL — HIGH (ref 0.2–1.2)
BUN SERPL-MCNC: 31 MG/DL — HIGH (ref 7–23)
C DIFF GDH STL QL: NEGATIVE — SIGNIFICANT CHANGE UP
C DIFF GDH STL QL: SIGNIFICANT CHANGE UP
CALCIUM SERPL-MCNC: 8.6 MG/DL — SIGNIFICANT CHANGE UP (ref 8.5–10.1)
CHLORIDE SERPL-SCNC: 108 MMOL/L — SIGNIFICANT CHANGE UP (ref 96–108)
CO2 SERPL-SCNC: 15 MMOL/L — LOW (ref 22–31)
CREAT SERPL-MCNC: 0.67 MG/DL — SIGNIFICANT CHANGE UP (ref 0.5–1.3)
EGFR: 90 ML/MIN/1.73M2 — SIGNIFICANT CHANGE UP
FERRITIN SERPL-MCNC: 688 NG/ML — HIGH (ref 13–330)
GLUCOSE SERPL-MCNC: 60 MG/DL — LOW (ref 70–99)
HCT VFR BLD CALC: 26.3 % — LOW (ref 34.5–45)
HGB BLD-MCNC: 8.8 G/DL — LOW (ref 11.5–15.5)
IRON SATN MFR SERPL: 40 % — SIGNIFICANT CHANGE UP (ref 14–50)
IRON SATN MFR SERPL: 46 UG/DL — SIGNIFICANT CHANGE UP (ref 30–160)
LACTATE SERPL-SCNC: 1.4 MMOL/L — SIGNIFICANT CHANGE UP (ref 0.7–2)
MCHC RBC-ENTMCNC: 26.5 PG — LOW (ref 27–34)
MCHC RBC-ENTMCNC: 33.5 G/DL — SIGNIFICANT CHANGE UP (ref 32–36)
MCV RBC AUTO: 79.2 FL — LOW (ref 80–100)
NRBC # BLD: 2 /100 WBCS — HIGH (ref 0–0)
NRBC BLD-RTO: 2 /100 WBCS — HIGH (ref 0–0)
PLATELET # BLD AUTO: 221 K/UL — SIGNIFICANT CHANGE UP (ref 150–400)
POTASSIUM SERPL-MCNC: 4.1 MMOL/L — SIGNIFICANT CHANGE UP (ref 3.5–5.3)
POTASSIUM SERPL-SCNC: 4.1 MMOL/L — SIGNIFICANT CHANGE UP (ref 3.5–5.3)
PROT SERPL-MCNC: 4.3 GM/DL — LOW (ref 6–8.3)
RBC # BLD: 3.32 M/UL — LOW (ref 3.8–5.2)
RBC # FLD: 21.6 % — HIGH (ref 10.3–14.5)
SODIUM SERPL-SCNC: 131 MMOL/L — LOW (ref 135–145)
TIBC SERPL-MCNC: 116 UG/DL — LOW (ref 220–430)
UIBC SERPL-MCNC: 70 UG/DL — LOW (ref 110–370)
VIT B12 SERPL-MCNC: >2000 PG/ML — HIGH (ref 232–1245)
WBC # BLD: 12.23 K/UL — HIGH (ref 3.8–10.5)
WBC # FLD AUTO: 12.23 K/UL — HIGH (ref 3.8–10.5)

## 2025-02-07 PROCEDURE — 99233 SBSQ HOSP IP/OBS HIGH 50: CPT

## 2025-02-07 PROCEDURE — 99221 1ST HOSP IP/OBS SF/LOW 40: CPT | Mod: FS

## 2025-02-07 RX ORDER — APIXABAN 5 MG/1
5 TABLET, FILM COATED ORAL EVERY 12 HOURS
Refills: 0 | Status: DISCONTINUED | OUTPATIENT
Start: 2025-02-07 | End: 2025-02-12

## 2025-02-07 RX ORDER — DM/PSEUDOEPHED/ACETAMINOPHEN 10-30-250
25 CAPSULE ORAL ONCE
Refills: 0 | Status: DISCONTINUED | OUTPATIENT
Start: 2025-02-07 | End: 2025-02-12

## 2025-02-07 RX ORDER — SODIUM CHLORIDE 9 G/ML
1000 INJECTION, SOLUTION INTRAVENOUS
Refills: 0 | Status: DISCONTINUED | OUTPATIENT
Start: 2025-02-07 | End: 2025-02-12

## 2025-02-07 RX ORDER — DM/PSEUDOEPHED/ACETAMINOPHEN 10-30-250
12.5 CAPSULE ORAL ONCE
Refills: 0 | Status: DISCONTINUED | OUTPATIENT
Start: 2025-02-07 | End: 2025-02-12

## 2025-02-07 RX ORDER — DM/PSEUDOEPHED/ACETAMINOPHEN 10-30-250
12.5 CAPSULE ORAL ONCE
Refills: 0 | Status: COMPLETED | OUTPATIENT
Start: 2025-02-07 | End: 2025-02-07

## 2025-02-07 RX ORDER — GLUCAGON 3 MG/1
1 POWDER NASAL ONCE
Refills: 0 | Status: DISCONTINUED | OUTPATIENT
Start: 2025-02-07 | End: 2025-02-12

## 2025-02-07 RX ORDER — DM/PSEUDOEPHED/ACETAMINOPHEN 10-30-250
15 CAPSULE ORAL ONCE
Refills: 0 | Status: DISCONTINUED | OUTPATIENT
Start: 2025-02-07 | End: 2025-02-12

## 2025-02-07 RX ADMIN — Medication 100 MILLIGRAM(S): at 10:57

## 2025-02-07 RX ADMIN — Medication 10 MILLIGRAM(S): at 05:41

## 2025-02-07 RX ADMIN — APIXABAN 5 MILLIGRAM(S): 5 TABLET, FILM COATED ORAL at 22:52

## 2025-02-07 RX ADMIN — PIPERACILLIN SODIUM AND TAZOBACTAM SODIUM 25 GRAM(S): 2; 250 INJECTION, POWDER, FOR SOLUTION INTRAVENOUS at 00:13

## 2025-02-07 RX ADMIN — PIPERACILLIN SODIUM AND TAZOBACTAM SODIUM 25 GRAM(S): 2; 250 INJECTION, POWDER, FOR SOLUTION INTRAVENOUS at 15:40

## 2025-02-07 RX ADMIN — Medication 12.5 GRAM(S): at 10:56

## 2025-02-07 RX ADMIN — Medication 1 MILLIGRAM(S): at 08:21

## 2025-02-07 RX ADMIN — Medication 25 MILLIGRAM(S): at 22:52

## 2025-02-07 RX ADMIN — PIPERACILLIN SODIUM AND TAZOBACTAM SODIUM 25 GRAM(S): 2; 250 INJECTION, POWDER, FOR SOLUTION INTRAVENOUS at 22:52

## 2025-02-07 RX ADMIN — PIPERACILLIN SODIUM AND TAZOBACTAM SODIUM 25 GRAM(S): 2; 250 INJECTION, POWDER, FOR SOLUTION INTRAVENOUS at 08:20

## 2025-02-07 RX ADMIN — LEVOTHYROXINE SODIUM 100 MICROGRAM(S): 25 TABLET ORAL at 05:41

## 2025-02-07 NOTE — DIETITIAN INITIAL EVALUATION ADULT - OTHER INFO
80 y/o F PMHx metastatic renal cell carcinoma to liver, lungs, previously on immunotherapy, liver abscesses,  s/p Lpa RT colectomy on 11/30 due to ischemic ascending colitis,  HTN, HLD, adrenal insufficiency on hydrocortisone,  Afib on Apixaban, hypothyroidism, presents to the ED regarding outpatient blood tests yesterday high white count of 18 (WBC was 13 on 1/3/25), associated general weakness/fatigue progressively worsening over past week, today too weak to even stand up.  Patient denies F/C, urine complaints, chest pain, LE pain/swelling.  Decreased appetite but adequate p.o. intake. Pt denies any fevers, chills, bodyaches, abdominal pain. Imaging showed Mets and liver abscesses. of note patient completed a course of minocycline today. Severe sepsis 2/2 Liver abscesses, RCC/Hepatic and Pulmonary mets. Per IR: Collections are either stable or smaller compared with imaging from December. They are not clearly abscesses and may be necrotic tumor. Recommend conservative management for now. Pt wishes to be DNR/DNI, Pt has a MOLST form at home and does not want to fill out a new one. States her son will bring it in the morning. Admitting diagnosis: sepsis, painless jaundice    Known to nutr services has previously met criteria for PCM on multiple admits. Son at bedside with pt during RD visit. Pt reports still with okay appetite, was NPO at time for procedure. Reports UBW used to be 140#; endorses wt loss over the last few months d/t recent hospitalizations; however son feels like pt is starting to gain some of that weight back. RD obtained bedscale 135# on 2/7/25 w/ 2+ edema doc'd skewing wt/appearance. Wt history reviewed: 123# (taken by RD 12/16); 143# (taken by RD 10/1); 146# w/ 1+ edema (taken by RD on 9/12/24); 165# w/ 2+ edema (taken by RD on 8/6/24); 139# (taken by RD on 12/21/23). ? 12# wt gain / 9.75% x 1.5 months; unable to determine clin sig 2/2 edema masking current weight. Pt appears thin; NFPE reveals mod muscle/fat wasting. Recommend to c/w regular diet to optimize nutritional status. Pt agreeable to trial Premier protein shake BID to optimize nutritional needs (provides 160 kcal, 30 g protein/ shake). Consider adding appetite stimulant such as Remeron or Marinol 2/2 chronically poor appetite/ PO intake. Confirm goals of care regarding nutrition support. Please see additional recommendations below.

## 2025-02-07 NOTE — DISCHARGE NOTE NURSING/CASE MANAGEMENT/SOCIAL WORK - PATIENT PORTAL LINK FT
You can access the FollowMyHealth Patient Portal offered by [facility] by registering at the following website: [email or web address]. By joining [facility]’s FollowMyHealth portal, you will also be able to view your health information using other applications (apps) compatible with our system.

## 2025-02-07 NOTE — CONSULT NOTE ADULT - SUBJECTIVE AND OBJECTIVE BOX
HPI: "78 yo female h/o  metastatic renal cell carcinoma to liver, lungs, previously on immunotherapy. , liver abscesses,  s/p Lpa RT colectomy on 11/30 due to ischemic ascending colitis,  HTN, HLD, adrenal insufficiency on hydrocortisone,  Afib on Apixaban, hypothyroidism, presents to the ED regarding outpatient blood tests yesterday high white count of 18 (WBC was 13 on 1/3/25), associated general weakness/fatigue progressively worsening over past week, today too weak to even stand up. " Patient currently admitted for severe sepsis 2/2 hepatic abscess. Palliative medicine is consulted for further assistance with GOC.     Examined pt at bedside, pt awake, alert, NAD, son Orlando at bedside. Denies current pain, sob, n/v symptoms. Awaiting further information and results from medical teams.       PAIN: ( )Yes   (x )No    DYSPNEA: ( ) Yes  (x ) No  Level:    PAST MEDICAL & SURGICAL HISTORY:  Hypertension      High cholesterol      Diverticulitis      History of diverticulitis      Hypothyroidism      Renal cell cancer      Metastasis to liver      Paroxysmal atrial fibrillation      Cholelithiasis with cholangitis      History of biliary stent insertion      History of tonsillectomy      History of laparotomy      History of arthroscopy      H/O bilateral oophorectomy      S/P surgical removal of pilonidal cyst          SOCIAL HX:    Hx opiate tolerance ( )YES  ( x)NO    Baseline ADLs  (Prior to Admission)  (x ) Independent   ( )Dependent    FAMILY HISTORY:      Review of Systems:    Anxiety- denies  Depression- denies  Physical Discomfort- denies  Dyspnea-denies  Constipation- denies  Diarrhea- denies  Nausea- denies  Vomiting-   Anorexia- denies  Weight Loss- denies   Cough- denies  Secretions- denies  Fatigue- +  Weakness- +  Delirium- denies    All other systems reviewed and negative      PHYSICAL EXAM:    Vital Signs Last 24 Hrs  T(C): 36.3 (07 Feb 2025 08:18), Max: 36.9 (06 Feb 2025 17:00)  T(F): 97.3 (07 Feb 2025 08:18), Max: 98.4 (06 Feb 2025 17:00)  HR: 80 (07 Feb 2025 08:18) (80 - 97)  BP: 127/84 (07 Feb 2025 08:18) (98/68 - 128/74)  BP(mean): 79 (06 Feb 2025 20:38) (79 - 93)  RR: 18 (07 Feb 2025 08:18) (16 - 18)  SpO2: 91% (07 Feb 2025 08:18) (91% - 100%)    Parameters below as of 07 Feb 2025 08:18  Patient On (Oxygen Delivery Method): room air      Daily     Daily     PPSV2:  50 %  FAST:    General: elderly female, awake, alert, NAD   Lungs: clear to auscultation   Cardiac: regular rate and rhythm   GI: soft, nontender  Ext: well perfused         LABS:                        8.8    12.23 )-----------( 221      ( 07 Feb 2025 06:32 )             26.3     02-07    131[L]  |  108  |  31[H]  ----------------------------<  60[L]  4.1   |  15[L]  |  0.67    Ca    8.6      07 Feb 2025 08:48    TPro  4.3[L]  /  Alb  1.4[L]  /  TBili  4.3[H]  /  DBili  x   /  AST  110[H]  /  ALT  137[H]  /  AlkPhos  1018[H]  02-07    PT/INR - ( 06 Feb 2025 13:49 )   PT: 16.7 sec;   INR: 1.42 ratio         PTT - ( 06 Feb 2025 13:49 )  PTT:34.1 sec  Albumin: Albumin: 1.4 g/dL (02-07 @ 08:48)      Allergies    Zetia (Unknown)  Cabometyx (Unknown)  bacitracin (Rash)  tivozanib (Faint)  Lipitor (Unknown)  Norvasc (Faint)  Crestor (Other)  statins (Unknown)  Dyazide (Faint)    Intolerances      MEDICATIONS  (STANDING):  apixaban 5 milliGRAM(s) Oral every 12 hours  dextrose 5%. 1000 milliLiter(s) (100 mL/Hr) IV Continuous <Continuous>  dextrose 5%. 1000 milliLiter(s) (50 mL/Hr) IV Continuous <Continuous>  dextrose 50% Injectable 25 Gram(s) IV Push once  dextrose 50% Injectable 12.5 Gram(s) IV Push once  dextrose 50% Injectable 25 Gram(s) IV Push once  dextrose Oral Gel 15 Gram(s) Oral once  folic acid 1 milliGRAM(s) Oral daily  glucagon  Injectable 1 milliGRAM(s) IntraMuscular once  hydrocortisone sodium succinate Injectable 25 milliGRAM(s) IV Push every 8 hours  levothyroxine 100 MICROGram(s) Oral daily  piperacillin/tazobactam IVPB.. 3.375 Gram(s) IV Intermittent every 8 hours    MEDICATIONS  (PRN):  acetaminophen     Tablet .. 650 milliGRAM(s) Oral every 6 hours PRN Temp greater or equal to 38C (100.4F), Mild Pain (1 - 3)  aluminum hydroxide/magnesium hydroxide/simethicone Suspension 30 milliLiter(s) Oral every 4 hours PRN Dyspepsia  melatonin 3 milliGRAM(s) Oral at bedtime PRN Insomnia  ondansetron Injectable 4 milliGRAM(s) IV Push every 8 hours PRN Nausea and/or Vomiting      RADIOLOGY/ADDITIONAL STUDIES:

## 2025-02-07 NOTE — DIETITIAN INITIAL EVALUATION ADULT - PERTINENT MEDS FT
MEDICATIONS  (STANDING):  apixaban 5 milliGRAM(s) Oral every 12 hours  dextrose 5%. 1000 milliLiter(s) (100 mL/Hr) IV Continuous <Continuous>  dextrose 5%. 1000 milliLiter(s) (50 mL/Hr) IV Continuous <Continuous>  dextrose 50% Injectable 25 Gram(s) IV Push once  dextrose 50% Injectable 12.5 Gram(s) IV Push once  dextrose 50% Injectable 25 Gram(s) IV Push once  dextrose 50% Injectable 12.5 Gram(s) IV Push once  dextrose Oral Gel 15 Gram(s) Oral once  folic acid 1 milliGRAM(s) Oral daily  glucagon  Injectable 1 milliGRAM(s) IntraMuscular once  hydrocortisone sodium succinate Injectable 100 milliGRAM(s) IV Push once  hydrocortisone sodium succinate Injectable 25 milliGRAM(s) IV Push every 8 hours  levothyroxine 100 MICROGram(s) Oral daily  piperacillin/tazobactam IVPB.. 3.375 Gram(s) IV Intermittent every 8 hours    MEDICATIONS  (PRN):  acetaminophen     Tablet .. 650 milliGRAM(s) Oral every 6 hours PRN Temp greater or equal to 38C (100.4F), Mild Pain (1 - 3)  aluminum hydroxide/magnesium hydroxide/simethicone Suspension 30 milliLiter(s) Oral every 4 hours PRN Dyspepsia  melatonin 3 milliGRAM(s) Oral at bedtime PRN Insomnia  ondansetron Injectable 4 milliGRAM(s) IV Push every 8 hours PRN Nausea and/or Vomiting

## 2025-02-07 NOTE — CONSULT NOTE ADULT - PROBLEM SELECTOR RECOMMENDATION 9
- Reviewed with dr. Carvajal, Dr. Abdul. Collections are either stable or smaller compared with imaging from December. They are not clearly abscesses and may be necrotic tumor. Recommend conservative management for now

## 2025-02-07 NOTE — CONSULT NOTE ADULT - ASSESSMENT
78 yo female h/o  metastatic renal cell carcinoma to liver, lungs, previously on immunotherapy, liver abscesses on IV abx since 9/29 then on ertapenem 1 gm IV qd and minocycline 200 mg PO BID since 11/30, colectomy due to ischemic ascending colitis, HTN, HLD, adrenal insufficiency on hydrocortisone, Afib on Apixaban, hypothyroidism was admitted on 2/6 for worsening leukocytosis. She had labs performed on the day PTA that showed high white count of 18 (WBC was 13 on 1/3/25), associated general weakness/fatigue progressively worsening over past week, today too weak to even stand up.  Patient denies F/C, urine complaints, chest pain, LE pain/swelling.  Decreased appetite but adequate p.o. intake. Pt denies fevers, chills, bodyaches, abdominal pain. In the ED VSS, WBC 17, LA 2.7 then 2.1. Imaging showed Mets and liver abscesses.    #Liver abscesses on IV ertapenem, only partial response to abx therapy  #Metastatic renal cell CA with mets to lung and liver  #Immunocompromised host.   #Leukocytosis.   -obtain BC x 2  -concern for infection with multiresistant organisms is raised. Prior cultures reviewed. An epidemiologic assessment was performed. The risk of the microorganism spread to family members, healthcare staff is low. Standard isolation in place at present time. Will reconsider isolation measures according to infection control policy, further culture results and other new information. The patient will be monitored closely, cultures collected as appropriate.     -old chart reviewed to assess prior cultures  -monitor temps  -f/u CBC  -supportive care  2. Other issues:   -care per medicine    [facility] token not applicable due to patient on long term antibiotics   80 yo female h/o  metastatic renal cell carcinoma to liver, lungs, previously on immunotherapy, liver abscesses on IV abx since 9/29 then on ertapenem 1 gm IV qd until one moth ago and now on minocycline 200 mg PO BID since 11/30, colectomy due to ischemic ascending colitis, HTN, HLD, adrenal insufficiency on hydrocortisone, Afib on Apixaban, hypothyroidism was admitted on 2/6 for worsening leukocytosis. She had labs performed on the day PTA that showed high white count of 18 (WBC was 13 on 1/3/25), associated general weakness/fatigue progressively worsening over past week, today too weak to even stand up.  Patient denies F/C, urine complaints, chest pain, LE pain/swelling.  Decreased appetite but adequate p.o. intake. Pt denies fevers, chills, bodyaches, abdominal pain. In the ED VSS, WBC 17, LA 2.7 then 2.1. Imaging showed Mets and liver abscesses.    #Liver abscesses vs metastatic lesions on IV ertapenem, only partial response to abx therapy  #Metastatic renal cell CA with mets to lung and liver  #Loose stools in colostomy. 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  #Immunocompromised host.   #Leukocytosis.   -obtain BC x 2  -obtain stool studies  -concern for infection with multiresistant organisms or C.diff is raised. Prior cultures reviewed. An epidemiologic assessment was performed. There is a significant risk for resistant microorganisms to spread to family members, and/or healthcare staff. The patient will be placed on contact isolation according to infection control policy. Will reconsider isolation measures based on new culture results and other clinical data as appropriate. Appropriate cultures collected and an appropriate broad spectrum abx therapy was started.  -agree with zosyn 3.375 gm IV q8h for now  -old chart reviewed to assess prior cultures  -IR evaluation appreciated - she is not a candidate for liver lesions aspiration/ biopsy  -monitor temps  -f/u CBC  -supportive care  2. Other issues:   -care per medicine    d/w Dr. Waggoner

## 2025-02-07 NOTE — DISCHARGE NOTE NURSING/CASE MANAGEMENT/SOCIAL WORK - FINANCIAL ASSISTANCE
[facility] provides services at a reduced cost to those who are determined to be eligible through [facility]’s financial assistance program. Information regarding [facility]’s financial assistance program can be found by going to [email or web address] or by calling [phone].

## 2025-02-07 NOTE — CONSULT NOTE ADULT - CONVERSATION DETAILS
Met with pt at bedside, accompanied by her son, Titus. We discussed Palliative Care team being a supportive team when a patient has ongoing illnesses.  We also discussed that it is not an end of life care service, but can help navigate symptoms and emotional support throughout their hospital stay here.    We reviewed reasons for acute hospitalization including severe sepsis 2/2 Liver abscesses, in setting of underlying RCC with lung and liver mets. Pt states she is awaiting further information and  update from medical team.     Prior to hospitalization, pt states she lives at home, was independent, able to drive. Was able to ambulate with walker, however felt weak and unable to get up which prompted her to come to the hospital.     We reviewed advanced directives including HCP and MOLST forms. Pt states that she has completed HCP naming her sons as her agents. We also reviewed MOLST form. Pt states that she has completed a MOLST form in the past indicating DNR/DNI status, son to bring in copy later this afternoon. Offered to complete a new MOLST form, however pt states she wishes to wait until her form is brought to the hospital.     Additional emotional support and counseling provided. Palliative medicine to continue to follow.

## 2025-02-07 NOTE — CONSULT NOTE ADULT - ASSESSMENT
"78 yo female h/o  metastatic renal cell carcinoma to liver, lungs, previously on immunotherapy. , liver abscesses,  s/p Lpa RT colectomy on 11/30 due to ischemic ascending colitis,  HTN, HLD, adrenal insufficiency on hydrocortisone,  Afib on Apixaban, hypothyroidism, presents to the ED regarding outpatient blood tests yesterday high white count of 18 (WBC was 13 on 1/3/25), associated general weakness/fatigue progressively worsening over past week, today too weak to even stand up. " Patient currently admitted for severe sepsis 2/2 hepatic abscess. Palliative medicine is consulted for further assistance with GOC.     Severe Sepsis 2/2 hepatic abscess  - on abx    Hx of RCC with mets to liver, lungs   - heme/onc consulted    GOC/ACP  Capacity:  HCP/Surrogate:  Code Status:  MOLST:  Dispo Plan:  Process of Care  --Reviewed dx/treatment problems and alignment with Goals of Care    Physical Aspects of Care  --Pain  patient denies at this time  c/w current managment    --Bowel Regimen  denies constipation  risk for constipation d/t immobility  daily dulcolax    --Dyspnea  No SOB at this time  comfortable and in NAD    --Nausea Vomiting  denies    --Weakness  PT as tolerated     Psychological and Psychiatric Aspects of Care:   --Greif/Bereavment: emotional support provided  --Hx of psychiatric dx: none  -Pastoral Care Available PRN     Social Aspects of Care  -SW involved     Cultural Aspects  -Primary Language: English    Goals of Care:     We discussed Palliative Care team being a supportive team when a patient has ongoing illnesses.  We also discussed that it is not an end of life care service, but can help navigate symptoms and emotional support througout their hospital stay here.    Hospice was explained as well  as an end of life care philosophy.  When a disease cannot be cured, or family/patient decide the treatment burdens out weigh the risk and one choses to change focus of treatment from cure to quality/comfort.       Prognosis: Death can occur at anytime, but if disease continues to progress naturally patient likely has days to weeks.    Ethical and Legal Aspects:   NA          Discussed With: Case coordinated with attending and SW and RN     Time Spent: 90 minutes including the care, coordination and counseling of this patient, 50% of which was spent coordinating and counseling.      "80 yo female h/o  metastatic renal cell carcinoma to liver, lungs, previously on immunotherapy. , liver abscesses,  s/p Lpa RT colectomy on 11/30 due to ischemic ascending colitis,  HTN, HLD, adrenal insufficiency on hydrocortisone,  Afib on Apixaban, hypothyroidism, presents to the ED regarding outpatient blood tests yesterday high white count of 18 (WBC was 13 on 1/3/25), associated general weakness/fatigue progressively worsening over past week, today too weak to even stand up. " Patient currently admitted for severe sepsis 2/2 hepatic abscess. Palliative medicine is consulted for further assistance with GOC.     Severe Sepsis 2/2 hepatic abscess  - on abx    Hx of RCC with mets to liver, lungs   - heme/onc consulted    GOC/ACP  Capacity: pt has capacity for complex medical decision making   HCP/Surrogate: pt's sons, to bring in HCP form   Code Status: pt states she is DNR/DNI, has MOLST form at home, to be brought in by sons   MOLST:  Dispo Plan: pt states she is awaiting further update from medical teams, pt's family to bring in HCP/MOLST form copies, ongoing GOC,    Process of Care  --Reviewed dx/treatment problems and alignment with Goals of Care    --Weakness  PT as tolerated     Psychological and Psychiatric Aspects of Care:   --Greif/Bereavment: emotional support provided  --Hx of psychiatric dx: none  -Pastoral Care Available PRN     Social Aspects of Care  -SW involved     Cultural Aspects  -Primary Language: English    Goals of Care:     We discussed Palliative Care team being a supportive team when a patient has ongoing illnesses.  We also discussed that it is not an end of life care service, but can help navigate symptoms and emotional support througout their hospital stay here.      Ethical and Legal Aspects:   NA          Discussed With: Case coordinated with attending and SW and RN     Time Spent: 90 minutes including the care, coordination and counseling of this patient, 50% of which was spent coordinating and counseling.

## 2025-02-07 NOTE — CONSULT NOTE ADULT - SUBJECTIVE AND OBJECTIVE BOX
Patient is a 79y old  Female who presents with a chief complaint of Sepsis    HPI:  78 yo female h/o  metastatic renal cell carcinoma to liver, lungs, previously on immunotherapy, liver abscesses on IV abx since 9/29 then on ertapenem 1 gm IV qd and minocycline 200 mg PO BID since 11/30, colectomy due to ischemic ascending colitis, HTN, HLD, adrenal insufficiency on hydrocortisone, Afib on Apixaban, hypothyroidism was admitted on 2/6 for worsening leukocytosis. She had labs performed on the day PTA that showed high white count of 18 (WBC was 13 on 1/3/25), associated general weakness/fatigue progressively worsening over past week, today too weak to even stand up.  Patient denies F/C, urine complaints, chest pain, LE pain/swelling.  Decreased appetite but adequate p.o. intake. Pt denies fevers, chills, bodyaches, abdominal pain. In the ED VSS, WBC 17, LA 2.7 then 2.1. Imaging showed Mets and liver abscesses. Of note patient completed a course of minocycline today.      PAST MEDICAL & SURGICAL HISTORY:  Hypertension  High cholesterol  Diverticulitis  History of diverticulitis  Hypothyroidism  Renal cell cancer  Metastasis to liver  Paroxysmal atrial fibrillation  Cholelithiasis with cholangitis  History of biliary stent insertion  History of tonsillectomy  History of laparotomy  History of arthroscopy  H/O bilateral oophorectomy  S/P surgical removal of pilonidal cyst    Meds: per reconciliation sheet, noted below  MEDICATIONS  (STANDING):  apixaban 5 milliGRAM(s) Oral every 12 hours  dextrose 5%. 1000 milliLiter(s) (100 mL/Hr) IV Continuous <Continuous>  dextrose 5%. 1000 milliLiter(s) (50 mL/Hr) IV Continuous <Continuous>  dextrose 50% Injectable 25 Gram(s) IV Push once  dextrose 50% Injectable 12.5 Gram(s) IV Push once  dextrose 50% Injectable 25 Gram(s) IV Push once  dextrose Oral Gel 15 Gram(s) Oral once  folic acid 1 milliGRAM(s) Oral daily  glucagon  Injectable 1 milliGRAM(s) IntraMuscular once  hydrocortisone sodium succinate Injectable 25 milliGRAM(s) IV Push every 8 hours  levothyroxine 100 MICROGram(s) Oral daily  piperacillin/tazobactam IVPB.. 3.375 Gram(s) IV Intermittent every 8 hours    MEDICATIONS  (PRN):  acetaminophen     Tablet .. 650 milliGRAM(s) Oral every 6 hours PRN Temp greater or equal to 38C (100.4F), Mild Pain (1 - 3)  aluminum hydroxide/magnesium hydroxide/simethicone Suspension 30 milliLiter(s) Oral every 4 hours PRN Dyspepsia  melatonin 3 milliGRAM(s) Oral at bedtime PRN Insomnia  ondansetron Injectable 4 milliGRAM(s) IV Push every 8 hours PRN Nausea and/or Vomiting    Allergies    Zetia (Unknown)  Cabometyx (Unknown)  bacitracin (Rash)  tivozanib (Faint)  Lipitor (Unknown)  Norvasc (Faint)  Crestor (Other)  statins (Unknown)  Dyazide (Faint)    Intolerances    Social: no smoking, no alcohol, no illegal drugs; no recent travel, no exposure to TB  FAMILY HISTORY:    no history of premature cardiovascular disease in first degree relatives    ROS: the patient denies fever, no chills, no HA, no seizures, no dizziness, no sore throat, no nasal congestion, no blurry vision, no CP, no palpitations, no SOB, no cough, no abdominal pain, no diarrhea, no N/V, no dysuria, no leg pain, no claudication, no rash, no joint aches, no rectal pain or bleeding, no night sweats  All other systems reviewed and are negative    Vital Signs Last 24 Hrs  T(C): 36.3 (07 Feb 2025 08:18), Max: 36.9 (06 Feb 2025 17:00)  T(F): 97.3 (07 Feb 2025 08:18), Max: 98.4 (06 Feb 2025 17:00)  HR: 80 (07 Feb 2025 08:18) (80 - 97)  BP: 127/84 (07 Feb 2025 08:18) (98/68 - 128/74)  BP(mean): 79 (06 Feb 2025 20:38) (79 - 93)  RR: 18 (07 Feb 2025 08:18) (16 - 18)  SpO2: 91% (07 Feb 2025 08:18) (91% - 100%)    Parameters below as of 07 Feb 2025 08:18  Patient On (Oxygen Delivery Method): room air    PE:    Constitutional:  No acute distress  HEENT: NC/AT, EOMI, PERRLA, conjunctivae clear; ears and nose atraumatic; pharynx benign  Neck: supple; thyroid not palpable  Back: no tenderness  Respiratory: respiratory effort normal; clear to auscultation  Cardiovascular: S1S2 regular, no murmurs  Abdomen: soft, not tender, not distended, positive BS; no liver or spleen organomegaly  Genitourinary: no suprapubic tenderness  Lymphatic: no LN palpable  Musculoskeletal: no muscle tenderness, no joint swelling or tenderness  Extremities: no pedal edema  Neurological/ Psychiatric: AxOx3, judgement and insight normal; moving all extremities  Skin: no rashes; no palpable lesions    Labs: all available labs reviewed                        8.8    12.23 )-----------( 221      ( 07 Feb 2025 06:32 )             26.3     02-07    131[L]  |  108  |  31[H]  ----------------------------<  60[L]  4.1   |  15[L]  |  0.67    Ca    8.6      07 Feb 2025 08:48    TPro  4.3[L]  /  Alb  1.4[L]  /  TBili  4.3[H]  /  DBili  x   /  AST  110[H]  /  ALT  137[H]  /  AlkPhos  1018[H]  02-07     LIVER FUNCTIONS - ( 07 Feb 2025 08:48 )  Alb: 1.4 g/dL / Pro: 4.3 gm/dL / ALK PHOS: 1018 U/L / ALT: 137 U/L / AST: 110 U/L / GGT: x           Urinalysis with Rflx Culture (collected 06 Feb 2025 14:17)    Radiology: all available radiological tests reviewed    < from: CT Abdomen and Pelvis w/ IV Cont (02.06.25 @ 16:30) >  1.  Progression of metastatic disease in the visualized lower chest and liver, as well as an abdominal wall metastasis along the right 10th rib   costochondral junction. New centrally necrotic para-aortic lymph node may represent lymph node metastasis.  2.  Multiple hepatic abscesses, majority of which are slightly decreased in size compared to 12/15/2024.  3.  Previous biliary stent has been removed. No biliary ductal dilatation.  < end of copied text >    Advanced directives addressed: full resuscitation Patient is a 79y old  Female who presents with a chief complaint of Sepsis    HPI:  80 yo female h/o  metastatic renal cell carcinoma to liver, lungs, previously on immunotherapy, liver abscesses on IV abx since 9/29 then on ertapenem 1 gm IV qd until one moth ago and now on minocycline 200 mg PO BID since 11/30, colectomy due to ischemic ascending colitis, HTN, HLD, adrenal insufficiency on hydrocortisone, Afib on Apixaban, hypothyroidism was admitted on 2/6 for worsening leukocytosis. She had labs performed on the day PTA that showed high white count of 18 (WBC was 13 on 1/3/25), associated general weakness/fatigue progressively worsening over past week, today too weak to even stand up.  Patient denies F/C, urine complaints, chest pain, LE pain/swelling.  Decreased appetite but adequate p.o. intake. Pt denies fevers, chills, bodyaches, abdominal pain. In the ED VSS, WBC 17, LA 2.7 then 2.1. Imaging showed Mets and liver abscesses. Of note patient completed a course of minocycline today.      PAST MEDICAL & SURGICAL HISTORY:  Hypertension  High cholesterol  Diverticulitis  History of diverticulitis  Hypothyroidism  Renal cell cancer  Metastasis to liver  Paroxysmal atrial fibrillation  Cholelithiasis with cholangitis  History of biliary stent insertion  History of tonsillectomy  History of laparotomy  History of arthroscopy  H/O bilateral oophorectomy  S/P surgical removal of pilonidal cyst    Meds: per reconciliation sheet, noted below  MEDICATIONS  (STANDING):  apixaban 5 milliGRAM(s) Oral every 12 hours  dextrose 5%. 1000 milliLiter(s) (100 mL/Hr) IV Continuous <Continuous>  dextrose 5%. 1000 milliLiter(s) (50 mL/Hr) IV Continuous <Continuous>  dextrose 50% Injectable 25 Gram(s) IV Push once  dextrose 50% Injectable 12.5 Gram(s) IV Push once  dextrose 50% Injectable 25 Gram(s) IV Push once  dextrose Oral Gel 15 Gram(s) Oral once  folic acid 1 milliGRAM(s) Oral daily  glucagon  Injectable 1 milliGRAM(s) IntraMuscular once  hydrocortisone sodium succinate Injectable 25 milliGRAM(s) IV Push every 8 hours  levothyroxine 100 MICROGram(s) Oral daily  piperacillin/tazobactam IVPB.. 3.375 Gram(s) IV Intermittent every 8 hours    MEDICATIONS  (PRN):  acetaminophen     Tablet .. 650 milliGRAM(s) Oral every 6 hours PRN Temp greater or equal to 38C (100.4F), Mild Pain (1 - 3)  aluminum hydroxide/magnesium hydroxide/simethicone Suspension 30 milliLiter(s) Oral every 4 hours PRN Dyspepsia  melatonin 3 milliGRAM(s) Oral at bedtime PRN Insomnia  ondansetron Injectable 4 milliGRAM(s) IV Push every 8 hours PRN Nausea and/or Vomiting    Allergies    Zetia (Unknown)  Cabometyx (Unknown)  bacitracin (Rash)  tivozanib (Faint)  Lipitor (Unknown)  Norvasc (Faint)  Crestor (Other)  statins (Unknown)  Dyazide (Faint)    Intolerances    Social: no smoking, no alcohol, no illegal drugs; no recent travel, no exposure to TB  FAMILY HISTORY:    no history of premature cardiovascular disease in first degree relatives    ROS: the patient denies fever, no chills, no HA, no seizures, no dizziness, no sore throat, no nasal congestion, no blurry vision, no CP, no palpitations, no SOB, no cough, no abdominal pain, no diarrhea, no N/V, no dysuria, no leg pain, no claudication, no rash, no joint aches, no rectal pain or bleeding, no night sweats  All other systems reviewed and are negative    Vital Signs Last 24 Hrs  T(C): 36.3 (07 Feb 2025 08:18), Max: 36.9 (06 Feb 2025 17:00)  T(F): 97.3 (07 Feb 2025 08:18), Max: 98.4 (06 Feb 2025 17:00)  HR: 80 (07 Feb 2025 08:18) (80 - 97)  BP: 127/84 (07 Feb 2025 08:18) (98/68 - 128/74)  BP(mean): 79 (06 Feb 2025 20:38) (79 - 93)  RR: 18 (07 Feb 2025 08:18) (16 - 18)  SpO2: 91% (07 Feb 2025 08:18) (91% - 100%)    Parameters below as of 07 Feb 2025 08:18  Patient On (Oxygen Delivery Method): room air    PE:    Constitutional:  No acute distress  HEENT: NC/AT, EOMI, PERRLA, conjunctivae clear; ears and nose atraumatic; pharynx benign  Neck: supple; thyroid not palpable  Back: no tenderness  Respiratory: respiratory effort normal; clear to auscultation  Cardiovascular: S1S2 regular, no murmurs  Abdomen: soft, not tender, not distended, positive BS; no liver or spleen organomegaly  Genitourinary: no suprapubic tenderness  Lymphatic: no LN palpable  Musculoskeletal: no muscle tenderness, no joint swelling or tenderness  Extremities: no pedal edema  Neurological/ Psychiatric: AxOx3, judgement and insight normal; moving all extremities  Skin: no rashes; no palpable lesions    Labs: all available labs reviewed                        8.8    12.23 )-----------( 221      ( 07 Feb 2025 06:32 )             26.3     02-07    131[L]  |  108  |  31[H]  ----------------------------<  60[L]  4.1   |  15[L]  |  0.67    Ca    8.6      07 Feb 2025 08:48    TPro  4.3[L]  /  Alb  1.4[L]  /  TBili  4.3[H]  /  DBili  x   /  AST  110[H]  /  ALT  137[H]  /  AlkPhos  1018[H]  02-07     LIVER FUNCTIONS - ( 07 Feb 2025 08:48 )  Alb: 1.4 g/dL / Pro: 4.3 gm/dL / ALK PHOS: 1018 U/L / ALT: 137 U/L / AST: 110 U/L / GGT: x           Urinalysis with Rflx Culture (collected 06 Feb 2025 14:17)    Radiology: all available radiological tests reviewed    < from: CT Abdomen and Pelvis w/ IV Cont (02.06.25 @ 16:30) >  1.  Progression of metastatic disease in the visualized lower chest and liver, as well as an abdominal wall metastasis along the right 10th rib   costochondral junction. New centrally necrotic para-aortic lymph node may represent lymph node metastasis.  2.  Multiple hepatic abscesses, majority of which are slightly decreased in size compared to 12/15/2024.  3.  Previous biliary stent has been removed. No biliary ductal dilatation.  < end of copied text >    Advanced directives addressed: full resuscitation

## 2025-02-07 NOTE — DIETITIAN INITIAL EVALUATION ADULT - ADD RECOMMEND
1. C/w regular diet to optimize nutritional status  2. Premier protein shake BID to optimize nutritional needs (provides 160 kcal, 30 g protein/ shake)  3. MVI w/ minerals daily to ensure 100% RDA met  4. Consider adding thiamine 100 mg daily 2/2 poor PO intake/ malnutrition  5. Monitor bowel movements, if no BM for >3 days, consider implementing bowel regimen.  6. Monitor daily lytes/min and replete prn  7. Encourage protein-rich foods, maximize food preferences  8. Obtain weekly wt to track/trend changes  9. Consider adding appetite stimulant such as Remeron or Marinol 2/2 chronically poor appetite/ PO intake  10. Confirm goals of care regarding nutrition support.   RD will continue to monitor PO intake, labs, hydration, and wt prn.

## 2025-02-07 NOTE — DIETITIAN INITIAL EVALUATION ADULT - HEIGHT FOR BMI (CENTIMETERS)
Encounter Date: 7/23/2024    ED Physician Progress Notes        Patient is medically cleared for transfer for inpatient psych.   Labs remarkable for etoh of 78 and positive urine drug screen for amphetamines.    Final diagnoses:  [R45.851] Suicidal ideation (Primary)  [F10.10] Alcohol abuse     154.94

## 2025-02-07 NOTE — PROGRESS NOTE ADULT - SUBJECTIVE AND OBJECTIVE BOX
HPI: 78 yo female h/o  metastatic renal cell carcinoma to liver, lungs, previously on immunotherapy. , liver abscesses,  s/p Lpa RT colectomy on 11/30 due to ischemic ascending colitis,  HTN, HLD, adrenal insufficiency on hydrocortisone,  Afib on Apixaban, hypothyroidism, presents to the ED regarding outpatient blood tests yesterday high white count of 18 (WBC was 13 on 1/3/25), associated general weakness/fatigue progressively worsening over past week, today too weak to even stand up.  Patient denies F/C, urine complaints, chest pain, LE pain/swelling.  Decreased appetite but adequate p.o. intake. Pt denies any fevers, chills, bodyaches, abdominal pain. In the ED VSS, WBC 17, LA 2.7 then 2.1. Imaging showed Mets and liver abscesses. of note patient completed a course of minocycline today.              MEDICATIONS  (STANDING):  apixaban 5 milliGRAM(s) Oral every 12 hours  dextrose 5%. 1000 milliLiter(s) (100 mL/Hr) IV Continuous <Continuous>  dextrose 5%. 1000 milliLiter(s) (50 mL/Hr) IV Continuous <Continuous>  dextrose 50% Injectable 25 Gram(s) IV Push once  dextrose 50% Injectable 12.5 Gram(s) IV Push once  dextrose 50% Injectable 25 Gram(s) IV Push once  dextrose Oral Gel 15 Gram(s) Oral once  folic acid 1 milliGRAM(s) Oral daily  glucagon  Injectable 1 milliGRAM(s) IntraMuscular once  hydrocortisone sodium succinate Injectable 25 milliGRAM(s) IV Push every 8 hours  levothyroxine 100 MICROGram(s) Oral daily  piperacillin/tazobactam IVPB.. 3.375 Gram(s) IV Intermittent every 8 hours    MEDICATIONS  (PRN):  acetaminophen     Tablet .. 650 milliGRAM(s) Oral every 6 hours PRN Temp greater or equal to 38C (100.4F), Mild Pain (1 - 3)  aluminum hydroxide/magnesium hydroxide/simethicone Suspension 30 milliLiter(s) Oral every 4 hours PRN Dyspepsia  melatonin 3 milliGRAM(s) Oral at bedtime PRN Insomnia  ondansetron Injectable 4 milliGRAM(s) IV Push every 8 hours PRN Nausea and/or Vomiting      Vital Signs Last 24 Hrs  T(C): 36.4 (07 Feb 2025 16:28), Max: 36.9 (06 Feb 2025 17:00)  T(F): 97.5 (07 Feb 2025 16:28), Max: 98.4 (06 Feb 2025 17:00)  HR: 92 (07 Feb 2025 16:28) (80 - 92)  BP: 126/85 (07 Feb 2025 16:28) (98/68 - 128/74)  BP(mean): 79 (06 Feb 2025 20:38) (79 - 89)  RR: 18 (07 Feb 2025 16:28) (16 - 18)  SpO2: 100% (07 Feb 2025 16:28) (91% - 100%)    Parameters below as of 07 Feb 2025 16:28  Patient On (Oxygen Delivery Method): room air      PHYSICAL EXAM:  GENERAL: NAD, lying in bed comfortably  HEAD:  Atraumatic, Normocephalic  EYES: conjunctiva and sclera clear  ENT: Moist mucous membranes  NECK: Supple, No JVD  CHEST/LUNG: Clear to auscultation bilaterally; No rales, rhonchi, wheezing. Unlabored respirations  HEART: Regular rate and rhythm; No murmurs  ABDOMEN: Bowel sounds present; Soft, Nontender, Nondistended.   EXTREMITIES:  2+ Peripheral Pulses, brisk capillary refill. No clubbing, cyanosis, or edema  NERVOUS SYSTEM:  Alert & Oriented X3, speech clear. No deficits   MSK: FROM all 4 extremities, full and equal strength          LABS:                          8.8    12.23 )-----------( 221      ( 07 Feb 2025 06:32 )             26.3     07 Feb 2025 08:48    131    |  108    |  31     ----------------------------<  60     4.1     |  15     |  0.67     Ca    8.6        07 Feb 2025 08:48    TPro  4.3    /  Alb  1.4    /  TBili  4.3    /  DBili  x      /  AST  110    /  ALT  137    /  AlkPhos  1018   07 Feb 2025 08:48    LIVER FUNCTIONS - ( 07 Feb 2025 08:48 )  Alb: 1.4 g/dL / Pro: 4.3 gm/dL / ALK PHOS: 1018 U/L / ALT: 137 U/L / AST: 110 U/L / GGT: x           PT/INR - ( 06 Feb 2025 13:49 )   PT: 16.7 sec;   INR: 1.42 ratio         PTT - ( 06 Feb 2025 13:49 )  PTT:34.1 sec  CAPILLARY BLOOD GLUCOSE      POCT Blood Glucose.: 79 mg/dL (07 Feb 2025 11:16)  POCT Blood Glucose.: 57 mg/dL (07 Feb 2025 10:40)              RADIOLOGY:    IMPRESSION:  1.  Progression of metastatic disease in the visualized lower chest and   liver, as well as an abdominal wall metastasis along the right 10th rib   costochondral junction. New centrally necrotic para-aortic lymph node may   represent lymph node metastasis.    2.  Multiple hepatic abscesses, majorityof which are slightly decreased   in size compared to 12/15/2024.    3.  Previous biliary stent has been removed. No biliary ductal dilatation.       HPI: 78 yo female h/o  metastatic renal cell carcinoma to liver, lungs, previously on immunotherapy. , liver abscesses,  s/p Lpa RT colectomy on 11/30 due to ischemic ascending colitis,  HTN, HLD, adrenal insufficiency on hydrocortisone,  Afib on Apixaban, hypothyroidism, presents to the ED regarding outpatient blood tests yesterday high white count of 18 (WBC was 13 on 1/3/25), associated general weakness/fatigue progressively worsening over past week, today too weak to even stand up.  Patient denies F/C, urine complaints, chest pain, LE pain/swelling.  Decreased appetite but adequate p.o. intake. Pt denies any fevers, chills, bodyaches, abdominal pain. In the ED VSS, WBC 17, LA 2.7 then 2.1. Imaging showed Mets and liver abscesses. of note patient completed a course of minocycline.     Subjective: pt seen this AM, awake. alert, feels well, no complaints     REVIEW OF SYSTEMS:    CONSTITUTIONAL: No weakness, fevers or chills  EYES/ENT: No visual changes;  No vertigo or throat pain   NECK: No pain or stiffness  RESPIRATORY: No cough, wheezing, hemoptysis; No shortness of breath  CARDIOVASCULAR: No chest pain or palpitations  GASTROINTESTINAL: No abdominal or epigastric pain. No nausea, vomiting, or hematemesis; No diarrhea or constipation. No melena or hematochezia.  GENITOURINARY: No dysuria, frequency or hematuria  NEUROLOGICAL: No numbness or weakness  SKIN: No itching, rashes        MEDICATIONS  (STANDING):  apixaban 5 milliGRAM(s) Oral every 12 hours  dextrose 5%. 1000 milliLiter(s) (100 mL/Hr) IV Continuous <Continuous>  dextrose 5%. 1000 milliLiter(s) (50 mL/Hr) IV Continuous <Continuous>  dextrose 50% Injectable 25 Gram(s) IV Push once  dextrose 50% Injectable 12.5 Gram(s) IV Push once  dextrose 50% Injectable 25 Gram(s) IV Push once  dextrose Oral Gel 15 Gram(s) Oral once  folic acid 1 milliGRAM(s) Oral daily  glucagon  Injectable 1 milliGRAM(s) IntraMuscular once  hydrocortisone sodium succinate Injectable 25 milliGRAM(s) IV Push every 8 hours  levothyroxine 100 MICROGram(s) Oral daily  piperacillin/tazobactam IVPB.. 3.375 Gram(s) IV Intermittent every 8 hours    MEDICATIONS  (PRN):  acetaminophen     Tablet .. 650 milliGRAM(s) Oral every 6 hours PRN Temp greater or equal to 38C (100.4F), Mild Pain (1 - 3)  aluminum hydroxide/magnesium hydroxide/simethicone Suspension 30 milliLiter(s) Oral every 4 hours PRN Dyspepsia  melatonin 3 milliGRAM(s) Oral at bedtime PRN Insomnia  ondansetron Injectable 4 milliGRAM(s) IV Push every 8 hours PRN Nausea and/or Vomiting      Vital Signs Last 24 Hrs  T(C): 36.4 (07 Feb 2025 16:28), Max: 36.9 (06 Feb 2025 17:00)  T(F): 97.5 (07 Feb 2025 16:28), Max: 98.4 (06 Feb 2025 17:00)  HR: 92 (07 Feb 2025 16:28) (80 - 92)  BP: 126/85 (07 Feb 2025 16:28) (98/68 - 128/74)  BP(mean): 79 (06 Feb 2025 20:38) (79 - 89)  RR: 18 (07 Feb 2025 16:28) (16 - 18)  SpO2: 100% (07 Feb 2025 16:28) (91% - 100%)    Parameters below as of 07 Feb 2025 16:28  Patient On (Oxygen Delivery Method): room air      PHYSICAL EXAM:  GENERAL: NAD, lying in bed comfortably  HEAD:  Atraumatic, Normocephalic  EYES: conjunctiva and sclera clear  ENT: Moist mucous membranes  NECK: Supple, No JVD  CHEST/LUNG: Clear to auscultation bilaterally; No rales, rhonchi, wheezing. Unlabored respirations  HEART: Regular rate and rhythm; No murmurs  ABDOMEN: Bowel sounds present; Soft, Nontender, Nondistended. + ostomy -- watery stool   EXTREMITIES:  2+ Peripheral Pulses, brisk capillary refill. No clubbing, cyanosis, or edema  NERVOUS SYSTEM:  Alert & Oriented X3, speech clear. No deficits   MSK: FROM all 4 extremities, full and equal strength          LABS:                          8.8    12.23 )-----------( 221      ( 07 Feb 2025 06:32 )             26.3     07 Feb 2025 08:48    131    |  108    |  31     ----------------------------<  60     4.1     |  15     |  0.67     Ca    8.6        07 Feb 2025 08:48    TPro  4.3    /  Alb  1.4    /  TBili  4.3    /  DBili  x      /  AST  110    /  ALT  137    /  AlkPhos  1018   07 Feb 2025 08:48    LIVER FUNCTIONS - ( 07 Feb 2025 08:48 )  Alb: 1.4 g/dL / Pro: 4.3 gm/dL / ALK PHOS: 1018 U/L / ALT: 137 U/L / AST: 110 U/L / GGT: x           PT/INR - ( 06 Feb 2025 13:49 )   PT: 16.7 sec;   INR: 1.42 ratio         PTT - ( 06 Feb 2025 13:49 )  PTT:34.1 sec  CAPILLARY BLOOD GLUCOSE      POCT Blood Glucose.: 79 mg/dL (07 Feb 2025 11:16)  POCT Blood Glucose.: 57 mg/dL (07 Feb 2025 10:40)              RADIOLOGY:    IMPRESSION:  1.  Progression of metastatic disease in the visualized lower chest and   liver, as well as an abdominal wall metastasis along the right 10th rib   costochondral junction. New centrally necrotic para-aortic lymph node may   represent lymph node metastasis.    2.  Multiple hepatic abscesses, majority of which are slightly decreased   in size compared to 12/15/2024.    3.  Previous biliary stent has been removed. No biliary ductal dilatation.

## 2025-02-07 NOTE — DIETITIAN INITIAL EVALUATION ADULT - PERTINENT LABORATORY DATA
02-07    131[L]  |  108  |  31[H]  ----------------------------<  60[L]  4.1   |  15[L]  |  0.67    Ca    8.6      07 Feb 2025 08:48    TPro  4.3[L]  /  Alb  1.4[L]  /  TBili  4.3[H]  /  DBili  x   /  AST  110[H]  /  ALT  137[H]  /  AlkPhos  1018[H]  02-07  POCT Blood Glucose.: 57 mg/dL (02-07-25 @ 10:40)  A1C with Estimated Average Glucose Result: 4.8 % (09-30-24 @ 06:38)  A1C with Estimated Average Glucose Result: 5.4 % (08-05-24 @ 05:47)

## 2025-02-07 NOTE — CONSULT NOTE ADULT - SUBJECTIVE AND OBJECTIVE BOX
Chief Complaint:  Patient is a 79y old  Female who presents with a chief complaint of liver fluid collection    HPI:  80 yo female h/o  metastatic renal cell carcinoma to liver, lungs, previously on immunotherapy. , liver abscesses,  s/p Lpa RT colectomy on 11/30 due to ischemic ascending colitis,  HTN, HLD, adrenal insufficiency on hydrocortisone,  Afib on Apixaban, hypothyroidism, presents to the ED regarding outpatient blood tests yesterday high white count of 18 (WBC was 13 on 1/3/25), associated general weakness/fatigue progressively worsening over past week, today too weak to even stand up.  Patient denies F/C, urine complaints, chest pain, LE pain/swelling.  Decreased appetite but adequate p.o. intake. Pt denies any fevers, chills, bodyaches, abdominal pain. In the ED VSS, WBC 17, LA 2.7 then 2.1. Imaging showed Mets and liver abscesses. IR consulted for evaluation.       Allergies  Zetia (Unknown)  Cabometyx (Unknown)  bacitracin (Rash)  tivozanib (Faint)  Lipitor (Unknown)  Norvasc (Faint)  Crestor (Other)  statins (Unknown)  Dyazide (Faint)      MEDICATIONS  (STANDING):  dextrose 5%. 1000 milliLiter(s) (50 mL/Hr) IV Continuous <Continuous>  dextrose 5%. 1000 milliLiter(s) (100 mL/Hr) IV Continuous <Continuous>  dextrose 50% Injectable 25 Gram(s) IV Push once  dextrose 50% Injectable 12.5 Gram(s) IV Push once  dextrose 50% Injectable 25 Gram(s) IV Push once  dextrose Oral Gel 15 Gram(s) Oral once  folic acid 1 milliGRAM(s) Oral daily  glucagon  Injectable 1 milliGRAM(s) IntraMuscular once  heparin   Injectable 5000 Unit(s) SubCutaneous every 8 hours  hydrocortisone 10 milliGRAM(s) Oral three times a day  levothyroxine 100 MICROGram(s) Oral daily  piperacillin/tazobactam IVPB.. 3.375 Gram(s) IV Intermittent every 8 hours    MEDICATIONS  (PRN):  acetaminophen     Tablet .. 650 milliGRAM(s) Oral every 6 hours PRN Temp greater or equal to 38C (100.4F), Mild Pain (1 - 3)  aluminum hydroxide/magnesium hydroxide/simethicone Suspension 30 milliLiter(s) Oral every 4 hours PRN Dyspepsia  melatonin 3 milliGRAM(s) Oral at bedtime PRN Insomnia  ondansetron Injectable 4 milliGRAM(s) IV Push every 8 hours PRN Nausea and/or Vomiting      PAST MEDICAL & SURGICAL HISTORY:  Hypertension      High cholesterol      Diverticulitis      History of diverticulitis      Hypothyroidism      Renal cell cancer      Metastasis to liver      Paroxysmal atrial fibrillation      Cholelithiasis with cholangitis      History of biliary stent insertion      History of tonsillectomy      History of laparotomy      History of arthroscopy      H/O bilateral oophorectomy      S/P surgical removal of pilonidal cyst    Vital Signs Last 24 Hrs  T(C): 36.3 (07 Feb 2025 08:18), Max: 37.8 (06 Feb 2025 13:25)  T(F): 97.3 (07 Feb 2025 08:18), Max: 100.1 (06 Feb 2025 13:25)  HR: 80 (07 Feb 2025 08:18) (80 - 98)  BP: 127/84 (07 Feb 2025 08:18) (98/68 - 128/74)  BP(mean): 79 (06 Feb 2025 20:38) (79 - 93)  RR: 18 (07 Feb 2025 08:18) (16 - 69)  SpO2: 91% (07 Feb 2025 08:18) (81% - 100%)    Parameters below as of 07 Feb 2025 08:18  Patient On (Oxygen Delivery Method): room air      CBC                        8.8    12.23 )-----------( 221      ( 07 Feb 2025 06:32 )             26.3       Chemistry  02-07    131[L]  |  108  |  31[H]  ----------------------------<  60[L]  4.1   |  15[L]  |  0.67    Ca    8.6      07 Feb 2025 08:48    TPro  4.3[L]  /  Alb  1.4[L]  /  TBili  4.3[H]  /  DBili  x   /  AST  110[H]  /  ALT  137[H]  /  AlkPhos  1018[H]  02-07      Coags  PT/INR - ( 06 Feb 2025 13:49 )   PT: 16.7 sec;   INR: 1.42 ratio    PTT - ( 06 Feb 2025 13:49 )  PTT:34.1 sec

## 2025-02-07 NOTE — DIETITIAN INITIAL EVALUATION ADULT - MALNUTRITION
"Susannah Goddard is a 51 year old female with hx of anxiety, depression, alcohol dependence in remission, raynauds and hypertension. She is here for the following issues:    Essential Hypertension  Susannah takes amlodipine 10 mg daily for treatment of hypertension. Her BP is in target range today. She reports that she has been exercising a lot without any resulting weight loss. She feels that this lack of weight loss is likely due to nighttime snacking. She has successfully lost weight before and feels capable of losing it again. Denies chest pain, palpitations, or peripheral edema. No difficulty breathing.     BP Readings from Last 3 Encounters:   03/03/23 119/76   08/01/22 (!) 122/90   05/24/21 129/82      Anxiety and Depression  Susannah takes duloxetine 60 mg twice daily and escitalopram 20 mg daily for treatment of anxiety and depression. She also takes Seroquel  mg 3x daily as well as 300 mg nightly. Finally, she uses medical cannabis in vape form through [employer]. She uses a version that has even CBD and THC, describing it as \"middle of the road\" strength. She notes that the cannabis really helps \"pull me out\" of bad depression days. She has been using medical cannabis for over a year. Lastly, she takes Omega 3 fish oils daily and states that she feels a decrease in her ability to focus when she doesn't take this.    Overall, Susannah reports that she is doing better since she last saw me. She indicates \"it's not good for me to live alone\". She currently rents a room in her boyfriend's house to maintain some independence. She has a 1-year-old puppy who needs to be walked daily, which helps her get outside on a regular basis. She has also been able to reduce her hours at work to part-time, 15-20 hours/week. She works in person 2 days/week and works from home for the rest of the time. She states that she feels a lot less burnt out and no longer feels like she is \"always working.\" Her boyfriend has MS and she states " "that he has good and bad days. They are planning a trip to see a doctor for potential other options for his treatment. He recently got a motorized wheelchair and adaptive van, which has helped him with his independence. She is 12 years sober from alcohol as of 2/9/2023. She states that her boyfriend's support in terms of her mental health is very helpful with staying sober as well as improving her general mental health. She is now seeing her therapist every other month, where she previously did so twice monthly.    PHQ 11/10/2021 8/1/2022 3/3/2023   PHQ-9 Total Score 19 12 6   Q9: Thoughts of better off dead/self-harm past 2 weeks Not at all Not at all Not at all     FRANK-7 SCORE 11/10/2021 8/1/2022 3/3/2023   Total Score - - -   Total Score 20 (severe anxiety) - -   Total Score 20 21 6     Breast Reduction Referral  Susannah has history of persistent neck and upper back strain, stiffness. I gave a referral for breast reduction in May 2021. She reports that this appointment \"never happened.\" She reports neck and shoulder tightness and pain, as well as unwanted attention. She will do stretches about 1x/week when she remembers. She uses a TENS unit for pain relief and states that this is more helpful than ibuprofen. She would like a new referral for breast reduction.     Spinal Stenosis  I gave Susannah a referral for a surgery consult regarding her spinal stenosis in the past. She states that she followed through for this, but upon learning how \"involved\" this surgery would be, she decided to pursue breast reduction surgery first. Discussed that it may be 6-12 months after breast reduction surgery before she would have the full benefit and be most informed of her remaining pain when considering spinal surgery.     History of Constipation  Susannah recalls that I recommended she take metamucil due to her history of constipation. She reports that this is working very well for her and she is now \"regular.\"     Cold Sores  Susannah takes " "valacyclovir 500 mg daily and Lysine 500 mg daily for prevention of cold sores. She reports no outbreaks since she last saw me.    Patient Active Problem List   Diagnosis     Depression with anxiety     Herpes, genital     Alcohol dependence in remission (H)     Raynaud's disease without gangrene       Current Outpatient Medications   Medication Sig Dispense Refill     amLODIPine (NORVASC) 10 MG tablet Take 1 tablet (10 mg) by mouth daily 30 tablet 0     DULoxetine (CYMBALTA) 30 MG capsule Take 60 mg by mouth 2 times daily 60 capsule 0     escitalopram (LEXAPRO) 20 MG tablet Take 20 mg by mouth daily       LYSINE HCL PO Take 500 mg % by mouth daily Taken for cold sores        omega 3 1000 MG CAPS Take 1000mg daily 90 capsule 3     QUEtiapine (SEROQUEL) 50 MG tablet Take 50-100mg po TID and take 300mg po q hs, prescribed by psychiatry 270 tablet 0     valACYclovir (VALTREX) 500 MG tablet Take 1 po q day to prevent HSV outbreak 90 tablet 1     norethindrone-ethinyl estradiol (JUNEL FE 1/20) 1-20 MG-MCG tablet Take 1 tablet by mouth daily (Patient not taking: Reported on 3/3/2023) 84 tablet 3       Allergies   Allergen Reactions     Dilaudid [Hydromorphone]         EXAM  /76 (BP Location: Right arm, Patient Position: Sitting, Cuff Size: Adult Regular)   Pulse 74   Temp 98  F (36.7  C) (Skin)   Resp 15   Ht 1.651 m (5' 5\")   Wt 80.7 kg (178 lb)   LMP 02/08/2023 (Approximate)   SpO2 98%   BMI 29.62 kg/m    Gen: Alert, pleasant, NAD  COR: S1,S2, no murmur  Lungs: CTA bilaterally, no rhonchi, wheezes or rales      Assessment:  (I10) Benign essential hypertension  (primary encounter diagnosis)  Comment: BP in target range since increasing from 5 mg to 10 mg dose of amlodipine. Also using amlodipine for raynauds.  Plan: amLODIPine (NORVASC) 10 MG tablet        Medication refilled.     (M54.2,  M25.519) Neck and shoulder pain    Comment: Uses a TENS unit for pain relief. Stretches about 1x/week. Requesting a new " referral for breast reduction.   Plan: Adult Plastic Surgery  Referral        Referral given.    (A60.00) Genital herpes simplex, unspecified site  Comment: Currently taking valacylovir 500 mg daily and Lysine 500 mg daily. No outbreaks since last visit in 8/2021.   Plan: valACYclovir (VALTREX) 500 MG tablet        Medication refilled.     (F41.9,  F32.A) Anxiety and depression  Comment: Currently sober x12 years. Follows with therapist every other month and mental health provider that prescribes medications, doing well.   Plan: Continue medications, therapy, self cares.     21 minutes spend on the date of this encounter doing chart review, history and exam, documentation and further activities as noted above.     Madyson Nunez MD  Internal Medicine/Pediatrics      I, Leana Bravo, am serving as a scribe to document services personally performed by Dr. Madyson Nunez, based on data collection and the provider's statements to me. Dr. Nunez has reviewed, edited, and approved the above note.    Pt meets criteria for severe protein calorie malnutrition in context of acute on chronic illness

## 2025-02-07 NOTE — DIETITIAN INITIAL EVALUATION ADULT - ORAL INTAKE PTA/DIET HISTORY
Son at bedside with pt at time of RD visit. Pt lives with one of her son's who help with the shopping and cooking. Reports typically okay appetite/PO intake will consume 3 small meals a day - reports she will drink 2 protein shakes a day (core fairlife). C/o one episode of vomiting PTA. Pt likely meeting <75% ENN chronically.

## 2025-02-08 LAB
-  AMPICILLIN: SIGNIFICANT CHANGE UP
-  CIPROFLOXACIN: SIGNIFICANT CHANGE UP
-  DAPTOMYCIN: SIGNIFICANT CHANGE UP
-  LEVOFLOXACIN: SIGNIFICANT CHANGE UP
-  LINEZOLID: SIGNIFICANT CHANGE UP
-  NITROFURANTOIN: SIGNIFICANT CHANGE UP
-  TETRACYCLINE: SIGNIFICANT CHANGE UP
-  VANCOMYCIN: SIGNIFICANT CHANGE UP
ANION GAP SERPL CALC-SCNC: 7 MMOL/L — SIGNIFICANT CHANGE UP (ref 5–17)
BASOPHILS # BLD AUTO: 0.01 K/UL — SIGNIFICANT CHANGE UP (ref 0–0.2)
BASOPHILS NFR BLD AUTO: 0.1 % — SIGNIFICANT CHANGE UP (ref 0–2)
BUN SERPL-MCNC: 36 MG/DL — HIGH (ref 7–23)
CALCIUM SERPL-MCNC: 8.6 MG/DL — SIGNIFICANT CHANGE UP (ref 8.5–10.1)
CHLORIDE SERPL-SCNC: 105 MMOL/L — SIGNIFICANT CHANGE UP (ref 96–108)
CO2 SERPL-SCNC: 21 MMOL/L — LOW (ref 22–31)
CREAT SERPL-MCNC: 0.98 MG/DL — SIGNIFICANT CHANGE UP (ref 0.5–1.3)
CULTURE RESULTS: ABNORMAL
EGFR: 59 ML/MIN/1.73M2 — LOW
EOSINOPHIL # BLD AUTO: 0 K/UL — SIGNIFICANT CHANGE UP (ref 0–0.5)
EOSINOPHIL NFR BLD AUTO: 0 % — SIGNIFICANT CHANGE UP (ref 0–6)
GI PCR PANEL: SIGNIFICANT CHANGE UP
GLUCOSE SERPL-MCNC: 110 MG/DL — HIGH (ref 70–99)
HCT VFR BLD CALC: 25.5 % — LOW (ref 34.5–45)
HGB BLD-MCNC: 8.8 G/DL — LOW (ref 11.5–15.5)
IMM GRANULOCYTES NFR BLD AUTO: 1.8 % — HIGH (ref 0–0.9)
LYMPHOCYTES # BLD AUTO: 0.4 K/UL — LOW (ref 1–3.3)
LYMPHOCYTES # BLD AUTO: 3.2 % — LOW (ref 13–44)
MCHC RBC-ENTMCNC: 26.6 PG — LOW (ref 27–34)
MCHC RBC-ENTMCNC: 34.5 G/DL — SIGNIFICANT CHANGE UP (ref 32–36)
MCV RBC AUTO: 77 FL — LOW (ref 80–100)
METHOD TYPE: SIGNIFICANT CHANGE UP
MONOCYTES # BLD AUTO: 0.53 K/UL — SIGNIFICANT CHANGE UP (ref 0–0.9)
MONOCYTES NFR BLD AUTO: 4.2 % — SIGNIFICANT CHANGE UP (ref 2–14)
NEUTROPHILS # BLD AUTO: 11.38 K/UL — HIGH (ref 1.8–7.4)
NEUTROPHILS NFR BLD AUTO: 90.7 % — HIGH (ref 43–77)
NRBC # BLD: 2 /100 WBCS — HIGH (ref 0–0)
NRBC BLD-RTO: 2 /100 WBCS — HIGH (ref 0–0)
ORGANISM # SPEC MICROSCOPIC CNT: ABNORMAL
ORGANISM # SPEC MICROSCOPIC CNT: SIGNIFICANT CHANGE UP
PLATELET # BLD AUTO: 212 K/UL — SIGNIFICANT CHANGE UP (ref 150–400)
POTASSIUM SERPL-MCNC: 4.1 MMOL/L — SIGNIFICANT CHANGE UP (ref 3.5–5.3)
POTASSIUM SERPL-SCNC: 4.1 MMOL/L — SIGNIFICANT CHANGE UP (ref 3.5–5.3)
RBC # BLD: 3.31 M/UL — LOW (ref 3.8–5.2)
RBC # FLD: 21.7 % — HIGH (ref 10.3–14.5)
SODIUM SERPL-SCNC: 133 MMOL/L — LOW (ref 135–145)
SPECIMEN SOURCE: SIGNIFICANT CHANGE UP
WBC # BLD: 12.54 K/UL — HIGH (ref 3.8–10.5)
WBC # FLD AUTO: 12.54 K/UL — HIGH (ref 3.8–10.5)

## 2025-02-08 PROCEDURE — 99233 SBSQ HOSP IP/OBS HIGH 50: CPT

## 2025-02-08 RX ADMIN — LEVOTHYROXINE SODIUM 100 MICROGRAM(S): 25 TABLET ORAL at 06:25

## 2025-02-08 RX ADMIN — APIXABAN 5 MILLIGRAM(S): 5 TABLET, FILM COATED ORAL at 10:28

## 2025-02-08 RX ADMIN — ACETAMINOPHEN, DIPHENHYDRAMINE HCL, PHENYLEPHRINE HCL 3 MILLIGRAM(S): 325; 25; 5 TABLET ORAL at 23:25

## 2025-02-08 RX ADMIN — PIPERACILLIN SODIUM AND TAZOBACTAM SODIUM 25 GRAM(S): 2; 250 INJECTION, POWDER, FOR SOLUTION INTRAVENOUS at 22:18

## 2025-02-08 RX ADMIN — Medication 30 MILLILITER(S): at 18:00

## 2025-02-08 RX ADMIN — Medication 25 MILLIGRAM(S): at 22:07

## 2025-02-08 RX ADMIN — Medication 30 MILLILITER(S): at 22:06

## 2025-02-08 RX ADMIN — Medication 25 MILLIGRAM(S): at 15:12

## 2025-02-08 RX ADMIN — Medication 25 MILLIGRAM(S): at 06:31

## 2025-02-08 RX ADMIN — APIXABAN 5 MILLIGRAM(S): 5 TABLET, FILM COATED ORAL at 22:06

## 2025-02-08 RX ADMIN — Medication 1 MILLIGRAM(S): at 10:28

## 2025-02-08 RX ADMIN — PIPERACILLIN SODIUM AND TAZOBACTAM SODIUM 25 GRAM(S): 2; 250 INJECTION, POWDER, FOR SOLUTION INTRAVENOUS at 15:12

## 2025-02-08 RX ADMIN — PIPERACILLIN SODIUM AND TAZOBACTAM SODIUM 25 GRAM(S): 2; 250 INJECTION, POWDER, FOR SOLUTION INTRAVENOUS at 06:23

## 2025-02-08 NOTE — PROGRESS NOTE ADULT - SUBJECTIVE AND OBJECTIVE BOX
HPI: 80 yo female h/o  metastatic renal cell carcinoma to liver, lungs, previously on immunotherapy. , liver abscesses,  s/p Lpa RT colectomy on 11/30 due to ischemic ascending colitis,  HTN, HLD, adrenal insufficiency on hydrocortisone,  Afib on Apixaban, hypothyroidism, presents to the ED regarding outpatient blood tests yesterday high white count of 18 (WBC was 13 on 1/3/25), associated general weakness/fatigue progressively worsening over past week, today too weak to even stand up.  Patient denies F/C, urine complaints, chest pain, LE pain/swelling.  Decreased appetite but adequate p.o. intake. Pt denies any fevers, chills, bodyaches, abdominal pain. In the ED VSS, WBC 17, LA 2.7 then 2.1. Imaging showed Mets and liver abscesses. of note patient completed a course of minocycline.     Subjective: pt seen this AM, son present, feels much better today, no overnight issues reported     REVIEW OF SYSTEMS:    CONSTITUTIONAL: No weakness, fevers or chills  EYES/ENT: No visual changes;  No vertigo or throat pain   NECK: No pain or stiffness  RESPIRATORY: No cough, wheezing, hemoptysis; No shortness of breath  CARDIOVASCULAR: No chest pain or palpitations  GASTROINTESTINAL: No abdominal or epigastric pain. No nausea, vomiting, or hematemesis; No diarrhea or constipation. No melena or hematochezia.  GENITOURINARY: No dysuria, frequency or hematuria  NEUROLOGICAL: No numbness or weakness  SKIN: No itching, rashes        MEDICATIONS  (STANDING):  apixaban 5 milliGRAM(s) Oral every 12 hours  dextrose 5%. 1000 milliLiter(s) (100 mL/Hr) IV Continuous <Continuous>  dextrose 5%. 1000 milliLiter(s) (50 mL/Hr) IV Continuous <Continuous>  dextrose 50% Injectable 25 Gram(s) IV Push once  dextrose 50% Injectable 12.5 Gram(s) IV Push once  dextrose 50% Injectable 25 Gram(s) IV Push once  dextrose Oral Gel 15 Gram(s) Oral once  folic acid 1 milliGRAM(s) Oral daily  glucagon  Injectable 1 milliGRAM(s) IntraMuscular once  hydrocortisone sodium succinate Injectable 25 milliGRAM(s) IV Push every 8 hours  levothyroxine 100 MICROGram(s) Oral daily  piperacillin/tazobactam IVPB.. 3.375 Gram(s) IV Intermittent every 8 hours    MEDICATIONS  (PRN):  acetaminophen     Tablet .. 650 milliGRAM(s) Oral every 6 hours PRN Temp greater or equal to 38C (100.4F), Mild Pain (1 - 3)  aluminum hydroxide/magnesium hydroxide/simethicone Suspension 30 milliLiter(s) Oral every 4 hours PRN Dyspepsia  melatonin 3 milliGRAM(s) Oral at bedtime PRN Insomnia  ondansetron Injectable 4 milliGRAM(s) IV Push every 8 hours PRN Nausea and/or Vomiting      Vital Signs Last 24 Hrs  T(C): 36.3 (08 Feb 2025 07:13), Max: 36.6 (07 Feb 2025 23:40)  T(F): 97.4 (08 Feb 2025 07:13), Max: 97.9 (07 Feb 2025 23:40)  HR: 80 (08 Feb 2025 07:13) (80 - 92)  BP: 130/80 (08 Feb 2025 07:13) (111/74 - 130/80)  RR: 18 (08 Feb 2025 07:13) (18 - 18)  SpO2: 100% (08 Feb 2025 07:13) (98% - 100%)    Parameters below as of 08 Feb 2025 07:13  Patient On (Oxygen Delivery Method): room air    PHYSICAL EXAM:  GENERAL: NAD, lying in bed comfortably  HEAD:  Atraumatic, Normocephalic  EYES: conjunctiva and sclera clear  ENT: Moist mucous membranes  NECK: Supple, No JVD  CHEST/LUNG: Clear to auscultation bilaterally; No rales, rhonchi, wheezing. Unlabored respirations  HEART: Regular rate and rhythm; No murmurs  ABDOMEN: Bowel sounds present; Soft, Nontender, Nondistended. + ostomy   EXTREMITIES:  2+ Peripheral Pulses, brisk capillary refill. No clubbing, cyanosis, or edema  NERVOUS SYSTEM:  Alert & Oriented X3, speech clear. No deficits   MSK: FROM all 4 extremities, full and equal strength          LABS:                          8.8    12.54 )-----------( 212      ( 08 Feb 2025 06:50 )             25.5   02-08    133[L]  |  105  |  36[H]  ----------------------------<  110[H]  4.1   |  21[L]  |  0.98    Ca    8.6      08 Feb 2025 06:50    TPro  4.3[L]  /  Alb  1.4[L]  /  TBili  4.3[H]  /  DBili  x   /  AST  110[H]  /  ALT  137[H]  /  AlkPhos  1018[H]  02-07    CAPILLARY BLOOD GLUCOSE      CAPILLARY BLOOD GLUCOSE      POCT Blood Glucose.: 119 mg/dL (07 Feb 2025 17:58)              RADIOLOGY:    IMPRESSION:  1.  Progression of metastatic disease in the visualized lower chest and   liver, as well as an abdominal wall metastasis along the right 10th rib   costochondral junction. New centrally necrotic para-aortic lymph node may   represent lymph node metastasis.    2.  Multiple hepatic abscesses, majority of which are slightly decreased   in size compared to 12/15/2024.    3.  Previous biliary stent has been removed. No biliary ductal dilatation.

## 2025-02-08 NOTE — PROGRESS NOTE ADULT - SUBJECTIVE AND OBJECTIVE BOX
Date of service: 02-08-25 @ 16:11    Lying in bed in NAD  Alert and verbal  No fever    ROS: no fever or chills; denies dizziness, no HA, no SOB or cough, no abdominal pain, no diarrhea or constipation; no dysuria, no legs pain, no rashes    MEDICATIONS  (STANDING):  apixaban 5 milliGRAM(s) Oral every 12 hours  dextrose 5%. 1000 milliLiter(s) (100 mL/Hr) IV Continuous <Continuous>  dextrose 5%. 1000 milliLiter(s) (50 mL/Hr) IV Continuous <Continuous>  dextrose 50% Injectable 25 Gram(s) IV Push once  dextrose 50% Injectable 12.5 Gram(s) IV Push once  dextrose 50% Injectable 25 Gram(s) IV Push once  dextrose Oral Gel 15 Gram(s) Oral once  folic acid 1 milliGRAM(s) Oral daily  glucagon  Injectable 1 milliGRAM(s) IntraMuscular once  hydrocortisone sodium succinate Injectable 25 milliGRAM(s) IV Push every 8 hours  levothyroxine 100 MICROGram(s) Oral daily  piperacillin/tazobactam IVPB.. 3.375 Gram(s) IV Intermittent every 8 hours    Vital Signs Last 24 Hrs  T(C): 36.4 (08 Feb 2025 15:42), Max: 36.6 (07 Feb 2025 23:40)  T(F): 97.6 (08 Feb 2025 15:42), Max: 97.9 (07 Feb 2025 23:40)  HR: 95 (08 Feb 2025 15:42) (80 - 95)  BP: 123/87 (08 Feb 2025 15:42) (111/74 - 130/80)  BP(mean): --  RR: 18 (08 Feb 2025 15:42) (18 - 18)  SpO2: 97% (08 Feb 2025 15:42) (97% - 100%)    Parameters below as of 08 Feb 2025 15:42  Patient On (Oxygen Delivery Method): room air     Physical exam:    Constitutional:  No acute distress  HEENT: NC/AT, EOMI, PERRLA, conjunctivae clear; ears and nose atraumatic; pharynx benign  Neck: supple; thyroid not palpable  Back: no tenderness  Respiratory: respiratory effort normal; decreased BS at bases  Cardiovascular: S1S2 regular, no murmurs  Abdomen: soft, not tender, not distended, positive BS; no liver or spleen organomegaly  Genitourinary: no suprapubic tenderness  Lymphatic: no LN palpable  Musculoskeletal: no muscle tenderness, no joint swelling or tenderness  Extremities: no pedal edema  Neurological/ Psychiatric: AxOx3, judgement and insight normal; moving all extremities  Skin: no rashes; no palpable lesions    Labs: reviewed                        8.8    12.54 )-----------( 212      ( 08 Feb 2025 06:50 )             25.5     02-08    133[L]  |  105  |  36[H]  ----------------------------<  110[H]  4.1   |  21[L]  |  0.98    Ca    8.6      08 Feb 2025 06:50    TPro  4.3[L]  /  Alb  1.4[L]  /  TBili  4.3[H]  /  DBili  x   /  AST  110[H]  /  ALT  137[H]  /  AlkPhos  1018[H]  02-07    Ferritin: 688 ng/mL (02-07-25 @ 06:32)                        8.8    12.23 )-----------( 221      ( 07 Feb 2025 06:32 )             26.3     02-07    131[L]  |  108  |  31[H]  ----------------------------<  60[L]  4.1   |  15[L]  |  0.67    Ca    8.6      07 Feb 2025 08:48    TPro  4.3[L]  /  Alb  1.4[L]  /  TBili  4.3[H]  /  DBili  x   /  AST  110[H]  /  ALT  137[H]  /  AlkPhos  1018[H]  02-07     LIVER FUNCTIONS - ( 07 Feb 2025 08:48 )  Alb: 1.4 g/dL / Pro: 4.3 gm/dL / ALK PHOS: 1018 U/L / ALT: 137 U/L / AST: 110 U/L / GGT: x           Urinalysis with Rflx Culture (collected 06 Feb 2025 14:17)    Radiology: all available radiological tests reviewed    < from: CT Abdomen and Pelvis w/ IV Cont (02.06.25 @ 16:30) >  1.  Progression of metastatic disease in the visualized lower chest and liver, as well as an abdominal wall metastasis along the right 10th rib   costochondral junction. New centrally necrotic para-aortic lymph node may represent lymph node metastasis.  2.  Multiple hepatic abscesses, majority of which are slightly decreased in size compared to 12/15/2024.  3.  Previous biliary stent has been removed. No biliary ductal dilatation.  < end of copied text >    Advanced directives addressed: full resuscitation

## 2025-02-08 NOTE — CONSULT NOTE ADULT - ASSESSMENT
Renal papillary cancer / Liver, lung and vielka disease  + slow progression in lung/ new small node para aortic  Liver Absesses  Adrenal insufficiency  Anemia / multifactorial    Her present admission is not directly related to the cancer , however there is documented progression  Given her multiple issues and declined performance status , she is not a good candidate for salvage ( at this point 4 th line ) treatment  As per my discussion with IR at [facility] ( [facility]) there are no plans for further liver directed therapies  The focus should be on management of acute issues ( infection/ hypotension/ adrenal etc) and discussion with palliative team   prior to discharge to coordinate future management   She is a candidate for Palliative eventual hospice etc  If she has not decided this prior to discharge , we can initiate/ continue the discussion as out patient

## 2025-02-08 NOTE — CONSULT NOTE ADULT - SUBJECTIVE AND OBJECTIVE BOX
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   As out patient  noted   to have rising ALK phos / liver enzymes; we had discussed discontinuation of therapy a few weeks ago; We did not feel she should start on salvage therapies given multiple other medical issues, declined performance status and likely toxicity from any new treatment ; She has been managed by IR at [facility] / [facility] and there are no plans for further liver directed treatment for similar reasons.      She is feeling better today  Awake / alert/ conformable   Looks well this AM  Family by bedside    Denies pains, N/V , fever/ chills   appetite fair  No abdominal pain  No GI symptoms     PAST MEDICAL & SURGICAL HISTORY:  Hypertension  High cholesterol  Diverticulitis  History of diverticulitiHypothyroidism  Renal cell cancer  Metastasis to liver  Paroxysmal atrial fibrillation  Cholelithiasis with cholangitisHistory of biliary stent insertion  History of tonsillectomy  History of laparotomy  History of arthroscopy  H/O bilateral oophorectomy  S/P surgical removal of pilonidal cyst      Zetia (Unknown)  Cabometyx (Unknown)  bacitracin (Rash)  tivozanib (Faint)  Lipitor (Unknown)  Norvasc (Faint)  Crestor (Other)  statins (Unknown)  Dyazide (Faint)    Intolerances        MEDICATIONS  (STANDING):  apixaban 5 milliGRAM(s) Oral two times a day  folic acid 1 milliGRAM(s) Oral daily  hydrocortisone 10 milliGRAM(s) Oral three times a day  levothyroxine 100 MICROGram(s) Oral daily  piperacillin/tazobactam IVPB.. 3.375 Gram(s) IV Intermittent every 8 hours    MEDICATIONS  (PRN):  acetaminophen     Tablet .. 650 milliGRAM(s) Oral every 6 hours PRN Temp greater or equal to 38C (100.4F), Mild Pain (1 - 3)  aluminum hydroxide/magnesium hydroxide/simethicone Suspension 30 milliLiter(s) Oral every 4 hours PRN Dyspepsia  melatonin 3 milliGRAM(s) Oral at bedtime PRN Insomnia  ondansetron Injectable 4 milliGRAM(s) IV Push every 8 hours PRN Nausea and/or Vomiting      Sclera No icterus  Skin:  Warm and dry without any rash   NECK:  Supple without lymphadenopathy.   HEART:  Regular rate and rhythm.  LUNGS clear  ABDOMEN:  Soft, nontender, nondistended with good bowel sounds heard  colostomy  EXTREMITIES:  Without cyanosis, clubbing or edema.   NEUROLOGICAL:  Gross nonfocal      PEx  T(C): 36.9 (25 @ 17:00), Max: 37.8 (25 @ 13:25)  HR: 88 (25 @ 17:00) (88 - 98)  BP: 116/79 (25 @ 17:00) (116/79 - 116/85)  RR: 16 (25 @ 17:00) (16 - 69)  SpO2: 100% (25 @ 13:25) (81% - 100%)  Wt(kg): --                        9.8    17.63 )-----------( 244      ( 2025 13:49 )             28.5     02-    129[L]  |  99  |  42[H]  ----------------------------<  72  4.3   |  23  |  0.89    Ca    10.0      2025 13:49    TPro  4.9[L]  /  Alb  2.0[L]  /  TBili  4.0[H]  /  DBili  x   /  AST  118[H]  /  ALT  155[H]  /  AlkPhos  1198  02-06    CAPILLARY BLOOD GLUCOSE        PT/INR - ( 2025 13:49 )   PT: 16.7 sec;   INR: 1.42 ratio         PTT - ( 2025 13:49 )  PTT:34.1 sec  Urinalysis Basic - ( 2025 14:17 )    Color: Dark Yellow / Appearance: Cloudy / S.024 / pH: x  Gluc: x / Ketone: Negative mg/dL  / Bili: Small / Urobili: 0.2 mg/dL   Blood: x / Protein: 30 mg/dL / Nitrite: Positive   Leuk Esterase: Small / RBC: 7 /HPF / WBC 2 /HPF   Sq Epi: x / Non Sq Epi: 1 /HPF / Bacteria: Negative /HPF          Urinalysis with Rflx Culture (collected 25 @ 14:17)      IMAGING    IMPRESSION:  1.  Progression of metastatic disease in the visualized lower chest and   liver, as well as an abdominal wall metastasis along the right 10th rib   costochondral junction. New centrally necrotic para-aortic lymph node may   represent lymph node metastasis.    2.  Multiple hepatic abscesses, majority which are slightly decreased   in size compared to 12/15/2024.    3.  Previous biliary stent has been removed. No biliary ductal dilatation.     (2025 20:01)    folic acid 1 milliGRAM(s) Oral daily  glucagon  Injectable 1 milliGRAM(s) IntraMuscular once  hydrocortisone sodium succinate Injectable 25 milliGRAM(s) IV Push every 8 hours  levothyroxine 100 MICROGram(s) Oral daily  piperacillin/tazobactam IVPB.. 3.375 Gram(s) IV Intermittent every 8 hours    MEDICATIONS  (PRN):  acetaminophen     Tablet .. 650 milliGRAM(s) Oral every 6 hours PRN Temp greater or equal to 38C (100.4F), Mild Pain (1 - 3)  aluminum hydroxide/magnesium hydroxide/simethicone Suspension 30 milliLiter(s) Oral every 4 hours PRN Dyspepsia  melatonin 3 milliGRAM(s) Oral at bedtime PRN Insomnia  ondansetron Injectable 4 milliGRAM(s) IV Push every 8 hours PRN Nausea and/or Vomiting      Allergies    Zetia (Unknown)  Cabometyx (Unknown)  bacitracin (Rash)  tivozanib (Faint)  Lipitor (Unknown)  Norvasc (Faint)  Crestor (Other)  statins (Unknown)  Dyazide (Faint)    Intolerances              LABS:                        8.8    12.54 )-----------( 212      ( 2025 06:50 )             25.5     02-08    133[L]  |  105  |  36[H]  ----------------------------<  110[H]  4.1   |  21[L]  |  0.98    Ca    8.6      2025 06:50    TPro  4.3[L]  /  Alb  1.4[L]  /  TBili  4.3[H]  /  DBili  x   /  AST  110[H]  /  ALT  137[H]  /  AlkPhos  1018[H]  02-    PT/INR - ( 2025 13:49 )   PT: 16.7 sec;   INR: 1.42 ratio         PTT - ( 2025 13:49 )  PTT:34.1 sec  Urinalysis Basic - ( 2025 06:50 )    Color: x / Appearance: x / SG: x / pH: x  Gluc: 110 mg/dL / Ketone: x  / Bili: x / Urobili: x   Blood: x / Protein: x / Nitrite: x   Leuk Esterase: x / RBC: x / WBC x   Sq Epi: x / Non Sq Epi: x / Bacteria: x        RADIOLOGY & ADDITIONAL STUDIES:  < from: CT Abdomen and Pelvis w/ IV Cont (25 @ 16:30) >  ACC: [id number] EXAM:  CT ABDOMEN AND PELVIS IC   ORDERED BY: CORIE MCCAIN     PROCEDURE DATE:  2025          INTERPRETATION:  CLINICAL INFORMATION: Sepsis, jaundice. History of renal   cancer with metastasis to the liver. Gallstones.    COMPARISON: Multiple prior exams, with most recent CT abdomen/pelvis   12/15/2024    CONTRAST/COMPLICATIONS:  IV Contrast: Omnipaque 350  90 cc administered   0 cc discarded  Oral Contrast: NONE  .    PROCEDURE:  CT of the Abdomen and Pelvis was performed.  Sagittal and coronal reformats were performed.    FINDINGS:  LOWER CHEST: Multiple bilateral pulmonary nodules. 1 cm nodule in the   right lower lobe (2-6), is increased in size previously measuring 7 mm on   12/15/2024.    LIVER: Redemonstration of atrophic left hepatic lobe with nodular   contour. Background of heterogeneous enhancement involving the entire   left hepatic lobe and anterior right hepatic lobe, which have increased   in extent and confluence. Focal nodular enhancement inthe posterior   right hepatic lobe measuring 2.1 cm (2-30), previously 1.1 cm.    Redemonstration of multiple hypodense collections in the liver,   consistent with known abscesses, slightly decreased in size compared to   12/15/2024:  -3.0 x 1.4 cm collection at the dome (2-17), previously 3.9 x 2.0 cm  -4.6 x 4.0 cm collection of segment 4B/5, previously 4.7 x 4.4 cm  Additional smaller fluid collections are also slightly smaller to stable.    BILE DUCTS: Previous biliary stent has been removed.No biliary ductal   dilatation.  GALLBLADDER: Within normal limits.  SPLEEN: Within normal limits.  PANCREAS: Within normal limits.  ADRENALS: Within normal limits.  KIDNEYS/URETERS: No hydronephrosis or obstructive renal calculi. Left   parapelvic cysts.    BLADDER: Within normal limits.  REPRODUCTIVE ORGANS: Uterus and adnexa within normal limits.    BOWEL: No bowel obstruction. Duodenal diverticula. Right hemicolectomy   with right lower quadrant ileostomy.  PERITONEUM/RETROPERITONEUM: Within normal limits. Stable 2.1 cm right   retrocrural cystic lesion.  VESSELS: Atherosclerotic changes.  LYMPH NODES: New centrally necrotic para-aortic lymph node (2-40)   measuring 1 cm short axis.  ABDOMINAL WALL: Postsurgical changes. Enhancing soft tissue mass along   the right anterolateral abdominal wall along the right 10th rib   costochondral junction measuring 3.6 x 2.2 cm, previously 3.3 x 1.7 cm.  BONES: Degenerative changes.    IMPRESSION:  1.  Progression of metastatic disease in the visualized lower chest and   liver, as well as an abdominal wall metastasis along the right 10th rib   costochondral junction. New centrally necrotic para-aortic lymph node may   represent lymph node metastasis.    2.  Multiple hepatic abscesses, majorityof which are slightly decreased   in size compared to 12/15/2024.    3.  Previous biliary stent has been removed. No biliary ductal dilatation.        < end of copied text >

## 2025-02-09 LAB
ANION GAP SERPL CALC-SCNC: 8 MMOL/L — SIGNIFICANT CHANGE UP (ref 5–17)
BASOPHILS # BLD AUTO: 0 K/UL — SIGNIFICANT CHANGE UP (ref 0–0.2)
BASOPHILS NFR BLD AUTO: 0 % — SIGNIFICANT CHANGE UP (ref 0–2)
BUN SERPL-MCNC: 38 MG/DL — HIGH (ref 7–23)
CALCIUM SERPL-MCNC: 8.7 MG/DL — SIGNIFICANT CHANGE UP (ref 8.5–10.1)
CHLORIDE SERPL-SCNC: 105 MMOL/L — SIGNIFICANT CHANGE UP (ref 96–108)
CO2 SERPL-SCNC: 18 MMOL/L — LOW (ref 22–31)
CREAT SERPL-MCNC: 0.78 MG/DL — SIGNIFICANT CHANGE UP (ref 0.5–1.3)
EGFR: 78 ML/MIN/1.73M2 — SIGNIFICANT CHANGE UP
EOSINOPHIL # BLD AUTO: 0 K/UL — SIGNIFICANT CHANGE UP (ref 0–0.5)
EOSINOPHIL NFR BLD AUTO: 0 % — SIGNIFICANT CHANGE UP (ref 0–6)
GLUCOSE SERPL-MCNC: 99 MG/DL — SIGNIFICANT CHANGE UP (ref 70–99)
HCT VFR BLD CALC: 26.2 % — LOW (ref 34.5–45)
HGB BLD-MCNC: 9 G/DL — LOW (ref 11.5–15.5)
LYMPHOCYTES # BLD AUTO: 0.27 K/UL — LOW (ref 1–3.3)
LYMPHOCYTES # BLD AUTO: 2 % — LOW (ref 13–44)
MAGNESIUM SERPL-MCNC: 2 MG/DL — SIGNIFICANT CHANGE UP (ref 1.6–2.6)
MCHC RBC-ENTMCNC: 26.3 PG — LOW (ref 27–34)
MCHC RBC-ENTMCNC: 34.4 G/DL — SIGNIFICANT CHANGE UP (ref 32–36)
MCV RBC AUTO: 76.6 FL — LOW (ref 80–100)
MONOCYTES # BLD AUTO: 0.27 K/UL — SIGNIFICANT CHANGE UP (ref 0–0.9)
MONOCYTES NFR BLD AUTO: 2 % — SIGNIFICANT CHANGE UP (ref 2–14)
NEUTROPHILS # BLD AUTO: 12.26 K/UL — HIGH (ref 1.8–7.4)
NEUTROPHILS NFR BLD AUTO: 92 % — HIGH (ref 43–77)
NRBC # BLD: SIGNIFICANT CHANGE UP /100 WBCS (ref 0–0)
NRBC BLD-RTO: SIGNIFICANT CHANGE UP /100 WBCS (ref 0–0)
PHOSPHATE SERPL-MCNC: 2.9 MG/DL — SIGNIFICANT CHANGE UP (ref 2.5–4.5)
PLATELET # BLD AUTO: 242 K/UL — SIGNIFICANT CHANGE UP (ref 150–400)
POTASSIUM SERPL-MCNC: 3.8 MMOL/L — SIGNIFICANT CHANGE UP (ref 3.5–5.3)
POTASSIUM SERPL-SCNC: 3.8 MMOL/L — SIGNIFICANT CHANGE UP (ref 3.5–5.3)
RBC # BLD: 3.42 M/UL — LOW (ref 3.8–5.2)
RBC # FLD: 21.4 % — HIGH (ref 10.3–14.5)
SODIUM SERPL-SCNC: 131 MMOL/L — LOW (ref 135–145)
WBC # BLD: 13.33 K/UL — HIGH (ref 3.8–10.5)
WBC # FLD AUTO: 13.33 K/UL — HIGH (ref 3.8–10.5)

## 2025-02-09 PROCEDURE — 99232 SBSQ HOSP IP/OBS MODERATE 35: CPT

## 2025-02-09 RX ORDER — CIPROFLOXACIN HCL 500 MG
500 TABLET ORAL DAILY
Refills: 0 | Status: DISCONTINUED | OUTPATIENT
Start: 2025-02-09 | End: 2025-02-12

## 2025-02-09 RX ADMIN — Medication 30 MILLILITER(S): at 08:34

## 2025-02-09 RX ADMIN — Medication 500 MILLIGRAM(S): at 21:36

## 2025-02-09 RX ADMIN — APIXABAN 5 MILLIGRAM(S): 5 TABLET, FILM COATED ORAL at 21:36

## 2025-02-09 RX ADMIN — Medication 1 MILLIGRAM(S): at 09:09

## 2025-02-09 RX ADMIN — APIXABAN 5 MILLIGRAM(S): 5 TABLET, FILM COATED ORAL at 09:09

## 2025-02-09 RX ADMIN — PIPERACILLIN SODIUM AND TAZOBACTAM SODIUM 25 GRAM(S): 2; 250 INJECTION, POWDER, FOR SOLUTION INTRAVENOUS at 05:18

## 2025-02-09 RX ADMIN — Medication 25 MILLIGRAM(S): at 21:37

## 2025-02-09 RX ADMIN — Medication 25 MILLIGRAM(S): at 14:03

## 2025-02-09 RX ADMIN — Medication 25 MILLIGRAM(S): at 05:17

## 2025-02-09 RX ADMIN — LEVOTHYROXINE SODIUM 100 MICROGRAM(S): 25 TABLET ORAL at 05:18

## 2025-02-09 NOTE — PHYSICAL THERAPY INITIAL EVALUATION ADULT - DIAGNOSIS, PT EVAL
Severe sepsis POA likely secondary to liver abscesses vs UTI Erythromycin Counseling:  I discussed with the patient the risks of erythromycin including but not limited to GI upset, allergic reaction, drug rash, diarrhea, increase in liver enzymes, and yeast infections.

## 2025-02-09 NOTE — PHYSICAL THERAPY INITIAL EVALUATION ADULT - GENERAL OBSERVATIONS, REHAB EVAL
Pt was found sitting in chair, son in room, Pt is pleasant and willing to participate in PT, c/o weakness

## 2025-02-09 NOTE — PHYSICAL THERAPY INITIAL EVALUATION ADULT - GAIT DISTANCE, PT EVAL
Patient Specific Counseling (Will Not Stick From Patient To Patient): pt reports metronidazole in the past did not improve her skin Detail Level: Zone Detail Level: Simple 10 feet+10 feet=20 feet

## 2025-02-09 NOTE — PHYSICAL THERAPY INITIAL EVALUATION ADULT - PERTINENT HX OF CURRENT PROBLEM, REHAB EVAL
HPI: 78 yo female h/o  metastatic renal cell carcinoma to liver, lungs, previously on immunotherapy. presents to the ED regarding outpatient blood tests yesterday high white count of 18 (WBC was 13 on 1/3/25), associated general weakness/fatigue progressively worsening over past week, today too weak to even stand up.  Imaging showed Mets and liver abscesses.

## 2025-02-09 NOTE — PROGRESS NOTE ADULT - SUBJECTIVE AND OBJECTIVE BOX
HPI: 78 yo female h/o  metastatic renal cell carcinoma to liver, lungs, previously on immunotherapy. , liver abscesses,  s/p Lpa RT colectomy on 11/30 due to ischemic ascending colitis,  HTN, HLD, adrenal insufficiency on hydrocortisone,  Afib on Apixaban, hypothyroidism, presents to the ED regarding outpatient blood tests yesterday high white count of 18 (WBC was 13 on 1/3/25), associated general weakness/fatigue progressively worsening over past week, today too weak to even stand up.  Patient denies F/C, urine complaints, chest pain, LE pain/swelling.  Decreased appetite but adequate p.o. intake. Pt denies any fevers, chills, bodyaches, abdominal pain. In the ED VSS, WBC 17, LA 2.7 then 2.1. Imaging showed Mets and liver abscesses. of note patient completed a course of minocycline.     Subjective: pt seen this AM, feels tired, no overnight issues reported    REVIEW OF SYSTEMS:    CONSTITUTIONAL: No weakness, fevers or chills  EYES/ENT: No visual changes;  No vertigo or throat pain   NECK: No pain or stiffness  RESPIRATORY: No cough, wheezing, hemoptysis; No shortness of breath  CARDIOVASCULAR: No chest pain or palpitations  GASTROINTESTINAL: No abdominal or epigastric pain. No nausea, vomiting, or hematemesis; No diarrhea or constipation. No melena or hematochezia.  GENITOURINARY: No dysuria, frequency or hematuria  NEUROLOGICAL: No numbness or weakness  SKIN: No itching, rashes        MEDICATIONS  (STANDING):  apixaban 5 milliGRAM(s) Oral every 12 hours  dextrose 5%. 1000 milliLiter(s) (100 mL/Hr) IV Continuous <Continuous>  dextrose 5%. 1000 milliLiter(s) (50 mL/Hr) IV Continuous <Continuous>  dextrose 50% Injectable 25 Gram(s) IV Push once  dextrose 50% Injectable 12.5 Gram(s) IV Push once  dextrose 50% Injectable 25 Gram(s) IV Push once  dextrose Oral Gel 15 Gram(s) Oral once  folic acid 1 milliGRAM(s) Oral daily  glucagon  Injectable 1 milliGRAM(s) IntraMuscular once  hydrocortisone sodium succinate Injectable 25 milliGRAM(s) IV Push every 8 hours  levothyroxine 100 MICROGram(s) Oral daily  piperacillin/tazobactam IVPB.. 3.375 Gram(s) IV Intermittent every 8 hours    MEDICATIONS  (PRN):  acetaminophen     Tablet .. 650 milliGRAM(s) Oral every 6 hours PRN Temp greater or equal to 38C (100.4F), Mild Pain (1 - 3)  aluminum hydroxide/magnesium hydroxide/simethicone Suspension 30 milliLiter(s) Oral every 4 hours PRN Dyspepsia  melatonin 3 milliGRAM(s) Oral at bedtime PRN Insomnia  ondansetron Injectable 4 milliGRAM(s) IV Push every 8 hours PRN Nausea and/or Vomiting      Vital Signs Last 24 Hrs  T(C): 36.4 (09 Feb 2025 09:08), Max: 36.4 (08 Feb 2025 15:42)  T(F): 97.6 (09 Feb 2025 09:08), Max: 97.6 (08 Feb 2025 15:42)  HR: 89 (09 Feb 2025 09:08) (83 - 95)  BP: 136/90 (09 Feb 2025 09:08) (120/84 - 136/90)  RR: 18 (09 Feb 2025 09:08) (18 - 18)  SpO2: 100% (09 Feb 2025 09:08) (97% - 100%)    Parameters below as of 09 Feb 2025 09:08  Patient On (Oxygen Delivery Method): room air        PHYSICAL EXAM:  GENERAL: NAD, lying in bed comfortably  HEAD:  Atraumatic, Normocephalic  EYES: conjunctiva and sclera clear  ENT: Moist mucous membranes  NECK: Supple, No JVD  CHEST/LUNG: Clear to auscultation bilaterally; No rales, rhonchi, wheezing. Unlabored respirations  HEART: Regular rate and rhythm; No murmurs  ABDOMEN: Bowel sounds present; Soft, Nontender, Nondistended. + ostomy   EXTREMITIES:  2+ Peripheral Pulses, brisk capillary refill. No clubbing, cyanosis, or edema  NERVOUS SYSTEM:  Alert & Oriented X3, speech clear. No deficits   MSK: FROM all 4 extremities, full and equal strength          LABS:                          8.8    12.54 )-----------( 212      ( 08 Feb 2025 06:50 )             25.5   02-08    133[L]  |  105  |  36[H]  ----------------------------<  110[H]  4.1   |  21[L]  |  0.98    Ca    8.6      08 Feb 2025 06:50    TPro  4.3[L]  /  Alb  1.4[L]  /  TBili  4.3[H]  /  DBili  x   /  AST  110[H]  /  ALT  137[H]  /  AlkPhos  1018[H]  02-07    CAPILLARY BLOOD GLUCOSE      CAPILLARY BLOOD GLUCOSE      POCT Blood Glucose.: 119 mg/dL (07 Feb 2025 17:58)              RADIOLOGY:    IMPRESSION:  1.  Progression of metastatic disease in the visualized lower chest and   liver, as well as an abdominal wall metastasis along the right 10th rib   costochondral junction. New centrally necrotic para-aortic lymph node may   represent lymph node metastasis.    2.  Multiple hepatic abscesses, majority of which are slightly decreased   in size compared to 12/15/2024.    3.  Previous biliary stent has been removed. No biliary ductal dilatation.

## 2025-02-09 NOTE — PROGRESS NOTE ADULT - SUBJECTIVE AND OBJECTIVE BOX
Date of service: 02-09-25 @ 08:07    Lying in bed in NAD  Alert and verbal  Denies pain  Reviewed all prior liver aspirate cultures  No fever or chills    ROS: no fever or chills; denies dizziness, no HA, no SOB or cough, no abdominal pain, no diarrhea or constipation; no dysuria, no legs pain, no rashes    MEDICATIONS  (STANDING):  apixaban 5 milliGRAM(s) Oral every 12 hours  dextrose 5%. 1000 milliLiter(s) (100 mL/Hr) IV Continuous <Continuous>  dextrose 5%. 1000 milliLiter(s) (50 mL/Hr) IV Continuous <Continuous>  dextrose 50% Injectable 25 Gram(s) IV Push once  dextrose 50% Injectable 12.5 Gram(s) IV Push once  dextrose 50% Injectable 25 Gram(s) IV Push once  dextrose Oral Gel 15 Gram(s) Oral once  folic acid 1 milliGRAM(s) Oral daily  glucagon  Injectable 1 milliGRAM(s) IntraMuscular once  hydrocortisone sodium succinate Injectable 25 milliGRAM(s) IV Push every 8 hours  levothyroxine 100 MICROGram(s) Oral daily    Vital Signs Last 24 Hrs  T(C): 36.3 (08 Feb 2025 23:41), Max: 36.4 (08 Feb 2025 15:42)  T(F): 97.4 (08 Feb 2025 23:41), Max: 97.6 (08 Feb 2025 15:42)  HR: 83 (08 Feb 2025 23:41) (83 - 95)  BP: 120/84 (08 Feb 2025 23:41) (120/84 - 123/87)  BP(mean): --  RR: 18 (08 Feb 2025 15:42) (18 - 18)  SpO2: 100% (08 Feb 2025 23:41) (97% - 100%)    Parameters below as of 08 Feb 2025 23:41  Patient On (Oxygen Delivery Method): room air     Physical exam:    Constitutional:  No acute distress  HEENT: NC/AT, EOMI, PERRLA, conjunctivae clear; ears and nose atraumatic; pharynx benign  Neck: supple; thyroid not palpable  Back: no tenderness  Respiratory: respiratory effort normal; decreased BS at bases  Cardiovascular: S1S2 regular, no murmurs  Abdomen: soft, not tender, not distended, positive BS; no liver or spleen organomegaly  Genitourinary: no suprapubic tenderness  Lymphatic: no LN palpable  Musculoskeletal: no muscle tenderness, no joint swelling or tenderness  Extremities: no pedal edema  Neurological/ Psychiatric: AxOx3, judgement and insight normal; moving all extremities  Skin: no rashes; no palpable lesions    Labs: reviewed                        8.8    12.54 )-----------( 212      ( 08 Feb 2025 06:50 )             25.5     02-08    133[L]  |  105  |  36[H]  ----------------------------<  110[H]  4.1   |  21[L]  |  0.98    Ca    8.6      08 Feb 2025 06:50    TPro  4.3[L]  /  Alb  1.4[L]  /  TBili  4.3[H]  /  DBili  x   /  AST  110[H]  /  ALT  137[H]  /  AlkPhos  1018[H]  02-07    Ferritin: 688 ng/mL (02-07-25 @ 06:32)                        8.8    12.23 )-----------( 221      ( 07 Feb 2025 06:32 )             26.3     02-07    131[L]  |  108  |  31[H]  ----------------------------<  60[L]  4.1   |  15[L]  |  0.67    Ca    8.6      07 Feb 2025 08:48    TPro  4.3[L]  /  Alb  1.4[L]  /  TBili  4.3[H]  /  DBili  x   /  AST  110[H]  /  ALT  137[H]  /  AlkPhos  1018[H]  02-07     LIVER FUNCTIONS - ( 07 Feb 2025 08:48 )  Alb: 1.4 g/dL / Pro: 4.3 gm/dL / ALK PHOS: 1018 U/L / ALT: 137 U/L / AST: 110 U/L / GGT: x           Urinalysis with Rflx Culture (collected 06 Feb 2025 14:17)    Radiology: all available radiological tests reviewed    < from: CT Abdomen and Pelvis w/ IV Cont (02.06.25 @ 16:30) >  1.  Progression of metastatic disease in the visualized lower chest and liver, as well as an abdominal wall metastasis along the right 10th rib   costochondral junction. New centrally necrotic para-aortic lymph node may represent lymph node metastasis.  2.  Multiple hepatic abscesses, majority of which are slightly decreased in size compared to 12/15/2024.  3.  Previous biliary stent has been removed. No biliary ductal dilatation.  < end of copied text >    Advanced directives addressed: full resuscitation

## 2025-02-09 NOTE — PHYSICAL THERAPY INITIAL EVALUATION ADULT - NSPTDMEREC_GEN_A_CORE
Pt will require a rolling walker at home due to their dx of difficulty walking to help complete their MRADL's./rolling walker/toileting/wheelchair

## 2025-02-10 LAB
ANION GAP SERPL CALC-SCNC: 6 MMOL/L — SIGNIFICANT CHANGE UP (ref 5–17)
BASOPHILS # BLD AUTO: 0.05 K/UL — SIGNIFICANT CHANGE UP (ref 0–0.2)
BASOPHILS NFR BLD AUTO: 0.3 % — SIGNIFICANT CHANGE UP (ref 0–2)
BUN SERPL-MCNC: 38 MG/DL — HIGH (ref 7–23)
CALCIUM SERPL-MCNC: 8.9 MG/DL — SIGNIFICANT CHANGE UP (ref 8.5–10.1)
CHLORIDE SERPL-SCNC: 104 MMOL/L — SIGNIFICANT CHANGE UP (ref 96–108)
CO2 SERPL-SCNC: 19 MMOL/L — LOW (ref 22–31)
CREAT SERPL-MCNC: 0.82 MG/DL — SIGNIFICANT CHANGE UP (ref 0.5–1.3)
EGFR: 74 ML/MIN/1.73M2 — SIGNIFICANT CHANGE UP
EOSINOPHIL # BLD AUTO: 0.03 K/UL — SIGNIFICANT CHANGE UP (ref 0–0.5)
EOSINOPHIL NFR BLD AUTO: 0.2 % — SIGNIFICANT CHANGE UP (ref 0–6)
GLUCOSE SERPL-MCNC: 93 MG/DL — SIGNIFICANT CHANGE UP (ref 70–99)
HCT VFR BLD CALC: 28 % — LOW (ref 34.5–45)
HGB BLD-MCNC: 9.4 G/DL — LOW (ref 11.5–15.5)
IMM GRANULOCYTES NFR BLD AUTO: 5 % — HIGH (ref 0–0.9)
LYMPHOCYTES # BLD AUTO: 0.84 K/UL — LOW (ref 1–3.3)
LYMPHOCYTES # BLD AUTO: 5.1 % — LOW (ref 13–44)
MCHC RBC-ENTMCNC: 26.2 PG — LOW (ref 27–34)
MCHC RBC-ENTMCNC: 33.6 G/DL — SIGNIFICANT CHANGE UP (ref 32–36)
MCV RBC AUTO: 78 FL — LOW (ref 80–100)
MONOCYTES # BLD AUTO: 0.93 K/UL — HIGH (ref 0–0.9)
MONOCYTES NFR BLD AUTO: 5.6 % — SIGNIFICANT CHANGE UP (ref 2–14)
NEUTROPHILS # BLD AUTO: 13.92 K/UL — HIGH (ref 1.8–7.4)
NEUTROPHILS NFR BLD AUTO: 83.8 % — HIGH (ref 43–77)
NRBC # BLD: 3 /100 WBCS — HIGH (ref 0–0)
NRBC BLD-RTO: 3 /100 WBCS — HIGH (ref 0–0)
PLATELET # BLD AUTO: 274 K/UL — SIGNIFICANT CHANGE UP (ref 150–400)
POTASSIUM SERPL-MCNC: 4.1 MMOL/L — SIGNIFICANT CHANGE UP (ref 3.5–5.3)
POTASSIUM SERPL-SCNC: 4.1 MMOL/L — SIGNIFICANT CHANGE UP (ref 3.5–5.3)
RBC # BLD: 3.59 M/UL — LOW (ref 3.8–5.2)
RBC # FLD: 22.7 % — HIGH (ref 10.3–14.5)
SODIUM SERPL-SCNC: 129 MMOL/L — LOW (ref 135–145)
WBC # BLD: 16.6 K/UL — HIGH (ref 3.8–10.5)
WBC # FLD AUTO: 16.6 K/UL — HIGH (ref 3.8–10.5)

## 2025-02-10 PROCEDURE — 99232 SBSQ HOSP IP/OBS MODERATE 35: CPT

## 2025-02-10 RX ADMIN — Medication 25 MILLIGRAM(S): at 21:04

## 2025-02-10 RX ADMIN — LEVOTHYROXINE SODIUM 100 MICROGRAM(S): 25 TABLET ORAL at 06:03

## 2025-02-10 RX ADMIN — APIXABAN 5 MILLIGRAM(S): 5 TABLET, FILM COATED ORAL at 21:04

## 2025-02-10 RX ADMIN — Medication 25 MILLIGRAM(S): at 06:03

## 2025-02-10 RX ADMIN — APIXABAN 5 MILLIGRAM(S): 5 TABLET, FILM COATED ORAL at 09:03

## 2025-02-10 RX ADMIN — Medication 500 MILLIGRAM(S): at 09:03

## 2025-02-10 RX ADMIN — Medication 1 MILLIGRAM(S): at 09:03

## 2025-02-10 RX ADMIN — Medication 25 MILLIGRAM(S): at 13:34

## 2025-02-10 NOTE — PROGRESS NOTE ADULT - SUBJECTIVE AND OBJECTIVE BOX
HPI: 80 yo female h/o  metastatic renal cell carcinoma to liver, lungs, previously on immunotherapy. , liver abscesses,  s/p Lpa RT colectomy on 11/30 due to ischemic ascending colitis,  HTN, HLD, adrenal insufficiency on hydrocortisone,  Afib on Apixaban, hypothyroidism, presents to the ED regarding outpatient blood tests yesterday high white count of 18 (WBC was 13 on 1/3/25), associated general weakness/fatigue progressively worsening over past week, today too weak to even stand up.  Patient denies F/C, urine complaints, chest pain, LE pain/swelling.  Decreased appetite but adequate p.o. intake. Pt denies any fevers, chills, bodyaches, abdominal pain. In the ED VSS, WBC 17, LA 2.7 then 2.1. Imaging showed Mets and liver abscesses. of note patient completed a course of minocycline.     Subjective: pt seen this AM, feels well, no complaints, worsening leukocytosis today, remains afebrile, asymptomatic, will monitor     REVIEW OF SYSTEMS:    CONSTITUTIONAL: No weakness, fevers or chills  EYES/ENT: No visual changes;  No vertigo or throat pain   NECK: No pain or stiffness  RESPIRATORY: No cough, wheezing, hemoptysis; No shortness of breath  CARDIOVASCULAR: No chest pain or palpitations  GASTROINTESTINAL: No abdominal or epigastric pain. No nausea, vomiting, or hematemesis; No diarrhea or constipation. No melena or hematochezia.  GENITOURINARY: No dysuria, frequency or hematuria  NEUROLOGICAL: No numbness or weakness  SKIN: No itching, rashes    Vital Signs Last 24 Hrs  T(C): 36.6 (10 Feb 2025 15:36), Max: 36.6 (10 Feb 2025 15:36)  T(F): 97.8 (10 Feb 2025 15:36), Max: 97.8 (10 Feb 2025 15:36)  HR: 87 (10 Feb 2025 15:36) (76 - 88)  BP: 138/87 (10 Feb 2025 15:36) (123/70 - 142/93)  RR: 18 (10 Feb 2025 15:36) (18 - 18)  SpO2: 98% (10 Feb 2025 15:36) (98% - 100%)    Parameters below as of 10 Feb 2025 15:36  Patient On (Oxygen Delivery Method): room air        PHYSICAL EXAM:  GENERAL: NAD, lying in bed comfortably  HEAD:  Atraumatic, Normocephalic  EYES: conjunctiva and sclera clear  ENT: Moist mucous membranes  NECK: Supple, No JVD  CHEST/LUNG: Clear to auscultation bilaterally; No rales, rhonchi, wheezing. Unlabored respirations  HEART: Regular rate and rhythm; No murmurs  ABDOMEN: Bowel sounds present; Soft, Nontender, Nondistended. + ostomy   EXTREMITIES:  2+ Peripheral Pulses, brisk capillary refill. No clubbing, cyanosis, or edema  NERVOUS SYSTEM:  Alert & Oriented X3, speech clear. No deficits   MSK: FROM all 4 extremities, full and equal strength      MEDICATIONS  (STANDING):  apixaban 5 milliGRAM(s) Oral every 12 hours  ciprofloxacin     Tablet 500 milliGRAM(s) Oral daily  dextrose 5%. 1000 milliLiter(s) (100 mL/Hr) IV Continuous <Continuous>  dextrose 5%. 1000 milliLiter(s) (50 mL/Hr) IV Continuous <Continuous>  dextrose 50% Injectable 25 Gram(s) IV Push once  dextrose 50% Injectable 12.5 Gram(s) IV Push once  dextrose 50% Injectable 25 Gram(s) IV Push once  dextrose Oral Gel 15 Gram(s) Oral once  folic acid 1 milliGRAM(s) Oral daily  glucagon  Injectable 1 milliGRAM(s) IntraMuscular once  hydrocortisone 25 milliGRAM(s) Oral three times a day  levothyroxine 100 MICROGram(s) Oral daily    MEDICATIONS  (PRN):  acetaminophen     Tablet .. 650 milliGRAM(s) Oral every 6 hours PRN Temp greater or equal to 38C (100.4F), Mild Pain (1 - 3)  aluminum hydroxide/magnesium hydroxide/simethicone Suspension 30 milliLiter(s) Oral every 4 hours PRN Dyspepsia  melatonin 3 milliGRAM(s) Oral at bedtime PRN Insomnia  ondansetron Injectable 4 milliGRAM(s) IV Push every 8 hours PRN Nausea and/or Vomiting      LABS:                          8.8    12.54 )-----------( 212      ( 08 Feb 2025 06:50 )             25.5   02-08    133[L]  |  105  |  36[H]  ----------------------------<  110[H]  4.1   |  21[L]  |  0.98    Ca    8.6      08 Feb 2025 06:50    TPro  4.3[L]  /  Alb  1.4[L]  /  TBili  4.3[H]  /  DBili  x   /  AST  110[H]  /  ALT  137[H]  /  AlkPhos  1018[H]  02-07    CAPILLARY BLOOD GLUCOSE      CAPILLARY BLOOD GLUCOSE      POCT Blood Glucose.: 119 mg/dL (07 Feb 2025 17:58)              RADIOLOGY:    IMPRESSION:  1.  Progression of metastatic disease in the visualized lower chest and   liver, as well as an abdominal wall metastasis along the right 10th rib   costochondral junction. New centrally necrotic para-aortic lymph node may   represent lymph node metastasis.    2.  Multiple hepatic abscesses, majority of which are slightly decreased   in size compared to 12/15/2024.    3.  Previous biliary stent has been removed. No biliary ductal dilatation.

## 2025-02-10 NOTE — PROGRESS NOTE ADULT - SUBJECTIVE AND OBJECTIVE BOX
Pt seen, sitting upright in chair, continuing iv abx    MEDICATIONS  (STANDING):  apixaban 5 milliGRAM(s) Oral every 12 hours  ciprofloxacin     Tablet 500 milliGRAM(s) Oral daily  dextrose 5%. 1000 milliLiter(s) (100 mL/Hr) IV Continuous <Continuous>  dextrose 5%. 1000 milliLiter(s) (50 mL/Hr) IV Continuous <Continuous>  dextrose 50% Injectable 25 Gram(s) IV Push once  dextrose 50% Injectable 12.5 Gram(s) IV Push once  dextrose 50% Injectable 25 Gram(s) IV Push once  dextrose Oral Gel 15 Gram(s) Oral once  folic acid 1 milliGRAM(s) Oral daily  glucagon  Injectable 1 milliGRAM(s) IntraMuscular once  hydrocortisone 25 milliGRAM(s) Oral three times a day  levothyroxine 100 MICROGram(s) Oral daily    MEDICATIONS  (PRN):  acetaminophen     Tablet .. 650 milliGRAM(s) Oral every 6 hours PRN Temp greater or equal to 38C (100.4F), Mild Pain (1 - 3)  aluminum hydroxide/magnesium hydroxide/simethicone Suspension 30 milliLiter(s) Oral every 4 hours PRN Dyspepsia  melatonin 3 milliGRAM(s) Oral at bedtime PRN Insomnia  ondansetron Injectable 4 milliGRAM(s) IV Push every 8 hours PRN Nausea and/or Vomiting      ROS  No fever, sweats, chills       Vital Signs Last 24 Hrs  T(C): 36.6 (10 Feb 2025 15:36), Max: 36.6 (10 Feb 2025 15:36)  T(F): 97.8 (10 Feb 2025 15:36), Max: 97.8 (10 Feb 2025 15:36)  HR: 87 (10 Feb 2025 15:36) (76 - 88)  BP: 138/87 (10 Feb 2025 15:36) (123/70 - 142/93)  BP(mean): --  RR: 18 (10 Feb 2025 15:36) (18 - 18)  SpO2: 98% (10 Feb 2025 15:36) (98% - 100%)    Parameters below as of 10 Feb 2025 15:36  Patient On (Oxygen Delivery Method): room air        PE  NAD  Awake, alert     No rash grossly  FROM                          9.4    16.60 )-----------( 274      ( 10 Feb 2025 05:56 )             28.0       02-10    129[L]  |  104  |  38[H]  ----------------------------<  93  4.1   |  19[L]  |  0.82    Ca    8.9      10 Feb 2025 05:56  Phos  2.9     02-09  Mg     2.0     02-09

## 2025-02-10 NOTE — PROGRESS NOTE ADULT - SUBJECTIVE AND OBJECTIVE BOX
Date of service: 02-10-25 @ 07:25    Lying in bed in NAD  No fever  Denies pain    ROS: no fever or chills; denies dizziness, no HA, no SOB or cough, no abdominal pain, no diarrhea or constipation; no dysuria, no legs pain, no rashes    MEDICATIONS  (STANDING):  apixaban 5 milliGRAM(s) Oral every 12 hours  ciprofloxacin     Tablet 500 milliGRAM(s) Oral daily  dextrose 5%. 1000 milliLiter(s) (100 mL/Hr) IV Continuous <Continuous>  dextrose 5%. 1000 milliLiter(s) (50 mL/Hr) IV Continuous <Continuous>  dextrose 50% Injectable 25 Gram(s) IV Push once  dextrose 50% Injectable 12.5 Gram(s) IV Push once  dextrose 50% Injectable 25 Gram(s) IV Push once  dextrose Oral Gel 15 Gram(s) Oral once  folic acid 1 milliGRAM(s) Oral daily  glucagon  Injectable 1 milliGRAM(s) IntraMuscular once  hydrocortisone 25 milliGRAM(s) Oral three times a day  levothyroxine 100 MICROGram(s) Oral daily    Vital Signs Last 24 Hrs  T(C): 36.4 (09 Feb 2025 23:13), Max: 36.4 (09 Feb 2025 09:08)  T(F): 97.6 (09 Feb 2025 23:13), Max: 97.6 (09 Feb 2025 09:08)  HR: 76 (09 Feb 2025 23:13) (76 - 91)  BP: 123/70 (09 Feb 2025 23:13) (123/70 - 136/90)  BP(mean): --  RR: 18 (09 Feb 2025 15:12) (18 - 18)  SpO2: 100% (09 Feb 2025 23:13) (100% - 100%)    Parameters below as of 09 Feb 2025 23:13  Patient On (Oxygen Delivery Method): room air     Physical exam:    Constitutional:  No acute distress  HEENT: NC/AT, EOMI, PERRLA, conjunctivae clear; ears and nose atraumatic; pharynx benign  Neck: supple; thyroid not palpable  Back: no tenderness  Respiratory: respiratory effort normal; decreased BS at bases  Cardiovascular: S1S2 regular, no murmurs  Abdomen: soft, not tender, not distended, positive BS; no liver or spleen organomegaly  Genitourinary: no suprapubic tenderness  Lymphatic: no LN palpable  Musculoskeletal: no muscle tenderness, no joint swelling or tenderness  Extremities: no pedal edema  Neurological/ Psychiatric: AxOx3, judgement and insight normal; moving all extremities  Skin: no rashes; no palpable lesions    Labs: reviewed                        9.4    16.60 )-----------( 274      ( 10 Feb 2025 05:56 )             28.0     02-10    129[L]  |  104  |  38[H]  ----------------------------<  93  4.1   |  19[L]  |  0.82    Ca    8.9      10 Feb 2025 05:56  Phos  2.9     02-09  Mg     2.0     02-09    Ferritin: 688 ng/mL (02-07-25 @ 06:32)                        8.8    12.54 )-----------( 212      ( 08 Feb 2025 06:50 )             25.5     02-08    133[L]  |  105  |  36[H]  ----------------------------<  110[H]  4.1   |  21[L]  |  0.98    Ca    8.6      08 Feb 2025 06:50    TPro  4.3[L]  /  Alb  1.4[L]  /  TBili  4.3[H]  /  DBili  x   /  AST  110[H]  /  ALT  137[H]  /  AlkPhos  1018[H]  02-07    Ferritin: 688 ng/mL (02-07-25 @ 06:32)                        8.8    12.23 )-----------( 221      ( 07 Feb 2025 06:32 )             26.3     02-07    131[L]  |  108  |  31[H]  ----------------------------<  60[L]  4.1   |  15[L]  |  0.67    Ca    8.6      07 Feb 2025 08:48    TPro  4.3[L]  /  Alb  1.4[L]  /  TBili  4.3[H]  /  DBili  x   /  AST  110[H]  /  ALT  137[H]  /  AlkPhos  1018[H]  02-07     LIVER FUNCTIONS - ( 07 Feb 2025 08:48 )  Alb: 1.4 g/dL / Pro: 4.3 gm/dL / ALK PHOS: 1018 U/L / ALT: 137 U/L / AST: 110 U/L / GGT: x           Urinalysis with Rflx Culture (collected 06 Feb 2025 14:17)    Radiology: all available radiological tests reviewed    < from: CT Abdomen and Pelvis w/ IV Cont (02.06.25 @ 16:30) >  1.  Progression of metastatic disease in the visualized lower chest and liver, as well as an abdominal wall metastasis along the right 10th rib   costochondral junction. New centrally necrotic para-aortic lymph node may represent lymph node metastasis.  2.  Multiple hepatic abscesses, majority of which are slightly decreased in size compared to 12/15/2024.  3.  Previous biliary stent has been removed. No biliary ductal dilatation.  < end of copied text >    Advanced directives addressed: full resuscitation

## 2025-02-11 ENCOUNTER — TRANSCRIPTION ENCOUNTER (OUTPATIENT)
Age: 80
End: 2025-02-11

## 2025-02-11 LAB
ANION GAP SERPL CALC-SCNC: 8 MMOL/L — SIGNIFICANT CHANGE UP (ref 5–17)
BASOPHILS # BLD AUTO: 0.04 K/UL — SIGNIFICANT CHANGE UP (ref 0–0.2)
BASOPHILS NFR BLD AUTO: 0.2 % — SIGNIFICANT CHANGE UP (ref 0–2)
BUN SERPL-MCNC: 38 MG/DL — HIGH (ref 7–23)
CALCIUM SERPL-MCNC: 8.9 MG/DL — SIGNIFICANT CHANGE UP (ref 8.5–10.1)
CHLORIDE SERPL-SCNC: 101 MMOL/L — SIGNIFICANT CHANGE UP (ref 96–108)
CO2 SERPL-SCNC: 22 MMOL/L — SIGNIFICANT CHANGE UP (ref 22–31)
CREAT SERPL-MCNC: 0.75 MG/DL — SIGNIFICANT CHANGE UP (ref 0.5–1.3)
CULTURE RESULTS: SIGNIFICANT CHANGE UP
CULTURE RESULTS: SIGNIFICANT CHANGE UP
EGFR: 82 ML/MIN/1.73M2 — SIGNIFICANT CHANGE UP
EOSINOPHIL # BLD AUTO: 0.07 K/UL — SIGNIFICANT CHANGE UP (ref 0–0.5)
EOSINOPHIL NFR BLD AUTO: 0.4 % — SIGNIFICANT CHANGE UP (ref 0–6)
GLUCOSE SERPL-MCNC: 97 MG/DL — SIGNIFICANT CHANGE UP (ref 70–99)
HCT VFR BLD CALC: 28.6 % — LOW (ref 34.5–45)
HGB BLD-MCNC: 9.9 G/DL — LOW (ref 11.5–15.5)
IMM GRANULOCYTES NFR BLD AUTO: 6.8 % — HIGH (ref 0–0.9)
LYMPHOCYTES # BLD AUTO: 0.6 K/UL — LOW (ref 1–3.3)
LYMPHOCYTES # BLD AUTO: 3.5 % — LOW (ref 13–44)
MCHC RBC-ENTMCNC: 27.1 PG — SIGNIFICANT CHANGE UP (ref 27–34)
MCHC RBC-ENTMCNC: 34.6 G/DL — SIGNIFICANT CHANGE UP (ref 32–36)
MCV RBC AUTO: 78.4 FL — LOW (ref 80–100)
MONOCYTES # BLD AUTO: 0.98 K/UL — HIGH (ref 0–0.9)
MONOCYTES NFR BLD AUTO: 5.7 % — SIGNIFICANT CHANGE UP (ref 2–14)
NEUTROPHILS # BLD AUTO: 14.21 K/UL — HIGH (ref 1.8–7.4)
NEUTROPHILS NFR BLD AUTO: 83.4 % — HIGH (ref 43–77)
NRBC # BLD: 1 /100 WBCS — HIGH (ref 0–0)
NRBC BLD-RTO: 1 /100 WBCS — HIGH (ref 0–0)
PLATELET # BLD AUTO: 240 K/UL — SIGNIFICANT CHANGE UP (ref 150–400)
POTASSIUM SERPL-MCNC: 4.4 MMOL/L — SIGNIFICANT CHANGE UP (ref 3.5–5.3)
POTASSIUM SERPL-SCNC: 4.4 MMOL/L — SIGNIFICANT CHANGE UP (ref 3.5–5.3)
RBC # BLD: 3.65 M/UL — LOW (ref 3.8–5.2)
RBC # FLD: 23.5 % — HIGH (ref 10.3–14.5)
SODIUM SERPL-SCNC: 131 MMOL/L — LOW (ref 135–145)
SPECIMEN SOURCE: SIGNIFICANT CHANGE UP
SPECIMEN SOURCE: SIGNIFICANT CHANGE UP
WBC # BLD: 17.06 K/UL — HIGH (ref 3.8–10.5)
WBC # FLD AUTO: 17.06 K/UL — HIGH (ref 3.8–10.5)

## 2025-02-11 PROCEDURE — 99233 SBSQ HOSP IP/OBS HIGH 50: CPT

## 2025-02-11 RX ORDER — FOLIC ACID 1 MG
1 TABLET ORAL
Refills: 0 | DISCHARGE

## 2025-02-11 RX ORDER — MORPHINE SULFATE 60 MG/1
2 TABLET, FILM COATED, EXTENDED RELEASE ORAL EVERY 6 HOURS
Refills: 0 | Status: DISCONTINUED | OUTPATIENT
Start: 2025-02-11 | End: 2025-02-12

## 2025-02-11 RX ORDER — MORPHINE SULFATE 60 MG/1
1 TABLET, FILM COATED, EXTENDED RELEASE ORAL EVERY 6 HOURS
Refills: 0 | Status: DISCONTINUED | OUTPATIENT
Start: 2025-02-11 | End: 2025-02-11

## 2025-02-11 RX ORDER — CIPROFLOXACIN HCL 500 MG
1 TABLET ORAL
Qty: 30 | Refills: 0
Start: 2025-02-11 | End: 2025-03-12

## 2025-02-11 RX ORDER — PANTOPRAZOLE 20 MG/1
40 TABLET, DELAYED RELEASE ORAL DAILY
Refills: 0 | Status: DISCONTINUED | OUTPATIENT
Start: 2025-02-11 | End: 2025-02-12

## 2025-02-11 RX ADMIN — ACETAMINOPHEN 650 MILLIGRAM(S): 160 SUSPENSION ORAL at 08:22

## 2025-02-11 RX ADMIN — Medication 30 MILLILITER(S): at 02:14

## 2025-02-11 RX ADMIN — MORPHINE SULFATE 1 MILLIGRAM(S): 60 TABLET, FILM COATED, EXTENDED RELEASE ORAL at 09:23

## 2025-02-11 RX ADMIN — MORPHINE SULFATE 1 MILLIGRAM(S): 60 TABLET, FILM COATED, EXTENDED RELEASE ORAL at 09:53

## 2025-02-11 RX ADMIN — APIXABAN 5 MILLIGRAM(S): 5 TABLET, FILM COATED ORAL at 20:05

## 2025-02-11 RX ADMIN — Medication 25 MILLIGRAM(S): at 05:41

## 2025-02-11 RX ADMIN — Medication 1 MILLIGRAM(S): at 08:23

## 2025-02-11 RX ADMIN — Medication 500 MILLIGRAM(S): at 08:23

## 2025-02-11 RX ADMIN — Medication 25 MILLIGRAM(S): at 14:41

## 2025-02-11 RX ADMIN — LEVOTHYROXINE SODIUM 100 MICROGRAM(S): 25 TABLET ORAL at 05:41

## 2025-02-11 RX ADMIN — PANTOPRAZOLE 40 MILLIGRAM(S): 20 TABLET, DELAYED RELEASE ORAL at 09:22

## 2025-02-11 RX ADMIN — APIXABAN 5 MILLIGRAM(S): 5 TABLET, FILM COATED ORAL at 08:22

## 2025-02-11 RX ADMIN — MORPHINE SULFATE 2 MILLIGRAM(S): 60 TABLET, FILM COATED, EXTENDED RELEASE ORAL at 20:21

## 2025-02-11 RX ADMIN — Medication 25 MILLIGRAM(S): at 20:04

## 2025-02-11 RX ADMIN — ACETAMINOPHEN 650 MILLIGRAM(S): 160 SUSPENSION ORAL at 08:52

## 2025-02-11 RX ADMIN — MORPHINE SULFATE 2 MILLIGRAM(S): 60 TABLET, FILM COATED, EXTENDED RELEASE ORAL at 19:51

## 2025-02-11 NOTE — PROGRESS NOTE ADULT - SUBJECTIVE AND OBJECTIVE BOX
HPI: 80 yo female h/o  metastatic renal cell carcinoma to liver, lungs, previously on immunotherapy. , liver abscesses,  s/p Lpa RT colectomy on 11/30 due to ischemic ascending colitis,  HTN, HLD, adrenal insufficiency on hydrocortisone,  Afib on Apixaban, hypothyroidism, presents to the ED regarding outpatient blood tests yesterday high white count of 18 (WBC was 13 on 1/3/25), associated general weakness/fatigue progressively worsening over past week, today too weak to even stand up.  Patient denies F/C, urine complaints, chest pain, LE pain/swelling.  Decreased appetite but adequate p.o. intake. Pt denies any fevers, chills, bodyaches, abdominal pain. In the ED VSS, WBC 17, LA 2.7 then 2.1. Imaging showed Mets and liver abscesses. of note patient completed a course of minocycline.     Subjective: pt seen this AM, c/o abdominal pain, tightness, poor appetite, weakness    REVIEW OF SYSTEMS:    CONSTITUTIONAL: No weakness, fevers or chills  EYES/ENT: No visual changes;  No vertigo or throat pain   NECK: No pain or stiffness  RESPIRATORY: No cough, wheezing, hemoptysis; No shortness of breath  CARDIOVASCULAR: No chest pain or palpitations  GASTROINTESTINAL: No abdominal or epigastric pain. No nausea, vomiting, or hematemesis; No diarrhea or constipation. No melena or hematochezia.  GENITOURINARY: No dysuria, frequency or hematuria  NEUROLOGICAL: No numbness or weakness  SKIN: No itching, rashes    Vital Signs Last 24 Hrs  T(C): 36.4 (11 Feb 2025 15:12), Max: 36.5 (11 Feb 2025 00:09)  T(F): 97.6 (11 Feb 2025 15:12), Max: 97.7 (11 Feb 2025 00:09)  HR: 64 (11 Feb 2025 15:12) (64 - 86)  BP: 136/84 (11 Feb 2025 15:12) (131/82 - 136/87)  RR: 18 (11 Feb 2025 15:12) (16 - 18)  SpO2: 97% (11 Feb 2025 15:12) (97% - 100%)    Parameters below as of 11 Feb 2025 15:12  Patient On (Oxygen Delivery Method): room air      PHYSICAL EXAM:  GENERAL: NAD, lying in bed comfortably  HEAD:  Atraumatic, Normocephalic  EYES: conjunctiva and sclera clear  ENT: Moist mucous membranes  NECK: Supple, No JVD  CHEST/LUNG: Clear to auscultation bilaterally; No rales, rhonchi, wheezing. Unlabored respirations  HEART: Regular rate and rhythm; No murmurs  ABDOMEN: Bowel sounds present; Soft, Nontender, Nondistended. + ostomy   EXTREMITIES:  2+ Peripheral Pulses, brisk capillary refill. No clubbing, cyanosis, or edema  NERVOUS SYSTEM:  Alert & Oriented X3, speech clear. No deficits   MSK: FROM all 4 extremities, full and equal strength      MEDICATIONS  (STANDING):  apixaban 5 milliGRAM(s) Oral every 12 hours  ciprofloxacin     Tablet 500 milliGRAM(s) Oral daily  dextrose 5%. 1000 milliLiter(s) (100 mL/Hr) IV Continuous <Continuous>  dextrose 5%. 1000 milliLiter(s) (50 mL/Hr) IV Continuous <Continuous>  dextrose 50% Injectable 25 Gram(s) IV Push once  dextrose 50% Injectable 12.5 Gram(s) IV Push once  dextrose 50% Injectable 25 Gram(s) IV Push once  dextrose Oral Gel 15 Gram(s) Oral once  folic acid 1 milliGRAM(s) Oral daily  glucagon  Injectable 1 milliGRAM(s) IntraMuscular once  hydrocortisone 25 milliGRAM(s) Oral three times a day  levothyroxine 100 MICROGram(s) Oral daily  pantoprazole  Injectable 40 milliGRAM(s) IV Push daily    MEDICATIONS  (PRN):  acetaminophen     Tablet .. 650 milliGRAM(s) Oral every 6 hours PRN Temp greater or equal to 38C (100.4F), Mild Pain (1 - 3)  aluminum hydroxide/magnesium hydroxide/simethicone Suspension 30 milliLiter(s) Oral every 4 hours PRN Dyspepsia  melatonin 3 milliGRAM(s) Oral at bedtime PRN Insomnia  morphine  - Injectable 1 milliGRAM(s) IV Push every 6 hours PRN Severe Pain (7 - 10)  ondansetron Injectable 4 milliGRAM(s) IV Push every 8 hours PRN Nausea and/or Vomiting        LABS:                                     9.9    17.06 )-----------( 240      ( 11 Feb 2025 06:42 )             28.6   02-11    131[L]  |  101  |  38[H]  ----------------------------<  97  4.4   |  22  |  0.75    Ca    8.9      11 Feb 2025 06:42                  RADIOLOGY:    IMPRESSION:  1.  Progression of metastatic disease in the visualized lower chest and   liver, as well as an abdominal wall metastasis along the right 10th rib   costochondral junction. New centrally necrotic para-aortic lymph node may   represent lymph node metastasis.    2.  Multiple hepatic abscesses, majority of which are slightly decreased   in size compared to 12/15/2024.    3.  Previous biliary stent has been removed. No biliary ductal dilatation.

## 2025-02-11 NOTE — DISCHARGE NOTE PROVIDER - DETAILS OF MALNUTRITION DIAGNOSIS/DIAGNOSES
Physical Therapy Daily Note  Date:  2019    TIme In:  3783                   Time Out:  1102    Patient Name:  Zahra Hutchinson    :  1962  MRN: [id number]    Restrictions/Precautions:    Pertinent Medical History:  Medical/Treatment Diagnosis Information:  ·   s/p L shoulder ATS with SAD / DCR / biceps tenodesis     Insurance/Certification information:    [employer]INO[employer]/BS  Physician Information:   Nader Sweeney PA-C  Plan of care signed (Y/N):    Visit# / total visits:     14/    G-Code (if applicable):      Date / Visit # G-Code Applied:         Progress Note: []  Yes  [x]  No  Next due by: 19      Pain level:  1/10    Subjective:  Pt reports she has pain mostly when reaching with her shoulder. Objective:  Observation:   Test measurements:     Palpation:     Exercises:  Exercise Resistance/Repetitions Other comments   Pendulums x30 each 24   Scapular retraction  x30 24   Pulleys 3' flexion 24   UT stretch 5x20\" 24   Wand abd x30 24   Wand ER/IR x30 24   Ball roll on table with scap ret x30 24   Supine self ROM flexion x30 24   Mid / low rows Orange, 2x10 24   ER / IR w/ t-band Orange, 2x10 each 24   HH depression (gentle) 2x10 24   Ball roll up wall 3x10 24     Other Therapeutic Activities:      Manual Treatments:  Lshoulder PROM as tolerated, grade II AP / inf mobs, STM of mid deltoid. x13'     Modalities:  IFC with MH, L shoulder 15 min      Timed Code Treatment Minutes:  53      Total Treatment Minutes:  70    Treatment/Activity Tolerance:  [x] Patient tolerated treatment well [] Patient limited by fatigue  [] Patient limited by pain  [] Patient limited by other medical complications  [x] Other:  Pt had less pain / tenderness at deltoid region today. PROM is progressing very well; plan to continue to focus on functional strengthening and AROM.      Pain after treatment:      1/10    Prognosis: [x] Good [] Fair  [] Poor    Patient Requires Follow-up: [x] Yes  [] No    Plan:   [x] Continue per plan of care [] Alter current plan (see comments)  [] Plan of care initiated [] Hold pending MD visit [] Discharge    Plan for Next Session:      Goals  Short term goals  Time Frame for Short term goals: 3-4 weeks  Short term goal 1: Pt to be I with HEP -MET  Short term goal 2: Pt to demonstrate L shoulder flexion AROM of 0-120 deg or greater  Short term goal 3: Pt to demonstrate L shoulder flexion / abd PROM of 0-160 deg. -MET   Short term goal 4: Pt to perform daily activities with pain of less than 5/10. -MET  Long term goals  Time Frame for Long term goals : 6-8 weeks  Long term goal 1: Quick DASH score to improve to less than 15% indicating improved function  Long term goal 2: Pt to demonstrate L shoulder AROM flexion and abduction of 0-150 deg or greater  Long term goal 3: ER / IR AROM to improve to 0-60 deg or greater.   Long term goal 4: Pt to demonstrate 4+/5 or greater L shoulder strength grossly       Electronically signed by:  Izabella Murillo PT This patient has been assessed with a concern for Malnutrition and was treated during this hospitalization for the following Nutrition diagnosis/diagnoses:     -  02/07/2025: Severe protein-calorie malnutrition

## 2025-02-11 NOTE — DISCHARGE NOTE PROVIDER - CARE PROVIDER_API CALL
Jaron Dockery  Cardiovascular Disease  [address], [facility] [address]  [place] [address]  Phone: [phone]  Fax: [phone]  Follow Up Time:

## 2025-02-11 NOTE — DISCHARGE NOTE PROVIDER - NSDCCAREPROVSEEN_GEN_ALL_CORE_FT
Edilson, Sammi Hatfield, Mary Roberson, Jose Angel REHMAN Edilson, Sammi Hatfield, Mary Roberson, Jose Angel Caceres, Johanna

## 2025-02-11 NOTE — PROGRESS NOTE ADULT - TIME BILLING
I spent a total of 55 minutes on the date of this encounter coordinating the patient's care. This includes reviewing prior documentation, results and imaging in addition to completing a full history and physical examination on the patient. Further tests, medications, and procedures have been ordered as indicated. Laboratory results and the plan of care were communicated to the patient and/or their family member. Supporting documentation was completed and added to the patient's chart.
I spent a total of 45 minutes on the date of this encounter coordinating the patient's care. This includes reviewing prior documentation, results and imaging in addition to completing a full history and physical examination on the patient. Further tests, medications, and procedures have been ordered as indicated. Laboratory results and the plan of care were communicated to the patient and/or their family member. Supporting documentation was completed and added to the patient's chart.
I spent a total of 45 minutes on the date of this encounter coordinating the patient's care. This includes reviewing prior documentation, results and imaging in addition to completing a full history and physical examination on the patient. Further tests, medications, and procedures have been ordered as indicated. Laboratory results and the plan of care were communicated to the patient and/or their family member. Supporting documentation was completed and added to the patient's chart.
I spent a total of 55 minutes on the date of this encounter coordinating the patient's care. This includes reviewing prior documentation, results and imaging in addition to completing a full history and physical examination on the patient. Further tests, medications, and procedures have been ordered as indicated. Laboratory results and the plan of care were communicated to the patient and/or their family member. Supporting documentation was completed and added to the patient's chart.
I spent a total of 55 minutes on the date of this encounter coordinating the patient's care. This includes reviewing prior documentation, results and imaging in addition to completing a full history and physical examination on the patient. Further tests, medications, and procedures have been ordered as indicated. Laboratory results and the plan of care were communicated to the patient and/or their family member. Supporting documentation was completed and added to the patient's chart.

## 2025-02-11 NOTE — DISCHARGE NOTE PROVIDER - HOSPITAL COURSE
80 yo female h/o  metastatic renal cell carcinoma to liver, lungs, previously on immunotherapy. , liver abscesses,  s/p Lpa RT colectomy on 11/30 due to ischemic ascending colitis,  HTN, HLD, adrenal insufficiency on hydrocortisone,  Afib on Apixaban, hypothyroidism, presents to the ED regarding outpatient blood tests yesterday high white count of 18 (WBC was 13 on 1/3/25), associated general weakness/fatigue progressively worsening over past week, today too weak to even stand up.  Patient denies F/C, urine complaints, chest pain, LE pain/swelling.  Decreased appetite but adequate p.o. intake. Pt denies any fevers, chills, bodyaches, abdominal pain. In the ED VSS, WBC 17, LA 2.7 then 2.1. Imaging showed Mets and liver abscesses. of note patient completed a course of minocycline.    Severe sepsis POA likely secondary to liver abscesses vs UTI  -Pt completed IV abx Invanz, minocycline on 1/8/25   -Has been on IV abx since 11/2024  -F/u blood and urine cx, lactate normalized   -Ostomy with watery stool, C diff, GI PCR negative   -Maintain adequate hydration   -IR consult appreciated, no interventions planned   -ID consult appreciated, D/w Dr. Roberson   -s/p zosyn 3 days  -start ciprofloxacin 500mg daily for suppressive therapy for long term  -Ucx with VRE, strep agalactiae     Hx Adrenal Insufficiency   -On hydrocortisone at home  -Will give stress dose as pt is hypoglycemic, weak and with new acute infectious process/SIRS   -Continue IV hydrocortisone for now, will switch to oral and taper eventually     Worsening metastatic disease   RCC, hepatic, pulmonary mets  -CT with Progression of metastatic disease in the visualized lower chest and   liver, as well as an abdominal wall metastasis along the right 10th rib   costochondral junction. New centrally necrotic para-aortic lymph node may   represent lymph node metastasis  -heme/onc consult appreciated   -pt decided on home hospice today  -accepted by [facility]    Hx ischemic colitis s/p R colectomy  -Ostomy care     HTN/HLD/ chronic Afib   -continue Eliquis   -not on BP meds     Hypothyroidism  -Continue levothyroxine    Severe protein-calorie malnutrition  -Nutrition consult appreciated  -Add supplements    VTE ppx: pt on eliquis    80 yo female h/o  metastatic renal cell carcinoma to liver, lungs, previously on immunotherapy. , liver abscesses,  s/p Lpa RT colectomy on 11/30 due to ischemic ascending colitis,  HTN, HLD, adrenal insufficiency on hydrocortisone,  Afib on Apixaban, hypothyroidism, presents to the ED regarding outpatient blood tests yesterday high white count of 18 (WBC was 13 on 1/3/25), associated general weakness/fatigue progressively worsening over past week, today too weak to even stand up.  Patient denies F/C, urine complaints, chest pain, LE pain/swelling.  Decreased appetite but adequate p.o. intake. Pt denies any fevers, chills, bodyaches, abdominal pain. In the ED VSS, WBC 17, LA 2.7 then 2.1. Imaging showed Mets and liver abscesses. of note patient completed a course of minocycline.  2/12  - pt seen and examined, no events, eager to go home, afebrile, denies pain, leg edema persist    Review of system- Rest of the review of system are negative except mentioned in HPI  PHYSICAL EXAM:    Constitutional: NAD, awake and alert   HEENT: PERR, EOMI, Normal Hearing, MMM  Neck: Soft and supple, No LAD, No JVD  Respiratory: Breath sounds are clear bilaterally, No wheezing, rales or rhonchi  Cardiovascular: S1 and S2, regular rate and rhythm, no Murmurs, gallops or rubs  Gastrointestinal: Bowel Sounds present, soft,  diffuse tenderness, + colostomy,  nondistended, no guarding, no rebound  Extremities: + 2 LE  peripheral edema  Vascular: 2+ peripheral pulses  Neurological: A/O x 3, no focal deficits  Musculoskeletal: 5/5 strength b/l upper and lower extremities  Skin: No rashes  rectal : deferred, not indicated    All labs radiology and other studies reviewed and interpreted :         Severe sepsis POA likely secondary to liver abscesses vs UTI  -Pt completed IV abx Invanz, minocycline on 1/8/25   -Has been on IV abx since 11/2024  -F/u blood and urine cx, lactate normalized   -Ostomy with watery stool, C diff, GI PCR negative   -Maintain adequate hydration   -IR consult appreciated, no interventions planned   -ID consult appreciated, D/w Dr. Roberson   -s/p zosyn 3 days  -start ciprofloxacin 500mg daily for suppressive therapy for long term  -Ucx with VRE, strep agalactiae     Hx Adrenal Insufficiency   -On hydrocortisone at home  -Will give stress dose as pt is hypoglycemic, weak and with new acute infectious process/SIRS   -Continue IV hydrocortisone for now, will switch to oral and taper eventually     Worsening metastatic disease   RCC, hepatic, pulmonary mets  -CT with Progression of metastatic disease in the visualized lower chest and   liver, as well as an abdominal wall metastasis along the right 10th rib   costochondral junction. New centrally necrotic para-aortic lymph node may   represent lymph node metastasis  -heme/onc consult appreciated   -pt decided on home hospice today  -accepted by [facility]    Hx ischemic colitis s/p R colectomy  -Ostomy care     HTN/HLD/ chronic Afib   -continue Eliquis   -not on BP meds     Hypothyroidism  -Continue levothyroxine    Chronic leg edema  = elevation, compression stocking     Severe protein-calorie malnutrition  -Nutrition consult appreciated  -Add supplements    VTE ppx: pt on eliquis   Final diagnosis, treatment plan, and follow-up recommendations were discussed and explained to the patient.   The patient was given an opportunity to ask questions concerning the diagnosis and treatment plan.   The patient acknowledged understanding of the diagnosis, treatment, and follow-up recommendations.   The patient was advised to seek urgent care upon discharge if worsening symptoms develop prior to scheduled follow-up.   Time spent on discharge included time with the patient, and also coordinating discharge care as outlined below.  Discharge note faxed to PCP with my contact information to call me back   PCP Dr. Dockery  Total time spent: 50 min

## 2025-02-11 NOTE — DISCHARGE NOTE PROVIDER - NSDCMRMEDTOKEN_GEN_ALL_CORE_FT
apixaban 5 mg oral tablet: 1 tab(s) orally 2 times a day  ciprofloxacin 500 mg oral tablet: 1 tab(s) orally once a day  hydrocortisone 10 mg oral tablet: 1 tab(s) orally 3 times a day  levothyroxine 100 mcg (0.1 mg) oral tablet: 1 tab(s) orally once a day

## 2025-02-11 NOTE — DISCHARGE NOTE PROVIDER - NSDCCPCAREPLAN_GEN_ALL_CORE_FT
PRINCIPAL DISCHARGE DIAGNOSIS  Diagnosis: Sepsis, unspecified organism  Assessment and Plan of Treatment: Admitted for sepsis likely due to urinary tract infection and liver abscesses, blood cultures were negative, treated with zosyn, recommended suppressive treatment with ciprofloxacin by infectious disease.      SECONDARY DISCHARGE DIAGNOSES  Diagnosis: Painless jaundice  Assessment and Plan of Treatment:     Diagnosis: H/O adrenal insufficiency  Assessment and Plan of Treatment:     Diagnosis: Metastatic disease  Assessment and Plan of Treatment:

## 2025-02-11 NOTE — PROGRESS NOTE ADULT - SUBJECTIVE AND OBJECTIVE BOX
Pt seen, sitting upright, plan for home today    MEDICATIONS  (STANDING):  apixaban 5 milliGRAM(s) Oral every 12 hours  ciprofloxacin     Tablet 500 milliGRAM(s) Oral daily  dextrose 5%. 1000 milliLiter(s) (100 mL/Hr) IV Continuous <Continuous>  dextrose 5%. 1000 milliLiter(s) (50 mL/Hr) IV Continuous <Continuous>  dextrose 50% Injectable 25 Gram(s) IV Push once  dextrose 50% Injectable 12.5 Gram(s) IV Push once  dextrose 50% Injectable 25 Gram(s) IV Push once  dextrose Oral Gel 15 Gram(s) Oral once  folic acid 1 milliGRAM(s) Oral daily  glucagon  Injectable 1 milliGRAM(s) IntraMuscular once  hydrocortisone 25 milliGRAM(s) Oral three times a day  levothyroxine 100 MICROGram(s) Oral daily  pantoprazole  Injectable 40 milliGRAM(s) IV Push daily    MEDICATIONS  (PRN):  acetaminophen     Tablet .. 650 milliGRAM(s) Oral every 6 hours PRN Temp greater or equal to 38C (100.4F), Mild Pain (1 - 3)  aluminum hydroxide/magnesium hydroxide/simethicone Suspension 30 milliLiter(s) Oral every 4 hours PRN Dyspepsia  melatonin 3 milliGRAM(s) Oral at bedtime PRN Insomnia  morphine  - Injectable 1 milliGRAM(s) IV Push every 6 hours PRN Severe Pain (7 - 10)  ondansetron Injectable 4 milliGRAM(s) IV Push every 8 hours PRN Nausea and/or Vomiting      ROS  No fever, sweats, chills      Vital Signs Last 24 Hrs  T(C): 36.4 (11 Feb 2025 15:12), Max: 36.5 (11 Feb 2025 00:09)  T(F): 97.6 (11 Feb 2025 15:12), Max: 97.7 (11 Feb 2025 00:09)  HR: 64 (11 Feb 2025 15:12) (64 - 86)  BP: 136/84 (11 Feb 2025 15:12) (131/82 - 136/87)  BP(mean): --  RR: 18 (11 Feb 2025 15:12) (16 - 18)  SpO2: 97% (11 Feb 2025 15:12) (97% - 100%)    Parameters below as of 11 Feb 2025 15:12  Patient On (Oxygen Delivery Method): room air        PE  NAD  Awake, alert    No c/c/e  No rash grossly  FROM                          9.9    17.06 )-----------( 240      ( 11 Feb 2025 06:42 )             28.6       02-11    131[L]  |  101  |  38[H]  ----------------------------<  97  4.4   |  22  |  0.75    Ca    8.9      11 Feb 2025 06:42

## 2025-02-12 VITALS
OXYGEN SATURATION: 100 % | RESPIRATION RATE: 16 BRPM | HEART RATE: 76 BPM | TEMPERATURE: 98 F | DIASTOLIC BLOOD PRESSURE: 84 MMHG | SYSTOLIC BLOOD PRESSURE: 129 MMHG

## 2025-02-12 PROCEDURE — 99239 HOSP IP/OBS DSCHRG MGMT >30: CPT

## 2025-02-12 RX ADMIN — Medication 1 MILLIGRAM(S): at 09:56

## 2025-02-12 RX ADMIN — Medication 500 MILLIGRAM(S): at 09:56

## 2025-02-12 RX ADMIN — APIXABAN 5 MILLIGRAM(S): 5 TABLET, FILM COATED ORAL at 09:56

## 2025-02-12 RX ADMIN — LEVOTHYROXINE SODIUM 100 MICROGRAM(S): 25 TABLET ORAL at 06:16

## 2025-02-12 RX ADMIN — Medication 25 MILLIGRAM(S): at 06:16

## 2025-02-12 NOTE — PROGRESS NOTE ADULT - NUTRITIONAL ASSESSMENT
This patient has been assessed with a concern for Malnutrition and has been determined to have a diagnosis/diagnoses of Severe protein-calorie malnutrition.    This patient is being managed with:   Diet Regular-  Entered: Feb 6 2025  7:57PM  

## 2025-02-12 NOTE — PROGRESS NOTE ADULT - ASSESSMENT
78 yo female h/o  metastatic renal cell carcinoma to liver, lungs, previously on immunotherapy, liver abscesses on IV abx since 9/29 then on ertapenem 1 gm IV qd until one moth ago and now on minocycline 200 mg PO BID since 11/30, colectomy due to ischemic ascending colitis, HTN, HLD, adrenal insufficiency on hydrocortisone, Afib on Apixaban, hypothyroidism was admitted on 2/6 for worsening leukocytosis. She had labs performed on the day PTA that showed high white count of 18 (WBC was 13 on 1/3/25), associated general weakness/fatigue progressively worsening over past week, today too weak to even stand up.  Patient denies F/C, urine complaints, chest pain, LE pain/swelling.  Decreased appetite but adequate p.o. intake. Pt denies fevers, chills, bodyaches, abdominal pain. In the ED VSS, WBC 17, LA 2.7 then 2.1. Imaging showed Mets and liver abscesses.    #Liver abscesses with STMA, ENFAE and ENFA vs metastatic lesions on IV ertapenem, only partial response to abx therapy  #Metastatic renal cell CA with mets to lung and liver  #Loose stools in colostomy. no evidence of CDAD.  #Immunocompromised host.   #Leukocytosis.   -obtain BC x 2  -obtain stool studies  -she has persistent leukocytosis, but no fever  -tolerating abx well so far; no side effects noted  -IR evaluation appreciated - she is not a candidate for liver lesions aspiration/ biopsy  -s/p zosyn 3.375 gm IV q8h # 3  -on cipro 500 mg PO qd as suppressive therapy for long term  -reason for abx use and side effects reviewed with patient; monitor CMP  -would continue on abx therapy for longer term to be determined by her clinical and radiological response  -monitor temps  -f/u CBC  -supportive care  2. Other issues:   -care per medicine    d/w Dr. Waggoner    
78 yo female h/o  metastatic renal cell carcinoma to liver, lungs, previously on immunotherapy, liver abscesses on IV abx since 9/29 then on ertapenem 1 gm IV qd until one moth ago and now on minocycline 200 mg PO BID since 11/30, colectomy due to ischemic ascending colitis, HTN, HLD, adrenal insufficiency on hydrocortisone, Afib on Apixaban, hypothyroidism was admitted on 2/6 for worsening leukocytosis. She had labs performed on the day PTA that showed high white count of 18 (WBC was 13 on 1/3/25), associated general weakness/fatigue progressively worsening over past week, today too weak to even stand up.  Patient denies F/C, urine complaints, chest pain, LE pain/swelling.  Decreased appetite but adequate p.o. intake. Pt denies fevers, chills, bodyaches, abdominal pain. In the ED VSS, WBC 17, LA 2.7 then 2.1. Imaging showed Mets and liver abscesses.    #Liver abscesses with STMA, ENFAE and ENFA vs metastatic lesions on IV ertapenem, only partial response to abx therapy  #Metastatic renal cell CA with mets to lung and liver  #Loose stools in colostomy. no evidence of CDAD.  #Immunocompromised host.   #Leukocytosis.   -obtain BC x 2  -obtain stool studies  -she has persistent leukocytosis, but no fever  -on zosyn 3.375 gm IV q8h # 3  -tolerating abx well so far; no side effects noted  -IR evaluation appreciated - she is not a candidate for liver lesions aspiration/ biopsy  -continue abx coverage  -abx choice d/w patient  -change abx to cipro 500 mg PO qd as suppressive therapy for long term  -reason for abx use and side effects reviewed with patient; monitor CMP  -monitor temps  -f/u CBC  -supportive care  2. Other issues:   -care per medicine    d/w Dr. Waggoner    
78 yo female h/o  metastatic renal cell carcinoma to liver, lungs, previously on immunotherapy. , liver abscesses,  s/p Lpa RT colectomy on 11/30 due to ischemic ascending colitis,  HTN, HLD, adrenal insufficiency on hydrocortisone,  Afib on Apixaban, hypothyroidism, presents to the ED regarding outpatient blood tests yesterday high white count of 18 (WBC was 13 on 1/3/25), associated general weakness/fatigue progressively worsening over past week, today too weak to even stand up.       Severe sepsis POA likely secondary to liver abscesses vs UTI  -Pt completed IV abx Invanz, minocycline on 1/8/25   -Has been on IV abx since 11/2024  -F/u blood and urine cx, lactate normalized   -Ostomy with watery stool, C diff, GI PCR negative   -Maintain adequate hydration   -IR consult appreciated, no interventions planned   -ID consult appreciated, D/w Dr. Roberson   -Continue zosyn for now   -Ucx with VRE, strep agalactiae     Hx Adrenal Insufficiency   -On hydrocortisone at home  -Will give stress dose as pt is hypoglycemic, weak and with new acute infectious process/SIRS   -Continue IV hydrocortisone for now, will switch to oral and taper eventually     Worsening metastatic disease   RCC, hepatic, pulmonary mets  -CT with Progression of metastatic disease in the visualized lower chest and   liver, as well as an abdominal wall metastasis along the right 10th rib   costochondral junction. New centrally necrotic para-aortic lymph node may   represent lymph node metastasis  -heme/onc consult appreciated     Hx ischemic colitis s/p R colectomy  -Ostomy care     HTN/HLD/ chronic Afib   -continue Eliquis   -not on BP meds     Hypothyroidism  -Continue levothyroxine    Severe protein-calorie malnutrition  -Nutrition consult appreciated  -Add supplements    VTE ppx: pt on eliquis     GOC: DNR/DNI/trial of BIPAP    PT eval       
80 yo female h/o  metastatic renal cell carcinoma to liver, lungs, previously on immunotherapy. , liver abscesses,  s/p Lpa RT colectomy on 11/30 due to ischemic ascending colitis,  HTN, HLD, adrenal insufficiency on hydrocortisone,  Afib on Apixaban, hypothyroidism, presents to the ED regarding outpatient blood tests yesterday high white count of 18 (WBC was 13 on 1/3/25), associated general weakness/fatigue progressively worsening over past week, today too weak to even stand up.     Severe sepsis POA likely secondary to liver abscesses vs UTI  -Pt completed IV abx Invanz, minocycline on 1/8/25   -Has been on IV abx since 11/2024  -F/u blood and urine cx, lactate normalized   -Ostomy with watery stool, C diff, GI PCR negative   -Maintain adequate hydration   -IR consult appreciated, no interventions planned   -ID consult appreciated, D/w Dr. Roberson   -s/p zosyn 3 days  -start ciprofloxacin 500mg daily for suppressive therapy for long term  -Ucx with VRE, strep agalactiae     Hx Adrenal Insufficiency   -On hydrocortisone at home  -Will give stress dose as pt is hypoglycemic, weak and with new acute infectious process/SIRS   -Continue IV hydrocortisone for now, will switch to oral and taper eventually     Worsening metastatic disease   RCC, hepatic, pulmonary mets  -CT with Progression of metastatic disease in the visualized lower chest and   liver, as well as an abdominal wall metastasis along the right 10th rib   costochondral junction. New centrally necrotic para-aortic lymph node may   represent lymph node metastasis  -heme/onc consult appreciated   -pt decided on home hospice today  -accepted by [facility]    Hx ischemic colitis s/p R colectomy  -Ostomy care     HTN/HLD/ chronic Afib   -continue Eliquis   -not on BP meds     Hypothyroidism  -Continue levothyroxine    Severe protein-calorie malnutrition  -Nutrition consult appreciated  -Add supplements    VTE ppx: pt on eliquis     GOC: DNR/DNI/trial of BIPAP      DISPO: d/c home with home hospice ([facility]) tomorrow AM      
Renal papillary cancer / Liver, lung and vielka disease  + slow progression in lung/ new small node para aortic  Liver Absesses  Adrenal insufficiency  Anemia / multifactorial    Her present admission is not directly related to the cancer , however there is documented progression  Given her multiple issues and declined performance status , she is not a good candidate for salvage ( at this point 4 th line ) treatment  No IR guided intervention planned  On IV zosyn as per ID, appreciate ID input  The focus should be on management of acute issues ( infection/ hypotension/ adrenal etc) and discussion with palliative team   prior to discharge to coordinate future management   She is a candidate for Palliative eventual hospice etc  If she has not decided this prior to discharge , we can initiate/ continue the discussion as out patient     Dusty Marcial MD  [facility]  Cell: [phone]  
 "78 yo female h/o  metastatic renal cell carcinoma to liver, lungs, previously on immunotherapy. , liver abscesses,  s/p Lpa RT colectomy on 11/30 due to ischemic ascending colitis,  HTN, HLD, adrenal insufficiency on hydrocortisone,  Afib on Apixaban, hypothyroidism, presents to the ED regarding outpatient blood tests yesterday high white count of 18 (WBC was 13 on 1/3/25), associated general weakness/fatigue progressively worsening over past week, today too weak to even stand up. " Patient currently admitted for severe sepsis 2/2 hepatic abscess. Palliative medicine is consulted for further assistance with GOC.     GOC/ACP  Capacity: pt has capacity for complex medical decision making   HCP/Surrogate: pt's sons, to bring in HCP form   Code Status: DNR/DNI with trial of NIV  MOLST: confirmed on admission   Dispo Plan: pt states plan is home with home hospice services    Process of Care  --Reviewed dx/treatment problems and alignment with Goals of Care    --Weakness  PT as tolerated     Psychological and Psychiatric Aspects of Care:   --Greif/Bereavment: emotional support provided  --Hx of psychiatric dx: none  -Pastoral Care Available PRN     Social Aspects of Care  -SW involved     Cultural Aspects  -Primary Language: English    Goals of Care:     We discussed Palliative Care team being a supportive team when a patient has ongoing illnesses.  We also discussed that it is not an end of life care service, but can help navigate symptoms and emotional support througout their hospital stay here.      Ethical and Legal Aspects:   NA          Discussed With: Case coordinated with attending and SW and RN     Time Spent: 30 minutes including the care, coordination and counseling of this patient, 50% of which was spent coordinating and counseling.      
80 yo female h/o  metastatic renal cell carcinoma to liver, lungs, previously on immunotherapy. , liver abscesses,  s/p Lpa RT colectomy on 11/30 due to ischemic ascending colitis,  HTN, HLD, adrenal insufficiency on hydrocortisone,  Afib on Apixaban, hypothyroidism, presents to the ED regarding outpatient blood tests yesterday high white count of 18 (WBC was 13 on 1/3/25), associated general weakness/fatigue progressively worsening over past week, today too weak to even stand up.       Severe sepsis POA likely secondary to liver abscesses vs UTI  -Pt completed IV abx Invanz, minocycline on 1/8/25   -Has been on IV abx since 11/2024  -F/u blood and urine cx, lactate normalized   -Ostomy with watery stool, C diff, GI PCR negative   -Maintain adequate hydration   -IR consult appreciated, no interventions planned   -ID consult appreciated, D/w Dr. Roberson   -s/p zosyn 3 days  -start ciprofloxacin 500mg daily for suppressive therapy for long term  -Ucx with VRE, strep agalactiae     Hx Adrenal Insufficiency   -On hydrocortisone at home  -Will give stress dose as pt is hypoglycemic, weak and with new acute infectious process/SIRS   -Continue IV hydrocortisone for now, will switch to oral and taper eventually     Worsening metastatic disease   RCC, hepatic, pulmonary mets  -CT with Progression of metastatic disease in the visualized lower chest and   liver, as well as an abdominal wall metastasis along the right 10th rib   costochondral junction. New centrally necrotic para-aortic lymph node may   represent lymph node metastasis  -heme/onc consult appreciated     Hx ischemic colitis s/p R colectomy  -Ostomy care     HTN/HLD/ chronic Afib   -continue Eliquis   -not on BP meds     Hypothyroidism  -Continue levothyroxine    Severe protein-calorie malnutrition  -Nutrition consult appreciated  -Add supplements    VTE ppx: pt on eliquis     GOC: DNR/DNI/trial of BIPAP    PT eval     DISPO: d/c planning for tomorrow   
80 yo female h/o  metastatic renal cell carcinoma to liver, lungs, previously on immunotherapy. , liver abscesses,  s/p Lpa RT colectomy on 11/30 due to ischemic ascending colitis,  HTN, HLD, adrenal insufficiency on hydrocortisone,  Afib on Apixaban, hypothyroidism, presents to the ED regarding outpatient blood tests yesterday high white count of 18 (WBC was 13 on 1/3/25), associated general weakness/fatigue progressively worsening over past week, today too weak to even stand up.     Severe sepsis POA likely secondary to liver abscesses vs UTI  -Pt completed IV abx Invanz, minocycline on 1/8/25   -Has been on IV abx since 11/2024  -F/u blood and urine cx, lactate normalized   -Ostomy with watery stool, C diff, GI PCR negative   -Maintain adequate hydration   -IR consult appreciated, no interventions planned   -ID consult appreciated, D/w Dr. Roberson today   -s/p zosyn 3 days  -start ciprofloxacin 500mg daily for suppressive therapy for long term  -Ucx with VRE, strep agalactiae     Hx Adrenal Insufficiency   -On hydrocortisone at home  -Will give stress dose as pt is hypoglycemic, weak and with new acute infectious process/SIRS   -Continue IV hydrocortisone for now, will switch to oral and taper eventually     Worsening metastatic disease   RCC, hepatic, pulmonary mets  -CT with Progression of metastatic disease in the visualized lower chest and   liver, as well as an abdominal wall metastasis along the right 10th rib   costochondral junction. New centrally necrotic para-aortic lymph node may   represent lymph node metastasis  -heme/onc consult appreciated     Hx ischemic colitis s/p R colectomy  -Ostomy care     HTN/HLD/ chronic Afib   -continue Eliquis   -not on BP meds     Hypothyroidism  -Continue levothyroxine    Severe protein-calorie malnutrition  -Nutrition consult appreciated  -Add supplements    VTE ppx: pt on eliquis     GOC: DNR/DNI/trial of BIPAP    PT eval     DISPO: d/c planning for tomorrow   
Renal papillary cancer / Liver, lung and vielka disease  + slow progression in lung/ new small node para aortic  Liver Absesses  Adrenal insufficiency  Anemia / multifactorial    Her present admission is not directly related to the cancer , however there is documented progression  Given her multiple issues and declined performance status , she is not a good candidate for salvage ( at this point 4 th line ) treatment  No IR guided intervention planned  Plan for dc home with home hospice      Dusty Marcial MD  [facility]  Cell: [phone]
78 yo female h/o  metastatic renal cell carcinoma to liver, lungs, previously on immunotherapy, liver abscesses on IV abx since 9/29 then on ertapenem 1 gm IV qd until one moth ago and now on minocycline 200 mg PO BID since 11/30, colectomy due to ischemic ascending colitis, HTN, HLD, adrenal insufficiency on hydrocortisone, Afib on Apixaban, hypothyroidism was admitted on 2/6 for worsening leukocytosis. She had labs performed on the day PTA that showed high white count of 18 (WBC was 13 on 1/3/25), associated general weakness/fatigue progressively worsening over past week, today too weak to even stand up.  Patient denies F/C, urine complaints, chest pain, LE pain/swelling.  Decreased appetite but adequate p.o. intake. Pt denies fevers, chills, bodyaches, abdominal pain. In the ED VSS, WBC 17, LA 2.7 then 2.1. Imaging showed Mets and liver abscesses.    #Liver abscesses with STMA, ENFAE and ENFA vs metastatic lesions on IV ertapenem, only partial response to abx therapy  #Metastatic renal cell CA with mets to lung and liver  #Loose stools in colostomy. no evidence of CDAD.  #Immunocompromised host.   #Leukocytosis.   -obtain BC x 2  -obtain stool studies  -she has persistent leukocytosis, but no fever  -on zosyn 3.375 gm IV q8h # 2  -tolerating abx well so far; no side effects noted  -IR evaluation appreciated - she is not a candidate for liver lesions aspiration/ biopsy  -continue abx coverage  -plan ot change to longer term abx therapy with cipro 500 mg PO qd as suppressive therapy  -reason for abx use and side effects reviewed with patient; monitor CMP  -monitor temps  -f/u CBC  -supportive care  2. Other issues:   -care per medicine    d/w Dr. Waggoner  Clinical team may change from intravenous to oral antibiotics when the following criteria are met:   1. Patient is clinically improving/stable       a)	Improved signs and symptoms of infection from initial presentation       b)	Afebrile for 24 hours       c)	Leukocytosis trending towards normal range   2. Patient is tolerating oral intake   3. Initial/repeat blood cultures are negative     When above criteria met, may change iv antibiotics to cipro PO as above  
78 yo female h/o  metastatic renal cell carcinoma to liver, lungs, previously on immunotherapy. , liver abscesses,  s/p Lpa RT colectomy on 11/30 due to ischemic ascending colitis,  HTN, HLD, adrenal insufficiency on hydrocortisone,  Afib on Apixaban, hypothyroidism, presents to the ED regarding outpatient blood tests yesterday high white count of 18 (WBC was 13 on 1/3/25), associated general weakness/fatigue progressively worsening over past week, today too weak to even stand up.       Severe sepsis POA likely secondary to liver abscesses vs UTI vs infectious diarrhea  -Pt completed IV abx Invanz, minocycline on 1/8/25   -Has been on IV abx since 11/2024  -F/u blood and urine cx, lactate normalized   -Ostomy with watery stool, r/o CDAD, will get GI PCR as well   -Maintain adequate hydration   -IR consult appreciated, no interventions planned   -ID consult appreciated, D/w Dr. Roberson today   -Continue zosyn for now     Hx Adrenal Insufficiency   -On hydrocortisone at home  -Will give stress dose as pt is hypoglycemic, weak and with new acute infectious process/SIRS   -Continue IV hydrocortisone for now, will switch to oral and taper eventually     Worsening metastatic disease   RCC, hepatic, pulmonary mets  -CT with Progression of metastatic disease in the visualized lower chest and   liver, as well as an abdominal wall metastasis along the right 10th rib   costochondral junction. New centrally necrotic para-aortic lymph node may   represent lymph node metastasis  -f/u heme/onc consult     Hx ischemic colitis s/p R colectomy  -Ostomy care     HTN/HLD/ chronic Afib   -continue Eliquis   -not on BP meds     Hypothyroidism  -Continue levothyroxine    Severe protein-calorie malnutrition  -Nutrition consult appreciated  -Add supplements    VTE ppx: pt on eliquis     GOC: DNR/DNI/trial of BIPAP , MOLST completed today

## 2025-02-12 NOTE — PROGRESS NOTE ADULT - SUBJECTIVE AND OBJECTIVE BOX
Examined pt at bedside, pt awake, alert, NAD. Reports she is going home with home hospice services today  Patient denies any pain, n/v, SOB at this time.       PAIN: ( )Yes   (x )No    DYSPNEA: ( ) Yes  (x ) No  Level:        Review of Systems:    Anxiety- denies   Depression- denies   Physical Discomfort- denies   Dyspnea- denies   Constipation- denies   Diarrhea- denies   Nausea-  denies   Vomiting- denies   Fatigue- +  Weakness- +  Delirium- denies     All other systems reviewed and negative      PHYSICAL EXAM:    Vital Signs Last 24 Hrs  T(C): 36.4 (12 Feb 2025 07:31), Max: 36.6 (11 Feb 2025 20:10)  T(F): 97.6 (12 Feb 2025 07:31), Max: 97.8 (11 Feb 2025 20:10)  HR: 76 (12 Feb 2025 07:31) (64 - 86)  BP: 129/84 (12 Feb 2025 07:31) (129/84 - 143/85)  BP(mean): --  RR: 16 (12 Feb 2025 07:31) (16 - 18)  SpO2: 100% (12 Feb 2025 07:31) (97% - 100%)    Parameters below as of 12 Feb 2025 07:31  Patient On (Oxygen Delivery Method): room air      Daily     Daily     PPSV2: 50  %  FAST:    General: elderly female, awake, alert, NAD   Lungs: clear to auscultation   Cardiac: regular rate and rhythm   GI: soft, nontender   Ext: well perfused     LABS:                        9.9    17.06 )-----------( 240      ( 11 Feb 2025 06:42 )             28.6     02-11    131[L]  |  101  |  38[H]  ----------------------------<  97  4.4   |  22  |  0.75    Ca    8.9      11 Feb 2025 06:42        Albumin: Albumin: 1.4 g/dL (02-07 @ 08:48)      Allergies    Zetia (Unknown)  Cabometyx (Unknown)  bacitracin (Rash)  tivozanib (Faint)  Lipitor (Unknown)  Norvasc (Faint)  Crestor (Other)  statins (Unknown)  Dyazide (Faint)    Intolerances      MEDICATIONS  (STANDING):  apixaban 5 milliGRAM(s) Oral every 12 hours  ciprofloxacin     Tablet 500 milliGRAM(s) Oral daily  dextrose 5%. 1000 milliLiter(s) (100 mL/Hr) IV Continuous <Continuous>  dextrose 5%. 1000 milliLiter(s) (50 mL/Hr) IV Continuous <Continuous>  dextrose 50% Injectable 25 Gram(s) IV Push once  dextrose 50% Injectable 12.5 Gram(s) IV Push once  dextrose 50% Injectable 25 Gram(s) IV Push once  dextrose Oral Gel 15 Gram(s) Oral once  folic acid 1 milliGRAM(s) Oral daily  glucagon  Injectable 1 milliGRAM(s) IntraMuscular once  hydrocortisone 25 milliGRAM(s) Oral three times a day  levothyroxine 100 MICROGram(s) Oral daily  pantoprazole  Injectable 40 milliGRAM(s) IV Push daily    MEDICATIONS  (PRN):  acetaminophen     Tablet .. 650 milliGRAM(s) Oral every 6 hours PRN Temp greater or equal to 38C (100.4F), Mild Pain (1 - 3)  aluminum hydroxide/magnesium hydroxide/simethicone Suspension 30 milliLiter(s) Oral every 4 hours PRN Dyspepsia  melatonin 3 milliGRAM(s) Oral at bedtime PRN Insomnia  morphine  - Injectable 2 milliGRAM(s) IV Push every 6 hours PRN Severe Pain (7 - 10)  ondansetron Injectable 4 milliGRAM(s) IV Push every 8 hours PRN Nausea and/or Vomiting      RADIOLOGY:

## 2025-02-12 NOTE — PROGRESS NOTE ADULT - PROVIDER SPECIALTY LIST ADULT
Hospitalist
Infectious Disease
Heme/Onc
Infectious Disease
Infectious Disease
Palliative Care
Heme/Onc
Hospitalist
Hospitalist

## 2025-02-18 DIAGNOSIS — C79.2 SECONDARY MALIGNANT NEOPLASM OF SKIN: ICD-10-CM

## 2025-02-18 DIAGNOSIS — C78.00 SECONDARY MALIGNANT NEOPLASM OF UNSPECIFIED LUNG: ICD-10-CM

## 2025-02-18 DIAGNOSIS — Z88.8 ALLERGY STATUS TO OTHER DRUGS, MEDICAMENTS AND BIOLOGICAL SUBSTANCES: ICD-10-CM

## 2025-02-18 DIAGNOSIS — E27.40 UNSPECIFIED ADRENOCORTICAL INSUFFICIENCY: ICD-10-CM

## 2025-02-18 DIAGNOSIS — I48.0 PAROXYSMAL ATRIAL FIBRILLATION: ICD-10-CM

## 2025-02-18 DIAGNOSIS — E03.9 HYPOTHYROIDISM, UNSPECIFIED: ICD-10-CM

## 2025-02-18 DIAGNOSIS — C78.7 SECONDARY MALIGNANT NEOPLASM OF LIVER AND INTRAHEPATIC BILE DUCT: ICD-10-CM

## 2025-02-18 DIAGNOSIS — C64.9 MALIGNANT NEOPLASM OF UNSPECIFIED KIDNEY, EXCEPT RENAL PELVIS: ICD-10-CM

## 2025-02-18 DIAGNOSIS — D63.0 ANEMIA IN NEOPLASTIC DISEASE: ICD-10-CM

## 2025-02-18 DIAGNOSIS — Z79.01 LONG TERM (CURRENT) USE OF ANTICOAGULANTS: ICD-10-CM

## 2025-02-18 DIAGNOSIS — A41.9 SEPSIS, UNSPECIFIED ORGANISM: ICD-10-CM

## 2025-02-18 DIAGNOSIS — D84.9 IMMUNODEFICIENCY, UNSPECIFIED: ICD-10-CM

## 2025-02-18 DIAGNOSIS — C79.51 SECONDARY MALIGNANT NEOPLASM OF BONE: ICD-10-CM

## 2025-02-18 DIAGNOSIS — E78.5 HYPERLIPIDEMIA, UNSPECIFIED: ICD-10-CM

## 2025-02-18 DIAGNOSIS — I10 ESSENTIAL (PRIMARY) HYPERTENSION: ICD-10-CM

## 2025-02-18 DIAGNOSIS — N39.0 URINARY TRACT INFECTION, SITE NOT SPECIFIED: ICD-10-CM

## 2025-02-18 DIAGNOSIS — R60.0 LOCALIZED EDEMA: ICD-10-CM

## 2025-02-18 DIAGNOSIS — Z66 DO NOT RESUSCITATE: ICD-10-CM

## 2025-02-18 DIAGNOSIS — K75.0 ABSCESS OF LIVER: ICD-10-CM

## 2025-02-18 DIAGNOSIS — E43 UNSPECIFIED SEVERE PROTEIN-CALORIE MALNUTRITION: ICD-10-CM

## 2025-02-18 DIAGNOSIS — Z92.25 PERSONAL HISTORY OF IMMUNOSUPPRESSION THERAPY: ICD-10-CM

## 2025-02-18 DIAGNOSIS — Z51.5 ENCOUNTER FOR PALLIATIVE CARE: ICD-10-CM

## 2025-02-18 DIAGNOSIS — Z79.890 HORMONE REPLACEMENT THERAPY: ICD-10-CM

## 2025-02-18 DIAGNOSIS — Z79.899 OTHER LONG TERM (CURRENT) DRUG THERAPY: ICD-10-CM

## 2025-03-03 NOTE — ED PROVIDER NOTE - NSCAREINITIATED _GEN_ER
Titus Son(Attending)
[FreeTextEntry1] : 11/16/22 feels well no sob or wheeze  wgt stable nl BM  nl sleep pattern Has RLE pain with walking  longstanding spinal stenosis  remains on inhalers  Had covid infection in 8/22 rx w paxlovid  resp statius stable no wheeze  03/08/23 Denies Chest Pain, SOB, Dizziness, Leg edema, Orthopnea, PND, Palpitations, Syncope, FLOWER, Diaphoresis. no new symptoms of dyspnea chest pain or syncope/ near syncope  mild leg ulcer left ankle trauma  07/05/23 PRIOR ekg lbbb NOW RESOLVED  NO SYNCOPE OR NEAR SYNCOPE  COPD AT BASELINE    10/18/23 copd at baseline seen by PULM on inhaler got Flu shot as well as COVID booster  mod back pain at times 10/10 Had relief w tramadol  2/21/24 seen for paroxysmal LBBB  copd and LS spine pain  dyspnea stable no chenge   no syncope or near syncope   5/22/2024: Overall doing well  States that her back has been bothering her  No dizziness or lightheadedness Notes that she sometimes has trouble getting a breath out, but breathing is at baseline  08/21/24 copd at baseline no sscp or HA   11/25/24 s/p mechanical fall at home NO loc had back injury seen in ER no Fx noted  was noted to HAVE PNA and rx for 4 days  alos rx for uti was d/c OCT 31 2024  3/3/25 balance is a problem getting PT has no walking aid  dyspnea at baseline uses inhalers no cough

## 2025-05-02 NOTE — PROVIDER CONTACT NOTE (CRITICAL VALUE NOTIFICATION) - SITUATION
patient with critically high lactate of 2.8
Few polymorphonuclear leukocytes seen per low power field. Few gram positive cocci in pairs seen per oil power field. Few gram negative rods seen per oil power field.
Patient on PO Augmentin & IV Ertapenem
no

## 2025-05-04 NOTE — CHART NOTE - NSCHARTNOTEFT_GEN_A_CORE
HPI: As per medical record,   "80 yo female h/o  metastatic renal cell carcinoma to liver, lungs, previously on immunotherapy. , liver abscesses,  s/p Lpa RT colectomy on 11/30 due to ischemic ascending colitis,  HTN, HLD, adrenal insufficiency on hydrocortisone,  Afib on Apixaban, hypothyroidism, presents to the ED regarding outpatient blood tests yesterday high white count of 18 (WBC was 13 on 1/3/25), associated general weakness/fatigue progressively worsening over past week, today too weak to even stand up.  Patient denies F/C, urine complaints, chest pain, LE pain/swelling.  Decreased appetite but adequate p.o. intake. Pt denies any fevers, chills, bodyaches, abdominal pain. In the ED VSS, WBC 17, LA 2.7 then 2.1. Imaging showed Mets and liver abscesses. of note patient completed a course of minocycline today. (06 Feb 2025 20:01)"    PERTINENT PMH REVIEWED:  [ x ] YES [ ] NO           Primary Contact: annette June ([phone])    HCP [  ] Surrogate [  x ] Guardian [   ]    Mental Status: [ x ] Alert  [ x ] Oriented [  ] Confused [  ] Lethargic   Concerns of Depression [  ] none reported to this SW  Anxiety [   ] none reported to this SW  Baseline ADLs (prior to admission):  Independent [ ] moderately [ ] fully   Dependent   [ ] moderately [ ]fully    Family Meeting attendees: Pt participated in discussion with Dr. Rivera    Anticipated Grief: Patient[  ] Family [  ]    Caregiver Hercules Assessed: Yes [ x ] No [  ]    Cheondoism: Jainism    Spiritual Concerns: None reported.  available PRN.     Goals of Care: Continue current medical management    Previous Services: [facility]    ADVANCE DIRECTIVES:  [ ] YES [ x ] NO   - Pt has capacity  - MOLST on chart reflecting DNR/DNI with trial NIV  - Health Care Proxy on chart naming annette June as primary and Soham as alternate.     Anticipated D/C Plan: TBD - from home                     Summary: Palliative SW met with patient to follow up and offer support. Palliative SW role explained. Pt lying in bed, visiting with her cousins. She appears alert, oriented with pleasant mood and able to make needs known. Pt is   and resides home with annette June. Pt acknowledges meeting with Dr. Rivera this morning. She confirms Health Care Proxt naming annette June - copy has been placed on chart. MOLST also on chart reflecting DNR/DNI trial NIV. Pt denies pain/discomfort at this time. Emotional support provided. Our team will continue to follow.
Palliative team met with pt to follow up and offer support. Pt appears alert, oriented with pleasant mood and able to make needs known. She explains that she told she was going home today and that she has decided to pursue home hospice. She shares that the unit SW has addressed all her questions and that she is at peace with her decision. She acknowledges her supportive family and friends and is eager to be home with them. Pt expressed appreciation for visit. Emotional support provided. Our team will continue to follow.
Yes

## 2025-05-25 NOTE — PROVIDER CONTACT NOTE (CRITICAL VALUE NOTIFICATION) - PERSON GIVING RESULT:
[facility]
lab
Gerardo Kraus- blade
[facility]
Ascites
CHF (congestive heart failure)
melinda murguia
artifact despite repeat scan. Discussed with Dr. Yeison Ledbetter by Dr. Prado at 2:31 PM on 5/19/2025     CT CERVICAL SPINE WO CONTRAST  Result Date: 5/19/2025  EXAMINATION: CT OF THE CERVICAL SPINE WITHOUT CONTRAST 5/19/2025 12:25 pm TECHNIQUE: CT of the cervical spine was performed without the administration of intravenous contrast. Multiplanar reformatted images are provided for review. Automated exposure control, iterative reconstruction, and/or weight based adjustment of the mA/kV was utilized to reduce the radiation dose to as low as reasonably achievable. COMPARISON: None. HISTORY: ORDERING SYSTEM PROVIDED HISTORY: fall neck pain TECHNOLOGIST PROVIDED HISTORY: Reason for exam:->fall neck pain Decision Support Exception - unselect if not a suspected or confirmed emergency medical condition->Emergency Medical Condition (MA) Reason for Exam: fall, neck and back pain, large sacral decubitis ulcer cellulitis FINDINGS: BONES/ALIGNMENT: There is no acute fracture or traumatic malalignment. DEGENERATIVE CHANGES: No severe osseous spinal canal stenosis.  Multilevel degenerative changes. SOFT TISSUES: There is no prevertebral soft tissue swelling.     No acute abnormality of the cervical spine.     CT HEAD WO CONTRAST  Result Date: 5/19/2025  EXAM: CT HEAD WITHOUT 05/19/2025 01:06:39 PM TECHNIQUE: CT of the head was performed without the administration of intravenous contrast. Automated exposure control, iterative reconstruction, and/or weight based adjustment of the mA/kV was utilized to reduce the radiation dose to as low as reasonably achievable. COMPARISON: None available. CLINICAL HISTORY: Fall head injury. FINDINGS: BRAIN AND VENTRICLES: There is no acute intracranial hemorrhage, mass effect or midline shift. No abnormal extra-axial fluid collection. The gray-white differentiation is maintained without evidence of an acute infarct. There is no evidence of hydrocephalus. Mild periventricular hypoattenuation, likely 
representing mild chronic small vessel ischemic changes. ORBITS: The visualized portion of the orbits demonstrate no acute abnormality. SINUSES: The visualized paranasal sinuses and mastoid air cells demonstrate no acute abnormality. SOFT TISSUES AND SKULL: No acute abnormality of the visualized skull or soft tissues.     1. No acute intracranial abnormality. 2. Mild periventricular hypoattenuation, consistent with mild chronic small vessel ischemic changes.       CBC:   Recent Labs     05/23/25  0609 05/25/25  0656   WBC 6.6 6.4   HGB 9.3* 9.2*    457*     BMP:    Recent Labs     05/23/25  0609 05/24/25  0539   * 129*   K 3.8 3.6   CL 93* 95*   CO2 27 28   BUN 12 17   CREATININE 0.3* 0.3*   GLUCOSE 95 96     Hepatic:   No results for input(s): \"AST\", \"ALT\", \"BILITOT\", \"ALKPHOS\" in the last 72 hours.    Invalid input(s): \"ALB\"    Lipids:   Lab Results   Component Value Date/Time    CHOL 200 06/22/2017 05:00 PM    HDL 51 06/22/2017 05:00 PM    TRIG 135 06/22/2017 05:00 PM     Hemoglobin A1C:   Lab Results   Component Value Date/Time    LABA1C 5.5 06/22/2017 05:00 PM     TSH:   Lab Results   Component Value Date/Time    TSH 3.42 05/22/2025 06:35 AM     Troponin: No results found for: \"TROPONINT\"  Lactic Acid: No results for input(s): \"LACTA\" in the last 72 hours.  BNP: No results for input(s): \"PROBNP\" in the last 72 hours.  UA:  Lab Results   Component Value Date/Time    NITRU Negative 05/19/2025 12:22 PM    COLORU Yellow 05/19/2025 12:22 PM    PHUR 6.0 05/19/2025 12:22 PM    PHUR 6.5 06/22/2017 05:00 PM    WBCUA None seen 06/13/2014 11:56 AM    RBCUA 0-2 06/13/2014 11:56 AM    CLARITYU Clear 05/19/2025 12:22 PM    SPECGRAV  06/25/2014 09:11 PM      Comment:      ? no color match    LEUKOCYTESUR Negative 05/19/2025 12:22 PM    UROBILINOGEN 1.0 05/19/2025 12:22 PM    BILIRUBINUR Negative 05/19/2025 12:22 PM    BLOODU Negative 05/19/2025 12:22 PM    GLUCOSEU Negative 05/19/2025 12:22 PM    KETUA Negative